# Patient Record
Sex: MALE | Race: WHITE | Employment: OTHER | URBAN - METROPOLITAN AREA
[De-identification: names, ages, dates, MRNs, and addresses within clinical notes are randomized per-mention and may not be internally consistent; named-entity substitution may affect disease eponyms.]

---

## 2017-10-21 ENCOUNTER — APPOINTMENT (OUTPATIENT)
Dept: GENERAL RADIOLOGY | Age: 47
End: 2017-10-21
Payer: MEDICARE

## 2017-10-21 ENCOUNTER — APPOINTMENT (OUTPATIENT)
Dept: CT IMAGING | Age: 47
End: 2017-10-21
Payer: MEDICARE

## 2017-10-21 ENCOUNTER — HOSPITAL ENCOUNTER (EMERGENCY)
Age: 47
Discharge: HOME | End: 2017-10-21
Attending: EMERGENCY MEDICINE
Payer: MEDICARE

## 2017-10-21 VITALS
BODY MASS INDEX: 25.52 KG/M2 | RESPIRATION RATE: 16 BRPM | HEIGHT: 60 IN | DIASTOLIC BLOOD PRESSURE: 98 MMHG | SYSTOLIC BLOOD PRESSURE: 142 MMHG | WEIGHT: 130 LBS | HEART RATE: 76 BPM | OXYGEN SATURATION: 95 % | TEMPERATURE: 97.7 F

## 2017-10-21 DIAGNOSIS — Z93.1 GASTROSTOMY TUBE IN PLACE (HCC): Primary | ICD-10-CM

## 2017-10-21 PROCEDURE — 99283 EMERGENCY DEPT VISIT LOW MDM: CPT

## 2017-10-21 PROCEDURE — 74176 CT ABD & PELVIS W/O CONTRAST: CPT

## 2017-10-21 PROCEDURE — 74000 XR ABDOMEN LIMITED (KUB): CPT

## 2017-10-21 PROCEDURE — 6360000004 HC RX CONTRAST MEDICATION: Performed by: EMERGENCY MEDICINE

## 2017-10-21 RX ORDER — ACETAMINOPHEN 325 MG/1
650 TABLET ORAL EVERY 6 HOURS PRN
Status: ON HOLD | COMMUNITY
End: 2019-04-08

## 2017-10-21 RX ORDER — MELATONIN
1000 2 TIMES DAILY
COMMUNITY

## 2017-10-21 RX ORDER — BISACODYL 10 MG
10 SUPPOSITORY, RECTAL RECTAL DAILY PRN
Status: ON HOLD | COMMUNITY
End: 2019-04-08

## 2017-10-21 RX ORDER — PSEUDOEPHEDRINE HYDROCHLORIDE 30 MG/1
30 TABLET ORAL EVERY 8 HOURS PRN
COMMUNITY

## 2017-10-21 RX ORDER — LEVETIRACETAM 100 MG/ML
1500 SOLUTION ORAL 2 TIMES DAILY
Status: ON HOLD | COMMUNITY
End: 2019-04-16 | Stop reason: HOSPADM

## 2017-10-21 RX ORDER — LACOSAMIDE 50 MG/1
150 TABLET ORAL EVERY MORNING
Status: ON HOLD | COMMUNITY
End: 2019-03-11

## 2017-10-21 RX ORDER — ALBUTEROL SULFATE 2.5 MG/3ML
2.5 SOLUTION RESPIRATORY (INHALATION) 3 TIMES DAILY PRN
Status: ON HOLD | COMMUNITY
End: 2019-03-14 | Stop reason: HOSPADM

## 2017-10-21 RX ORDER — LORATADINE 10 MG/1
10 TABLET ORAL DAILY
Status: ON HOLD | COMMUNITY
End: 2019-04-16 | Stop reason: HOSPADM

## 2017-10-21 RX ORDER — NUTRITIONAL SUPPLEMENT/FIBER
900 LIQUID (ML) ORAL DAILY
COMMUNITY

## 2017-10-21 RX ORDER — CLONAZEPAM 2 MG/1
2 TABLET ORAL PRN
COMMUNITY
End: 2018-01-23

## 2017-10-21 RX ORDER — AMOXICILLIN 250 MG
2 CAPSULE ORAL DAILY
COMMUNITY

## 2017-10-21 RX ORDER — LORAZEPAM 1 MG/1
1 TABLET ORAL PRN
Status: ON HOLD | COMMUNITY
End: 2019-03-11

## 2017-10-21 RX ORDER — LAMOTRIGINE 100 MG/1
100 TABLET ORAL EVERY MORNING
Status: ON HOLD | COMMUNITY
End: 2019-03-14 | Stop reason: HOSPADM

## 2017-10-21 RX ADMIN — DIATRIZOATE MEGLUMINE AND DIATRIZOATE SODIUM 50 ML: 660; 100 LIQUID ORAL; RECTAL at 15:30

## 2017-10-21 ASSESSMENT — ENCOUNTER SYMPTOMS
ABDOMINAL DISTENTION: 0
RESPIRATORY NEGATIVE: 1
EYES NEGATIVE: 1

## 2017-10-21 NOTE — ED PROVIDER NOTES
[address] [place] [address] ED  eMERGENCY dEPARTMENT eNCOUnter      Pt Name: Aron Gonzalez  MRN: [id number]  [place] 1970  Date of evaluation: 10/21/2017  Provider: Jesse Justin MD    [address] [place] [address]       Chief Complaint   Patient presents with    Feeding Tube Problem     malfunction         HISTORY OF PRESENT ILLNESS  (Location/Symptom, Timing/Onset, Context/Setting, Quality, Duration, Modifying Factors, Severity.)   Aron Gonzalez is a 51 y.o. male who presents to the emergency department The complain of having problems with feeding tube nursing home resident sent to this facility      Nursing Notes were reviewed.     PAST MEDICAL HISTORY     Past Medical History:   Diagnosis Date    [facility]erebral [facility] ([facility] [address]     Mental disability     MRDD    Scoliosis     Seizures ([facility] [address]          SURGICAL HISTORY       Past Surgical History:   Procedure Laterality Date    STOMACH SURGERY      has Feeding tube         CURRENT MEDICATIONS       Current Discharge Medication List      CONTINUE these medications which have NOT CHANGED    Details   lamoTRIgine (LAMICTAL) 100 MG tablet 100 mg by Per G Tube route daily      levETIRAcetam (KEPPRA) 100 MG/ML solution Take 1,500 mg by mouth 2 times daily      loratadine (CLARITIN) 10 MG tablet 10 mg by Per G Tube route daily      Lansoprazole (PREVACID 24HR PO) 30 mg by Feeding Tube route daily      pseudoephedrine (SUDAFED) 30 MG tablet 30 mg by Per G Tube route 3 times daily      senna-docusate (PERICOLACE) 8.6-50 MG per tablet 2 tablets by Per G Tube route daily      lacosamide (VIMPAT) 50 MG TABS tablet 100 mg by Per G Tube route 2 times daily      Cholecalciferol (VITAMIN D3) 2000 units CAPS 1,000 Units by Feeding Tube route 2 times daily      Nutritional Supplements (REPLETE/FIBER) LIQD 75 mLs by Feeding Tube route daily      clonazePAM (KLONOPIN) 2 MG tablet 2 mg by Per G Tube route as needed (for seizure greater than 3 min or 3 or more in 1 hour)      LORazepam (ATIVAN) 1 MG tablet Take 1 mg by mouth every 6 hours as needed for Anxiety      acetaminophen (TYLENOL) 325 MG tablet by Per G Tube route every 6 hours as needed for Pain      bisacodyl (DULCOLAX) 10 MG suppository Place 10 mg rectally daily as needed for Constipation      albuterol (PROVENTIL) (2.5 MG/3ML) 0.083% nebulizer solution Take 2.5 mg by nebulization 3 times daily as needed for Wheezing             ALLERGIES     Ampicillin and Valium [diazepam]    FAMILY HISTORY     History reviewed. No pertinent family history. SOCIAL HISTORY       Social History     Social History    Marital status: Single     Spouse name: N/A    Number of children: N/A    Years of education: N/A     Social History Main Topics    Smoking status: Never Smoker    Smokeless tobacco: Never Used    Alcohol use No    Drug use: No    Sexual activity: Not Asked     Other Topics Concern    None     Social History Narrative    None         REVIEW OF SYSTEMS    (2-9 systems for level 4, 10 or more for level 5)     Review of Systems   Constitutional: Positive for fatigue. HENT: Negative. Eyes: Negative. Respiratory: Negative. Cardiovascular: Negative. Gastrointestinal: Negative for abdominal distention. Feediing tube not working  Abdomen not distended,   Feeding tube to be attached to the left side of the epigastric   Musculoskeletal: Negative. Hematological: Negative. Psychiatric/Behavioral: Negative. Except as noted above the remainder of the review of systems was reviewed and negative. PHYSICAL EXAM    (up to 7 for level 4, 8 or more for level 5)     ED Triage Vitals   BP Temp Temp src Pulse Resp SpO2 Height Weight   -- -- -- -- -- -- -- --       Physical Exam   Constitutional: He appears well-developed and well-nourished. Neck: Normal range of motion. Cardiovascular: Normal rate and regular rhythm. Pulmonary/Chest: Breath sounds normal.   Abdominal: Soft.    Neurological:   Doesn't respond to verbal complication

## 2017-11-13 ENCOUNTER — HOSPITAL ENCOUNTER (INPATIENT)
Age: 47
LOS: 8 days | Discharge: OTHER INSTITUTION | DRG: 193 | End: 2017-11-21
Attending: EMERGENCY MEDICINE | Admitting: INTERNAL MEDICINE
Payer: MEDICARE

## 2017-11-13 ENCOUNTER — APPOINTMENT (OUTPATIENT)
Dept: GENERAL RADIOLOGY | Age: 47
DRG: 193 | End: 2017-11-13
Payer: MEDICARE

## 2017-11-13 DIAGNOSIS — J44.1 COPD EXACERBATION (HCC): Primary | ICD-10-CM

## 2017-11-13 LAB
ABSOLUTE EOS #: 0.1 K/UL (ref 0–0.4)
ABSOLUTE IMMATURE GRANULOCYTE: NORMAL K/UL (ref 0–0.3)
ABSOLUTE LYMPH #: 2.8 K/UL (ref 1–4.8)
ABSOLUTE MONO #: 0.7 K/UL (ref 0.2–0.8)
ANION GAP SERPL CALCULATED.3IONS-SCNC: 15 MMOL/L (ref 9–17)
BASOPHILS # BLD: 1 %
BASOPHILS ABSOLUTE: 0 K/UL (ref 0–0.2)
BNP INTERPRETATION: NORMAL
BUN BLDV-MCNC: 14 MG/DL (ref 6–20)
BUN/CREAT BLD: ABNORMAL (ref 9–20)
CALCIUM SERPL-MCNC: 9.3 MG/DL (ref 8.6–10.4)
CHLORIDE BLD-SCNC: 96 MMOL/L (ref 98–107)
CO2: 27 MMOL/L (ref 20–31)
CREAT SERPL-MCNC: <0.4 MG/DL (ref 0.7–1.2)
DIFFERENTIAL TYPE: NORMAL
EOSINOPHILS RELATIVE PERCENT: 2 %
GFR AFRICAN AMERICAN: ABNORMAL ML/MIN
GFR NON-AFRICAN AMERICAN: ABNORMAL ML/MIN
GFR SERPL CREATININE-BSD FRML MDRD: ABNORMAL ML/MIN/{1.73_M2}
GFR SERPL CREATININE-BSD FRML MDRD: ABNORMAL ML/MIN/{1.73_M2}
GLUCOSE BLD-MCNC: 100 MG/DL (ref 70–99)
HCT VFR BLD CALC: 45.7 % (ref 41–53)
HEMOGLOBIN: 15.3 G/DL (ref 13.5–17.5)
IMMATURE GRANULOCYTES: NORMAL %
LACTIC ACID: 1.9 MMOL/L (ref 0.5–2.2)
LYMPHOCYTES # BLD: 44 %
MCH RBC QN AUTO: 32.1 PG (ref 26–34)
MCHC RBC AUTO-ENTMCNC: 33.5 G/DL (ref 31–37)
MCV RBC AUTO: 95.8 FL (ref 80–100)
MONOCYTES # BLD: 11 %
MYOGLOBIN: <21 NG/ML (ref 28–72)
PDW BLD-RTO: 13.4 % (ref 11.5–14.5)
PLATELET # BLD: 253 K/UL (ref 130–400)
PLATELET ESTIMATE: NORMAL
PMV BLD AUTO: 9.3 FL (ref 6–12)
POTASSIUM SERPL-SCNC: 3.9 MMOL/L (ref 3.7–5.3)
PRO-BNP: 58 PG/ML
RBC # BLD: 4.77 M/UL (ref 4.5–5.9)
RBC # BLD: NORMAL 10*6/UL
SEG NEUTROPHILS: 42 %
SEGMENTED NEUTROPHILS ABSOLUTE COUNT: 2.6 K/UL (ref 1.8–7.7)
SODIUM BLD-SCNC: 138 MMOL/L (ref 135–144)
TROPONIN INTERP: ABNORMAL
TROPONIN T: <0.03 NG/ML
WBC # BLD: 6.3 K/UL (ref 3.5–11)
WBC # BLD: NORMAL 10*3/UL

## 2017-11-13 PROCEDURE — 96375 TX/PRO/DX INJ NEW DRUG ADDON: CPT

## 2017-11-13 PROCEDURE — 2580000003 HC RX 258: Performed by: NURSE PRACTITIONER

## 2017-11-13 PROCEDURE — 83880 ASSAY OF NATRIURETIC PEPTIDE: CPT

## 2017-11-13 PROCEDURE — 94640 AIRWAY INHALATION TREATMENT: CPT

## 2017-11-13 PROCEDURE — 6360000002 HC RX W HCPCS: Performed by: NURSE PRACTITIONER

## 2017-11-13 PROCEDURE — 80048 BASIC METABOLIC PNL TOTAL CA: CPT

## 2017-11-13 PROCEDURE — 83605 ASSAY OF LACTIC ACID: CPT

## 2017-11-13 PROCEDURE — 99285 EMERGENCY DEPT VISIT HI MDM: CPT

## 2017-11-13 PROCEDURE — 6370000000 HC RX 637 (ALT 250 FOR IP): Performed by: NURSE PRACTITIONER

## 2017-11-13 PROCEDURE — 1200000000 HC SEMI PRIVATE

## 2017-11-13 PROCEDURE — 83874 ASSAY OF MYOGLOBIN: CPT

## 2017-11-13 PROCEDURE — 96374 THER/PROPH/DIAG INJ IV PUSH: CPT

## 2017-11-13 PROCEDURE — 85025 COMPLETE CBC W/AUTO DIFF WBC: CPT

## 2017-11-13 PROCEDURE — 84484 ASSAY OF TROPONIN QUANT: CPT

## 2017-11-13 PROCEDURE — 87040 BLOOD CULTURE FOR BACTERIA: CPT

## 2017-11-13 PROCEDURE — 71010 XR CHEST PORTABLE: CPT

## 2017-11-13 PROCEDURE — 93005 ELECTROCARDIOGRAM TRACING: CPT

## 2017-11-13 RX ORDER — METHYLPREDNISOLONE SODIUM SUCCINATE 125 MG/2ML
80 INJECTION, POWDER, LYOPHILIZED, FOR SOLUTION INTRAMUSCULAR; INTRAVENOUS EVERY 8 HOURS
Status: DISCONTINUED | OUTPATIENT
Start: 2017-11-14 | End: 2017-11-15

## 2017-11-13 RX ORDER — ALBUTEROL SULFATE 90 UG/1
2 AEROSOL, METERED RESPIRATORY (INHALATION)
Status: DISCONTINUED | OUTPATIENT
Start: 2017-11-13 | End: 2017-11-13

## 2017-11-13 RX ORDER — MONTELUKAST SODIUM 10 MG/1
10 TABLET ORAL NIGHTLY
COMMUNITY

## 2017-11-13 RX ORDER — LAMOTRIGINE 100 MG/1
100 TABLET ORAL DAILY
Status: DISCONTINUED | OUTPATIENT
Start: 2017-11-14 | End: 2017-11-21 | Stop reason: HOSPADM

## 2017-11-13 RX ORDER — MORPHINE SULFATE 4 MG/ML
4 INJECTION, SOLUTION INTRAMUSCULAR; INTRAVENOUS
Status: DISCONTINUED | OUTPATIENT
Start: 2017-11-13 | End: 2017-11-21 | Stop reason: HOSPADM

## 2017-11-13 RX ORDER — METHYLPREDNISOLONE SODIUM SUCCINATE 125 MG/2ML
125 INJECTION, POWDER, LYOPHILIZED, FOR SOLUTION INTRAMUSCULAR; INTRAVENOUS ONCE
Status: COMPLETED | OUTPATIENT
Start: 2017-11-13 | End: 2017-11-13

## 2017-11-13 RX ORDER — BACLOFEN 10 MG/1
10 TABLET ORAL 3 TIMES DAILY
COMMUNITY

## 2017-11-13 RX ORDER — ALBUTEROL SULFATE 2.5 MG/3ML
5 SOLUTION RESPIRATORY (INHALATION)
Status: DISCONTINUED | OUTPATIENT
Start: 2017-11-13 | End: 2017-11-13

## 2017-11-13 RX ORDER — MORPHINE SULFATE 2 MG/ML
2 INJECTION, SOLUTION INTRAMUSCULAR; INTRAVENOUS
Status: DISCONTINUED | OUTPATIENT
Start: 2017-11-13 | End: 2017-11-21 | Stop reason: HOSPADM

## 2017-11-13 RX ORDER — BACLOFEN 10 MG/1
10 TABLET ORAL 3 TIMES DAILY
Status: DISCONTINUED | OUTPATIENT
Start: 2017-11-13 | End: 2017-11-21 | Stop reason: HOSPADM

## 2017-11-13 RX ORDER — BISACODYL 10 MG
10 SUPPOSITORY, RECTAL RECTAL DAILY PRN
Status: DISCONTINUED | OUTPATIENT
Start: 2017-11-13 | End: 2017-11-21 | Stop reason: HOSPADM

## 2017-11-13 RX ORDER — ALBUTEROL SULFATE 2.5 MG/3ML
2.5 SOLUTION RESPIRATORY (INHALATION)
Status: DISCONTINUED | OUTPATIENT
Start: 2017-11-13 | End: 2017-11-15

## 2017-11-13 RX ORDER — POLYETHYLENE GLYCOL 3350 17 G/17G
17 POWDER, FOR SOLUTION ORAL DAILY
Status: ON HOLD | COMMUNITY
End: 2019-03-11

## 2017-11-13 RX ORDER — SODIUM CHLORIDE 0.9 % (FLUSH) 0.9 %
10 SYRINGE (ML) INJECTION PRN
Status: DISCONTINUED | OUTPATIENT
Start: 2017-11-13 | End: 2017-11-21 | Stop reason: HOSPADM

## 2017-11-13 RX ORDER — LACTULOSE 10 G/15ML
20 SOLUTION ORAL 2 TIMES DAILY
Status: ON HOLD | COMMUNITY
End: 2019-03-11

## 2017-11-13 RX ORDER — SODIUM CHLORIDE 0.9 % (FLUSH) 0.9 %
10 SYRINGE (ML) INJECTION EVERY 12 HOURS SCHEDULED
Status: DISCONTINUED | OUTPATIENT
Start: 2017-11-13 | End: 2017-11-21 | Stop reason: HOSPADM

## 2017-11-13 RX ORDER — SODIUM CHLORIDE 9 MG/ML
INJECTION, SOLUTION INTRAVENOUS CONTINUOUS
Status: DISCONTINUED | OUTPATIENT
Start: 2017-11-13 | End: 2017-11-15

## 2017-11-13 RX ORDER — LAMOTRIGINE 100 MG/1
200 TABLET ORAL 2 TIMES DAILY
Status: DISCONTINUED | OUTPATIENT
Start: 2017-11-13 | End: 2017-11-21 | Stop reason: HOSPADM

## 2017-11-13 RX ORDER — ONDANSETRON 2 MG/ML
4 INJECTION INTRAMUSCULAR; INTRAVENOUS EVERY 6 HOURS PRN
Status: DISCONTINUED | OUTPATIENT
Start: 2017-11-13 | End: 2017-11-21 | Stop reason: HOSPADM

## 2017-11-13 RX ORDER — IPRATROPIUM BROMIDE AND ALBUTEROL SULFATE 2.5; .5 MG/3ML; MG/3ML
1 SOLUTION RESPIRATORY (INHALATION)
Status: DISCONTINUED | OUTPATIENT
Start: 2017-11-14 | End: 2017-11-21 | Stop reason: HOSPADM

## 2017-11-13 RX ORDER — IPRATROPIUM BROMIDE AND ALBUTEROL SULFATE 2.5; .5 MG/3ML; MG/3ML
1 SOLUTION RESPIRATORY (INHALATION)
Status: DISCONTINUED | OUTPATIENT
Start: 2017-11-13 | End: 2017-11-13

## 2017-11-13 RX ORDER — CLONAZEPAM 0.5 MG/1
2 TABLET ORAL PRN
Status: DISCONTINUED | OUTPATIENT
Start: 2017-11-13 | End: 2017-11-21 | Stop reason: HOSPADM

## 2017-11-13 RX ORDER — HYDROCODONE BITARTRATE AND ACETAMINOPHEN 5; 325 MG/1; MG/1
1 TABLET ORAL EVERY 4 HOURS PRN
Status: DISCONTINUED | OUTPATIENT
Start: 2017-11-13 | End: 2017-11-21 | Stop reason: HOSPADM

## 2017-11-13 RX ORDER — SODIUM PHOSPHATE, DIBASIC AND SODIUM PHOSPHATE, MONOBASIC 7; 19 G/133ML; G/133ML
1 ENEMA RECTAL DAILY PRN
Status: ON HOLD | COMMUNITY
End: 2019-04-08

## 2017-11-13 RX ORDER — LAMOTRIGINE 100 MG/1
200 TABLET ORAL 2 TIMES DAILY
Status: ON HOLD | COMMUNITY
End: 2019-03-11 | Stop reason: SDUPTHER

## 2017-11-13 RX ORDER — SENNA AND DOCUSATE SODIUM 50; 8.6 MG/1; MG/1
2 TABLET, FILM COATED ORAL DAILY
Status: DISCONTINUED | OUTPATIENT
Start: 2017-11-14 | End: 2017-11-21 | Stop reason: HOSPADM

## 2017-11-13 RX ORDER — NICOTINE 21 MG/24HR
1 PATCH, TRANSDERMAL 24 HOURS TRANSDERMAL DAILY PRN
Status: DISCONTINUED | OUTPATIENT
Start: 2017-11-13 | End: 2017-11-21 | Stop reason: HOSPADM

## 2017-11-13 RX ORDER — CALCIUM CARBONATE 200(500)MG
1000 TABLET,CHEWABLE ORAL 2 TIMES DAILY
COMMUNITY

## 2017-11-13 RX ORDER — LACOSAMIDE 100 MG/1
100 TABLET ORAL 2 TIMES DAILY
Status: DISCONTINUED | OUTPATIENT
Start: 2017-11-13 | End: 2017-11-21 | Stop reason: HOSPADM

## 2017-11-13 RX ORDER — ACETAMINOPHEN 325 MG/1
650 TABLET ORAL EVERY 4 HOURS PRN
Status: DISCONTINUED | OUTPATIENT
Start: 2017-11-13 | End: 2017-11-21 | Stop reason: HOSPADM

## 2017-11-13 RX ORDER — LEVETIRACETAM 100 MG/ML
1500 SOLUTION ORAL 2 TIMES DAILY
Status: DISCONTINUED | OUTPATIENT
Start: 2017-11-13 | End: 2017-11-21 | Stop reason: HOSPADM

## 2017-11-13 RX ORDER — FLUTICASONE PROPIONATE 50 MCG
2 SPRAY, SUSPENSION (ML) NASAL DAILY
COMMUNITY

## 2017-11-13 RX ADMIN — Medication 10 ML: at 23:13

## 2017-11-13 RX ADMIN — AZITHROMYCIN MONOHYDRATE 500 MG: 500 INJECTION, POWDER, LYOPHILIZED, FOR SOLUTION INTRAVENOUS at 20:12

## 2017-11-13 RX ADMIN — LACOSAMIDE 100 MG: 100 TABLET, FILM COATED ORAL at 23:31

## 2017-11-13 RX ADMIN — LAMOTRIGINE 200 MG: 100 TABLET ORAL at 23:31

## 2017-11-13 RX ADMIN — BACLOFEN 10 MG: 10 TABLET ORAL at 22:37

## 2017-11-13 RX ADMIN — SODIUM CHLORIDE: 9 INJECTION, SOLUTION INTRAVENOUS at 22:26

## 2017-11-13 RX ADMIN — LEVETIRACETAM 1500 MG: 100 SOLUTION ORAL at 22:37

## 2017-11-13 RX ADMIN — CEFTRIAXONE SODIUM 1 G: 1 INJECTION, POWDER, FOR SOLUTION INTRAMUSCULAR; INTRAVENOUS at 19:36

## 2017-11-13 RX ADMIN — ALBUTEROL SULFATE 2.5 MG: 2.5 SOLUTION RESPIRATORY (INHALATION) at 18:24

## 2017-11-13 RX ADMIN — METHYLPREDNISOLONE SODIUM SUCCINATE 125 MG: 125 INJECTION, POWDER, FOR SOLUTION INTRAMUSCULAR; INTRAVENOUS at 18:25

## 2017-11-13 ASSESSMENT — ENCOUNTER SYMPTOMS
SORE THROAT: 0
SHORTNESS OF BREATH: 1
COUGH: 1
COLOR CHANGE: 0
ABDOMINAL PAIN: 0
CONSTIPATION: 0
DIARRHEA: 0
WHEEZING: 1
NAUSEA: 0
RHINORRHEA: 0
SINUS PRESSURE: 0
VOMITING: 0

## 2017-11-14 ENCOUNTER — APPOINTMENT (OUTPATIENT)
Dept: GENERAL RADIOLOGY | Age: 47
DRG: 193 | End: 2017-11-14
Payer: MEDICARE

## 2017-11-14 PROBLEM — G40.909 SEIZURE DISORDER (HCC): Status: ACTIVE | Noted: 2017-11-14

## 2017-11-14 PROBLEM — J44.1 ACUTE EXACERBATION OF CHRONIC OBSTRUCTIVE PULMONARY DISEASE (COPD) (HCC): Status: ACTIVE | Noted: 2017-11-14

## 2017-11-14 PROBLEM — G80.9 CEREBRAL PALSIED (HCC): Status: ACTIVE | Noted: 2017-11-14

## 2017-11-14 LAB
ALBUMIN SERPL-MCNC: 4 G/DL (ref 3.5–5.2)
ALBUMIN/GLOBULIN RATIO: ABNORMAL (ref 1–2.5)
ALP BLD-CCNC: 101 U/L (ref 40–129)
ALT SERPL-CCNC: 44 U/L (ref 5–41)
ANION GAP SERPL CALCULATED.3IONS-SCNC: 13 MMOL/L (ref 9–17)
AST SERPL-CCNC: 28 U/L
BILIRUB SERPL-MCNC: 0.17 MG/DL (ref 0.3–1.2)
BUN BLDV-MCNC: 10 MG/DL (ref 6–20)
BUN/CREAT BLD: ABNORMAL (ref 9–20)
CALCIUM SERPL-MCNC: 8.9 MG/DL (ref 8.6–10.4)
CHLORIDE BLD-SCNC: 99 MMOL/L (ref 98–107)
CO2: 26 MMOL/L (ref 20–31)
CREAT SERPL-MCNC: <0.4 MG/DL (ref 0.7–1.2)
EKG ATRIAL RATE: 91 BPM
EKG P AXIS: 53 DEGREES
EKG P-R INTERVAL: 132 MS
EKG Q-T INTERVAL: 394 MS
EKG QRS DURATION: 88 MS
EKG QTC CALCULATION (BAZETT): 484 MS
EKG R AXIS: 124 DEGREES
EKG T AXIS: 45 DEGREES
EKG VENTRICULAR RATE: 91 BPM
GFR AFRICAN AMERICAN: ABNORMAL ML/MIN
GFR NON-AFRICAN AMERICAN: ABNORMAL ML/MIN
GFR SERPL CREATININE-BSD FRML MDRD: ABNORMAL ML/MIN/{1.73_M2}
GFR SERPL CREATININE-BSD FRML MDRD: ABNORMAL ML/MIN/{1.73_M2}
GLUCOSE BLD-MCNC: 134 MG/DL (ref 70–99)
HCT VFR BLD CALC: 44.8 % (ref 41–53)
HEMOGLOBIN: 14.8 G/DL (ref 13.5–17.5)
MCH RBC QN AUTO: 31.5 PG (ref 26–34)
MCHC RBC AUTO-ENTMCNC: 33.2 G/DL (ref 31–37)
MCV RBC AUTO: 94.9 FL (ref 80–100)
PDW BLD-RTO: 13.2 % (ref 11.5–14.5)
PLATELET # BLD: 258 K/UL (ref 130–400)
PMV BLD AUTO: 9.5 FL (ref 6–12)
POTASSIUM SERPL-SCNC: 3.9 MMOL/L (ref 3.7–5.3)
RBC # BLD: 4.72 M/UL (ref 4.5–5.9)
SODIUM BLD-SCNC: 138 MMOL/L (ref 135–144)
TOTAL PROTEIN: 7.2 G/DL (ref 6.4–8.3)
WBC # BLD: 3.5 K/UL (ref 3.5–11)

## 2017-11-14 PROCEDURE — 94640 AIRWAY INHALATION TREATMENT: CPT

## 2017-11-14 PROCEDURE — 80053 COMPREHEN METABOLIC PANEL: CPT

## 2017-11-14 PROCEDURE — 93005 ELECTROCARDIOGRAM TRACING: CPT

## 2017-11-14 PROCEDURE — 6360000002 HC RX W HCPCS: Performed by: NURSE PRACTITIONER

## 2017-11-14 PROCEDURE — 36415 COLL VENOUS BLD VENIPUNCTURE: CPT

## 2017-11-14 PROCEDURE — 2580000003 HC RX 258: Performed by: NURSE PRACTITIONER

## 2017-11-14 PROCEDURE — 2500000003 HC RX 250 WO HCPCS: Performed by: NURSE PRACTITIONER

## 2017-11-14 PROCEDURE — 2700000000 HC OXYGEN THERAPY PER DAY

## 2017-11-14 PROCEDURE — 6370000000 HC RX 637 (ALT 250 FOR IP): Performed by: NURSE PRACTITIONER

## 2017-11-14 PROCEDURE — 71010 XR CHEST PORTABLE: CPT

## 2017-11-14 PROCEDURE — 1200000000 HC SEMI PRIVATE

## 2017-11-14 PROCEDURE — 6370000000 HC RX 637 (ALT 250 FOR IP): Performed by: INTERNAL MEDICINE

## 2017-11-14 PROCEDURE — 99223 1ST HOSP IP/OBS HIGH 75: CPT | Performed by: INTERNAL MEDICINE

## 2017-11-14 PROCEDURE — 94760 N-INVAS EAR/PLS OXIMETRY 1: CPT

## 2017-11-14 PROCEDURE — 85027 COMPLETE CBC AUTOMATED: CPT

## 2017-11-14 RX ORDER — BACITRACIN, NEOMYCIN, POLYMYXIN B 400; 3.5; 5 [USP'U]/G; MG/G; [USP'U]/G
OINTMENT TOPICAL 2 TIMES DAILY
Status: DISCONTINUED | OUTPATIENT
Start: 2017-11-14 | End: 2017-11-21 | Stop reason: HOSPADM

## 2017-11-14 RX ADMIN — VITAMIN D, TAB 1000IU (100/BT) 1000 UNITS: 25 TAB at 10:07

## 2017-11-14 RX ADMIN — BACLOFEN 10 MG: 10 TABLET ORAL at 10:07

## 2017-11-14 RX ADMIN — LEVETIRACETAM 1500 MG: 100 SOLUTION ORAL at 10:08

## 2017-11-14 RX ADMIN — IPRATROPIUM BROMIDE AND ALBUTEROL SULFATE 1 AMPULE: .5; 3 SOLUTION RESPIRATORY (INHALATION) at 15:44

## 2017-11-14 RX ADMIN — AZITHROMYCIN MONOHYDRATE 500 MG: 500 INJECTION, POWDER, LYOPHILIZED, FOR SOLUTION INTRAVENOUS at 20:44

## 2017-11-14 RX ADMIN — ENOXAPARIN SODIUM 40 MG: 40 INJECTION SUBCUTANEOUS at 10:07

## 2017-11-14 RX ADMIN — IPRATROPIUM BROMIDE AND ALBUTEROL SULFATE 1 AMPULE: .5; 3 SOLUTION RESPIRATORY (INHALATION) at 06:19

## 2017-11-14 RX ADMIN — BACITRACIN, NEOMYCIN, POLYMYXIN B 14 G: 400; 3.5; 5 OINTMENT TOPICAL at 20:45

## 2017-11-14 RX ADMIN — BACLOFEN 10 MG: 10 TABLET ORAL at 15:46

## 2017-11-14 RX ADMIN — BACLOFEN 10 MG: 10 TABLET ORAL at 20:45

## 2017-11-14 RX ADMIN — LACOSAMIDE 100 MG: 100 TABLET, FILM COATED ORAL at 20:45

## 2017-11-14 RX ADMIN — IPRATROPIUM BROMIDE AND ALBUTEROL SULFATE 1 AMPULE: .5; 3 SOLUTION RESPIRATORY (INHALATION) at 11:20

## 2017-11-14 RX ADMIN — METHYLPREDNISOLONE SODIUM SUCCINATE 80 MG: 125 INJECTION, POWDER, FOR SOLUTION INTRAMUSCULAR; INTRAVENOUS at 02:00

## 2017-11-14 RX ADMIN — LAMOTRIGINE 200 MG: 100 TABLET ORAL at 20:44

## 2017-11-14 RX ADMIN — LACOSAMIDE 100 MG: 100 TABLET, FILM COATED ORAL at 10:07

## 2017-11-14 RX ADMIN — LANSOPRAZOLE 30 MG: 30 TABLET, ORALLY DISINTEGRATING, DELAYED RELEASE ORAL at 07:19

## 2017-11-14 RX ADMIN — LAMOTRIGINE 200 MG: 100 TABLET ORAL at 10:07

## 2017-11-14 RX ADMIN — IPRATROPIUM BROMIDE AND ALBUTEROL SULFATE 1 AMPULE: .5; 3 SOLUTION RESPIRATORY (INHALATION) at 19:35

## 2017-11-14 RX ADMIN — VITAMIN D, TAB 1000IU (100/BT) 1000 UNITS: 25 TAB at 20:45

## 2017-11-14 RX ADMIN — DOCUSATE SODIUM AND SENNOSIDES 2 TABLET: 8.6; 5 TABLET, FILM COATED ORAL at 10:07

## 2017-11-14 RX ADMIN — LEVETIRACETAM 1500 MG: 100 SOLUTION ORAL at 20:47

## 2017-11-14 RX ADMIN — METHYLPREDNISOLONE SODIUM SUCCINATE 80 MG: 125 INJECTION, POWDER, FOR SOLUTION INTRAMUSCULAR; INTRAVENOUS at 18:34

## 2017-11-14 RX ADMIN — METHYLPREDNISOLONE SODIUM SUCCINATE 80 MG: 125 INJECTION, POWDER, FOR SOLUTION INTRAMUSCULAR; INTRAVENOUS at 10:07

## 2017-11-14 RX ADMIN — LAMOTRIGINE 100 MG: 100 TABLET ORAL at 15:45

## 2017-11-14 RX ADMIN — BACITRACIN, NEOMYCIN, POLYMYXIN B: 400; 3.5; 5 OINTMENT TOPICAL at 15:45

## 2017-11-14 RX ADMIN — CEFTRIAXONE SODIUM 1 G: 10 INJECTION, POWDER, FOR SOLUTION INTRAVENOUS at 18:34

## 2017-11-14 ASSESSMENT — PAIN SCALES - WONG BAKER
WONGBAKER_NUMERICALRESPONSE: 0

## 2017-11-14 NOTE — ED PROVIDER NOTES
[address] [place] [address] ED  eMERGENCY dEPARTMENT eNCOUnter      Pt Name: Manuela Salazar  MRN: [id number]  [place] 1970  Date of evaluation: 11/13/2017  Provider: Ingrid Schultz NP, [id number]       Chief Complaint   Patient presents with    Shortness of Breath         HISTORY OF PRESENT ILLNESS  (Location/Symptom, Timing/Onset, Context/Setting, Quality, Duration, Modifying Factors, Severity.)   Manuela Salazar is a 51 y.o. male who presents to the emergency department Today by private vehicle for evaluation of cough and wheezing. The caregiver states that the patient has had a wet cough with audible wheezing for the last 2 days. The nurses at the group home called 911 because of the wheezing. EMS had given him a breathing treatment prior to arrival He denies any fevers or chills. He is currently getting tube feedings through a G-tube in the stomach. They deny that he's had any nausea or vomiting. Nursing Notes were reviewed.     ALLERGIES     Ampicillin and Valium [diazepam]    CURRENT MEDICATIONS       Previous Medications    ACETAMINOPHEN (TYLENOL) 325 MG TABLET    by Per G Tube route every 6 hours as needed for Pain    ALBUTEROL (PROVENTIL) (2.5 MG/3ML) 0.083% NEBULIZER SOLUTION    Take 2.5 mg by nebulization 3 times daily as needed for Wheezing    BISACODYL (DULCOLAX) 10 MG SUPPOSITORY    Place 10 mg rectally daily as needed for Constipation    CHOLECALCIFEROL (VITAMIN D3) 2000 UNITS CAPS    1,000 Units by Feeding Tube route 2 times daily    CLONAZEPAM (KLONOPIN) 2 MG TABLET    2 mg by Per G Tube route as needed (for seizure greater than 3 min or 3 or more in 1 hour)    LACOSAMIDE (VIMPAT) 50 MG TABS TABLET    100 mg by Per G Tube route 2 times daily    LAMOTRIGINE (LAMICTAL) 100 MG TABLET    100 mg by Per G Tube route daily    LANSOPRAZOLE (PREVACID 24HR PO)    30 mg by Feeding Tube route daily    LEVETIRACETAM (KEPPRA) 100 MG/ML SOLUTION    Take 1,500 mg by mouth 2 times daily HISTORY: Pain TECHNOLOGIST PROVIDED HISTORY: Inject 30-50ml of water soluble contrast to the tube, take  Xray immediately as advised by Radiologist Reason for exam:->Pain Ordering Physician Provided Reason for Exam: pegogram Acuity: Unknown Type of Exam: Unknown FINDINGS: Contrast was injected through the percutaneous enteric tube. Injected contrast appears within the lumen of the bowel. Multiple dilated bowel loops are otherwise unchanged from previous examination. Severe thoracolumbar scoliosis. Deformity of the left hip is unchanged. Injected oral contrast appears within lumen of bowel, indicating appropriate position. Xr Abdomen (kub) (single Ap View)    Result Date: 10/21/2017  EXAMINATION: SUPINE VIEW(S) OF THE ABDOMEN 10/21/2017 1:16 pm COMPARISON: None. HISTORY: ORDERING SYSTEM PROVIDED HISTORY: Pain TECHNOLOGIST PROVIDED HISTORY: Reason for exam:->Pain Acuity: Unknown Type of Exam: Unknown FINDINGS: Examination is somewhat limited due to patient body habitus. There are multiple dilated bowel loops throughout the abdomen or pelvis. Moderate colonic stool burden. Limited evaluation of the intra-abdominal free air on the supine radiograph. Severe thoracolumbar scoliosis. Multilevel degenerative changes of the thoracolumbar spine. Severe diffuse osteopenia. Chronic appearing deformity of the left hip. Multiple dilated bowel loops throughout the abdomen or pelvis may represent ileus, or less likely bowel obstruction. Moderate colonic stool burden. Severe thoracolumbar scoliosis. Chronic deformity of the left hip. Xr Chest Portable    Result Date: 11/13/2017  EXAMINATION: SINGLE VIEW OF THE CHEST 11/13/2017 6:19 pm COMPARISON: None.  HISTORY: ORDERING SYSTEM PROVIDED HISTORY: Chest Pain TECHNOLOGIST PROVIDED HISTORY: Reason for exam:->Chest Pain Ordering Physician Provided Reason for Exam: cough Acuity: Unknown unsure Type of Exam: Unknown Additional signs and symptoms: wheezing Relevant Medical/Surgical History: MRDD  scoliosis  feeding tube FINDINGS: Single frontal view of the chest is submitted for review. Cardiac silhouette is mildly enlarged. Low lung volumes are present resulting in crowding of the bronchopulmonary system. Severe scoliotic curvature of the thoracolumbar spine noted. Generalized osteopenia noted. Low lung volume examination resulting in crowding of the bronchopulmonary interstitium. No obvious focal airspace consolidation or pneumothorax. Severe scoliotic curvature of the thoracolumbar spine. Generalized osteopenia. Interpretation per the Radiologist below, if available at the time of this note:    XR Chest Portable   Final Result   Low lung volume examination resulting in crowding of the bronchopulmonary   interstitium. No obvious focal airspace consolidation or pneumothorax. Severe scoliotic curvature of the thoracolumbar spine. Generalized   osteopenia. LABS:  Labs Reviewed   BASIC METABOLIC PANEL - Abnormal; Notable for the following:        Result Value    Glucose 100 (*)     CREATININE <0.40 (*)     Chloride 96 (*)     All other components within normal limits   TROP/MYOGLOBIN - Abnormal; Notable for the following:     Myoglobin <21 (*)     All other components within normal limits   CULTURE BLOOD #1   CULTURE BLOOD #1   CBC WITH AUTO DIFFERENTIAL   BRAIN NATRIURETIC PEPTIDE   LACTIC ACID       All other labs were within normal range or not returned as of this dictation. EMERGENCY DEPARTMENT COURSE and DIFFERENTIAL DIAGNOSIS/MDM:   Vitals:    Vitals:    11/13/17 1834 11/13/17 1842 11/13/17 1857 11/13/17 1912   BP:  (!) 148/86 (!) 139/97 134/85   Pulse: 92 87 82 86   Resp: 23 26 26 24   SpO2: 98% 94% (!) 89% 95%   Weight:           Medical Decision Making: patient oxygen saturation did drop into the high 80s on room air and he remains wheezy and tachypneic despite breathing treatments and steroids.  He will be admitted to the

## 2017-11-14 NOTE — PLAN OF CARE
Problem: Falls - Risk of  Goal: Absence of falls  Outcome: Ongoing  Patient remains free from injuries and falls, appropriate measures in place       Problem: Risk for Impaired Skin Integrity  Goal: Tissue integrity - skin and mucous membranes  Structural intactness and normal physiological function of skin and  mucous membranes. Outcome: Ongoing  Skin integrity maintained, no new skin abnormalities assessed. Appropriate measures remain in place      Problem: Airway Clearance - Ineffective:  Goal: Ability to maintain a clear airway will improve  Ability to maintain a clear airway will improve  Outcome: Ongoing  Patients respiratory status being controlled at this time with use of respiratory treatments , IV antibiotics and IV soumedrol. No signs or symptoms of distress noted. Respirations non-labored and regular. Nasal canula 02 in place as needed to maintain sp02>90% or per md order.

## 2017-11-14 NOTE — PROGRESS NOTES
Nutrition Assessment    Type and Reason for Visit: Initial, Consult (tube feeding recommendations)    Nutrition Recommendations: 1. Modify tube feed order to run 77 ml/hr x 13 hours per G-tube to infuse entire 1L carton. This will give 1200 kcal, 55.5 g protein, and 805 ml free water. 100 ml 4x/day for flushes if no IVF. 2. Monitor for loose stool. Malnutrition Assessment:  · Malnutrition Status: Meets the criteria for severe malnutrition  · Context: Chronic illness  · Findings of the 6 clinical characteristics of malnutrition (Minimum of 2 out of 6 clinical characteristics is required to make the diagnosis of moderate or severe Protein Calorie Malnutrition based on AND/ASPEN Guidelines):  1. Energy Intake-Greater than 75%, not able to assess    2. Weight Loss-No significant weight loss, unable to assess  3. Fat Loss-Moderate subcutaneous fat loss, Orbital, Triceps  4. Muscle Loss-Moderate muscle mass loss, Temples (temporalis muscle), Clavicles (pectoralis and deltoids), Interosseous, Calf (gastrocnemius)  5. Fluid Accumulation-No significant fluid accumulation, Extremities  6.  Strength-Not measured    Nutrition Diagnosis:   · Problem: Inadequate oral intake  · Etiology: related to Cognitive or neurological impairment     Signs and symptoms:  as evidenced by NPO status due to medical condition, Nutrition support - EN    Nutrition Assessment:  · Subjective Assessment: Pt has cerebral palsy, MRDD. He is laying in bed, and is non-communicative. Pt has been started on tube feed this morning per nursing. Pt was in ER, and transferred out. Spoke with nursing, Deepika Toscano, and no family present. Per Deepika Toscano, pt is taking Nutren with fiber at home @ 75 ml/hr for 13 hours per day. Not certain which Nutren density is used. Will complete assessment.   +Wt loss: 4 lb (1.8 kg) 3% x 3 weeks  · Nutrition-Focused Physical Findings: GI: +gastrostomy, +rounded, +active bowel sounds, +PV: WDL  · Wound Type: None  · Current

## 2017-11-14 NOTE — PROGRESS NOTES
Pt admitted to room 2004 bed 1 from ER. Patient is non-verbal and contracted. Patient repositioned in bed for comfort, seizure pads in place, side rails 3/4 up.

## 2017-11-14 NOTE — H&P
Yes Historical Provider, MD   Potassium Chloride (KLOR-CON SPRINKLE PO) 10 mEq by Per G Tube route daily   Yes Historical Provider, MD   montelukast (SINGULAIR) 10 MG tablet 10 mg by Per G Tube route nightly   Yes Historical Provider, MD   fluticasone (FLONASE) 50 MCG/ACT nasal spray 2 sprays by Nasal route daily   Yes Historical Provider, MD   Multiple Vitamin (DAILY-JAY PO) by Gastric Tube route daily   Yes Historical Provider, MD   calcium carbonate 1250 MG/5ML SUSP suspension 1,250 mg by Per G Tube route daily   Yes Historical Provider, MD   polyethylene glycol (GLYCOLAX) powder 17 g by Per G Tube route daily   Yes Historical Provider, MD   lactulose (CHRONULAC) 10 GM/15ML solution 30 g by Per G Tube route 2 times daily   Yes Historical Provider, MD   Sodium Phosphates (FLEET) 7-19 GM/118ML Place 1 enema rectally daily as needed   Yes Historical Provider, MD   Dextromethorphan-Guaifenesin (ROBAFEN DM PO) by Gastric Tube route   Yes Historical Provider, MD   lamoTRIgine (LAMICTAL) 100 MG tablet 100 mg by Per G Tube route daily    Yes Historical Provider, MD   levETIRAcetam (KEPPRA) 100 MG/ML solution Take 1,500 mg by mouth 2 times daily   Yes Historical Provider, MD   loratadine (CLARITIN) 10 MG tablet 10 mg by Per G Tube route daily   Yes Historical Provider, MD   LORazepam (ATIVAN) 1 MG tablet Take 1 mg by mouth every 6 hours as needed for Anxiety   Yes Historical Provider, MD   Lansoprazole (PREVACID 24HR PO) 30 mg by Feeding Tube route daily   Yes Historical Provider, MD   pseudoephedrine (SUDAFED) 30 MG tablet 30 mg by Per G Tube route 3 times daily   Yes Historical Provider, MD   senna-docusate (PERICOLACE) 8.6-50 MG per tablet 2 tablets by Per G Tube route daily   Yes Historical Provider, MD   lacosamide (VIMPAT) 50 MG TABS tablet 100 mg by Per G Tube route 2 times daily   Yes Historical Provider, MD   Cholecalciferol (VITAMIN D3) 2000 units CAPS 1,000 Units by Feeding Tube route 2 times daily   Yes Historical Provider, MD   Nutritional Supplements (REPLETE/FIBER) LIQD 75 mLs by Feeding Tube route daily   Yes Historical Provider, MD   acetaminophen (TYLENOL) 325 MG tablet by Per G Tube route every 6 hours as needed for Pain   Yes Historical Provider, MD   bisacodyl (DULCOLAX) 10 MG suppository Place 10 mg rectally daily as needed for Constipation   Yes Historical Provider, MD   albuterol (PROVENTIL) (2.5 MG/3ML) 0.083% nebulizer solution Take 2.5 mg by nebulization 3 times daily as needed for Wheezing   Yes Historical Provider, MD   clonazePAM (KLONOPIN) 2 MG tablet 2 mg by Per G Tube route as needed (for seizure greater than 3 min or 3 or more in 1 hour)   Yes Historical Provider, MD        Allergies:     Ampicillin and Valium [diazepam]    Social History:     Tobacco:    reports that he has never smoked. He has never used smokeless tobacco.  Alcohol:      reports that he does not drink alcohol. Drug Use:  reports that he does not use drugs. Family History:     History reviewed. No pertinent family history. Review of Systems:     Unable to obtain since patient nonverbal    Physical Exam:   BP (!) 142/91   Pulse 94   Temp 97.5 °F (36.4 °C) (Oral)   Resp 20   Ht 4' 8\" (1.422 m)   Wt 126 lb (57.2 kg)   SpO2 100%   BMI 28.25 kg/m²   Temp (24hrs), Av.8 °F (36.6 °C), Min:97.3 °F (36.3 °C), Max:98.2 °F (36.8 °C)    No results for input(s): POCGLU in the last 72 hours. No intake or output data in the 24 hours ending 17 1339    General Appearance:  alert, ill appearing, and in no acute distress  Mental status: cerebral palsy  Head:  normocephalic, atraumatic.   Eye: no icterus, redness, pupils equal and reactive, extraocular eye movements intact, conjunctiva clear  Ear: normal external ear, no discharge, hearing intact  Nose:  no drainage noted  Mouth: mucous membranes moist  Neck: supple, no carotid bruits, thyroid not palpable  Lungs: coarse BS bilat  Cardiovascular: normal rate, regular rhythm, no murmur, gallop, rub. Abdomen: Soft, nontender, nondistended, normal bowel sounds, no hepatomegaly or splenomegaly  Neurologic: CP, contractures of all 4 extremities  Skin: No gross lesions, rashes, bruising or bleeding on exposed skin area  Extremities:  peripheral pulses palpable, no pedal edema or calf pain with palpation  Psych: CP      Investigations:      Laboratory Testing:  Recent Results (from the past 24 hour(s))   EKG 12 Lead    Collection Time: 11/13/17  6:10 PM   Result Value Ref Range    Ventricular Rate 91 BPM    Atrial Rate 91 BPM    P-R Interval 132 ms    QRS Duration 88 ms    Q-T Interval 394 ms    QTc Calculation (Bazett) 484 ms    P Axis 53 degrees    R Axis 124 degrees    T Axis 45 degrees   Culture Blood #1    Collection Time: 11/13/17  6:20 PM   Result Value Ref Range    Specimen Description       . BLOOD 4ML RTA CLP Performed at [address] [place] [address] [address] [facility]    Specimen Description  [place], [address] [facility] [phone]     Special Requests NOT REPORTED     Culture NO GROWTH 12 HOURS     Culture       Performed at [facility] [address] [facility], [address] [place] [address] [phone]    Status Pending    CBC Auto Differential    Collection Time: 11/13/17  6:28 PM   Result Value Ref Range    WBC 6.3 3.5 - 11.0 k/uL    RBC 4.77 4.5 - 5.9 m/uL    Hemoglobin 15.3 13.5 - 17.5 g/dL    Hematocrit 45.7 41 - 53 %    MCV 95.8 80 - 100 fL    MCH 32.1 26 - 34 pg    MCHC 33.5 31 - 37 g/dL    RDW 13.4 11.5 - 14.5 %    Platelets 035 186 - 741 k/uL    MPV 9.3 6.0 - 12.0 fL    Differential Type NOT REPORTED     Seg Neutrophils 42 %    Lymphocytes 44 %    Monocytes 11 %    Eosinophils % 2 %    Basophils 1 %    Immature Granulocytes NOT REPORTED 0 %    Segs Absolute 2.60 1.8 - 7.7 k/uL    Absolute Lymph # 2.80 1.0 - 4.8 k/uL    Absolute Mono # 0.70 0.2 - 0.8 k/uL    Absolute Eos # 0.10 0.0 - 0.4 k/uL    Basophils # 0.00 0.0 - 0.2 k/uL    Absolute Immature Granulocyte NOT REPORTED 0.00 - 0.30 k/uL    WBC Morphology NOT REPORTED     RBC Morphology NOT REPORTED     Platelet Estimate NOT REPORTED    Basic Metabolic Panel    Collection Time: 11/13/17  6:28 PM   Result Value Ref Range    Glucose 100 (H) 70 - 99 mg/dL    BUN 14 6 - 20 mg/dL    CREATININE <0.40 (L) 0.70 - 1.20 mg/dL    Bun/Cre Ratio CANNOT BE CALCULATED 9 - 20    Calcium 9.3 8.6 - 10.4 mg/dL    Sodium 138 135 - 144 mmol/L    Potassium 3.9 3.7 - 5.3 mmol/L    Chloride 96 (L) 98 - 107 mmol/L    CO2 27 20 - 31 mmol/L    Anion Gap 15 9 - 17 mmol/L    GFR Non- CANNOT BE CALCULATED >60 mL/min    GFR  CANNOT BE CALCULATED >60 mL/min    GFR Comment          GFR Staging NOT REPORTED    Brain Natriuretic Peptide    Collection Time: 11/13/17  6:28 PM   Result Value Ref Range    Pro-BNP 58 <300 pg/mL    BNP Interpretation         Trop/Myoglobin    Collection Time: 11/13/17  6:28 PM   Result Value Ref Range    Troponin T <0.03 <0.03 ng/mL    Troponin Interp          Myoglobin <21 (L) 28 - 72 ng/mL   Lactic Acid    Collection Time: 11/13/17  6:28 PM   Result Value Ref Range    Lactic Acid 1.9 0.5 - 2.2 mmol/L   Culture Blood #1    Collection Time: 11/13/17  6:30 PM   Result Value Ref Range    Specimen Description       . BLOOD 3ML L HAND CLP Performed at [address]    Specimen Description  [address] [phone]     Special Requests NOT REPORTED     Culture NO GROWTH 12 HOURS     Culture       Performed at [facility] [id number] [place] [address] [phone]    Status Pending    Comprehensive metabolic panel    Collection Time: 11/14/17  5:25 AM   Result Value Ref Range    Glucose 134 (H) 70 - 99 mg/dL    BUN 10 6 - 20 mg/dL    CREATININE <0.40 (L) 0.70 - 1.20 mg/dL    Bun/Cre Ratio CANNOT BE CALCULATED 9 - 20    Calcium 8.9 8.6 - 10.4 mg/dL    Sodium 138 135 - 144 mmol/L    Potassium 3.9 3.7 - 5.3 mmol/L    Chloride 99 98 - 107 mmol/L    CO2 26 20 - 31 mmol/L    Anion

## 2017-11-14 NOTE — FLOWSHEET NOTE
Patient sleeping; no family present.  prayed for patient; left note of support on tray table. Spiritual Care will follow as needed.      11/14/17 1200   Encounter Summary   Services provided to: Patient   Referral/Consult From: Cherrie   Continue Visiting (11/14/17 sleeping)   Complexity of Encounter Low   Length of Encounter 15 minutes   Routine   Type Initial   Assessment Sleeping   Intervention Prayer  (Left note)   Outcome Did not respond

## 2017-11-15 PROBLEM — E43 SEVERE PROTEIN-CALORIE MALNUTRITION (GOMEZ: LESS THAN 60% OF STANDARD WEIGHT) (HCC): Status: ACTIVE | Noted: 2017-11-15

## 2017-11-15 PROBLEM — J18.9 MULTIFOCAL PNEUMONIA: Status: ACTIVE | Noted: 2017-11-15

## 2017-11-15 LAB
EKG ATRIAL RATE: 105 BPM
EKG P AXIS: 61 DEGREES
EKG P-R INTERVAL: 166 MS
EKG Q-T INTERVAL: 386 MS
EKG QRS DURATION: 90 MS
EKG QTC CALCULATION (BAZETT): 510 MS
EKG R AXIS: 127 DEGREES
EKG T AXIS: 44 DEGREES
EKG VENTRICULAR RATE: 105 BPM

## 2017-11-15 PROCEDURE — 89220 SPUTUM SPECIMEN COLLECTION: CPT

## 2017-11-15 PROCEDURE — 87077 CULTURE AEROBIC IDENTIFY: CPT

## 2017-11-15 PROCEDURE — 1200000000 HC SEMI PRIVATE

## 2017-11-15 PROCEDURE — 2500000003 HC RX 250 WO HCPCS: Performed by: NURSE PRACTITIONER

## 2017-11-15 PROCEDURE — 2700000000 HC OXYGEN THERAPY PER DAY

## 2017-11-15 PROCEDURE — 31720 CLEARANCE OF AIRWAYS: CPT

## 2017-11-15 PROCEDURE — 94760 N-INVAS EAR/PLS OXIMETRY 1: CPT

## 2017-11-15 PROCEDURE — 6370000000 HC RX 637 (ALT 250 FOR IP): Performed by: NURSE PRACTITIONER

## 2017-11-15 PROCEDURE — 87070 CULTURE OTHR SPECIMN AEROBIC: CPT

## 2017-11-15 PROCEDURE — 2580000003 HC RX 258: Performed by: NURSE PRACTITIONER

## 2017-11-15 PROCEDURE — 87186 SC STD MICRODIL/AGAR DIL: CPT

## 2017-11-15 PROCEDURE — 87205 SMEAR GRAM STAIN: CPT

## 2017-11-15 PROCEDURE — 94640 AIRWAY INHALATION TREATMENT: CPT

## 2017-11-15 PROCEDURE — 6360000002 HC RX W HCPCS: Performed by: INTERNAL MEDICINE

## 2017-11-15 PROCEDURE — 6370000000 HC RX 637 (ALT 250 FOR IP): Performed by: INTERNAL MEDICINE

## 2017-11-15 PROCEDURE — 99232 SBSQ HOSP IP/OBS MODERATE 35: CPT | Performed by: INTERNAL MEDICINE

## 2017-11-15 PROCEDURE — 6360000002 HC RX W HCPCS: Performed by: NURSE PRACTITIONER

## 2017-11-15 RX ORDER — CETIRIZINE HYDROCHLORIDE 5 MG/1
5 TABLET ORAL DAILY
Status: DISCONTINUED | OUTPATIENT
Start: 2017-11-15 | End: 2017-11-21 | Stop reason: HOSPADM

## 2017-11-15 RX ORDER — FLUTICASONE PROPIONATE 50 MCG
2 SPRAY, SUSPENSION (ML) NASAL DAILY
Status: DISCONTINUED | OUTPATIENT
Start: 2017-11-15 | End: 2017-11-15

## 2017-11-15 RX ORDER — METHYLPREDNISOLONE SODIUM SUCCINATE 40 MG/ML
40 INJECTION, POWDER, LYOPHILIZED, FOR SOLUTION INTRAMUSCULAR; INTRAVENOUS EVERY 12 HOURS
Status: DISCONTINUED | OUTPATIENT
Start: 2017-11-15 | End: 2017-11-19

## 2017-11-15 RX ORDER — POLYETHYLENE GLYCOL 3350 17 G/17G
17 POWDER, FOR SOLUTION ORAL DAILY
Status: DISCONTINUED | OUTPATIENT
Start: 2017-11-15 | End: 2017-11-15 | Stop reason: CLARIF

## 2017-11-15 RX ORDER — ALBUTEROL SULFATE 2.5 MG/3ML
2.5 SOLUTION RESPIRATORY (INHALATION)
Status: DISCONTINUED | OUTPATIENT
Start: 2017-11-15 | End: 2017-11-15

## 2017-11-15 RX ORDER — MONTELUKAST SODIUM 10 MG/1
10 TABLET ORAL NIGHTLY
Status: DISCONTINUED | OUTPATIENT
Start: 2017-11-15 | End: 2017-11-21 | Stop reason: HOSPADM

## 2017-11-15 RX ORDER — LACTULOSE 10 G/15ML
30 SOLUTION ORAL 2 TIMES DAILY
Status: DISCONTINUED | OUTPATIENT
Start: 2017-11-15 | End: 2017-11-21 | Stop reason: HOSPADM

## 2017-11-15 RX ORDER — POLYETHYLENE GLYCOL 3350 17 G/17G
17 POWDER, FOR SOLUTION ORAL DAILY
Status: DISCONTINUED | OUTPATIENT
Start: 2017-11-15 | End: 2017-11-21 | Stop reason: HOSPADM

## 2017-11-15 RX ORDER — GUAIFENESIN/DEXTROMETHORPHAN 100-10MG/5
10 SYRUP ORAL EVERY 6 HOURS PRN
Status: DISCONTINUED | OUTPATIENT
Start: 2017-11-15 | End: 2017-11-21 | Stop reason: HOSPADM

## 2017-11-15 RX ORDER — ALBUTEROL SULFATE 2.5 MG/3ML
2.5 SOLUTION RESPIRATORY (INHALATION) EVERY 4 HOURS PRN
Status: DISCONTINUED | OUTPATIENT
Start: 2017-11-15 | End: 2017-11-21 | Stop reason: HOSPADM

## 2017-11-15 RX ORDER — MULTIVITAMIN WITH FOLIC ACID 400 MCG
1 TABLET ORAL DAILY
Status: DISCONTINUED | OUTPATIENT
Start: 2017-11-15 | End: 2017-11-21 | Stop reason: HOSPADM

## 2017-11-15 RX ORDER — FUROSEMIDE 10 MG/ML
20 INJECTION INTRAMUSCULAR; INTRAVENOUS ONCE
Status: COMPLETED | OUTPATIENT
Start: 2017-11-15 | End: 2017-11-15

## 2017-11-15 RX ORDER — CALCIUM CARBONATE 1250 MG/5ML
1250 SUSPENSION ORAL DAILY
Status: DISCONTINUED | OUTPATIENT
Start: 2017-11-16 | End: 2017-11-16 | Stop reason: CLARIF

## 2017-11-15 RX ADMIN — AZITHROMYCIN MONOHYDRATE 500 MG: 500 INJECTION, POWDER, LYOPHILIZED, FOR SOLUTION INTRAVENOUS at 19:42

## 2017-11-15 RX ADMIN — METHYLPREDNISOLONE SODIUM SUCCINATE 80 MG: 125 INJECTION, POWDER, FOR SOLUTION INTRAMUSCULAR; INTRAVENOUS at 02:32

## 2017-11-15 RX ADMIN — LACTULOSE 30 G: 20 SOLUTION ORAL at 22:20

## 2017-11-15 RX ADMIN — LACTULOSE 30 G: 20 SOLUTION ORAL at 13:50

## 2017-11-15 RX ADMIN — FUROSEMIDE 20 MG: 10 INJECTION, SOLUTION INTRAMUSCULAR; INTRAVENOUS at 13:50

## 2017-11-15 RX ADMIN — CEFTRIAXONE SODIUM 1 G: 10 INJECTION, POWDER, FOR SOLUTION INTRAVENOUS at 19:42

## 2017-11-15 RX ADMIN — BACLOFEN 10 MG: 10 TABLET ORAL at 22:12

## 2017-11-15 RX ADMIN — MONTELUKAST SODIUM 10 MG: 10 TABLET, FILM COATED ORAL at 22:12

## 2017-11-15 RX ADMIN — DOCUSATE SODIUM AND SENNOSIDES 2 TABLET: 8.6; 5 TABLET, FILM COATED ORAL at 07:47

## 2017-11-15 RX ADMIN — LACOSAMIDE 100 MG: 100 TABLET, FILM COATED ORAL at 07:46

## 2017-11-15 RX ADMIN — BACLOFEN 10 MG: 10 TABLET ORAL at 13:51

## 2017-11-15 RX ADMIN — IPRATROPIUM BROMIDE AND ALBUTEROL SULFATE 1 AMPULE: .5; 3 SOLUTION RESPIRATORY (INHALATION) at 14:51

## 2017-11-15 RX ADMIN — CETIRIZINE HYDROCHLORIDE 5 MG: 5 TABLET, FILM COATED ORAL at 13:50

## 2017-11-15 RX ADMIN — BACITRACIN, NEOMYCIN, POLYMYXIN B 14 G: 400; 3.5; 5 OINTMENT TOPICAL at 07:47

## 2017-11-15 RX ADMIN — MULTIVITAMIN TABLET 1 TABLET: TABLET at 13:50

## 2017-11-15 RX ADMIN — SODIUM CHLORIDE: 9 INJECTION, SOLUTION INTRAVENOUS at 02:30

## 2017-11-15 RX ADMIN — BACLOFEN 10 MG: 10 TABLET ORAL at 07:46

## 2017-11-15 RX ADMIN — GUAIFENESIN AND DEXTROMETHORPHAN 10 ML: 100; 10 SYRUP ORAL at 13:50

## 2017-11-15 RX ADMIN — LANSOPRAZOLE 30 MG: 30 TABLET, ORALLY DISINTEGRATING, DELAYED RELEASE ORAL at 07:46

## 2017-11-15 RX ADMIN — ENOXAPARIN SODIUM 40 MG: 40 INJECTION SUBCUTANEOUS at 07:46

## 2017-11-15 RX ADMIN — VITAMIN D, TAB 1000IU (100/BT) 1000 UNITS: 25 TAB at 07:46

## 2017-11-15 RX ADMIN — LACOSAMIDE 100 MG: 100 TABLET, FILM COATED ORAL at 22:12

## 2017-11-15 RX ADMIN — BACITRACIN, NEOMYCIN, POLYMYXIN B 14 G: 400; 3.5; 5 OINTMENT TOPICAL at 22:13

## 2017-11-15 RX ADMIN — LAMOTRIGINE 200 MG: 100 TABLET ORAL at 07:47

## 2017-11-15 RX ADMIN — HYDROCODONE BITARTRATE AND ACETAMINOPHEN 1 TABLET: 5; 325 TABLET ORAL at 07:50

## 2017-11-15 RX ADMIN — IPRATROPIUM BROMIDE AND ALBUTEROL SULFATE 1 AMPULE: .5; 3 SOLUTION RESPIRATORY (INHALATION) at 07:09

## 2017-11-15 RX ADMIN — LEVETIRACETAM 1500 MG: 100 SOLUTION ORAL at 22:13

## 2017-11-15 RX ADMIN — IPRATROPIUM BROMIDE AND ALBUTEROL SULFATE 1 AMPULE: .5; 3 SOLUTION RESPIRATORY (INHALATION) at 20:16

## 2017-11-15 RX ADMIN — LEVETIRACETAM 1500 MG: 100 SOLUTION ORAL at 07:46

## 2017-11-15 RX ADMIN — IPRATROPIUM BROMIDE AND ALBUTEROL SULFATE 1 AMPULE: .5; 3 SOLUTION RESPIRATORY (INHALATION) at 10:59

## 2017-11-15 RX ADMIN — METHYLPREDNISOLONE SODIUM SUCCINATE 40 MG: 40 INJECTION, POWDER, FOR SOLUTION INTRAMUSCULAR; INTRAVENOUS at 22:21

## 2017-11-15 RX ADMIN — LAMOTRIGINE 200 MG: 100 TABLET ORAL at 22:13

## 2017-11-15 RX ADMIN — LAMOTRIGINE 100 MG: 100 TABLET ORAL at 07:51

## 2017-11-15 RX ADMIN — VITAMIN D, TAB 1000IU (100/BT) 1000 UNITS: 25 TAB at 22:12

## 2017-11-15 RX ADMIN — POLYETHYLENE GLYCOL 3350 17 G: 17 POWDER, FOR SOLUTION ORAL at 13:50

## 2017-11-15 ASSESSMENT — PAIN SCALES - WONG BAKER
WONGBAKER_NUMERICALRESPONSE: 0

## 2017-11-15 ASSESSMENT — PAIN SCALES - GENERAL: PAINLEVEL_OUTOF10: 5

## 2017-11-15 NOTE — PROGRESS NOTES
[facility] Assessment complete. COPD exacerbation ([facility] [address] [J44.1] . Vitals:    11/15/17 1126   BP: 113/62   Pulse: 107   Resp: 20   Temp: 98.2 °F (36.8 °C)   SpO2: 98%   . Patients home meds are   Prior to Admission medications    Medication Sig Start Date End Date Taking?  Authorizing Provider   baclofen (LIORESAL) 10 MG tablet 10 mg by Per G Tube route 3 times daily   Yes Historical Provider, MD   lamoTRIgine (LAMICTAL) 100 MG tablet 200 mg by Per G Tube route 2 times daily    Yes Historical Provider, MD   Potassium Chloride (KLOR-CON SPRINKLE PO) 10 mEq by Per G Tube route daily   Yes Historical Provider, MD   montelukast (SINGULAIR) 10 MG tablet 10 mg by Per G Tube route nightly   Yes Historical Provider, MD   fluticasone (FLONASE) 50 MCG/ACT nasal spray 2 sprays by Nasal route daily   Yes Historical Provider, MD   Multiple Vitamin (DAILY-JAY PO) by Gastric Tube route daily   Yes Historical Provider, MD   calcium carbonate 1250 MG/5ML SUSP suspension 1,250 mg by Per G Tube route daily   Yes Historical Provider, MD   polyethylene glycol (GLYCOLAX) powder 17 g by Per G Tube route daily   Yes Historical Provider, MD   lactulose (CHRONULAC) 10 GM/15ML solution 30 g by Per G Tube route 2 times daily   Yes Historical Provider, MD   Sodium Phosphates (FLEET) 7-19 GM/118ML Place 1 enema rectally daily as needed   Yes Historical Provider, MD   Dextromethorphan-Guaifenesin (ROBAFEN DM PO) by Gastric Tube route   Yes Historical Provider, MD   lamoTRIgine (LAMICTAL) 100 MG tablet 100 mg by Per G Tube route every morning    Yes Historical Provider, MD   levETIRAcetam (KEPPRA) 100 MG/ML solution Take 1,500 mg by mouth 2 times daily   Yes Historical Provider, MD   loratadine (CLARITIN) 10 MG tablet 10 mg by Per G Tube route daily   Yes Historical Provider, MD   LORazepam (ATIVAN) 1 MG tablet Take 1 mg by mouth every 6 hours as needed for Anxiety   Yes Historical Provider, MD   Lansoprazole (PREVACID 24HR PO) 30

## 2017-11-15 NOTE — PROGRESS NOTES
96*  99   CO2  27  26   GLUCOSE  100*  134*   BUN  14  10   CREATININE  <0.40*  <0.40*   CALCIUM  9.3  8.9     Recent Labs      11/14/17   0525   PROT  7.2   LABALBU  4.0   AST  28   ALT  44*   ALKPHOS  101   BILITOT  0.17*       Lab Results   Component Value Date/Time    SPECIAL NOT REPORTED 11/13/2017 06:30 PM     Lab Results   Component Value Date/Time    CULTURE NO GROWTH 2 DAYS 11/13/2017 06:30 PM    CULTURE  11/13/2017 06:30 PM     Performed at [facility] [id number] [place] [address], [address] [place] [address] [phone]       [facility]    Radiology:    CXR:    Impression:        Low lung volume examination resulting in crowding of the bronchopulmonary  interstitium.  No obvious focal airspace consolidation or pneumothorax. Severe scoliotic curvature of the thoracolumbar spine.  Generalized  osteopenia         Physical Examination:        General appearance:  alert, cooperative and in mild respiratory distress  Mental Status:  Awake, doesn't follow commands or speak  Lungs:  Coarse BS bilat  Heart:  regular rate and rhythm, no murmur  Abdomen:  soft, nontender, nondistended, normal bowel sounds, no masses, hepatomegaly, splenomegaly  Extremities:  no edema, + contractures  Skin:  + erythema around Peg-tube    Assessment:        Primary Problem  Acute exacerbation of chronic obstructive pulmonary disease (COPD) ([address]    Active Hospital Problems    Diagnosis Date Noted    Severe protein-calorie malnutrition Imani Pester: less than 60% of standard weight) ([facility] [E43] 11/15/2017    Multifocal pneumonia [J18.9] 11/15/2017    Acute exacerbation of chronic obstructive pulmonary disease (COPD) ([facility] [J44.1] 11/14/2017    Seizure disorder ([facility] [G40.909] 11/14/2017    Cerebral palsied ([facility] [G80.9] 11/14/2017       Plan:        1. COPD exac/ Multifocal pneumonia- Looked at Chest xray with Dr. Cadence Mckay, there appears to be bibasilar pneumonia.   On IV antibiotics, agree with stopping IVF, Lasix x 1, aerosols, f/u CXR tomorrow,

## 2017-11-15 NOTE — PLAN OF CARE
Problem: Falls - Risk of  Goal: Absence of falls  Outcome: Ongoing  Siderails up x 2  Hourly rounding. Call light in reach. Instructed to call for assist before attempting out of bed. Remains free from falls and accidental injury at this time. Floor free from obstacles, and bed is locked and in lowest position. Adequate lighting provided. Bed alarm on for safety. Problem: Risk for Impaired Skin Integrity  Goal: Tissue integrity - skin and mucous membranes  Structural intactness and normal physiological function of skin and  mucous membranes.    Outcome: Ongoing      Problem: Airway Clearance - Ineffective:  Goal: Ability to maintain a clear airway will improve  Ability to maintain a clear airway will improve   Outcome: Ongoing

## 2017-11-15 NOTE — PLAN OF CARE
[facility]    Second Visit Note  For more detailed information please refer to the progress note of the day      11/15/2017    3:17 PM    Name:   Bernadine David  MRN:     [id number]     Alejandralyside:      [de-identified]   Room:   2004/2004-02  IP Day:  2  Admit Date:  11/13/2017  6:08 PM    PCP:   Guerline Bailey  Code Status:  Full Code        Pt vitals were reviewed   New labs were reviewed   Patient was seen    Updated plan :     1. Patient still appears SOB  2.  Will f/u CXR in am        Susan Servin MD  11/15/2017  3:17 PM

## 2017-11-16 ENCOUNTER — APPOINTMENT (OUTPATIENT)
Dept: GENERAL RADIOLOGY | Age: 47
DRG: 193 | End: 2017-11-16
Payer: MEDICARE

## 2017-11-16 PROCEDURE — 2700000000 HC OXYGEN THERAPY PER DAY

## 2017-11-16 PROCEDURE — 6370000000 HC RX 637 (ALT 250 FOR IP): Performed by: NURSE PRACTITIONER

## 2017-11-16 PROCEDURE — 2500000003 HC RX 250 WO HCPCS: Performed by: NURSE PRACTITIONER

## 2017-11-16 PROCEDURE — 94640 AIRWAY INHALATION TREATMENT: CPT

## 2017-11-16 PROCEDURE — 6370000000 HC RX 637 (ALT 250 FOR IP): Performed by: INTERNAL MEDICINE

## 2017-11-16 PROCEDURE — G8988 SELF CARE GOAL STATUS: HCPCS

## 2017-11-16 PROCEDURE — G8987 SELF CARE CURRENT STATUS: HCPCS

## 2017-11-16 PROCEDURE — 94761 N-INVAS EAR/PLS OXIMETRY MLT: CPT

## 2017-11-16 PROCEDURE — 1200000000 HC SEMI PRIVATE

## 2017-11-16 PROCEDURE — 99232 SBSQ HOSP IP/OBS MODERATE 35: CPT | Performed by: INTERNAL MEDICINE

## 2017-11-16 PROCEDURE — 6360000002 HC RX W HCPCS: Performed by: NURSE PRACTITIONER

## 2017-11-16 PROCEDURE — 6360000002 HC RX W HCPCS: Performed by: INTERNAL MEDICINE

## 2017-11-16 PROCEDURE — 97535 SELF CARE MNGMENT TRAINING: CPT

## 2017-11-16 PROCEDURE — 97166 OT EVAL MOD COMPLEX 45 MIN: CPT

## 2017-11-16 PROCEDURE — G8989 SELF CARE D/C STATUS: HCPCS

## 2017-11-16 PROCEDURE — 2580000003 HC RX 258: Performed by: NURSE PRACTITIONER

## 2017-11-16 PROCEDURE — 71010 XR CHEST PORTABLE: CPT

## 2017-11-16 RX ORDER — CALCIUM CARBONATE 200(500)MG
1250 TABLET,CHEWABLE ORAL DAILY
Status: DISCONTINUED | OUTPATIENT
Start: 2017-11-16 | End: 2017-11-21 | Stop reason: HOSPADM

## 2017-11-16 RX ADMIN — ANTACID TABLETS 1250 MG: 500 TABLET, CHEWABLE ORAL at 10:04

## 2017-11-16 RX ADMIN — CETIRIZINE HYDROCHLORIDE 5 MG: 5 TABLET, FILM COATED ORAL at 07:49

## 2017-11-16 RX ADMIN — BACITRACIN, NEOMYCIN, POLYMYXIN B 14 G: 400; 3.5; 5 OINTMENT TOPICAL at 07:50

## 2017-11-16 RX ADMIN — ENOXAPARIN SODIUM 40 MG: 40 INJECTION SUBCUTANEOUS at 07:49

## 2017-11-16 RX ADMIN — BACLOFEN 10 MG: 10 TABLET ORAL at 07:49

## 2017-11-16 RX ADMIN — BACLOFEN 10 MG: 10 TABLET ORAL at 14:04

## 2017-11-16 RX ADMIN — METHYLPREDNISOLONE SODIUM SUCCINATE 40 MG: 40 INJECTION, POWDER, FOR SOLUTION INTRAMUSCULAR; INTRAVENOUS at 21:56

## 2017-11-16 RX ADMIN — LACTULOSE 30 G: 20 SOLUTION ORAL at 07:51

## 2017-11-16 RX ADMIN — Medication 10 ML: at 20:02

## 2017-11-16 RX ADMIN — POLYETHYLENE GLYCOL 3350 17 G: 17 POWDER, FOR SOLUTION ORAL at 07:50

## 2017-11-16 RX ADMIN — MONTELUKAST SODIUM 10 MG: 10 TABLET, FILM COATED ORAL at 21:57

## 2017-11-16 RX ADMIN — LAMOTRIGINE 200 MG: 100 TABLET ORAL at 21:57

## 2017-11-16 RX ADMIN — VITAMIN D, TAB 1000IU (100/BT) 1000 UNITS: 25 TAB at 21:57

## 2017-11-16 RX ADMIN — LAMOTRIGINE 200 MG: 100 TABLET ORAL at 07:51

## 2017-11-16 RX ADMIN — BACLOFEN 10 MG: 10 TABLET ORAL at 21:57

## 2017-11-16 RX ADMIN — MULTIVITAMIN TABLET 1 TABLET: TABLET at 07:49

## 2017-11-16 RX ADMIN — MORPHINE SULFATE 2 MG: 2 INJECTION, SOLUTION INTRAMUSCULAR; INTRAVENOUS at 10:57

## 2017-11-16 RX ADMIN — LEVETIRACETAM 1500 MG: 100 SOLUTION ORAL at 07:48

## 2017-11-16 RX ADMIN — Medication 10 ML: at 07:52

## 2017-11-16 RX ADMIN — CEFTRIAXONE SODIUM 1 G: 10 INJECTION, POWDER, FOR SOLUTION INTRAVENOUS at 17:49

## 2017-11-16 RX ADMIN — IPRATROPIUM BROMIDE AND ALBUTEROL SULFATE 1 AMPULE: .5; 3 SOLUTION RESPIRATORY (INHALATION) at 15:31

## 2017-11-16 RX ADMIN — METHYLPREDNISOLONE SODIUM SUCCINATE 40 MG: 40 INJECTION, POWDER, FOR SOLUTION INTRAMUSCULAR; INTRAVENOUS at 10:04

## 2017-11-16 RX ADMIN — BACITRACIN, NEOMYCIN, POLYMYXIN B 14 G: 400; 3.5; 5 OINTMENT TOPICAL at 21:57

## 2017-11-16 RX ADMIN — LACTULOSE 30 G: 20 SOLUTION ORAL at 21:56

## 2017-11-16 RX ADMIN — LEVETIRACETAM 1500 MG: 100 SOLUTION ORAL at 21:57

## 2017-11-16 RX ADMIN — DOCUSATE SODIUM AND SENNOSIDES 2 TABLET: 8.6; 5 TABLET, FILM COATED ORAL at 07:49

## 2017-11-16 RX ADMIN — LAMOTRIGINE 100 MG: 100 TABLET ORAL at 07:49

## 2017-11-16 RX ADMIN — AZITHROMYCIN MONOHYDRATE 500 MG: 500 INJECTION, POWDER, LYOPHILIZED, FOR SOLUTION INTRAVENOUS at 20:02

## 2017-11-16 RX ADMIN — LANSOPRAZOLE 30 MG: 30 TABLET, ORALLY DISINTEGRATING, DELAYED RELEASE ORAL at 07:49

## 2017-11-16 RX ADMIN — IPRATROPIUM BROMIDE AND ALBUTEROL SULFATE 1 AMPULE: .5; 3 SOLUTION RESPIRATORY (INHALATION) at 19:04

## 2017-11-16 RX ADMIN — VITAMIN D, TAB 1000IU (100/BT) 1000 UNITS: 25 TAB at 07:49

## 2017-11-16 RX ADMIN — IPRATROPIUM BROMIDE AND ALBUTEROL SULFATE 1 AMPULE: .5; 3 SOLUTION RESPIRATORY (INHALATION) at 11:39

## 2017-11-16 RX ADMIN — IPRATROPIUM BROMIDE AND ALBUTEROL SULFATE 1 AMPULE: .5; 3 SOLUTION RESPIRATORY (INHALATION) at 07:41

## 2017-11-16 RX ADMIN — LACOSAMIDE 100 MG: 100 TABLET, FILM COATED ORAL at 07:49

## 2017-11-16 RX ADMIN — LACOSAMIDE 100 MG: 100 TABLET, FILM COATED ORAL at 21:57

## 2017-11-16 ASSESSMENT — PAIN SCALES - GENERAL
PAINLEVEL_OUTOF10: 0
PAINLEVEL_OUTOF10: 5

## 2017-11-16 NOTE — PLAN OF CARE
Problem: Falls - Risk of  Goal: Absence of falls  Outcome: Ongoing      Problem: Risk for Impaired Skin Integrity  Goal: Tissue integrity - skin and mucous membranes  Structural intactness and normal physiological function of skin and  mucous membranes.    Outcome: Ongoing      Problem: Airway Clearance - Ineffective:  Goal: Ability to maintain a clear airway will improve  Ability to maintain a clear airway will improve   Outcome: Ongoing    Goal: Clear lung sounds  Clear lung sounds   Outcome: Ongoing      Problem: Gas Exchange - Impaired:  Goal: Levels of oxygenation will improve  Levels of oxygenation will improve   Outcome: Ongoing      Problem: Nutrition  Goal: Optimal nutrition therapy  Nutrition Problem: Inadequate oral intake  Intervention: Food and/or Nutrient Delivery: Continue NPO, Modify rate/Continue current Tube Feeding  Nutritional Goals: EN to meet > 75% of estimated kcal/protein needs with good GI tolerance     Outcome: Ongoing      Problem: Pain:  Goal: Pain level will decrease  Pain level will decrease   Outcome: Ongoing      Problem: ABCDS Injury Assessment  Goal: Absence of physical injury  Outcome: Ongoing

## 2017-11-16 NOTE — PLAN OF CARE
Problem: Falls - Risk of  Goal: Absence of falls  Outcome: Ongoing  Siderails up x 2  Hourly rounding. Call light in reach. Instructed to call for assist before attempting out of bed. Remains free from falls and accidental injury at this time. Floor free from obstacles, and bed is locked and in lowest position. Adequate lighting provided. Bed alarm on for safety. Problem: Risk for Impaired Skin Integrity  Goal: Tissue integrity - skin and mucous membranes  Structural intactness and normal physiological function of skin and  mucous membranes. Outcome: Ongoing  Frequent turning and repositioning. Cleaning with foam cleanser with each brief change. Problem: Airway Clearance - Ineffective:  Goal: Ability to maintain a clear airway will improve  Ability to maintain a clear airway will improve   Outcome: Ongoing      Problem: Gas Exchange - Impaired:  Goal: Levels of oxygenation will improve  Levels of oxygenation will improve   Outcome: Ongoing      Problem: Pain:  Goal: Pain level will decrease  Pain level will decrease   Outcome: Ongoing  Pain level assessment complete. Pt educated on pain scale and control interventions. PRN pain medication given per pt request.   Pt instructed to call out with new onset of pain or unrelieved pain.

## 2017-11-16 NOTE — PROGRESS NOTES
route daily   Yes Historical Provider, MD   pseudoephedrine (SUDAFED) 30 MG tablet 30 mg by Per G Tube route 3 times daily   Yes Historical Provider, MD   senna-docusate (Bethel Singer) 8.6-50 MG per tablet 2 tablets by Per G Tube route daily   Yes Historical Provider, MD   lacosamide (VIMPAT) 50 MG TABS tablet 100 mg by Per G Tube route 2 times daily   Yes Historical Provider, MD   Cholecalciferol (VITAMIN D3) 2000 units CAPS 1,000 Units by Feeding Tube route 2 times daily   Yes Historical Provider, MD   Nutritional Supplements (REPLETE/FIBER) LIQD 75 mLs by Feeding Tube route daily   Yes Historical Provider, MD   acetaminophen (TYLENOL) 325 MG tablet by Per G Tube route every 6 hours as needed for Pain   Yes Historical Provider, MD   bisacodyl (DULCOLAX) 10 MG suppository Place 10 mg rectally daily as needed for Constipation   Yes Historical Provider, MD   albuterol (PROVENTIL) (2.5 MG/3ML) 0.083% nebulizer solution Take 2.5 mg by nebulization 3 times daily as needed for Wheezing   Yes Historical Provider, MD   clonazePAM (KLONOPIN) 2 MG tablet 2 mg by Per G Tube route as needed (for seizure greater than 3 min or 3 or more in 1 hour)   Yes Historical Provider, MD   .  Recent Surgical History: None = 0     Assessment       FEV1/FVC    FEV1 Predicted unable MRDD      FEV1     V1 % Predicted   FVC   IS volume   IBW   FIO2% 3lpm  SPO2 985  RR 20  Breath Sounds: dim/sl dim      · Bronchodilator assessment at level  3  · Hyperinflation assessment at level   · Secretion Management assessment at level    ·   · [x]    Bronchodilator Assessment  BRONCHODILATOR ASSESSMENT SCORE  Score 0 1 2 3 4 5   Breath Sounds   []  Patient Baseline []  No Wheeze good aeration []  Faint, scattered wheezing, good aeration [x]  Expiratory Wheezing and or moderately diminished []  Insp/Exp wheeze and/or very diminished []  Insp/Exp and/ or marked distress   Respiratory Rate   []  Patient Baseline []  Less than 20 []  Less than 20 [x]  20-25 []  Greater than 25 []  Greater than 25   Peak flow % of Pred or PB []  NA   []  Greater than 90%  []  81-90% []  71-80% []  Less than or equal to 70%  or unable to perform []  Unable due to Respiratory Distress   Dyspnea re []  Patient Baseline []  No SOB []  No SOB [x]  SOB on exertion []  SOB min activity []  At rest/acute   e FEV% Predicted       []  NA []  Above 69%  []  Unable []  Above 60-69%  []  Unable []  Above 50-59%  [x]  Unable []  Above 35-49%  []  Unable []  Less than 35%  []  Unable                 []  Hyperinflation Assessment  Score 1 2 3   CXR and Breath Sounds   []  Clear []  No atelectasis  Basilar aeration []  Atelectasis or absent basilar breath sounds   Incentive Spirometry Volume  (Per IBW)   []  Greater than or equal to 15ml/Kg []  less than 15ml/Kg []  less than 15ml/Kg   Surgery within last 2 weeks []  None or general   []  Abdominal or thoracic surgery  []  Abdominal or thoracic   Chronic Pulmonary Historyre []  No []  Yes []  Yes     []  Secretion Management Assessment  Score 1 2 3   Bilateral Breath Sounds   []  Occasional Rhonchi []  Scattered Rhonchi []  Course Rhonchi and/or poor aeration   Sputum    []  Small amount of thin secretions []  Moderate amount of viscous secretions []  Copius, Viscious Yellow/ Secretions   CXR as reported by physician []  clear  []  Unavailable []  Infiltrates and/or consolidation  []  Unavailable []  Mucus Plugging and or lobar consolidation  []  Unavailable   Cough []  Strong, productive cough []  Weak productive cough []  No cough or weak non-productive cough

## 2017-11-16 NOTE — PROGRESS NOTES
[facility]    Progress Note    11/16/2017    10:51 AM    Name:   Jay Ann  MRN:     [id number]     Alejandralyside:      [de-identified]   Room:   2004/2004-02  IP Day:  3  Admit Date:  11/13/2017  6:08 PM    PCP:   Pilar Jonas  Code Status:  Full Code    Subjective:     C/C:   Chief Complaint   Patient presents with    Shortness of Breath     Interval History Status: not changed. Patient is still SOB today. He is non-verbal.      Brief History:     The patient is a 51 y.o. Unavailable/unknown male who presents with Shortness of Breath   and he is admitted to the hospital for the management of  COPD exacerbation.     Per the ED report Yahaira Brown a 51 y. o. male who presents to the emergency department Today by private vehicle for evaluation of cough and wheezing.  The caregiver states that the patient has had a wet cough with audible wheezing for the last 2 days.  The nurses at the group home called 911 because of the wheezing.  EMS had given him a breathing treatment prior to arrival He denies any fevers or chills.  He is currently getting tube feedings through a G-tube in the stomach.  They deny that he's had any nausea or vomiting'     He is nonverbal. He was found to have bibasilar pneumonia. Pulmonary was consulted. Review of Systems:     Unable to obtain since he is non-verbal, CP    Medications: Allergies:     Allergies   Allergen Reactions    Ampicillin Other (See Comments)    Valium [Diazepam] Other (See Comments)    Other      Bubble bath products        Current Meds:   Scheduled Meds:    calcium carbonate  1,250 mg Per G Tube Daily    methylPREDNISolone  40 mg Intravenous Q12H    lactulose  30 g Per G Tube BID    cetirizine  5 mg Oral Daily    montelukast  10 mg Per G Tube Nightly    multivitamin  1 tablet Per G Tube Daily    polyethylene glycol  17 g Per G Tube Daily    neomycin-bacitracin-polymyxin   Topical BID    sodium chloride flush  10 mL Intravenous 2 times per day    enoxaparin  40 mg Subcutaneous Daily    ipratropium-albuterol  1 ampule Inhalation Q4H WA    baclofen  10 mg Per G Tube TID    vitamin D  1,000 Units Per G Tube BID    levETIRAcetam  1,500 mg Oral BID    lansoprazole  30 mg Per G Tube QAM AC    sennosides-docusate sodium  2 tablet Per G Tube Daily    azithromycin  500 mg Intravenous Q24H    cefTRIAXone (ROCEPHIN) IV  1 g Intravenous Q24H    lacosamide  100 mg Per G Tube BID    lamoTRIgine  100 mg Per G Tube Daily    lamoTRIgine  200 mg Per G Tube BID     Continuous Infusions:    PRN Meds: guaiFENesin-dextromethorphan, albuterol, sodium chloride flush, acetaminophen, HYDROcodone 5 mg - acetaminophen, morphine **OR** morphine, magnesium hydroxide, bisacodyl, ondansetron, nicotine, clonazePAM    Data:     Past Medical History:   has a past medical history of Cerebral palsy ([address] Cholelithiasis with chronic cholecystitis; Constipation; Delayed gastric emptying; Dysphagia; GERD (gastroesophageal reflux disease); Hearing loss; Mental disability; Profound mental retardation; Scoliosis; Seizures ([address] and 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[address] Social History:   reports that he has never smoked. He has never used smokeless tobacco. He reports that he does not drink alcohol or use drugs. Family History: History reviewed. No pertinent family history. Vitals:  /76   Pulse 106   Temp 97.9 °F (36.6 °C)   Resp 18   Ht 4' 8\" (1.422 m)   Wt 121 lb 4.8 oz (55 kg)   SpO2 96%   BMI 27.19 kg/m²   Temp (24hrs), Av.2 °F (36.8 °C), Min:97.9 °F (36.6 °C), Max:98.4 °F (36.9 °C)    No results for input(s): POCGLU in the last 72 hours. I/O (24Hr):     Intake/Output Summary (Last 24 hours) at 17 1051  Last data filed at 17 0810   Gross per 24 hour   Intake             1703 ml   Output                0 ml   Net             1703 ml       Labs:    Hematology:  Recent Labs      17   3037 11/14/17   0525   WBC  6.3  3.5   HGB  15.3  14.8   HCT  45.7  44.8   PLT  253  258     Chemistry:  Recent Labs      11/13/17   1828  11/14/17   0525   NA  138  138   K  3.9  3.9   CL  96*  99   CO2  27  26   GLUCOSE  100*  134*   BUN  14  10   CREATININE  <0.40*  <0.40*   CALCIUM  9.3  8.9     Recent Labs      11/14/17   0525   PROT  7.2   LABALBU  4.0   AST  28   ALT  44*   ALKPHOS  101   BILITOT  0.17*       Lab Results   Component Value Date/Time    SPECIAL NOT REPORTED 11/15/2017 11:30 AM     Lab Results   Component Value Date/Time    CULTURE Pending 11/15/2017 11:30 AM       @[facility]@    Radiology:    CXR:    Impression:        Low lung volume examination resulting in crowding of the bronchopulmonary  interstitium.  No obvious focal airspace consolidation or pneumothorax. Severe scoliotic curvature of the thoracolumbar spine.  Generalized  osteopenia         Physical Examination:        General appearance:  alert, cooperative and in mild respiratory distress  Mental Status:  Awake, doesn't follow commands or speak  Lungs:  Coarse BS bilat  Heart:  regular rate and rhythm, no murmur  Abdomen:  soft, nontender, nondistended, normal bowel sounds, no masses, hepatomegaly, splenomegaly  Extremities:  no edema, + contractures  Skin:  + erythema around Peg-tube    Assessment:        Primary Problem  Acute exacerbation of chronic obstructive pulmonary disease (COPD) ([facility]    Active Hospital Problems    Diagnosis Date Noted    Severe protein-calorie malnutrition Praful Ibrahim: less than 60% of standard weight) ([facility] [E43] 11/15/2017    Multifocal pneumonia [J18.9] 11/15/2017    Acute exacerbation of chronic obstructive pulmonary disease ([facility]D) ([facility] [J44.1] 11/14/2017    Seizure disorder ([facility] [G40.909] 11/14/2017    Cerebral palsied ([facility] [G80.9] 11/14/2017       Plan:        1. COPD exac/ Multifocal pneumonia- Appears to be bibasilar pneumonia.   On IV antibiotics, agree with stopping IVF, aerosols, f/u CXR today,

## 2017-11-16 NOTE — PROGRESS NOTES
Patient with excessive oral secretions, oral suction performed. Will continue to monitor patient closely.

## 2017-11-16 NOTE — PROGRESS NOTES
PROGRESS NOTE: PULMONARY & CRITICAL CARE             Nasim Brown MD     Patient - Yumiko Cortez   MRN -  [id number]   Zac # - [de-identified]   - 1970      Date of Admission -  2017  6:08 PM  Date of evaluation -  2017    Admit Date: 2017    Subjective:WORK OF BREATHING IS SLIGHTLY BETTER, AUDIBLE WHEEZE IS IMPROVING. Objective:   Vitals: /76   Pulse 106   Temp 97.9 °F (36.6 °C)   Resp 18   Ht 4' 8\" (1.422 m)   Wt 121 lb 4.8 oz (55 kg)   SpO2 96%   BMI 27.19 kg/m²   General appearance: alert, comfortable  Skin: Skin color, texture, turgor normal. No rash  HEENT: Head: Normocephalic, no lesions, without obvious abnormality. Neck:  Supple, no JVD, normal on examination  Lungs: diminished breath sounds bilaterally and wheezes bilaterally  Heart: regular rate and rhythm, S1, S2 normal, P2 sounds normal  ABDOMEN: soft, bowel sounds positive, soft  Extremities: CONTRACTED, SOME EDEMA. NO CELLULITIS;   SKIN: turgor normal, no rash seen, BETTER TODAY. Neurologic: Mental status: CEREBRAL PALSY, ALERT;      Data:   Scheduled Meds: Reviewed  Continuous Infusions:     Intake/Output Summary (Last 24 hours) at 17 1023  Last data filed at 17 0810   Gross per 24 hour   Intake             1703 ml   Output                0 ml   Net             1703 ml     CBC:   Recent Labs      17   0525   WBC  3.5   HGB  14.8   PLT  258     BMP:  Recent Labs      17   0525   NA  138   K  3.9   CL  99   CO2  26   BUN  10   CREATININE  <0.40*   GLUCOSE  134*       -TODAY CXR PENDING; WILL REVIEW      Assessment/Discussion & Plan     ACUTE MF PNEUMONIA  HYPOXEMIA  WHEEZING AND INCREASED WORK OF BREATHING  CEREBRAL PALSY    BETTER WITH BREATHING TODAY, STILL HAS COARSE CRACKLES, LESS WHEEZING. CXR PENDING.    RN WITH ME, NEXT 24 HRS, KEEP IV MEDS, AND STEROIDS, THE POSSIBLE SWITCH TO G TUBE AND GROUP HOME, IF OK WITH

## 2017-11-16 NOTE — PROGRESS NOTES
Occupational Therapy   Occupational Therapy Initial Assessment  Date: 2017   Patient Name: Linda Gutierrez  MRN: [id number]     : 1970     Discharge Recommendations:  Home with assist PRN (Per , Pt receives PT and OT 2x/week. Pt lives in group home with ADL assistance PRN)  Pt with no acute care OT needs at this time. Pt would benefit from continued sub-acute OT services. RN reports patient is medically stable for therapy treatment this date. Chart reviewed prior to treatment and patient is agreeable for therapy. All lines intact and patient positioned comfortably at end of treatment. All patient needs addressed prior to ending therapy session. Note copied from ED: Linda Gutierrez is a 51 y.o. male who presents to the emergency department Today by private vehicle for evaluation of cough and wheezing. The caregiver states that the patient has had a wet cough with audible wheezing for the last 2 days. The nurses at the group home called 911 because of the wheezing. EMS had given him a breathing treatment prior to arrival He denies any fevers or chills. He is currently getting tube feedings through a G-tube in the stomach. They deny that he's had any nausea or vomiting. Patient Diagnosis(es): The encounter diagnosis was COPD exacerbation ([address] has a past medical history of Cerebral palsy ([address] Cholelithiasis with chronic cholecystitis; Constipation; Delayed gastric emptying; Dysphagia; GERD (gastroesophageal reflux disease); Hearing loss; Mental disability; Profound mental retardation; Scoliosis; Seizures ([address] and 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[address] has a past surgical history that includes Stomach surgery.     Treatment Diagnosis: COPD exacerbation, Cerebral Palsy, Seizure disorder      Restrictions  Restrictions/Precautions  Restrictions/Precautions: General Precautions, Fall Risk  Position Activity Restriction  Other position/activity restrictions: Pt may progress activity as long as spO2 >92, up as tolerated    Subjective   General  Chart Reviewed: No  Patient assessed for rehabilitation services?: Yes  Family / Caregiver Present: No  Pain Assessment  Patient Currently in Pain: Yes  Pain Assessment: FLACC  Dias-Weber Pain Rating: No hurt  Pain Level: 5  Response to Pain Intervention: Asleep with RR greater than 10  Oxygen Therapy  SpO2: 96 %  Pulse Oximeter Device Mode: Continuous  Pulse Oximeter Device Location: Finger  O2 Device: Nasal cannula  O2 Flow Rate (L/min): 3 L/min  Social/Functional History  Social/Functional History  Lives With: Other (comment)  Type of Home: Trailer (Group Home)  ADL Assistance:  (Pt has visiting OT 2x/week)  Ambulation Assistance:  (Pt has visiting PT 2x/week)  Active : No  Occupation: On disability  Leisure & Hobbies: Pt attend adult  daily. Additional Comments: All information obtained from  note     Objective   Vision:  (KERRIE)  Hearing:  (Pt directed eye/head towards writer sporadically when writer speaking)    Orientation  Overall Orientation Status:  (KERRIE)     Balance  Sitting Balance: Dependent/Total  Standing Balance:  (Pt not medically appropriate for assessment OOB)  ADL  Feeding: Other (Comment); Dependent/Total (PEG)  Grooming: Maximum assistance  UE Bathing: Dependent/Total  LE Bathing: Dependent/Total  UE Dressing: Dependent/Total  LE Dressing: Dependent/Total  Toileting: Dependent/Total  Tone RUE  RUE Tone: Hypertonic  Tone LUE  LUE Tone: Hypertonic  Quality of Movement Other  Comment: Pt not following commands to assess coordination throughout UE     Bed mobility  Rolling to Left: Dependent/Total  Rolling to Right: Dependent/Total  Supine to Sit: Dependent/Total  Sit to Supine: Dependent/Total  Scooting: Dependent/Total     Cognition  Overall Cognitive Status: Exceptions  Following Commands: Does not follow commands  Safety Judgement: Decreased awareness of need for safety;Decreased awareness of need for assistance  Initiation: Requires cues for some  Sequencing: Requires cues for some  Cognition Comment: Pt writhing in bed to remove nasal cannula. Pt noted to grind teeth at times of distress (e.g. material buildup in mouth). Pt able to move material in mouth toward lips in order to place material on washcloth. Sensation  Overall Sensation Status:  (KERRIE)      LUE PROM (degrees)  LUE PROM: Exceptions  LUE General PROM: Pt with minimal passive movement throughout UE at time of exam.  LUE AROM (degrees)  LUE General AROM: Pt not following commands to assess AROM throughout UE  Left Hand PROM (degrees)  Left Hand General PROM: Pt with minimal passive movement throughout UE at time of exam.  Left Hand AROM (degrees)  Left Hand General AROM: Pt not following commands to assess AROM throughout UE  RUE PROM (degrees)  RUE General PROM: Pt with minimal passive movement throughout UE at time of exam.  RUE AROM (degrees)  RUE General AROM: Pt not following commands to assess AROM throughout UE  Right Hand PROM (degrees)  Right Hand General PROM: Pt with minimal passive movement throughout UE at time of exam.  Right Hand AROM (degrees)  Right Hand General AROM: Pt not following commands to assess AROM throughout UE  LUE Strength  Gross LUE Strength: Exceptions to WFL (1/5)  L Hand Grasp: 1/5  RUE Strength  Gross RUE Strength:  (1/5)  R Hand Grasp: 1/5     Assessment   Performance deficits / Impairments: Decreased functional mobility ; Decreased ADL status; Decreased ROM; Decreased strength;Decreased safe awareness;Decreased cognition;Decreased endurance;Decreased balance;Decreased high-level IADLs;Decreased fine motor control;Decreased coordination (Pt at baseline for all performance area per RN.)  Assessment: Pt supine in bed upon arrival. Pt noted to grind teeth and vocalize during ROM/MMT. Pt oral care performed with use of suction and washcloth to remove secretions.  Pt demo roll-left, roll-right, renata-care, scoot to [facility] bedpan, or urinal?      1      2        3      4   4. Putting on and taking off regular upper body clothing? 1      2      3       4   5. Taking care of personal grooming such as brushing teeth? 1      2      3      4   6. Eating meals? 1      2       3       4     1. Unable = Total/Dependent Assist  2. A Lot = Maximum/Moderate Assist  3. A Little = Minimum/Contact Guard Assist/Supervision  4.  None= Modified [place]/Independent    Raw Score Scale Score Scale Score Standard Error Approximate Degree of Functional Impairment     6 17.07 3.74 100%   7 20.13 3.68 92%   8 22.86 3.43 86%   9 25.33 3.17 80%   10 27.31 2.96 75%   11 29.04 2.79 70%   12 30.60 2.68 67%   13 32.03 2.62 63%   14 33.39 2.61 60%   15 34.69 2.65 56%   16 35.96 2.71 53%   17 37.26 2.82 50%   18 38.66 2.97 47%   19 40.22 3.20 43%   20 42.03 3.55 38%   21 44.27 4.08 33%   22 47.10 4.81 26%   23 51.12 5.88 16%   24 57.54 7.36 0%     Therapy Time   Individual Concurrent Group Co-treatment   Time In 0835         Time Out 0854         Minutes Henry Diadema [address] ASHLEE Hazel/L

## 2017-11-17 ENCOUNTER — APPOINTMENT (OUTPATIENT)
Dept: CT IMAGING | Age: 47
DRG: 193 | End: 2017-11-17
Payer: MEDICARE

## 2017-11-17 LAB
KEPPRA: 62 UG/ML
LAMOTRIGINE LEVEL: 9.6 UG/ML (ref 3–15)

## 2017-11-17 PROCEDURE — G0480 DRUG TEST DEF 1-7 CLASSES: HCPCS

## 2017-11-17 PROCEDURE — 94620 HC 6-MINUTE WALK TEST/PULM STRESS TEST SIMPLE: CPT

## 2017-11-17 PROCEDURE — 6360000002 HC RX W HCPCS: Performed by: NURSE PRACTITIONER

## 2017-11-17 PROCEDURE — 2700000000 HC OXYGEN THERAPY PER DAY

## 2017-11-17 PROCEDURE — 2500000003 HC RX 250 WO HCPCS: Performed by: NURSE PRACTITIONER

## 2017-11-17 PROCEDURE — 6360000002 HC RX W HCPCS: Performed by: INTERNAL MEDICINE

## 2017-11-17 PROCEDURE — 80175 DRUG SCREEN QUAN LAMOTRIGINE: CPT

## 2017-11-17 PROCEDURE — 94761 N-INVAS EAR/PLS OXIMETRY MLT: CPT

## 2017-11-17 PROCEDURE — 6370000000 HC RX 637 (ALT 250 FOR IP): Performed by: INTERNAL MEDICINE

## 2017-11-17 PROCEDURE — 2580000003 HC RX 258: Performed by: NURSE PRACTITIONER

## 2017-11-17 PROCEDURE — 6370000000 HC RX 637 (ALT 250 FOR IP): Performed by: NURSE PRACTITIONER

## 2017-11-17 PROCEDURE — 99232 SBSQ HOSP IP/OBS MODERATE 35: CPT | Performed by: INTERNAL MEDICINE

## 2017-11-17 PROCEDURE — 1200000000 HC SEMI PRIVATE

## 2017-11-17 PROCEDURE — 94640 AIRWAY INHALATION TREATMENT: CPT

## 2017-11-17 PROCEDURE — 80177 DRUG SCRN QUAN LEVETIRACETAM: CPT

## 2017-11-17 PROCEDURE — 70450 CT HEAD/BRAIN W/O DYE: CPT

## 2017-11-17 PROCEDURE — 36415 COLL VENOUS BLD VENIPUNCTURE: CPT

## 2017-11-17 RX ORDER — LORAZEPAM 2 MG/ML
1 INJECTION INTRAMUSCULAR EVERY 4 HOURS PRN
Status: DISCONTINUED | OUTPATIENT
Start: 2017-11-17 | End: 2017-11-21 | Stop reason: HOSPADM

## 2017-11-17 RX ORDER — LORAZEPAM 2 MG/ML
1 INJECTION INTRAMUSCULAR EVERY 4 HOURS PRN
Status: DISCONTINUED | OUTPATIENT
Start: 2017-11-17 | End: 2017-11-17

## 2017-11-17 RX ADMIN — LANSOPRAZOLE 30 MG: 30 TABLET, ORALLY DISINTEGRATING, DELAYED RELEASE ORAL at 08:14

## 2017-11-17 RX ADMIN — LACTULOSE 30 G: 20 SOLUTION ORAL at 08:13

## 2017-11-17 RX ADMIN — LAMOTRIGINE 200 MG: 100 TABLET ORAL at 08:13

## 2017-11-17 RX ADMIN — BACLOFEN 10 MG: 10 TABLET ORAL at 08:14

## 2017-11-17 RX ADMIN — LACOSAMIDE 100 MG: 100 TABLET, FILM COATED ORAL at 08:13

## 2017-11-17 RX ADMIN — LACTULOSE 30 G: 20 SOLUTION ORAL at 21:53

## 2017-11-17 RX ADMIN — METHYLPREDNISOLONE SODIUM SUCCINATE 40 MG: 40 INJECTION, POWDER, FOR SOLUTION INTRAMUSCULAR; INTRAVENOUS at 10:17

## 2017-11-17 RX ADMIN — IPRATROPIUM BROMIDE AND ALBUTEROL SULFATE 1 AMPULE: .5; 3 SOLUTION RESPIRATORY (INHALATION) at 11:55

## 2017-11-17 RX ADMIN — MULTIVITAMIN TABLET 1 TABLET: TABLET at 08:12

## 2017-11-17 RX ADMIN — ENOXAPARIN SODIUM 40 MG: 40 INJECTION SUBCUTANEOUS at 08:14

## 2017-11-17 RX ADMIN — DOCUSATE SODIUM AND SENNOSIDES 2 TABLET: 8.6; 5 TABLET, FILM COATED ORAL at 08:14

## 2017-11-17 RX ADMIN — LAMOTRIGINE 100 MG: 100 TABLET ORAL at 08:13

## 2017-11-17 RX ADMIN — BACITRACIN, NEOMYCIN, POLYMYXIN B 14 G: 400; 3.5; 5 OINTMENT TOPICAL at 21:55

## 2017-11-17 RX ADMIN — LACOSAMIDE 100 MG: 100 TABLET, FILM COATED ORAL at 21:52

## 2017-11-17 RX ADMIN — IPRATROPIUM BROMIDE AND ALBUTEROL SULFATE 1 AMPULE: .5; 3 SOLUTION RESPIRATORY (INHALATION) at 08:15

## 2017-11-17 RX ADMIN — LEVETIRACETAM 1500 MG: 100 SOLUTION ORAL at 21:51

## 2017-11-17 RX ADMIN — ANTACID TABLETS 1250 MG: 500 TABLET, CHEWABLE ORAL at 08:14

## 2017-11-17 RX ADMIN — LEVETIRACETAM 1500 MG: 100 SOLUTION ORAL at 08:14

## 2017-11-17 RX ADMIN — IPRATROPIUM BROMIDE AND ALBUTEROL SULFATE 1 AMPULE: .5; 3 SOLUTION RESPIRATORY (INHALATION) at 16:32

## 2017-11-17 RX ADMIN — Medication 10 ML: at 21:00

## 2017-11-17 RX ADMIN — POLYETHYLENE GLYCOL 3350 17 G: 17 POWDER, FOR SOLUTION ORAL at 08:14

## 2017-11-17 RX ADMIN — BACLOFEN 10 MG: 10 TABLET ORAL at 14:19

## 2017-11-17 RX ADMIN — CEFTRIAXONE SODIUM 1 G: 10 INJECTION, POWDER, FOR SOLUTION INTRAVENOUS at 21:40

## 2017-11-17 RX ADMIN — VITAMIN D, TAB 1000IU (100/BT) 1000 UNITS: 25 TAB at 21:55

## 2017-11-17 RX ADMIN — LORAZEPAM 1 MG: 2 INJECTION, SOLUTION INTRAMUSCULAR; INTRAVENOUS at 10:17

## 2017-11-17 RX ADMIN — MONTELUKAST SODIUM 10 MG: 10 TABLET, FILM COATED ORAL at 21:51

## 2017-11-17 RX ADMIN — AZITHROMYCIN MONOHYDRATE 500 MG: 500 INJECTION, POWDER, LYOPHILIZED, FOR SOLUTION INTRAVENOUS at 21:40

## 2017-11-17 RX ADMIN — IPRATROPIUM BROMIDE AND ALBUTEROL SULFATE 1 AMPULE: .5; 3 SOLUTION RESPIRATORY (INHALATION) at 20:36

## 2017-11-17 RX ADMIN — CETIRIZINE HYDROCHLORIDE 5 MG: 5 TABLET, FILM COATED ORAL at 08:14

## 2017-11-17 RX ADMIN — Medication 10 ML: at 08:48

## 2017-11-17 RX ADMIN — BACITRACIN, NEOMYCIN, POLYMYXIN B 14 G: 400; 3.5; 5 OINTMENT TOPICAL at 08:48

## 2017-11-17 RX ADMIN — LAMOTRIGINE 200 MG: 100 TABLET ORAL at 21:52

## 2017-11-17 RX ADMIN — BACLOFEN 10 MG: 10 TABLET ORAL at 21:53

## 2017-11-17 RX ADMIN — VITAMIN D, TAB 1000IU (100/BT) 1000 UNITS: 25 TAB at 08:13

## 2017-11-17 NOTE — PROGRESS NOTES
· Feeding Route: Gastrostomy  · Formula: Standard w/Fiber  · Rate [email or web address] ml/hr    · Volume (ml/day): 975 ml  · Duration: Cyclic (13 hours)  · TF Residuals: None recorded  · Water Flushes: Per Nursing Protocol  · Current TF & Flush Orders Provides: 1170 kcal, 54 g protein   · Goal TF & Flush Orders Provides: @77 ml/hr x 13 hrs: 1,200 kcal, 55.5 g protein  · Additional Calories: None  · Anthropometric Measures:  · Ht: 4' 8\" (142.2 cm)   · Admission Body Wt: 126 lb 1.7 oz (57.2 kg)  · Usual Body Wt: 130 lb (59 kg)  · % Weight Change: 3%,  3 weeks  · Ideal Body Wt: 96 lb (43.5 kg), % Ideal Body 131%  · BMI Classification: BMI 25.0 - 29.9 Overweight (28)  · Comparative Standards (Estimated Nutrition Needs):  · Estimated Daily Total Kcal: 4204-3990  · Estimated Daily Protein (g): 47-55  · Estimated Daily Fluid (ml/day): 1963-9893    Estimated Intake vs Estimated Needs: Intake Less Than Needs    Nutrition Risk Level: High    Nutrition Interventions:   Continue NPO, Continue current Tube Feeding  Continued Inpatient Monitoring, Coordination of Care    Nutrition Evaluation:   · Evaluation: Goals set   · Goals: EN to meet > 75% of estimated kcal/protein needs with good GI tolerance   · Monitoring: Diet Progression, TF Intake, TF Tolerance, Gastric Residuals, Ascites/Edema, Mental Status/Confusion, Weight, Pertinent Labs, Chewing/Swallowing, Diarrhea    See Adult Nutrition Doc Flowsheet for more detail.      Electronically signed by Mir Vera RD, BLAZE on 11/17/17 at 9:12 AM    Contact Number: [phone]

## 2017-11-17 NOTE — PROGRESS NOTES
congestion. No discrete area of consolidation.  No pleural effusion or pneumothorax.           Impression   Possible mild vascular congestion.  No focal consolidation. Assessment/Discussion & Plan     ACUTE MF PNEUMONIA  HYPOXEMIA  WHEEZING AND INCREASED WORK OF BREATHING  CEREBRAL PALSY    ONLY MILD IMPROVEMENT, STILL HAS INCREASED WORK OF BREATHING, SKIN BETTER TURGOR, I WILL KEEP ALL AGGRESSIVE MEASURES FOR NEXT 24 HRS.  RN INFORMED    Electronically signed by Haley Pulido MD on 11/17/2017 at 11:41 AM  Keagan Turner MD  PULMONARY, [address]   [phone]

## 2017-11-17 NOTE — CARE COORDINATION
Dc planning    Received call from Henry Ya 1154 at [facility]. Updated Shoshana Pradip with pt's status. Pt will need to return home with new 02. Shoshana Moseley will discuss with pt's sister Marin Stover if she wants pt to be discharged to [facility], where there is RN present for monitoring pt's status or back to group home with new home 02. Called and left voicemail for Marin Stover to discuss discharge plan. Informed Marin Stover pt will need oxygen at discharge and will need DME company. Informed Rikki Milton will be faxed to [facility] as known provider for medicare. Requested Marin Stover to call back for coordination of care.

## 2017-11-17 NOTE — PROGRESS NOTES
[facility] Assessment complete. COPD exacerbation ([address] [J44.1] . Vitals:    11/17/17 1530   BP: 125/86   Pulse: 95   Resp: 18   Temp: 98.2 °F (36.8 °C)   SpO2: 97%   . Patients home meds are   Prior to Admission medications    Medication Sig Start Date End Date Taking?  Authorizing Provider   baclofen (LIORESAL) 10 MG tablet 10 mg by Per G Tube route 3 times daily   Yes Historical Provider, MD   lamoTRIgine (LAMICTAL) 100 MG tablet 200 mg by Per G Tube route 2 times daily    Yes Historical Provider, MD   Potassium Chloride (KLOR-CON SPRINKLE PO) 10 mEq by Per G Tube route daily   Yes Historical Provider, MD   montelukast (SINGULAIR) 10 MG tablet 10 mg by Per G Tube route nightly   Yes Historical Provider, MD   fluticasone (FLONASE) 50 MCG/ACT nasal spray 2 sprays by Nasal route daily   Yes Historical Provider, MD   Multiple Vitamin (DAILY-JAY PO) by Gastric Tube route daily   Yes Historical Provider, MD   calcium carbonate 1250 MG/5ML SUSP suspension 1,250 mg by Per G Tube route daily   Yes Historical Provider, MD   polyethylene glycol (GLYCOLAX) powder 17 g by Per G Tube route daily   Yes Historical Provider, MD   lactulose (CHRONULAC) 10 GM/15ML solution 30 g by Per G Tube route 2 times daily   Yes Historical Provider, MD   Sodium Phosphates (FLEET) 7-19 GM/118ML Place 1 enema rectally daily as needed   Yes Historical Provider, MD   Dextromethorphan-Guaifenesin (ROBAFEN DM PO) by Gastric Tube route   Yes Historical Provider, MD   lamoTRIgine (LAMICTAL) 100 MG tablet 100 mg by Per G Tube route every morning    Yes Historical Provider, MD   levETIRAcetam (KEPPRA) 100 MG/ML solution Take 1,500 mg by mouth 2 times daily   Yes Historical Provider, MD   loratadine (CLARITIN) 10 MG tablet 10 mg by Per G Tube route daily   Yes Historical Provider, MD   LORazepam (ATIVAN) 1 MG tablet Take 1 mg by mouth every 6 hours as needed for Anxiety   Yes Historical Provider, MD   Lansoprazole (PREVACID 24HR PO) 30 20 [x]  20-25 []  Greater than 25 []  Greater than 25   Peak flow % of Pred or PB []  NA   []  Greater than 90%  []  81-90% []  71-80% []  Less than or equal to 70%  or unable to perform []  Unable due to Respiratory Distress   Dyspnea re []  Patient Baseline []  No SOB []  No SOB [x]  SOB on exertion []  SOB min activity []  At rest/acute   e FEV% Predicted       []  NA []  Above 69%  []  Unable []  Above 60-69%  []  Unable []  Above 50-59%  [x]  Unable []  Above 35-49%  []  Unable []  Less than 35%  []  Unable                 []  Hyperinflation Assessment  Score 1 2 3   CXR and Breath Sounds   []  Clear []  No atelectasis  Basilar aeration []  Atelectasis or absent basilar breath sounds   Incentive Spirometry Volume  (Per IBW)   []  Greater than or equal to 15ml/Kg []  less than 15ml/Kg []  less than 15ml/Kg   Surgery within last 2 weeks []  None or general   []  Abdominal or thoracic surgery  []  Abdominal or thoracic   Chronic Pulmonary Historyre []  No []  Yes []  Yes     []  Secretion Management Assessment  Score 1 2 3   Bilateral Breath Sounds   []  Occasional Rhonchi []  Scattered Rhonchi []  Course Rhonchi and/or poor aeration   Sputum    []  Small amount of thin secretions []  Moderate amount of viscous secretions []  Copius, Viscious Yellow/ Secretions   CXR as reported by physician []  clear  []  Unavailable []  Infiltrates and/or consolidation  []  Unavailable []  Mucus Plugging and or lobar consolidation  []  Unavailable   Cough []  Strong, productive cough []  Weak productive cough []  No cough or weak non-productive cough

## 2017-11-17 NOTE — PROGRESS NOTES
Home Oxygen Evaluation [facility] Oxygen Evaluation completed. Patient is on 3 liters per minute via nasal cannula. Resting SpO2 = 98%  Resting SpO2 on room air = 83%    SpO2 on room air with exercise = na%  SpO2 on oxygen as above with exercise = na%    Nocturnal Oximetry with patient on room air is recommended is SpO2 is between 89% and 95% (requires additional order).     Efrem Bui  10:22 AM

## 2017-11-17 NOTE — PLAN OF CARE
Problem: Falls - Risk of  Goal: Absence of falls  Outcome: Met This Shift  Pt remains free from falls/injury this shift, bed locked and in lowest position, call light and bedside table within reach, bed alarm activated, falling star posted outside of room, non-skid socks on, hourly rounding, will continue to monitor. Problem: Risk for Impaired Skin Integrity  Goal: Tissue integrity - skin and mucous membranes  Structural intactness and normal physiological function of skin and  mucous membranes.    Outcome: Ongoing  Skin assessment completed, barrier cream used, pt turned Q2H, I&Os monitored, skin and linens kept clean/dry, waffle mattress in place, pillow support provided, heels elevated off bed,  will continue to monitor

## 2017-11-17 NOTE — FLOWSHEET NOTE
While writer was in pt's room giving his medications, the pt had a seizure that lasted 30 seconds. Dr. Kline Left notified of situation and that pt's scheduled meds were given, she stated she was going to order a neuro consult. Will continue to monitor.

## 2017-11-17 NOTE — PROGRESS NOTES
mg Intravenous Q12H    lactulose  30 g Per G Tube BID    cetirizine  5 mg Oral Daily    montelukast  10 mg Per G Tube Nightly    multivitamin  1 tablet Per G Tube Daily    polyethylene glycol  17 g Per G Tube Daily    neomycin-bacitracin-polymyxin   Topical BID    sodium chloride flush  10 mL Intravenous 2 times per day    enoxaparin  40 mg Subcutaneous Daily    ipratropium-albuterol  1 ampule Inhalation Q4H WA    baclofen  10 mg Per G Tube TID    vitamin D  1,000 Units Per G Tube BID    levETIRAcetam  1,500 mg Oral BID    lansoprazole  30 mg Per G Tube QAM AC    sennosides-docusate sodium  2 tablet Per G Tube Daily    azithromycin  500 mg Intravenous Q24H    cefTRIAXone (ROCEPHIN) IV  1 g Intravenous Q24H    lacosamide  100 mg Per G Tube BID    lamoTRIgine  100 mg Per G Tube Daily    lamoTRIgine  200 mg Per G Tube BID     Continuous Infusions:    PRN Meds: guaiFENesin-dextromethorphan, albuterol, sodium chloride flush, acetaminophen, HYDROcodone 5 mg - acetaminophen, morphine **OR** morphine, magnesium hydroxide, bisacodyl, ondansetron, nicotine, clonazePAM    Data:     Past Medical History:   has a past medical history of Cerebral palsy ([address] Cholelithiasis with chronic cholecystitis; Constipation; Delayed gastric emptying; Dysphagia; GERD (gastroesophageal reflux disease); Hearing loss; Mental disability; Profound mental retardation; Scoliosis; Seizures ([address] and 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[address] Social History:   reports that he has never smoked. He has never used smokeless tobacco. He reports that he does not drink alcohol or use drugs. Family History: History reviewed. No pertinent family history.     Vitals:  BP (!) 124/91   Pulse 87   Temp 98.4 °F (36.9 °C) (Axillary)   Resp 18   Ht 4' 8\" (1.422 m)   Wt 118 lb 14.4 oz (53.9 kg)   SpO2 94%   BMI 26.66 kg/m²   Temp (24hrs), Av °F (36.7 °C), Min:97.6 °F (36.4 °C), Max:98.4 °F (36.9 °C)    No results for input(s): POCGLU in the

## 2017-11-17 NOTE — CONSULTS
[address] [facility] [address]                 [address] [facility], [address] [place] [address]                                   CONSULTATION    PATIENT NAME: Lilliana Leo                     :        1970  MED REC NO:   [id number]                             ROOM:       2004  ACCOUNT NO:   [de-identified]                           ADMIT DATE: 2017  PROVIDER:     Carlo Wilson DATE:  2017    HISTORY OF PRESENT ILLNESS:  This patient is a 70-year-old gentleman whom I  am asked to see in neurology consultation for advice and opinion regarding  seizures. The patient has multiple comorbidities including cerebral palsy,  mental retardation with developmental delay, scoliosis, and seizures. He  was brought to the hospital because of cough and wheezing. He had wet  cough and audible wheezing over the last 2 days prior to admission. Nurses  at the [facility] home activated EMS because the patient had wheezing. The EMS  gave him a breathing treatment prior to arrival.  He denied any fever or  chills. He is getting tube feedings through G-tube in the stomach. He  denied he had any nausea, vomiting. He is nonverbal.  While he was here,  he had a breakthrough seizure. He has generalized seizures. He has had  seizures for long time. PAST MEDICAL HISTORY:  Significant for cerebral palsy, mental retardation,  developmental delay, scoliosis, seizures. PAST SURGICAL HISTORY:  Includes tube placement for feeding. FAMILY HISTORY:  Noncontributory. REVIEW OF SYSTEMS:  He is unable to give review of systems. PERSONAL SOCIAL HISTORY:  Does not use alcohol, tobacco, or street drugs. MEDICATIONS:  As on the electronic health record. ALLERGIES:  AMPICILLIN, and VALIUM. PHYSICAL EXAMINATION:  VITAL SIGNS:  His temperature is 97.5 degrees Fahrenheit, pulse 74,  respirations 20, blood pressure 142/91. CHEST:  Coarse rhonchi, bibasilar.   HEART:  S1, S2.  NEUROLOGIC:  He
LIVER PROFILE: @LABRCNT, ALB:3,BILIDIR:3,BILITOT:3,ALKPHOS:3)@  PT/INR: No results for input(s): PROTIME, INR in the last 72 hours. APTT: No results for input(s): APTT in the last 72 hours. UA:No results for input(s): NITRITE, COLORU, PHUR, LABCAST, WBCUA, RBCUA, MUCUS, TRICHOMONAS, YEAST, BACTERIA, CLARITYU, SPECGRAV, LEUKOCYTESUR, UROBILINOGEN, BILIRUBINUR, BLOODU, GLUCOSEU, AMORPHOUS in the last 72 hours. Invalid input(s): KETONESU  No results for input(s): PHART, SOV3YUG, PO2ART in the last 72 hours.      LABS REVIEWED    CHEST RADIOGRAPH:  ROTATED FILM WITH MF PNEUMONIA, MORE EXTENSIVE LEFT BASE  Assessment:    ACUTE MF PNEUMONIA  ACUTE RESP FAILURE WITH HYPOXEMIA  CEREBRAL PALSY WITH SPASTIC QUADRIPARESIS    Plan:   STOP IV FLUIDS, ONE DOSE OF IV LASIX  KEEP TUBE FEEDING  TRACHEAL SUCTION FOR SPUTUM CULTURE  AEROSOL PROTOCOL  OXYGEN MONITORING NEXT 24 HRS  REPEAT CXR Thursday  D/W  1508 Kiran Santana MD  PULMONARY, CRITICAL CARE  [facility] PULMONARY  SLEEP AND CRITICAL CARE GROUP  [phone]

## 2017-11-18 ENCOUNTER — APPOINTMENT (OUTPATIENT)
Dept: GENERAL RADIOLOGY | Age: 47
DRG: 193 | End: 2017-11-18
Payer: MEDICARE

## 2017-11-18 LAB
ABSOLUTE EOS #: 0 K/UL (ref 0–0.4)
ABSOLUTE IMMATURE GRANULOCYTE: ABNORMAL K/UL (ref 0–0.3)
ABSOLUTE LYMPH #: 1.3 K/UL (ref 1–4.8)
ABSOLUTE MONO #: 0.4 K/UL (ref 0.2–0.8)
ANION GAP SERPL CALCULATED.3IONS-SCNC: 11 MMOL/L (ref 9–17)
BASOPHILS # BLD: 0 %
BASOPHILS ABSOLUTE: 0 K/UL (ref 0–0.2)
BUN BLDV-MCNC: 7 MG/DL (ref 6–20)
BUN/CREAT BLD: ABNORMAL (ref 9–20)
CALCIUM SERPL-MCNC: 8.8 MG/DL (ref 8.6–10.4)
CHLORIDE BLD-SCNC: 100 MMOL/L (ref 98–107)
CO2: 29 MMOL/L (ref 20–31)
CREAT SERPL-MCNC: <0.4 MG/DL (ref 0.7–1.2)
CULTURE: ABNORMAL
DIFFERENTIAL TYPE: ABNORMAL
DIRECT EXAM: ABNORMAL
EOSINOPHILS RELATIVE PERCENT: 0 %
GFR AFRICAN AMERICAN: ABNORMAL ML/MIN
GFR NON-AFRICAN AMERICAN: ABNORMAL ML/MIN
GFR SERPL CREATININE-BSD FRML MDRD: ABNORMAL ML/MIN/{1.73_M2}
GFR SERPL CREATININE-BSD FRML MDRD: ABNORMAL ML/MIN/{1.73_M2}
GLUCOSE BLD-MCNC: 122 MG/DL (ref 70–99)
HCT VFR BLD CALC: 49.5 % (ref 41–53)
HEMOGLOBIN: 16.4 G/DL (ref 13.5–17.5)
IMMATURE GRANULOCYTES: ABNORMAL %
LYMPHOCYTES # BLD: 14 %
Lab: ABNORMAL
MCH RBC QN AUTO: 31.9 PG (ref 26–34)
MCHC RBC AUTO-ENTMCNC: 33.1 G/DL (ref 31–37)
MCV RBC AUTO: 96.2 FL (ref 80–100)
MONOCYTES # BLD: 4 %
ORGANISM: ABNORMAL
PDW BLD-RTO: 13.6 % (ref 11.5–14.5)
PLATELET # BLD: 333 K/UL (ref 130–400)
PLATELET ESTIMATE: ABNORMAL
PMV BLD AUTO: 8.7 FL (ref 6–12)
POTASSIUM SERPL-SCNC: 3.8 MMOL/L (ref 3.7–5.3)
PROCALCITONIN: 0.05 NG/ML
RBC # BLD: 5.15 M/UL (ref 4.5–5.9)
RBC # BLD: ABNORMAL 10*6/UL
SEG NEUTROPHILS: 82 %
SEGMENTED NEUTROPHILS ABSOLUTE COUNT: 7.8 K/UL (ref 1.8–7.7)
SODIUM BLD-SCNC: 140 MMOL/L (ref 135–144)
SPECIMEN DESCRIPTION: ABNORMAL
SPECIMEN DESCRIPTION: ABNORMAL
STATUS: ABNORMAL
WBC # BLD: 9.6 K/UL (ref 3.5–11)
WBC # BLD: ABNORMAL 10*3/UL

## 2017-11-18 PROCEDURE — 80048 BASIC METABOLIC PNL TOTAL CA: CPT

## 2017-11-18 PROCEDURE — 6370000000 HC RX 637 (ALT 250 FOR IP): Performed by: NURSE PRACTITIONER

## 2017-11-18 PROCEDURE — 6360000002 HC RX W HCPCS: Performed by: INTERNAL MEDICINE

## 2017-11-18 PROCEDURE — 6360000002 HC RX W HCPCS: Performed by: NURSE PRACTITIONER

## 2017-11-18 PROCEDURE — 2580000003 HC RX 258: Performed by: INTERNAL MEDICINE

## 2017-11-18 PROCEDURE — 94640 AIRWAY INHALATION TREATMENT: CPT

## 2017-11-18 PROCEDURE — 94667 MNPJ CHEST WALL 1ST: CPT

## 2017-11-18 PROCEDURE — 36415 COLL VENOUS BLD VENIPUNCTURE: CPT

## 2017-11-18 PROCEDURE — 2580000003 HC RX 258: Performed by: NURSE PRACTITIONER

## 2017-11-18 PROCEDURE — 71010 XR CHEST PORTABLE: CPT

## 2017-11-18 PROCEDURE — 94761 N-INVAS EAR/PLS OXIMETRY MLT: CPT

## 2017-11-18 PROCEDURE — 99232 SBSQ HOSP IP/OBS MODERATE 35: CPT | Performed by: INTERNAL MEDICINE

## 2017-11-18 PROCEDURE — 85025 COMPLETE CBC W/AUTO DIFF WBC: CPT

## 2017-11-18 PROCEDURE — 6370000000 HC RX 637 (ALT 250 FOR IP): Performed by: INTERNAL MEDICINE

## 2017-11-18 PROCEDURE — 84145 PROCALCITONIN (PCT): CPT

## 2017-11-18 PROCEDURE — 2700000000 HC OXYGEN THERAPY PER DAY

## 2017-11-18 PROCEDURE — 1200000000 HC SEMI PRIVATE

## 2017-11-18 RX ORDER — FUROSEMIDE 10 MG/ML
40 INJECTION INTRAMUSCULAR; INTRAVENOUS ONCE
Status: COMPLETED | OUTPATIENT
Start: 2017-11-18 | End: 2017-11-18

## 2017-11-18 RX ADMIN — BACLOFEN 10 MG: 10 TABLET ORAL at 07:53

## 2017-11-18 RX ADMIN — CETIRIZINE HYDROCHLORIDE 5 MG: 5 TABLET, FILM COATED ORAL at 07:53

## 2017-11-18 RX ADMIN — WATER 1 G: 1 INJECTION INTRAMUSCULAR; INTRAVENOUS; SUBCUTANEOUS at 22:18

## 2017-11-18 RX ADMIN — WATER 1 G: 1 INJECTION INTRAMUSCULAR; INTRAVENOUS; SUBCUTANEOUS at 14:42

## 2017-11-18 RX ADMIN — LAMOTRIGINE 200 MG: 100 TABLET ORAL at 07:54

## 2017-11-18 RX ADMIN — POLYETHYLENE GLYCOL 3350 17 G: 17 POWDER, FOR SOLUTION ORAL at 07:54

## 2017-11-18 RX ADMIN — BACITRACIN, NEOMYCIN, POLYMYXIN B 14 G: 400; 3.5; 5 OINTMENT TOPICAL at 11:00

## 2017-11-18 RX ADMIN — Medication 10 ML: at 11:00

## 2017-11-18 RX ADMIN — VITAMIN D, TAB 1000IU (100/BT) 1000 UNITS: 25 TAB at 21:13

## 2017-11-18 RX ADMIN — LAMOTRIGINE 100 MG: 100 TABLET ORAL at 07:57

## 2017-11-18 RX ADMIN — MONTELUKAST SODIUM 10 MG: 10 TABLET, FILM COATED ORAL at 21:13

## 2017-11-18 RX ADMIN — LANSOPRAZOLE 30 MG: 30 TABLET, ORALLY DISINTEGRATING, DELAYED RELEASE ORAL at 07:53

## 2017-11-18 RX ADMIN — DOCUSATE SODIUM AND SENNOSIDES 2 TABLET: 8.6; 5 TABLET, FILM COATED ORAL at 07:53

## 2017-11-18 RX ADMIN — LEVETIRACETAM 1500 MG: 100 SOLUTION ORAL at 07:52

## 2017-11-18 RX ADMIN — LACOSAMIDE 100 MG: 100 TABLET, FILM COATED ORAL at 21:13

## 2017-11-18 RX ADMIN — IPRATROPIUM BROMIDE AND ALBUTEROL SULFATE 1 AMPULE: .5; 3 SOLUTION RESPIRATORY (INHALATION) at 15:18

## 2017-11-18 RX ADMIN — HYDROCODONE BITARTRATE AND ACETAMINOPHEN 1 TABLET: 5; 325 TABLET ORAL at 07:55

## 2017-11-18 RX ADMIN — BACLOFEN 10 MG: 10 TABLET ORAL at 14:19

## 2017-11-18 RX ADMIN — VITAMIN D, TAB 1000IU (100/BT) 1000 UNITS: 25 TAB at 07:53

## 2017-11-18 RX ADMIN — BACITRACIN, NEOMYCIN, POLYMYXIN B 14 G: 400; 3.5; 5 OINTMENT TOPICAL at 21:12

## 2017-11-18 RX ADMIN — ANTACID TABLETS 1250 MG: 500 TABLET, CHEWABLE ORAL at 07:55

## 2017-11-18 RX ADMIN — IPRATROPIUM BROMIDE AND ALBUTEROL SULFATE 1 AMPULE: .5; 3 SOLUTION RESPIRATORY (INHALATION) at 07:14

## 2017-11-18 RX ADMIN — BACLOFEN 10 MG: 10 TABLET ORAL at 21:13

## 2017-11-18 RX ADMIN — METHYLPREDNISOLONE SODIUM SUCCINATE 40 MG: 40 INJECTION, POWDER, FOR SOLUTION INTRAMUSCULAR; INTRAVENOUS at 14:20

## 2017-11-18 RX ADMIN — ENOXAPARIN SODIUM 40 MG: 40 INJECTION SUBCUTANEOUS at 07:54

## 2017-11-18 RX ADMIN — IPRATROPIUM BROMIDE AND ALBUTEROL SULFATE 1 AMPULE: .5; 3 SOLUTION RESPIRATORY (INHALATION) at 11:16

## 2017-11-18 RX ADMIN — Medication 10 ML: at 22:36

## 2017-11-18 RX ADMIN — LACTULOSE 30 G: 20 SOLUTION ORAL at 21:14

## 2017-11-18 RX ADMIN — METHYLPREDNISOLONE SODIUM SUCCINATE 40 MG: 40 INJECTION, POWDER, FOR SOLUTION INTRAMUSCULAR; INTRAVENOUS at 22:17

## 2017-11-18 RX ADMIN — METHYLPREDNISOLONE SODIUM SUCCINATE 40 MG: 40 INJECTION, POWDER, FOR SOLUTION INTRAMUSCULAR; INTRAVENOUS at 01:02

## 2017-11-18 RX ADMIN — MULTIVITAMIN TABLET 1 TABLET: TABLET at 07:53

## 2017-11-18 RX ADMIN — LACTULOSE 30 G: 20 SOLUTION ORAL at 07:54

## 2017-11-18 RX ADMIN — LACOSAMIDE 100 MG: 100 TABLET, FILM COATED ORAL at 07:52

## 2017-11-18 RX ADMIN — IPRATROPIUM BROMIDE AND ALBUTEROL SULFATE 1 AMPULE: .5; 3 SOLUTION RESPIRATORY (INHALATION) at 20:36

## 2017-11-18 RX ADMIN — LEVETIRACETAM 1500 MG: 100 SOLUTION ORAL at 21:11

## 2017-11-18 RX ADMIN — HYDROCODONE BITARTRATE AND ACETAMINOPHEN 1 TABLET: 5; 325 TABLET ORAL at 22:29

## 2017-11-18 RX ADMIN — LAMOTRIGINE 200 MG: 100 TABLET ORAL at 21:13

## 2017-11-18 RX ADMIN — FUROSEMIDE 40 MG: 10 INJECTION, SOLUTION INTRAMUSCULAR; INTRAVENOUS at 15:44

## 2017-11-18 ASSESSMENT — PAIN SCALES - GENERAL
PAINLEVEL_OUTOF10: 6
PAINLEVEL_OUTOF10: 3
PAINLEVEL_OUTOF10: 6

## 2017-11-18 ASSESSMENT — PAIN DESCRIPTION - LOCATION: LOCATION: OTHER (COMMENT)

## 2017-11-18 ASSESSMENT — PAIN SCALES - WONG BAKER
WONGBAKER_NUMERICALRESPONSE: 0
WONGBAKER_NUMERICALRESPONSE: 6
WONGBAKER_NUMERICALRESPONSE: 2
WONGBAKER_NUMERICALRESPONSE: 6

## 2017-11-18 ASSESSMENT — PAIN DESCRIPTION - PAIN TYPE
TYPE: CHRONIC PAIN
TYPE: CHRONIC PAIN

## 2017-11-18 ASSESSMENT — PAIN DESCRIPTION - DESCRIPTORS: DESCRIPTORS: PATIENT UNABLE TO DESCRIBE

## 2017-11-18 NOTE — PROGRESS NOTES
Armani Montes is a 51 y.o. male patient.     Current Facility-Administered Medications   Medication Dose Route Frequency Provider Last Rate Last Dose    cefepime (MAXIPIME) 1 g in sterile water 10 mL IV syringe  1 g Intravenous Q12H Beverley Lawrence MD        LORazepam (ATIVAN) injection 1 mg  1 mg Intravenous Q4H PRN Beverley Larwence MD   1 mg at 11/17/17 1017    calcium carbonate (TUMS) chewable tablet 1,250 mg  1,250 mg Per G Tube Daily Beverley Lawrence MD   1,250 mg at 11/18/17 0755    methylPREDNISolone sodium (SOLU-MEDROL) injection 40 mg  40 mg Intravenous Q12H Yuval Ramirez MD   40 mg at 11/18/17 0102    guaiFENesin-dextromethorphan (ROBITUSSIN DM) 100-10 MG/5ML syrup 10 mL  10 mL Per G Tube Q6H PRN Beverley Lawrence MD   10 mL at 11/15/17 1350    lactulose (CHRONULAC) 10 GM/15ML solution 30 g  30 g Per G Tube BID Beverley Lawrence MD   30 g at 11/18/17 0754    cetirizine (ZYRTEC) tablet 5 mg  5 mg Oral Daily Beverley Lawrence MD   5 mg at 11/18/17 0753    montelukast (SINGULAIR) tablet 10 mg  10 mg Per G Tube Nightly Beverley Lawrence MD   10 mg at 11/17/17 2151    multivitamin 1 tablet  1 tablet Per G Tube Daily Beverley Lawrence MD   1 tablet at 11/18/17 0753    albuterol (PROVENTIL) nebulizer solution 2.5 mg  2.5 mg Nebulization Q4H PRN Yuval Ramirez MD        polyethylene glycol [facility]) packet 17 g  17 g Per G Tube Daily Beverley Lawrence MD   17 g at 11/18/17 0754    neomycin-bacitracin-polymyxin (NEOSPORIN) ointment   Topical BID Beverley Lawrence MD   14 g at 11/17/17 2155    sodium chloride flush 0.9 % injection 10 mL  10 mL Intravenous 2 times per day Drew Mitchell NP   10 mL at 11/17/17 2100    sodium chloride flush 0.9 % injection 10 mL  10 mL Intravenous PRN Drew Mitchell NP        acetaminophen (TYLENOL) tablet 650 mg  650 mg Oral Q4H PRN Drew Mitchell NP        HYDROcodone-acetaminophen (NORCO) 5-325 MG per tablet 1 tablet  1 tablet Oral Q4H PRN

## 2017-11-18 NOTE — PROGRESS NOTES
11/17/17 2246   Gross per 24 hour   Intake             1100 ml   Output                0 ml   Net             1100 ml       Labs:    Hematology:  No results for input(s): WBC, HGB, HCT, PLT, SEDRATE, INR in the last 72 hours. Invalid input(s): PT  Chemistry:  Recent Labs      11/18/17   0528   NA  140   K  3.8   CL  100   CO2  29   GLUCOSE  122*   BUN  7   CREATININE  <0.40*   CALCIUM  8.8     No results for input(s): PROT, LABALBU, LABA1C, V7RBSBJ, K6VBOUY, FT4, TSH, AST, ALT, LDH, GGT, ALKPHOS, BILITOT, BILIDIR, AMMONIA, AMYLASE, LIPASE, LACTATE, CHOL, TRIG, HDL, LDLCALC, LDLDIRECT, LABVLDL, BNP, TROPONINI, CKTOTAL, CKMB, CKMBINDEX in the last 72 hours. Lab Results   Component Value Date/Time    SPECIAL NOT REPORTED 11/15/2017 11:30 AM     Lab Results   Component Value Date/Time    CULTURE PSEUDOMONAS AERUGINOSA LIGHT GROWTH (A) 11/15/2017 11:30 AM    CULTURE NORMAL RESPIRATORY JACKSON HEAVY GROWTH 11/15/2017 11:30 AM    CULTURE  11/15/2017 11:30 AM     Performed at [address] [place] [address], [address] [place] [address] [phone]     Lamotrigine Lvl 9.6     Levetiracetam Lvl 62         @[facility]@    Radiology:    CXR: 11.16.17    FINDINGS:  Severe levoscoliosis of the spine and patient rotation noted.  Stable  cardiomediastinal silhouette.  Possible mild perihilar vascular congestion. No discrete area of consolidation.  No pleural effusion or pneumothorax.      Impression:       Possible mild vascular congestion.  No focal consolidation. CT brain:  Impression:     1. Motion limits evaluation. 2. No convincing acute intracranial abnormality. 3. Extensive global parenchymal volume loss with asymmetric involvement  involving the posterior cerebral hemispheres bilaterally.          Physical Examination:        General appearance:  alert, cooperative and agitated  Mental Status:  Awake, doesn't follow commands or speak  Lungs:  Clear bilateral lung fields  Heart:  regular rate and rhythm, no

## 2017-11-19 LAB
CULTURE: NORMAL
Lab: NORMAL
Lab: NORMAL
SPECIMEN DESCRIPTION: NORMAL
STATUS: NORMAL
STATUS: NORMAL

## 2017-11-19 PROCEDURE — 6370000000 HC RX 637 (ALT 250 FOR IP): Performed by: INTERNAL MEDICINE

## 2017-11-19 PROCEDURE — 2700000000 HC OXYGEN THERAPY PER DAY

## 2017-11-19 PROCEDURE — 99232 SBSQ HOSP IP/OBS MODERATE 35: CPT | Performed by: INTERNAL MEDICINE

## 2017-11-19 PROCEDURE — 6360000002 HC RX W HCPCS: Performed by: INTERNAL MEDICINE

## 2017-11-19 PROCEDURE — 6370000000 HC RX 637 (ALT 250 FOR IP): Performed by: NURSE PRACTITIONER

## 2017-11-19 PROCEDURE — 94640 AIRWAY INHALATION TREATMENT: CPT

## 2017-11-19 PROCEDURE — 2580000003 HC RX 258: Performed by: NURSE PRACTITIONER

## 2017-11-19 PROCEDURE — 94761 N-INVAS EAR/PLS OXIMETRY MLT: CPT

## 2017-11-19 PROCEDURE — 2580000003 HC RX 258: Performed by: INTERNAL MEDICINE

## 2017-11-19 PROCEDURE — 1200000000 HC SEMI PRIVATE

## 2017-11-19 PROCEDURE — 6360000002 HC RX W HCPCS: Performed by: NURSE PRACTITIONER

## 2017-11-19 RX ORDER — SODIUM PHOSPHATE, DIBASIC AND SODIUM PHOSPHATE, MONOBASIC 7; 19 G/133ML; G/133ML
1 ENEMA RECTAL DAILY PRN
Status: DISCONTINUED | OUTPATIENT
Start: 2017-11-19 | End: 2017-11-21 | Stop reason: HOSPADM

## 2017-11-19 RX ORDER — LEVOFLOXACIN 500 MG/1
500 TABLET, FILM COATED ORAL DAILY
Status: DISCONTINUED | OUTPATIENT
Start: 2017-11-19 | End: 2017-11-19

## 2017-11-19 RX ORDER — NUTRITIONAL SUPPLEMENT/FIBER
75 LIQUID (ML) ORAL DAILY
Status: DISCONTINUED | OUTPATIENT
Start: 2017-11-19 | End: 2017-11-19

## 2017-11-19 RX ORDER — POLYETHYLENE GLYCOL 3350 17 G/17G
17 POWDER, FOR SOLUTION ORAL DAILY
Status: DISCONTINUED | OUTPATIENT
Start: 2017-11-19 | End: 2017-11-19 | Stop reason: SDUPTHER

## 2017-11-19 RX ORDER — PREDNISONE 20 MG/1
40 TABLET ORAL DAILY
Status: DISCONTINUED | OUTPATIENT
Start: 2017-11-19 | End: 2017-11-19

## 2017-11-19 RX ORDER — FUROSEMIDE 10 MG/ML
20 INJECTION INTRAMUSCULAR; INTRAVENOUS ONCE
Status: COMPLETED | OUTPATIENT
Start: 2017-11-19 | End: 2017-11-19

## 2017-11-19 RX ORDER — PREDNISONE 20 MG/1
20 TABLET ORAL DAILY
Status: DISCONTINUED | OUTPATIENT
Start: 2017-11-19 | End: 2017-11-21 | Stop reason: HOSPADM

## 2017-11-19 RX ADMIN — METHYLPREDNISOLONE SODIUM SUCCINATE 40 MG: 40 INJECTION, POWDER, FOR SOLUTION INTRAMUSCULAR; INTRAVENOUS at 09:49

## 2017-11-19 RX ADMIN — BACLOFEN 10 MG: 10 TABLET ORAL at 07:35

## 2017-11-19 RX ADMIN — VITAMIN D, TAB 1000IU (100/BT) 1000 UNITS: 25 TAB at 20:45

## 2017-11-19 RX ADMIN — ALBUTEROL SULFATE 2.5 MG: 2.5 SOLUTION RESPIRATORY (INHALATION) at 15:14

## 2017-11-19 RX ADMIN — HYDROCODONE BITARTRATE AND ACETAMINOPHEN 1 TABLET: 5; 325 TABLET ORAL at 07:37

## 2017-11-19 RX ADMIN — BACITRACIN, NEOMYCIN, POLYMYXIN B 14 G: 400; 3.5; 5 OINTMENT TOPICAL at 20:46

## 2017-11-19 RX ADMIN — ANTACID TABLETS 1250 MG: 500 TABLET, CHEWABLE ORAL at 07:36

## 2017-11-19 RX ADMIN — Medication 10 ML: at 07:37

## 2017-11-19 RX ADMIN — VITAMIN D, TAB 1000IU (100/BT) 1000 UNITS: 25 TAB at 07:36

## 2017-11-19 RX ADMIN — CETIRIZINE HYDROCHLORIDE 5 MG: 5 TABLET, FILM COATED ORAL at 07:35

## 2017-11-19 RX ADMIN — LACOSAMIDE 100 MG: 100 TABLET, FILM COATED ORAL at 20:45

## 2017-11-19 RX ADMIN — LANSOPRAZOLE 30 MG: 30 TABLET, ORALLY DISINTEGRATING, DELAYED RELEASE ORAL at 07:36

## 2017-11-19 RX ADMIN — IPRATROPIUM BROMIDE AND ALBUTEROL SULFATE 1 AMPULE: .5; 3 SOLUTION RESPIRATORY (INHALATION) at 08:21

## 2017-11-19 RX ADMIN — LACTULOSE 30 G: 20 SOLUTION ORAL at 07:34

## 2017-11-19 RX ADMIN — LAMOTRIGINE 200 MG: 100 TABLET ORAL at 20:45

## 2017-11-19 RX ADMIN — DOCUSATE SODIUM AND SENNOSIDES 2 TABLET: 8.6; 5 TABLET, FILM COATED ORAL at 07:35

## 2017-11-19 RX ADMIN — BACITRACIN, NEOMYCIN, POLYMYXIN B 14 G: 400; 3.5; 5 OINTMENT TOPICAL at 07:34

## 2017-11-19 RX ADMIN — ENOXAPARIN SODIUM 40 MG: 40 INJECTION SUBCUTANEOUS at 07:36

## 2017-11-19 RX ADMIN — BACLOFEN 10 MG: 10 TABLET ORAL at 16:02

## 2017-11-19 RX ADMIN — MULTIVITAMIN TABLET 1 TABLET: TABLET at 07:35

## 2017-11-19 RX ADMIN — LAMOTRIGINE 100 MG: 100 TABLET ORAL at 09:59

## 2017-11-19 RX ADMIN — LACOSAMIDE 100 MG: 100 TABLET, FILM COATED ORAL at 07:35

## 2017-11-19 RX ADMIN — LACTULOSE 30 G: 20 SOLUTION ORAL at 20:46

## 2017-11-19 RX ADMIN — POLYETHYLENE GLYCOL 3350 17 G: 17 POWDER, FOR SOLUTION ORAL at 07:36

## 2017-11-19 RX ADMIN — LEVETIRACETAM 1500 MG: 100 SOLUTION ORAL at 20:46

## 2017-11-19 RX ADMIN — PREDNISONE 20 MG: 20 TABLET ORAL at 16:01

## 2017-11-19 RX ADMIN — WATER 1 G: 1 INJECTION INTRAMUSCULAR; INTRAVENOUS; SUBCUTANEOUS at 09:48

## 2017-11-19 RX ADMIN — BACLOFEN 10 MG: 10 TABLET ORAL at 20:45

## 2017-11-19 RX ADMIN — LEVETIRACETAM 1500 MG: 100 SOLUTION ORAL at 07:34

## 2017-11-19 RX ADMIN — MONTELUKAST SODIUM 10 MG: 10 TABLET, FILM COATED ORAL at 20:45

## 2017-11-19 RX ADMIN — FUROSEMIDE 20 MG: 10 INJECTION, SOLUTION INTRAMUSCULAR; INTRAVENOUS at 16:01

## 2017-11-19 RX ADMIN — LAMOTRIGINE 200 MG: 100 TABLET ORAL at 07:36

## 2017-11-19 RX ADMIN — IPRATROPIUM BROMIDE AND ALBUTEROL SULFATE 1 AMPULE: .5; 3 SOLUTION RESPIRATORY (INHALATION) at 19:44

## 2017-11-19 RX ADMIN — IPRATROPIUM BROMIDE AND ALBUTEROL SULFATE 1 AMPULE: .5; 3 SOLUTION RESPIRATORY (INHALATION) at 11:05

## 2017-11-19 ASSESSMENT — PAIN SCALES - GENERAL: PAINLEVEL_OUTOF10: 3

## 2017-11-19 NOTE — PROGRESS NOTES
route daily   Yes Historical Provider, MD   pseudoephedrine (SUDAFED) 30 MG tablet 30 mg by Per G Tube route 3 times daily   Yes Historical Provider, MD   senna-docusate (Fausto Ayala) 8.6-50 MG per tablet 2 tablets by Per G Tube route daily   Yes Historical Provider, MD   lacosamide (VIMPAT) 50 MG TABS tablet 100 mg by Per G Tube route 2 times daily   Yes Historical Provider, MD   Cholecalciferol (VITAMIN D3) 2000 units CAPS 1,000 Units by Feeding Tube route 2 times daily   Yes Historical Provider, MD   Nutritional Supplements (REPLETE/FIBER) LIQD 75 mLs by Feeding Tube route daily   Yes Historical Provider, MD   acetaminophen (TYLENOL) 325 MG tablet by Per G Tube route every 6 hours as needed for Pain   Yes Historical Provider, MD   bisacodyl (DULCOLAX) 10 MG suppository Place 10 mg rectally daily as needed for Constipation   Yes Historical Provider, MD   albuterol (PROVENTIL) (2.5 MG/3ML) 0.083% nebulizer solution Take 2.5 mg by nebulization 3 times daily as needed for Wheezing   Yes Historical Provider, MD   clonazePAM (KLONOPIN) 2 MG tablet 2 mg by Per G Tube route as needed (for seizure greater than 3 min or 3 or more in 1 hour)   Yes Historical Provider, MD   .  Recent Surgical History: None = 0     Assessment     FEV1/FVC    FEV1 Predicted unable MRDD      FEV1     FEV1 % Predicted   FVC   IS volume   IBW   FIO2% 2lpm  SPO2 98%  RR 20  Breath Sounds: dim/clear      · Bronchodilator assessment at level  3  · Hyperinflation assessment at level   · Secretion Management assessment at level    ·   · [x]    Bronchodilator Assessment  BRONCHODILATOR ASSESSMENT SCORE  Score 0 1 2 3 4 5   Breath Sounds   []  Patient Baseline []  No Wheeze good aeration []  Faint, scattered wheezing, good aeration [x]  Expiratory Wheezing and or moderately diminished []  Insp/Exp wheeze and/or very diminished []  Insp/Exp and/ or marked distress   Respiratory Rate   []  Patient Baseline []  Less than 20 []  Less than 20 [x]  20-25

## 2017-11-19 NOTE — PLAN OF CARE
Problem: Falls - Risk of  Goal: Absence of falls  Outcome: Ongoing  Bed alarm in use, frequent rounds, monitoring    Problem: Risk for Impaired Skin Integrity  Goal: Tissue integrity - skin and mucous membranes  Structural intactness and normal physiological function of skin and  mucous membranes.    Outcome: Ongoing  Oral care prn, waffle mattress in place, turning ~q2h    Problem: Airway Clearance - Ineffective:  Goal: Clear lung sounds  Clear lung sounds   Outcome: Ongoing      Problem: Gas Exchange - Impaired:  Goal: Levels of oxygenation will improve  Levels of oxygenation will improve   Outcome: Ongoing  Using 2lnc    Problem: Pain:  Goal: Pain level will decrease  Pain level will decrease   Outcome: Ongoing  Medicated for pain when pt restless, crying out -- resting after, affect relaxed      Problem: ABCDS Injury Assessment  Goal: Absence of physical injury  Outcome: Ongoing

## 2017-11-19 NOTE — PROGRESS NOTES
Pulmonary Critical Care Progress Note    Patient seen for the follow up of Acute exacerbation of chronic obstructive pulmonary disease (COPD) ([address]     Subjective:    He is non verbal he has less desaturation on O2 NC. He keep taking cannula off by moving his head. He has been on Tf. No seizures    Examination:    Vitals: BP (!) 126/98   Pulse 89   Temp 97.9 °F (36.6 °C) (Axillary)   Resp 20   Ht 4' 8\" (1.422 m)   Wt 103 lb 4.8 oz (46.9 kg)   SpO2 99%   BMI 23.16 kg/m²   SpO2  Av %  Min: 94 %  Max: 99 %  General appearance: alert and non verbal  Neck: No JVD  Lungs: mild decreased air entry no crackels  Heart: regular rate and rhythm, S1, S2 normal, no gallop  Abdomen: Soft, non tender, + BS distended  Extremities: no cyanosis or clubbing.  No significant edema contractures    LABs:    BMP:   Recent Labs      17   0528   NA  140   K  3.8   CO2  29   BUN  7   CREATININE  <0.40*   LABGLOM  CANNOT BE CALCULATED   GLUCOSE  122*       Radiology:    CXR ;      Bibasilar opacities/ mild congestion    No pleural effusion or pneumothorax     procalcitonin 0.05   Impression:  · Acute hypoxic respiratory failure  · Atelectasis  · Pulmonary edema/Pneumonia possible  · CP/ Scoliosis    Recommendations:  · O2  · duoneb  · Stop Solumedrol 40 IV  q12hr  · Repeat lasix 20 IV x1  · echoacrdiogram  · prednisone 20 mg /PEG taper to stop  · Stop Cefepime given normal procalcitonin  · TF  · Discussed with POA and siter Geovani Dominguez; she wants patient DNRCCA  · percussion vest  · Poor prognosis  · Discharge planning  · DVT/PUD Cuate Dowling MD, [facility]  Pulmonary Critical Care and Sleep Medicine,  [facility]  Cell: [phone]  Office: [phone]

## 2017-11-19 NOTE — PROGRESS NOTES
acetaminophen (TYLENOL) tablet 650 mg  650 mg Oral Q4H PRN Gleda Beaumont, NP        HYDROcodone-acetaminophen [facility]) 5-325 MG per tablet 1 tablet  1 tablet Oral Q4H PRN Gleda Beaumont, NP   1 tablet at 11/19/17 0737    morphine injection 2 mg  2 mg Intravenous Q2H PRN Gleda Julio Cesar, NP   2 mg at 11/16/17 1057    Or    morphine injection 4 mg  4 mg Intravenous Q2H PRN Gleda Julio Cesar, NP        magnesium hydroxide (MILK OF MAGNESIA) 400 MG/5ML suspension 30 mL  30 mL Oral Daily PRN Gleda Julio Cesar, NP        bisacodyl (DULCOLAX) suppository 10 mg  10 mg Rectal Daily PRN Gleda Beaumont, NP        ondansetron [facility]) injection 4 mg  4 mg Intravenous Q6H PRN Gleda Beaumont, NP        nicotine (NICODERM CQ) 21 MG/24HR 1 patch  1 patch Transdermal Daily PRN Gleda Julio Cesar, NP        enoxaparin (LOVENOX) injection 40 mg  40 mg Subcutaneous Daily Gleda Beaumont, NP   40 mg at 11/19/17 0736    ipratropium-albuterol (DUONEB) nebulizer solution 1 ampule  1 ampule Inhalation Q4H WA Gleda Beaumont, NP   1 ampule at 11/19/17 2579    baclofen (LIORESAL) tablet 10 mg  10 mg Per G Tube TID Gleda Beaumont, NP   10 mg at 11/19/17 0735    vitamin D (CHOLECALCIFEROL) tablet 1,000 Units  1,000 Units Per G Tube BID Gleda Beaumont, NP   1,000 Units at 11/19/17 0736    clonazePAM (KLONOPIN) tablet 2 mg  2 mg Per G Tube PRN Gleda Julio Cesar, NP        levETIRAcetam (KEPPRA) 100 MG/ML solution 1,500 mg  1,500 mg Oral BID Gleda Julio Cesar, NP   1,500 mg at 11/19/17 0734    lansoprazole (PREVACID SOLUTAB) disintegrating tablet 30 mg  30 mg Per G Tube QAM AC Gleda Julio Cesar, NP   30 mg at 11/19/17 0736    sennosides-docusate sodium (SENOKOT-S) 8.6-50 MG tablet 2 tablet  2 tablet Per G Tube Daily Patricia Harrell NP   2 tablet at 11/19/17 0735    lacosamide (VIMPAT) tablet 100 mg  100 mg Per G Tube BID Gleda Waverly Hall, NP   100 mg at 11/19/17 0735    lamoTRIgine (LAMICTAL) tablet 100 mg  100 mg Per G Tube Daily Gleda Waverly Hall, NP   100 mg at 11/19/17 8434    lamoTRIgine (LAMICTAL) tablet 200 mg  200 mg Per G Tube BID Gleda Waverly Hall, NP   200 mg at 11/19/17 5922     Allergies   Allergen Reactions    Ampicillin Other (See Comments)    Valium [Diazepam] Other (See Comments)    Other      Bubble bath products      Principal Problem:    Acute exacerbation of chronic obstructive pulmonary disease (COPD) ([facility]  Active Problems:    Seiz[facility] disorder ([facility]    Cerebral palsied ([facility]    Severe protein-calorie malnutrition (Cherylle Esau: less than 60% of standard weight) ([facility]    Multifocal pneumonia    Blood pressure (!) 126/98, pulse 89, temperature 97.9 °F (36.6 °C), temperature source Axillary, resp. rate 20, height 4' 8\" (1.422 m), weight 103 lb 4.8 oz (46.9 kg), SpO2 95 %. Subjective:  Symptoms:  (No further seizures. ). Objective:  General Appearance:  Uncomfortable. Vital signs: (most recent): Blood pressure (!) 126/98, pulse 89, temperature 97.9 °F (36.6 °C), temperature source Axillary, resp. rate 20, height 4' 8\" (1.422 m), weight 103 lb 4.8 oz (46.9 kg), SpO2 95 %. (Diastolic BP is elevated. ). Lungs:  Normal effort. Heart: Normal rate. Neurological: Patient is alert. Assessment:  (MRDD, CP, seizures. No seizures. ).        Alan Gaxiola MD  11/19/2017

## 2017-11-19 NOTE — PLAN OF CARE
Problem: Falls - Risk of  Goal: Absence of falls  Outcome: Ongoing      Problem: Risk for Impaired Skin Integrity  Goal: Tissue integrity - skin and mucous membranes  Structural intactness and normal physiological function of skin and  mucous membranes.    Outcome: Ongoing      Problem: Airway Clearance - Ineffective:  Goal: Ability to maintain a clear airway will improve  Ability to maintain a clear airway will improve   Outcome: Ongoing      Problem: Pain:  Goal: Pain level will decrease  Pain level will decrease   Outcome: Ongoing

## 2017-11-19 NOTE — PROGRESS NOTES
[facility]    Progress Note    11/19/2017    9:26 AM    Name:   Kayleen Watts  MRN:     [id number]     [place]:      [de-identified]   Room:   2004/2004-01   Day:  6  Admit Date:  11/13/2017  6:08 PM    PCP:   Kong Ace  Code Status:  Full Code    Subjective:     C/C:   Chief Complaint   Patient presents with    Shortness of Breath     Interval History Status: not changed. Patient appears more comfortable today. He is sleeping. He is non-verbal.     No more seizure activity. Brief History:     The patient is a 51 y.o. Unavailable/unknown male who presents with Shortness of Breath   and he is admitted to the hospital for the management of  COPD exacerbation.     Per the ED report Maite Salgueros a 51 y. o. male who presents to the emergency department Today by private vehicle for evaluation of cough and wheezing.  The caregiver states that the patient has had a wet cough with audible wheezing for the last 2 days.  The nurses at the group home called 911 because of the wheezing.  EMS had given him a breathing treatment prior to arrival He denies any fevers or chills.  He is currently getting tube feedings through a G-tube in the stomach.  They deny that he's had any nausea or vomiting'     He is nonverbal. He was found to have bibasilar pneumonia. Pulmonary was consulted. He has had 2 break-through seizures while he has been admitted. Review of Systems:     Unable to obtain since he is non-verbal, CP    Medications: Allergies:     Allergies   Allergen Reactions    Ampicillin Other (See Comments)    Valium [Diazepam] Other (See Comments)    Other      Bubble bath products        Current Meds:   Scheduled Meds:    polyethylene glycol  17 g Per G Tube Daily    REPLETE/FIBER  75 mL Feeding Tube Daily    cefepime  1 g Intravenous Q12H    calcium carbonate  1,250 mg Per G Tube Daily    methylPREDNISolone  40 mg Intravenous Q12H    lactulose  30 g Per G Tube BID    cetirizine  5 mg Oral Daily    montelukast  10 mg Per G Tube Nightly    multivitamin  1 tablet Per G Tube Daily    polyethylene glycol  17 g Per G Tube Daily    neomycin-bacitracin-polymyxin   Topical BID    sodium chloride flush  10 mL Intravenous 2 times per day    enoxaparin  40 mg Subcutaneous Daily    ipratropium-albuterol  1 ampule Inhalation Q4H WA    baclofen  10 mg Per G Tube TID    vitamin D  1,000 Units Per G Tube BID    levETIRAcetam  1,500 mg Oral BID    lansoprazole  30 mg Per G Tube QAM AC    sennosides-docusate sodium  2 tablet Per G Tube Daily    lacosamide  100 mg Per G Tube BID    lamoTRIgine  100 mg Per G Tube Daily    lamoTRIgine  200 mg Per G Tube BID     Continuous Infusions:    PRN Meds: fleet, LORazepam, guaiFENesin-dextromethorphan, albuterol, sodium chloride flush, acetaminophen, HYDROcodone 5 mg - acetaminophen, morphine **OR** morphine, magnesium hydroxide, bisacodyl, ondansetron, nicotine, clonazePAM    Data:     Past Medical History:   has a past medical history of Cerebral palsy ([facility] Cholelithiasis with chronic cholecystitis; Constipation; Delayed gastric emptying; Dysphagia; GERD (gastroesophageal reflux disease); Hearing loss; Mental disability; Profound mental retardation; Scoliosis; Seizures ([facility] and 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[facility] Social History:   reports that he has never smoked. He has never used smokeless tobacco. He reports that he does not drink alcohol or use drugs. Family History: History reviewed. No pertinent family history. Vitals:  BP (!) 126/98   Pulse 89   Temp 97.9 °F (36.6 °C) (Axillary)   Resp 20   Ht 4' 8\" (1.422 m)   Wt 103 lb 4.8 oz (46.9 kg)   SpO2 95%   BMI 23.16 kg/m²   Temp (24hrs), Av.1 °F (36.7 °C), Min:97.9 °F (36.6 °C), Max:98.4 °F (36.9 °C)    No results for input(s): POCGLU in the last 72 hours. I/O (24Hr):     Intake/Output Summary (Last 24 hours) at 17 4364  Last data filed at 11/19/17 0650   Gross per 24 hour   Intake             1281 ml   Output                0 ml   Net             1281 ml       Labs:    Hematology:  Recent Labs      11/18/17   1737   WBC  9.6   HGB  16.4   HCT  49.5   PLT  333     Chemistry:  Recent Labs      11/18/17   0528   NA  140   K  3.8   CL  100   CO2  29   GLUCOSE  122*   BUN  7   CREATININE  <0.40*   CALCIUM  8.8     No results for input(s): PROT, LABALBU, LABA1C, Q1BMZES, P4VGPCU, FT4, TSH, AST, ALT, LDH, GGT, ALKPHOS, BILITOT, BILIDIR, AMMONIA, AMYLASE, LIPASE, LACTATE, CHOL, TRIG, HDL, LDLCALC, LDLDIRECT, LABVLDL, BNP, TROPONINI, CKTOTAL, CKMB, CKMBINDEX in the last 72 hours. Lab Results   Component Value Date/Time    SPECIAL NOT REPORTED 11/15/2017 11:30 AM     Lab Results   Component Value Date/Time    CULTURE PSEUDOMONAS AERUGINOSA LIGHT GROWTH (A) 11/15/2017 11:30 AM    CULTURE NORMAL RESPIRATORY JACKSON HEAVY GROWTH 11/15/2017 11:30 AM    CULTURE  11/15/2017 11:30 AM     Performed at [address] [place] [address] [phone]     Lamotrigine Lvl 9.6     Levetiracetam Lvl 62         @[facility]@    Radiology:    CXR: 11.19.17    Impression:        No acute cardiopulmonary disease. CT brain:  Impression:     1. Motion limits evaluation. 2. No convincing acute intracranial abnormality. 3. Extensive global parenchymal volume loss with asymmetric involvement  involving the posterior cerebral hemispheres bilaterally.          Physical Examination:        General appearance:  sleeping, comfortable  Mental Status:  sleeping, doesn't follow commands or speak  Lungs:  Clear bilateral lung fields  Heart:  regular rate and rhythm, no murmur  Abdomen:  soft, nontender, nondistended, normal bowel sounds, no masses, hepatomegaly, splenomegaly  Extremities:  no edema, + contractures  Skin:  + erythema around Peg-tube    Assessment:        Primary Problem  Acute exacerbation of chronic obstructive pulmonary disease (COPD) [facility])    Active Hospital Problems    Diagnosis Date Noted    Severe protein-calorie malnutrition Pedro Fischer: less than 60% of standard weight) ([facility] [E43] 11/15/2017    Multifocal pneumonia [J18.9] 11/15/2017    Acute exacerbation of chronic obstructive pulmonary disease (COPD) ([facility] [J44.1] 11/14/2017    Se[facility] ([facility] [G40.909] 11/14/2017    Cerebral palsied ([facility] [G80.9] 11/14/2017       Plan:        1. COPD exac/ Multifocal pneumonia- Appears to be improving. Changed IV antibiotics to Cefepime since he has Pseudomonas in his resp culture. Consider taper steriods, Pulmonary following, CXR improved after Lasix  2. Severe protein malnutrition- Tube feeds  3. Cerebral palsy  4. Skin breakdown at Peg tube site- Bacitracin, place gauze on top  5. Seizure d/o- 2 break through seizures, Ativan IV prn, apprec Neurology input, CT brain essentially negative, seizure precautions  6. Constipation- Lactulose, resume Fiber, Miralax, suppository prn  7. GI/DVT proph  8.  Occupational therapy  9. SW consult- dc planning back to group home soon but he may need a Picc for IV antibiotics?, avoid Cipro or Levaquin due to lowering seizure threshold    dw nursing    Talon Barba MD  11/19/2017  9:26 AM

## 2017-11-20 LAB
LACOSAMIDE: 6.6 UG/ML (ref 5–10)
LV EF: 55 %
LVEF MODALITY: NORMAL

## 2017-11-20 PROCEDURE — 6370000000 HC RX 637 (ALT 250 FOR IP): Performed by: NURSE PRACTITIONER

## 2017-11-20 PROCEDURE — 2700000000 HC OXYGEN THERAPY PER DAY

## 2017-11-20 PROCEDURE — 99232 SBSQ HOSP IP/OBS MODERATE 35: CPT | Performed by: INTERNAL MEDICINE

## 2017-11-20 PROCEDURE — 94640 AIRWAY INHALATION TREATMENT: CPT

## 2017-11-20 PROCEDURE — 6370000000 HC RX 637 (ALT 250 FOR IP): Performed by: INTERNAL MEDICINE

## 2017-11-20 PROCEDURE — 2580000003 HC RX 258: Performed by: NURSE PRACTITIONER

## 2017-11-20 PROCEDURE — 1200000000 HC SEMI PRIVATE

## 2017-11-20 PROCEDURE — 93306 TTE W/DOPPLER COMPLETE: CPT

## 2017-11-20 PROCEDURE — 6360000002 HC RX W HCPCS: Performed by: NURSE PRACTITIONER

## 2017-11-20 PROCEDURE — 94760 N-INVAS EAR/PLS OXIMETRY 1: CPT

## 2017-11-20 PROCEDURE — 94761 N-INVAS EAR/PLS OXIMETRY MLT: CPT

## 2017-11-20 RX ADMIN — IPRATROPIUM BROMIDE AND ALBUTEROL SULFATE 1 AMPULE: .5; 3 SOLUTION RESPIRATORY (INHALATION) at 15:52

## 2017-11-20 RX ADMIN — BACITRACIN, NEOMYCIN, POLYMYXIN B 14 G: 400; 3.5; 5 OINTMENT TOPICAL at 11:10

## 2017-11-20 RX ADMIN — LACOSAMIDE 100 MG: 100 TABLET, FILM COATED ORAL at 20:27

## 2017-11-20 RX ADMIN — HYDROCODONE BITARTRATE AND ACETAMINOPHEN 1 TABLET: 5; 325 TABLET ORAL at 20:27

## 2017-11-20 RX ADMIN — LAMOTRIGINE 100 MG: 100 TABLET ORAL at 11:15

## 2017-11-20 RX ADMIN — VITAMIN D, TAB 1000IU (100/BT) 1000 UNITS: 25 TAB at 11:14

## 2017-11-20 RX ADMIN — LACTULOSE 30 G: 20 SOLUTION ORAL at 11:11

## 2017-11-20 RX ADMIN — MONTELUKAST SODIUM 10 MG: 10 TABLET, FILM COATED ORAL at 20:26

## 2017-11-20 RX ADMIN — LANSOPRAZOLE 30 MG: 30 TABLET, ORALLY DISINTEGRATING, DELAYED RELEASE ORAL at 11:13

## 2017-11-20 RX ADMIN — VITAMIN D, TAB 1000IU (100/BT) 1000 UNITS: 25 TAB at 20:26

## 2017-11-20 RX ADMIN — PREDNISONE 20 MG: 20 TABLET ORAL at 11:13

## 2017-11-20 RX ADMIN — BACLOFEN 10 MG: 10 TABLET ORAL at 14:47

## 2017-11-20 RX ADMIN — Medication 10 ML: at 11:12

## 2017-11-20 RX ADMIN — LACOSAMIDE 100 MG: 100 TABLET, FILM COATED ORAL at 11:13

## 2017-11-20 RX ADMIN — Medication 10 ML: at 20:27

## 2017-11-20 RX ADMIN — CETIRIZINE HYDROCHLORIDE 5 MG: 5 TABLET, FILM COATED ORAL at 11:14

## 2017-11-20 RX ADMIN — LAMOTRIGINE 200 MG: 100 TABLET ORAL at 11:13

## 2017-11-20 RX ADMIN — LAMOTRIGINE 200 MG: 100 TABLET ORAL at 20:26

## 2017-11-20 RX ADMIN — IPRATROPIUM BROMIDE AND ALBUTEROL SULFATE 1 AMPULE: .5; 3 SOLUTION RESPIRATORY (INHALATION) at 18:14

## 2017-11-20 RX ADMIN — HYDROCODONE BITARTRATE AND ACETAMINOPHEN 1 TABLET: 5; 325 TABLET ORAL at 05:01

## 2017-11-20 RX ADMIN — LEVETIRACETAM 1500 MG: 100 SOLUTION ORAL at 11:11

## 2017-11-20 RX ADMIN — BACLOFEN 10 MG: 10 TABLET ORAL at 11:14

## 2017-11-20 RX ADMIN — IPRATROPIUM BROMIDE AND ALBUTEROL SULFATE 1 AMPULE: .5; 3 SOLUTION RESPIRATORY (INHALATION) at 12:00

## 2017-11-20 RX ADMIN — MULTIVITAMIN TABLET 1 TABLET: TABLET at 11:12

## 2017-11-20 RX ADMIN — ANTACID TABLETS 1250 MG: 500 TABLET, CHEWABLE ORAL at 11:14

## 2017-11-20 RX ADMIN — DOCUSATE SODIUM AND SENNOSIDES 2 TABLET: 8.6; 5 TABLET, FILM COATED ORAL at 11:11

## 2017-11-20 RX ADMIN — LEVETIRACETAM 1500 MG: 100 SOLUTION ORAL at 20:27

## 2017-11-20 RX ADMIN — ENOXAPARIN SODIUM 40 MG: 40 INJECTION SUBCUTANEOUS at 11:13

## 2017-11-20 RX ADMIN — BACLOFEN 10 MG: 10 TABLET ORAL at 20:27

## 2017-11-20 RX ADMIN — IPRATROPIUM BROMIDE AND ALBUTEROL SULFATE 1 AMPULE: .5; 3 SOLUTION RESPIRATORY (INHALATION) at 08:26

## 2017-11-20 RX ADMIN — LACTULOSE 30 G: 20 SOLUTION ORAL at 20:27

## 2017-11-20 RX ADMIN — POLYETHYLENE GLYCOL 3350 17 G: 17 POWDER, FOR SOLUTION ORAL at 11:11

## 2017-11-20 RX ADMIN — BACITRACIN, NEOMYCIN, POLYMYXIN B 14 G: 400; 3.5; 5 OINTMENT TOPICAL at 20:27

## 2017-11-20 ASSESSMENT — PAIN SCALES - WONG BAKER
WONGBAKER_NUMERICALRESPONSE: 4
WONGBAKER_NUMERICALRESPONSE: 8
WONGBAKER_NUMERICALRESPONSE: 4
WONGBAKER_NUMERICALRESPONSE: 6

## 2017-11-20 ASSESSMENT — PAIN SCALES - GENERAL
PAINLEVEL_OUTOF10: 5
PAINLEVEL_OUTOF10: 4
PAINLEVEL_OUTOF10: 6
PAINLEVEL_OUTOF10: 7

## 2017-11-20 ASSESSMENT — PAIN DESCRIPTION - DESCRIPTORS: DESCRIPTORS: PATIENT UNABLE TO DESCRIBE

## 2017-11-20 NOTE — PROGRESS NOTES
(1.422 m)   Wt 103 lb 4.8 oz (46.9 kg)   SpO2 97%   BMI 23.16 kg/m²   Intake/Output Summary (Last 24 hours) at 11/20/17 0945  Last data filed at 11/19/17 2055   Gross per 24 hour   Intake              982 ml   Output                0 ml   Net              982 ml       General Appearance:    Awake, nonverbal, no distress, appears stated age   Head:    Normocephalic, without obvious abnormality, atraumatic   Eyes:    PERRL, conjunctiva/corneas clear, EOM's intact        Ears:    Normal external ear canals, both ears   Nose:   Nares normal, septum midline, mucosa normal, no drainage    or sinus tenderness   Throat:   Lips, mucosa, and tongue normal; Teeth in poor repair    Neck:   Supple, symmetrical, trachea midline, no adenopathy;        thyroid:  No enlargement/tenderness/nodules; no carotid    bruit or JVD   Back:     Not evaluated    Lungs:     Clear to auscultation bilaterally, Upper respiratory secretions noted, respirations unlabored   Chest wall:    No tenderness   Heart:    Regular rate and rhythm, S1 and S2 normal, no murmur, rub   or gallop   Abdomen:     Soft, non-tender, bowel sounds active all four quadrants,     no masses, no organomegaly. PEG site intact    Extremities:   Cerebral palsy, contractures all 4 extremities, no cyanosis or edema   Pulses:   2+ and symmetric all extremities   Skin:   Skin color, texture, turgor normal, no rashes or lesions   Lymph nodes:   Cervical, supraclavicular, and axillary nodes normal   Neurologic:   CNII-XII Grossly intact.        Assessment:     Primary Problem  Acute exacerbation of chronic obstructive pulmonary disease (COPD) ([facility]     Active Hospital Problems    Diagnosis Date Noted    Severe protein-calorie malnutrition Rekha Murali: less than 60% of standard weight) ([facility] [E43] 11/15/2017    Multifocal pneumonia [J18.9] 11/15/2017    Acute exacerbation of chronic obstructive pulmonary disease (COPD) ([facility] [J44.1] 11/14/2017    Seizure disorder ([facility] [Q66.739]

## 2017-11-20 NOTE — FLOWSHEET NOTE
11/20/17 1004   Provider Notification   Reason for Communication Review case   Provider Name Latoya Steve   Provider Notification Nurse Practitioner   Method of Communication Face to face   Response At bedside   Notification Time 1000   NP rounded at this time    Per NP:   OK to d/c continual monitoring of SpO2 at this time.

## 2017-11-20 NOTE — PLAN OF CARE
Problem: Falls - Risk of  Goal: Absence of falls  Outcome: Ongoing  Bed alarm in use, frequent rounds    Problem: Risk for Impaired Skin Integrity  Goal: Tissue integrity - skin and mucous membranes  Structural intactness and normal physiological function of skin and  mucous membranes.    Outcome: Ongoing  Oral care prn, pericare prn    Problem: Airway Clearance - Ineffective:  Goal: Ability to maintain a clear airway will improve  Ability to maintain a clear airway will improve   Outcome: Ongoing  No respiratory distress noted    Problem: Gas Exchange - Impaired:  Goal: Levels of oxygenation will improve  Levels of oxygenation will improve   Outcome: Ongoing  sats in mid 90's, down to 80's when agitated, repositioned -- returns to 90's      Problem: Nutrition  Goal: Optimal nutrition therapy  Nutrition Problem: Inadequate oral intake  Intervention: Food and/or Nutrient Delivery: Continue NPO, Continue current Tube Feeding  Nutritional Goals: EN to meet > 75% of estimated kcal/protein needs with good GI tolerance        Outcome: Ongoing  PEG feeding per orders      Problem: Pain:  Goal: Pain level will decrease  Pain level will decrease   Outcome: Ongoing  Pt resting more tonite, no agitation noted    Problem: ABCDS Injury Assessment  Goal: Absence of physical injury  Outcome: Ongoing

## 2017-11-20 NOTE — FLOWSHEET NOTE
11/20/17 1004   Family Notification   Reason update request   Name J.   Method Call   Relationship Sibling   Response updated   patient's sister called at this time    Stated concern about tightness of NC tubing causing pt discomfort. Per  AM assessment, pt is not currently displaying discomfort.   No further questions at this time

## 2017-11-20 NOTE — PROGRESS NOTES
tablet at 11/20/17 0501    morphine injection 2 mg  2 mg Intravenous Q2H PRN Codie Damian NP   2 mg at 11/16/17 1057    Or    morphine injection 4 mg  4 mg Intravenous Q2H PRN Codie Damian NP        magnesium hydroxide (MILK OF MAGNESIA) 400 MG/5ML suspension 30 mL  30 mL Oral Daily PRN Codie Damian NP        bisacodyl (DULCOLAX) suppository 10 mg  10 mg Rectal Daily PRN Codie Damian NP        ondansetron [facility]) injection 4 mg  4 mg Intravenous Q6H PRN Codie Damian NP        nicotine (NICODERM CQ) 21 MG/24HR 1 patch  1 patch Transdermal Daily PRN Codie Damian NP        enoxaparin (LOVENOX) injection 40 mg  40 mg Subcutaneous Daily Codie Damian NP   40 mg at 11/20/17 1113    ipratropium-albuterol (DUONEB) nebulizer solution 1 ampule  1 ampule Inhalation Q4H WA Codie Damian NP   1 ampule at 11/20/17 5232    baclofen (LIORESAL) tablet 10 mg  10 mg Per G Tube TID Codie Damian NP   10 mg at 11/20/17 1114    vitamin D (CHOLECALCIFEROL) tablet 1,000 Units  1,000 Units Per G Tube BID Codie Damian NP   1,000 Units at 11/20/17 1114    clonazePAM (KLONOPIN) tablet 2 mg  2 mg Per G Tube PRN Codie Damian NP        levETIRAcetam (KEPPRA) 100 MG/ML solution 1,500 mg  1,500 mg Oral BID Codie Damian NP   1,500 mg at 11/20/17 1111    lansoprazole (PREVACID SOLUTAB) disintegrating tablet 30 mg  30 mg Per G Tube QAM AC Codie Damian NP   30 mg at 11/20/17 1113    sennosides-docusate sodium (SENOKOT-S) 8.6-50 MG tablet 2 tablet  2 tablet Per G Tube Daily Codie Damian NP   2 tablet at 11/20/17 1111    lacosamide (VIMPAT) tablet 100 mg  100 mg Per G Tube BID Codie Damian NP   100 mg at 11/20/17 1113    lamoTRIgine (LAMICTAL) tablet 100 mg  100 mg Per G Tube Daily Codie Damian NP   100 mg at 11/20/17 1115    lamoTRIgine (LAMICTAL) tablet 200 mg  200 mg Per G Tube BID Shea Mcleod NP   200 mg at 11/20/17 1113     Allergies   Allergen Reactions    Ampicillin Other (See Comments)    Valium [Diazepam] Other (See Comments)    Other      Bubble bath products      Principal Problem:    Acute exacerbation of chronic obstructive pulmonary disease (COPD) ([facility]  Active Problems:    Seizure disorder ([facility]    Cerebral palsied ([facility]    Severe protein-calorie malnutrition (Schwartz Srinath: less than 60% of standard weight) (HCC)    Multifocal pneumonia    Blood pressure 125/87, pulse 67, temperature 97.2 °F (36.2 °C), temperature source Axillary, resp. rate 20, height 4' 8\" (1.422 m), weight 103 lb 4.8 oz (46.9 kg), SpO2 97 %. Subjective:  Symptoms:  (No seizures. ). Objective:  General Appearance:  Ill-appearing. Vital signs: (most recent): Blood pressure 125/87, pulse 67, temperature 97.2 °F (36.2 °C), temperature source Axillary, resp. rate 20, height 4' 8\" (1.422 m), weight 103 lb 4.8 oz (46.9 kg), SpO2 97 %. Vital signs are normal.    Lungs:  Normal effort. Heart: Normal rate. Neurological: Patient is alert. Assessment:  (CP, MRDD, seizures. Responding to therapy. ).        Tony Lakhani MD  11/20/2017

## 2017-11-20 NOTE — PROGRESS NOTES
Nutrition Assessment    Type and Reason for Visit: Reassess    Nutrition Recommendations: 1. Suggest continuing NPO status. 2. Consider maintaining cyclic Tube Feeding per g-tube:  with Jevity 1.2 Chace (standard with fiber) @ 77 ml/hr x 13 hrs (0800 to 2100). Provide additional water flushes of 100 ml, x 4/day. *    *TF @ goal:  1,201 kcal, 56 g protein, 18 g dietary fiber, 808 ml free water + 400 ml water flushes=1,208 ml total fluid/day. Malnutrition Assessment:  · Malnutrition Status: Meets the criteria for severe malnutrition  · Context: Chronic illness  · Findings of the 6 clinical characteristics of malnutrition (Minimum of 2 out of 6 clinical characteristics is required to make the diagnosis of moderate or severe Protein Calorie Malnutrition based on AND/ASPEN Guidelines):  1. Energy Intake-Greater than 75%, not able to assess    2. Weight Loss-No significant weight loss, unable to assess  3. Fat Loss-Moderate subcutaneous fat loss, Orbital, Triceps  4. Muscle Loss-Moderate muscle mass loss, Temples (temporalis muscle), Clavicles (pectoralis and deltoids), Interosseous, Calf (gastrocnemius)  5. Fluid Accumulation-No significant fluid accumulation, Extremities  6.  Strength-Not measured    Nutrition Diagnosis:   · Problem: Inadequate oral intake  · Etiology: related to Cognitive or neurological impairment     Signs and symptoms:  as evidenced by NPO status due to medical condition, Nutrition support - EN (wt loss of 3% x weeks)    Nutrition Assessment:  · Subjective Assessment: Pt is +non-verbal.  Spoke with Jasmyne (Nursing):  Pt is tolerating cyclic tube feeding with Jevity 1.2 formula @ 77 ml/hr (goal) from 8 am to 9 pm.  Residuals:  0-2.5 ml. Noted +wt loss of 8.1 kg since 11/16, likely r/t fluid losses on diuretic medication per Charlotte Zimmerman.   · Nutrition-Focused Physical Findings: GI:  +g-tube, +rounded, +soft, +no guarding, +active bowel sounds; PV:  WDL; Skin:  +redness/skin breakdown, +g-tube

## 2017-11-21 VITALS
RESPIRATION RATE: 16 BRPM | HEIGHT: 60 IN | DIASTOLIC BLOOD PRESSURE: 64 MMHG | SYSTOLIC BLOOD PRESSURE: 118 MMHG | OXYGEN SATURATION: 95 % | BODY MASS INDEX: 20.48 KG/M2 | TEMPERATURE: 98.8 F | WEIGHT: 104.3 LBS | HEART RATE: 102 BPM

## 2017-11-21 PROCEDURE — 6360000002 HC RX W HCPCS: Performed by: NURSE PRACTITIONER

## 2017-11-21 PROCEDURE — 99239 HOSP IP/OBS DSCHRG MGMT >30: CPT | Performed by: INTERNAL MEDICINE

## 2017-11-21 PROCEDURE — 2700000000 HC OXYGEN THERAPY PER DAY

## 2017-11-21 PROCEDURE — 6370000000 HC RX 637 (ALT 250 FOR IP): Performed by: NURSE PRACTITIONER

## 2017-11-21 PROCEDURE — 6370000000 HC RX 637 (ALT 250 FOR IP): Performed by: INTERNAL MEDICINE

## 2017-11-21 PROCEDURE — 94640 AIRWAY INHALATION TREATMENT: CPT

## 2017-11-21 RX ORDER — PREDNISONE 10 MG/1
TABLET ORAL
Qty: 12 TABLET | Refills: 0 | Status: SHIPPED | OUTPATIENT
Start: 2017-11-21 | End: 2018-01-23

## 2017-11-21 RX ADMIN — BACLOFEN 10 MG: 10 TABLET ORAL at 14:11

## 2017-11-21 RX ADMIN — IPRATROPIUM BROMIDE AND ALBUTEROL SULFATE 1 AMPULE: .5; 3 SOLUTION RESPIRATORY (INHALATION) at 10:40

## 2017-11-21 RX ADMIN — HYDROCODONE BITARTRATE AND ACETAMINOPHEN 1 TABLET: 5; 325 TABLET ORAL at 01:08

## 2017-11-21 RX ADMIN — DOCUSATE SODIUM AND SENNOSIDES 2 TABLET: 8.6; 5 TABLET, FILM COATED ORAL at 08:25

## 2017-11-21 RX ADMIN — POLYETHYLENE GLYCOL 3350 17 G: 17 POWDER, FOR SOLUTION ORAL at 08:25

## 2017-11-21 RX ADMIN — LACOSAMIDE 100 MG: 100 TABLET, FILM COATED ORAL at 08:28

## 2017-11-21 RX ADMIN — BACITRACIN, NEOMYCIN, POLYMYXIN B 14 G: 400; 3.5; 5 OINTMENT TOPICAL at 08:25

## 2017-11-21 RX ADMIN — LACTULOSE 30 G: 20 SOLUTION ORAL at 08:29

## 2017-11-21 RX ADMIN — IPRATROPIUM BROMIDE AND ALBUTEROL SULFATE 1 AMPULE: .5; 3 SOLUTION RESPIRATORY (INHALATION) at 14:44

## 2017-11-21 RX ADMIN — ANTACID TABLETS 1250 MG: 500 TABLET, CHEWABLE ORAL at 08:25

## 2017-11-21 RX ADMIN — BACLOFEN 10 MG: 10 TABLET ORAL at 08:28

## 2017-11-21 RX ADMIN — LEVETIRACETAM 1500 MG: 100 SOLUTION ORAL at 08:24

## 2017-11-21 RX ADMIN — PREDNISONE 20 MG: 20 TABLET ORAL at 08:24

## 2017-11-21 RX ADMIN — ENOXAPARIN SODIUM 40 MG: 40 INJECTION SUBCUTANEOUS at 08:24

## 2017-11-21 RX ADMIN — IPRATROPIUM BROMIDE AND ALBUTEROL SULFATE 1 AMPULE: .5; 3 SOLUTION RESPIRATORY (INHALATION) at 06:58

## 2017-11-21 RX ADMIN — LAMOTRIGINE 200 MG: 100 TABLET ORAL at 08:30

## 2017-11-21 RX ADMIN — VITAMIN D, TAB 1000IU (100/BT) 1000 UNITS: 25 TAB at 08:25

## 2017-11-21 RX ADMIN — LAMOTRIGINE 100 MG: 100 TABLET ORAL at 08:25

## 2017-11-21 RX ADMIN — CETIRIZINE HYDROCHLORIDE 5 MG: 5 TABLET, FILM COATED ORAL at 08:25

## 2017-11-21 RX ADMIN — LANSOPRAZOLE 30 MG: 30 TABLET, ORALLY DISINTEGRATING, DELAYED RELEASE ORAL at 08:25

## 2017-11-21 ASSESSMENT — PAIN DESCRIPTION - LOCATION: LOCATION: OTHER (COMMENT)

## 2017-11-21 ASSESSMENT — PAIN SCALES - WONG BAKER
WONGBAKER_NUMERICALRESPONSE: 8
WONGBAKER_NUMERICALRESPONSE: 4

## 2017-11-21 ASSESSMENT — PAIN SCALES - GENERAL
PAINLEVEL_OUTOF10: 4
PAINLEVEL_OUTOF10: 8

## 2017-11-21 ASSESSMENT — PAIN DESCRIPTION - PAIN TYPE: TYPE: CHRONIC PAIN

## 2017-11-21 NOTE — DISCHARGE SUMMARY
[facility] Medicine Discharge Summary      Patient ID: Karen Mendez    MRN: [id number]     Acct:  [de-identified]   Hospital: [facility]      Patient's PCP: Narinder Ramirez    Admit Date: 11/13/2017     Discharge Date:  11/21/2017      Admitting Physician: Popeye Polanco MD    Discharge Physician: Dalton Espinal DO     Discharge Diagnoses:    Primary Problem  Acute exacerbation of chronic obstructive pulmonary disease (COPD) [facility])    Active Hospital Problems    Diagnosis Date Noted    Severe protein-calorie malnutrition Indira Huertas: less than 60% of standard weight) ([facility] [address] [E43] 11/15/2017    Multifocal pneumonia [J18.9] 11/15/2017    Acute exacerbation of chronic obstructive pulmonary disease (COPD) ([facility] [address] [J44.1] 11/14/2017    Seizure disorder ([facility] [address] [G40.909] 11/14/2017    Cerebral palsied ([facility] [address] [G80.9] 11/14/2017       Recommendations:  1. Albuterol and ipratropium every 4 hours and when necessary  2. Singular  3. Prednisone 20 mg via PEG with tapering dose to eventual discontinuation  4. Continue tube feed  5. Continue DVT prophylaxis  6. Continue GI prophylaxis  7. Pulmonary toilet  8. Percussion vest  9. Follow up with pulmonary in 2-3 weeks    Past Medical History:   Diagnosis Date    Cerebral palsy ([address]     Cholelithiasis with chronic cholecystitis     Constipation     Delayed gastric emptying     Dysphagia     GERD (gastroesophageal reflux disease)     Hearing loss     Mental disability     MRDD    Profound mental retardation     Scoliosis     Seizures (HCC)     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HCC)      The patient was seen and examined on day of discharge and this discharge summary is in conjunction with any daily progress note from day of discharge. Code Status:  [facility] Course:     C/C:       Chief Complaint   Patient presents with    Shortness of Breath         Interval History: Status: Stable. No further seizure activity.  The patient LABGLOM  CANNOT BE CALCULATED   GFRAA  CANNOT BE CALCULATED   CALCIUM  8.8       Treatments: as above    Disposition: Group home    Discharged Condition: Stable    Follow Up:   Luana Hutchins in one week    Discharge Medications:    Mu Moser   Home Medication Instructions VID:[id number]    Printed on:11/21/17 0741   Medication Information                      acetaminophen (TYLENOL) 325 MG tablet  by Per G Tube route every 6 hours as needed for Pain             albuterol (PROVENTIL) (2.5 MG/3ML) 0.083% nebulizer solution  Take 2.5 mg by nebulization 3 times daily as needed for Wheezing             baclofen (LIORESAL) 10 MG tablet  10 mg by Per G Tube route 3 times daily             bisacodyl (DULCOLAX) 10 MG suppository  Place 10 mg rectally daily as needed for Constipation             calcium carbonate 1250 MG/5ML SUSP suspension  1,250 mg by Per G Tube route daily             Cholecalciferol (VITAMIN D3) 2000 units CAPS  1,000 Units by Feeding Tube route 2 times daily             clonazePAM (KLONOPIN) 2 MG tablet  2 mg by Per G Tube route as needed (for seizure greater than 3 min or 3 or more in 1 hour)             Dextromethorphan-Guaifenesin (ROBAFEN DM PO)  by Gastric Tube route             fluticasone (FLONASE) 50 MCG/ACT nasal spray  2 sprays by Nasal route daily             lacosamide (VIMPAT) 50 MG TABS tablet  100 mg by Per G Tube route 2 times daily             lactulose (CHRONULAC) 10 GM/15ML solution  30 g by Per G Tube route 2 times daily             lamoTRIgine (LAMICTAL) 100 MG tablet  100 mg by Per G Tube route every morning              lamoTRIgine (LAMICTAL) 100 MG tablet  200 mg by Per G Tube route 2 times daily              Lansoprazole (PREVACID 24HR PO)  30 mg by Feeding Tube route daily             levETIRAcetam (KEPPRA) 100 MG/ML solution  Take 1,500 mg by mouth 2 times daily             loratadine (CLARITIN) 10 MG tablet  10 mg by Per G Tube route daily             LORazepam

## 2017-11-21 NOTE — PLAN OF CARE
Problem: Falls - Risk of  Goal: Absence of falls  Outcome: Ongoing  Pt remains free of falls and verbalizes understanding of individual fall risks. Pt wearing non skid footwear, bed locked and in lowest position. Side rails up 2/4. Pt has call light and tray table within reach and encouraged to seek out staff for any assistance needed. Pt uses call light appropriately. Problem: Risk for Impaired Skin Integrity  Goal: Tissue integrity - skin and mucous membranes  Structural intactness and normal physiological function of skin and  mucous membranes. Outcome: Ongoing  Patient turned Q2H or able to turn self, nutrition adequate to support skin integrity. No new insults to skin noted for the duration of the shift.

## 2017-11-21 NOTE — PROGRESS NOTES
Shilpi Hobbs is a 51 y.o. male patient.     Current Facility-Administered Medications   Medication Dose Route Frequency Provider Last Rate Last Dose    fleet rectal enema 1 enema  1 enema Rectal Daily PRN Susan Skinner MD        predniSONE (DELTASONE) tablet 20 mg  20 mg Oral Daily Roxanna Jean MD   20 mg at 11/21/17 0824    LORazepam (ATIVAN) injection 1 mg  1 mg Intravenous Q4H PRN Susan Skinner MD   1 mg at 11/17/17 1017    calcium carbonate (TUMS) chewable tablet 1,250 mg  1,250 mg Per G Tube Daily Susan Skinner MD   1,250 mg at 11/21/17 0825    guaiFENesin-dextromethorphan (ROBITUSSIN DM) 100-10 MG/5ML syrup 10 mL  10 mL Per G Tube Q6H PRN Susan Skinner MD   10 mL at 11/15/17 1350    lactulose (CHRONULAC) 10 GM/15ML solution 30 g  30 g Per G Tube BID Susan Skinner MD   30 g at 11/21/17 0829    cetirizine (ZYRTEC) tablet 5 mg  5 mg Oral Daily Susan Skinner MD   5 mg at 11/21/17 0825    montelukast (SINGULAIR) tablet 10 mg  10 mg Per G Tube Nightly Susan Skinner MD   10 mg at 11/20/17 2026    multivitamin 1 tablet  1 tablet Per G Tube Daily Susan Skinner MD   1 tablet at 11/20/17 1112    albuterol (PROVENTIL) nebulizer solution 2.5 mg  2.5 mg Nebulization Q4H PRN Perfecto Davidson MD   2.5 mg at 11/19/17 1514    polyethylene glycol (GLYCOLAX) packet 17 g  17 g Per G Tube Daily Susan Skinner MD   17 g at 11/21/17 0825    neomycin-bacitracin-polymyxin (NEOSPORIN) ointment   Topical BID Susan Skinner MD   14 g at 11/21/17 0825    sodium chloride flush 0.9 % injection 10 mL  10 mL Intravenous 2 times per day Hank Sparks NP   10 mL at 11/20/17 2027    sodium chloride flush 0.9 % injection 10 mL  10 mL Intravenous PRN Hank Sparks NP        acetaminophen (TYLENOL) tablet 650 mg  650 mg Oral Q4H PRN Hank Sparks NP        HYDROcodone-acetaminophen [facility]) 5-325 MG per tablet 1 tablet  1 tablet Oral Q4H PRN Hank Sparks NP   1 tablet at 11/21/17 0108    morphine injection 2 mg  2 mg Intravenous Q2H PRN Corrine Gamma, NP   2 mg at 11/16/17 1057    Or    morphine injection 4 mg  4 mg Intravenous Q2H PRN Corrine Gamma, NP        magnesium hydroxide (MILK OF MAGNESIA) 400 MG/5ML suspension 30 mL  30 mL Oral Daily PRN Corrine Gamma, NP        bisacodyl (DULCOLAX) suppository 10 mg  10 mg Rectal Daily PRN Corrine Gamma, NP        ondansetron [facility]) injection 4 mg  4 mg Intravenous Q6H PRN Corrine Gamma, NP        nicotine (NICODERM CQ) 21 MG/24HR 1 patch  1 patch Transdermal Daily PRN Corrine Gamma, NP        enoxaparin (LOVENOX) injection 40 mg  40 mg Subcutaneous Daily Corrine Gamma, NP   40 mg at 11/21/17 0824    ipratropium-albuterol (DUONEB) nebulizer solution 1 ampule  1 ampule Inhalation Q4H WA Corrine Gamma, NP   1 ampule at 11/21/17 1040    baclofen (LIORESAL) tablet 10 mg  10 mg Per G Tube TID Corrine Gamma, NP   10 mg at 11/21/17 0986    vitamin D (CHOLECALCIFEROL) tablet 1,000 Units  1,000 Units Per G Tube BID Corrine Gamma, NP   1,000 Units at 11/21/17 0825    clonazePAM (KLONOPIN) tablet 2 mg  2 mg Per G Tube PRN Corrine Gamma, NP        levETIRAcetam (KEPPRA) 100 MG/ML solution 1,500 mg  1,500 mg Oral BID Corrine Gamma, NP   1,500 mg at 11/21/17 0824    lansoprazole (PREVACID SOLUTAB) disintegrating tablet 30 mg  30 mg Per G Tube QAM AC Corrine Gamma, NP   30 mg at 11/21/17 0825    sennosides-docusate sodium (SENOKOT-S) 8.6-50 MG tablet 2 tablet  2 tablet Per G Tube Daily Corrine Gamma, NP   2 tablet at 11/21/17 0825    lacosamide (VIMPAT) tablet 100 mg  100 mg Per G Tube BID Corrine Gamma, NP   100 mg at 11/21/17 7489    lamoTRIgine (LAMICTAL) tablet 100 mg  100 mg Per G Tube Daily Corrine White NP   100 mg at 11/21/17 0825    lamoTRIgine (LAMICTAL) tablet 200 mg  200

## 2017-11-21 NOTE — PROGRESS NOTES
Nurse from [facility] called to obtain report prior to disch.  Await ambulance transport

## 2017-11-21 NOTE — PLAN OF CARE
Problem: Falls - Risk of  Goal: Absence of falls  Outcome: Ongoing  Bed alarm on, pt does not ambulate, monitoring frequently    Problem: Risk for Impaired Skin Integrity  Goal: Tissue integrity - skin and mucous membranes  Structural intactness and normal physiological function of skin and  mucous membranes.    Outcome: Ongoing  Waffle mattress in place, pericare prn, turning ~q2h    Problem: Airway Clearance - Ineffective:  Goal: Ability to maintain a clear airway will improve  Ability to maintain a clear airway will improve   Outcome: Ongoing  Continues with congested cough, no respiratory distress    Problem: Gas Exchange - Impaired:  Goal: Levels of oxygenation will improve  Levels of oxygenation will improve   Outcome: Ongoing  sats in mid-90's on 2lnc,     Problem: Pain:  Goal: Pain level will decrease  Pain level will decrease   Outcome: Ongoing  Pt resting, affect more relaxed after pain meds given

## 2017-11-21 NOTE — PROGRESS NOTES
[facility] - Progress Note    11/21/2017   9:12 AM    Name:  Nalini Lopez  Age:  51 y.o. male  MRN:    [id number]     [place]:     [de-identified]   Room:  2004/2004-01   Day: Armando Thorntonat [address]: [facility]     Admit Date: 11/13/2017  6:08 PM  PCP: Jayla Morgan    Subjective:     C/C:   Chief Complaint   Patient presents with    Shortness of Breath       Interval History: Status: Stable. No further seizure activity. The patient does not appear to be short of breath. There is no evidence of coughing. Likewise there is no evidence of wheezing. Patient remains nonverbal. Pulmonary has released the patient for discharge back to group home. Patient's oxygen saturation is between 90-94% on room air. He will not require home oxygen. History: \"The patient is a 51 y.o. male who presents with Shortness of Breath. He is admitted to the hospital for the management of  COPD exacerbation.     Per the ED report Ann Nam a 51 y. o. male who presents to the emergency department Today by private vehicle for evaluation of cough and wheezing.  The caregiver states that the patient has had a wet cough with audible wheezing for the last 2 days.  The nurses at the group home called 911 because of the wheezing.  EMS had given him a breathing treatment prior to arrival He denies any fevers or chills.  He is currently getting tube feedings through a G-tube in the stomach.  They deny that he's had any nausea or vomiting'     He is nonverbal.  He appears drowsy, but is arrousable. \"    ROS:  Unable to obtain secondary to patient's underlying condition. Medications: Allergies:    Allergies   Allergen Reactions    Ampicillin Other (See Comments)    Valium [Diazepam] Other (See Comments)    Other      Bubble bath products        Current Meds:     fleet rectal enema 1 enema Daily PRN   predniSONE (DELTASONE) tablet 20 mg Daily   LORazepam (ATIVAN) injection 1 mg Q4H PRN   calcium carbonate (TUMS)

## 2017-11-21 NOTE — FLOWSHEET NOTE
approached the patient who was unresponsive,  sustained presence/Silent prayer and left a Spiritual Card, patient did not respond     11/21/17 1513   Encounter Summary   Services provided to: Patient   Referral/Consult From: Cherrie   Continue Visiting (11-21-17)   Complexity of Encounter Low   Length of Encounter 15 minutes   Routine   Type Follow up   Assessment Approachable; Unable to respond   Intervention Sustaining presence/ Ministry of presence;Prayer;Provided reading materials/devotional materials   Outcome Did not respond   .

## 2018-01-22 ENCOUNTER — HOSPITAL ENCOUNTER (INPATIENT)
Age: 48
LOS: 6 days | Discharge: HOME | DRG: 193 | End: 2018-01-29
Attending: EMERGENCY MEDICINE | Admitting: FAMILY MEDICINE
Payer: MEDICARE

## 2018-01-22 ENCOUNTER — APPOINTMENT (OUTPATIENT)
Dept: GENERAL RADIOLOGY | Age: 48
DRG: 193 | End: 2018-01-22
Payer: MEDICARE

## 2018-01-22 DIAGNOSIS — J18.9 PNEUMONIA DUE TO ORGANISM: Primary | ICD-10-CM

## 2018-01-22 PROCEDURE — 71045 X-RAY EXAM CHEST 1 VIEW: CPT

## 2018-01-22 PROCEDURE — 99285 EMERGENCY DEPT VISIT HI MDM: CPT

## 2018-01-22 PROCEDURE — 94761 N-INVAS EAR/PLS OXIMETRY MLT: CPT

## 2018-01-22 PROCEDURE — 2700000000 HC OXYGEN THERAPY PER DAY

## 2018-01-23 PROBLEM — J18.9 PNEUMONIA: Status: ACTIVE | Noted: 2018-01-23

## 2018-01-23 LAB
ABSOLUTE EOS #: 0.1 K/UL (ref 0–0.4)
ABSOLUTE IMMATURE GRANULOCYTE: ABNORMAL K/UL (ref 0–0.3)
ABSOLUTE LYMPH #: 3 K/UL (ref 1–4.8)
ABSOLUTE MONO #: 0.7 K/UL (ref 0.2–0.8)
ANION GAP SERPL CALCULATED.3IONS-SCNC: 13 MMOL/L (ref 9–17)
BASOPHILS # BLD: 1 % (ref 0–2)
BASOPHILS ABSOLUTE: 0.1 K/UL (ref 0–0.2)
BUN BLDV-MCNC: 14 MG/DL (ref 6–20)
BUN/CREAT BLD: 34 (ref 9–20)
CALCIUM SERPL-MCNC: 9.6 MG/DL (ref 8.6–10.4)
CHLORIDE BLD-SCNC: 99 MMOL/L (ref 98–107)
CO2: 29 MMOL/L (ref 20–31)
CREAT SERPL-MCNC: 0.41 MG/DL (ref 0.7–1.2)
DIFFERENTIAL TYPE: ABNORMAL
DIRECT EXAM: NORMAL
EOSINOPHILS RELATIVE PERCENT: 1 % (ref 1–4)
GFR AFRICAN AMERICAN: >60 ML/MIN
GFR NON-AFRICAN AMERICAN: >60 ML/MIN
GFR SERPL CREATININE-BSD FRML MDRD: ABNORMAL ML/MIN/{1.73_M2}
GFR SERPL CREATININE-BSD FRML MDRD: ABNORMAL ML/MIN/{1.73_M2}
GLUCOSE BLD-MCNC: 108 MG/DL (ref 75–110)
GLUCOSE BLD-MCNC: 142 MG/DL (ref 75–110)
GLUCOSE BLD-MCNC: 84 MG/DL (ref 75–110)
GLUCOSE BLD-MCNC: 88 MG/DL (ref 75–110)
GLUCOSE BLD-MCNC: 93 MG/DL (ref 70–99)
HCT VFR BLD CALC: 45.1 % (ref 41–53)
HEMOGLOBIN: 14.9 G/DL (ref 13.5–17.5)
IMMATURE GRANULOCYTES: ABNORMAL %
LACTIC ACID: 1.3 MMOL/L (ref 0.5–2.2)
LACTIC ACID: 1.4 MMOL/L (ref 0.5–2.2)
LYMPHOCYTES # BLD: 34 % (ref 24–44)
Lab: NORMAL
MCH RBC QN AUTO: 31.5 PG (ref 26–34)
MCHC RBC AUTO-ENTMCNC: 32.9 G/DL (ref 31–37)
MCV RBC AUTO: 95.7 FL (ref 80–100)
MONOCYTES # BLD: 8 % (ref 1–7)
NRBC AUTOMATED: ABNORMAL PER 100 WBC
PDW BLD-RTO: 13.8 % (ref 11.5–14.5)
PLATELET # BLD: 306 K/UL (ref 130–400)
PLATELET ESTIMATE: ABNORMAL
PMV BLD AUTO: 8.9 FL (ref 6–12)
POTASSIUM SERPL-SCNC: 3.9 MMOL/L (ref 3.7–5.3)
RBC # BLD: 4.72 M/UL (ref 4.5–5.9)
RBC # BLD: ABNORMAL 10*6/UL
SEG NEUTROPHILS: 56 % (ref 36–66)
SEGMENTED NEUTROPHILS ABSOLUTE COUNT: 4.8 K/UL (ref 1.8–7.7)
SODIUM BLD-SCNC: 141 MMOL/L (ref 135–144)
SPECIMEN DESCRIPTION: NORMAL
STATUS: NORMAL
WBC # BLD: 8.6 K/UL (ref 3.5–11)
WBC # BLD: ABNORMAL 10*3/UL

## 2018-01-23 PROCEDURE — 87040 BLOOD CULTURE FOR BACTERIA: CPT

## 2018-01-23 PROCEDURE — 85025 COMPLETE CBC W/AUTO DIFF WBC: CPT

## 2018-01-23 PROCEDURE — 1200000000 HC SEMI PRIVATE

## 2018-01-23 PROCEDURE — 87804 INFLUENZA ASSAY W/OPTIC: CPT

## 2018-01-23 PROCEDURE — 87801 DETECT AGNT MULT DNA AMPLI: CPT

## 2018-01-23 PROCEDURE — 80048 BASIC METABOLIC PNL TOTAL CA: CPT

## 2018-01-23 PROCEDURE — 94640 AIRWAY INHALATION TREATMENT: CPT

## 2018-01-23 PROCEDURE — 87149 DNA/RNA DIRECT PROBE: CPT

## 2018-01-23 PROCEDURE — 2580000003 HC RX 258: Performed by: NURSE PRACTITIONER

## 2018-01-23 PROCEDURE — 82947 ASSAY GLUCOSE BLOOD QUANT: CPT

## 2018-01-23 PROCEDURE — 6360000002 HC RX W HCPCS: Performed by: INTERNAL MEDICINE

## 2018-01-23 PROCEDURE — 6370000000 HC RX 637 (ALT 250 FOR IP): Performed by: NURSE PRACTITIONER

## 2018-01-23 PROCEDURE — 87205 SMEAR GRAM STAIN: CPT

## 2018-01-23 PROCEDURE — 36415 COLL VENOUS BLD VENIPUNCTURE: CPT

## 2018-01-23 PROCEDURE — 2700000000 HC OXYGEN THERAPY PER DAY

## 2018-01-23 PROCEDURE — 6360000002 HC RX W HCPCS: Performed by: NURSE PRACTITIONER

## 2018-01-23 PROCEDURE — 94760 N-INVAS EAR/PLS OXIMETRY 1: CPT

## 2018-01-23 PROCEDURE — 83605 ASSAY OF LACTIC ACID: CPT

## 2018-01-23 PROCEDURE — 99222 1ST HOSP IP/OBS MODERATE 55: CPT | Performed by: FAMILY MEDICINE

## 2018-01-23 RX ORDER — SODIUM CHLORIDE 0.9 % (FLUSH) 0.9 %
10 SYRINGE (ML) INJECTION EVERY 12 HOURS SCHEDULED
Status: DISCONTINUED | OUTPATIENT
Start: 2018-01-23 | End: 2018-01-29 | Stop reason: HOSPADM

## 2018-01-23 RX ORDER — NUTRITIONAL SUPPLEMENT/FIBER
75 LIQUID (ML) ORAL DAILY
Status: DISCONTINUED | OUTPATIENT
Start: 2018-01-23 | End: 2018-01-23

## 2018-01-23 RX ORDER — FLUTICASONE PROPIONATE 50 MCG
2 SPRAY, SUSPENSION (ML) NASAL DAILY
Status: DISCONTINUED | OUTPATIENT
Start: 2018-01-23 | End: 2018-01-29 | Stop reason: HOSPADM

## 2018-01-23 RX ORDER — ACETAMINOPHEN 325 MG/1
650 TABLET ORAL EVERY 4 HOURS PRN
Status: DISCONTINUED | OUTPATIENT
Start: 2018-01-23 | End: 2018-01-29 | Stop reason: HOSPADM

## 2018-01-23 RX ORDER — POLYETHYLENE GLYCOL 3350 17 G/17G
17 POWDER, FOR SOLUTION ORAL DAILY
Status: DISCONTINUED | OUTPATIENT
Start: 2018-01-23 | End: 2018-01-29 | Stop reason: HOSPADM

## 2018-01-23 RX ORDER — MONTELUKAST SODIUM 10 MG/1
10 TABLET ORAL NIGHTLY
Status: DISCONTINUED | OUTPATIENT
Start: 2018-01-23 | End: 2018-01-29 | Stop reason: HOSPADM

## 2018-01-23 RX ORDER — HYDROCODONE BITARTRATE AND ACETAMINOPHEN 5; 325 MG/1; MG/1
2 TABLET ORAL EVERY 4 HOURS PRN
Status: DISCONTINUED | OUTPATIENT
Start: 2018-01-23 | End: 2018-01-29 | Stop reason: HOSPADM

## 2018-01-23 RX ORDER — LEVOFLOXACIN 5 MG/ML
750 INJECTION, SOLUTION INTRAVENOUS EVERY 24 HOURS
Status: DISCONTINUED | OUTPATIENT
Start: 2018-01-23 | End: 2018-01-25

## 2018-01-23 RX ORDER — CALCIUM CARBONATE 1250 MG/5ML
1250 SUSPENSION ORAL DAILY
Status: DISCONTINUED | OUTPATIENT
Start: 2018-01-23 | End: 2018-01-23 | Stop reason: CLARIF

## 2018-01-23 RX ORDER — LAMOTRIGINE 100 MG/1
200 TABLET ORAL 2 TIMES DAILY
Status: DISCONTINUED | OUTPATIENT
Start: 2018-01-23 | End: 2018-01-29 | Stop reason: HOSPADM

## 2018-01-23 RX ORDER — BACLOFEN 10 MG/1
10 TABLET ORAL 3 TIMES DAILY
Status: DISCONTINUED | OUTPATIENT
Start: 2018-01-23 | End: 2018-01-29 | Stop reason: HOSPADM

## 2018-01-23 RX ORDER — FUROSEMIDE 10 MG/ML
20 INJECTION INTRAMUSCULAR; INTRAVENOUS ONCE
Status: COMPLETED | OUTPATIENT
Start: 2018-01-23 | End: 2018-01-23

## 2018-01-23 RX ORDER — SODIUM CHLORIDE 0.9 % (FLUSH) 0.9 %
10 SYRINGE (ML) INJECTION PRN
Status: DISCONTINUED | OUTPATIENT
Start: 2018-01-23 | End: 2018-01-29 | Stop reason: HOSPADM

## 2018-01-23 RX ORDER — IPRATROPIUM BROMIDE AND ALBUTEROL SULFATE 2.5; .5 MG/3ML; MG/3ML
1 SOLUTION RESPIRATORY (INHALATION)
Status: DISCONTINUED | OUTPATIENT
Start: 2018-01-23 | End: 2018-01-29 | Stop reason: HOSPADM

## 2018-01-23 RX ORDER — POLYETHYLENE GLYCOL 3350 17 G/17G
17 POWDER, FOR SOLUTION ORAL DAILY
Status: DISCONTINUED | OUTPATIENT
Start: 2018-01-23 | End: 2018-01-23 | Stop reason: CLARIF

## 2018-01-23 RX ORDER — FUROSEMIDE 20 MG/1
20 TABLET ORAL DAILY
Status: ON HOLD | COMMUNITY
End: 2019-04-16 | Stop reason: HOSPADM

## 2018-01-23 RX ORDER — LACTULOSE 10 G/15ML
30 SOLUTION ORAL 2 TIMES DAILY
Status: DISCONTINUED | OUTPATIENT
Start: 2018-01-23 | End: 2018-01-29 | Stop reason: HOSPADM

## 2018-01-23 RX ORDER — HYDROCODONE BITARTRATE AND ACETAMINOPHEN 5; 325 MG/1; MG/1
1 TABLET ORAL EVERY 4 HOURS PRN
Status: DISCONTINUED | OUTPATIENT
Start: 2018-01-23 | End: 2018-01-29 | Stop reason: HOSPADM

## 2018-01-23 RX ORDER — LACOSAMIDE 100 MG/1
100 TABLET ORAL 2 TIMES DAILY
Status: DISCONTINUED | OUTPATIENT
Start: 2018-01-23 | End: 2018-01-29 | Stop reason: HOSPADM

## 2018-01-23 RX ORDER — METHYLPREDNISOLONE SODIUM SUCCINATE 40 MG/ML
40 INJECTION, POWDER, LYOPHILIZED, FOR SOLUTION INTRAMUSCULAR; INTRAVENOUS EVERY 8 HOURS
Status: COMPLETED | OUTPATIENT
Start: 2018-01-23 | End: 2018-01-24

## 2018-01-23 RX ORDER — LAMOTRIGINE 100 MG/1
100 TABLET ORAL EVERY MORNING
Status: DISCONTINUED | OUTPATIENT
Start: 2018-01-23 | End: 2018-01-29 | Stop reason: HOSPADM

## 2018-01-23 RX ORDER — CALCIUM CARBONATE 200(500)MG
1250 TABLET,CHEWABLE ORAL DAILY
Status: DISCONTINUED | OUTPATIENT
Start: 2018-01-23 | End: 2018-01-29 | Stop reason: HOSPADM

## 2018-01-23 RX ORDER — CETIRIZINE HYDROCHLORIDE 10 MG/1
10 TABLET ORAL DAILY
Status: DISCONTINUED | OUTPATIENT
Start: 2018-01-23 | End: 2018-01-29 | Stop reason: HOSPADM

## 2018-01-23 RX ORDER — ONDANSETRON 2 MG/ML
4 INJECTION INTRAMUSCULAR; INTRAVENOUS EVERY 6 HOURS PRN
Status: DISCONTINUED | OUTPATIENT
Start: 2018-01-23 | End: 2018-01-29 | Stop reason: HOSPADM

## 2018-01-23 RX ORDER — LORAZEPAM 1 MG/1
1 TABLET ORAL EVERY 6 HOURS PRN
Status: DISCONTINUED | OUTPATIENT
Start: 2018-01-23 | End: 2018-01-29 | Stop reason: HOSPADM

## 2018-01-23 RX ORDER — SODIUM CHLORIDE 9 MG/ML
INJECTION, SOLUTION INTRAVENOUS CONTINUOUS
Status: DISCONTINUED | OUTPATIENT
Start: 2018-01-23 | End: 2018-01-23

## 2018-01-23 RX ORDER — ALBUTEROL SULFATE 2.5 MG/3ML
2.5 SOLUTION RESPIRATORY (INHALATION)
Status: DISCONTINUED | OUTPATIENT
Start: 2018-01-23 | End: 2018-01-29 | Stop reason: HOSPADM

## 2018-01-23 RX ORDER — LEVOFLOXACIN 5 MG/ML
500 INJECTION, SOLUTION INTRAVENOUS ONCE
Status: DISCONTINUED | OUTPATIENT
Start: 2018-01-23 | End: 2018-01-23

## 2018-01-23 RX ORDER — NICOTINE 21 MG/24HR
1 PATCH, TRANSDERMAL 24 HOURS TRANSDERMAL DAILY PRN
Status: DISCONTINUED | OUTPATIENT
Start: 2018-01-23 | End: 2018-01-29 | Stop reason: HOSPADM

## 2018-01-23 RX ORDER — BISACODYL 10 MG
10 SUPPOSITORY, RECTAL RECTAL DAILY PRN
Status: DISCONTINUED | OUTPATIENT
Start: 2018-01-23 | End: 2018-01-29 | Stop reason: HOSPADM

## 2018-01-23 RX ORDER — LANSOPRAZOLE 15 MG/1
30 CAPSULE, DELAYED RELEASE ORAL
Status: DISCONTINUED | OUTPATIENT
Start: 2018-01-23 | End: 2018-01-23 | Stop reason: CLARIF

## 2018-01-23 RX ORDER — NYSTATIN 100000 [USP'U]/G
POWDER TOPICAL DAILY
COMMUNITY

## 2018-01-23 RX ORDER — LEVETIRACETAM 100 MG/ML
1500 SOLUTION ORAL 2 TIMES DAILY
Status: DISCONTINUED | OUTPATIENT
Start: 2018-01-23 | End: 2018-01-25

## 2018-01-23 RX ADMIN — IPRATROPIUM BROMIDE AND ALBUTEROL SULFATE 1 AMPULE: .5; 3 SOLUTION RESPIRATORY (INHALATION) at 14:18

## 2018-01-23 RX ADMIN — LEVETIRACETAM 1500 MG: 100 SOLUTION ORAL at 09:09

## 2018-01-23 RX ADMIN — LEVOFLOXACIN 750 MG: 5 INJECTION, SOLUTION INTRAVENOUS at 02:09

## 2018-01-23 RX ADMIN — LAMOTRIGINE 200 MG: 100 TABLET ORAL at 21:48

## 2018-01-23 RX ADMIN — IPRATROPIUM BROMIDE AND ALBUTEROL SULFATE 1 AMPULE: .5; 3 SOLUTION RESPIRATORY (INHALATION) at 19:42

## 2018-01-23 RX ADMIN — CETIRIZINE HYDROCHLORIDE 10 MG: 10 TABLET, FILM COATED ORAL at 09:08

## 2018-01-23 RX ADMIN — SODIUM CHLORIDE: 9 INJECTION, SOLUTION INTRAVENOUS at 16:53

## 2018-01-23 RX ADMIN — SODIUM CHLORIDE: 9 INJECTION, SOLUTION INTRAVENOUS at 02:09

## 2018-01-23 RX ADMIN — MONTELUKAST SODIUM 10 MG: 10 TABLET, FILM COATED ORAL at 21:48

## 2018-01-23 RX ADMIN — LACOSAMIDE 100 MG: 100 TABLET, FILM COATED ORAL at 09:07

## 2018-01-23 RX ADMIN — LEVETIRACETAM 1500 MG: 100 SOLUTION ORAL at 21:48

## 2018-01-23 RX ADMIN — LANSOPRAZOLE 30 MG: 30 TABLET, ORALLY DISINTEGRATING, DELAYED RELEASE ORAL at 09:07

## 2018-01-23 RX ADMIN — BACLOFEN 10 MG: 10 TABLET ORAL at 09:07

## 2018-01-23 RX ADMIN — BACLOFEN 10 MG: 10 TABLET ORAL at 21:48

## 2018-01-23 RX ADMIN — VITAMIN D, TAB 1000IU (100/BT) 2000 UNITS: 25 TAB at 21:47

## 2018-01-23 RX ADMIN — LACTULOSE 30 G: 20 SOLUTION ORAL at 09:09

## 2018-01-23 RX ADMIN — LACOSAMIDE 100 MG: 100 TABLET, FILM COATED ORAL at 21:48

## 2018-01-23 RX ADMIN — LAMOTRIGINE 100 MG: 100 TABLET ORAL at 09:07

## 2018-01-23 RX ADMIN — ENOXAPARIN SODIUM 40 MG: 40 INJECTION SUBCUTANEOUS at 08:59

## 2018-01-23 RX ADMIN — FLUTICASONE PROPIONATE 2 SPRAY: 50 SPRAY, METERED NASAL at 09:06

## 2018-01-23 RX ADMIN — METHYLPREDNISOLONE SODIUM SUCCINATE 40 MG: 40 INJECTION, POWDER, FOR SOLUTION INTRAMUSCULAR; INTRAVENOUS at 18:24

## 2018-01-23 RX ADMIN — BACLOFEN 10 MG: 10 TABLET ORAL at 16:55

## 2018-01-23 RX ADMIN — VITAMIN D, TAB 1000IU (100/BT) 2000 UNITS: 25 TAB at 09:08

## 2018-01-23 RX ADMIN — LACTULOSE 30 G: 20 SOLUTION ORAL at 21:49

## 2018-01-23 RX ADMIN — FUROSEMIDE 20 MG: 10 INJECTION, SOLUTION INTRAMUSCULAR; INTRAVENOUS at 18:24

## 2018-01-23 RX ADMIN — ANTACID TABLETS 1250 MG: 500 TABLET, CHEWABLE ORAL at 09:06

## 2018-01-23 RX ADMIN — IPRATROPIUM BROMIDE AND ALBUTEROL SULFATE 1 AMPULE: .5; 3 SOLUTION RESPIRATORY (INHALATION) at 11:25

## 2018-01-23 RX ADMIN — LAMOTRIGINE 200 MG: 100 TABLET ORAL at 09:09

## 2018-01-23 RX ADMIN — IPRATROPIUM BROMIDE AND ALBUTEROL SULFATE 1 AMPULE: .5; 3 SOLUTION RESPIRATORY (INHALATION) at 07:38

## 2018-01-23 RX ADMIN — POLYETHYLENE GLYCOL (3350) 17 G: 17 POWDER, FOR SOLUTION ORAL at 09:09

## 2018-01-23 ASSESSMENT — ENCOUNTER SYMPTOMS
COUGH: 1
SHORTNESS OF BREATH: 1

## 2018-01-23 NOTE — PROGRESS NOTES
Pharmacy Accuracy Service Medication History Note    The patient's list of current home medications has been reviewed. Source(s) of information: MAR from [address] on information provided by the above source(s), I have updated the patient's home med list as described below. Please review the ACTION REQUESTED BY PHYSICIAN section of this note below for any discrepancies on current hospital orders. I changed or updated the following medications on the patient's home medication list:  Discontinued · none     Added · Lasix 20 mg per GT daily  · Nystatin powder between toes daily     Adjusted   · Lactulose from 30 GM to 20 GM BID  · Ativan from 1mg q6hrs prn to once prn prior to examination  · KCL 10meq from daily to BID   Other Notes · none         PHYSICIAN ACTION REQUESTED  Discrepancies on current hospital orders that need to be addressed by a physician:    Medication Action Requested   Lasix  Nystatin  Lactulose  KCl     Please review/reconcile and order if appropriate. Please evaluate dosing and adjust as appropriate. Please feel free to call me with any questions about this encounter. Thank you.     Jhonny Nuno PharmD  Pharmacy Medication Accuracy Review Service  Phone:  [phone]  Fax: [phone]      Electronically signed by Jhonny Nuno, [address] [place] [address] on 1/23/2018 at 9:49 AM

## 2018-01-23 NOTE — CARE COORDINATION
Social work; pt is from [facility] Randal is the supervisor there. His bed is still available but the level of care should be discussed with Aliya once final plan known. Will need abhijit, discharge med list and call at discharge.   Lilly branch

## 2018-01-23 NOTE — PROGRESS NOTES
weights for accuracy. Will communicate with nursing. Pt is considered to be at high nutrition risk. · Nutrition-Focused Physical Findings: GI: G-tube, +active bowel sounds, +PV: WDL   · Wound Type: None  · Current Nutrition Therapies:  · Oral Diet Orders: NPO   · Oral Diet intake: NPO  · Oral Nutrition Supplement (ONS) Orders: None  · ONS intake: NPO  Tube Feeding (TF) Orders:   Feeding Route: Gastrostomy  Formula: Standard w/Fiber  Rate (ml/hr):40 ml/hr    Volume (ml/day): 960 ml  Duration: Continuous 24hrs  TF Residuals: None recorded  Water Flushes:  (Water flushes of 150 ml (4 times) if IVF not running)  Current TF & Flush Orders Provides: @40 ml/hr: 1,152 kcal, 53 g protein/day, 17 g Dietary Fiber with 775 ml free water  Goal TF & Flush Orders Provides: @ 75 ml/hr: 2,160 kcal, 93 g protein/day, 32 g Dietary Fiber with 1,450 ml free water  · Anthropometric Measures:  · Ht: 5' 6\" (167.6 cm)   · Current Body Wt: 126 lb 1.7 oz (57.2 kg)  · Admission Body Wt: 126 lb (57.2 kg)  · Usual Body Wt: 104 lb (47.2 kg) (actual weight on 11/21/17)  · Ideal Body Wt: 142 lb (64.4 kg), % Ideal Body 88.7%  · BMI Classification: BMI 18.5 - 24.9 Normal Weight  · Comparative Standards (Estimated Nutrition Needs):  · Estimated Daily Total Kcal: 6474-0263  · Estimated Daily Protein (g): 64-76  · Estimated Daily Fluids (mL): 0629-9601    Estimated Intake vs Estimated Needs: Intake Less Than Needs    Nutrition Risk Level: High    Nutrition Interventions:   Continue NPO, Start Tube Feeding  Continued Inpatient Monitoring, Coordination of Care    Nutrition Evaluation:   · Evaluation: Goals set   · Goals: EN to meet > 90% of estimated kcal/protein needs with good GI tolerance    · Monitoring: TF Intake, Diet Tolerance, Gastric Residuals, Mental Status/Confusion, Weight, Pertinent Labs, Constipation    See Adult Nutrition Doc Flowsheet for more detail.      Electronically signed by Shubham JHAVERI, WILLIAM, LD on 1/23/2018 at 12:25

## 2018-01-23 NOTE — ED PROVIDER NOTES
[facility]  eMERGENCY dEPARTMENT eNCOUnter      Pt Name: Beth Machado  MRN: [id number]  [place] 1970  Date of evaluation: 1/22/2018  Provider: Derrek Uribe NP    CHIEF COMPLAINT       Chief Complaint   Patient presents with    Shortness of Breath     from group home onset this evening         HISTORY OF PRESENT ILLNESS  (Location/Symptom, Timing/Onset, Context/Setting, Quality, Duration, Modifying Factors, Severity.)   Beth Machado is a 49 y.o. male who presents to the emergency department via EMS for SOB. Onset was this evening. His oxygen saturation was 89% on room air at his group home per EMS. Additional information is not available. The patient is [facility]. Patient is unable to communicate; he has a history of cerebral palsy, MRDD. HPI is limited. He was admitted for COPD exacerbation 11/13/17. Nursing Notes were reviewed.     ALLERGIES     Ampicillin; Valium [diazepam]; and Other    CURRENT MEDICATIONS       Current Discharge Medication List      CONTINUE these medications which have NOT CHANGED    Details   baclofen (LIORESAL) 10 MG tablet 10 mg by Per G Tube route 3 times daily      !! lamoTRIgine (LAMICTAL) 100 MG tablet 200 mg by Per G Tube route 2 times daily       Potassium Chloride (KLOR-CON SPRINKLE PO) 10 mEq by Per G Tube route daily      montelukast (SINGULAIR) 10 MG tablet 10 mg by Per G Tube route nightly      fluticasone (FLONASE) 50 MCG/ACT nasal spray 2 sprays by Nasal route daily      Multiple Vitamin (DAILY-JAY PO) by Gastric Tube route daily      calcium carbonate 1250 MG/5ML SUSP suspension 1,250 mg by Per G Tube route daily      polyethylene glycol (GLYCOLAX) powder 17 g by Per G Tube route daily      lactulose (CHRONULAC) 10 GM/15ML solution 30 g by Per G Tube route 2 times daily      !! lamoTRIgine (LAMICTAL) 100 MG tablet 100 mg by Per G Tube route every morning       levETIRAcetam (KEPPRA) 100 MG/ML solution Take 1,500 mg by mouth 2 times There is suspicion of diffuse left lung airspace disease. Right lung is grossly clear. Heart size is likely within normal limits. No pneumothorax or definite pleural effusion. Left lung airspace disease/ pneumonia is suspected on this portable study obtained at low lung volumes. Clinical correlation and follow-up study recommended. LABS:  Labs Reviewed   BASIC METABOLIC PANEL - Abnormal; Notable for the following:        Result Value    CREATININE 0.41 (*)     Bun/Cre Ratio 34 (*)     All other components within normal limits   CBC WITH AUTO DIFFERENTIAL - Abnormal; Notable for the following:     Monocytes 8 (*)     All other components within normal limits   CULTURE BLOOD #1   CULTURE BLOOD #1   LACTIC ACID   LACTIC ACID       All other labs were within normal range or not returned as of this dictation. EMERGENCY DEPARTMENT COURSE and DIFFERENTIAL DIAGNOSIS/MDM:   Vitals:    Vitals:    01/22/18 2342 01/22/18 2356 01/23/18 0030 01/23/18 0123   Pulse:       Resp:       Temp:    97.3 °F (36.3 °C)   TempSrc:    Axillary   SpO2: 95% 95% 93%    Weight:    126 lb (57.2 kg)   Height:    5' 6\" (1.676 m)         MEDICATIONS GIVEN IN THE ED:  Medications   0.9 % sodium chloride infusion (not administered)   levofloxacin (LEVAQUIN) 750 MG/150ML infusion 750 mg (not administered)       CLINICAL DECISION MAKING:  The patient presented alert with a nontoxic appearance and was seen in conjunction with Dr. Nilda Jean. Chest x-ray showed pneumonia. He will be admitted for further evaluation and treatment. CONSULTS:  IP CONSULT TO INTERNAL MEDICINE      FINAL IMPRESSION      1.  Pneumonia due to organism            Problem List  Patient Active Problem List   Diagnosis Code    Acute exacerbation of chronic obstructive pulmonary disease (COPD) ([facility] [address] J44.1    Seizure disorder ([facility] [address] G40.909    Cerebral palsied ([facility] [address] G80.9    Severe protein-calorie malnutrition (Darcella Churches: less than 60% of standard weight) ([facility] [address] E43   

## 2018-01-23 NOTE — CONSULTS
Pulmonary Medicine and Critical Care Consult    Patient - David Castro   MRN -  [id number]   Acct # - [de-identified]   - 1970      Date of Admission -  2018 11:05 PM  Date of evaluation -  2018  [facility] - [facility]   Hospital Day - [address] Rosa M MD Primary Care Physician - Sonu Dong     Reason for Consult      Pneumonia  Assessment     · Pneumonia/possible aspiration/atelectasis  · Possible cardiomegaly/ pulmonary edema  · Dysphagia status post PEG  · Cerebral palsy with spastic quadriparesis    Recommendations   · Oxygen nasal cannula  · Albuterol by nebulizer  · Levaquin  · Check BNP  · Solu-Medrol 40 IV every 8 hours for 3 doses  · Echocardiogram  · PEG tube feeding  · lasix 20 iv x1  · Repeat chest x-ray  · DVT and peptic ulcer disease prophylaxis    Problem List      Patient Active Problem List   Diagnosis    Acute exacerbation of chronic obstructive pulmonary disease (COPD) ([address]    Seizure disorder ([address]    Cerebral palsied ([address]    Severe protein-calorie malnutrition Lars Clark: less than 60% of standard weight) ([address]    Multifocal pneumonia    Pneumonia       HPI     David Castro is 49 y.o.,  male, PMH Of cerebral palsy with spastic quadriparesis and seizures, scoliosis, scoliosis , gastroparesis, dysphagia status post PEG tube, gastroesophageal reflux disease, and severe MRDD. He presented after he was noted to be in respiratory distress by his caregiver having wheezing and saturation of 90%. He also was noted to have cough for one day. His chest x-ray showed left lung opacity. The patient is not able to volunteer history.     PMHx   Past Medical History      Diagnosis Date    Cerebral palsy ([address]     Cholelithiasis with chronic cholecystitis     Constipation     Delayed gastric emptying     Dysphagia     GERD (gastroesophageal reflux disease)     Hearing loss     Mental disability     MRDD    Profound mental retardation     Scoliosis     by Per G Tube route daily  lactulose (CHRONULAC) 10 GM/15ML solution, 20 g by Per G Tube route 2 times daily   Dextromethorphan-Guaifenesin (ROBAFEN DM PO), 10 mLs by Gastric Tube route 4 times daily as needed (cough)   lamoTRIgine (LAMICTAL) 100 MG tablet, 100 mg by Per G Tube route every morning   levETIRAcetam (KEPPRA) 100 MG/ML solution, Take 1,500 mg by mouth 2 times daily  loratadine (CLARITIN) 10 MG tablet, 10 mg by Per G Tube route daily  LORazepam (ATIVAN) 1 MG tablet, Take 1 mg by mouth as needed for Anxiety . Lansoprazole (PREVACID 24HR PO), 30 mg by Feeding Tube route daily  pseudoephedrine (SUDAFED) 30 MG tablet, 30 mg by Per G Tube route 3 times daily  senna-docusate (PERICOLACE) 8.6-50 MG per tablet, 2 tablets by Per G Tube route daily  Nutritional Supplements (REPLETE/FIBER) LIQD, 75 mLs by Feeding Tube route daily  bisacodyl (DULCOLAX) 10 MG suppository, Place 10 mg rectally daily as needed for Constipation  albuterol (PROVENTIL) (2.5 MG/3ML) 0.083% nebulizer solution, Take 2.5 mg by nebulization 3 times daily as needed for Wheezing  Sodium Phosphates (FLEET) 7-19 GM/118ML, Place 1 enema rectally daily as needed  lacosamide (VIMPAT) 50 MG TABS tablet, 100 mg by Per G Tube route 2 times daily  Cholecalciferol (VITAMIN D3) 2000 units CAPS, 1,000 Units by Feeding Tube route 2 times daily  acetaminophen (TYLENOL) 325 MG tablet, by Per G Tube route every 6 hours as needed for Pain    Allergies    Ampicillin; Valium [diazepam]; and Other  Social History     Social History     Social History    Marital status: Single     Spouse name: N/A    Number of children: N/A    Years of education: N/A     Occupational History    Not on file.      Social History Main Topics    Smoking status: Never Smoker    Smokeless tobacco: Never Used    Alcohol use No    Drug use: No    Sexual activity: Not on file     Other Topics Concern    Not on file     Social History Narrative    No narrative on file     Family

## 2018-01-23 NOTE — H&P
topically daily Apply between toes. Yes Historical Provider, MD   baclofen (LIORESAL) 10 MG tablet 10 mg by Per G Tube route 3 times daily   Yes Historical Provider, MD   lamoTRIgine (LAMICTAL) 100 MG tablet 200 mg by Per G Tube route 2 times daily    Yes Historical Provider, MD   Potassium Chloride (KLOR-CON SPRINKLE PO) 10 mEq by Per G Tube route 2 times daily    Yes Historical Provider, MD   montelukast (SINGULAIR) 10 MG tablet 10 mg by Per G Tube route nightly   Yes Historical Provider, MD   fluticasone (FLONASE) 50 MCG/ACT nasal spray 2 sprays by Nasal route daily   Yes Historical Provider, MD   Multiple Vitamin (DAILY-JAY PO) by Gastric Tube route daily   Yes Historical Provider, MD   calcium carbonate 1250 MG/5ML SUSP suspension 1,250 mg by Per G Tube route daily   Yes Historical Provider, MD   polyethylene glycol (GLYCOLAX) powder 17 g by Per G Tube route daily   Yes Historical Provider, MD   lactulose (CHRONULAC) 10 GM/15ML solution 20 g by Per G Tube route 2 times daily    Yes Historical Provider, MD   Dextromethorphan-Guaifenesin (ROBAFEN DM PO) 10 mLs by Gastric Tube route 4 times daily as needed (cough)    Yes Historical Provider, MD   lamoTRIgine (LAMICTAL) 100 MG tablet 100 mg by Per G Tube route every morning    Yes Historical Provider, MD   levETIRAcetam (KEPPRA) 100 MG/ML solution Take 1,500 mg by mouth 2 times daily   Yes Historical Provider, MD   loratadine (CLARITIN) 10 MG tablet 10 mg by Per G Tube route daily   Yes Historical Provider, MD   LORazepam (ATIVAN) 1 MG tablet Take 1 mg by mouth as needed for Anxiety .    Yes Historical Provider, MD   Lansoprazole (PREVACID 24HR PO) 30 mg by Feeding Tube route daily   Yes Historical Provider, MD   pseudoephedrine (SUDAFED) 30 MG tablet 30 mg by Per G Tube route 3 times daily   Yes Historical Provider, MD   senna-docusate (Morene Chill) 8.6-50 MG per tablet 2 tablets by Per G Tube route daily   Yes Historical Provider, MD   Nutritional Supplements (REPLETE/FIBER) LIQD 75 mLs by Feeding Tube route daily   Yes Historical Provider, MD   bisacodyl (DULCOLAX) 10 MG suppository Place 10 mg rectally daily as needed for Constipation   Yes Historical Provider, MD   albuterol (PROVENTIL) (2.5 MG/3ML) 0.083% nebulizer solution Take 2.5 mg by nebulization 3 times daily as needed for Wheezing   Yes Historical Provider, MD   Sodium Phosphates (FLEET) 7-19 GM/118ML Place 1 enema rectally daily as needed    Historical Provider, MD   lacosamide (VIMPAT) 50 MG TABS tablet 100 mg by Per G Tube route 2 times daily    Historical Provider, MD   Cholecalciferol (VITAMIN D3) 2000 units CAPS 1,000 Units by Feeding Tube route 2 times daily    Historical Provider, MD   acetaminophen (TYLENOL) 325 MG tablet by Per G Tube route every 6 hours as needed for Pain    Historical Provider, MD        Allergies:     Ampicillin; Valium [diazepam]; and Other    Social History:     Tobacco:    reports that he has never smoked. He has never used smokeless tobacco.  Alcohol:      reports that he does not drink alcohol. Drug Use:  reports that he does not use drugs. Family History:     History reviewed. No pertinent family history. Review of Systems:     Positive and Negative as described in HPI. Review of Systems   Unable to perform ROS: Patient nonverbal   Respiratory: Positive for shortness of breath.           Physical Exam:   BP (!) 143/77   Pulse 89   Temp 98.3 °F (36.8 °C) (Axillary)   Resp 26   Ht 5' 6\" (1.676 m)   Wt 126 lb (57.2 kg)   SpO2 97%   BMI 20.34 kg/m²   Temp (24hrs), Av.8 °F (36.6 °C), Min:97.3 °F (36.3 °C), Max:98.3 °F (36.8 °C)    Recent Labs      18   0822   POCGLU  84       Intake/Output Summary (Last 24 hours) at 18 1047  Last data filed at 18 5799   Gross per 24 hour   Intake              574 ml   Output                0 ml   Net              574 ml       General Appearance:  alert, and in no acute distress  Head:  normocephalic, Absolute Lymph # 3.00 1.0 - 4.8 k/uL    Absolute Mono # 0.70 0.2 - 0.8 k/uL    Absolute Eos # 0.10 0.0 - 0.4 k/uL    Basophils # 0.10 0.0 - 0.2 k/uL    Absolute Immature Granulocyte NOT REPORTED 0.00 - 0.30 k/uL    WBC Morphology NOT REPORTED     RBC Morphology NOT REPORTED     Platelet Estimate NOT REPORTED    Lactic Acid    Collection Time: 01/23/18 12:53 AM   Result Value Ref Range    Lactic Acid 1.3 0.5 - 2.2 mmol/L   Lactic Acid    Collection Time: 01/23/18  4:07 AM   Result Value Ref Range    Lactic Acid 1.4 0.5 - 2.2 mmol/L   POC Glucose Fingerstick    Collection Time: 01/23/18  8:22 AM   Result Value Ref Range    POC Glucose 84 75 - 110 mg/dL       Imaging/Diagnostics:    Xr Chest Portable    Result Date: 1/22/2018  EXAMINATION: SINGLE VIEW OF THE CHEST 1/22/2018 11:09 pm COMPARISON: 11/18/2017 HISTORY: ORDERING SYSTEM PROVIDED HISTORY: sob TECHNOLOGIST PROVIDED HISTORY: Reason for exam:->sob Ordering Physician Provided Reason for Exam: sob Acuity: Acute Type of Exam: Initial FINDINGS: Portable study was obtained at low lung volumes and limited by unchanged severe thoracolumbar scoliosis. There is suspicion of diffuse left lung airspace disease. Right lung is grossly clear. Heart size is likely within normal limits. No pneumothorax or definite pleural effusion. Left lung airspace disease/ pneumonia is suspected on this portable study obtained at low lung volumes. Clinical correlation and follow-up study recommended. Assessment :      Primary Problem  Pneumonia    Active Hospital Problems    Diagnosis Date Noted    Pneumonia [J18.9] 01/23/2018    Seizure disorder ([facility] [G40.909] 11/14/2017    Cerebral palsied ([facility] [G80.9] 11/14/2017       Plan:     Patient status Admit as inpatient in the  Med/Surge    1. PNA, acute hypoxic respiratory failure, improving  - on iv levaquin   - duoneb, singulair, zyrtec, flonase    - pulm consult   - fu rapid flu, respiratory culture   2.  Seizure d/o - stable. vimpat, lamictal, keppra    3. Feeding tube - dietitian consult, will start feeding today   4. GERD- continue prevacid      Consultations:   IP CONSULT TO INTERNAL MEDICINE  IP CONSULT TO DIETITIAN    Patient is admitted as inpatient status because of co-morbidities listed above, severity of signs and symptoms as outlined, requirement for current medical therapies and most importantly because of direct risk to patient if care not provided in a hospital setting.     Kenyon Oscar MD  1/23/2018  10:47 AM    Copy sent to Dr. Lorena Monk

## 2018-01-24 ENCOUNTER — APPOINTMENT (OUTPATIENT)
Dept: GENERAL RADIOLOGY | Age: 48
DRG: 193 | End: 2018-01-24
Payer: MEDICARE

## 2018-01-24 LAB
BNP INTERPRETATION: NORMAL
GLUCOSE BLD-MCNC: 129 MG/DL (ref 75–110)
GLUCOSE BLD-MCNC: 162 MG/DL (ref 75–110)
GLUCOSE BLD-MCNC: 169 MG/DL (ref 75–110)
GLUCOSE BLD-MCNC: 176 MG/DL (ref 75–110)
HCT VFR BLD CALC: 44.1 % (ref 41–53)
HEMOGLOBIN: 14.5 G/DL (ref 13.5–17.5)
LV EF: 65 %
LVEF MODALITY: NORMAL
MCH RBC QN AUTO: 31.4 PG (ref 26–34)
MCHC RBC AUTO-ENTMCNC: 33 G/DL (ref 31–37)
MCV RBC AUTO: 95.3 FL (ref 80–100)
NRBC AUTOMATED: ABNORMAL PER 100 WBC
PDW BLD-RTO: 14.3 % (ref 11.5–14.5)
PLATELET # BLD: 308 K/UL (ref 130–400)
PMV BLD AUTO: 9.4 FL (ref 6–12)
PRO-BNP: 115 PG/ML
RBC # BLD: 4.62 M/UL (ref 4.5–5.9)
WBC # BLD: 3.3 K/UL (ref 3.5–11)

## 2018-01-24 PROCEDURE — 6370000000 HC RX 637 (ALT 250 FOR IP): Performed by: NURSE PRACTITIONER

## 2018-01-24 PROCEDURE — 83880 ASSAY OF NATRIURETIC PEPTIDE: CPT

## 2018-01-24 PROCEDURE — 94760 N-INVAS EAR/PLS OXIMETRY 1: CPT

## 2018-01-24 PROCEDURE — 94640 AIRWAY INHALATION TREATMENT: CPT

## 2018-01-24 PROCEDURE — 51701 INSERT BLADDER CATHETER: CPT

## 2018-01-24 PROCEDURE — 2700000000 HC OXYGEN THERAPY PER DAY

## 2018-01-24 PROCEDURE — 99232 SBSQ HOSP IP/OBS MODERATE 35: CPT | Performed by: FAMILY MEDICINE

## 2018-01-24 PROCEDURE — 36415 COLL VENOUS BLD VENIPUNCTURE: CPT

## 2018-01-24 PROCEDURE — 85027 COMPLETE CBC AUTOMATED: CPT

## 2018-01-24 PROCEDURE — 93306 TTE W/DOPPLER COMPLETE: CPT

## 2018-01-24 PROCEDURE — 82947 ASSAY GLUCOSE BLOOD QUANT: CPT

## 2018-01-24 PROCEDURE — 51798 US URINE CAPACITY MEASURE: CPT

## 2018-01-24 PROCEDURE — 1200000000 HC SEMI PRIVATE

## 2018-01-24 PROCEDURE — 6360000002 HC RX W HCPCS: Performed by: INTERNAL MEDICINE

## 2018-01-24 PROCEDURE — 99221 1ST HOSP IP/OBS SF/LOW 40: CPT | Performed by: INTERNAL MEDICINE

## 2018-01-24 PROCEDURE — 71045 X-RAY EXAM CHEST 1 VIEW: CPT

## 2018-01-24 PROCEDURE — 6360000002 HC RX W HCPCS: Performed by: NURSE PRACTITIONER

## 2018-01-24 RX ORDER — NICOTINE POLACRILEX 4 MG
15 LOZENGE BUCCAL PRN
Status: DISCONTINUED | OUTPATIENT
Start: 2018-01-24 | End: 2018-01-29 | Stop reason: HOSPADM

## 2018-01-24 RX ORDER — DEXTROSE MONOHYDRATE 50 MG/ML
100 INJECTION, SOLUTION INTRAVENOUS PRN
Status: DISCONTINUED | OUTPATIENT
Start: 2018-01-24 | End: 2018-01-29 | Stop reason: HOSPADM

## 2018-01-24 RX ORDER — DEXTROSE MONOHYDRATE 25 G/50ML
12.5 INJECTION, SOLUTION INTRAVENOUS PRN
Status: DISCONTINUED | OUTPATIENT
Start: 2018-01-24 | End: 2018-01-29 | Stop reason: HOSPADM

## 2018-01-24 RX ADMIN — LANSOPRAZOLE 30 MG: 30 TABLET, ORALLY DISINTEGRATING, DELAYED RELEASE ORAL at 10:11

## 2018-01-24 RX ADMIN — LACOSAMIDE 100 MG: 100 TABLET, FILM COATED ORAL at 10:10

## 2018-01-24 RX ADMIN — LAMOTRIGINE 200 MG: 100 TABLET ORAL at 20:33

## 2018-01-24 RX ADMIN — ANTACID TABLETS 1250 MG: 500 TABLET, CHEWABLE ORAL at 10:10

## 2018-01-24 RX ADMIN — IPRATROPIUM BROMIDE AND ALBUTEROL SULFATE 1 AMPULE: .5; 3 SOLUTION RESPIRATORY (INHALATION) at 07:29

## 2018-01-24 RX ADMIN — METHYLPREDNISOLONE SODIUM SUCCINATE 40 MG: 40 INJECTION, POWDER, FOR SOLUTION INTRAMUSCULAR; INTRAVENOUS at 01:56

## 2018-01-24 RX ADMIN — LACTULOSE 30 G: 20 SOLUTION ORAL at 10:12

## 2018-01-24 RX ADMIN — INSULIN LISPRO 1 UNITS: 100 INJECTION, SOLUTION INTRAVENOUS; SUBCUTANEOUS at 17:13

## 2018-01-24 RX ADMIN — INSULIN LISPRO 1 UNITS: 100 INJECTION, SOLUTION INTRAVENOUS; SUBCUTANEOUS at 20:49

## 2018-01-24 RX ADMIN — METHYLPREDNISOLONE SODIUM SUCCINATE 40 MG: 40 INJECTION, POWDER, FOR SOLUTION INTRAMUSCULAR; INTRAVENOUS at 10:09

## 2018-01-24 RX ADMIN — LACOSAMIDE 100 MG: 100 TABLET, FILM COATED ORAL at 20:32

## 2018-01-24 RX ADMIN — MONTELUKAST SODIUM 10 MG: 10 TABLET, FILM COATED ORAL at 20:33

## 2018-01-24 RX ADMIN — BACLOFEN 10 MG: 10 TABLET ORAL at 20:32

## 2018-01-24 RX ADMIN — LAMOTRIGINE 100 MG: 100 TABLET ORAL at 10:12

## 2018-01-24 RX ADMIN — VITAMIN D, TAB 1000IU (100/BT) 2000 UNITS: 25 TAB at 10:09

## 2018-01-24 RX ADMIN — IPRATROPIUM BROMIDE AND ALBUTEROL SULFATE 1 AMPULE: .5; 3 SOLUTION RESPIRATORY (INHALATION) at 18:43

## 2018-01-24 RX ADMIN — LEVETIRACETAM 1500 MG: 100 SOLUTION ORAL at 10:11

## 2018-01-24 RX ADMIN — BACLOFEN 10 MG: 10 TABLET ORAL at 14:31

## 2018-01-24 RX ADMIN — LEVETIRACETAM 1500 MG: 100 SOLUTION ORAL at 20:33

## 2018-01-24 RX ADMIN — FLUTICASONE PROPIONATE 2 SPRAY: 50 SPRAY, METERED NASAL at 10:09

## 2018-01-24 RX ADMIN — LAMOTRIGINE 200 MG: 100 TABLET ORAL at 10:10

## 2018-01-24 RX ADMIN — IPRATROPIUM BROMIDE AND ALBUTEROL SULFATE 1 AMPULE: .5; 3 SOLUTION RESPIRATORY (INHALATION) at 15:19

## 2018-01-24 RX ADMIN — VITAMIN D, TAB 1000IU (100/BT) 2000 UNITS: 25 TAB at 20:31

## 2018-01-24 RX ADMIN — BACLOFEN 10 MG: 10 TABLET ORAL at 10:11

## 2018-01-24 RX ADMIN — CETIRIZINE HYDROCHLORIDE 10 MG: 10 TABLET, FILM COATED ORAL at 10:10

## 2018-01-24 RX ADMIN — LEVOFLOXACIN 750 MG: 5 INJECTION, SOLUTION INTRAVENOUS at 01:56

## 2018-01-24 RX ADMIN — ENOXAPARIN SODIUM 40 MG: 40 INJECTION SUBCUTANEOUS at 10:11

## 2018-01-24 RX ADMIN — POLYETHYLENE GLYCOL (3350) 17 G: 17 POWDER, FOR SOLUTION ORAL at 10:09

## 2018-01-24 RX ADMIN — IPRATROPIUM BROMIDE AND ALBUTEROL SULFATE 1 AMPULE: .5; 3 SOLUTION RESPIRATORY (INHALATION) at 11:26

## 2018-01-24 NOTE — CARE COORDINATION
Case Management Initial Discharge Plan  Randy Hood,         Readmission Risk              Readmission Risk:        27       Age 72 or Greater:  0    Admitted from SNF or Requires Paid or Family Care:  2    Currently has CHF,COPD,ARF,CRI,or is on dialysis:  4    Takes more than 5 Prescription Medications:  4    Takes Digoxin,Insulin,Anticoagulants,Narcotics or ASA/Plavix:  [address] in Past 12 Months:  10    On Disability:  3    Patient Considers own Health:  5            Met with:Makayla to discuss discharge plans. Information verified: address, contacts, phone number, , insurance Yes  PCP: Svitlana Ramos  Date of last visit: 18    Insurance Provider: Medicare, Medicaid    Discharge Planning  Current Residence:   [address] [place] [address]  Living Arrangements:   [address] has 1 stories/0 stairs to climb  Support Systems:     Current Services PTA:    Supplier: Gets nursing from [address] [place] [address] 1-2 times a week  Patient able to perform ADL's:Dependent  DME used to aid ambulation prior to admission: hospital bed, camden lift, shower chair/during admission - none    Potential Assistance Needed:   Return to [address] [place] [address]: [address] [place] [address]  Medications:   no  Does patient want to participate in local refill/ meds to beds program?   no  Patient agreeable to home care: No  [place] of choice provided:  n/a      Type of Home Care Services:     Patient expects to be discharged to:       Prior SNF/Rehab Placement and Facility: no  Agreeable to SNF/Rehab: No  [place] of choice provided: n/a   Evaluation: n/a    Expected Discharge date: Follow Up Appointment: Best Day/ Time:      Transportation provider: ambulance  Transportation arrangements needed for discharge: Yes    Discharge Plan: Spoke to Jimena Christensen home care worker at [address] [place] [address]. Prior to group home pt was resident at [facility] on [address] [facility] in [facility].   Pt has been a resident at [facility] for 4-5 months. Has sister Camilo Luevano who is guardian and has not been to [address] [place] [address] to see pt. Does call infrequently to check on patients condition. Pt is total care and has care givers 24/7. Has a SN that visits 1-2 times a week. Does go to PCP's office by bus service from [employer]. Continue to follow ID plan of care and will return to [address] [place] [address] at discharge if no IV's are needed.               Electronically signed by Hardik Go RN on 1/24/18 at 3:35 PM

## 2018-01-24 NOTE — PROGRESS NOTES
Patient has not urinated since 10 pm last night. RN bladder scanned patient for 350. RN paged NP for order for straight cath.     Muna Onofre

## 2018-01-24 NOTE — PROGRESS NOTES
Pt stable, no signs of distress and transferred to [place] [address], with belongings, medication, and paperwork. Report given at bedside to [facility].

## 2018-01-24 NOTE — CONSULTS
Inpatient consult to Infectious Diseases  Consult performed by: Quan Narayan ordered by: Jayme Florez          [facility]  Initial Consult Note  Date: 1/24/2018    Impression:   · Acute hypoxic respiratory insufficiency   · Possible left lower lobe pneumonia  · Leukopenia  · Coagulase negative staph out of one set likely represents a contaminated blood culture  · Cerebral palsy with spastic quadriparesis  · Kyphoscoliosis    Recommendations   · Continue the levofloxacin therapy but this can be switched to oral via PEG  · The patient did complete a course of IV Solu-Medrol  · Continue O2 by nasal cannula  · Follow the white blood cell count trend  · Influenza PCR is pending    Chief complaint/reason for consultation:   Positive blood culture, pneumonia. History of Present Illness: Alexsander Manjarrez is a 49y.o.-year-old male who was initially admitted on 1/22/2018. Lisa Deutsch has underlying cerebral palsy/MRDD and resides at an extended care facility. It is my understanding that on the day of admission he had an acute onset of shortness of breath and cough. He was found to have an O2 sat of 89% on room air which prompted him to be sent to the emergency room for evaluation. The patient was admitted for an acute onset of COPD, has been seen by the pulmonary service who do raise concern for possible aspiration pneumonia  Blood cultures 1 out of 2 sets were positive and I asked to evaluate and help with antibiotic choice. The patient is nonverbal and the history is obtained from reviewing the chart    I have personally reviewed the past medical history, past surgical history, medications, social history, and family history, and I have updated the database accordingly.   Past Medical History:     Past Medical History:   Diagnosis Date    Cerebral palsy ([address]     Cholelithiasis with chronic cholecystitis     Constipation     Delayed gastric emptying     Dysphagia     GERD RT [employer] 9ML Performed at [facility] [address] [facility] [place]    Specimen Description [place], [facility] [phone]    Special Requests NOT REPORTED    Culture NO GROWTH 1 DAY    Culture Performed at [facility] [id number] [place] [address], [address] [place] [address] [phone]    Status Pending   Cult,Blood #2 [[id number]] (Abnormal) Collected: 01/23/18 0053   Order Status: Completed Specimen: Blood Updated: 01/24/18 0527    Specimen Description . BLOOD 6ML RT FOREARM HM Performed at [address] [place] [address] [address]    Specimen Description [facility], [address] [place] [address] [phone]    Special Requests NOT REPORTED    Culture POSITIVE BLOOD CULTURE, RN NOTIFIED: Yamilex Cortez. 1/24 0525 (A)    Culture DIRECT GRAM STAIN FROM BOTTLE: GRAM POSITIVE COCCI IN CLUSTERS (A)    Culture ID by PNAFISH: STAPHYLOCOCCUS SPECIES, COAGULASE NEGATIVE (A)    Culture Performed at [facility] [address], [address] [place] [address] [phone]    Status Pending   RAPID INFLUENZA A/B ANTIGENS [[id number]] Collected: 01/23/18 1045   Order Status: Completed Specimen: Nasopharyngeal Updated: 01/23/18 1137    Specimen Description . NASOPHARYNGEAL SWAB    Special Requests NOT REPORTED    Direct Exam PRESUMPTIVE NEGATIVE for Influenza A + B antigens. Direct Exam PCR testing to confirm this result is available upon request. Brent Buenrostro will be    Direct Exam  saved in the laboratory for 7 days.  Please call [phone] if PCR testing is    Direct Exam  indicated. Direct Exam Performed at [address] [place] [address] [address], [facility]    Direct Exam  [phone]    Status FINAL 01/23/2018       Thank you for allowing us to participate in the care of this patient. Please call with questions.     Electronically signed by Rubén Chinchilla MD on 1/24/2018 at 5:14 PM    [facility] messaging  OFFICE: [phone]    This note is created with the assistance of a speech recognition program. While intending to generate a document that actually reflects the content of the visit, the document can still have some errors including those of syntax and sound a like substitutions which may escape proof reading. It such instances, actual meaning can be extrapolated by contextual diversion.

## 2018-01-24 NOTE — PROGRESS NOTES
[facility]    Progress Note    1/24/2018    8:48 AM    Name:   Laurie Bertrand  MRN:     [id number]     Acct:      [de-identified]   Room:   2012/2012-02   Day:  1  Admit Date:  1/22/2018 11:05 PM    PCP:   Ana Maria Olivo  Code Status:  Limited    Subjective:     C/C:   Chief Complaint   Patient presents with    Shortness of Breath     from group home onset this evening     Interval History Status: not changed. Patient seems to be having a lot of mucus. And is breathing heavy. No fever, WBC low at 3.3   Positive blood culture of coag negative gram positive cocci in 1 out of 2 blood cultures. Likely contamination. ID consulted overnight. Urinary retention with bladder scan showing 350 ml. Brief History:     50 yo m with h/o cerebral palsy, MRDD, seizure disorder presented to ED from group home for shortness of breath and cough x1 day. Patient was satting at 89% in group home. CXR from ED shows left lung air space disease/pna. Patient is afebrile. No leucocytosis, but is tachypneic and required O2 yesterday. Patient was started on levaquin for pna. Patient saturating at 98% on room air this morning.       Echo in nov, 2017 with EF 55%, mild LVH.     Patient was admitted to [facility] for multifocal pna and copd exacerbation 2 months ago. Review of Systems:     Unable to  Obtain     Medications: Allergies:     Allergies   Allergen Reactions    Ampicillin Other (See Comments)    Valium [Diazepam] Other (See Comments)    Other      Bubble bath products        Current Meds:   Scheduled Meds:    insulin lispro  0-6 Units Subcutaneous TID WC    insulin lispro  0-3 Units Subcutaneous Nightly    baclofen  10 mg Per G Tube TID    vitamin D  2,000 Units Per G Tube BID    fluticasone  2 spray Nasal Daily    lacosamide  100 mg Per G Tube BID    lactulose  30 g Per G Tube BID    lamoTRIgine  100 mg Per G Tube QAM    lamoTRIgine  200 mg Labs:    Hematology:  Recent Labs      01/23/18   0001  01/24/18   0626   WBC  8.6  3.3*   RBC  4.72  4.62   HGB  14.9  14.5   HCT  45.1  44.1   MCV  95.7  95.3   MCH  31.5  31.4   MCHC  32.9  33.0   RDW  13.8  14.3   PLT  306  308   MPV  8.9  9.4     Chemistry:  Recent Labs      01/23/18   0001  01/24/18   0626   NA  141   --    K  3.9   --    CL  99   --    CO2  29   --    GLUCOSE  93   --    BUN  14   --    CREATININE  0.41*   --    ANIONGAP  13   --    LABGLOM  >60   --    GFRAA  >60   --    CALCIUM  9.6   --    PROBNP   --   115     Recent Labs      01/23/18   0822  01/23/18   1213  01/23/18   1631  01/23/18   2102  01/24/18   0308   POCGLU  84  88  108  142*  162*         Lab Results   Component Value Date/Time    SPECIAL NOT REPORTED 01/23/2018 10:45 AM     Lab Results   Component Value Date/Time    CULTURE (A) 01/23/2018 12:53 AM     POSITIVE BLOOD CULTURE, RN NOTIFIED: Montez Stone SCarlos 1/24 0525    CULTURE (A) 01/23/2018 12:53 AM     DIRECT GRAM STAIN FROM BOTTLE: GRAM POSITIVE COCCI IN CLUSTERS    CULTURE (A) 01/23/2018 12:53 AM     ID by BRIELLE: STAPHYLOCOCCUS SPECIES, COAGULASE NEGATIVE    CULTURE  01/23/2018 12:53 AM     Performed at [address] [phone]       No results found for: POCPH, PHART, PH, POCPCO2, GCH6LVQ, PCO2, POCPO2, PO2ART, PO2, POCHCO3, NGL2MTE, HCO3, NBEA, PBEA, BEART, BE, THGBART, THB, NEL2SCB, EPIO8JAT, N9CMIDBM, O2SAT, FIO2    Radiology:    Xr Chest Portable    Result Date: 1/22/2018  EXAMINATION: SINGLE VIEW OF THE CHEST 1/22/2018 11:09 pm COMPARISON: 11/18/2017 HISTORY: ORDERING SYSTEM PROVIDED HISTORY: sob TECHNOLOGIST PROVIDED HISTORY: Reason for exam:->sob Ordering Physician Provided Reason for Exam: sob Acuity: Acute Type of Exam: Initial FINDINGS: Portable study was obtained at low lung volumes and limited by unchanged severe thoracolumbar scoliosis. There is suspicion of diffuse left lung airspace disease.   Right lung is grossly

## 2018-01-25 LAB
ABSOLUTE EOS #: 0 K/UL (ref 0–0.4)
ABSOLUTE IMMATURE GRANULOCYTE: ABNORMAL K/UL (ref 0–0.3)
ABSOLUTE LYMPH #: 3.1 K/UL (ref 1–4.8)
ABSOLUTE MONO #: 1.7 K/UL (ref 0.2–0.8)
ANION GAP SERPL CALCULATED.3IONS-SCNC: 12 MMOL/L (ref 9–17)
BASOPHILS # BLD: 0 % (ref 0–2)
BASOPHILS ABSOLUTE: 0 K/UL (ref 0–0.2)
BUN BLDV-MCNC: 13 MG/DL (ref 6–20)
BUN/CREAT BLD: 32 (ref 9–20)
CALCIUM SERPL-MCNC: 9.2 MG/DL (ref 8.6–10.4)
CHLORIDE BLD-SCNC: 107 MMOL/L (ref 98–107)
CO2: 29 MMOL/L (ref 20–31)
CREAT SERPL-MCNC: 0.41 MG/DL (ref 0.7–1.2)
CULTURE: ABNORMAL
DIFFERENTIAL TYPE: ABNORMAL
DIRECT EXAM: NORMAL
EOSINOPHILS RELATIVE PERCENT: 0 % (ref 1–4)
GFR AFRICAN AMERICAN: >60 ML/MIN
GFR NON-AFRICAN AMERICAN: >60 ML/MIN
GFR SERPL CREATININE-BSD FRML MDRD: ABNORMAL ML/MIN/{1.73_M2}
GFR SERPL CREATININE-BSD FRML MDRD: ABNORMAL ML/MIN/{1.73_M2}
GLUCOSE BLD-MCNC: 108 MG/DL (ref 75–110)
GLUCOSE BLD-MCNC: 122 MG/DL (ref 75–110)
GLUCOSE BLD-MCNC: 131 MG/DL (ref 70–99)
GLUCOSE BLD-MCNC: 131 MG/DL (ref 75–110)
HCT VFR BLD CALC: 43.6 % (ref 41–53)
HEMOGLOBIN: 14.6 G/DL (ref 13.5–17.5)
IMMATURE GRANULOCYTES: ABNORMAL %
LYMPHOCYTES # BLD: 25 % (ref 24–44)
Lab: ABNORMAL
Lab: NORMAL
MCH RBC QN AUTO: 31.9 PG (ref 26–34)
MCHC RBC AUTO-ENTMCNC: 33.4 G/DL (ref 31–37)
MCV RBC AUTO: 95.3 FL (ref 80–100)
MONOCYTES # BLD: 14 % (ref 1–7)
NRBC AUTOMATED: ABNORMAL PER 100 WBC
PDW BLD-RTO: 13.6 % (ref 11.5–14.5)
PLATELET # BLD: 308 K/UL (ref 130–400)
PLATELET ESTIMATE: ABNORMAL
PMV BLD AUTO: ABNORMAL FL (ref 6–12)
POTASSIUM SERPL-SCNC: 3.4 MMOL/L (ref 3.7–5.3)
PROCALCITONIN: 0.05 NG/ML
RBC # BLD: 4.57 M/UL (ref 4.5–5.9)
RBC # BLD: ABNORMAL 10*6/UL
SEG NEUTROPHILS: 61 % (ref 36–66)
SEGMENTED NEUTROPHILS ABSOLUTE COUNT: 7.3 K/UL (ref 1.8–7.7)
SODIUM BLD-SCNC: 148 MMOL/L (ref 135–144)
SPECIMEN DESCRIPTION: ABNORMAL
SPECIMEN DESCRIPTION: ABNORMAL
SPECIMEN DESCRIPTION: NORMAL
SPECIMEN DESCRIPTION: NORMAL
STATUS: ABNORMAL
STATUS: NORMAL
WBC # BLD: 12.1 K/UL (ref 3.5–11)
WBC # BLD: ABNORMAL 10*3/UL

## 2018-01-25 PROCEDURE — 6370000000 HC RX 637 (ALT 250 FOR IP): Performed by: INTERNAL MEDICINE

## 2018-01-25 PROCEDURE — 6370000000 HC RX 637 (ALT 250 FOR IP): Performed by: NURSE PRACTITIONER

## 2018-01-25 PROCEDURE — 36415 COLL VENOUS BLD VENIPUNCTURE: CPT

## 2018-01-25 PROCEDURE — 84145 PROCALCITONIN (PCT): CPT

## 2018-01-25 PROCEDURE — 1200000000 HC SEMI PRIVATE

## 2018-01-25 PROCEDURE — 94640 AIRWAY INHALATION TREATMENT: CPT

## 2018-01-25 PROCEDURE — 6360000002 HC RX W HCPCS: Performed by: NURSE PRACTITIONER

## 2018-01-25 PROCEDURE — 85025 COMPLETE CBC W/AUTO DIFF WBC: CPT

## 2018-01-25 PROCEDURE — 80048 BASIC METABOLIC PNL TOTAL CA: CPT

## 2018-01-25 PROCEDURE — 82947 ASSAY GLUCOSE BLOOD QUANT: CPT

## 2018-01-25 PROCEDURE — 2700000000 HC OXYGEN THERAPY PER DAY

## 2018-01-25 PROCEDURE — 99232 SBSQ HOSP IP/OBS MODERATE 35: CPT | Performed by: FAMILY MEDICINE

## 2018-01-25 PROCEDURE — 99232 SBSQ HOSP IP/OBS MODERATE 35: CPT | Performed by: INTERNAL MEDICINE

## 2018-01-25 RX ORDER — POTASSIUM CHLORIDE 20 MEQ/1
40 TABLET, EXTENDED RELEASE ORAL ONCE
Status: COMPLETED | OUTPATIENT
Start: 2018-01-25 | End: 2018-01-25

## 2018-01-25 RX ORDER — LEVETIRACETAM 100 MG/ML
1500 SOLUTION ORAL 2 TIMES DAILY
Status: DISCONTINUED | OUTPATIENT
Start: 2018-01-26 | End: 2018-01-29 | Stop reason: HOSPADM

## 2018-01-25 RX ORDER — LEVOFLOXACIN 750 MG/1
750 TABLET ORAL DAILY
Status: COMPLETED | OUTPATIENT
Start: 2018-01-25 | End: 2018-01-26

## 2018-01-25 RX ADMIN — LEVOFLOXACIN 750 MG: 5 INJECTION, SOLUTION INTRAVENOUS at 02:03

## 2018-01-25 RX ADMIN — LAMOTRIGINE 200 MG: 100 TABLET ORAL at 08:43

## 2018-01-25 RX ADMIN — LACTULOSE 30 G: 20 SOLUTION ORAL at 20:01

## 2018-01-25 RX ADMIN — CETIRIZINE HYDROCHLORIDE 10 MG: 10 TABLET, FILM COATED ORAL at 08:42

## 2018-01-25 RX ADMIN — IPRATROPIUM BROMIDE AND ALBUTEROL SULFATE 1 AMPULE: .5; 3 SOLUTION RESPIRATORY (INHALATION) at 08:12

## 2018-01-25 RX ADMIN — LACOSAMIDE 100 MG: 100 TABLET, FILM COATED ORAL at 20:00

## 2018-01-25 RX ADMIN — LEVOFLOXACIN 750 MG: 750 TABLET, FILM COATED ORAL at 17:09

## 2018-01-25 RX ADMIN — LANSOPRAZOLE 30 MG: 30 TABLET, ORALLY DISINTEGRATING, DELAYED RELEASE ORAL at 06:28

## 2018-01-25 RX ADMIN — MONTELUKAST SODIUM 10 MG: 10 TABLET, FILM COATED ORAL at 19:45

## 2018-01-25 RX ADMIN — BACLOFEN 10 MG: 10 TABLET ORAL at 20:00

## 2018-01-25 RX ADMIN — POTASSIUM CHLORIDE 40 MEQ: 20 TABLET, EXTENDED RELEASE ORAL at 15:23

## 2018-01-25 RX ADMIN — ENOXAPARIN SODIUM 40 MG: 40 INJECTION SUBCUTANEOUS at 08:42

## 2018-01-25 RX ADMIN — LEVETIRACETAM 1500 MG: 100 SOLUTION ORAL at 20:01

## 2018-01-25 RX ADMIN — VITAMIN D, TAB 1000IU (100/BT) 2000 UNITS: 25 TAB at 19:42

## 2018-01-25 RX ADMIN — VITAMIN D, TAB 1000IU (100/BT) 2000 UNITS: 25 TAB at 08:43

## 2018-01-25 RX ADMIN — LEVETIRACETAM 1500 MG: 100 SOLUTION ORAL at 08:41

## 2018-01-25 RX ADMIN — IPRATROPIUM BROMIDE AND ALBUTEROL SULFATE 1 AMPULE: .5; 3 SOLUTION RESPIRATORY (INHALATION) at 20:49

## 2018-01-25 RX ADMIN — IPRATROPIUM BROMIDE AND ALBUTEROL SULFATE 1 AMPULE: .5; 3 SOLUTION RESPIRATORY (INHALATION) at 16:10

## 2018-01-25 RX ADMIN — BACLOFEN 10 MG: 10 TABLET ORAL at 08:42

## 2018-01-25 RX ADMIN — IPRATROPIUM BROMIDE AND ALBUTEROL SULFATE 1 AMPULE: .5; 3 SOLUTION RESPIRATORY (INHALATION) at 11:27

## 2018-01-25 RX ADMIN — LAMOTRIGINE 200 MG: 100 TABLET ORAL at 19:59

## 2018-01-25 RX ADMIN — FLUTICASONE PROPIONATE 2 SPRAY: 50 SPRAY, METERED NASAL at 08:41

## 2018-01-25 RX ADMIN — LACOSAMIDE 100 MG: 100 TABLET, FILM COATED ORAL at 08:42

## 2018-01-25 RX ADMIN — BACLOFEN 10 MG: 10 TABLET ORAL at 15:23

## 2018-01-25 RX ADMIN — ANTACID TABLETS 1250 MG: 500 TABLET, CHEWABLE ORAL at 08:42

## 2018-01-25 RX ADMIN — LORAZEPAM 1 MG: 1 TABLET ORAL at 08:43

## 2018-01-25 RX ADMIN — LAMOTRIGINE 100 MG: 100 TABLET ORAL at 08:48

## 2018-01-25 NOTE — PROGRESS NOTES
[facility]    Progress Note    1/25/2018    8:55 AM    Name:   Renay Rios  MRN:     [id number]     Acct:      [de-identified]   Room:   2012/2012-02   Day:  2  Admit Date:  1/22/2018 11:05 PM    PCP:   Ej Augustine  Code Status:  Limited    Subjective:     C/C:   Chief Complaint   Patient presents with    Shortness of Breath     from group home onset this evening     Interval History Status:      WBC 12 today from 3.3. tachycardic. Afebrile   Hypernatremia and hypokalemia. Pt on tube feeding      Brief History:     50 yo m with h/o cerebral palsy, MRDD, seizure disorder presented to ED from group home for shortness of breath and cough x1 day. Patient was satting at 89% in group home. CXR from ED shows left lung air space disease/pna. Patient is afebrile. No leucocytosis, but is tachypneic and required O2 yesterday. Patient was started on levaquin for pna. Patient saturating at 98% on room air this morning.       Echo in nov, 2017 with EF 55%, mild LVH.     Patient was admitted to [facility] for multifocal pna and copd exacerbation 2 months ago. Review of Systems:     Unable to  Obtain     Medications: Allergies:     Allergies   Allergen Reactions    Ampicillin Other (See Comments)    Valium [Diazepam] Other (See Comments)    Other      Bubble bath products        Current Meds:   Scheduled Meds:    insulin lispro  0-6 Units Subcutaneous TID WC    insulin lispro  0-3 Units Subcutaneous Nightly    baclofen  10 mg Per G Tube TID    vitamin D  2,000 Units Per G Tube BID    fluticasone  2 spray Nasal Daily    lacosamide  100 mg Per G Tube BID    lactulose  30 g Per G Tube BID    lamoTRIgine  100 mg Per G Tube QAM    lamoTRIgine  200 mg Per G Tube BID    levETIRAcetam  1,500 mg Oral BID    cetirizine  10 mg PEG Tube Daily    montelukast  10 mg Per G Tube Nightly    sodium chloride flush  10 mL Intravenous 2 times per day    31.5  31.4  31.9   MCHC  32.9  33.0  33.4   RDW  13.8  14.3  13.6   PLT  306  308  308   MPV  8.9  9.4  NOT REPORTED     Chemistry:  Recent Labs      01/23/18   0001  01/24/18   0626  01/25/18   0702   NA  141   --   148*   K  3.9   --   3.4*   CL  99   --   107   CO2  29   --   29   GLUCOSE  93   --   131*   BUN  14   --   13   CREATININE  0.41*   --   0.41*   ANIONGAP  13   --   12   LABGLOM  >60   --   >60   GFRAA  >60   --   >60   CALCIUM  9.6   --   9.2   PROBNP   --   115   --      Recent Labs      01/23/18   2102  01/24/18   0308  01/24/18   1119  01/24/18   1702  01/24/18   2033  01/25/18   0619   POCGLU  142*  162*  129*  176*  169*  122*         Lab Results   Component Value Date/Time    SPECIAL NOT REPORTED 01/23/2018 10:45 AM     Lab Results   Component Value Date/Time    CULTURE  01/23/2018 12:53 AM     Performed at [address] [place] [address], [address] [place] [address] [phone]    CULTURE (A) 01/23/2018 12:53 AM     POSITIVE BLOOD CULTURE, RN NOTIFIED: Gallito Nguyen. 1/24 0525    CULTURE (A) 01/23/2018 12:53 AM     DIRECT GRAM STAIN FROM BOTTLE: GRAM POSITIVE COCCI IN CLUSTERS    CULTURE (A) 01/23/2018 12:53 AM     ID by PNAFISH: STAPHYLOCOCCUS SPECIES, COAGULASE NEGATIVE    CULTURE (A) 01/23/2018 12:53 AM     STAPHYLOCOCCUS SPECIES, COAGULASE NEGATIVE A single positive blood culture of    CULTURE (A) 01/23/2018 12:53 AM      coagulase negative staphylococci, micrococci, diphtheroids or Bacillus species    CULTURE (A) 01/23/2018 12:53 AM      should be interpreted with caution and viewed as likely a skin contaminant.        No results found for: POCPH, PHART, PH, POCPCO2, XWE0VSO, PCO2, POCPO2, PO2ART, PO2, POCHCO3, COI6VYN, HCO3, NBEA, PBEA, BEART, BE, THGBART, THB, GGI3FXC, DXVN0AVR, N1PSVCBM, O2SAT, FIO2    Radiology:    Xr Chest Portable    Result Date: 1/22/2018  EXAMINATION: SINGLE VIEW OF THE CHEST 1/22/2018 11:09 pm COMPARISON: 11/18/2017 HISTORY: ORDERING SYSTEM PROVIDED HISTORY: sob TECHNOLOGIST

## 2018-01-25 NOTE — PROGRESS NOTES
Direct Exam NEGATIVE:  Influenza A and B RNA not detected by nucleic acid amplification. Direct Exam                                          The results obtained should be    Direct Exam  interpreted in conjunction with clinical findings and other laboratory markers. Direct Exam   The performance characterisitics of this molecular test were validated by the    Direct Exam  molecular microbiology department of [facility]. Direct Exam Performed at [address] [phone]    Status FINAL 01/25/2018   Cult,Blood #1 [[id number]] Collected: 01/23/18 0045   Order Status: Completed Specimen: Blood Updated: 01/25/18 0747    Specimen Description . BLOOD RT AC 9ML Performed at [place] [id number] [facility]    Specimen Description Tosha [address] [place] [address] [phone]    Special Requests NOT REPORTED    Culture NO GROWTH 2 DAYS    Culture Performed at [address] [phone]    Status Pending   Cult,Blood #2 [[id number]] (Abnormal) Collected: 01/23/18 0053   Order Status: Completed Specimen: Blood Updated: 01/24/18 2011    Specimen Description . BLOOD 6ML RT FOREARM HM Performed at [facility]    Specimen Description Camilo [address] [place] [address] [phone]    Special Requests NOT REPORTED    Culture POSITIVE BLOOD CULTURE, RN NOTIFIED: Montez Stone S. 1/24 4717 (A)    Culture DIRECT GRAM STAIN FROM BOTTLE: GRAM POSITIVE COCCI IN CLUSTERS (A)    Culture ID by PNAFISH: STAPHYLOCOCCUS SPECIES, COAGULASE NEGATIVE (A)    Culture STAPHYLOCOCCUS SPECIES, COAGULASE NEGATIVE A single positive blood culture of (A)    Culture  coagulase negative staphylococci, micrococci, diphtheroids or Bacillus species (A)    Culture  should be interpreted with caution and viewed as likely a skin contaminant.  (A)    Culture Performed at [facility] [address], [address] [place] [address] [phone]

## 2018-01-25 NOTE — PROGRESS NOTES
Pulmonary Critical Care Progress Note  Shon Madrid CNP / Dr. Zoraida Young M.D. Patient seen for the follow up of Pneumonia due to organism     Subjective:  He is nonverbal. He had Ativan earlier this morning for some increased anxiety. He has accessory muscle use.      Examination:  Vitals: BP (!) 133/91   Pulse 103   Temp 98.9 °F (37.2 °C) (Axillary)   Resp 28   Ht 5' 6\" (1.676 m)   Wt 117 lb 11.6 oz (53.4 kg)   SpO2 95%   BMI 19.00 kg/m²     General appearance: Nonverbal, resting   Neck: No JVD  Lungs: + ronchi, accessory muscle use, no wheeze  Heart: regular rate and rhythm, S1, S2 normal, no gallop  Abdomen: Soft, non tender, + BS  Extremities: no cyanosis or clubbing. + edema    LABs:  CBC:   Recent Labs      01/24/18   0626  01/25/18   0527   WBC  3.3*  12.1*   HGB  14.5  14.6   HCT  44.1  43.6   PLT  308  308     BMP:   Recent Labs      01/23/18   0001  01/25/18   0702   NA  141  148*   K  3.9  3.4*   CO2  29  29   BUN  14  13   CREATININE  0.41*  0.41*   LABGLOM  >60  >60   GLUCOSE  93  131*     PRESUMPTIVE NEGATIVE for Influenza A + B antigens    Impression:  · Acute hypoxic respiratory failure with oxygen saturation at 88% while on room air on admission  · Possible left lower lobe Pneumonia  · Leukopenia secondary to above  · Atelectasis  · Mild pulmonary edema  · Cerebral palsy with spastic quadriparesis  · Hypokalemia    Recommendations:  · 2 liters/min via nasal cannula  · Continue IV Levaquin, infectious disease on consult  · Completed IV solu-medrol  · Albuterol and Ipratropium Q 4 hours and prn  · Singulair  · DVT prophylaxis with low molecular weight heparin  · Check pro calcitonin  · Reviewed echocardiogram  · Discussed with nursing   · Home O2 evaluation prior to discharge  · Will follow with you    Electronically signed by     Siobhan Richard CNP on 1/25/2018 at 12:21 PM  Patient was seen under the supervision of Dr. [facility], patient seen and evaluated by me around 12:15 p.m. this afternoon. Patient is nonverbal, in no apparent distress. Lung exam reveals moderate air exchange. No wheezing was appreciated. Plan is to continue IV Levaquin . Continue duo Lobo goldbergir. Patient may need oxygen at the time of discharge with evaluate for the need of oxygen. Above was reviewed and discussed with Marcell Noonan CNP. And I agree with assessment and plan of care.   Electronically signed by     Waleska Mcadams MD on 1/25/2018 at 11:52 PM  Pulmonary Critical Care and Sleep Medicine,  [facility]  Cell: [phone]  Office: [phone]

## 2018-01-25 NOTE — PLAN OF CARE
Problem: Airway Clearance - Ineffective:  Goal: Ability to maintain a clear airway will improve  Ability to maintain a clear airway will improve   Outcome: Ongoing  Airway is clear, at times resp labored using accessory muscles, O2 cont per nasal cannula jeeping sats above 95%    Problem: Gas Exchange - Impaired:  Goal: Levels of oxygenation will improve  Levels of oxygenation will improve   Outcome: Ongoing  Levels of oxygen remain stable with supplementary oxygen    Problem: Falls - Risk of  Goal: Absence of falls  Outcome: Ongoing  Patient is a fall risk during this admission. Fall risk assessment was performed. Patient is absent of falls. Bed is in the lowest position. Wheels on the bed are locked. Call light and bed side table are within reach. Clutter is removed. Patient was educated to call out when needing assistance or wanting to get out of bed. Patient offered toileting assistance during rounding. Hourly rounds have been performed. Fall precautions in place    Problem: Risk for Impaired Skin Integrity  Goal: Tissue integrity - skin and mucous membranes  Structural intactness and normal physiological function of skin and  mucous membranes. Outcome: Ongoing  Skin assessment ongoing. Pressure ulcer preventions being practiced - see charting for details. Patient turned every two hours.  Waffle mattress on

## 2018-01-26 ENCOUNTER — APPOINTMENT (OUTPATIENT)
Dept: GENERAL RADIOLOGY | Age: 48
DRG: 193 | End: 2018-01-26
Payer: MEDICARE

## 2018-01-26 LAB
ABSOLUTE EOS #: 0.2 K/UL (ref 0–0.4)
ABSOLUTE IMMATURE GRANULOCYTE: ABNORMAL K/UL (ref 0–0.3)
ABSOLUTE LYMPH #: 2.8 K/UL (ref 1–4.8)
ABSOLUTE MONO #: 1 K/UL (ref 0.2–0.8)
ANION GAP SERPL CALCULATED.3IONS-SCNC: 9 MMOL/L (ref 9–17)
BASOPHILS # BLD: 1 % (ref 0–2)
BASOPHILS ABSOLUTE: 0.1 K/UL (ref 0–0.2)
BUN BLDV-MCNC: 14 MG/DL (ref 6–20)
BUN/CREAT BLD: ABNORMAL (ref 9–20)
CALCIUM SERPL-MCNC: 9.1 MG/DL (ref 8.6–10.4)
CHLORIDE BLD-SCNC: 111 MMOL/L (ref 98–107)
CHLORIDE, UR: 85 MMOL/L
CO2: 31 MMOL/L (ref 20–31)
CREAT SERPL-MCNC: <0.4 MG/DL (ref 0.7–1.2)
DIFFERENTIAL TYPE: ABNORMAL
EOSINOPHILS RELATIVE PERCENT: 3 % (ref 1–4)
GFR AFRICAN AMERICAN: ABNORMAL ML/MIN
GFR NON-AFRICAN AMERICAN: ABNORMAL ML/MIN
GFR SERPL CREATININE-BSD FRML MDRD: ABNORMAL ML/MIN/{1.73_M2}
GFR SERPL CREATININE-BSD FRML MDRD: ABNORMAL ML/MIN/{1.73_M2}
GLUCOSE BLD-MCNC: 101 MG/DL (ref 75–110)
GLUCOSE BLD-MCNC: 116 MG/DL (ref 75–110)
GLUCOSE BLD-MCNC: 120 MG/DL (ref 75–110)
GLUCOSE BLD-MCNC: 91 MG/DL (ref 70–99)
GLUCOSE BLD-MCNC: 93 MG/DL (ref 75–110)
HCT VFR BLD CALC: 42.9 % (ref 41–53)
HEMOGLOBIN: 13.8 G/DL (ref 13.5–17.5)
IMMATURE GRANULOCYTES: ABNORMAL %
LYMPHOCYTES # BLD: 34 % (ref 24–44)
MCH RBC QN AUTO: 31.2 PG (ref 26–34)
MCHC RBC AUTO-ENTMCNC: 32.2 G/DL (ref 31–37)
MCV RBC AUTO: 97.1 FL (ref 80–100)
MONOCYTES # BLD: 12 % (ref 1–7)
NRBC AUTOMATED: ABNORMAL PER 100 WBC
OSMOLALITY URINE: 535 MOSM/KG (ref 300–1170)
PDW BLD-RTO: 14.3 % (ref 11.5–14.5)
PLATELET # BLD: 289 K/UL (ref 130–400)
PLATELET ESTIMATE: ABNORMAL
PMV BLD AUTO: 9.9 FL (ref 6–12)
POTASSIUM SERPL-SCNC: 4.1 MMOL/L (ref 3.7–5.3)
RBC # BLD: 4.42 M/UL (ref 4.5–5.9)
RBC # BLD: ABNORMAL 10*6/UL
SEG NEUTROPHILS: 50 % (ref 36–66)
SEGMENTED NEUTROPHILS ABSOLUTE COUNT: 4.1 K/UL (ref 1.8–7.7)
SODIUM BLD-SCNC: 151 MMOL/L (ref 135–144)
SODIUM,UR: 121 MMOL/L
WBC # BLD: 8.2 K/UL (ref 3.5–11)
WBC # BLD: ABNORMAL 10*3/UL

## 2018-01-26 PROCEDURE — 85025 COMPLETE CBC W/AUTO DIFF WBC: CPT

## 2018-01-26 PROCEDURE — 36415 COLL VENOUS BLD VENIPUNCTURE: CPT

## 2018-01-26 PROCEDURE — 6370000000 HC RX 637 (ALT 250 FOR IP): Performed by: NURSE PRACTITIONER

## 2018-01-26 PROCEDURE — 6360000002 HC RX W HCPCS: Performed by: NURSE PRACTITIONER

## 2018-01-26 PROCEDURE — 80051 ELECTROLYTE PANEL: CPT

## 2018-01-26 PROCEDURE — 84300 ASSAY OF URINE SODIUM: CPT

## 2018-01-26 PROCEDURE — 80048 BASIC METABOLIC PNL TOTAL CA: CPT

## 2018-01-26 PROCEDURE — 6370000000 HC RX 637 (ALT 250 FOR IP): Performed by: INTERNAL MEDICINE

## 2018-01-26 PROCEDURE — 99232 SBSQ HOSP IP/OBS MODERATE 35: CPT | Performed by: INTERNAL MEDICINE

## 2018-01-26 PROCEDURE — 2700000000 HC OXYGEN THERAPY PER DAY

## 2018-01-26 PROCEDURE — 82947 ASSAY GLUCOSE BLOOD QUANT: CPT

## 2018-01-26 PROCEDURE — 1200000000 HC SEMI PRIVATE

## 2018-01-26 PROCEDURE — 71045 X-RAY EXAM CHEST 1 VIEW: CPT

## 2018-01-26 PROCEDURE — 82436 ASSAY OF URINE CHLORIDE: CPT

## 2018-01-26 PROCEDURE — 83935 ASSAY OF URINE OSMOLALITY: CPT

## 2018-01-26 PROCEDURE — 94640 AIRWAY INHALATION TREATMENT: CPT

## 2018-01-26 PROCEDURE — 94760 N-INVAS EAR/PLS OXIMETRY 1: CPT

## 2018-01-26 PROCEDURE — 99232 SBSQ HOSP IP/OBS MODERATE 35: CPT | Performed by: FAMILY MEDICINE

## 2018-01-26 PROCEDURE — 2580000003 HC RX 258: Performed by: FAMILY MEDICINE

## 2018-01-26 RX ORDER — DEXTROSE AND SODIUM CHLORIDE 5; .45 G/100ML; G/100ML
INJECTION, SOLUTION INTRAVENOUS CONTINUOUS
Status: DISCONTINUED | OUTPATIENT
Start: 2018-01-26 | End: 2018-01-28

## 2018-01-26 RX ADMIN — LEVETIRACETAM 1500 MG: 100 SOLUTION ORAL at 10:26

## 2018-01-26 RX ADMIN — LANSOPRAZOLE 30 MG: 30 TABLET, ORALLY DISINTEGRATING, DELAYED RELEASE ORAL at 06:27

## 2018-01-26 RX ADMIN — IPRATROPIUM BROMIDE AND ALBUTEROL SULFATE 1 AMPULE: .5; 3 SOLUTION RESPIRATORY (INHALATION) at 07:48

## 2018-01-26 RX ADMIN — IPRATROPIUM BROMIDE AND ALBUTEROL SULFATE 1 AMPULE: .5; 3 SOLUTION RESPIRATORY (INHALATION) at 20:24

## 2018-01-26 RX ADMIN — IPRATROPIUM BROMIDE AND ALBUTEROL SULFATE 1 AMPULE: .5; 3 SOLUTION RESPIRATORY (INHALATION) at 15:46

## 2018-01-26 RX ADMIN — CETIRIZINE HYDROCHLORIDE 10 MG: 10 TABLET, FILM COATED ORAL at 10:26

## 2018-01-26 RX ADMIN — ENOXAPARIN SODIUM 40 MG: 40 INJECTION SUBCUTANEOUS at 10:27

## 2018-01-26 RX ADMIN — LAMOTRIGINE 200 MG: 100 TABLET ORAL at 10:30

## 2018-01-26 RX ADMIN — LACOSAMIDE 100 MG: 100 TABLET, FILM COATED ORAL at 10:26

## 2018-01-26 RX ADMIN — ANTACID TABLETS 1250 MG: 500 TABLET, CHEWABLE ORAL at 10:26

## 2018-01-26 RX ADMIN — BACLOFEN 10 MG: 10 TABLET ORAL at 15:35

## 2018-01-26 RX ADMIN — IPRATROPIUM BROMIDE AND ALBUTEROL SULFATE 1 AMPULE: .5; 3 SOLUTION RESPIRATORY (INHALATION) at 11:52

## 2018-01-26 RX ADMIN — LORAZEPAM 1 MG: 1 TABLET ORAL at 03:51

## 2018-01-26 RX ADMIN — FLUTICASONE PROPIONATE 2 SPRAY: 50 SPRAY, METERED NASAL at 10:31

## 2018-01-26 RX ADMIN — LEVOFLOXACIN 750 MG: 750 TABLET, FILM COATED ORAL at 12:23

## 2018-01-26 RX ADMIN — LAMOTRIGINE 100 MG: 100 TABLET ORAL at 10:26

## 2018-01-26 RX ADMIN — VITAMIN D, TAB 1000IU (100/BT) 2000 UNITS: 25 TAB at 10:27

## 2018-01-26 RX ADMIN — DEXTROSE AND SODIUM CHLORIDE: 5; 450 INJECTION, SOLUTION INTRAVENOUS at 10:27

## 2018-01-26 RX ADMIN — LACTULOSE 30 G: 20 SOLUTION ORAL at 10:30

## 2018-01-26 RX ADMIN — BACLOFEN 10 MG: 10 TABLET ORAL at 10:27

## 2018-01-26 RX ADMIN — POLYETHYLENE GLYCOL (3350) 17 G: 17 POWDER, FOR SOLUTION ORAL at 10:27

## 2018-01-26 NOTE — PROGRESS NOTES
[facility]    Progress Note    1/26/2018    9:19 AM    Name:   Renay Rios  MRN:     [id number]     Acct:      [de-identified]   Room:   2012/2012-02   Day:  3  Admit Date:  1/22/2018 11:05 PM    PCP:   Ej Augustine  Code Status:  Limited    Subjective:     C/C:   Chief Complaint   Patient presents with    Shortness of Breath     from group home onset this evening     Interval History Status:    Vitals stable. Patient still on 2L of O2. But looks more comfortable today    Na up to 151 today. Brief History:     48 yo m with h/o cerebral palsy, MRDD, seizure disorder presented to ED from group home for shortness of breath and cough x1 day. Patient was satting at 89% in group home. CXR from ED shows left lung air space disease/pna. Patient is afebrile. No leucocytosis, but is tachypneic and required O2 yesterday. Patient was started on levaquin for pna. Patient saturating at 98% on room air this morning.       Echo in nov, 2017 with EF 55%, mild LVH.     Patient was admitted to [facility] for multifocal pna and copd exacerbation 2 months ago. Review of Systems:     Unable to  Obtain     Medications: Allergies:     Allergies   Allergen Reactions    Ampicillin Other (See Comments)    Valium [Diazepam] Other (See Comments)    Other      Bubble bath products        Current Meds:   Scheduled Meds:    levofloxacin  750 mg PEG Tube Daily    levETIRAcetam  1,500 mg Per G Tube BID    insulin lispro  0-6 Units Subcutaneous TID WC    insulin lispro  0-3 Units Subcutaneous Nightly    baclofen  10 mg Per G Tube TID    vitamin D  2,000 Units Per G Tube BID    fluticasone  2 spray Nasal Daily    lacosamide  100 mg Per G Tube BID    lactulose  30 g Per G Tube BID    lamoTRIgine  100 mg Per G Tube QAM    lamoTRIgine  200 mg Per G Tube BID    cetirizine  10 mg PEG Tube Daily    montelukast  10 mg Per G Tube Nightly    sodium chloride flush 10 mL Intravenous 2 times per day    enoxaparin  40 mg Subcutaneous Daily    ipratropium-albuterol  1 ampule Inhalation Q4H WA    lansoprazole  30 mg PEG Tube QAM AC    calcium carbonate  1,250 mg Per G Tube Daily    polyethylene glycol  17 g Per G Tube Daily     Continuous Infusions:    dextrose 5 % and 0.45 % NaCl      dextrose       PRN Meds: glucose, dextrose, glucagon (rDNA), dextrose, bisacodyl, LORazepam, sodium chloride flush, acetaminophen, HYDROcodone 5 mg - acetaminophen **OR** HYDROcodone 5 mg - acetaminophen, magnesium hydroxide, ondansetron, nicotine, albuterol    Data:     Past Medical History:   has a past medical history of Cerebral palsy ([facility] [address] Cholelithiasis with chronic cholecystitis; Constipation; Delayed gastric emptying; Dysphagia; GERD (gastroesophageal reflux disease); Hearing loss; Mental disability; Profound mental retardation; Scoliosis; Seizures ([facility] [address] and 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[facility] [address] Social History:   reports that he has never smoked. He has never used smokeless tobacco. He reports that he does not drink alcohol or use drugs. Family History: History reviewed. No pertinent family history. Vitals:  /77   Pulse 93   Temp 98.6 °F (37 °C) (Oral)   Resp 20   Ht 5' 6\" (1.676 m)   Wt 117 lb 11.6 oz (53.4 kg)   SpO2 94%   BMI 19.00 kg/m²   Temp (24hrs), Av °F (37.2 °C), Min:98.6 °F (37 °C), Max:99.3 °F (37.4 °C)    Recent Labs      18   0619  18   1202  18   2140  18   0552   POCGLU  122*  108  131*  116*       I/O (24Hr):     Intake/Output Summary (Last 24 hours) at 18 0919  Last data filed at 18 0806   Gross per 24 hour   Intake             2315 ml   Output                0 ml   Net             2315 ml       Labs:    Hematology:  Recent Labs      18   0626  18   0527  18   0618   WBC  3.3*  12.1*  8.2   RBC  4.62  4.57  4.42*   HGB  14.5  14.6  13.8   HCT  44.1  43.6  42.9   MCV  95.3  95.3  97.1   MCH

## 2018-01-26 NOTE — PLAN OF CARE
Problem: Falls - Risk of  Goal: Absence of falls  Outcome: Ongoing  Patient is free from falls this shift. Bed is in the lowest position. Call light and side table are within reach. Room checks done hourly. Bed alarm is on. Side rails are up.

## 2018-01-26 NOTE — PROGRESS NOTES
[facility]  Progress Note    David Castro  MRN: [id number]  Date: 2018    Reason for F/U :   Pneumonia    Impression :   · Acute hypoxic respiratory insufficiency   · Possible left lower lobe pneumonia  · Leukopenia - Resolved  · Coagulase negative staph out of one set likely represents a contaminated blood culture  · Cerebral palsy with spastic quadriparesis  · Kyphoscoliosis - likely with a component of restrictive lung disease    Recommendations:   · Continue levofloxacin to complete a five-day course on 2018  · Continue aerosol therapy  · Consider vest/percussion therapy  · The patient does have some mild increase in the work of breathing but again this has been stable for the last 3 days that I have seen him. · Infectious disease wise he is okay for discharge    Infection Control Recommendations:   Universal precautions    Discharge Planning:   Infectious disease wise the patient is stable for discharge back to the care facility    Summary of Stay:   David Castro is a 49y.o.-year-old male who was initially admitted on 2018. Wilbur Fernandez has underlying cerebral palsy/MRDD and resides at an extended care facility. It is my understanding that on the day of admission he had an acute onset of shortness of breath and cough. He was found to have an O2 sat of 89% on room air which prompted him to be sent to the emergency room for evaluation. The patient was admitted for an acute onset of COPD, has been seen by the pulmonary service who do raise concern for possible aspiration pneumonia  Blood cultures 1 out of 2 sets were positive and I asked to evaluate and help with antibiotic choice.   The patient is nonverbal.     Current evaluation:2018    /77   Pulse 93   Temp 98.6 °F (37 °C) (Oral)   Resp 20   Ht 5' 6\" (1.676 m)   Wt 117 lb 11.6 oz (53.4 kg)   SpO2 94%   BMI 19.00 kg/m²     Temperature Range: Temp: 98.6 °F (37 °C) Temp  Av °F (37.2 °C)  Min: 98.6 °F

## 2018-01-27 LAB
ANION GAP SERPL CALCULATED.3IONS-SCNC: 11 MMOL/L (ref 9–17)
ANION GAP SERPL CALCULATED.3IONS-SCNC: 13 MMOL/L (ref 9–17)
ANION GAP SERPL CALCULATED.3IONS-SCNC: 9 MMOL/L (ref 9–17)
BUN BLDV-MCNC: 9 MG/DL (ref 6–20)
BUN/CREAT BLD: ABNORMAL (ref 9–20)
CALCIUM SERPL-MCNC: 9.1 MG/DL (ref 8.6–10.4)
CHLORIDE BLD-SCNC: 101 MMOL/L (ref 98–107)
CHLORIDE BLD-SCNC: 101 MMOL/L (ref 98–107)
CHLORIDE BLD-SCNC: 102 MMOL/L (ref 98–107)
CO2: 28 MMOL/L (ref 20–31)
CO2: 29 MMOL/L (ref 20–31)
CO2: 31 MMOL/L (ref 20–31)
CREAT SERPL-MCNC: <0.4 MG/DL (ref 0.7–1.2)
GFR AFRICAN AMERICAN: ABNORMAL ML/MIN
GFR NON-AFRICAN AMERICAN: ABNORMAL ML/MIN
GFR SERPL CREATININE-BSD FRML MDRD: ABNORMAL ML/MIN/{1.73_M2}
GFR SERPL CREATININE-BSD FRML MDRD: ABNORMAL ML/MIN/{1.73_M2}
GLUCOSE BLD-MCNC: 105 MG/DL (ref 75–110)
GLUCOSE BLD-MCNC: 113 MG/DL (ref 75–110)
GLUCOSE BLD-MCNC: 115 MG/DL (ref 75–110)
GLUCOSE BLD-MCNC: 116 MG/DL (ref 70–99)
GLUCOSE BLD-MCNC: 116 MG/DL (ref 75–110)
GLUCOSE BLD-MCNC: 88 MG/DL (ref 75–110)
POTASSIUM SERPL-SCNC: 3.5 MMOL/L (ref 3.7–5.3)
POTASSIUM SERPL-SCNC: 3.6 MMOL/L (ref 3.7–5.3)
POTASSIUM SERPL-SCNC: 3.8 MMOL/L (ref 3.7–5.3)
SODIUM BLD-SCNC: 141 MMOL/L (ref 135–144)
SODIUM BLD-SCNC: 142 MMOL/L (ref 135–144)
SODIUM BLD-SCNC: 142 MMOL/L (ref 135–144)

## 2018-01-27 PROCEDURE — 1200000000 HC SEMI PRIVATE

## 2018-01-27 PROCEDURE — 99233 SBSQ HOSP IP/OBS HIGH 50: CPT | Performed by: INTERNAL MEDICINE

## 2018-01-27 PROCEDURE — 80048 BASIC METABOLIC PNL TOTAL CA: CPT

## 2018-01-27 PROCEDURE — 6370000000 HC RX 637 (ALT 250 FOR IP): Performed by: NURSE PRACTITIONER

## 2018-01-27 PROCEDURE — 6360000002 HC RX W HCPCS: Performed by: FAMILY MEDICINE

## 2018-01-27 PROCEDURE — 99232 SBSQ HOSP IP/OBS MODERATE 35: CPT | Performed by: FAMILY MEDICINE

## 2018-01-27 PROCEDURE — 2580000003 HC RX 258: Performed by: FAMILY MEDICINE

## 2018-01-27 PROCEDURE — 82947 ASSAY GLUCOSE BLOOD QUANT: CPT

## 2018-01-27 PROCEDURE — 80051 ELECTROLYTE PANEL: CPT

## 2018-01-27 PROCEDURE — 94640 AIRWAY INHALATION TREATMENT: CPT

## 2018-01-27 PROCEDURE — 6360000002 HC RX W HCPCS: Performed by: NURSE PRACTITIONER

## 2018-01-27 PROCEDURE — 36415 COLL VENOUS BLD VENIPUNCTURE: CPT

## 2018-01-27 RX ORDER — DIPHENHYDRAMINE HYDROCHLORIDE 50 MG/ML
25 INJECTION INTRAMUSCULAR; INTRAVENOUS EVERY 6 HOURS PRN
Status: DISCONTINUED | OUTPATIENT
Start: 2018-01-27 | End: 2018-01-29 | Stop reason: HOSPADM

## 2018-01-27 RX ORDER — LORAZEPAM 2 MG/ML
1 INJECTION INTRAMUSCULAR EVERY 6 HOURS PRN
Status: DISCONTINUED | OUTPATIENT
Start: 2018-01-27 | End: 2018-01-27

## 2018-01-27 RX ORDER — FUROSEMIDE 10 MG/ML
20 INJECTION INTRAMUSCULAR; INTRAVENOUS ONCE
Status: COMPLETED | OUTPATIENT
Start: 2018-01-27 | End: 2018-01-27

## 2018-01-27 RX ADMIN — LORAZEPAM 1 MG: 1 TABLET ORAL at 19:02

## 2018-01-27 RX ADMIN — IPRATROPIUM BROMIDE AND ALBUTEROL SULFATE 1 AMPULE: .5; 3 SOLUTION RESPIRATORY (INHALATION) at 15:05

## 2018-01-27 RX ADMIN — FUROSEMIDE 20 MG: 10 INJECTION, SOLUTION INTRAMUSCULAR; INTRAVENOUS at 13:50

## 2018-01-27 RX ADMIN — ENOXAPARIN SODIUM 40 MG: 40 INJECTION SUBCUTANEOUS at 08:15

## 2018-01-27 RX ADMIN — FLUTICASONE PROPIONATE 2 SPRAY: 50 SPRAY, METERED NASAL at 08:16

## 2018-01-27 RX ADMIN — DEXTROSE AND SODIUM CHLORIDE: 5; 450 INJECTION, SOLUTION INTRAVENOUS at 06:30

## 2018-01-27 RX ADMIN — BACLOFEN 10 MG: 10 TABLET ORAL at 13:50

## 2018-01-27 RX ADMIN — LAMOTRIGINE 100 MG: 100 TABLET ORAL at 08:16

## 2018-01-27 RX ADMIN — LEVETIRACETAM 1500 MG: 100 SOLUTION ORAL at 08:15

## 2018-01-27 RX ADMIN — MONTELUKAST SODIUM 10 MG: 10 TABLET, FILM COATED ORAL at 22:16

## 2018-01-27 RX ADMIN — LACOSAMIDE 100 MG: 100 TABLET, FILM COATED ORAL at 03:15

## 2018-01-27 RX ADMIN — MONTELUKAST SODIUM 10 MG: 10 TABLET, FILM COATED ORAL at 03:15

## 2018-01-27 RX ADMIN — LEVETIRACETAM 1500 MG: 100 SOLUTION ORAL at 22:21

## 2018-01-27 RX ADMIN — BACLOFEN 10 MG: 10 TABLET ORAL at 03:16

## 2018-01-27 RX ADMIN — BACLOFEN 10 MG: 10 TABLET ORAL at 08:14

## 2018-01-27 RX ADMIN — DIPHENHYDRAMINE HYDROCHLORIDE 25 MG: 50 INJECTION, SOLUTION INTRAMUSCULAR; INTRAVENOUS at 03:10

## 2018-01-27 RX ADMIN — LORAZEPAM 1 MG: 1 TABLET ORAL at 08:29

## 2018-01-27 RX ADMIN — VITAMIN D, TAB 1000IU (100/BT) 2000 UNITS: 25 TAB at 22:20

## 2018-01-27 RX ADMIN — VITAMIN D, TAB 1000IU (100/BT) 2000 UNITS: 25 TAB at 03:17

## 2018-01-27 RX ADMIN — CETIRIZINE HYDROCHLORIDE 10 MG: 10 TABLET, FILM COATED ORAL at 08:14

## 2018-01-27 RX ADMIN — IPRATROPIUM BROMIDE AND ALBUTEROL SULFATE 1 AMPULE: .5; 3 SOLUTION RESPIRATORY (INHALATION) at 20:32

## 2018-01-27 RX ADMIN — LAMOTRIGINE 200 MG: 100 TABLET ORAL at 08:14

## 2018-01-27 RX ADMIN — VITAMIN D, TAB 1000IU (100/BT) 2000 UNITS: 25 TAB at 08:14

## 2018-01-27 RX ADMIN — LEVETIRACETAM 1500 MG: 100 SOLUTION ORAL at 03:27

## 2018-01-27 RX ADMIN — IPRATROPIUM BROMIDE AND ALBUTEROL SULFATE 1 AMPULE: .5; 3 SOLUTION RESPIRATORY (INHALATION) at 08:00

## 2018-01-27 RX ADMIN — BACLOFEN 10 MG: 10 TABLET ORAL at 22:17

## 2018-01-27 RX ADMIN — LACOSAMIDE 100 MG: 100 TABLET, FILM COATED ORAL at 08:14

## 2018-01-27 RX ADMIN — LANSOPRAZOLE 30 MG: 30 TABLET, ORALLY DISINTEGRATING, DELAYED RELEASE ORAL at 08:15

## 2018-01-27 RX ADMIN — LACTULOSE 30 G: 20 SOLUTION ORAL at 08:15

## 2018-01-27 RX ADMIN — LACTULOSE 30 G: 20 SOLUTION ORAL at 22:20

## 2018-01-27 RX ADMIN — LACTULOSE 30 G: 20 SOLUTION ORAL at 03:13

## 2018-01-27 RX ADMIN — LAMOTRIGINE 200 MG: 100 TABLET ORAL at 22:17

## 2018-01-27 RX ADMIN — POLYETHYLENE GLYCOL (3350) 17 G: 17 POWDER, FOR SOLUTION ORAL at 08:15

## 2018-01-27 RX ADMIN — IPRATROPIUM BROMIDE AND ALBUTEROL SULFATE 1 AMPULE: .5; 3 SOLUTION RESPIRATORY (INHALATION) at 11:26

## 2018-01-27 RX ADMIN — LACOSAMIDE 100 MG: 100 TABLET, FILM COATED ORAL at 22:16

## 2018-01-27 RX ADMIN — LAMOTRIGINE 200 MG: 100 TABLET ORAL at 03:16

## 2018-01-27 RX ADMIN — ANTACID TABLETS 1250 MG: 500 TABLET, CHEWABLE ORAL at 08:15

## 2018-01-27 ASSESSMENT — PAIN SCALES - PAIN ASSESSMENT IN ADVANCED DEMENTIA (PAINAD)
FACIALEXPRESSION: 0
TOTALSCORE: 2
NEGVOCALIZATION: 1
BODYLANGUAGE: 0
BREATHING: 1
CONSOLABILITY: 0

## 2018-01-27 NOTE — PLAN OF CARE
Problem: Airway Clearance - Ineffective:  Goal: Clear lung sounds  Clear lung sounds   Outcome: Ongoing  Afebrile Congested cough Antibiotics given Lungs diminished O2 on at 2 liters per mask Pulse ox 99 on O2

## 2018-01-27 NOTE — PROGRESS NOTES
[facility]  Progress Note    Angela Lowe  MRN: [id number]  Date: 1/27/2018    Reason for F/U :   Pneumonia    Impression :   · Acute hypoxic respiratory insufficiency   · Possible left lower lobe pneumonia  · Leukopenia - Resolved  · Coagulase negative staph out of one set likely represents a contaminated blood culture  · Cerebral palsy with spastic quadriparesis  · Kyphoscoliosis - likely with a component of restrictive lung disease    Recommendations:   · Has completed levofloxacin x five-day course on January 26, 2018  · Continue aerosol therapy  · Consider vest/percussion therapy  · The patient does have some mild increase in the work of breathing but again this has been stable for the last 3 days that I have seen him. · Infectious disease wise he is okay for discharge    Infection Control Recommendations:   Universal precautions    Discharge Planning:   Infectious disease wise the patient is stable for discharge back to the care facility    Summary of Stay:     INITIAL HISTORY:  Angela Lowe is a 49y.o.-year-old male who was initially admitted on 1/22/2018. Meron Camarillo has underlying cerebral palsy/MRDD and resides at an extended care facility. It is my understanding that on the day of admission he had an acute onset of shortness of breath and cough. He was found to have an O2 sat of 89% on room air which prompted him to be sent to the emergency room for evaluation. The patient was admitted for an acute onset of COPD, has been seen by the pulmonary service who do raise concern for possible aspiration pneumonia  Blood cultures 1 out of 2 sets were positive and I asked to evaluate and help with antibiotic choice.   The patient is nonverbal.     CURRENT EVALUATION [de-identified]1/27/2018    BP (!) 141/93   Pulse 77   Temp 98.2 °F (36.8 °C) (Axillary)   Resp 20   Ht 5' 6\" (1.676 m)   Wt 114 lb 12.8 oz (52.1 kg)   SpO2 99%   BMI 18.53 kg/m²     Temperature Range: Temp: 98.2 °F (36.8 °C) Temp  Avg: to participate in the care of this patient. Please call with questions. I have examined the patient, reviewed the patient's medical history in detail and updated the computerized patient record. Above exam and data confirmed. Sofiya Triana MD      Electronically signed by Bernie Boyce MD on 1/27/2018 at 9:35 AM    This note is created with the assistance of a speech recognition program.  While intending to generate a document that actually reflects the content of the visit, the document can still have some errors including those of syntax and sound a like substitutions which may escape proof reading. It such instances, actual meaning can be extrapolated by contextual diversion.

## 2018-01-27 NOTE — PROGRESS NOTES
Department of Internal Medicine  Nephrology Amanda Coronado MD    Progress Note        SUBJECTIVE:  We are following this patient for the management of hypernatremia. Cindy Gonzalez initially presented with cough and shortness of breath and was admitted for management of Pneumonia. His serum sodium however progressively increased to 151 mmol/L and hence nephrology consultation. He was placed on hypotonic IV fluid D5/0.45 normal saline at 50 mL/h and serum sodium is down to 1 41 mmol/L today. Remains fairly nonverbal.    Physical Exam:    VITALS:  BP (!) 144/88   Pulse 86   Temp 97.7 °F (36.5 °C) (Axillary)   Resp 20   Ht 5' 6\" (1.676 m)   Wt 114 lb 12.8 oz (52.1 kg)   SpO2 97%   BMI 18.53 kg/m²   24HR INTAKE/OUTPUT:    Intake/Output Summary (Last 24 hours) at 01/27/18 1407  Last data filed at 01/27/18 1121   Gross per 24 hour   Intake          2478.82 ml   Output                0 ml   Net          2478.82 ml       Constitutional:  Nonviable. Cardiovascular/Edema:  S1-S2 regular rate and rhythm; trace pedal edema. Respiratory:  Diminished breath sounds. Gastrointestinal:  Soft with feeding tube in place.       CBC:   Recent Labs      01/24/18   0626  01/25/18   0527  01/26/18   0618   WBC  3.3*  12.1*  8.2   HGB  14.5  14.6  13.8   PLT  308  308  289     BMP:    Recent Labs      01/25/18   0702  01/26/18   0618  01/26/18   2348   NA  148*  151*  142   K  3.4*  4.1  3.8   CL  107  111*  102   CO2  29  31  31   BUN  13  14   --    CREATININE  0.41*  <0.40*   --    GLUCOSE  131*  91   --        No results found for: NITRU, COLORU, PHUR, LABCAST, WBCUA, RBCUA, MUCUS, TRICHOMONAS, YEAST, BACTERIA, CLARITYU, SPECGRAV, LEUKOCYTESUR, UROBILINOGEN, BILIRUBINUR, BLOODU, GLUCOSEU, KETUA, AMORPHOUS  Urine Sodium:     Lab Results   Component Value Date    SANDOR 121 01/26/2018     Urine Potassium:  No results found for: KUR  Urine Chloride:    Lab Results   Component Value Date    CLUR 85 01/26/2018     Urine

## 2018-01-27 NOTE — PLAN OF CARE
Problem: Falls - Risk of  Goal: Absence of falls  Outcome: Ongoing  Fall precautions in place Bed in low position Does not attempt to crawl out of bed Nonverbal but yells out at intervals    Problem: Risk for Impaired Skin Integrity  Goal: Tissue integrity - skin and mucous membranes  Structural intactness and normal physiological function of skin and  mucous membranes.    Outcome: Ongoing  No heel breakdown Scrotum red and cream applied Air mattress in place    Problem: Nutrition  Goal: Optimal nutrition therapy  Nutrition Problem: Inadequate oral intake  Intervention: Food and/or Nutrient Delivery: Continue NPO, Modify current Tube Feeding  Nutritional Goals: EN to meet > 90% of estimated kcal/protein needs with good GI tolerance     Outcome: Ongoing  Tolerating tube feedings today without residual

## 2018-01-27 NOTE — PROGRESS NOTES
During q 4h flush of patient's G-tube 150mL of residual was returned. Shad Knapp contacted via [employer] for orders to hold tube feed and medications through G-tube. Orders placed to hold meds and tube feeding for 4 hours. Meds to given and tube feed to be restarted at 0230 on 1/27.

## 2018-01-27 NOTE — FLOWSHEET NOTE
was going to visit, RN's just left room, patient was restless.  spoke to RN's  RN states patient has been here before, do not have any contact information of visitors or family. Spiritual Care to follow up as needed.       01/27/18 1348   Encounter Summary   Services provided to: Patient not available   Referral/Consult From: [facility] Unknown   Continue Visiting (1-27-18 spoke to RN's / MRDD)   Complexity of Encounter Low   Length of Encounter 15 minutes   Spiritual Assessment Completed Yes   Routine   Type Follow up   Assessment Unable to respond   Intervention Prayer;Explored feelings, thoughts, concerns   Outcome Did not respond

## 2018-01-27 NOTE — PROGRESS NOTES
[facility]    Progress Note    1/27/2018    11:43 AM    Name:   Alexsander Manjarrez  MRN:     [id number]     Acct:      [de-identified]   Room:   2012/2012-02   Day:  4  Admit Date:  1/22/2018 11:05 PM    PCP:   Ranulfo Nova  Code Status:  Limited    Subjective:     C/C:   Chief Complaint   Patient presents with    Shortness of Breath     from group home onset this evening     Interval History Status:    Patient on 2L of O2 this morning. Seems to be breathing heavier today. CXR done yesterday showed increased congestion. 1 dose lasix ordered. Brief History:     48 yo m with h/o cerebral palsy, MRDD, seizure disorder presented to ED from group home for shortness of breath and cough x1 day. Patient was satting at 89% in group home. CXR from ED shows left lung air space disease/pna. Patient is afebrile. No leucocytosis, but is tachypneic and required O2 yesterday. Patient was started on levaquin for pna. Patient saturating at 98% on room air this morning.       Echo in nov, 2017 with EF 55%, mild LVH.     Patient was admitted to [facility] for multifocal pna and copd exacerbation 2 months ago. Review of Systems:     Unable to  Obtain     Medications: Allergies:     Allergies   Allergen Reactions    Ampicillin Other (See Comments)    Valium [Diazepam] Other (See Comments)    Other      Bubble bath products        Current Meds:   Scheduled Meds:    furosemide  20 mg Intravenous Once    levETIRAcetam  1,500 mg Per G Tube BID    insulin lispro  0-6 Units Subcutaneous TID WC    insulin lispro  0-3 Units Subcutaneous Nightly    baclofen  10 mg Per G Tube TID    vitamin D  2,000 Units Per G Tube BID    fluticasone  2 spray Nasal Daily    lacosamide  100 mg Per G Tube BID    lactulose  30 g Per G Tube BID    lamoTRIgine  100 mg Per G Tube QAM    lamoTRIgine  200 mg Per G Tube BID    cetirizine  10 mg PEG Tube Daily    montelukast  10 MD  1/27/2018  11:43 AM

## 2018-01-27 NOTE — PLAN OF CARE
Problem: Falls - Risk of  Goal: Absence of falls  Outcome: Ongoing  Room free of clutter  Hourly rounding   Non-skid socks worn  Side rails up x2  Bed low and locked  Call light in reach  Instructed to call out before getting out of bed  Anticipatory needs met  Bed alarm on  Falling star at the door and on wristband      Problem: Risk for Impaired Skin Integrity  Goal: Tissue integrity - skin and mucous membranes  Structural intactness and normal physiological function of skin and  mucous membranes.    Outcome: Ongoing  Skin assessment completed and documented  Shahid scale updated  Relieve pressure to bony prominences  Avoid shearing  Assess s/sx of infection   Air mattress in place  Turned q 2 hours  Corie care given and barrier cream applied to prevent breakdown  Heels elevated  Structural intactness and normal physiological function of skin and mucous membranes

## 2018-01-28 LAB
ABSOLUTE EOS #: 0.4 K/UL (ref 0–0.4)
ABSOLUTE IMMATURE GRANULOCYTE: ABNORMAL K/UL (ref 0–0.3)
ABSOLUTE LYMPH #: 3 K/UL (ref 1–4.8)
ABSOLUTE MONO #: 0.6 K/UL (ref 0.2–0.8)
ALBUMIN SERPL-MCNC: 3.5 G/DL (ref 3.5–5.2)
ANION GAP SERPL CALCULATED.3IONS-SCNC: 9 MMOL/L (ref 9–17)
ANION GAP SERPL CALCULATED.3IONS-SCNC: 9 MMOL/L (ref 9–17)
BASOPHILS # BLD: 1 % (ref 0–2)
BASOPHILS ABSOLUTE: 0 K/UL (ref 0–0.2)
BUN BLDV-MCNC: 10 MG/DL (ref 6–20)
BUN BLDV-MCNC: 11 MG/DL (ref 6–20)
BUN/CREAT BLD: ABNORMAL (ref 9–20)
BUN/CREAT BLD: ABNORMAL (ref 9–20)
CALCIUM SERPL-MCNC: 8.7 MG/DL (ref 8.6–10.4)
CALCIUM SERPL-MCNC: 8.8 MG/DL (ref 8.6–10.4)
CHLORIDE BLD-SCNC: 98 MMOL/L (ref 98–107)
CHLORIDE BLD-SCNC: 98 MMOL/L (ref 98–107)
CO2: 31 MMOL/L (ref 20–31)
CO2: 32 MMOL/L (ref 20–31)
CREAT SERPL-MCNC: <0.4 MG/DL (ref 0.7–1.2)
CREAT SERPL-MCNC: <0.4 MG/DL (ref 0.7–1.2)
DIFFERENTIAL TYPE: ABNORMAL
EOSINOPHILS RELATIVE PERCENT: 5 % (ref 1–4)
GFR AFRICAN AMERICAN: ABNORMAL ML/MIN
GFR AFRICAN AMERICAN: ABNORMAL ML/MIN
GFR NON-AFRICAN AMERICAN: ABNORMAL ML/MIN
GFR NON-AFRICAN AMERICAN: ABNORMAL ML/MIN
GFR SERPL CREATININE-BSD FRML MDRD: ABNORMAL ML/MIN/{1.73_M2}
GLUCOSE BLD-MCNC: 109 MG/DL (ref 70–99)
GLUCOSE BLD-MCNC: 110 MG/DL (ref 75–110)
GLUCOSE BLD-MCNC: 92 MG/DL (ref 70–99)
GLUCOSE BLD-MCNC: 92 MG/DL (ref 75–110)
HCT VFR BLD CALC: 39.4 % (ref 41–53)
HEMOGLOBIN: 12.7 G/DL (ref 13.5–17.5)
IMMATURE GRANULOCYTES: ABNORMAL %
LYMPHOCYTES # BLD: 38 % (ref 24–44)
MCH RBC QN AUTO: 31.3 PG (ref 26–34)
MCHC RBC AUTO-ENTMCNC: 32.2 G/DL (ref 31–37)
MCV RBC AUTO: 97.2 FL (ref 80–100)
MONOCYTES # BLD: 8 % (ref 1–7)
NRBC AUTOMATED: ABNORMAL PER 100 WBC
PDW BLD-RTO: 14.1 % (ref 11.5–14.5)
PHOSPHORUS: 3.4 MG/DL (ref 2.5–4.5)
PLATELET # BLD: 273 K/UL (ref 130–400)
PLATELET ESTIMATE: ABNORMAL
PMV BLD AUTO: 8.9 FL (ref 6–12)
POTASSIUM SERPL-SCNC: 3.5 MMOL/L (ref 3.7–5.3)
POTASSIUM SERPL-SCNC: 3.8 MMOL/L (ref 3.7–5.3)
RBC # BLD: 4.05 M/UL (ref 4.5–5.9)
RBC # BLD: ABNORMAL 10*6/UL
SEG NEUTROPHILS: 48 % (ref 36–66)
SEGMENTED NEUTROPHILS ABSOLUTE COUNT: 3.9 K/UL (ref 1.8–7.7)
SODIUM BLD-SCNC: 138 MMOL/L (ref 135–144)
SODIUM BLD-SCNC: 139 MMOL/L (ref 135–144)
WBC # BLD: 8 K/UL (ref 3.5–11)
WBC # BLD: ABNORMAL 10*3/UL

## 2018-01-28 PROCEDURE — 6360000002 HC RX W HCPCS: Performed by: NURSE PRACTITIONER

## 2018-01-28 PROCEDURE — 99232 SBSQ HOSP IP/OBS MODERATE 35: CPT | Performed by: FAMILY MEDICINE

## 2018-01-28 PROCEDURE — 2700000000 HC OXYGEN THERAPY PER DAY

## 2018-01-28 PROCEDURE — 6370000000 HC RX 637 (ALT 250 FOR IP): Performed by: NURSE PRACTITIONER

## 2018-01-28 PROCEDURE — 1200000000 HC SEMI PRIVATE

## 2018-01-28 PROCEDURE — 2580000003 HC RX 258: Performed by: NURSE PRACTITIONER

## 2018-01-28 PROCEDURE — 85025 COMPLETE CBC W/AUTO DIFF WBC: CPT

## 2018-01-28 PROCEDURE — 36415 COLL VENOUS BLD VENIPUNCTURE: CPT

## 2018-01-28 PROCEDURE — 94760 N-INVAS EAR/PLS OXIMETRY 1: CPT

## 2018-01-28 PROCEDURE — 80069 RENAL FUNCTION PANEL: CPT

## 2018-01-28 PROCEDURE — 99233 SBSQ HOSP IP/OBS HIGH 50: CPT | Performed by: INTERNAL MEDICINE

## 2018-01-28 PROCEDURE — 2580000003 HC RX 258: Performed by: FAMILY MEDICINE

## 2018-01-28 PROCEDURE — 82947 ASSAY GLUCOSE BLOOD QUANT: CPT

## 2018-01-28 PROCEDURE — 80048 BASIC METABOLIC PNL TOTAL CA: CPT

## 2018-01-28 PROCEDURE — 94640 AIRWAY INHALATION TREATMENT: CPT

## 2018-01-28 RX ADMIN — BACLOFEN 10 MG: 10 TABLET ORAL at 14:53

## 2018-01-28 RX ADMIN — BACLOFEN 10 MG: 10 TABLET ORAL at 08:13

## 2018-01-28 RX ADMIN — BACLOFEN 10 MG: 10 TABLET ORAL at 21:27

## 2018-01-28 RX ADMIN — LORAZEPAM 1 MG: 1 TABLET ORAL at 21:27

## 2018-01-28 RX ADMIN — DIPHENHYDRAMINE HYDROCHLORIDE 25 MG: 50 INJECTION, SOLUTION INTRAMUSCULAR; INTRAVENOUS at 04:58

## 2018-01-28 RX ADMIN — FLUTICASONE PROPIONATE 2 SPRAY: 50 SPRAY, METERED NASAL at 08:13

## 2018-01-28 RX ADMIN — LEVETIRACETAM 1500 MG: 100 SOLUTION ORAL at 21:25

## 2018-01-28 RX ADMIN — IPRATROPIUM BROMIDE AND ALBUTEROL SULFATE 1 AMPULE: .5; 3 SOLUTION RESPIRATORY (INHALATION) at 20:38

## 2018-01-28 RX ADMIN — VITAMIN D, TAB 1000IU (100/BT) 2000 UNITS: 25 TAB at 21:26

## 2018-01-28 RX ADMIN — IPRATROPIUM BROMIDE AND ALBUTEROL SULFATE 1 AMPULE: .5; 3 SOLUTION RESPIRATORY (INHALATION) at 08:06

## 2018-01-28 RX ADMIN — LANSOPRAZOLE 30 MG: 30 TABLET, ORALLY DISINTEGRATING, DELAYED RELEASE ORAL at 08:14

## 2018-01-28 RX ADMIN — DEXTROSE AND SODIUM CHLORIDE: 5; 450 INJECTION, SOLUTION INTRAVENOUS at 01:37

## 2018-01-28 RX ADMIN — ENOXAPARIN SODIUM 40 MG: 40 INJECTION SUBCUTANEOUS at 08:14

## 2018-01-28 RX ADMIN — Medication 10 ML: at 21:27

## 2018-01-28 RX ADMIN — POLYETHYLENE GLYCOL (3350) 17 G: 17 POWDER, FOR SOLUTION ORAL at 08:14

## 2018-01-28 RX ADMIN — CETIRIZINE HYDROCHLORIDE 10 MG: 10 TABLET, FILM COATED ORAL at 08:14

## 2018-01-28 RX ADMIN — IPRATROPIUM BROMIDE AND ALBUTEROL SULFATE 1 AMPULE: .5; 3 SOLUTION RESPIRATORY (INHALATION) at 11:27

## 2018-01-28 RX ADMIN — LEVETIRACETAM 1500 MG: 100 SOLUTION ORAL at 08:13

## 2018-01-28 RX ADMIN — LORAZEPAM 1 MG: 1 TABLET ORAL at 08:14

## 2018-01-28 RX ADMIN — LACTULOSE 30 G: 20 SOLUTION ORAL at 08:13

## 2018-01-28 RX ADMIN — ANTACID TABLETS 1250 MG: 500 TABLET, CHEWABLE ORAL at 08:13

## 2018-01-28 RX ADMIN — LACOSAMIDE 100 MG: 100 TABLET, FILM COATED ORAL at 21:27

## 2018-01-28 RX ADMIN — LACTULOSE 30 G: 20 SOLUTION ORAL at 21:25

## 2018-01-28 RX ADMIN — LAMOTRIGINE 200 MG: 100 TABLET ORAL at 21:27

## 2018-01-28 RX ADMIN — LAMOTRIGINE 100 MG: 100 TABLET ORAL at 08:15

## 2018-01-28 RX ADMIN — VITAMIN D, TAB 1000IU (100/BT) 2000 UNITS: 25 TAB at 08:14

## 2018-01-28 RX ADMIN — LORAZEPAM 1 MG: 1 TABLET ORAL at 01:17

## 2018-01-28 RX ADMIN — LACOSAMIDE 100 MG: 100 TABLET, FILM COATED ORAL at 08:14

## 2018-01-28 RX ADMIN — MONTELUKAST SODIUM 10 MG: 10 TABLET, FILM COATED ORAL at 21:27

## 2018-01-28 RX ADMIN — LAMOTRIGINE 200 MG: 100 TABLET ORAL at 08:14

## 2018-01-28 ASSESSMENT — PAIN SCALES - PAIN ASSESSMENT IN ADVANCED DEMENTIA (PAINAD)
BREATHING: 0
TOTALSCORE: 0
CONSOLABILITY: 0
NEGVOCALIZATION: 0
BREATHING: 0
BODYLANGUAGE: 0
CONSOLABILITY: 0
FACIALEXPRESSION: 0
BREATHING: 0
BREATHING: 0
BODYLANGUAGE: 0
FACIALEXPRESSION: 0
CONSOLABILITY: 0
NEGVOCALIZATION: 0
BREATHING: 0
BODYLANGUAGE: 0
NEGVOCALIZATION: 0
TOTALSCORE: 0
FACIALEXPRESSION: 0
NEGVOCALIZATION: 0
CONSOLABILITY: 0
FACIALEXPRESSION: 0
TOTALSCORE: 0
BODYLANGUAGE: 0
TOTALSCORE: 0
FACIALEXPRESSION: 0
TOTALSCORE: 0
CONSOLABILITY: 0
BODYLANGUAGE: 0
NEGVOCALIZATION: 0

## 2018-01-28 NOTE — PROGRESS NOTES
PLT  289  273   MPV  9.9  8.9     Chemistry:  Recent Labs      01/27/18   0623  01/27/18   1428  01/28/18   0521  01/28/18   0920   NA  141  142  138  139   K  3.6*  3.5*  3.5*  3.8   CL  101  101  98  98   CO2  29  28  31  32*   GLUCOSE  116*   --   92  109*   BUN  9   --   10  11   CREATININE  <0.40*   --   <0.40*  <0.40*   ANIONGAP  11  13  9  9   LABGLOM  CANNOT BE CALCULATED   --   CANNOT BE CALCULATED  CANNOT BE CALCULATED   GFRAA  CANNOT BE CALCULATED   --   CANNOT BE CALCULATED  CANNOT BE CALCULATED   CALCIUM  9.1   --   8.7  8.8   PHOS   --    --   3.4   --      Recent Labs      01/27/18   0628  01/27/18   1115  01/27/18   1619  01/27/18   2123  01/28/18   0520  01/28/18   0521  01/28/18   1138   LABALBU   --    --    --    --    --   3.5   --    POCGLU  115*  116*  113*  105  92   --   110         Lab Results   Component Value Date/Time    SPECIAL NOT REPORTED 01/23/2018 10:45 AM     Lab Results   Component Value Date/Time    CULTURE  01/23/2018 12:53 AM     Performed at [facility], [address] [place] [address] [phone]    CULTURE (A) 01/23/2018 12:53 AM     POSITIVE BLOOD CULTURE, RN NOTIFIED: Nisha Anne. 1/24 0525    CULTURE (A) 01/23/2018 12:53 AM     DIRECT GRAM STAIN FROM BOTTLE: GRAM POSITIVE COCCI IN CLUSTERS    CULTURE (A) 01/23/2018 12:53 AM     ID by PNAFISH: STAPHYLOCOCCUS SPECIES, COAGULASE NEGATIVE    CULTURE (A) 01/23/2018 12:53 AM     STAPHYLOCOCCUS SPECIES, COAGULASE NEGATIVE A single positive blood culture of    CULTURE (A) 01/23/2018 12:53 AM      coagulase negative staphylococci, micrococci, diphtheroids or Bacillus species    CULTURE (A) 01/23/2018 12:53 AM      should be interpreted with caution and viewed as likely a skin contaminant.        No results found for: POCPH, PHART, PH, POCPCO2, NQO7PWF, PCO2, POCPO2, PO2ART, PO2, POCHCO3, MJI1EPV, HCO3, NBEA, PBEA, BEART, BE, THGBART, THB, TES0QYP, MWIL7JFU, H4RAXBEG, O2SAT, FIO2    Radiology:    Xr Chest Portable    Result 4. Seizure d/o - stable. vimpat, lamictal, keppra    5. Feeding tube - dietitian consult  6.  GERD- continue prevacid    Venetta Cranker, MD  1/28/2018  1:14 PM

## 2018-01-28 NOTE — PLAN OF CARE
Problem: Airway Clearance - Ineffective:  Goal: Clear lung sounds  Clear lung sounds   Outcome: Ongoing    Goal: Ability to maintain a clear airway will improve  Ability to maintain a clear airway will improve   Outcome: Ongoing      Problem: Falls - Risk of  Goal: Absence of falls  Outcome: Ongoing      Problem: Risk for Impaired Skin Integrity  Goal: Tissue integrity - skin and mucous membranes  Structural intactness and normal physiological function of skin and  mucous membranes.    Outcome: Ongoing      Problem: Nutrition  Goal: Optimal nutrition therapy  Nutrition Problem: Inadequate oral intake  Intervention: Food and/or Nutrient Delivery: Continue NPO, Modify current Tube Feeding  Nutritional Goals: EN to meet > 90% of estimated kcal/protein needs with good GI tolerance     Outcome: Ongoing

## 2018-01-28 NOTE — PROGRESS NOTES
[facility]  Progress Note    Dayna Hammans  MRN: [id number]  Date: 2018    Reason for F/U :   Pneumonia    Impression :   · Acute hypoxic respiratory insufficiency   · Possible left lower lobe pneumonia  · Leukopenia - Resolved  · Coagulase negative staph out of one set likely represents a contaminated blood culture  · Cerebral palsy with spastic quadriparesis  · Kyphoscoliosis - likely with a component of restrictive lung disease    Recommendations:   · Has completed levofloxacin x five-day course on 2018  · Continue aerosol therapy  · Consider vest/percussion therapy  · The patient does have some mild increase in the work of breathing but again this has been stable for the last 3 days that I have seen him. · Infectious disease wise he is okay for discharge    Infection Control Recommendations:   Universal precautions    Discharge Planning:   Infectious disease wise the patient is stable for discharge back to the care facility    Summary of Stay:     INITIAL HISTORY:  Dayna Hammans is a 49y.o.-year-old male who was initially admitted on 2018. Trisha Mtz has underlying cerebral palsy/MRDD and resides at an extended care facility. It is my understanding that on the day of admission he had an acute onset of shortness of breath and cough. He was found to have an O2 sat of 89% on room air which prompted him to be sent to the emergency room for evaluation. The patient was admitted for an acute onset of COPD, has been seen by the pulmonary service who do raise concern for possible aspiration pneumonia  Blood cultures 1 out of 2 sets were positive and I asked to evaluate and help with antibiotic choice.   The patient is nonverbal.     CURRENT EVALUATION [de-identified]2018    /67   Pulse 85   Temp 98.6 °F (37 °C) (Oral)   Resp 20   Ht 5' 6\" (1.676 m)   Wt 114 lb 12.8 oz (52.1 kg)   SpO2 95%   BMI 18.53 kg/m²     Temperature Range: Temp: 98.6 °F (37 °C) Temp  Av.4 °F (36.9 °C)  Min: 97.7 °F (36.5 °C)  Max: 98.8 °F (37.1 °C)     Afebrile  VS stable    The patient seen and evaluated at bedside. He is doing well with no new acute issues or concerns today. He does not have any fevers, chills, cough, shortness of breath, chest pains or palpitations. No new culture data. No new x-ray data    Discussed with patient, RN. Physical Examination :     Physical Exam   Constitutional: No distress. HENT:   Head: Normocephalic and atraumatic. Eyes: Pupils are equal, round, and reactive to light. Neck: Normal range of motion. No tracheal deviation present. No thyromegaly present. Cardiovascular: Normal rate, regular rhythm and normal heart sounds. Pulmonary/Chest: No stridor. He has no wheezes. He has rales (Few scattered rales). Increased work of breathing   Abdominal: Soft. He exhibits distension. There is no rebound and no guarding. There is a PEG tube in place   Musculoskeletal: He exhibits deformity. He exhibits no edema. Neurological: He is alert. Skin: Skin is warm and dry. He is not diaphoretic. Psychiatric: He has a normal mood and affect. Laboratory data:   I have independently reviewed the following labs:  CBC with Differential:   Recent Labs      01/26/18   0618   WBC  8.2   HGB  13.8   HCT  42.9   PLT  289   LYMPHOPCT  34   MONOPCT  12*     BMP:   Recent Labs      01/27/18   0623  01/27/18   1428  01/28/18   0521   NA  141  142  138   K  3.6*  3.5*  3.5*   CL  101  101  98   CO2  29  28  31   BUN  9   --   10   CREATININE  <0.40*   --   <0.40*     Hepatic Function Panel:   Recent Labs      01/28/18   0521   LABALBU  3.5     No results for input(s): [facility] in the last 72 hours. No results found for: CRP  No results found for: SEDRATE      Imaging Studies:   SINGLE VIEW OF THE CHEST 1/26/2018 10:19 am  Impression   Limited study due to patient condition and low lung volumes. Yris Hones worsened   vascular congestion suspected.        Cultures:

## 2018-01-28 NOTE — PLAN OF CARE
Problem: Airway Clearance - Ineffective:  Goal: Clear lung sounds  Clear lung sounds   Outcome: Ongoing  Pulse ox 95 with O2 at 2 liters continuously. Infrequent congested cough. Lungs diminished     Problem: Falls - Risk of  Goal: Absence of falls  Outcome: Ongoing  Fall precautions in place Bed in low position Does not attempt to crawl out of bed Totally dependent Nonverbal     Problem: Risk for Impaired Skin Integrity  Goal: Tissue integrity - skin and mucous membranes  Structural intactness and normal physiological function of skin and  mucous membranes.    Outcome: Ongoing  Complete care with turning No heel breakdown  Scrotum red No coccyx breakdown Air mattress in place    Problem: Nutrition  Goal: Optimal nutrition therapy  Nutrition Problem: Inadequate oral intake  Intervention: Food and/or Nutrient Delivery: Continue NPO, Modify current Tube Feeding  Nutritional Goals: EN to meet > 90% of estimated kcal/protein needs with good GI tolerance     Outcome: Ongoing  Tolerating TF and flushes without residual

## 2018-01-29 ENCOUNTER — APPOINTMENT (OUTPATIENT)
Dept: GENERAL RADIOLOGY | Age: 48
DRG: 193 | End: 2018-01-29
Payer: MEDICARE

## 2018-01-29 VITALS
DIASTOLIC BLOOD PRESSURE: 88 MMHG | RESPIRATION RATE: 23 BRPM | BODY MASS INDEX: 18.45 KG/M2 | SYSTOLIC BLOOD PRESSURE: 123 MMHG | TEMPERATURE: 97.7 F | OXYGEN SATURATION: 95 % | WEIGHT: 114.8 LBS | HEIGHT: 66 IN | HEART RATE: 91 BPM

## 2018-01-29 LAB
ABSOLUTE EOS #: 0.3 K/UL (ref 0–0.4)
ABSOLUTE IMMATURE GRANULOCYTE: ABNORMAL K/UL (ref 0–0.3)
ABSOLUTE LYMPH #: 2.9 K/UL (ref 1–4.8)
ABSOLUTE MONO #: 0.6 K/UL (ref 0.2–0.8)
ANION GAP SERPL CALCULATED.3IONS-SCNC: 11 MMOL/L (ref 9–17)
BASOPHILS # BLD: 1 % (ref 0–2)
BASOPHILS ABSOLUTE: 0.1 K/UL (ref 0–0.2)
BUN BLDV-MCNC: 11 MG/DL (ref 6–20)
BUN/CREAT BLD: ABNORMAL (ref 9–20)
CALCIUM SERPL-MCNC: 9.1 MG/DL (ref 8.6–10.4)
CHLORIDE BLD-SCNC: 101 MMOL/L (ref 98–107)
CO2: 31 MMOL/L (ref 20–31)
CREAT SERPL-MCNC: <0.4 MG/DL (ref 0.7–1.2)
CULTURE: NORMAL
CULTURE: NORMAL
DIFFERENTIAL TYPE: ABNORMAL
EOSINOPHILS RELATIVE PERCENT: 4 % (ref 1–4)
GFR AFRICAN AMERICAN: ABNORMAL ML/MIN
GFR NON-AFRICAN AMERICAN: ABNORMAL ML/MIN
GFR SERPL CREATININE-BSD FRML MDRD: ABNORMAL ML/MIN/{1.73_M2}
GFR SERPL CREATININE-BSD FRML MDRD: ABNORMAL ML/MIN/{1.73_M2}
GLUCOSE BLD-MCNC: 101 MG/DL (ref 75–110)
GLUCOSE BLD-MCNC: 114 MG/DL (ref 75–110)
GLUCOSE BLD-MCNC: 89 MG/DL (ref 70–99)
GLUCOSE BLD-MCNC: 96 MG/DL (ref 75–110)
HCT VFR BLD CALC: 39.5 % (ref 41–53)
HEMOGLOBIN: 12.9 G/DL (ref 13.5–17.5)
IMMATURE GRANULOCYTES: ABNORMAL %
LYMPHOCYTES # BLD: 44 % (ref 24–44)
Lab: NORMAL
MCH RBC QN AUTO: 31.6 PG (ref 26–34)
MCHC RBC AUTO-ENTMCNC: 32.7 G/DL (ref 31–37)
MCV RBC AUTO: 96.6 FL (ref 80–100)
MONOCYTES # BLD: 9 % (ref 1–7)
NRBC AUTOMATED: ABNORMAL PER 100 WBC
PDW BLD-RTO: 14.3 % (ref 11.5–14.5)
PLATELET # BLD: 268 K/UL (ref 130–400)
PLATELET ESTIMATE: ABNORMAL
PMV BLD AUTO: 8.9 FL (ref 6–12)
POTASSIUM SERPL-SCNC: 4.1 MMOL/L (ref 3.7–5.3)
RBC # BLD: 4.09 M/UL (ref 4.5–5.9)
RBC # BLD: ABNORMAL 10*6/UL
SEG NEUTROPHILS: 42 % (ref 36–66)
SEGMENTED NEUTROPHILS ABSOLUTE COUNT: 2.8 K/UL (ref 1.8–7.7)
SODIUM BLD-SCNC: 143 MMOL/L (ref 135–144)
SPECIMEN DESCRIPTION: NORMAL
SPECIMEN DESCRIPTION: NORMAL
STATUS: NORMAL
WBC # BLD: 6.6 K/UL (ref 3.5–11)
WBC # BLD: ABNORMAL 10*3/UL

## 2018-01-29 PROCEDURE — 99232 SBSQ HOSP IP/OBS MODERATE 35: CPT | Performed by: INTERNAL MEDICINE

## 2018-01-29 PROCEDURE — 6370000000 HC RX 637 (ALT 250 FOR IP): Performed by: NURSE PRACTITIONER

## 2018-01-29 PROCEDURE — 85025 COMPLETE CBC W/AUTO DIFF WBC: CPT

## 2018-01-29 PROCEDURE — 94640 AIRWAY INHALATION TREATMENT: CPT

## 2018-01-29 PROCEDURE — 6360000002 HC RX W HCPCS: Performed by: INTERNAL MEDICINE

## 2018-01-29 PROCEDURE — 71045 X-RAY EXAM CHEST 1 VIEW: CPT

## 2018-01-29 PROCEDURE — 94760 N-INVAS EAR/PLS OXIMETRY 1: CPT

## 2018-01-29 PROCEDURE — 99239 HOSP IP/OBS DSCHRG MGMT >30: CPT | Performed by: INTERNAL MEDICINE

## 2018-01-29 PROCEDURE — 2700000000 HC OXYGEN THERAPY PER DAY

## 2018-01-29 PROCEDURE — 2580000003 HC RX 258: Performed by: NURSE PRACTITIONER

## 2018-01-29 PROCEDURE — 82947 ASSAY GLUCOSE BLOOD QUANT: CPT

## 2018-01-29 PROCEDURE — 80048 BASIC METABOLIC PNL TOTAL CA: CPT

## 2018-01-29 PROCEDURE — 6360000002 HC RX W HCPCS: Performed by: NURSE PRACTITIONER

## 2018-01-29 PROCEDURE — 36415 COLL VENOUS BLD VENIPUNCTURE: CPT

## 2018-01-29 RX ORDER — FUROSEMIDE 10 MG/ML
20 INJECTION INTRAMUSCULAR; INTRAVENOUS ONCE
Status: COMPLETED | OUTPATIENT
Start: 2018-01-29 | End: 2018-01-29

## 2018-01-29 RX ADMIN — LACOSAMIDE 100 MG: 100 TABLET, FILM COATED ORAL at 08:09

## 2018-01-29 RX ADMIN — Medication 10 ML: at 08:11

## 2018-01-29 RX ADMIN — LANSOPRAZOLE 30 MG: 30 TABLET, ORALLY DISINTEGRATING, DELAYED RELEASE ORAL at 05:12

## 2018-01-29 RX ADMIN — ANTACID TABLETS 1250 MG: 500 TABLET, CHEWABLE ORAL at 08:07

## 2018-01-29 RX ADMIN — LAMOTRIGINE 200 MG: 100 TABLET ORAL at 08:09

## 2018-01-29 RX ADMIN — LAMOTRIGINE 100 MG: 100 TABLET ORAL at 08:12

## 2018-01-29 RX ADMIN — VITAMIN D, TAB 1000IU (100/BT) 2000 UNITS: 25 TAB at 08:08

## 2018-01-29 RX ADMIN — CETIRIZINE HYDROCHLORIDE 10 MG: 10 TABLET, FILM COATED ORAL at 08:09

## 2018-01-29 RX ADMIN — IPRATROPIUM BROMIDE AND ALBUTEROL SULFATE 1 AMPULE: .5; 3 SOLUTION RESPIRATORY (INHALATION) at 11:40

## 2018-01-29 RX ADMIN — POLYETHYLENE GLYCOL (3350) 17 G: 17 POWDER, FOR SOLUTION ORAL at 08:07

## 2018-01-29 RX ADMIN — ENOXAPARIN SODIUM 40 MG: 40 INJECTION SUBCUTANEOUS at 08:07

## 2018-01-29 RX ADMIN — FUROSEMIDE 20 MG: 10 INJECTION, SOLUTION INTRAMUSCULAR; INTRAVENOUS at 14:49

## 2018-01-29 RX ADMIN — LEVETIRACETAM 1500 MG: 100 SOLUTION ORAL at 08:07

## 2018-01-29 RX ADMIN — IPRATROPIUM BROMIDE AND ALBUTEROL SULFATE 1 AMPULE: .5; 3 SOLUTION RESPIRATORY (INHALATION) at 15:50

## 2018-01-29 RX ADMIN — BACLOFEN 10 MG: 10 TABLET ORAL at 08:09

## 2018-01-29 RX ADMIN — LORAZEPAM 1 MG: 1 TABLET ORAL at 10:43

## 2018-01-29 RX ADMIN — FLUTICASONE PROPIONATE 2 SPRAY: 50 SPRAY, METERED NASAL at 08:07

## 2018-01-29 RX ADMIN — BACLOFEN 10 MG: 10 TABLET ORAL at 13:50

## 2018-01-29 RX ADMIN — LACTULOSE 30 G: 20 SOLUTION ORAL at 08:07

## 2018-01-29 RX ADMIN — IPRATROPIUM BROMIDE AND ALBUTEROL SULFATE 1 AMPULE: .5; 3 SOLUTION RESPIRATORY (INHALATION) at 07:39

## 2018-01-29 ASSESSMENT — PAIN SCALES - PAIN ASSESSMENT IN ADVANCED DEMENTIA (PAINAD)
TOTALSCORE: 0
NEGVOCALIZATION: 0
BREATHING: 0
BREATHING: 0
FACIALEXPRESSION: 0
TOTALSCORE: 0
BODYLANGUAGE: 0
NEGVOCALIZATION: 0
BODYLANGUAGE: 0
NEGVOCALIZATION: 0
BREATHING: 0
CONSOLABILITY: 0
TOTALSCORE: 0
FACIALEXPRESSION: 0
FACIALEXPRESSION: 0
CONSOLABILITY: 0
FACIALEXPRESSION: 0
CONSOLABILITY: 0
BODYLANGUAGE: 0
BODYLANGUAGE: 0
FACIALEXPRESSION: 0
CONSOLABILITY: 0
CONSOLABILITY: 0
NEGVOCALIZATION: 0
BREATHING: 0
NEGVOCALIZATION: 0
FACIALEXPRESSION: 0
TOTALSCORE: 0
CONSOLABILITY: 0
CONSOLABILITY: 0
TOTALSCORE: 0
NEGVOCALIZATION: 0
BREATHING: 0
NEGVOCALIZATION: 0
BREATHING: 0
BODYLANGUAGE: 0
CONSOLABILITY: 0
TOTALSCORE: 0
TOTALSCORE: 0
BODYLANGUAGE: 0
FACIALEXPRESSION: 0
BREATHING: 0
NEGVOCALIZATION: 0
BODYLANGUAGE: 0
BODYLANGUAGE: 0
TOTALSCORE: 0
CONSOLABILITY: 0
BODYLANGUAGE: 0
BODYLANGUAGE: 0
BREATHING: 0
TOTALSCORE: 0
CONSOLABILITY: 0
CONSOLABILITY: 0
TOTALSCORE: 0
TOTALSCORE: 0
BODYLANGUAGE: 0
BODYLANGUAGE: 0
NEGVOCALIZATION: 0
CONSOLABILITY: 0
BREATHING: 0
BODYLANGUAGE: 0
NEGVOCALIZATION: 0
NEGVOCALIZATION: 0
FACIALEXPRESSION: 0
FACIALEXPRESSION: 0
BREATHING: 0
CONSOLABILITY: 0
BREATHING: 0
NEGVOCALIZATION: 0
BODYLANGUAGE: 0
FACIALEXPRESSION: 0
BREATHING: 0
FACIALEXPRESSION: 0
NEGVOCALIZATION: 0
TOTALSCORE: 0
BODYLANGUAGE: 0
FACIALEXPRESSION: 0
TOTALSCORE: 0
FACIALEXPRESSION: 0
FACIALEXPRESSION: 0
NEGVOCALIZATION: 0
FACIALEXPRESSION: 0
TOTALSCORE: 0
BREATHING: 0
BREATHING: 0
BODYLANGUAGE: 0
TOTALSCORE: 0
TOTALSCORE: 0
NEGVOCALIZATION: 0
CONSOLABILITY: 0
FACIALEXPRESSION: 0
CONSOLABILITY: 0
FACIALEXPRESSION: 0
BREATHING: 0
BREATHING: 0
TOTALSCORE: 0
BODYLANGUAGE: 0
NEGVOCALIZATION: 0
NEGVOCALIZATION: 0
BREATHING: 0

## 2018-01-29 NOTE — DISCHARGE SUMMARY
patient for discharge.     History: \"49 yo m with h/o cerebral palsy, MRDD, seizure disorder presented to ED from group home for shortness of breath and cough x1 day. CXR from [facility] left lung air space disease/pna. Patient is afebrile. No leucocytosis, but is tachypneic and requiring O2 due to hypoxia. Pulmonology consulted. Started on steroid and duoneb. Patient was also started on levaquin for pna, finished 5 day course of abx on 1/26/18. ID was also consulted due to positive blood culture 1/2 - coag negative staph, likely contamination. Nephrology was also involved due to hypernatremia, which has resolved. \"      Consults:  pulmonary/intensive care, ID and nephrology    Significant Diagnostic Studies: as above, and as follows:      Portable chest x-ray: 1/29/2018  Vascular congestion appears mildly improved.  Otherwise stable appearance of   the chest.     Hematology:       Recent Labs      01/28/18   0920  01/29/18   0347   WBC  8.0  6.6   RBC  4.05*  4.09*   HGB  12.7*  12.9*   HCT  39.4*  39.5*   MCV  97.2  96.6   MCH  31.3  31.6   MCHC  32.2  32.7   RDW  14.1  14.3   PLT  273  268   MPV  8.9  8.9      Chemistry:        Recent Labs      01/28/18   0521  01/28/18   0920  01/29/18   0347   NA  138  139  143   K  3.5*  3.8  4.1   CL  98  98  101   CO2  31  32*  31   GLUCOSE  92  109*  89   BUN  10  11  11   CREATININE  <0.40*  <0.40*  <0.40*   ANIONGAP  9  9  11   LABGLOM  CANNOT BE CALCULATED  CANNOT BE CALCULATED  CANNOT BE CALCULATED   GFRAA  CANNOT BE CALCULATED  CANNOT BE CALCULATED  CANNOT BE CALCULATED   CALCIUM  8.7  8.8  9.1   PHOS  3.4   --    --           Recent Labs      01/28/18   0521   LABALBU  3.5      POC Glucose:           Recent Labs      01/27/18   2123  01/28/18   0520  01/28/18   1138  01/28/18   1637  01/29/18   0059  01/29/18   0510   POCGLU  105  92  110  114*  101  96      Cult,Blood #1 [[id number]] Collected: 01/23/18 0045   Order Status: Completed Specimen: Blood Updated: Gastric Tube route 4 times daily as needed (cough)              fluticasone (FLONASE) 50 MCG/ACT nasal spray  2 sprays by Nasal route daily             furosemide (LASIX) 20 MG tablet  20 mg by Per G Tube route daily             lacosamide (VIMPAT) 50 MG TABS tablet  100 mg by Per G Tube route 2 times daily             lactulose (CHRONULAC) 10 GM/15ML solution  20 g by Per G Tube route 2 times daily              lamoTRIgine (LAMICTAL) 100 MG tablet  100 mg by Per G Tube route every morning              lamoTRIgine (LAMICTAL) 100 MG tablet  200 mg by Per G Tube route 2 times daily              Lansoprazole (PREVACID 24HR PO)  30 mg by Feeding Tube route daily             levETIRAcetam (KEPPRA) 100 MG/ML solution  Take 1,500 mg by mouth 2 times daily             loratadine (CLARITIN) 10 MG tablet  10 mg by Per G Tube route daily             LORazepam (ATIVAN) 1 MG tablet  Take 1 mg by mouth as needed for Anxiety . montelukast (SINGULAIR) 10 MG tablet  10 mg by Per G Tube route nightly             Multiple Vitamin (DAILY-JAY PO)  by Gastric Tube route daily             Nutritional Supplements (REPLETE/FIBER) LIQD  75 mLs by Feeding Tube route daily             nystatin (MYCOSTATIN) 583207 UNIT/GM powder  Apply topically daily Apply between toes.              polyethylene glycol (GLYCOLAX) powder  17 g by Per G Tube route daily             Potassium Chloride (KLOR-CON SPRINKLE PO)  10 mEq by Per G Tube route 2 times daily              pseudoephedrine (SUDAFED) 30 MG tablet  30 mg by Per G Tube route 3 times daily             senna-docusate (PERICOLACE) 8.6-50 MG per tablet  2 tablets by Per G Tube route daily             Sodium Phosphates (FLEET) 7-19 GM/118ML  Place 1 enema rectally daily as needed                  Activity: activity as tolerated and bedrest    Diet: Tube feeds    Time Spent on discharge is more than 45 minutes in the examination, evaluation, counseling and review of medications and

## 2018-01-29 NOTE — CARE COORDINATION
Pt discharged back to group home. [facility] called and will be transferred today at 4pm by ambulance. Aliya from group home informed and [phone] fax[address].

## 2018-01-29 NOTE — DISCHARGE INSTR - DIET

## 2018-01-29 NOTE — PLAN OF CARE
Problem: Gas Exchange - Impaired:  Goal: Levels of oxygenation will improve  Levels of oxygenation will improve   Outcome: Ongoing  Lungs diminished Pulse ox 99 on O2 at 2.5 liters which is worn continuously Less congested coughing and shortness of breath

## 2018-01-29 NOTE — PLAN OF CARE
Problem: Falls - Risk of  Goal: Absence of falls  Outcome: Ongoing  Siderails up x 2  Hourly rounding. Call light in reach. Instructed to call for assist before attempting out of bed. Remains free from falls and accidental injury at this time. Floor free from obstacles, and bed is locked and in lowest position. Adequate lighting provided. Bed alarm on, red falling star and Stay with Me signs posted. Arms and legs contracted, no attempts oob per self. Problem: Risk for Impaired Skin Integrity  Goal: Tissue integrity - skin and mucous membranes  Structural intactness and normal physiological function of skin and  mucous membranes. Outcome: Ongoing  Checked for incontinence every 2 hours and prn. Pericare as needed. Assisted to reposition off back frequently. On waffle mattress. Heels off bed with pillows.

## 2018-02-01 LAB — GLUCOSE BLD-MCNC: 115 MG/DL (ref 75–110)

## 2018-02-05 ENCOUNTER — APPOINTMENT (OUTPATIENT)
Dept: GENERAL RADIOLOGY | Age: 48
End: 2018-02-05
Payer: MEDICARE

## 2018-02-05 ENCOUNTER — HOSPITAL ENCOUNTER (EMERGENCY)
Age: 48
Discharge: HOME | End: 2018-02-06
Attending: EMERGENCY MEDICINE
Payer: MEDICARE

## 2018-02-05 DIAGNOSIS — R06.00 DYSPNEA AND RESPIRATORY ABNORMALITIES: Primary | ICD-10-CM

## 2018-02-05 DIAGNOSIS — R06.89 DYSPNEA AND RESPIRATORY ABNORMALITIES: Primary | ICD-10-CM

## 2018-02-05 LAB
ABSOLUTE EOS #: 0.1 K/UL (ref 0–0.4)
ABSOLUTE IMMATURE GRANULOCYTE: ABNORMAL K/UL (ref 0–0.3)
ABSOLUTE LYMPH #: 2.7 K/UL (ref 1–4.8)
ABSOLUTE MONO #: 0.7 K/UL (ref 0.2–0.8)
BASOPHILS # BLD: 1 % (ref 0–2)
BASOPHILS ABSOLUTE: 0.1 K/UL (ref 0–0.2)
DIFFERENTIAL TYPE: ABNORMAL
EOSINOPHILS RELATIVE PERCENT: 2 % (ref 1–4)
HCT VFR BLD CALC: 42.4 % (ref 41–53)
HEMOGLOBIN: 13.8 G/DL (ref 13.5–17.5)
IMMATURE GRANULOCYTES: ABNORMAL %
LYMPHOCYTES # BLD: 42 % (ref 24–44)
MCH RBC QN AUTO: 31.1 PG (ref 26–34)
MCHC RBC AUTO-ENTMCNC: 32.5 G/DL (ref 31–37)
MCV RBC AUTO: 95.9 FL (ref 80–100)
MONOCYTES # BLD: 11 % (ref 1–7)
NRBC AUTOMATED: ABNORMAL PER 100 WBC
PDW BLD-RTO: 14.3 % (ref 11.5–14.5)
PLATELET # BLD: 353 K/UL (ref 130–400)
PLATELET ESTIMATE: ABNORMAL
PMV BLD AUTO: 9 FL (ref 6–12)
RBC # BLD: 4.42 M/UL (ref 4.5–5.9)
RBC # BLD: ABNORMAL 10*6/UL
SEG NEUTROPHILS: 44 % (ref 36–66)
SEGMENTED NEUTROPHILS ABSOLUTE COUNT: 2.9 K/UL (ref 1.8–7.7)
WBC # BLD: 6.6 K/UL (ref 3.5–11)
WBC # BLD: ABNORMAL 10*3/UL

## 2018-02-05 PROCEDURE — 94640 AIRWAY INHALATION TREATMENT: CPT

## 2018-02-05 PROCEDURE — 6360000002 HC RX W HCPCS: Performed by: EMERGENCY MEDICINE

## 2018-02-05 PROCEDURE — 87040 BLOOD CULTURE FOR BACTERIA: CPT

## 2018-02-05 PROCEDURE — 80048 BASIC METABOLIC PNL TOTAL CA: CPT

## 2018-02-05 PROCEDURE — 83735 ASSAY OF MAGNESIUM: CPT

## 2018-02-05 PROCEDURE — 71045 X-RAY EXAM CHEST 1 VIEW: CPT

## 2018-02-05 PROCEDURE — 99285 EMERGENCY DEPT VISIT HI MDM: CPT

## 2018-02-05 PROCEDURE — 83880 ASSAY OF NATRIURETIC PEPTIDE: CPT

## 2018-02-05 PROCEDURE — 85025 COMPLETE CBC W/AUTO DIFF WBC: CPT

## 2018-02-05 RX ORDER — ALBUTEROL SULFATE 2.5 MG/3ML
2.5 SOLUTION RESPIRATORY (INHALATION) ONCE
Status: COMPLETED | OUTPATIENT
Start: 2018-02-05 | End: 2018-02-05

## 2018-02-05 RX ORDER — CLONAZEPAM 2 MG/1
2 TABLET ORAL 2 TIMES DAILY PRN
Status: ON HOLD | COMMUNITY
End: 2019-04-08

## 2018-02-05 RX ADMIN — ALBUTEROL SULFATE 2.5 MG: 2.5 SOLUTION RESPIRATORY (INHALATION) at 22:50

## 2018-02-06 VITALS
RESPIRATION RATE: 22 BRPM | DIASTOLIC BLOOD PRESSURE: 73 MMHG | OXYGEN SATURATION: 95 % | HEIGHT: 60 IN | HEART RATE: 74 BPM | WEIGHT: 126 LBS | TEMPERATURE: 97 F | SYSTOLIC BLOOD PRESSURE: 118 MMHG | BODY MASS INDEX: 24.74 KG/M2

## 2018-02-06 LAB
ANION GAP SERPL CALCULATED.3IONS-SCNC: 13 MMOL/L (ref 9–17)
BNP INTERPRETATION: NORMAL
BUN BLDV-MCNC: 19 MG/DL (ref 6–20)
BUN/CREAT BLD: 42 (ref 9–20)
CALCIUM SERPL-MCNC: 9.5 MG/DL (ref 8.6–10.4)
CHLORIDE BLD-SCNC: 100 MMOL/L (ref 98–107)
CO2: 28 MMOL/L (ref 20–31)
CREAT SERPL-MCNC: 0.45 MG/DL (ref 0.7–1.2)
D-DIMER QUANTITATIVE: 0.46 MG/L FEU
GFR AFRICAN AMERICAN: >60 ML/MIN
GFR NON-AFRICAN AMERICAN: >60 ML/MIN
GFR SERPL CREATININE-BSD FRML MDRD: ABNORMAL ML/MIN/{1.73_M2}
GFR SERPL CREATININE-BSD FRML MDRD: ABNORMAL ML/MIN/{1.73_M2}
GLUCOSE BLD-MCNC: 98 MG/DL (ref 70–99)
MAGNESIUM: 2.2 MG/DL (ref 1.6–2.6)
POTASSIUM SERPL-SCNC: 3.9 MMOL/L (ref 3.7–5.3)
PRO-BNP: 34 PG/ML
SODIUM BLD-SCNC: 141 MMOL/L (ref 135–144)

## 2018-02-06 PROCEDURE — 36415 COLL VENOUS BLD VENIPUNCTURE: CPT

## 2018-02-06 PROCEDURE — 85379 FIBRIN DEGRADATION QUANT: CPT

## 2018-02-06 NOTE — ED PROVIDER NOTES
times daily as needed (cough)     FLUTICASONE (FLONASE) 50 MCG/ACT NASAL SPRAY    2 sprays by Nasal route daily    FUROSEMIDE (LASIX) 20 MG TABLET    20 mg by Per G Tube route daily    LACOSAMIDE (VIMPAT) 50 MG TABS TABLET    100 mg by Per G Tube route 2 times daily    LACTULOSE (CHRONULAC) 10 GM/15ML SOLUTION    20 g by Per G Tube route 2 times daily     LAMOTRIGINE (LAMICTAL) 100 MG TABLET    100 mg by Per G Tube route every morning     LAMOTRIGINE (LAMICTAL) 100 MG TABLET    200 mg by Per G Tube route 2 times daily     LANSOPRAZOLE (PREVACID 24HR PO)    30 mg by Feeding Tube route daily    LEVETIRACETAM (KEPPRA) 100 MG/ML SOLUTION    Take 1,500 mg by mouth 2 times daily    LORATADINE (CLARITIN) 10 MG TABLET    10 mg by Per G Tube route daily    LORAZEPAM (ATIVAN) 1 MG TABLET    Take 1 mg by mouth as needed for Anxiety . MONTELUKAST (SINGULAIR) 10 MG TABLET    10 mg by Per G Tube route nightly    MULTIPLE VITAMIN (DAILY-JAY PO)    by Gastric Tube route daily    NUTRITIONAL SUPPLEMENTS (REPLETE/FIBER) LIQD    75 mLs by Feeding Tube route daily    NYSTATIN (MYCOSTATIN) 288773 UNIT/GM POWDER    Apply topically daily Apply between toes.     POLYETHYLENE GLYCOL (GLYCOLAX) POWDER    17 g by Per G Tube route daily    POTASSIUM CHLORIDE (KLOR-CON SPRINKLE PO)    10 mEq by Per G Tube route 2 times daily     PSEUDOEPHEDRINE (SUDAFED) 30 MG TABLET    30 mg by Per G Tube route 3 times daily    SENNA-DOCUSATE (PERICOLACE) 8.6-50 MG PER TABLET    2 tablets by Per G Tube route daily    SODIUM PHOSPHATES (FLEET) 7-19 GM/118ML    Place 1 enema rectally daily as needed       PAST MEDICAL HISTORY         Diagnosis Date    Cerebral palsy ([address]     Cholelithiasis with chronic cholecystitis     Constipation     Delayed gastric emptying     Dysphagia     GERD (gastroesophageal reflux disease)     Hearing loss     Mental disability     MRDD    Profound mental retardation     Scoliosis     Seizures (HCC)     Spastic CULTURE BLOOD #1   BRAIN NATRIURETIC PEPTIDE   MAGNESIUM   D-DIMER, QUANTITATIVE     Results for Zak Boswell (MRN [id number]) as of 2/6/2018 00:30   Ref.  Range 2/5/2018 23:00 2/5/2018 23:16 2/5/2018 23:20   Sodium Latest Ref Range: 135 - 144 mmol/L   141   Potassium Latest Ref Range: 3.7 - 5.3 mmol/L   3.9   Chloride Latest Ref Range: 98 - 107 mmol/L   100   CO2 Latest Ref Range: 20 - 31 mmol/L   28   BUN Latest Ref Range: 6 - 20 mg/dL   19   Creatinine Latest Ref Range: 0.70 - 1.20 mg/dL   0.45 (L)   Bun/Cre Ratio Latest Ref Range: 9 - 20    42 (H)   Anion Gap Latest Ref Range: 9 - 17 mmol/L   13   GFR Non- Latest Ref Range: >60 mL/min   >60   GFR  Latest Ref Range: >60 mL/min   >60   Magnesium Latest Ref Range: 1.6 - 2.6 mg/dL   2.2   Glucose Latest Ref Range: 70 - 99 mg/dL   98   Calcium Latest Ref Range: 8.6 - 10.4 mg/dL   9.5   GFR Comment Unknown   Pend   GFR Staging Unknown   NOT REPORTED   Pro-BNP Latest Ref Range: <300 pg/mL   34   BNP Interpretation Unknown   Pend   WBC Latest Ref Range: 3.5 - 11.0 k/uL 6.6     RBC Latest Ref Range: 4.5 - 5.9 m/uL 4.42 (L)     Hemoglobin Quant Latest Ref Range: 13.5 - 17.5 g/dL 13.8     Hematocrit Latest Ref Range: 41 - 53 % 42.4     MCV Latest Ref Range: 80 - 100 fL 95.9     MCH Latest Ref Range: 26 - 34 pg 31.1     MCHC Latest Ref Range: 31 - 37 g/dL 32.5     MPV Latest Ref Range: 6.0 - 12.0 fL 9.0     RDW Latest Ref Range: 11.5 - 14.5 % 14.3     Platelet Count Latest Ref Range: 130 - 400 k/uL 353     Platelet Estimate Unknown NOT REPORTED     Absolute Mono # Latest Ref Range: 0.2 - 0.8 k/uL 0.70     Eosinophils % Latest Ref Range: 1 - 4 % 2     Basophils # Latest Ref Range: 0.0 - 0.2 k/uL 0.10     Differential Type Unknown NOT REPORTED     Seg Neutrophils Latest Ref Range: 36 - 66 % 44     Segs Absolute Latest Ref Range: 1.8 - 7.7 k/uL 2.90     Lymphocytes Latest Ref Range: 24 - 44 % 42     Absolute Lymph # Latest Ref Range: 1.0 - 4.8 AM      PATIENT REFERRED TO:   [address] [facility], [address]  [facility] [address]  [phone]    Call       [facility]  [address] [facility]  [phone]    As needed      DISCHARGE MEDICATIONS:     New Prescriptions    No medications on file         (Please note that portions of this note were completed with a voice recognition program.  Efforts were made to edit the dictations but occasionally words are mis-transcribed.)    Eron Calix MD  Attending Emergency Physician         Eron Calix MD  02/06/18 4432

## 2018-02-06 NOTE — ED NOTES
Pt presents to ED via EMS with c/o SOB. Per EMS pt was at his group home and was SOB with an O2 saturation of 90%, so an albuterol treatment was administered. EMS put pt on 8L of O2 non-re breather with a O2 sat of 94%. Pt's O2 sat was 98% on 4L NC upon arrival to ED room 22. Pt's lung sounds are diminished throughout. Pt's mucous membranes are pink and moist, pt's skin is warm and dry. Pt's respirations are even and non-labored, no distress noted at this time, will continue to monitor pt.       Alexus Younger RN  02/05/18 0973

## 2018-02-12 LAB
CULTURE: NORMAL
Lab: NORMAL
Lab: NORMAL
SPECIMEN DESCRIPTION: NORMAL
STATUS: NORMAL
STATUS: NORMAL

## 2018-03-01 ENCOUNTER — APPOINTMENT (OUTPATIENT)
Dept: GENERAL RADIOLOGY | Age: 48
End: 2018-03-01
Payer: MEDICARE

## 2018-03-01 ENCOUNTER — HOSPITAL ENCOUNTER (EMERGENCY)
Age: 48
Discharge: HOME | End: 2018-03-02
Attending: EMERGENCY MEDICINE
Payer: MEDICARE

## 2018-03-01 VITALS
WEIGHT: 126 LBS | TEMPERATURE: 98.2 F | SYSTOLIC BLOOD PRESSURE: 148 MMHG | OXYGEN SATURATION: 95 % | HEART RATE: 74 BPM | BODY MASS INDEX: 28.25 KG/M2 | DIASTOLIC BLOOD PRESSURE: 101 MMHG

## 2018-03-01 DIAGNOSIS — Z87.09 HISTORY OF ASTHMA: Primary | ICD-10-CM

## 2018-03-01 LAB
ABSOLUTE EOS #: 0.1 K/UL (ref 0–0.4)
ABSOLUTE IMMATURE GRANULOCYTE: ABNORMAL K/UL (ref 0–0.3)
ABSOLUTE LYMPH #: 2.3 K/UL (ref 1–4.8)
ABSOLUTE MONO #: 0.7 K/UL (ref 0.2–0.8)
ANION GAP SERPL CALCULATED.3IONS-SCNC: 13 MMOL/L (ref 9–17)
BASOPHILS # BLD: 1 % (ref 0–2)
BASOPHILS ABSOLUTE: 0.1 K/UL (ref 0–0.2)
BILIRUBIN URINE: NEGATIVE
BNP INTERPRETATION: NORMAL
BUN BLDV-MCNC: 16 MG/DL (ref 6–20)
BUN/CREAT BLD: 36 (ref 9–20)
CALCIUM SERPL-MCNC: 9.7 MG/DL (ref 8.6–10.4)
CHLORIDE BLD-SCNC: 100 MMOL/L (ref 98–107)
CO2: 28 MMOL/L (ref 20–31)
COLOR: YELLOW
COMMENT UA: NORMAL
CREAT SERPL-MCNC: 0.45 MG/DL (ref 0.7–1.2)
DIFFERENTIAL TYPE: ABNORMAL
EOSINOPHILS RELATIVE PERCENT: 2 % (ref 1–4)
GFR AFRICAN AMERICAN: >60 ML/MIN
GFR NON-AFRICAN AMERICAN: >60 ML/MIN
GFR SERPL CREATININE-BSD FRML MDRD: ABNORMAL ML/MIN/{1.73_M2}
GFR SERPL CREATININE-BSD FRML MDRD: ABNORMAL ML/MIN/{1.73_M2}
GLUCOSE BLD-MCNC: 95 MG/DL (ref 70–99)
GLUCOSE URINE: NEGATIVE
HCT VFR BLD CALC: 47.6 % (ref 41–53)
HEMOGLOBIN: 15.5 G/DL (ref 13.5–17.5)
IMMATURE GRANULOCYTES: ABNORMAL %
KETONES, URINE: NEGATIVE
LEUKOCYTE ESTERASE, URINE: NEGATIVE
LYMPHOCYTES # BLD: 37 % (ref 24–44)
MCH RBC QN AUTO: 30.8 PG (ref 26–34)
MCHC RBC AUTO-ENTMCNC: 32.6 G/DL (ref 31–37)
MCV RBC AUTO: 94.7 FL (ref 80–100)
MONOCYTES # BLD: 12 % (ref 1–7)
NITRITE, URINE: NEGATIVE
NRBC AUTOMATED: ABNORMAL PER 100 WBC
PDW BLD-RTO: 13.5 % (ref 11.5–14.5)
PH UA: 7 (ref 5–8)
PLATELET # BLD: 325 K/UL (ref 130–400)
PLATELET ESTIMATE: ABNORMAL
PMV BLD AUTO: ABNORMAL FL (ref 6–12)
POTASSIUM SERPL-SCNC: 4.2 MMOL/L (ref 3.7–5.3)
PRO-BNP: 104 PG/ML
PROTEIN UA: NEGATIVE
RBC # BLD: 5.03 M/UL (ref 4.5–5.9)
RBC # BLD: ABNORMAL 10*6/UL
SEG NEUTROPHILS: 48 % (ref 36–66)
SEGMENTED NEUTROPHILS ABSOLUTE COUNT: 2.9 K/UL (ref 1.8–7.7)
SODIUM BLD-SCNC: 141 MMOL/L (ref 135–144)
SPECIFIC GRAVITY UA: 1.01 (ref 1–1.03)
TURBIDITY: CLEAR
URINE HGB: NEGATIVE
UROBILINOGEN, URINE: NORMAL
WBC # BLD: 6.1 K/UL (ref 3.5–11)
WBC # BLD: ABNORMAL 10*3/UL

## 2018-03-01 PROCEDURE — 80048 BASIC METABOLIC PNL TOTAL CA: CPT

## 2018-03-01 PROCEDURE — 51798 US URINE CAPACITY MEASURE: CPT

## 2018-03-01 PROCEDURE — 87086 URINE CULTURE/COLONY COUNT: CPT

## 2018-03-01 PROCEDURE — 83880 ASSAY OF NATRIURETIC PEPTIDE: CPT

## 2018-03-01 PROCEDURE — 81003 URINALYSIS AUTO W/O SCOPE: CPT

## 2018-03-01 PROCEDURE — 71045 X-RAY EXAM CHEST 1 VIEW: CPT

## 2018-03-01 PROCEDURE — 99285 EMERGENCY DEPT VISIT HI MDM: CPT

## 2018-03-01 PROCEDURE — 85025 COMPLETE CBC W/AUTO DIFF WBC: CPT

## 2018-03-01 PROCEDURE — 99284 EMERGENCY DEPT VISIT MOD MDM: CPT

## 2018-03-02 NOTE — ED NOTES
Pt presents to ED via private auto with c/o per care giver from group home with SOB, and urination. EMS states caregivers were unable to improve Pt oxygen saturation and has not urinated since 11 am.  Upon arrival Pt is non-labored, with 95/96% on room air. Respirations even and unlabored with clear lung sounds throughout all fields. Denies fever, chest pain, SOB, N/V/D, blurry vision and dizziness at this time. Pt is alert and oriented x 4 with no signs of distress noted.   Pt is resting on cot         Samuel Montano RN  03/01/18 2451
more than3 min). DEXTROMETHORPHAN-GUAIFENESIN (ROBAFEN DM PO)    10 mLs by Gastric Tube route 4 times daily as needed (cough)     FLUTICASONE (FLONASE) 50 MCG/ACT NASAL SPRAY    2 sprays by Nasal route daily    FUROSEMIDE (LASIX) 20 MG TABLET    20 mg by Per G Tube route daily    LACOSAMIDE (VIMPAT) 50 MG TABS TABLET    100 mg by Per G Tube route 2 times daily    LACTULOSE (CHRONULAC) 10 GM/15ML SOLUTION    20 g by Per G Tube route 2 times daily     LAMOTRIGINE (LAMICTAL) 100 MG TABLET    100 mg by Per G Tube route every morning     LAMOTRIGINE (LAMICTAL) 100 MG TABLET    200 mg by Per G Tube route 2 times daily     LANSOPRAZOLE (PREVACID 24HR PO)    30 mg by Feeding Tube route daily    LEVETIRACETAM (KEPPRA) 100 MG/ML SOLUTION    Take 1,500 mg by mouth 2 times daily    LORATADINE (CLARITIN) 10 MG TABLET    10 mg by Per G Tube route daily    LORAZEPAM (ATIVAN) 1 MG TABLET    Take 1 mg by mouth as needed for Anxiety . MONTELUKAST (SINGULAIR) 10 MG TABLET    10 mg by Per G Tube route nightly    MULTIPLE VITAMIN (DAILY-JAY PO)    by Gastric Tube route daily    NUTRITIONAL SUPPLEMENTS (REPLETE/FIBER) LIQD    75 mLs by Feeding Tube route daily    NYSTATIN (MYCOSTATIN) 500410 UNIT/GM POWDER    Apply topically daily Apply between toes.     POLYETHYLENE GLYCOL (GLYCOLAX) POWDER    17 g by Per G Tube route daily    POTASSIUM CHLORIDE (KLOR-CON SPRINKLE PO)    10 mEq by Per G Tube route 2 times daily     PSEUDOEPHEDRINE (SUDAFED) 30 MG TABLET    30 mg by Per G Tube route 3 times daily    SENNA-DOCUSATE (PERICOLACE) 8.6-50 MG PER TABLET    2 tablets by Per G Tube route daily    SODIUM PHOSPHATES (FLEET) 7-19 GM/118ML    Place 1 enema rectally daily as needed       PAST MEDICAL HISTORY         Diagnosis Date    Cerebral palsy ([address]     Cholelithiasis with chronic cholecystitis     Constipation     Delayed gastric emptying     Dysphagia     GERD (gastroesophageal reflux disease)     Hearing loss     Mental

## 2018-03-03 LAB
CULTURE: NO GROWTH
CULTURE: NORMAL
Lab: NORMAL
SPECIMEN DESCRIPTION: NORMAL
SPECIMEN DESCRIPTION: NORMAL
STATUS: NORMAL

## 2018-08-13 ENCOUNTER — APPOINTMENT (OUTPATIENT)
Dept: CT IMAGING | Age: 48
DRG: 417 | End: 2018-08-13
Payer: MEDICARE

## 2018-08-13 ENCOUNTER — HOSPITAL ENCOUNTER (INPATIENT)
Age: 48
LOS: 7 days | Discharge: HOME | DRG: 417 | End: 2018-08-21
Attending: EMERGENCY MEDICINE | Admitting: INTERNAL MEDICINE
Payer: MEDICARE

## 2018-08-13 DIAGNOSIS — F89 DEVELOPMENTAL DISABILITY: ICD-10-CM

## 2018-08-13 DIAGNOSIS — R10.84 GENERALIZED ABDOMINAL PAIN: Primary | ICD-10-CM

## 2018-08-13 DIAGNOSIS — K59.00 CONSTIPATION, UNSPECIFIED CONSTIPATION TYPE: ICD-10-CM

## 2018-08-13 DIAGNOSIS — K80.50 CHOLEDOCHOLITHIASIS: ICD-10-CM

## 2018-08-13 DIAGNOSIS — Z90.49 S/P CHOLECYSTECTOMY: ICD-10-CM

## 2018-08-13 DIAGNOSIS — G82.50 QUADRIPARESIS (HCC): ICD-10-CM

## 2018-08-13 LAB
ABSOLUTE EOS #: 0 K/UL (ref 0–0.4)
ABSOLUTE IMMATURE GRANULOCYTE: ABNORMAL K/UL (ref 0–0.3)
ABSOLUTE LYMPH #: 1.2 K/UL (ref 1–4.8)
ABSOLUTE MONO #: 0.6 K/UL (ref 0.2–0.8)
ALBUMIN SERPL-MCNC: 4.3 G/DL (ref 3.5–5.2)
ALBUMIN/GLOBULIN RATIO: ABNORMAL (ref 1–2.5)
ALP BLD-CCNC: 113 U/L (ref 40–129)
ALT SERPL-CCNC: 53 U/L (ref 5–41)
AMYLASE: 40 U/L (ref 28–100)
ANION GAP SERPL CALCULATED.3IONS-SCNC: 14 MMOL/L (ref 9–17)
AST SERPL-CCNC: 30 U/L
BASOPHILS # BLD: 1 % (ref 0–2)
BASOPHILS ABSOLUTE: 0 K/UL (ref 0–0.2)
BILIRUB SERPL-MCNC: 0.15 MG/DL (ref 0.3–1.2)
BILIRUBIN DIRECT: <0.08 MG/DL
BILIRUBIN, INDIRECT: ABNORMAL MG/DL (ref 0–1)
BUN BLDV-MCNC: 17 MG/DL (ref 6–20)
BUN/CREAT BLD: ABNORMAL (ref 9–20)
CALCIUM SERPL-MCNC: 9.9 MG/DL (ref 8.6–10.4)
CHLORIDE BLD-SCNC: 98 MMOL/L (ref 98–107)
CO2: 25 MMOL/L (ref 20–31)
CREAT SERPL-MCNC: <0.4 MG/DL (ref 0.7–1.2)
DIFFERENTIAL TYPE: ABNORMAL
EOSINOPHILS RELATIVE PERCENT: 0 % (ref 1–4)
GFR AFRICAN AMERICAN: ABNORMAL ML/MIN
GFR NON-AFRICAN AMERICAN: ABNORMAL ML/MIN
GFR SERPL CREATININE-BSD FRML MDRD: ABNORMAL ML/MIN/{1.73_M2}
GFR SERPL CREATININE-BSD FRML MDRD: ABNORMAL ML/MIN/{1.73_M2}
GLOBULIN: ABNORMAL G/DL (ref 1.5–3.8)
GLUCOSE BLD-MCNC: 112 MG/DL (ref 70–99)
HCT VFR BLD CALC: 46 % (ref 41–53)
HEMOGLOBIN: 15.1 G/DL (ref 13.5–17.5)
IMMATURE GRANULOCYTES: ABNORMAL %
LACTIC ACID: 2.5 MMOL/L (ref 0.5–2.2)
LIPASE: 21 U/L (ref 13–60)
LYMPHOCYTES # BLD: 15 % (ref 24–44)
MCH RBC QN AUTO: 31.6 PG (ref 26–34)
MCHC RBC AUTO-ENTMCNC: 32.8 G/DL (ref 31–37)
MCV RBC AUTO: 96.3 FL (ref 80–100)
MONOCYTES # BLD: 7 % (ref 1–7)
NRBC AUTOMATED: ABNORMAL PER 100 WBC
PDW BLD-RTO: 13.6 % (ref 11.5–14.5)
PLATELET # BLD: 336 K/UL (ref 130–400)
PLATELET ESTIMATE: ABNORMAL
PMV BLD AUTO: 9.2 FL (ref 6–12)
POTASSIUM SERPL-SCNC: 4 MMOL/L (ref 3.7–5.3)
RBC # BLD: 4.77 M/UL (ref 4.5–5.9)
RBC # BLD: ABNORMAL 10*6/UL
SEG NEUTROPHILS: 77 % (ref 36–66)
SEGMENTED NEUTROPHILS ABSOLUTE COUNT: 6.6 K/UL (ref 1.8–7.7)
SODIUM BLD-SCNC: 137 MMOL/L (ref 135–144)
TOTAL PROTEIN: 8.1 G/DL (ref 6.4–8.3)
WBC # BLD: 8.4 K/UL (ref 3.5–11)
WBC # BLD: ABNORMAL 10*3/UL

## 2018-08-13 PROCEDURE — 82150 ASSAY OF AMYLASE: CPT

## 2018-08-13 PROCEDURE — 6360000002 HC RX W HCPCS: Performed by: EMERGENCY MEDICINE

## 2018-08-13 PROCEDURE — 96374 THER/PROPH/DIAG INJ IV PUSH: CPT

## 2018-08-13 PROCEDURE — 6360000004 HC RX CONTRAST MEDICATION: Performed by: EMERGENCY MEDICINE

## 2018-08-13 PROCEDURE — 96375 TX/PRO/DX INJ NEW DRUG ADDON: CPT

## 2018-08-13 PROCEDURE — 80076 HEPATIC FUNCTION PANEL: CPT

## 2018-08-13 PROCEDURE — 81001 URINALYSIS AUTO W/SCOPE: CPT

## 2018-08-13 PROCEDURE — 80048 BASIC METABOLIC PNL TOTAL CA: CPT

## 2018-08-13 PROCEDURE — 74177 CT ABD & PELVIS W/CONTRAST: CPT

## 2018-08-13 PROCEDURE — 2580000003 HC RX 258: Performed by: EMERGENCY MEDICINE

## 2018-08-13 PROCEDURE — 99285 EMERGENCY DEPT VISIT HI MDM: CPT

## 2018-08-13 PROCEDURE — 85025 COMPLETE CBC W/AUTO DIFF WBC: CPT

## 2018-08-13 PROCEDURE — 87086 URINE CULTURE/COLONY COUNT: CPT

## 2018-08-13 PROCEDURE — 83605 ASSAY OF LACTIC ACID: CPT

## 2018-08-13 PROCEDURE — 83690 ASSAY OF LIPASE: CPT

## 2018-08-13 RX ORDER — FENTANYL CITRATE 50 UG/ML
50 INJECTION, SOLUTION INTRAMUSCULAR; INTRAVENOUS ONCE
Status: COMPLETED | OUTPATIENT
Start: 2018-08-13 | End: 2018-08-13

## 2018-08-13 RX ORDER — ONDANSETRON 2 MG/ML
4 INJECTION INTRAMUSCULAR; INTRAVENOUS ONCE
Status: COMPLETED | OUTPATIENT
Start: 2018-08-13 | End: 2018-08-13

## 2018-08-13 RX ORDER — 0.9 % SODIUM CHLORIDE 0.9 %
500 INTRAVENOUS SOLUTION INTRAVENOUS ONCE
Status: COMPLETED | OUTPATIENT
Start: 2018-08-13 | End: 2018-08-14

## 2018-08-13 RX ORDER — 0.9 % SODIUM CHLORIDE 0.9 %
80 INTRAVENOUS SOLUTION INTRAVENOUS ONCE
Status: COMPLETED | OUTPATIENT
Start: 2018-08-13 | End: 2018-08-14

## 2018-08-13 RX ORDER — SODIUM CHLORIDE 0.9 % (FLUSH) 0.9 %
10 SYRINGE (ML) INJECTION PRN
Status: DISCONTINUED | OUTPATIENT
Start: 2018-08-13 | End: 2018-08-14 | Stop reason: SDUPTHER

## 2018-08-13 RX ORDER — LORAZEPAM 2 MG/ML
0.5 INJECTION INTRAMUSCULAR ONCE
Status: COMPLETED | OUTPATIENT
Start: 2018-08-13 | End: 2018-08-13

## 2018-08-13 RX ADMIN — ONDANSETRON 4 MG: 2 INJECTION, SOLUTION INTRAMUSCULAR; INTRAVENOUS at 21:30

## 2018-08-13 RX ADMIN — IOPAMIDOL 75 ML: 755 INJECTION, SOLUTION INTRAVENOUS at 21:36

## 2018-08-13 RX ADMIN — Medication 10 ML: at 21:37

## 2018-08-13 RX ADMIN — FENTANYL CITRATE 50 MCG: 50 INJECTION, SOLUTION INTRAMUSCULAR; INTRAVENOUS at 21:30

## 2018-08-13 RX ADMIN — LORAZEPAM 0.5 MG: 2 INJECTION, SOLUTION INTRAMUSCULAR; INTRAVENOUS at 21:53

## 2018-08-13 RX ADMIN — SODIUM CHLORIDE 500 ML: 9 INJECTION, SOLUTION INTRAVENOUS at 21:53

## 2018-08-13 ASSESSMENT — PAIN SCALES - GENERAL: PAINLEVEL_OUTOF10: 0

## 2018-08-14 ENCOUNTER — APPOINTMENT (OUTPATIENT)
Dept: MRI IMAGING | Age: 48
DRG: 417 | End: 2018-08-14
Payer: MEDICARE

## 2018-08-14 PROBLEM — K80.50 CHOLEDOCHOLITHIASIS: Status: ACTIVE | Noted: 2018-08-14

## 2018-08-14 PROBLEM — K59.00 OBSTIPATION: Status: ACTIVE | Noted: 2018-08-14

## 2018-08-14 LAB
-: ABNORMAL
AMORPHOUS: ABNORMAL
ANION GAP SERPL CALCULATED.3IONS-SCNC: 11 MMOL/L (ref 9–17)
BACTERIA: ABNORMAL
BILIRUBIN URINE: NEGATIVE
BUN BLDV-MCNC: 10 MG/DL (ref 6–20)
BUN/CREAT BLD: ABNORMAL (ref 9–20)
CALCIUM SERPL-MCNC: 9.4 MG/DL (ref 8.6–10.4)
CASTS UA: ABNORMAL /LPF (ref 0–8)
CHLORIDE BLD-SCNC: 105 MMOL/L (ref 98–107)
CO2: 25 MMOL/L (ref 20–31)
COLOR: YELLOW
COMMENT UA: ABNORMAL
CREAT SERPL-MCNC: <0.4 MG/DL (ref 0.7–1.2)
CRYSTALS, UA: ABNORMAL /HPF
CULTURE: NO GROWTH
EPITHELIAL CELLS UA: ABNORMAL /HPF (ref 0–5)
GFR AFRICAN AMERICAN: ABNORMAL ML/MIN
GFR NON-AFRICAN AMERICAN: ABNORMAL ML/MIN
GFR SERPL CREATININE-BSD FRML MDRD: ABNORMAL ML/MIN/{1.73_M2}
GFR SERPL CREATININE-BSD FRML MDRD: ABNORMAL ML/MIN/{1.73_M2}
GLUCOSE BLD-MCNC: 94 MG/DL (ref 70–99)
GLUCOSE URINE: NEGATIVE
HCT VFR BLD CALC: 44.6 % (ref 41–53)
HEMOGLOBIN: 14.4 G/DL (ref 13.5–17.5)
INR BLD: 1.2
KETONES, URINE: NEGATIVE
LEUKOCYTE ESTERASE, URINE: ABNORMAL
Lab: NORMAL
MCH RBC QN AUTO: 31.2 PG (ref 26–34)
MCHC RBC AUTO-ENTMCNC: 32.4 G/DL (ref 31–37)
MCV RBC AUTO: 96.3 FL (ref 80–100)
MUCUS: ABNORMAL
NITRITE, URINE: NEGATIVE
NRBC AUTOMATED: NORMAL PER 100 WBC
OTHER OBSERVATIONS UA: ABNORMAL
PDW BLD-RTO: 13.3 % (ref 11.5–14.5)
PH UA: 7 (ref 5–8)
PLATELET # BLD: 298 K/UL (ref 130–400)
PMV BLD AUTO: 8.8 FL (ref 6–12)
POTASSIUM SERPL-SCNC: 3.8 MMOL/L (ref 3.7–5.3)
PROTEIN UA: NEGATIVE
PROTHROMBIN TIME: 12.5 SEC (ref 9.7–11.6)
RBC # BLD: 4.63 M/UL (ref 4.5–5.9)
RBC UA: ABNORMAL /HPF (ref 0–4)
RENAL EPITHELIAL, UA: ABNORMAL /HPF
SODIUM BLD-SCNC: 141 MMOL/L (ref 135–144)
SPECIFIC GRAVITY UA: 1.05 (ref 1–1.03)
SPECIMEN DESCRIPTION: NORMAL
STATUS: NORMAL
TRICHOMONAS: ABNORMAL
TURBIDITY: ABNORMAL
URINE HGB: ABNORMAL
UROBILINOGEN, URINE: NORMAL
WBC # BLD: 9.9 K/UL (ref 3.5–11)
WBC UA: ABNORMAL /HPF (ref 0–5)
YEAST: ABNORMAL

## 2018-08-14 PROCEDURE — 6370000000 HC RX 637 (ALT 250 FOR IP): Performed by: INTERNAL MEDICINE

## 2018-08-14 PROCEDURE — 2580000003 HC RX 258: Performed by: NURSE PRACTITIONER

## 2018-08-14 PROCEDURE — 99223 1ST HOSP IP/OBS HIGH 75: CPT | Performed by: INTERNAL MEDICINE

## 2018-08-14 PROCEDURE — 80048 BASIC METABOLIC PNL TOTAL CA: CPT

## 2018-08-14 PROCEDURE — 97110 THERAPEUTIC EXERCISES: CPT

## 2018-08-14 PROCEDURE — 85027 COMPLETE CBC AUTOMATED: CPT

## 2018-08-14 PROCEDURE — G8979 MOBILITY GOAL STATUS: HCPCS

## 2018-08-14 PROCEDURE — 6360000002 HC RX W HCPCS: Performed by: NURSE PRACTITIONER

## 2018-08-14 PROCEDURE — 1200000000 HC SEMI PRIVATE

## 2018-08-14 PROCEDURE — G8978 MOBILITY CURRENT STATUS: HCPCS

## 2018-08-14 PROCEDURE — 36415 COLL VENOUS BLD VENIPUNCTURE: CPT

## 2018-08-14 PROCEDURE — 6370000000 HC RX 637 (ALT 250 FOR IP): Performed by: NURSE PRACTITIONER

## 2018-08-14 PROCEDURE — 85610 PROTHROMBIN TIME: CPT

## 2018-08-14 PROCEDURE — 2580000003 HC RX 258: Performed by: EMERGENCY MEDICINE

## 2018-08-14 PROCEDURE — 99222 1ST HOSP IP/OBS MODERATE 55: CPT | Performed by: INTERNAL MEDICINE

## 2018-08-14 PROCEDURE — 97163 PT EVAL HIGH COMPLEX 45 MIN: CPT

## 2018-08-14 PROCEDURE — 6360000002 HC RX W HCPCS: Performed by: EMERGENCY MEDICINE

## 2018-08-14 RX ORDER — SENNA AND DOCUSATE SODIUM 50; 8.6 MG/1; MG/1
2 TABLET, FILM COATED ORAL DAILY
Status: DISCONTINUED | OUTPATIENT
Start: 2018-08-14 | End: 2018-08-21 | Stop reason: HOSPADM

## 2018-08-14 RX ORDER — ALBUTEROL SULFATE 2.5 MG/3ML
2.5 SOLUTION RESPIRATORY (INHALATION) 3 TIMES DAILY PRN
Status: DISCONTINUED | OUTPATIENT
Start: 2018-08-14 | End: 2018-08-21 | Stop reason: HOSPADM

## 2018-08-14 RX ORDER — LACTULOSE 10 G/15ML
20 SOLUTION ORAL 2 TIMES DAILY
Status: DISCONTINUED | OUTPATIENT
Start: 2018-08-14 | End: 2018-08-21 | Stop reason: HOSPADM

## 2018-08-14 RX ORDER — NUTRITIONAL SUPPLEMENT/FIBER
75 LIQUID (ML) ORAL DAILY
Status: DISCONTINUED | OUTPATIENT
Start: 2018-08-14 | End: 2018-08-21 | Stop reason: HOSPADM

## 2018-08-14 RX ORDER — ACETAMINOPHEN 325 MG/1
650 TABLET ORAL EVERY 4 HOURS PRN
Status: DISCONTINUED | OUTPATIENT
Start: 2018-08-14 | End: 2018-08-21 | Stop reason: HOSPADM

## 2018-08-14 RX ORDER — LAMOTRIGINE 100 MG/1
300 TABLET ORAL EVERY MORNING
Status: DISCONTINUED | OUTPATIENT
Start: 2018-08-14 | End: 2018-08-21 | Stop reason: HOSPADM

## 2018-08-14 RX ORDER — ONDANSETRON 4 MG/1
4 TABLET, ORALLY DISINTEGRATING ORAL EVERY 6 HOURS PRN
Status: DISCONTINUED | OUTPATIENT
Start: 2018-08-14 | End: 2018-08-18

## 2018-08-14 RX ORDER — SODIUM CHLORIDE 0.9 % (FLUSH) 0.9 %
10 SYRINGE (ML) INJECTION EVERY 12 HOURS SCHEDULED
Status: DISCONTINUED | OUTPATIENT
Start: 2018-08-14 | End: 2018-08-18 | Stop reason: SDUPTHER

## 2018-08-14 RX ORDER — CLONAZEPAM 0.5 MG/1
2 TABLET ORAL 2 TIMES DAILY PRN
Status: DISCONTINUED | OUTPATIENT
Start: 2018-08-14 | End: 2018-08-21 | Stop reason: HOSPADM

## 2018-08-14 RX ORDER — SODIUM CHLORIDE 9 MG/ML
INJECTION, SOLUTION INTRAVENOUS CONTINUOUS
Status: DISCONTINUED | OUTPATIENT
Start: 2018-08-14 | End: 2018-08-15

## 2018-08-14 RX ORDER — UREA 10 %
2 LOTION (ML) TOPICAL NIGHTLY PRN
Status: ON HOLD | COMMUNITY
End: 2019-04-08

## 2018-08-14 RX ORDER — BACLOFEN 10 MG/1
10 TABLET ORAL 3 TIMES DAILY
Status: DISCONTINUED | OUTPATIENT
Start: 2018-08-14 | End: 2018-08-21 | Stop reason: HOSPADM

## 2018-08-14 RX ORDER — LANSOPRAZOLE 15 MG/1
30 CAPSULE, DELAYED RELEASE ORAL DAILY
Status: DISCONTINUED | OUTPATIENT
Start: 2018-08-14 | End: 2018-08-14 | Stop reason: CLARIF

## 2018-08-14 RX ORDER — FLUTICASONE PROPIONATE 50 MCG
2 SPRAY, SUSPENSION (ML) NASAL DAILY
Status: DISCONTINUED | OUTPATIENT
Start: 2018-08-14 | End: 2018-08-21 | Stop reason: HOSPADM

## 2018-08-14 RX ORDER — MONTELUKAST SODIUM 10 MG/1
10 TABLET ORAL NIGHTLY
Status: DISCONTINUED | OUTPATIENT
Start: 2018-08-14 | End: 2018-08-21 | Stop reason: HOSPADM

## 2018-08-14 RX ORDER — ONDANSETRON 2 MG/ML
4 INJECTION INTRAMUSCULAR; INTRAVENOUS EVERY 6 HOURS PRN
Status: DISCONTINUED | OUTPATIENT
Start: 2018-08-14 | End: 2018-08-18

## 2018-08-14 RX ORDER — CALCIUM CARBONATE 1250 MG/5ML
1250 SUSPENSION ORAL DAILY
Status: DISCONTINUED | OUTPATIENT
Start: 2018-08-14 | End: 2018-08-14 | Stop reason: CLARIF

## 2018-08-14 RX ORDER — POTASSIUM CHLORIDE 20MEQ/15ML
40 LIQUID (ML) ORAL PRN
Status: DISCONTINUED | OUTPATIENT
Start: 2018-08-14 | End: 2018-08-21 | Stop reason: HOSPADM

## 2018-08-14 RX ORDER — LANSOPRAZOLE
30 KIT
Status: DISCONTINUED | OUTPATIENT
Start: 2018-08-14 | End: 2018-08-21 | Stop reason: HOSPADM

## 2018-08-14 RX ORDER — CETIRIZINE HYDROCHLORIDE 10 MG/1
10 TABLET ORAL DAILY
Status: DISCONTINUED | OUTPATIENT
Start: 2018-08-14 | End: 2018-08-21 | Stop reason: HOSPADM

## 2018-08-14 RX ORDER — ONDANSETRON 2 MG/ML
4 INJECTION INTRAMUSCULAR; INTRAVENOUS EVERY 6 HOURS PRN
Status: DISCONTINUED | OUTPATIENT
Start: 2018-08-14 | End: 2018-08-14 | Stop reason: SDUPTHER

## 2018-08-14 RX ORDER — CALCIUM CARBONATE 200(500)MG
500 TABLET,CHEWABLE ORAL DAILY
Status: DISCONTINUED | OUTPATIENT
Start: 2018-08-14 | End: 2018-08-21 | Stop reason: HOSPADM

## 2018-08-14 RX ORDER — POLYETHYLENE GLYCOL 3350 17 G/17G
17 POWDER, FOR SOLUTION ORAL DAILY
Status: DISCONTINUED | OUTPATIENT
Start: 2018-08-14 | End: 2018-08-21 | Stop reason: HOSPADM

## 2018-08-14 RX ORDER — MAGNESIUM SULFATE 1 G/100ML
1 INJECTION INTRAVENOUS PRN
Status: DISCONTINUED | OUTPATIENT
Start: 2018-08-14 | End: 2018-08-21 | Stop reason: HOSPADM

## 2018-08-14 RX ORDER — POTASSIUM CHLORIDE 20 MEQ/1
40 TABLET, EXTENDED RELEASE ORAL PRN
Status: DISCONTINUED | OUTPATIENT
Start: 2018-08-14 | End: 2018-08-21 | Stop reason: HOSPADM

## 2018-08-14 RX ORDER — LEVETIRACETAM 100 MG/ML
1500 SOLUTION ORAL 2 TIMES DAILY
Status: DISCONTINUED | OUTPATIENT
Start: 2018-08-14 | End: 2018-08-21 | Stop reason: HOSPADM

## 2018-08-14 RX ORDER — GLYCOPYRROLATE 1 MG/1
1 TABLET ORAL DAILY
COMMUNITY

## 2018-08-14 RX ORDER — FUROSEMIDE 20 MG/1
20 TABLET ORAL DAILY
Status: DISCONTINUED | OUTPATIENT
Start: 2018-08-14 | End: 2018-08-21 | Stop reason: HOSPADM

## 2018-08-14 RX ORDER — BISACODYL 10 MG
10 SUPPOSITORY, RECTAL RECTAL DAILY PRN
Status: DISCONTINUED | OUTPATIENT
Start: 2018-08-14 | End: 2018-08-21 | Stop reason: HOSPADM

## 2018-08-14 RX ORDER — NICOTINE 21 MG/24HR
1 PATCH, TRANSDERMAL 24 HOURS TRANSDERMAL DAILY PRN
Status: DISCONTINUED | OUTPATIENT
Start: 2018-08-14 | End: 2018-08-18

## 2018-08-14 RX ORDER — POTASSIUM CHLORIDE 20MEQ/15ML
10 LIQUID (ML) ORAL 2 TIMES DAILY
Status: DISCONTINUED | OUTPATIENT
Start: 2018-08-14 | End: 2018-08-21 | Stop reason: HOSPADM

## 2018-08-14 RX ORDER — LORAZEPAM 2 MG/ML
0.5 INJECTION INTRAMUSCULAR ONCE
Status: COMPLETED | OUTPATIENT
Start: 2018-08-14 | End: 2018-08-14

## 2018-08-14 RX ORDER — LAMOTRIGINE 100 MG/1
200 TABLET ORAL NIGHTLY
Status: DISCONTINUED | OUTPATIENT
Start: 2018-08-14 | End: 2018-08-21 | Stop reason: HOSPADM

## 2018-08-14 RX ORDER — ACETAMINOPHEN 325 MG/1
650 TABLET ORAL EVERY 6 HOURS PRN
Status: DISCONTINUED | OUTPATIENT
Start: 2018-08-14 | End: 2018-08-14 | Stop reason: SDUPTHER

## 2018-08-14 RX ORDER — BISACODYL 10 MG
10 SUPPOSITORY, RECTAL RECTAL DAILY PRN
Status: DISCONTINUED | OUTPATIENT
Start: 2018-08-14 | End: 2018-08-14 | Stop reason: SDUPTHER

## 2018-08-14 RX ORDER — SODIUM CHLORIDE 0.9 % (FLUSH) 0.9 %
10 SYRINGE (ML) INJECTION PRN
Status: DISCONTINUED | OUTPATIENT
Start: 2018-08-14 | End: 2018-08-18 | Stop reason: SDUPTHER

## 2018-08-14 RX ORDER — POTASSIUM CHLORIDE 7.45 MG/ML
10 INJECTION INTRAVENOUS PRN
Status: DISCONTINUED | OUTPATIENT
Start: 2018-08-14 | End: 2018-08-21 | Stop reason: HOSPADM

## 2018-08-14 RX ORDER — POLYETHYLENE GLYCOL 3350 17 G/17G
50 POWDER, FOR SOLUTION ORAL ONCE
Status: COMPLETED | OUTPATIENT
Start: 2018-08-14 | End: 2018-08-14

## 2018-08-14 RX ORDER — LACOSAMIDE 50 MG/1
100 TABLET ORAL NIGHTLY
Status: ON HOLD | COMMUNITY
End: 2019-03-11

## 2018-08-14 RX ORDER — LACOSAMIDE 100 MG/1
100 TABLET ORAL 2 TIMES DAILY
Status: DISCONTINUED | OUTPATIENT
Start: 2018-08-14 | End: 2018-08-21 | Stop reason: HOSPADM

## 2018-08-14 RX ADMIN — LORAZEPAM 0.5 MG: 2 INJECTION, SOLUTION INTRAMUSCULAR; INTRAVENOUS at 04:44

## 2018-08-14 RX ADMIN — BACLOFEN 10 MG: 10 TABLET ORAL at 12:05

## 2018-08-14 RX ADMIN — POLYETHYLENE GLYCOL 3350 50 G: 17 POWDER, FOR SOLUTION ORAL at 18:05

## 2018-08-14 RX ADMIN — LACTULOSE 20 G: 20 SOLUTION ORAL at 12:04

## 2018-08-14 RX ADMIN — LACOSAMIDE 100 MG: 100 TABLET, FILM COATED ORAL at 22:11

## 2018-08-14 RX ADMIN — LAMOTRIGINE 300 MG: 100 TABLET ORAL at 12:04

## 2018-08-14 RX ADMIN — VITAMIN D, TAB 1000IU (100/BT) 1000 UNITS: 25 TAB at 22:11

## 2018-08-14 RX ADMIN — POTASSIUM CHLORIDE 10 MEQ: 40 SOLUTION ORAL at 12:03

## 2018-08-14 RX ADMIN — CETIRIZINE HYDROCHLORIDE 10 MG: 10 TABLET, FILM COATED ORAL at 12:05

## 2018-08-14 RX ADMIN — MONTELUKAST SODIUM 10 MG: 10 TABLET, FILM COATED ORAL at 22:11

## 2018-08-14 RX ADMIN — FUROSEMIDE 20 MG: 20 TABLET ORAL at 12:05

## 2018-08-14 RX ADMIN — SODIUM CHLORIDE 80 ML: 0.9 INJECTION, SOLUTION INTRAVENOUS at 01:20

## 2018-08-14 RX ADMIN — VITAMIN D, TAB 1000IU (100/BT) 1000 UNITS: 25 TAB at 12:04

## 2018-08-14 RX ADMIN — POTASSIUM CHLORIDE 10 MEQ: 40 SOLUTION ORAL at 22:12

## 2018-08-14 RX ADMIN — LAMOTRIGINE 200 MG: 100 TABLET ORAL at 22:11

## 2018-08-14 RX ADMIN — FLUTICASONE PROPIONATE 2 SPRAY: 50 SPRAY, METERED NASAL at 08:45

## 2018-08-14 RX ADMIN — LEVETIRACETAM 1500 MG: 100 SOLUTION ORAL at 22:12

## 2018-08-14 RX ADMIN — ENOXAPARIN SODIUM 40 MG: 40 INJECTION SUBCUTANEOUS at 12:04

## 2018-08-14 RX ADMIN — Medication 10 ML: at 08:45

## 2018-08-14 RX ADMIN — LEVETIRACETAM 1500 MG: 100 SOLUTION ORAL at 12:06

## 2018-08-14 RX ADMIN — SODIUM CHLORIDE: 9 INJECTION, SOLUTION INTRAVENOUS at 04:36

## 2018-08-14 RX ADMIN — SENNOSIDES AND DOCUSATE SODIUM 2 TABLET: 8.6; 5 TABLET ORAL at 12:05

## 2018-08-14 RX ADMIN — BACLOFEN 10 MG: 10 TABLET ORAL at 22:11

## 2018-08-14 RX ADMIN — SODIUM CHLORIDE: 9 INJECTION, SOLUTION INTRAVENOUS at 18:05

## 2018-08-14 RX ADMIN — LACTULOSE 20 G: 20 SOLUTION ORAL at 22:11

## 2018-08-14 RX ADMIN — POLYETHYLENE GLYCOL (3350) 17 G: 17 POWDER, FOR SOLUTION ORAL at 12:04

## 2018-08-14 RX ADMIN — ANTACID TABLETS 500 MG: 500 TABLET, CHEWABLE ORAL at 12:05

## 2018-08-14 RX ADMIN — LACOSAMIDE 100 MG: 100 TABLET, FILM COATED ORAL at 12:04

## 2018-08-14 ASSESSMENT — PAIN SCALES - GENERAL: PAINLEVEL_OUTOF10: 0

## 2018-08-14 NOTE — PROGRESS NOTES
Nutrition Assessment    Type and Reason for Visit: Initial, Positive Nutrition Screen, Consult (Difficulty chewing or swallowing, +PEG tube/home TF / TF recommendations)    Nutrition Recommendations: 1. Suggest continuing NPO Effective Now.  2. Consider when appropriate, start of continuous Tube Feeding per G-tube with:  Jevity 1.2 Chace (standard with fiber formula) @ 10 ml/hr, increase as tolerated by 10 ml/hr every 6 hrs to 60 ml/hr (goal). Provide additional water flushes of 150 ml every 6 hrs. *    *TF @ 60 ml/hr goal:  1,440 ml, 1,728 kcal, 80 g protein, 26 g dietary fiber, 1,162 ml free water + 600 ml water flushes=1,762 ml total fluid/day. Malnutrition Assessment:  · Malnutrition Status: At risk for malnutrition  · Findings of the 6 clinical characteristics of malnutrition (Minimum of 2 out of 6 clinical characteristics is required to make the diagnosis of moderate or severe Protein Calorie Malnutrition based on AND/ASPEN Guidelines):  1. Energy Intake-Not available, not able to assess    2. Weight Loss-No significant weight loss  3. Fat Loss-Unable to assess  4. Muscle Loss-Unable to assess  5. Fluid Accumulation-No significant fluid accumulation  6.  Strength-Not measured    Nutrition Diagnosis:   · Problem: Inadequate oral intake  · Etiology: related to Difficulty swallowing     Signs and symptoms:  as evidenced by NPO status due to medical condition, Nutrition support - EN    Nutrition Assessment:  · Subjective Assessment: Pt is non-verbal.  Contacted by Nanette William RN about request for TF recommendations. Suggest using Jevity 1.2 Chace formula @ 10 ml/hr, increase to goal of 60 ml/hr. · Nutrition-Focused Physical Findings: GI:  +soft, +distended, +active bowel sounds, +passing flatus; PV/Skin:  WDL;  Other:  +contractures  · Wound Type: None  · Current Nutrition Therapies:  · Oral Diet Orders: NPO   · Oral Diet intake: NPO  · Tube Feeding (TF) Orders:   · Feeding Route: Gastrostomy  · Formula:

## 2018-08-14 NOTE — H&P
daily    Historical Provider, MD   LORazepam (ATIVAN) 1 MG tablet Take 1 mg by mouth as needed for Anxiety . Historical Provider, MD   Lansoprazole (PREVACID 24HR PO) 30 mg by Feeding Tube route daily    Historical Provider, MD   pseudoephedrine (SUDAFED) 30 MG tablet 30 mg by Per G Tube route 3 times daily    Historical Provider, MD   senna-docusate (Josey Proffer) 8.6-50 MG per tablet 2 tablets by Per G Tube route daily    Historical Provider, MD   lacosamide (VIMPAT) 50 MG TABS tablet 100 mg by Per G Tube route 2 times daily    Historical Provider, MD   Cholecalciferol (VITAMIN D3) 2000 units CAPS 1,000 Units by Feeding Tube route 2 times daily    Historical Provider, MD   Nutritional Supplements (REPLETE/FIBER) LIQD 75 mLs by Feeding Tube route daily    Historical Provider, MD   acetaminophen (TYLENOL) 325 MG tablet by Per G Tube route every 6 hours as needed for Pain    Historical Provider, MD   bisacodyl (DULCOLAX) 10 MG suppository Place 10 mg rectally daily as needed for Constipation    Historical Provider, MD   albuterol (PROVENTIL) (2.5 MG/3ML) 0.083% nebulizer solution Take 2.5 mg by nebulization 3 times daily as needed for Wheezing    Historical Provider, MD        Allergies:  Ampicillin; Valium [diazepam]; and Other    Social History:   Tobacco:    reports that he has never smoked. He has never used smokeless tobacco.  Alcohol:      reports that he does not drink alcohol. Drug Use:  reports that he does not use drugs. Family History:   History reviewed. No pertinent family history. REVIEW OF SYSTE  Unable to obtain from patient. History is only as noted above from caretaker.     Code Status:  Full Code    PHYSICAL EXAM:  /78   Pulse 92   Temp 97.9 °F (36.6 °C) (Axillary)   Resp 20   Ht 4' (1.219 m)   Wt 126 lb (57.2 kg)   SpO2 91%   BMI 38.45 kg/m² No intake or output data in the 24 hours ending 08/14/18 1103    General Appearance:    Patient was sleeping soundly and awakens only Slightly smaller coarse calcification in the pancreatic head, equivocal for choledocholithiasis. Cholelithiasis present. The CBD is not significantly dilated. Minimal prominence of the intrahepatic bile ducts. 16 mm hypodensity in the right kidney is indeterminate based on Hounsfield unit attenuation characteristics. Additional bilateral subcentimeter renal hypodensities, too small to characterize, probably representing cysts. Nonobstructive bilateral renal calculi. No definite hydronephrosis. GI/Bowel: Gastrostomy tube in place. No definite acute gastric abnormality. No definite acute small bowel abnormality. Mild nonspecific anal wall thickening. Large stool burden is noted in the colon with the rectosigmoid region dilated up to 7.7 cm. Pelvis: Redemonstration of small right inguinal hernia containing a portion of the outer wall of the bladder. Tiny calcifications within dependent portion of bladder, equivocal for layering tiny bladder stones versus wall calcification. Coarse prostatic calcifications. Peritoneum/Retroperitoneum: No significant free fluid. There is no significant lymphadenopathy. IVC and aorta demonstrate no definite acute abnormality. Bones/Soft Tissues: Bony demineralization. Chronic dysplastic changes of the left hip. Severe scoliotic curvature of the thoracolumbar spine is again noted. Impression:  1. Examination is limited by significant motion artifact and severe scoliotic curvature of the thoracolumbar spine. 2. Slightly smaller coarse calcifications near the pancreatic head, measuring up to 4 mm, near the distal CBD. Findings are suspicious for choledocholithiasis. Additional probable punctate nonobstructing stone in the mid CBD. The CBD is otherwise not significantly dilated. There is mild prominence of the intrahepatic bile ducts. Additional gallstones are noted. Consider MRCP for further evaluation.    3. Indeterminate 16 mm hypodensity in the right kidney, Intravenous PRN Milas Stephen, APRN - CNP        magnesium hydroxide (MILK OF MAGNESIA) 400 MG/5ML suspension 30 mL  30 mL Oral Daily PRN Milas Stephen, APRN - CNP        bisacodyl (DULCOLAX) suppository 10 mg  10 mg Rectal Daily PRN Milas Stephen, APRN - CNP        nicotine (NICODERM CQ) 21 MG/24HR 1 patch  1 patch Transdermal Daily PRN Milas Stephen, APRN - CNP        0.9 % sodium chloride infusion   Intravenous Continuous Milas Stephen, APRN - CNP 75 mL/hr at 08/14/18 0436      enoxaparin (LOVENOX) injection 40 mg  40 mg Subcutaneous Daily Milas Stephen, APRN - CNP        acetaminophen (TYLENOL) tablet 650 mg  650 mg Per G Tube Q4H PRN Milas Stephen, APRN - CNP        potassium chloride 20 MEQ/15ML (10%) oral solution 10 mEq  10 mEq Per G Tube BID Milas Stephen, APRN - CNP        ondansetron (ZOFRAN-ODT) disintegrating tablet 4 mg  4 mg Oral Q6H PRN Raina Guerin MD        Or    ondansetron [facility]) injection 4 mg  4 mg Intravenous Q6H PRN Raina Guerin MD        calcium carbonate (TUMS) chewable tablet 500 mg  500 mg Per G Tube Daily Raina Guerin MD        lansoprazole suspension SUSP 30 mg  30 [facility]         Consultations:  IP CONSULT TO INTERNAL MEDICINE  IP CONSULT TO GI    ASSESSMENT:    Primary Problem  Obstipation    Active Hospital Problems    Diagnosis Date Noted    Choledocholithiasis [K80.50] 08/14/2018    Obstipation [K59.00] 08/14/2018    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 GI consultation has been obtained  2. Resume home medications via PEG  3. Hold tube feedings so after GI consultation  4. Antiseizure medications as ordered  5. Repeat labs in the morning  6.  Resume tube feeds if approved by GI  7. PT to eval and treat  8. GI and DVT prophylaxis    The severity of this patient's signs and symptoms indicate the need for an

## 2018-08-14 NOTE — CONSULTS
Tube route daily    Historical Provider, MD   polyethylene glycol (GLYCOLAX) powder 17 g by Per G Tube route daily    Historical Provider, MD   lactulose (CHRONULAC) 10 GM/15ML solution 20 g by Per G Tube route 2 times daily     Historical Provider, MD   Sodium Phosphates (FLEET) 7-19 GM/118ML Place 1 enema rectally daily as needed    Historical Provider, MD   Dextromethorphan-Guaifenesin (ROBAFEN DM PO) 10 mLs by Gastric Tube route 4 times daily as needed (cough)     Historical Provider, MD   lamoTRIgine (LAMICTAL) 100 MG tablet 100 mg by Per G Tube route every morning     Historical Provider, MD   levETIRAcetam (KEPPRA) 100 MG/ML solution Take 1,500 mg by mouth 2 times daily    Historical Provider, MD   loratadine (CLARITIN) 10 MG tablet 10 mg by Per G Tube route daily    Historical Provider, MD   LORazepam (ATIVAN) 1 MG tablet Take 1 mg by mouth as needed for Anxiety .     Historical Provider, MD   Lansoprazole (PREVACID 24HR PO) 30 mg by Feeding Tube route daily    Historical Provider, MD   pseudoephedrine (SUDAFED) 30 MG tablet 30 mg by Per G Tube route 3 times daily    Historical Provider, MD   senna-docusate (Deric Valentina) 8.6-50 MG per tablet 2 tablets by Per G Tube route daily    Historical Provider, MD   lacosamide (VIMPAT) 50 MG TABS tablet 100 mg by Per G Tube route 2 times daily    Historical Provider, MD   Cholecalciferol (VITAMIN D3) 2000 units CAPS 1,000 Units by Feeding Tube route 2 times daily    Historical Provider, MD   Nutritional Supplements (REPLETE/FIBER) LIQD 75 mLs by Feeding Tube route daily    Historical Provider, MD   acetaminophen (TYLENOL) 325 MG tablet by Per G Tube route every 6 hours as needed for Pain    Historical Provider, MD   bisacodyl (DULCOLAX) 10 MG suppository Place 10 mg rectally daily as needed for Constipation    Historical Provider, MD   albuterol (PROVENTIL) (2.5 MG/3ML) 0.083% nebulizer solution Take 2.5 mg by nebulization 3 times daily as needed for Wheezing 08/13/2018    BASOPCT 1 08/13/2018    MONOSABS 0.60 08/13/2018    LYMPHSABS 1.20 08/13/2018    EOSABS 0.00 08/13/2018    BASOSABS 0.00 08/13/2018    DIFFTYPE NOT REPORTED 08/13/2018     Hemoglobin/Hematocrit:    Lab Results   Component Value Date    HGB 14.4 08/14/2018    HCT 44.6 08/14/2018     CMP:    Lab Results   Component Value Date     08/14/2018    K 3.8 08/14/2018     08/14/2018    CO2 25 08/14/2018    BUN 10 08/14/2018    CREATININE <0.40 08/14/2018    GFRAA CANNOT BE CALCULATED 08/14/2018    LABGLOM CANNOT BE CALCULATED 08/14/2018    GLUCOSE 94 08/14/2018    PROT 8.1 08/13/2018    LABALBU 4.3 08/13/2018    CALCIUM 9.4 08/14/2018    BILITOT 0.15 08/13/2018    ALKPHOS 113 08/13/2018    AST 30 08/13/2018    ALT 53 08/13/2018     BMP:    Lab Results   Component Value Date     08/14/2018    K 3.8 08/14/2018     08/14/2018    CO2 25 08/14/2018    BUN 10 08/14/2018    LABALBU 4.3 08/13/2018    CREATININE <0.40 08/14/2018    CALCIUM 9.4 08/14/2018    GFRAA CANNOT BE CALCULATED 08/14/2018    LABGLOM CANNOT BE CALCULATED 08/14/2018    GLUCOSE 94 08/14/2018     PT/INR:    Lab Results   Component Value Date    PROTIME 12.5 08/14/2018    INR 1.2 08/14/2018     PTT:  No results found for: APTT, PTT[APTT}    Assesment:     Primary Problem  Obstipation    Active Hospital Problems    Diagnosis Date Noted    Choledocholithiasis [K80.50] 08/14/2018    Obstipation [K59.00] 08/14/2018    Spastic quadriplegic cerebral palsy (HCC) [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 Abdominal Pains  Abnormal imaging  abd distention  PEG status  MRDD    Plan:     1. Miralax   2. Will get KUB in AM  3. Will order MRCP  4. F/u LFTs  5. Will DW family regarding any invasive procedures if needed  6.  nursing staff        Thank you for allowing me to participate in the care of your patient. Please feel free to contact me with any questions or concerns.      Electronically signed by Tori Piña, MD on 8/14/2018 at 5:05 PM     Copy sent to Dr. Kirk Jones

## 2018-08-14 NOTE — PROGRESS NOTES
Dr. Ace Killer rounded and updated on patient. See order for Miralax, MRCP and KUB tomorrow. Hold off on tube feeds for now and will reassess tomorrow. Okay to give meds through PEG tube. Will monitor.

## 2018-08-14 NOTE — ED PROVIDER NOTES
[address] [place] [address] ED  eMERGENCY dEPARTMENT eNCOUnter      Pt Name: Gurpreet Ennis  MRN: [id number]  [place] 1970  Date of evaluation: 8/13/2018  Provider: Jessica Bailon MD    [address] [place] [address]       Chief Complaint   Patient presents with    Abdominal Pain     x 1 day         HISTORY OF PRESENT ILLNESS  (Location/Symptom, Timing/Onset, Context/Setting, Quality, Duration, Modifying Factors, Severity.)   Gurpreet Ennis is a 49 y.o. male who presents to the emergency department For evaluation of abdominal pain. Patient is MR and nonverbal.  He presents with visible abdominal distention. He is moaning in pain. His caretaker states the symptoms have been increasing over the last 24 hours. He is tube fed. They did feed him tonight. He has some of his feeding solution visible within his gastric tubing. He has low-grade temp on arrival and appears very uncomfortable       Nursing Notes were reviewed. ALLERGIES     Ampicillin; Valium [diazepam]; and Other    CURRENT MEDICATIONS       Previous Medications    ACETAMINOPHEN (TYLENOL) 325 MG TABLET    by Per G Tube route every 6 hours as needed for Pain    ALBUTEROL (PROVENTIL) (2.5 MG/3ML) 0.083% NEBULIZER SOLUTION    Take 2.5 mg by nebulization 3 times daily as needed for Wheezing    BACLOFEN (LIORESAL) 10 MG TABLET    10 mg by Per G Tube route 3 times daily    BISACODYL (DULCOLAX) 10 MG SUPPOSITORY    Place 10 mg rectally daily as needed for Constipation    CALCIUM CARBONATE 1250 MG/5ML SUSP SUSPENSION    1,250 mg by Per G Tube route daily    CHOLECALCIFEROL (VITAMIN D3) 2000 UNITS CAPS    1,000 Units by Feeding Tube route 2 times daily    CLONAZEPAM (KLONOPIN) 2 MG TABLET    Take 2 mg by mouth 2 times daily as needed (for seizure lasting more than3 min).     DEXTROMETHORPHAN-GUAIFENESIN (ROBAFEN DM PO)    10 mLs by Gastric Tube route 4 times daily as needed (cough)     FLUTICASONE (FLONASE) 50 MCG/ACT NASAL SPRAY    2 sprays by Nasal route daily FUROSEMIDE (LASIX) 20 MG TABLET    20 mg by Per G Tube route daily    LACOSAMIDE (VIMPAT) 50 MG TABS TABLET    100 mg by Per G Tube route 2 times daily    LACTULOSE (CHRONULAC) 10 GM/15ML SOLUTION    20 g by Per G Tube route 2 times daily     LAMOTRIGINE (LAMICTAL) 100 MG TABLET    100 mg by Per G Tube route every morning     LAMOTRIGINE (LAMICTAL) 100 MG TABLET    200 mg by Per G Tube route 2 times daily     LANSOPRAZOLE (PREVACID 24HR PO)    30 mg by Feeding Tube route daily    LEVETIRACETAM (KEPPRA) 100 MG/ML SOLUTION    Take 1,500 mg by mouth 2 times daily    LORATADINE (CLARITIN) 10 MG TABLET    10 mg by Per G Tube route daily    LORAZEPAM (ATIVAN) 1 MG TABLET    Take 1 mg by mouth as needed for Anxiety . MONTELUKAST (SINGULAIR) 10 MG TABLET    10 mg by Per G Tube route nightly    MULTIPLE VITAMIN (DAILY-JAY PO)    by Gastric Tube route daily    NUTRITIONAL SUPPLEMENTS (REPLETE/FIBER) LIQD    75 mLs by Feeding Tube route daily    NYSTATIN (MYCOSTATIN) 486872 UNIT/GM POWDER    Apply topically daily Apply between toes.     POLYETHYLENE GLYCOL (GLYCOLAX) POWDER    17 g by Per G Tube route daily    POTASSIUM CHLORIDE (KLOR-CON SPRINKLE PO)    10 mEq by Per G Tube route 2 times daily     PSEUDOEPHEDRINE (SUDAFED) 30 MG TABLET    30 mg by Per G Tube route 3 times daily    SENNA-DOCUSATE (PERICOLACE) 8.6-50 MG PER TABLET    2 tablets by Per G Tube route daily    SODIUM PHOSPHATES (FLEET) 7-19 GM/118ML    Place 1 enema rectally daily as needed       PAST MEDICAL HISTORY         Diagnosis Date    Cerebral palsy ([address]     Cholelithiasis with chronic cholecystitis     Constipation     Delayed gastric emptying     Dysphagia     GERD (gastroesophageal reflux disease)     Hearing loss     Mental disability     MRDD    Profound mental retardation     Scoliosis     Seizures (HCC)     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HCC)        SURGICAL HISTORY           Procedure Laterality Date    STOMACH SURGERY      has Feeding tube further evaluation. 3. Indeterminate 16 mm hypodensity in the right kidney, possibly   proteinaceous cyst.  However, consider nonemergent outpatient  further   evaluation with ultrasound or renal mass CT/MRI. 4. Large stool burden is seen throughout the colon. The rectosigmoid region   is dilated up to 7.7 cm. Correlation for constipation is recommended. 5. Small right inguinal hernia containing a small portion of the outer wall   of the bladder. Tiny calcifications along the dependent portion of bladder   may represent layering stones versus wall calcifications. 6. Nonobstructive bilateral renal calculi. No hydronephrosis. LABS:  Labs Reviewed   BASIC METABOLIC PANEL - Abnormal; Notable for the following:        Result Value    Glucose 112 (*)     CREATININE <0.40 (*)     All other components within normal limits   CBC WITH AUTO DIFFERENTIAL - Abnormal; Notable for the following:     Seg Neutrophils 77 (*)     Lymphocytes 15 (*)     Eosinophils % 0 (*)     All other components within normal limits   HEPATIC FUNCTION PANEL - Abnormal; Notable for the following:     ALT 53 (*)     Total Bilirubin 0.15 (*)     All other components within normal limits   URINALYSIS - Abnormal; Notable for the following:     Turbidity UA TURBID (*)     Specific Gravity, UA 1.053 (*)     Urine Hgb LARGE (*)     Leukocyte Esterase, Urine TRACE (*)     All other components within normal limits   LACTIC ACID - Abnormal; Notable for the following:     Lactic Acid 2.5 (*)     All other components within normal limits   MICROSCOPIC URINALYSIS - Abnormal; Notable for the following:     Amorphous, UA 3+ (*)     All other components within normal limits   URINE CULTURE   AMYLASE   LIPASE   BASIC METABOLIC PANEL W/ REFLEX TO MG FOR LOW K    CBC   PROTIME-INR     Results for Catarino Dong (MRN [id number]) as of 8/14/2018 01:58   Ref.  Range 8/13/2018 21:31 8/13/2018 22:00   Sodium Latest Ref Range: 135 - 144 mmol/L 137 Potassium Latest Ref Range: 3.7 - 5.3 mmol/L 4.0    Chloride Latest Ref Range: 98 - 107 mmol/L 98    CO2 Latest Ref Range: 20 - 31 mmol/L 25    BUN Latest Ref Range: 6 - 20 mg/dL 17    Creatinine Latest Ref Range: 0.70 - 1.20 mg/dL <0.40 (L)    Bun/Cre Ratio Latest Ref Range: 9 - 20  CANNOT BE CALCULATED    Anion Gap Latest Ref Range: 9 - 17 mmol/L 14    GFR Non- Latest Ref Range: >60 mL/min CANNOT BE CALCULATED    GFR  Latest Ref Range: >60 mL/min CANNOT BE CALCULATED    Lactic Acid Latest Ref Range: 0.5 - 2.2 mmol/L 2.5 (H)    Glucose Latest Ref Range: 70 - 99 mg/dL 112 (H)    Calcium Latest Ref Range: 8.6 - 10.4 mg/dL 9.9    Albumin/Globulin Ratio Latest Ref Range: 1.0 - 2.5  NOT REPORTED    Total Protein Latest Ref Range: 6.4 - 8.3 g/dL 8.1    GFR Comment Unknown Pend    GFR Staging Unknown NOT REPORTED    Albumin Latest Ref Range: 3.5 - 5.2 g/dL 4.3    Globulin Latest Ref Range: 1.5 - 3.8 g/dL NOT REPORTED    Alk Phos Latest Ref Range: 40 - 129 U/L 113    ALT Latest Ref Range: 5 - 41 U/L 53 (H)    Amylase Latest Ref Range: 28 - 100 U/L 40    AST Latest Ref Range: <40 U/L 30    Bilirubin Latest Ref Range: 0.3 - 1.2 mg/dL 0.15 (L)    Bilirubin, Direct Latest Ref Range: <0.31 mg/dL <0.08    Bilirubin, Indirect Latest Ref Range: 0.00 - 1.00 mg/dL CANNOT BE CALCULATED    Lipase Latest Ref Range: 13 - 60 U/L 21    WBC Latest Ref Range: 3.5 - 11.0 k/uL 8.4    RBC Latest Ref Range: 4.5 - 5.9 m/uL 4.77    Hemoglobin Quant Latest Ref Range: 13.5 - 17.5 g/dL 15.1    Hematocrit Latest Ref Range: 41 - 53 % 46.0    MCV Latest Ref Range: 80 - 100 fL 96.3    MCH Latest Ref Range: 26 - 34 pg 31.6    MCHC Latest Ref Range: 31 - 37 g/dL 32.8    MPV Latest Ref Range: 6.0 - 12.0 fL 9.2    RDW Latest Ref Range: 11.5 - 14.5 % 13.6    Platelet Count Latest Ref Range: 130 - 400 k/uL 336    Platelet Estimate Unknown NOT REPORTED    Absolute Mono # Latest Ref Range: 0.2 - 0.8 k/uL 0.60    Eosinophils % REPORTED     All other labs were within normal range or not returned as of this dictation. EMERGENCY DEPARTMENT COURSE and DIFFERENTIAL DIAGNOSIS/MDM:   Vitals:    Vitals:    08/14/18 0022 08/14/18 0052 08/14/18 0122 08/14/18 0152   BP: 134/78 130/83 137/85 139/88   Pulse: 111 106 108 107   Resp:       Temp:       TempSrc:       SpO2: 92% 96% 97% 99%   Weight:       Height:         Patient is evaluated. He is treated symptomatically for his discomfort and anxiety symptoms. He rested much more comfortably following medical management. Laboratory and imaging studies are reviewed. Patient does have evidence of choledocholithiasis a component of biliary colic possible cholecystitis not excluded . MRCP has been recommended and will be obtained in the morning. Patient also suffers from recurrent constipation. This is addressed while in the emergency department. With his elevation in lactic acid care is discussed with internal medicine to facilitate admission for further symptomatic management and additional evaluation and care. CONSULTS:  IP CONSULT TO INTERNAL MEDICINE  IP CONSULT TO GI    PROCEDURES:  None    FINAL IMPRESSION      1. Generalized abdominal pain    2. Choledocholithiasis    3. Constipation, unspecified constipation type    4. Quadriparesis ([address]    5.  Developmental disability          DISPOSITION/PLAN   DISPOSITION    Decision to Admit    PATIENT REFERRED TO:   [place] [address], [address]  [place] [address] [address] [facility]  [phone]            DISCHARGE MEDICATIONS:     New Prescriptions    No medications on file             (Please note that portions of this note were completed with a voice recognition program.  Efforts were made to edit the dictations but occasionally words are mis-transcribed.)    Gurpreet Ty MD  Attending Emergency Physician         Gurpreet Ty MD  08/14/18 1168

## 2018-08-15 ENCOUNTER — APPOINTMENT (OUTPATIENT)
Dept: MRI IMAGING | Age: 48
DRG: 417 | End: 2018-08-15
Payer: MEDICARE

## 2018-08-15 ENCOUNTER — APPOINTMENT (OUTPATIENT)
Dept: GENERAL RADIOLOGY | Age: 48
DRG: 417 | End: 2018-08-15
Payer: MEDICARE

## 2018-08-15 LAB
-: NORMAL
ABSOLUTE EOS #: 0.1 K/UL (ref 0–0.4)
ABSOLUTE IMMATURE GRANULOCYTE: ABNORMAL K/UL (ref 0–0.3)
ABSOLUTE LYMPH #: 2 K/UL (ref 1–4.8)
ABSOLUTE MONO #: 0.8 K/UL (ref 0.2–0.8)
ALBUMIN SERPL-MCNC: 4 G/DL (ref 3.5–5.2)
ALBUMIN/GLOBULIN RATIO: ABNORMAL (ref 1–2.5)
ALP BLD-CCNC: 84 U/L (ref 40–129)
ALT SERPL-CCNC: 98 U/L (ref 5–41)
AMYLASE: 32 U/L (ref 28–100)
ANION GAP SERPL CALCULATED.3IONS-SCNC: 13 MMOL/L (ref 9–17)
AST SERPL-CCNC: 65 U/L
BASOPHILS # BLD: 1 % (ref 0–2)
BASOPHILS ABSOLUTE: 0.1 K/UL (ref 0–0.2)
BILIRUB SERPL-MCNC: 0.24 MG/DL (ref 0.3–1.2)
BILIRUBIN DIRECT: <0.08 MG/DL
BILIRUBIN, INDIRECT: ABNORMAL MG/DL (ref 0–1)
BUN BLDV-MCNC: 6 MG/DL (ref 6–20)
CALCIUM SERPL-MCNC: 8.9 MG/DL (ref 8.6–10.4)
CHLORIDE BLD-SCNC: 108 MMOL/L (ref 98–107)
CO2: 24 MMOL/L (ref 20–31)
CREAT SERPL-MCNC: <0.4 MG/DL (ref 0.7–1.2)
DIFFERENTIAL TYPE: ABNORMAL
EOSINOPHILS RELATIVE PERCENT: 1 % (ref 1–4)
GFR AFRICAN AMERICAN: ABNORMAL ML/MIN
GFR NON-AFRICAN AMERICAN: ABNORMAL ML/MIN
GFR SERPL CREATININE-BSD FRML MDRD: ABNORMAL ML/MIN/{1.73_M2}
GFR SERPL CREATININE-BSD FRML MDRD: ABNORMAL ML/MIN/{1.73_M2}
GLUCOSE BLD-MCNC: 109 MG/DL (ref 75–110)
GLUCOSE BLD-MCNC: 84 MG/DL (ref 70–99)
HCT VFR BLD CALC: 43.9 % (ref 41–53)
HEMOGLOBIN: 14.7 G/DL (ref 13.5–17.5)
IMMATURE GRANULOCYTES: ABNORMAL %
LIPASE: 13 U/L (ref 13–60)
LYMPHOCYTES # BLD: 32 % (ref 24–44)
MAGNESIUM: 2.1 MG/DL (ref 1.6–2.6)
MCH RBC QN AUTO: 31.7 PG (ref 26–34)
MCHC RBC AUTO-ENTMCNC: 33.4 G/DL (ref 31–37)
MCV RBC AUTO: 95 FL (ref 80–100)
MONOCYTES # BLD: 13 % (ref 1–7)
NRBC AUTOMATED: ABNORMAL PER 100 WBC
PDW BLD-RTO: 13 % (ref 11.5–14.5)
PLATELET # BLD: 283 K/UL (ref 130–400)
PLATELET ESTIMATE: ABNORMAL
PMV BLD AUTO: ABNORMAL FL (ref 6–12)
POTASSIUM SERPL-SCNC: 3.6 MMOL/L (ref 3.7–5.3)
RBC # BLD: 4.62 M/UL (ref 4.5–5.9)
RBC # BLD: ABNORMAL 10*6/UL
REASON FOR REJECTION: NORMAL
SEG NEUTROPHILS: 53 % (ref 36–66)
SEGMENTED NEUTROPHILS ABSOLUTE COUNT: 3.3 K/UL (ref 1.8–7.7)
SODIUM BLD-SCNC: 145 MMOL/L (ref 135–144)
TOTAL PROTEIN: 7.1 G/DL (ref 6.4–8.3)
WBC # BLD: 6.3 K/UL (ref 3.5–11)
WBC # BLD: ABNORMAL 10*3/UL
ZZ NTE CLEAN UP: ORDERED TEST: NORMAL
ZZ NTE WITH NAME CLEAN UP: SPECIMEN SOURCE: NORMAL

## 2018-08-15 PROCEDURE — 36415 COLL VENOUS BLD VENIPUNCTURE: CPT

## 2018-08-15 PROCEDURE — 2580000003 HC RX 258: Performed by: INTERNAL MEDICINE

## 2018-08-15 PROCEDURE — 1200000000 HC SEMI PRIVATE

## 2018-08-15 PROCEDURE — 97110 THERAPEUTIC EXERCISES: CPT

## 2018-08-15 PROCEDURE — 99232 SBSQ HOSP IP/OBS MODERATE 35: CPT | Performed by: INTERNAL MEDICINE

## 2018-08-15 PROCEDURE — 82947 ASSAY GLUCOSE BLOOD QUANT: CPT

## 2018-08-15 PROCEDURE — 83690 ASSAY OF LIPASE: CPT

## 2018-08-15 PROCEDURE — 97530 THERAPEUTIC ACTIVITIES: CPT

## 2018-08-15 PROCEDURE — 85025 COMPLETE CBC W/AUTO DIFF WBC: CPT

## 2018-08-15 PROCEDURE — 6370000000 HC RX 637 (ALT 250 FOR IP): Performed by: INTERNAL MEDICINE

## 2018-08-15 PROCEDURE — 83735 ASSAY OF MAGNESIUM: CPT

## 2018-08-15 PROCEDURE — 80074 ACUTE HEPATITIS PANEL: CPT

## 2018-08-15 PROCEDURE — 6360000004 HC RX CONTRAST MEDICATION: Performed by: INTERNAL MEDICINE

## 2018-08-15 PROCEDURE — 6360000002 HC RX W HCPCS: Performed by: NURSE PRACTITIONER

## 2018-08-15 PROCEDURE — 80053 COMPREHEN METABOLIC PANEL: CPT

## 2018-08-15 PROCEDURE — 74183 MRI ABD W/O CNTR FLWD CNTR: CPT

## 2018-08-15 PROCEDURE — 6370000000 HC RX 637 (ALT 250 FOR IP): Performed by: NURSE PRACTITIONER

## 2018-08-15 PROCEDURE — 74018 RADEX ABDOMEN 1 VIEW: CPT

## 2018-08-15 PROCEDURE — 82248 BILIRUBIN DIRECT: CPT

## 2018-08-15 PROCEDURE — A9579 GAD-BASE MR CONTRAST NOS,1ML: HCPCS | Performed by: INTERNAL MEDICINE

## 2018-08-15 PROCEDURE — 82150 ASSAY OF AMYLASE: CPT

## 2018-08-15 RX ORDER — POTASSIUM CHLORIDE 20MEQ/15ML
20 LIQUID (ML) ORAL ONCE
Status: DISCONTINUED | OUTPATIENT
Start: 2018-08-15 | End: 2018-08-21 | Stop reason: HOSPADM

## 2018-08-15 RX ORDER — MORPHINE SULFATE 4 MG/ML
4 INJECTION, SOLUTION INTRAMUSCULAR; INTRAVENOUS
Status: DISCONTINUED | OUTPATIENT
Start: 2018-08-15 | End: 2018-08-18

## 2018-08-15 RX ORDER — SODIUM PHOSPHATE, DIBASIC AND SODIUM PHOSPHATE, MONOBASIC 7; 19 G/133ML; G/133ML
1 ENEMA RECTAL ONCE
Status: COMPLETED | OUTPATIENT
Start: 2018-08-15 | End: 2018-08-15

## 2018-08-15 RX ORDER — 0.9 % SODIUM CHLORIDE 0.9 %
20 INTRAVENOUS SOLUTION INTRAVENOUS
Status: DISCONTINUED | OUTPATIENT
Start: 2018-08-15 | End: 2018-08-21 | Stop reason: HOSPADM

## 2018-08-15 RX ORDER — MORPHINE SULFATE 2 MG/ML
2 INJECTION, SOLUTION INTRAMUSCULAR; INTRAVENOUS
Status: DISCONTINUED | OUTPATIENT
Start: 2018-08-15 | End: 2018-08-18

## 2018-08-15 RX ORDER — DEXTROSE MONOHYDRATE 50 MG/ML
INJECTION, SOLUTION INTRAVENOUS CONTINUOUS
Status: DISCONTINUED | OUTPATIENT
Start: 2018-08-15 | End: 2018-08-17

## 2018-08-15 RX ADMIN — SENNOSIDES AND DOCUSATE SODIUM 2 TABLET: 8.6; 5 TABLET ORAL at 10:48

## 2018-08-15 RX ADMIN — LACTULOSE 20 G: 20 SOLUTION ORAL at 10:49

## 2018-08-15 RX ADMIN — LAMOTRIGINE 200 MG: 100 TABLET ORAL at 22:41

## 2018-08-15 RX ADMIN — BACLOFEN 10 MG: 10 TABLET ORAL at 22:40

## 2018-08-15 RX ADMIN — BACLOFEN 10 MG: 10 TABLET ORAL at 10:47

## 2018-08-15 RX ADMIN — VITAMIN D, TAB 1000IU (100/BT) 1000 UNITS: 25 TAB at 10:49

## 2018-08-15 RX ADMIN — LACOSAMIDE 100 MG: 100 TABLET, FILM COATED ORAL at 10:48

## 2018-08-15 RX ADMIN — BACLOFEN 10 MG: 10 TABLET ORAL at 15:20

## 2018-08-15 RX ADMIN — FUROSEMIDE 20 MG: 20 TABLET ORAL at 10:49

## 2018-08-15 RX ADMIN — LEVETIRACETAM 1500 MG: 100 SOLUTION ORAL at 10:49

## 2018-08-15 RX ADMIN — ANTACID TABLETS 500 MG: 500 TABLET, CHEWABLE ORAL at 10:48

## 2018-08-15 RX ADMIN — LAMOTRIGINE 300 MG: 100 TABLET ORAL at 10:48

## 2018-08-15 RX ADMIN — POTASSIUM CHLORIDE 10 MEQ: 40 SOLUTION ORAL at 22:41

## 2018-08-15 RX ADMIN — DEXTROSE MONOHYDRATE: 50 INJECTION, SOLUTION INTRAVENOUS at 18:06

## 2018-08-15 RX ADMIN — GADOTERIDOL 12 ML: 279.3 INJECTION, SOLUTION INTRAVENOUS at 14:04

## 2018-08-15 RX ADMIN — VITAMIN D, TAB 1000IU (100/BT) 1000 UNITS: 25 TAB at 22:41

## 2018-08-15 RX ADMIN — CETIRIZINE HYDROCHLORIDE 10 MG: 10 TABLET, FILM COATED ORAL at 10:49

## 2018-08-15 RX ADMIN — LACTULOSE 20 G: 20 SOLUTION ORAL at 22:41

## 2018-08-15 RX ADMIN — MORPHINE SULFATE 2 MG: 2 INJECTION, SOLUTION INTRAMUSCULAR; INTRAVENOUS at 03:40

## 2018-08-15 RX ADMIN — FLUTICASONE PROPIONATE 2 SPRAY: 50 SPRAY, METERED NASAL at 11:04

## 2018-08-15 RX ADMIN — POTASSIUM CHLORIDE 10 MEQ: 40 SOLUTION ORAL at 10:49

## 2018-08-15 RX ADMIN — ENOXAPARIN SODIUM 40 MG: 40 INJECTION SUBCUTANEOUS at 10:48

## 2018-08-15 RX ADMIN — LACOSAMIDE 100 MG: 100 TABLET, FILM COATED ORAL at 22:40

## 2018-08-15 RX ADMIN — SODIUM PHOSPHATE, DIBASIC AND SODIUM PHOSPHATE, MONOBASIC 1 ENEMA: 7; 19 ENEMA RECTAL at 16:57

## 2018-08-15 RX ADMIN — POLYETHYLENE GLYCOL (3350) 17 G: 17 POWDER, FOR SOLUTION ORAL at 15:21

## 2018-08-15 RX ADMIN — MONTELUKAST SODIUM 10 MG: 10 TABLET, FILM COATED ORAL at 22:40

## 2018-08-15 RX ADMIN — LEVETIRACETAM 1500 MG: 100 SOLUTION ORAL at 22:41

## 2018-08-15 ASSESSMENT — PAIN SCALES - GENERAL: PAINLEVEL_OUTOF10: 4

## 2018-08-15 NOTE — PROGRESS NOTES
Dr. Britt Bañuelos rounded and updated on patient. Okay to go forward with MRCP today. Only start tube feeds at 10 ml if patient has a BM. Patient needs to have a BM prior to starting tube feeds. Give fleets enema after MRCP. If patient has a BM start tube feeds at 10 ml only continuous and stop at 0600 for possible ERCP tomorrow.

## 2018-08-15 NOTE — PROGRESS NOTES
Call out to Dr. Mirna Valadez to review MRCP results. Enema given but no results yet. Tube feeds on hold until patient has a BM. Blood sugar check 79. Will await return call.

## 2018-08-15 NOTE — PROGRESS NOTES
Patient has been crying out off and on since 0000. Spoke with [facility] JOSE Price via secure message. See MAR for additional orders.

## 2018-08-15 NOTE — PROGRESS NOTES
GI Progress notes    8/15/2018   2:00 PM    Name:  Gurpreet Ennis  MRN:    [id number]     Acct:     [de-identified]   Room:  [address] [place] [address] Day: 1     Admit Date: 8/13/2018  9:15 PM  PCP: Sosa Hutchins    Subjective:     C/C:   Chief Complaint   Patient presents with    Abdominal Pain     x 1 day       Interval History: Status: not changed. Patient seen with the nursing staff  More stable than yesterday  Has mild abd bloating  Apparently no BMs today  No bleeding reported  No sig worsening of the LFTs  For MRCP today     ROS:  MRDD    Medications: Allergies:    Allergies   Allergen Reactions    Ampicillin Other (See Comments)    Valium [Diazepam] Other (See Comments)    Other      Bubble bath products        Current Meds:   morphine (PF) injection 2 mg Q2H PRN   Or    morphine (PF) injection 4 mg Q2H PRN   potassium chloride 20 MEQ/15ML (10%) oral solution 20 mEq Once   gadoteridol (PROHANCE) injection 12 mL ONCE PRN   0.9 % sodium chloride bolus As Directed RT PRN   fleet rectal enema 1 enema Once   albuterol (PROVENTIL) nebulizer solution 2.5 mg TID PRN   baclofen (LIORESAL) tablet 10 mg TID   vitamin D (CHOLECALCIFEROL) tablet 1,000 Units BID   clonazePAM (KLONOPIN) tablet 2 mg BID PRN   fluticasone (FLONASE) 50 MCG/ACT nasal spray 2 spray Daily   furosemide (LASIX) tablet 20 mg Daily   lacosamide (VIMPAT) tablet 100 mg BID   lactulose (CHRONULAC) 10 GM/15ML solution 20 g BID   lamoTRIgine (LAMICTAL) tablet 300 mg QAM   lamoTRIgine (LAMICTAL) tablet 200 mg Nightly   levETIRAcetam (KEPPRA) 100 MG/ML solution 1,500 mg BID   cetirizine (ZYRTEC) tablet 10 mg Daily   montelukast (SINGULAIR) tablet 10 mg Nightly   REPLETE/FIBER LIQD 75 mL Daily   polyethylene glycol (GLYCOLAX) packet 17 g Daily   sennosides-docusate sodium (SENOKOT-S) 8.6-50 MG tablet 2 tablet Daily   sodium chloride flush 0.9 % injection 10 mL 2 times per day   sodium chloride flush 0.9 % injection 10 mL PRN   potassium chloride (KLOR-CON M) extended release tablet 40 mEq PRN   Or    potassium chloride 20 MEQ/15ML (10%) oral solution 40 mEq PRN   Or    potassium chloride 10 mEq/100 mL IVPB (Peripheral Line) PRN   magnesium sulfate 1 g in dextrose 5% 100 mL IVPB PRN   magnesium hydroxide (MILK OF MAGNESIA) 400 MG/5ML suspension 30 mL Daily PRN   bisacodyl (DULCOLAX) suppository 10 mg Daily PRN   nicotine (NICODERM CQ) 21 MG/24HR 1 patch Daily PRN   0.9 % sodium chloride infusion Continuous   enoxaparin (LOVENOX) injection 40 mg Daily   acetaminophen (TYLENOL) tablet 650 mg Q4H PRN   potassium chloride 20 MEQ/15ML (10%) oral solution 10 mEq BID   ondansetron (ZOFRAN-ODT) disintegrating tablet 4 mg Q6H PRN   Or    ondansetron (ZOFRAN) injection 4 mg Q6H PRN   calcium carbonate (TUMS) chewable tablet 500 mg Daily   lansoprazole suspension SUSP 30 mg QAM AC       Data:     Code Status:  Full Code    History reviewed. No pertinent family history. Social History     Social History    Marital status: Single     Spouse name: N/A    Number of children: N/A    Years of education: N/A     Occupational History    Not on file. Social History Main Topics    Smoking status: Never Smoker    Smokeless tobacco: Never Used    Alcohol use No    Drug use: No    Sexual activity: Not on file     Other Topics Concern    Not on file     Social History Narrative    No narrative on file       Vitals:  BP (!) 150/96   Pulse 69   Temp 98.4 °F (36.9 °C) (Axillary)   Resp 20   Ht 4' (1.219 m)   Wt 126 lb (57.2 kg)   SpO2 96%   BMI 38.45 kg/m²   Temp (24hrs), Av.9 °F (36.6 °C), Min:97.5 °F (36.4 °C), Max:98.4 °F (36.9 °C)    No results for input(s): POCGLU in the last 72 hours. 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 OK to give ativan for MRCP  2. Enemas after that for BMs  3. F/u KUB  4. May need ERCP   5. Will try to get hold of the POA for that  6. F/u LFTs  7.  If have good BMs OK to start PEG feedings at slow rate      d/W Nursing Staff  Will F/U with you  Please call or Page for any issues or change in status  Thanks    Electronically signed by Theresa Ham MD on 8/15/2018 at 2:00 PM

## 2018-08-15 NOTE — PROGRESS NOTES
[facility] - Progress Note    8/15/2018   11:02 AM    Name:  Eloina Macias  :    1970  Age:  49 y.o. male  MRN:    [id number]     Acct:     [de-identified]   Room:  [address] [place] [address] Day: [address]: [address]     Admit Date: 2018  9:15 PM  PCP: Otilio Aviles    Subjective:     C/C:   Chief Complaint   Patient presents with    Abdominal Pain     x 1 day       Interval History: Status: improved. Patient remains unresponsive but is resting comfortably without any pain. KUB confirms decreased stool burden. The patient is scheduled for an attempted MRCP today. Potassium is 3.6 and will be replaced. His medications are being given through his PEG tube with small amounts of water. History: Kami Morgan is a 49 y.o.  male who presents to the emergency department for evaluation of abdominal pain. The patient has severe MR and is nonverbal secondary to underlying cerebral palsy. History is from the chart and caretakers. He presented with abdominal distention and was moaning in pain. Caretaker relates that his symptoms had been increasing over the 24 hours prior to admission. He has tube feeds and was fed on the night of admission. He is exhibiting a low-grade temperature. He is currently resting comfortably in the room. He does not awaken. I have palpated his abdomen extensively and he does not appear to be in any pain. CT abdomen revealed large fecal load. The patient had 2 large bowel movements in the emergency department which appears to have relieved his symptomatology. GI consultation has been obtained. The patient is currently nothing by mouth but I will resume his medications via PEG tube pending their consult. \"    ROS:  Unable to obtain review of systems as patient is noncommunicative. Medications: Allergies:    Allergies   Allergen Reactions    Ampicillin Other (See Comments)    Valium [Diazepam] Other (See Comments)    Other      Bubble bath products        Current Meds:   morphine (PF) injection 2 mg Q2H PRN   Or    morphine (PF) injection 4 mg Q2H PRN   albuterol (PROVENTIL) nebulizer solution 2.5 mg TID PRN   baclofen (LIORESAL) tablet 10 mg TID   vitamin D (CHOLECALCIFEROL) tablet 1,000 Units BID   clonazePAM (KLONOPIN) tablet 2 mg BID PRN   fluticasone (FLONASE) 50 MCG/ACT nasal spray 2 spray Daily   furosemide (LASIX) tablet 20 mg Daily   lacosamide (VIMPAT) tablet 100 mg BID   lactulose (CHRONULAC) 10 GM/15ML solution 20 g BID   lamoTRIgine (LAMICTAL) tablet 300 mg QAM   lamoTRIgine (LAMICTAL) tablet 200 mg Nightly   levETIRAcetam (KEPPRA) 100 MG/ML solution 1,500 mg BID   cetirizine (ZYRTEC) tablet 10 mg Daily   montelukast (SINGULAIR) tablet 10 mg Nightly   REPLETE/FIBER LIQD 75 mL Daily   polyethylene glycol (GLYCOLAX) packet 17 g Daily   sennosides-docusate sodium (SENOKOT-S) 8.6-50 MG tablet 2 tablet Daily   sodium chloride flush 0.9 % injection 10 mL 2 times per day   sodium chloride flush 0.9 % injection 10 mL PRN   potassium chloride (KLOR-CON M) extended release tablet 40 mEq PRN   Or    potassium chloride 20 MEQ/15ML (10%) oral solution 40 mEq PRN   Or    potassium chloride 10 mEq/100 mL IVPB (Peripheral Line) PRN   magnesium sulfate 1 g in dextrose 5% 100 mL IVPB PRN   magnesium hydroxide (MILK OF MAGNESIA) 400 MG/5ML suspension 30 mL Daily PRN   bisacodyl (DULCOLAX) suppository 10 mg Daily PRN   nicotine (NICODERM CQ) 21 MG/24HR 1 patch Daily PRN   0.9 % sodium chloride infusion Continuous   enoxaparin (LOVENOX) injection 40 mg Daily   acetaminophen (TYLENOL) tablet 650 mg Q4H PRN   potassium chloride 20 MEQ/15ML (10%) oral solution 10 mEq BID   ondansetron (ZOFRAN-ODT) disintegrating tablet 4 mg Q6H PRN   Or    ondansetron (ZOFRAN) injection 4 mg Q6H PRN   calcium carbonate (TUMS) chewable tablet 500 mg Daily   lansoprazole suspension SUSP 30 mg YOSI        Data:     Code Status:  Full Code     KUB:8/15/2018  Severe scoliosis is noted.  Mildly dysplastic left hip with osteoarthrosis is   again noted.  A gastrostomy tube projects in the left upper quadrant. Bilateral renal calculi are noted.  Gas is present within mildly prominent   loops of colon and there is a moderate amount of rectal stool burden.  No   dilated loops of small bowel identified. Impression   Overall stool burden has diminished with residual moderate amount of distal   stool burden distending the rectum and mild gaseous prominence of the colon.       Bilateral nephrolithiasis. CT Abdomen Pelvis W Iv Contrast  Result Date: 8/13/2018  COMPARISON: CT dated October 21, 2017  FINDINGS: Examination is limited by significant motion degradation and severe scoliotic curvature. There is also streak artifact from overlying arms. Lower Chest: Lung bases demonstrate no definite acute abnormality. Atelectasis or scarring is noted in the lingula and bilateral bases. No definite acute abnormality to limited views of the mediastinum. Organs: No definite acute hepatic abnormality. Subcentimeter hypodensities in the right hepatic lobe are too small to characterize, probably represents cyst.  Bilateral adrenal glands appear normal.  The spleen is unremarkable. Pancreas appears unremarkable. Slightly smaller coarse calcification in the pancreatic head, equivocal for choledocholithiasis. Cholelithiasis present. The CBD is not significantly dilated. Minimal prominence of the intrahepatic bile ducts. 16 mm hypodensity in the right kidney is indeterminate based on Hounsfield unit attenuation characteristics. Additional bilateral subcentimeter renal hypodensities, too small to characterize, probably representing cysts. Nonobstructive bilateral renal calculi. No definite hydronephrosis. GI/Bowel: Gastrostomy tube in place. No definite acute gastric abnormality. No definite acute small bowel abnormality. Mild nonspecific anal wall thickening.   Large stool burden is noted in the colon with the rectosigmoid region dilated up to 7.7 cm. Pelvis: Redemonstration of small right inguinal hernia containing a portion of the outer wall of the bladder. Tiny calcifications within dependent portion of bladder, equivocal for layering tiny bladder stones versus wall calcification. Coarse prostatic calcifications. Peritoneum/Retroperitoneum: No significant free fluid. There is no significant lymphadenopathy. IVC and aorta demonstrate no definite acute abnormality. Bones/Soft Tissues: Bony demineralization. Chronic dysplastic changes of the left hip. Severe scoliotic curvature of the thoracolumbar spine is again noted. Impression:  1. Examination is limited by significant motion artifact and severe scoliotic curvature of the thoracolumbar spine. 2. Slightly smaller coarse calcifications near the pancreatic head, measuring up to 4 mm, near the distal CBD. Findings are suspicious for choledocholithiasis. Additional probable punctate nonobstructing stone in the mid CBD. The CBD is otherwise not significantly dilated. There is mild prominence of the intrahepatic bile ducts. Additional gallstones are noted. Consider MRCP for further evaluation. 3. Indeterminate 16 mm hypodensity in the right kidney, possibly proteinaceous cyst.  However, consider nonemergent outpatient  further evaluation with ultrasound or renal mass CT/MRI. 4. Large stool burden is seen throughout the colon. The rectosigmoid region is dilated up to 7.7 cm. Correlation for constipation is recommended. 5. Small right inguinal hernia containing a small portion of the outer wall of the bladder. Tiny calcifications along the dependent portion of bladder may represent layering stones versus wall calcifications. 6. Nonobstructive bilateral renal calculi. No hydronephrosis.      Labs:    Hematology:  Recent Labs      08/13/18   2131  08/14/18   0608  08/15/18   0840   WBC  8.4  9.9  6.3   RBC  4.77  4.63  4.62 HGB  15.1  14.4  14.7   HCT  46.0  44.6  43.9   MCV  96.3  96.3  95.0   MCH  31.6  31.2  31.7   MCHC  32.8  32.4  33.4   RDW  13.6  13.3  13.0   PLT  336  298  283   MPV  9.2  8.8  NOT REPORTED   INR   --   1.2   --      Chemistry:  Recent Labs      08/13/18 2131 08/14/18   0608  08/15/18   0522   NA  137  141  145*   K  4.0  3.8  3.6*   CL  98  105  108*   CO2  25  25  24   GLUCOSE  112*  94  84   BUN  17  10  6   CREATININE  <0.40*  <0.40*  <0.40*   MG   --    --   2.1   ANIONGAP  14  11  13   LABGLOM  CANNOT BE CALCULATED  CANNOT BE CALCULATED  CANNOT BE CALCULATED   GFRAA  CANNOT BE CALCULATED  CANNOT BE CALCULATED  CANNOT BE CALCULATED   CALCIUM  9.9  9.4  8.9   LACTA  2.5*   --    --      Recent Labs      08/13/18   2131  08/15/18   0522   PROT  8.1  7.1   LABALBU  4.3  4.0   AST  30  65*   ALT  53*  98*   ALKPHOS  113  84   BILITOT  0.15*  0.24*   BILIDIR  <0.08  <0.08   AMYLASE  40  32   LIPASE  21  13     POC Glucose:No results for input(s): POCGLU in the last 72 hours.     Physical Examination:    /86   Pulse 80   Temp 98.4 °F (36.9 °C) (Axillary)   Resp 16   Ht 4' (1.219 m)   Wt 126 lb (57.2 kg)   SpO2 95%   BMI 38.45 kg/m²   Intake/Output Summary (Last 24 hours) at 08/15/18 1102  Last data filed at 08/15/18 0506   Gross per 24 hour   Intake             1971 ml   Output                0 ml   Net             1971 ml       General Appearance:    Patient was sleeping soundly and awakens only briefly, appears stated age   Head:    Normocephalic, without obvious abnormality, atraumatic   Eyes:    PERRL, conjunctiva/corneas clear, EOM's intact        Ears:    Normal external ear canals, both ears   Nose:   Nares normal, septum midline, mucosa normal, no drainage    or sinus tenderness   Throat:   Lips, mucosa, and tongue normal   Neck:   Supple, symmetrical, trachea midline, no adenopathy;        thyroid:  No enlargement/tenderness/nodules; no carotid    bruit or JVD   Back:     Not evaluated

## 2018-08-15 NOTE — PLAN OF CARE
Problem: Nutrition  Goal: Optimal nutrition therapy  Nutrition Problem: Inadequate oral intake  Intervention: Food and/or Nutrient Delivery: Continue NPO, Start Tube Feeding  Nutritional Goals: EN intake to meet >90% of estimated kcal/protein needs, with good GI tolerance   Outcome: Ongoing  Patient currently NPO. NATHALIA on board. Plans to start tube feeds today after testing completed. Patient has PEG tube. IVF at this time ordered. Will monitor.

## 2018-08-16 ENCOUNTER — ANESTHESIA (OUTPATIENT)
Dept: OPERATING ROOM | Age: 48
DRG: 417 | End: 2018-08-16
Payer: MEDICARE

## 2018-08-16 ENCOUNTER — APPOINTMENT (OUTPATIENT)
Dept: GENERAL RADIOLOGY | Age: 48
DRG: 417 | End: 2018-08-16
Payer: MEDICARE

## 2018-08-16 ENCOUNTER — ANESTHESIA EVENT (OUTPATIENT)
Dept: OPERATING ROOM | Age: 48
DRG: 417 | End: 2018-08-16
Payer: MEDICARE

## 2018-08-16 VITALS — TEMPERATURE: 95.2 F | DIASTOLIC BLOOD PRESSURE: 67 MMHG | SYSTOLIC BLOOD PRESSURE: 111 MMHG | OXYGEN SATURATION: 100 %

## 2018-08-16 LAB
ANION GAP SERPL CALCULATED.3IONS-SCNC: 14 MMOL/L (ref 9–17)
BUN BLDV-MCNC: 4 MG/DL (ref 6–20)
BUN/CREAT BLD: ABNORMAL (ref 9–20)
CALCIUM SERPL-MCNC: 8.9 MG/DL (ref 8.6–10.4)
CHLORIDE BLD-SCNC: 104 MMOL/L (ref 98–107)
CO2: 24 MMOL/L (ref 20–31)
CREAT SERPL-MCNC: <0.4 MG/DL (ref 0.7–1.2)
GFR AFRICAN AMERICAN: ABNORMAL ML/MIN
GFR NON-AFRICAN AMERICAN: ABNORMAL ML/MIN
GFR SERPL CREATININE-BSD FRML MDRD: ABNORMAL ML/MIN/{1.73_M2}
GFR SERPL CREATININE-BSD FRML MDRD: ABNORMAL ML/MIN/{1.73_M2}
GLUCOSE BLD-MCNC: 104 MG/DL (ref 70–99)
GLUCOSE BLD-MCNC: 74 MG/DL (ref 75–110)
GLUCOSE BLD-MCNC: 85 MG/DL (ref 75–110)
GLUCOSE BLD-MCNC: 96 MG/DL (ref 75–110)
HAV IGM SER IA-ACNC: NONREACTIVE
HEPATITIS B CORE IGM ANTIBODY: NONREACTIVE
HEPATITIS B SURFACE ANTIGEN: NONREACTIVE
HEPATITIS C ANTIBODY: NONREACTIVE
MAGNESIUM: 2.2 MG/DL (ref 1.6–2.6)
POTASSIUM SERPL-SCNC: 3.4 MMOL/L (ref 3.7–5.3)
SODIUM BLD-SCNC: 142 MMOL/L (ref 135–144)

## 2018-08-16 PROCEDURE — 3209999900 FLUORO FOR SURGICAL PROCEDURES

## 2018-08-16 PROCEDURE — 2720000010 HC SURG SUPPLY STERILE: Performed by: INTERNAL MEDICINE

## 2018-08-16 PROCEDURE — 2500000003 HC RX 250 WO HCPCS: Performed by: NURSE ANESTHETIST, CERTIFIED REGISTERED

## 2018-08-16 PROCEDURE — 74328 X-RAY BILE DUCT ENDOSCOPY: CPT

## 2018-08-16 PROCEDURE — 2500000003 HC RX 250 WO HCPCS: Performed by: ANESTHESIOLOGY

## 2018-08-16 PROCEDURE — 83735 ASSAY OF MAGNESIUM: CPT

## 2018-08-16 PROCEDURE — 6360000002 HC RX W HCPCS: Performed by: ANESTHESIOLOGY

## 2018-08-16 PROCEDURE — 6360000004 HC RX CONTRAST MEDICATION: Performed by: INTERNAL MEDICINE

## 2018-08-16 PROCEDURE — 2709999900 HC NON-CHARGEABLE SUPPLY: Performed by: INTERNAL MEDICINE

## 2018-08-16 PROCEDURE — 6370000000 HC RX 637 (ALT 250 FOR IP): Performed by: NURSE PRACTITIONER

## 2018-08-16 PROCEDURE — 6360000002 HC RX W HCPCS: Performed by: NURSE ANESTHETIST, CERTIFIED REGISTERED

## 2018-08-16 PROCEDURE — 2580000003 HC RX 258: Performed by: NURSE PRACTITIONER

## 2018-08-16 PROCEDURE — 6370000000 HC RX 637 (ALT 250 FOR IP): Performed by: INTERNAL MEDICINE

## 2018-08-16 PROCEDURE — 80048 BASIC METABOLIC PNL TOTAL CA: CPT

## 2018-08-16 PROCEDURE — 2580000003 HC RX 258: Performed by: NURSE ANESTHETIST, CERTIFIED REGISTERED

## 2018-08-16 PROCEDURE — 0F798ZZ DILATION OF COMMON BILE DUCT, VIA NATURAL OR ARTIFICIAL OPENING ENDOSCOPIC: ICD-10-PCS | Performed by: INTERNAL MEDICINE

## 2018-08-16 PROCEDURE — 3700000000 HC ANESTHESIA ATTENDED CARE: Performed by: INTERNAL MEDICINE

## 2018-08-16 PROCEDURE — 7100000000 HC PACU RECOVERY - FIRST 15 MIN: Performed by: INTERNAL MEDICINE

## 2018-08-16 PROCEDURE — 3700000001 HC ADD 15 MINUTES (ANESTHESIA): Performed by: INTERNAL MEDICINE

## 2018-08-16 PROCEDURE — 82947 ASSAY GLUCOSE BLOOD QUANT: CPT

## 2018-08-16 PROCEDURE — 7100000001 HC PACU RECOVERY - ADDTL 15 MIN: Performed by: INTERNAL MEDICINE

## 2018-08-16 PROCEDURE — 36415 COLL VENOUS BLD VENIPUNCTURE: CPT

## 2018-08-16 PROCEDURE — 99232 SBSQ HOSP IP/OBS MODERATE 35: CPT | Performed by: INTERNAL MEDICINE

## 2018-08-16 PROCEDURE — 0FC98ZZ EXTIRPATION OF MATTER FROM COMMON BILE DUCT, VIA NATURAL OR ARTIFICIAL OPENING ENDOSCOPIC: ICD-10-PCS | Performed by: INTERNAL MEDICINE

## 2018-08-16 PROCEDURE — C1769 GUIDE WIRE: HCPCS | Performed by: INTERNAL MEDICINE

## 2018-08-16 PROCEDURE — 2580000003 HC RX 258: Performed by: INTERNAL MEDICINE

## 2018-08-16 PROCEDURE — 2060000000 HC ICU INTERMEDIATE R&B

## 2018-08-16 PROCEDURE — BF101ZZ FLUOROSCOPY OF BILE DUCTS USING LOW OSMOLAR CONTRAST: ICD-10-PCS | Performed by: INTERNAL MEDICINE

## 2018-08-16 PROCEDURE — 97110 THERAPEUTIC EXERCISES: CPT

## 2018-08-16 PROCEDURE — 3609018800 HC ERCP DX COLLECTION SPECIMEN BRUSHING/WASHING: Performed by: INTERNAL MEDICINE

## 2018-08-16 RX ORDER — FENTANYL CITRATE 50 UG/ML
25 INJECTION, SOLUTION INTRAMUSCULAR; INTRAVENOUS EVERY 5 MIN PRN
Status: DISCONTINUED | OUTPATIENT
Start: 2018-08-16 | End: 2018-08-16 | Stop reason: HOSPADM

## 2018-08-16 RX ORDER — GLYCOPYRROLATE 1 MG/5 ML
SYRINGE (ML) INTRAVENOUS PRN
Status: DISCONTINUED | OUTPATIENT
Start: 2018-08-16 | End: 2018-08-16 | Stop reason: SDUPTHER

## 2018-08-16 RX ORDER — LABETALOL HYDROCHLORIDE 5 MG/ML
5 INJECTION, SOLUTION INTRAVENOUS EVERY 10 MIN PRN
Status: DISCONTINUED | OUTPATIENT
Start: 2018-08-16 | End: 2018-08-16 | Stop reason: HOSPADM

## 2018-08-16 RX ORDER — LIDOCAINE HYDROCHLORIDE 20 MG/ML
INJECTION, SOLUTION INFILTRATION; PERINEURAL PRN
Status: DISCONTINUED | OUTPATIENT
Start: 2018-08-16 | End: 2018-08-16 | Stop reason: SDUPTHER

## 2018-08-16 RX ORDER — HYDRALAZINE HYDROCHLORIDE 20 MG/ML
5 INJECTION INTRAMUSCULAR; INTRAVENOUS EVERY 10 MIN PRN
Status: DISCONTINUED | OUTPATIENT
Start: 2018-08-16 | End: 2018-08-16 | Stop reason: HOSPADM

## 2018-08-16 RX ORDER — FENTANYL CITRATE 50 UG/ML
INJECTION, SOLUTION INTRAMUSCULAR; INTRAVENOUS PRN
Status: DISCONTINUED | OUTPATIENT
Start: 2018-08-16 | End: 2018-08-16 | Stop reason: SDUPTHER

## 2018-08-16 RX ORDER — DEXAMETHASONE SODIUM PHOSPHATE 4 MG/ML
4 INJECTION, SOLUTION INTRA-ARTICULAR; INTRALESIONAL; INTRAMUSCULAR; INTRAVENOUS; SOFT TISSUE
Status: DISCONTINUED | OUTPATIENT
Start: 2018-08-16 | End: 2018-08-16 | Stop reason: HOSPADM

## 2018-08-16 RX ORDER — DEXTROSE MONOHYDRATE 50 MG/ML
INJECTION, SOLUTION INTRAVENOUS CONTINUOUS PRN
Status: DISCONTINUED | OUTPATIENT
Start: 2018-08-16 | End: 2018-08-16 | Stop reason: SDUPTHER

## 2018-08-16 RX ORDER — PROPOFOL 10 MG/ML
INJECTION, EMULSION INTRAVENOUS PRN
Status: DISCONTINUED | OUTPATIENT
Start: 2018-08-16 | End: 2018-08-16 | Stop reason: SDUPTHER

## 2018-08-16 RX ORDER — CEFAZOLIN SODIUM 1 G/3ML
INJECTION, POWDER, FOR SOLUTION INTRAMUSCULAR; INTRAVENOUS PRN
Status: DISCONTINUED | OUTPATIENT
Start: 2018-08-16 | End: 2018-08-16 | Stop reason: SDUPTHER

## 2018-08-16 RX ORDER — MEPERIDINE HYDROCHLORIDE 25 MG/ML
12.5 INJECTION INTRAMUSCULAR; INTRAVENOUS; SUBCUTANEOUS EVERY 5 MIN PRN
Status: DISCONTINUED | OUTPATIENT
Start: 2018-08-16 | End: 2018-08-16 | Stop reason: HOSPADM

## 2018-08-16 RX ORDER — DIPHENHYDRAMINE HYDROCHLORIDE 50 MG/ML
12.5 INJECTION INTRAMUSCULAR; INTRAVENOUS
Status: DISCONTINUED | OUTPATIENT
Start: 2018-08-16 | End: 2018-08-16 | Stop reason: HOSPADM

## 2018-08-16 RX ORDER — ONDANSETRON 2 MG/ML
INJECTION INTRAMUSCULAR; INTRAVENOUS PRN
Status: DISCONTINUED | OUTPATIENT
Start: 2018-08-16 | End: 2018-08-16 | Stop reason: SDUPTHER

## 2018-08-16 RX ORDER — ONDANSETRON 2 MG/ML
4 INJECTION INTRAMUSCULAR; INTRAVENOUS
Status: DISCONTINUED | OUTPATIENT
Start: 2018-08-16 | End: 2018-08-16 | Stop reason: HOSPADM

## 2018-08-16 RX ADMIN — MONTELUKAST SODIUM 10 MG: 10 TABLET, FILM COATED ORAL at 21:38

## 2018-08-16 RX ADMIN — FLUTICASONE PROPIONATE 2 SPRAY: 50 SPRAY, METERED NASAL at 09:39

## 2018-08-16 RX ADMIN — LACTULOSE 20 G: 20 SOLUTION ORAL at 09:40

## 2018-08-16 RX ADMIN — Medication 10 ML: at 21:45

## 2018-08-16 RX ADMIN — VITAMIN D, TAB 1000IU (100/BT) 1000 UNITS: 25 TAB at 09:47

## 2018-08-16 RX ADMIN — POTASSIUM CHLORIDE 40 MEQ: 40 SOLUTION ORAL at 09:43

## 2018-08-16 RX ADMIN — BACLOFEN 10 MG: 10 TABLET ORAL at 09:41

## 2018-08-16 RX ADMIN — FUROSEMIDE 20 MG: 20 TABLET ORAL at 09:41

## 2018-08-16 RX ADMIN — FENTANYL CITRATE 100 MCG: 50 INJECTION, SOLUTION INTRAMUSCULAR; INTRAVENOUS at 17:16

## 2018-08-16 RX ADMIN — CETIRIZINE HYDROCHLORIDE 10 MG: 10 TABLET, FILM COATED ORAL at 09:41

## 2018-08-16 RX ADMIN — LACTULOSE 20 G: 20 SOLUTION ORAL at 21:37

## 2018-08-16 RX ADMIN — LAMOTRIGINE 300 MG: 100 TABLET ORAL at 09:40

## 2018-08-16 RX ADMIN — ANTACID TABLETS 500 MG: 500 TABLET, CHEWABLE ORAL at 09:41

## 2018-08-16 RX ADMIN — POTASSIUM CHLORIDE 10 MEQ: 40 SOLUTION ORAL at 21:37

## 2018-08-16 RX ADMIN — DEXTROSE MONOHYDRATE: 50 INJECTION, SOLUTION INTRAVENOUS at 21:58

## 2018-08-16 RX ADMIN — ONDANSETRON 4 MG: 2 INJECTION, SOLUTION INTRAMUSCULAR; INTRAVENOUS at 18:28

## 2018-08-16 RX ADMIN — SENNOSIDES AND DOCUSATE SODIUM 2 TABLET: 8.6; 5 TABLET ORAL at 09:41

## 2018-08-16 RX ADMIN — CEFAZOLIN 2000 MG: 1 INJECTION, POWDER, FOR SOLUTION INTRAMUSCULAR; INTRAVENOUS at 17:26

## 2018-08-16 RX ADMIN — PHENYLEPHRINE HYDROCHLORIDE 100 MCG: 10 INJECTION INTRAVENOUS at 17:26

## 2018-08-16 RX ADMIN — LACOSAMIDE 100 MG: 100 TABLET, FILM COATED ORAL at 09:41

## 2018-08-16 RX ADMIN — LAMOTRIGINE 200 MG: 100 TABLET ORAL at 21:38

## 2018-08-16 RX ADMIN — LEVETIRACETAM 1500 MG: 100 SOLUTION ORAL at 09:40

## 2018-08-16 RX ADMIN — PROPOFOL 140 MG: 10 INJECTION, EMULSION INTRAVENOUS at 17:16

## 2018-08-16 RX ADMIN — DEXTROSE MONOHYDRATE: 50 INJECTION, SOLUTION INTRAVENOUS at 17:06

## 2018-08-16 RX ADMIN — LACOSAMIDE 100 MG: 100 TABLET, FILM COATED ORAL at 21:38

## 2018-08-16 RX ADMIN — Medication 0.2 MG: at 18:24

## 2018-08-16 RX ADMIN — LEVETIRACETAM 1500 MG: 100 SOLUTION ORAL at 21:37

## 2018-08-16 RX ADMIN — LIDOCAINE HYDROCHLORIDE 70 MG: 20 INJECTION, SOLUTION INFILTRATION; PERINEURAL at 17:16

## 2018-08-16 RX ADMIN — VITAMIN D, TAB 1000IU (100/BT) 1000 UNITS: 25 TAB at 21:38

## 2018-08-16 RX ADMIN — POTASSIUM CHLORIDE 10 MEQ: 40 SOLUTION ORAL at 09:40

## 2018-08-16 RX ADMIN — BACLOFEN 10 MG: 10 TABLET ORAL at 21:38

## 2018-08-16 RX ADMIN — POLYETHYLENE GLYCOL (3350) 17 G: 17 POWDER, FOR SOLUTION ORAL at 09:39

## 2018-08-16 ASSESSMENT — PULMONARY FUNCTION TESTS
PIF_VALUE: 2
PIF_VALUE: 1
PIF_VALUE: 25
PIF_VALUE: 23
PIF_VALUE: 25
PIF_VALUE: 4
PIF_VALUE: 25
PIF_VALUE: 0
PIF_VALUE: 25
PIF_VALUE: 23
PIF_VALUE: 4
PIF_VALUE: 1
PIF_VALUE: 25
PIF_VALUE: 25
PIF_VALUE: 0
PIF_VALUE: 22
PIF_VALUE: 27
PIF_VALUE: 23
PIF_VALUE: 26
PIF_VALUE: 25
PIF_VALUE: 25
PIF_VALUE: 22
PIF_VALUE: 24
PIF_VALUE: 24
PIF_VALUE: 25
PIF_VALUE: 24
PIF_VALUE: 24
PIF_VALUE: 25
PIF_VALUE: 24
PIF_VALUE: 25
PIF_VALUE: 1
PIF_VALUE: 25
PIF_VALUE: 26
PIF_VALUE: 2
PIF_VALUE: 29
PIF_VALUE: 1
PIF_VALUE: 40
PIF_VALUE: 25
PIF_VALUE: 3
PIF_VALUE: 1
PIF_VALUE: 3
PIF_VALUE: 26
PIF_VALUE: 4
PIF_VALUE: 20
PIF_VALUE: 24
PIF_VALUE: 25
PIF_VALUE: 10
PIF_VALUE: 11
PIF_VALUE: 25
PIF_VALUE: 23
PIF_VALUE: 22
PIF_VALUE: 28
PIF_VALUE: 25
PIF_VALUE: 25
PIF_VALUE: 4
PIF_VALUE: 25
PIF_VALUE: 25
PIF_VALUE: 26
PIF_VALUE: 26
PIF_VALUE: 22
PIF_VALUE: 38
PIF_VALUE: 1
PIF_VALUE: 2
PIF_VALUE: 25
PIF_VALUE: 25
PIF_VALUE: 23
PIF_VALUE: 24
PIF_VALUE: 26
PIF_VALUE: 22
PIF_VALUE: 0
PIF_VALUE: 22
PIF_VALUE: 25
PIF_VALUE: 27
PIF_VALUE: 24
PIF_VALUE: 3
PIF_VALUE: 26
PIF_VALUE: 26
PIF_VALUE: 4
PIF_VALUE: 2
PIF_VALUE: 2
PIF_VALUE: 1
PIF_VALUE: 3
PIF_VALUE: 25

## 2018-08-16 NOTE — PLAN OF CARE
Problem: Falls - Risk of:  Goal: Will remain free from falls  Will remain free from falls   Outcome: Ongoing  Pt remains nonverbal and does not follow commands, pt remains free from falls, bed alarm on at all times, will continue to monitor     Comments: careplan reviewed with pt and family

## 2018-08-16 NOTE — PLAN OF CARE
Problem: Risk for Impaired Skin Integrity  Goal: Tissue integrity - skin and mucous membranes  Structural intactness and normal physiological function of skin and  mucous membranes. Outcome: Ongoing  Patients membranes moist. Skin intact, no breakdown. RN will continue to monitor on hourly rounds and as needed. Problem: Falls - Risk of:  Goal: Will remain free from falls  Will remain free from falls   Outcome: Ongoing  Bed in lowest position, wheels locked, bed alarm on. RN will continue to monitor on hourly rounds and as needed.      Comments: Careplan reviewed with patient

## 2018-08-16 NOTE — ANESTHESIA PRE PROCEDURE
Department of Anesthesiology  Preprocedure Note       Name:  Shawn Meier   Age:  49 y.o.  :  1970                                          MRN:  [id number]         Date:  2018      Surgeon: Astrid Davis):  Edmund Vazquez MD    Procedure: Procedure(s):  ERCP ENDOSCOPIC RETROGRADE CHOLANGIOPANCREATOGRAPHY    Medications prior to admission:   Prior to Admission medications    Medication Sig Start Date End Date Taking? Authorizing Provider   glycopyrrolate (ROBINUL) 1 MG tablet 1 mg by Per G Tube route 3 times daily FOR INCREASED SECRETIONS. Yes Historical Provider, MD   carbamide peroxide (DEBROX) 6.5 % otic solution Place 5 drops into both ears 2 times daily FOR 2 DAYS PER MONTH. Yes Historical Provider, MD   melatonin 1 MG tablet 2 mg by Per G Tube route nightly as needed for Sleep   Yes Historical Provider, MD   lacosamide (VIMPAT) 50 MG TABS tablet 100 mg by Per G Tube route nightly. .   Yes Historical Provider, MD   clonazePAM (KLONOPIN) 2 MG tablet Take 2 mg by mouth 2 times daily as needed (for seizure lasting more than3 min). Historical Provider, MD   furosemide (LASIX) 20 MG tablet 20 mg by Per G Tube route daily    Historical Provider, MD   nystatin (MYCOSTATIN) 618593 UNIT/GM powder Apply topically daily Apply between toes.     Historical Provider, MD   baclofen (LIORESAL) 10 MG tablet 10 mg by Per G Tube route 3 times daily    Historical Provider, MD   lamoTRIgine (LAMICTAL) 100 MG tablet 200 mg by Per G Tube route 2 times daily     Historical Provider, MD   Potassium Chloride (KLOR-CON SPRINKLE PO) 10 mEq by Per G Tube route 2 times daily     Historical Provider, MD   montelukast (SINGULAIR) 10 MG tablet 10 mg by Per G Tube route nightly    Historical Provider, MD   fluticasone (FLONASE) 50 MCG/ACT nasal spray 2 sprays by Nasal route daily    Historical Provider, MD   Multiple Vitamin (DAILY-JAY PO) by Gastric Tube route daily    Historical Provider, MD   calcium carbonate 1250 MG/5ML SUSP needed for Wheezing    Historical Provider, MD       Current medications:    Current Facility-Administered Medications   Medication Dose Route Frequency Provider Last Rate Last Dose    morphine (PF) injection 2 mg  2 mg Intravenous Q2H PRN KARIME Bains - CNP   2 mg at 08/15/18 0340    Or    morphine (PF) injection 4 mg  4 mg Intravenous Q2H PRN KARIME Bains CNP        potassium chloride 20 MEQ/15ML (10%) oral solution 20 mEq  20 mEq Oral Once Mariann Habermann,         0.9 % sodium chloride bolus  20 mL Intravenous As Directed RT PRN Joao Shelton MD   Stopped at 08/15/18 1405    dextrose 5 % solution   Intravenous Continuous Glory Alfaro DO 75 mL/hr at 08/15/18 1806      albuterol (PROVENTIL) nebulizer solution 2.5 mg  2.5 mg Nebulization TID PRN Verna Cartwright APRN - CNP        baclofen (LIORESAL) tablet 10 mg  10 mg Per G Tube TID Verna Cartwright APRN - CNP   10 mg at 08/16/18 0941    vitamin D (CHOLECALCIFEROL) tablet 1,000 Units  1,000 Units Per G Tube BID Verna Cartwright APRN - CNP   1,000 Units at 08/16/18 4765    clonazePAM (KLONOPIN) tablet 2 mg  2 mg Oral BID PRN Verna Cartwright APRN - JOSELYN        fluticasone (FLONASE) 50 MCG/ACT nasal spray 2 spray  2 spray Nasal Daily Verna Cartwright APRN - CNP   2 spray at 08/16/18 9499    furosemide (LASIX) tablet 20 mg  20 mg Per G Tube Daily Verna Cartwright APRN - CNP   20 mg at 08/16/18 0941    lacosamide (VIMPAT) tablet 100 mg  100 mg Per G Tube BID Verna Cartwright APRN - CNP   100 mg at 08/16/18 0941    lactulose (CHRONULAC) 10 GM/15ML solution 20 g  20 g Per G Tube BID Verna Cartwright APRN - CNP   20 g at 08/16/18 0940    lamoTRIgine (LAMICTAL) tablet 300 mg  300 mg Per G Tube QAM Verna Cartwright APRN - CNP   300 mg at 08/16/18 0940    lamoTRIgine (LAMICTAL) tablet 200 mg  200 mg Per G Tube Nightly KARIME Cosby CNP   200 mg at 08/15/18 7959    levETIRAcetam (KEPPRA) 100 MG/ML solution 1,500 mg  1,500 mg Oral BID KARIME Hung CNP   1,500 mg at 08/16/18 0940    cetirizine (ZYRTEC) tablet 10 mg  10 mg Oral Daily KARIME Hung CNP   10 mg at 08/16/18 0941    montelukast (SINGULAIR) tablet 10 mg  10 mg Per G Tube Nightly KARIME Hung CNP   10 mg at 08/15/18 2240    REPLETE/FIBER LIQD 75 mL  75 mL Feeding Tube Daily KARIME Hung CNP        polyethylene glycol (GLYCOLAX) packet 17 g  17 g Per G Tube Daily KARIME Hung CNP   17 g at 08/16/18 4848    sennosides-docusate sodium (SENOKOT-S) 8.6-50 MG tablet 2 tablet  2 tablet Per G Tube Daily KARIME Hung CNP   2 tablet at 08/16/18 0941    sodium chloride flush 0.9 % injection 10 mL  10 mL Intravenous 2 times per day KARIME Hung CNP   10 mL at 08/14/18 0845    sodium chloride flush 0.9 % injection 10 mL  10 mL Intravenous PRN KARIME Hung CNP        potassium chloride (KLOR-CON M) extended release tablet 40 mEq  40 mEq Oral PRN KARIME Hung CNP        Or    potassium chloride 20 MEQ/15ML (10%) oral solution 40 mEq  40 mEq Oral PRN KARIME Hung CNP   40 mEq at 08/16/18 0420    Or    potassium chloride 10 mEq/100 mL IVPB (Peripheral Line)  10 mEq Intravenous PRN KARIME Hung CNP        magnesium sulfate 1 g in dextrose 5% 100 mL IVPB  1 g Intravenous PRN KARIME Hung CNP        magnesium hydroxide (MILK OF MAGNESIA) 400 MG/5ML suspension 30 mL  30 mL Oral Daily PRN KARIME Hung CNP        bisacodyl (DULCOLAX) suppository 10 mg  10 mg Rectal Daily PRN Jeannene Woodbury, APRN - CNP        nicotine (NICODERM CQ) 21 MG/24HR 1 patch  1 patch Transdermal Daily PRN KARIME Hung - CNP        enoxaparin (LOVENOX) injection 40 mg  40 mg Subcutaneous Daily KARIME Hung - CNP   40 mg at 08/15/18

## 2018-08-16 NOTE — PROGRESS NOTES
[facility] - Progress Note    2018   9:30 AM    Name:  Zhen Sahni  :    1970  Age:  49 y.o. male  MRN:    [id number]     Acct:     [de-identified]   Room:  [facility]1020-01   Day: 2  Hospital: [facility] Date: 2018  9:15 PM  PCP: Ann Magana    Subjective:     C/C:   Chief Complaint   Patient presents with    Abdominal Pain     x 1 day       Interval History: Status: improved. Patient is more awake today. MRCP suggested large stone within the common bile duct. LFTs are mildly elevated today. The patient is scheduled for an ERCP. On physical exam the patient appears more uncomfortable on palpation across the abdomen although this is difficult to completely evaluate. When I am not palpating his abdomen he appears at his baseline. KUB confirms decreased stool burden. Potassium is 3.4 and will be replaced with 40 mEq of KCl. His medications are being given through his PEG tube with small amounts of water prior to the procedure. History: Chrissy Kelsey is a 49 y.o.  male who presents to the emergency department for evaluation of abdominal pain. The patient has severe MR and is nonverbal secondary to underlying cerebral palsy. History is from the chart and caretakers. He presented with abdominal distention and was moaning in pain. Caretaker relates that his symptoms had been increasing over the 24 hours prior to admission. He has tube feeds and was fed on the night of admission. He is exhibiting a low-grade temperature. He is currently resting comfortably in the room. He does not awaken. I have palpated his abdomen extensively and he does not appear to be in any pain. CT abdomen revealed large fecal load. The patient had 2 large bowel movements in the emergency department which appears to have relieved his symptomatology. GI consultation has been obtained.  The patient is currently nothing by mouth but I will resume his medications via PEG tube for layering tiny bladder stones versus wall calcification. Coarse prostatic calcifications. Peritoneum/Retroperitoneum: No significant free fluid. There is no significant lymphadenopathy. IVC and aorta demonstrate no definite acute abnormality. Bones/Soft Tissues: Bony demineralization. Chronic dysplastic changes of the left hip. Severe scoliotic curvature of the thoracolumbar spine is again noted. Impression:  1. Examination is limited by significant motion artifact and severe scoliotic curvature of the thoracolumbar spine. 2. Slightly smaller coarse calcifications near the pancreatic head, measuring up to 4 mm, near the distal CBD. Findings are suspicious for choledocholithiasis. Additional probable punctate nonobstructing stone in the mid CBD. The CBD is otherwise not significantly dilated. There is mild prominence of the intrahepatic bile ducts. Additional gallstones are noted. Consider MRCP for further evaluation. 3. Indeterminate 16 mm hypodensity in the right kidney, possibly proteinaceous cyst.  However, consider nonemergent outpatient  further evaluation with ultrasound or renal mass CT/MRI. 4. Large stool burden is seen throughout the colon. The rectosigmoid region is dilated up to 7.7 cm. Correlation for constipation is recommended. 5. Small right inguinal hernia containing a small portion of the outer wall of the bladder. Tiny calcifications along the dependent portion of bladder may represent layering stones versus wall calcifications. 6. Nonobstructive bilateral renal calculi. No hydronephrosis.      Labs:    Hematology:  Recent Labs      08/13/18   2131  08/14/18   0608  08/15/18   0840   WBC  8.4  9.9  6.3   RBC  4.77  4.63  4.62   HGB  15.1  14.4  14.7   HCT  46.0  44.6  43.9   MCV  96.3  96.3  95.0   MCH  31.6  31.2  31.7   MCHC  32.8  32.4  33.4   RDW  13.6  13.3  13.0   PLT  336  298  283   MPV  9.2  8.8  NOT REPORTED   INR   --   1.2   --      Chemistry:  Recent

## 2018-08-16 NOTE — ANESTHESIA POSTPROCEDURE EVALUATION
Department of Anesthesiology  Postprocedure Note    Patient: Vaughn Watts  MRN: [id number]  YOB: 1970  Date of evaluation: 8/16/2018  Time:  6:35 PM     Procedure Summary     Date:  08/16/18 Room / Location:  [facility]    Anesthesia Start:  1498 Anesthesia Stop:  7035    Procedure:  ERCP PAPILOTOMY SWEEPING STONE EXTRACTION ENDOSCOPIC RETROGRADE CHOLANGIOPANCREATOGRAPHY (N/A ) Diagnosis:  (obstipation)    Surgeon:  Brittany Black MD Responsible Provider:  Krystina Lucio MD    Anesthesia Type:  general ASA Status:  4          Anesthesia Type: general    Yee Phase I:      Yee Phase II:      Last vitals: Reviewed and per EMR flowsheets. Anesthesia Post Evaluation    Patient location during evaluation: PACU  Patient participation: complete - patient cannot participate  Level of consciousness: awake  Pain score: 0  Airway patency: patent  Nausea & Vomiting: no nausea and no vomiting  Complications: no  Cardiovascular status: blood pressure returned to baseline  Respiratory status: acceptable  Hydration status: euvolemic  Comments: Pt has mental retardation he is back to his preop basline.

## 2018-08-17 ENCOUNTER — ANESTHESIA EVENT (OUTPATIENT)
Dept: OPERATING ROOM | Age: 48
DRG: 417 | End: 2018-08-17
Payer: MEDICARE

## 2018-08-17 ENCOUNTER — APPOINTMENT (OUTPATIENT)
Dept: GENERAL RADIOLOGY | Age: 48
DRG: 417 | End: 2018-08-17
Payer: MEDICARE

## 2018-08-17 LAB
ABSOLUTE EOS #: 0 K/UL (ref 0–0.4)
ABSOLUTE IMMATURE GRANULOCYTE: ABNORMAL K/UL (ref 0–0.3)
ABSOLUTE LYMPH #: 1 K/UL (ref 1–4.8)
ABSOLUTE MONO #: 0.8 K/UL (ref 0.2–0.8)
ALBUMIN SERPL-MCNC: 3.9 G/DL (ref 3.5–5.2)
ALBUMIN/GLOBULIN RATIO: ABNORMAL (ref 1–2.5)
ALP BLD-CCNC: 135 U/L (ref 40–129)
ALT SERPL-CCNC: 106 U/L (ref 5–41)
ANION GAP SERPL CALCULATED.3IONS-SCNC: 15 MMOL/L (ref 9–17)
AST SERPL-CCNC: 102 U/L
BASOPHILS # BLD: 1 % (ref 0–2)
BASOPHILS ABSOLUTE: 0.1 K/UL (ref 0–0.2)
BILIRUB SERPL-MCNC: 1.28 MG/DL (ref 0.3–1.2)
BILIRUBIN DIRECT: 0.82 MG/DL
BILIRUBIN, INDIRECT: 0.46 MG/DL (ref 0–1)
BUN BLDV-MCNC: 5 MG/DL (ref 6–20)
CALCIUM SERPL-MCNC: 9.1 MG/DL (ref 8.6–10.4)
CHLORIDE BLD-SCNC: 97 MMOL/L (ref 98–107)
CO2: 22 MMOL/L (ref 20–31)
CREAT SERPL-MCNC: <0.4 MG/DL (ref 0.7–1.2)
DIFFERENTIAL TYPE: ABNORMAL
EOSINOPHILS RELATIVE PERCENT: 0 % (ref 1–4)
GFR AFRICAN AMERICAN: ABNORMAL ML/MIN
GFR NON-AFRICAN AMERICAN: ABNORMAL ML/MIN
GFR SERPL CREATININE-BSD FRML MDRD: ABNORMAL ML/MIN/{1.73_M2}
GFR SERPL CREATININE-BSD FRML MDRD: ABNORMAL ML/MIN/{1.73_M2}
GLUCOSE BLD-MCNC: 105 MG/DL (ref 75–110)
GLUCOSE BLD-MCNC: 106 MG/DL (ref 75–110)
GLUCOSE BLD-MCNC: 128 MG/DL (ref 75–110)
GLUCOSE BLD-MCNC: 151 MG/DL (ref 70–99)
GLUCOSE BLD-MCNC: 176 MG/DL (ref 75–110)
HCT VFR BLD CALC: 45.6 % (ref 41–53)
HEMOGLOBIN: 15 G/DL (ref 13.5–17.5)
IMMATURE GRANULOCYTES: ABNORMAL %
LYMPHOCYTES # BLD: 8 % (ref 24–44)
MAGNESIUM: 2 MG/DL (ref 1.6–2.6)
MCH RBC QN AUTO: 31.3 PG (ref 26–34)
MCHC RBC AUTO-ENTMCNC: 32.9 G/DL (ref 31–37)
MCV RBC AUTO: 95.1 FL (ref 80–100)
MONOCYTES # BLD: 7 % (ref 1–7)
NRBC AUTOMATED: ABNORMAL PER 100 WBC
PDW BLD-RTO: 12.3 % (ref 11.5–14.5)
PLATELET # BLD: 318 K/UL (ref 130–400)
PLATELET ESTIMATE: ABNORMAL
PMV BLD AUTO: ABNORMAL FL (ref 6–12)
POTASSIUM SERPL-SCNC: 3.9 MMOL/L (ref 3.7–5.3)
RBC # BLD: 4.79 M/UL (ref 4.5–5.9)
RBC # BLD: ABNORMAL 10*6/UL
SEG NEUTROPHILS: 84 % (ref 36–66)
SEGMENTED NEUTROPHILS ABSOLUTE COUNT: 10.4 K/UL (ref 1.8–7.7)
SODIUM BLD-SCNC: 134 MMOL/L (ref 135–144)
TOTAL PROTEIN: 7.2 G/DL (ref 6.4–8.3)
WBC # BLD: 12.3 K/UL (ref 3.5–11)
WBC # BLD: ABNORMAL 10*3/UL

## 2018-08-17 PROCEDURE — 6370000000 HC RX 637 (ALT 250 FOR IP): Performed by: NURSE PRACTITIONER

## 2018-08-17 PROCEDURE — 83735 ASSAY OF MAGNESIUM: CPT

## 2018-08-17 PROCEDURE — 2580000003 HC RX 258: Performed by: SURGERY

## 2018-08-17 PROCEDURE — 2500000003 HC RX 250 WO HCPCS: Performed by: SURGERY

## 2018-08-17 PROCEDURE — 6360000002 HC RX W HCPCS: Performed by: SURGERY

## 2018-08-17 PROCEDURE — 43262 ENDO CHOLANGIOPANCREATOGRAPH: CPT | Performed by: INTERNAL MEDICINE

## 2018-08-17 PROCEDURE — 43264 ERCP REMOVE DUCT CALCULI: CPT | Performed by: INTERNAL MEDICINE

## 2018-08-17 PROCEDURE — 99233 SBSQ HOSP IP/OBS HIGH 50: CPT | Performed by: INTERNAL MEDICINE

## 2018-08-17 PROCEDURE — 6360000002 HC RX W HCPCS: Performed by: NURSE PRACTITIONER

## 2018-08-17 PROCEDURE — 97110 THERAPEUTIC EXERCISES: CPT

## 2018-08-17 PROCEDURE — 6360000002 HC RX W HCPCS: Performed by: INTERNAL MEDICINE

## 2018-08-17 PROCEDURE — 6370000000 HC RX 637 (ALT 250 FOR IP): Performed by: INTERNAL MEDICINE

## 2018-08-17 PROCEDURE — 74018 RADEX ABDOMEN 1 VIEW: CPT

## 2018-08-17 PROCEDURE — 80053 COMPREHEN METABOLIC PANEL: CPT

## 2018-08-17 PROCEDURE — 82248 BILIRUBIN DIRECT: CPT

## 2018-08-17 PROCEDURE — 82947 ASSAY GLUCOSE BLOOD QUANT: CPT

## 2018-08-17 PROCEDURE — 99232 SBSQ HOSP IP/OBS MODERATE 35: CPT | Performed by: INTERNAL MEDICINE

## 2018-08-17 PROCEDURE — 2060000000 HC ICU INTERMEDIATE R&B

## 2018-08-17 PROCEDURE — 85025 COMPLETE CBC W/AUTO DIFF WBC: CPT

## 2018-08-17 PROCEDURE — 36415 COLL VENOUS BLD VENIPUNCTURE: CPT

## 2018-08-17 PROCEDURE — 2580000003 HC RX 258: Performed by: NURSE PRACTITIONER

## 2018-08-17 RX ORDER — CIPROFLOXACIN 2 MG/ML
400 INJECTION, SOLUTION INTRAVENOUS EVERY 12 HOURS
Status: DISCONTINUED | OUTPATIENT
Start: 2018-08-17 | End: 2018-08-20

## 2018-08-17 RX ORDER — SODIUM CHLORIDE 9 MG/ML
INJECTION, SOLUTION INTRAVENOUS CONTINUOUS
Status: DISCONTINUED | OUTPATIENT
Start: 2018-08-17 | End: 2018-08-18

## 2018-08-17 RX ORDER — METOCLOPRAMIDE HYDROCHLORIDE 5 MG/ML
10 INJECTION INTRAMUSCULAR; INTRAVENOUS EVERY 8 HOURS SCHEDULED
Status: DISCONTINUED | OUTPATIENT
Start: 2018-08-17 | End: 2018-08-21 | Stop reason: HOSPADM

## 2018-08-17 RX ADMIN — METRONIDAZOLE 500 MG: 500 INJECTION, SOLUTION INTRAVENOUS at 21:29

## 2018-08-17 RX ADMIN — BACLOFEN 10 MG: 10 TABLET ORAL at 21:12

## 2018-08-17 RX ADMIN — LEVETIRACETAM 1500 MG: 100 SOLUTION ORAL at 08:25

## 2018-08-17 RX ADMIN — MORPHINE SULFATE 2 MG: 2 INJECTION, SOLUTION INTRAMUSCULAR; INTRAVENOUS at 09:11

## 2018-08-17 RX ADMIN — LACOSAMIDE 100 MG: 100 TABLET, FILM COATED ORAL at 08:25

## 2018-08-17 RX ADMIN — METOCLOPRAMIDE 10 MG: 5 INJECTION, SOLUTION INTRAMUSCULAR; INTRAVENOUS at 21:13

## 2018-08-17 RX ADMIN — POTASSIUM CHLORIDE 10 MEQ: 40 SOLUTION ORAL at 08:25

## 2018-08-17 RX ADMIN — ENOXAPARIN SODIUM 40 MG: 40 INJECTION SUBCUTANEOUS at 08:25

## 2018-08-17 RX ADMIN — LACOSAMIDE 100 MG: 100 TABLET, FILM COATED ORAL at 21:12

## 2018-08-17 RX ADMIN — LAMOTRIGINE 300 MG: 100 TABLET ORAL at 08:25

## 2018-08-17 RX ADMIN — METRONIDAZOLE 500 MG: 500 INJECTION, SOLUTION INTRAVENOUS at 14:34

## 2018-08-17 RX ADMIN — BACLOFEN 10 MG: 10 TABLET ORAL at 08:25

## 2018-08-17 RX ADMIN — POTASSIUM CHLORIDE 10 MEQ: 40 SOLUTION ORAL at 21:37

## 2018-08-17 RX ADMIN — MONTELUKAST SODIUM 10 MG: 10 TABLET, FILM COATED ORAL at 21:12

## 2018-08-17 RX ADMIN — POLYETHYLENE GLYCOL (3350) 17 G: 17 POWDER, FOR SOLUTION ORAL at 08:26

## 2018-08-17 RX ADMIN — CIPROFLOXACIN 400 MG: 2 INJECTION, SOLUTION INTRAVENOUS at 13:18

## 2018-08-17 RX ADMIN — LACTULOSE 20 G: 20 SOLUTION ORAL at 21:13

## 2018-08-17 RX ADMIN — SODIUM CHLORIDE: 9 INJECTION, SOLUTION INTRAVENOUS at 10:48

## 2018-08-17 RX ADMIN — LEVETIRACETAM 1500 MG: 100 SOLUTION ORAL at 21:13

## 2018-08-17 RX ADMIN — VITAMIN D, TAB 1000IU (100/BT) 1000 UNITS: 25 TAB at 08:26

## 2018-08-17 RX ADMIN — LACTULOSE 20 G: 20 SOLUTION ORAL at 08:25

## 2018-08-17 RX ADMIN — LAMOTRIGINE 200 MG: 100 TABLET ORAL at 21:12

## 2018-08-17 RX ADMIN — METOCLOPRAMIDE 10 MG: 5 INJECTION, SOLUTION INTRAMUSCULAR; INTRAVENOUS at 13:21

## 2018-08-17 RX ADMIN — Medication 30 MG: at 05:49

## 2018-08-17 RX ADMIN — Medication 10 ML: at 21:37

## 2018-08-17 RX ADMIN — ANTACID TABLETS 500 MG: 500 TABLET, CHEWABLE ORAL at 08:29

## 2018-08-17 RX ADMIN — MORPHINE SULFATE 4 MG: 4 INJECTION, SOLUTION INTRAMUSCULAR; INTRAVENOUS at 21:41

## 2018-08-17 RX ADMIN — CETIRIZINE HYDROCHLORIDE 10 MG: 10 TABLET, FILM COATED ORAL at 08:26

## 2018-08-17 RX ADMIN — FUROSEMIDE 20 MG: 20 TABLET ORAL at 08:26

## 2018-08-17 RX ADMIN — VITAMIN D, TAB 1000IU (100/BT) 1000 UNITS: 25 TAB at 21:29

## 2018-08-17 ASSESSMENT — PAIN SCALES - GENERAL: PAINLEVEL_OUTOF10: 5

## 2018-08-17 NOTE — PROGRESS NOTES
2067 ml       Labs:      CBC: Lab Results   Component Value Date    WBC 12.3 08/17/2018    RBC 4.79 08/17/2018    HGB 15.0 08/17/2018    HCT 45.6 08/17/2018    MCV 95.1 08/17/2018    MCH 31.3 08/17/2018    MCHC 32.9 08/17/2018    RDW 12.3 08/17/2018     08/17/2018    MPV NOT REPORTED 08/17/2018     CBC with Differential:  Lab Results   Component Value Date    WBC 12.3 08/17/2018    RBC 4.79 08/17/2018    HGB 15.0 08/17/2018    HCT 45.6 08/17/2018     08/17/2018    MCV 95.1 08/17/2018    MCH 31.3 08/17/2018    MCHC 32.9 08/17/2018    RDW 12.3 08/17/2018    LYMPHOPCT 8 08/17/2018    MONOPCT 7 08/17/2018    BASOPCT 1 08/17/2018    MONOSABS 0.80 08/17/2018    LYMPHSABS 1.00 08/17/2018    EOSABS 0.00 08/17/2018    BASOSABS 0.10 08/17/2018    DIFFTYPE NOT REPORTED 08/17/2018     Hemoglobin/Hematocrit:  Lab Results   Component Value Date    HGB 15.0 08/17/2018    HCT 45.6 08/17/2018     CMP:  Lab Results   Component Value Date     08/17/2018    K 3.9 08/17/2018    CL 97 08/17/2018    CO2 22 08/17/2018    BUN 5 08/17/2018    CREATININE <0.40 08/17/2018    GFRAA CANNOT BE CALCULATED 08/17/2018    LABGLOM CANNOT BE CALCULATED 08/17/2018    GLUCOSE 151 08/17/2018    PROT 7.2 08/17/2018    LABALBU 3.9 08/17/2018    CALCIUM 9.1 08/17/2018    BILITOT 1.28 08/17/2018    ALKPHOS 135 08/17/2018     08/17/2018     08/17/2018     BMP:  Lab Results   Component Value Date     08/17/2018    K 3.9 08/17/2018    CL 97 08/17/2018    CO2 22 08/17/2018    BUN 5 08/17/2018    LABALBU 3.9 08/17/2018    CREATININE <0.40 08/17/2018    CALCIUM 9.1 08/17/2018    GFRAA CANNOT BE CALCULATED 08/17/2018    LABGLOM CANNOT BE CALCULATED 08/17/2018    GLUCOSE 151 08/17/2018     PT/INR:    Lab Results   Component Value Date    PROTIME 12.5 08/14/2018    INR 1.2 08/14/2018     PTT:  No results found for: APTT, PTT[APTT}    Physical Examination:        General appearance: MRDD  Abdomen: soft, nontender, mild

## 2018-08-17 NOTE — PROGRESS NOTES
[facility] - Progress Note    2018   9:17 AM    Name:  Zhen Sahni  :    1970  Age:  49 y.o. male  MRN:    [id number]     Acct:     [de-identified]   Room:  [address] [place] [address] Day: [address] [facility] [address]: [facility] Date: 2018  9:15 PM  PCP: Ann Magana    Subjective:     C/C:   Chief Complaint   Patient presents with    Abdominal Pain     x 1 day       Interval History: Status: Significantly worse. Called by nursing to evaluate patient. His abdomen initially appeared to be a rigid and distended however when he relaxes (and stops bearing down) it appears once again to be soft. He appears to be in a significant amount of pain. When his PEG tube is uncapped air spontaneously comes out. LFTs are all elevated over yesterday as is his white count. He is status post ERCP with papillotomy, multiple balloon sweeps, extension of papillotomy and removal of multiple stone fragments. Normal bile flow was reported from the duct at the end of the procedure. Multiple retained stones are noted within the gallbladder. Patient has had a bowel movement and tube feeds were resumed last night. Stat KUB is ordered. The case has been discussed with GI. At this point surgical consultation has been obtained as the patient will be cholecystectomy and CBD dye injection. KUB just returned. There is no evidence of acute abdomen. Small bowel and large bowel distention consistent with ileus. History: Chrissy Kelsey is a 49 y.o.  male who presents to the emergency department for evaluation of abdominal pain. The patient has severe MR and is nonverbal secondary to underlying cerebral palsy. History is from the chart and caretakers. He presented with abdominal distention and was moaning in pain. Caretaker relates that his symptoms had been increasing over the 24 hours prior to admission. He has tube feeds and was fed on the night of admission. He is exhibiting a low-grade temperature.  He is currently resting comfortably in the room. He does not awaken. I have palpated his abdomen extensively and he does not appear to be in any pain. CT abdomen revealed large fecal load. The patient had 2 large bowel movements in the emergency department which appears to have relieved his symptomatology. GI consultation has been obtained. The patient is currently nothing by mouth but I will resume his medications via PEG tube pending their consult. \"    ROS:  Unable to obtain review of systems as patient is noncommunicative. Patient is obviously in significant discomfort from abdominal distention. Medications: Allergies:    Allergies   Allergen Reactions    Ampicillin Other (See Comments)    Valium [Diazepam] Other (See Comments)    Other      Bubble bath products        Current Meds:     morphine (PF) injection 2 mg Q2H PRN   Or    morphine (PF) injection 4 mg Q2H PRN   potassium chloride 20 MEQ/15ML (10%) oral solution 20 mEq Once   0.9 % sodium chloride bolus As Directed RT PRN   dextrose 5 % solution Continuous   albuterol (PROVENTIL) nebulizer solution 2.5 mg TID PRN   baclofen (LIORESAL) tablet 10 mg TID   vitamin D (CHOLECALCIFEROL) tablet 1,000 Units BID   clonazePAM (KLONOPIN) tablet 2 mg BID PRN   fluticasone (FLONASE) 50 MCG/ACT nasal spray 2 spray Daily   furosemide (LASIX) tablet 20 mg Daily   lacosamide (VIMPAT) tablet 100 mg BID   lactulose (CHRONULAC) 10 GM/15ML solution 20 g BID   lamoTRIgine (LAMICTAL) tablet 300 mg QAM   lamoTRIgine (LAMICTAL) tablet 200 mg Nightly   levETIRAcetam (KEPPRA) 100 MG/ML solution 1,500 mg BID   cetirizine (ZYRTEC) tablet 10 mg Daily   montelukast (SINGULAIR) tablet 10 mg Nightly   REPLETE/FIBER LIQD 75 mL Daily   polyethylene glycol (GLYCOLAX) packet 17 g Daily   sennosides-docusate sodium (SENOKOT-S) 8.6-50 MG tablet 2 tablet Daily   sodium chloride flush 0.9 % injection 10 mL 2 times per day   sodium chloride flush 0.9 % injection 10 mL PRN   potassium chloride (KLOR-CON M) extended release tablet 40 mEq PRN   Or    potassium chloride 20 MEQ/15ML (10%) oral solution 40 mEq PRN   Or    potassium chloride 10 mEq/100 mL IVPB (Peripheral Line) PRN   magnesium sulfate 1 g in dextrose 5% 100 mL IVPB PRN   magnesium hydroxide (MILK OF MAGNESIA) 400 MG/5ML suspension 30 mL Daily PRN   bisacodyl (DULCOLAX) suppository 10 mg Daily PRN   nicotine (NICODERM CQ) 21 MG/24HR 1 patch Daily PRN   enoxaparin (LOVENOX) injection 40 mg Daily   acetaminophen (TYLENOL) tablet 650 mg Q4H PRN   potassium chloride 20 MEQ/15ML (10%) oral solution 10 mEq BID   ondansetron (ZOFRAN-ODT) disintegrating tablet 4 mg Q6H PRN   Or    ondansetron (ZOFRAN) injection 4 mg Q6H PRN   calcium carbonate (TUMS) chewable tablet 500 mg Daily   lansoprazole suspension SUSP 30 mg QAM AC       Data:     Code Status:  Full Code     KUB: 8/17/2018  There is PEG tube noted.  Bilateral renal calculi are noted.       There is gaseous distention of the small and large bowel, likely related to   an ileus, not appreciably changed.       There is a marked scoliosis.  The bones are osteopenic.  There is deformity   of the left femoral head. Impression   1. Bilateral renal calculi. 2. Gaseous distention of small and large bowel, likely related to an ileus. ERCP: 8/16/2018  1. ERCP. 2.  ERCP with papillotomy. 3.  ERCP with balloon sweep. 4. ERCP with large amount of broken stones and sludge extraction as  mentioned below.     PREOPERATIVE DIAGNOSES:  1. History for CBD stones. 2.  Mild elevation of the LFTs.     POSTOPERATIVE DIAGNOSES:  1. Difficult anatomy secondary to body stature. 2.  Mild dilation of the common bile duct. 3.  Papillotomy was made. 4.  Large amount of dark stone pieces as well as sludge were extracted, as  mentioned below.     MRCP: 8/15/2018  There is an 8 mm cyst in the posterior right lobe of liver.  There is motion   degradation of particularly the T1 weighted images.  No scoliotic curvature of the thoracolumbar spine. 2. Slightly smaller coarse calcifications near the pancreatic head, measuring up to 4 mm, near the distal CBD. Findings are suspicious for choledocholithiasis. Additional probable punctate nonobstructing stone in the mid CBD. The CBD is otherwise not significantly dilated. There is mild prominence of the intrahepatic bile ducts. Additional gallstones are noted. Consider MRCP for further evaluation. 3. Indeterminate 16 mm hypodensity in the right kidney, possibly proteinaceous cyst.  However, consider nonemergent outpatient  further evaluation with ultrasound or renal mass CT/MRI. 4. Large stool burden is seen throughout the colon. The rectosigmoid region is dilated up to 7.7 cm. Correlation for constipation is recommended. 5. Small right inguinal hernia containing a small portion of the outer wall of the bladder. Tiny calcifications along the dependent portion of bladder may represent layering stones versus wall calcifications. 6. Nonobstructive bilateral renal calculi. No hydronephrosis.      Labs:    Hematology:  Recent Labs      08/15/18   0840  08/17/18   0629   WBC  6.3  12.3*   RBC  4.62  4.79   HGB  14.7  15.0   HCT  43.9  45.6   MCV  95.0  95.1   MCH  31.7  31.3   MCHC  33.4  32.9   RDW  13.0  12.3   PLT  283  318   MPV  NOT REPORTED  NOT REPORTED     Chemistry:  Recent Labs      08/15/18   0522  08/16/18   0537  08/17/18   0629   NA  145*  142  134*   K  3.6*  3.4*  3.9   CL  108*  104  97*   CO2  24  24  22   GLUCOSE  84  104*  151*   BUN  6  4*  5*   CREATININE  <0.40*  <0.40*  <0.40*   MG  2.1  2.2  2.0   ANIONGAP  13  14  15   LABGLOM  CANNOT BE CALCULATED  CANNOT BE CALCULATED  CANNOT BE CALCULATED   GFRAA  CANNOT BE CALCULATED  CANNOT BE CALCULATED  CANNOT BE CALCULATED   CALCIUM  8.9  8.9  9.1     Recent Labs      08/15/18   0522  08/17/18   0629   PROT  7.1  7.2   LABALBU  4.0  3.9   AST  65*  102*   ALT  98*  106*   ALKPHOS  84  135* BILITOT  0.24*  1.28*   BILIDIR  <0.08  0.82*   AMYLASE  32   --    LIPASE  13   --      POC Glucose:  Recent Labs      08/15/18   2347  08/16/18   0640  08/16/18   0818  08/16/18   2035  08/16/18   2358  08/17/18   0607   POCGLU  109  96  85  74*  128*  176*       Physical Examination:    BP (!) 150/80   Pulse 104   Temp 98.4 °F (36.9 °C) (Oral)   Resp 18   Ht 4' (1.219 m)   Wt 122 lb 11.2 oz (55.7 kg)   SpO2 92%   BMI 37.44 kg/m²     Intake/Output Summary (Last 24 hours) at 08/17/18 0455  Last data filed at 08/17/18 5129   Gross per 24 hour   Intake             2067 ml   Output                0 ml   Net             2067 ml       General Appearance:    Patient is awake and in significant discomfort    Head:    Normocephalic, without obvious abnormality, atraumatic   Eyes:    PERRL, conjunctiva/corneas clear, EOM's intact        Ears:    Normal external ear canals, both ears   Nose:   Nares normal, septum midline, mucosa normal, no drainage    or sinus tenderness   Throat:   Lips, mucosa, and tongue normal   Neck:   Supple, symmetrical, trachea midline, no adenopathy;        thyroid:  No enlargement/tenderness/nodules; no carotid    bruit or JVD   Back:     Not evaluated at this time    Lungs:     Clear to auscultation bilaterally, respirations unlabored   Chest wall:    No tenderness or deformity   Heart:    Regular rate and rhythm, S1 and S2 normal, no murmur, rub   or gallop   Abdomen:     Rigid, painful to palpation, PEG tube is in place and leaking air from the lumen    Extremities:   Flexion contractures noted    Pulses:   2+ and symmetric all extremities   Skin:   Skin color, texture, turgor normal, no rashes or lesions   Lymph nodes:   Cervical, supraclavicular, and axillary nodes normal   Neurologic:   CNII-XII Grossly intact, History of quadriplegia          Assessment:     Primary Problem  Obstipation     Active Hospital Problems    Diagnosis Date Noted    Choledocholithiasis [K80.50] 08/14/2018

## 2018-08-17 NOTE — PLAN OF CARE
Problem: Risk for Impaired Skin Integrity  Goal: Tissue integrity - skin and mucous membranes  Structural intactness and normal physiological function of skin and  mucous membranes. Outcome: Ongoing  Skin No rashes or nodules noted. . Mucus membranes moist. Patient turned and repositioned as needed. Heels elevated as needed. Dressings changed as needed and per orders. Continue to monitor skin for breakdown. Problem: Falls - Risk of:  Goal: Will remain free from falls  Will remain free from falls   Outcome: Ongoing  Patient is fall risk per fall scale. Falling star on door. Fall sticker on armband. Hourly rounding performed. Personal belongings and call light within reach. Bed in low position. Restraint alternatives in place.

## 2018-08-17 NOTE — PROGRESS NOTES
Spoke with Osvaldo Fields, patients POA/sister.  Advised her to be available tomorrow morning prior to patient's surgery for verbal consent due to her being ill and not making it to the hospital.

## 2018-08-17 NOTE — OP NOTE
[id number] [facility] [address]                 [address] [facility], [address] [place] [address]                                 OPERATIVE REPORT    PATIENT NAME: Jaswinder Chavez                     :        1970  MED REC NO:   [id number]                             ROOM:       1020  ACCOUNT NO:   [de-identified]                           ADMIT DATE: 2018  PROVIDER:     Serenity Bañuelos    DATE OF PROCEDURE:  2018    PROCEDURES PERFORMED:  1. ERCP. 2.  ERCP with papillotomy. 3.  ERCP with balloon sweep. 4. ERCP with large amount of broken stones and sludge extraction as  mentioned below. PREOPERATIVE DIAGNOSES:  1. History for CBD stones. 2.  Mild elevation of the LFTs. POSTOPERATIVE DIAGNOSES:  1. Difficult anatomy secondary to body stature. 2.  Mild dilation of the common bile duct. 3.  Papillotomy was made. 4.  Large amount of dark stone pieces as well as sludge were extracted, as  mentioned below. SURGEON:  Serenity Bañuelos MD    SEDATION:  As per Anesthesia. OPERATIVE PROCEDURE:  The procedure was explained to the patient's sister  on the telephone and informed consent was taken. All the risks, benefits,  alternatives including bleeding, perforation, cardiorespiratory problem,  pancreatitis were all explained to them. Informed consent was also taken  by the anesthesiologist.  The patient was brought to the endoscopy suite  and sedated as per Anesthesia and placed in appropriate ERCP position  secondary to body stature with MR/DD. Intubation was slightly difficult. Subsequently, descending duodenum was reached and ampulla was identified. Contrast material was injected into the ampulla after cannulating the  common bile duct, which appeared mildly dilated. Some radiopaque stones  were seen. Gallbladder filling was also noted with large amount of stones  in the gallbladder. Approximately, 1-cm papillotomy was made, which  overlaid and then extended.   Multiple

## 2018-08-17 NOTE — CONSULTS
General Surgery Consult      Pt Name: Peter Celestin  MRN: [id number]  [place]: 1970  Date of evaluation: 8/17/2018  Primary Care Physician: Gilberto Ramon   Patient evaluated at the request of  Dr. Jayme Law  Reason for evaluation: abdominal pain    SUBJECTIVE:   History of Chief Complaint:    Peter Celestin is a 49 y.o. male with a history of MRDD due to cerebral palsy and is non-verbal. He was noted by his care giver to have worsening abdominal pain and distension and was brought to ED. A CT scan was performed showing multiple stones in the gallbladder. The patient had a gastroenterology evaluation and an MRCP was ordered. This also showed multiple stones in the gallbladder and the common bile duct. The patient underwent an ERCP yesterday where a papillotomy was performed and a balloon sweep was done to remove multiple stones in the common bile duct. Fragments or stone were also noted. The gallbladder still has multiple stones. We were asked to see the patient regarding abdominal distention and need for cholecystectomy     Past Medical History   has a past medical history of Cerebral palsy ([address] Cholelithiasis with chronic cholecystitis; Constipation; Delayed gastric emptying; Dysphagia; GERD (gastroesophageal reflux disease); Hearing loss; Mental disability; Profound mental retardation; Scoliosis; Seizures ([address] and 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[address] Past Surgical History   has a past surgical history that includes Stomach surgery; ERCP (08/16/2018); and pr ercp dx collection specimen brushing/washing (N/A, 8/16/2018). Medications  Prior to Admission medications    Medication Sig Start Date End Date Taking? Authorizing Provider   glycopyrrolate (ROBINUL) 1 MG tablet 1 mg by Per G Tube route 3 times daily FOR INCREASED SECRETIONS. Yes Historical Provider, MD   carbamide peroxide (DEBROX) 6.5 % otic solution Place 5 drops into both ears 2 times daily FOR 2 DAYS PER MONTH.    Yes Historical evaluation. 3. Indeterminate 16 mm hypodensity in the right kidney, possibly  proteinaceous cyst.  However, consider nonemergent evaluation with ultrasound  or renal mass CT/MRI. 4. Large stool burden is seen throughout the colon. The rectosigmoid region  is dilated up to 7.7 cm. Correlation for constipation is recommended. 5. Small right inguinal hernia containing a small portion of the outer wall  of the bladder. Tiny calcifications along the dependent portion of bladder  may represent layering stones versus wall calcifications. 6. Nonobstructive bilateral renal calculi. No hydronephrosis. MRI/MRCP:  There is an 8 mm cyst in the posterior right lobe of liver. There is motion  degradation of particularly the T1 weighted images. No suspicious liver  lesion. No steatosis. The spleen, pancreas, adrenal glands show no  significant abnormalities. Bilateral renal cysts largest 1.4 cm on the right  noted. Gallbladder: Cholelithiasis with several small gallstones evident. No  findings to suggest acute cholecystitis. Bile Ducts: MRCP images are quite suboptimal.  No clear evidence for  intrahepatic biliary ductal dilatation. Dilatation of the common bile duct  up to 9 mm noted. There is 6 mm rounded signal void filling defect in the  distal common bile duct most suggestive of common duct stone. This is best  seen on 3D MRCP images. Pancreatic Duct: Pancreatic duct is not dilated. It is not well visualized. Other:  Percutaneous gastrostomy tube in place. Severe dextroscoliosis  lumbar spine. No ascites. Impression:      1. Study limited by large amount of motion artifact degrading particularly T1  weighted images and MRCP. 2. A 6 mm filling defect in the distal common bile duct most suggestive of  choledocholithiasis. 3. Cholelithiasis without evidence for acute cholecystitis. 4. There is an 8 mm hepatic cyst and bilateral renal cysts. IMPRESSION:   1.  Symptomatic cholelithiasis  2. Choledocholithiasis, status post ERCP    Principal Problem:    Obstipation  Active Problems:    Seizure disorder (HCC)    Spastic quadriplegic cerebral palsy (HCC)    Choledocholithiasis    Generalized abdominal pain    Constipation    Developmental disability  Resolved Problems:    * No resolved hospital problems. *      PLAN:   1. Will need cholecystectomy with cholangiogram  2. This will be discussed with the patient's sister who is his power of   3. In the meantime I will place the patient on antibiotics      Thank you for this interesting consult and for allowing us to participate in the care of your patient. Please feel free to contact me with any questions or concerns.

## 2018-08-17 NOTE — CARE COORDINATION
SW received call from [address] [place] [address] regarding contacting pt group home staff regarding discharge planning, DEEP spoke with nurse Joao Shay regarding discharge plan. Joao Shay reports the plan is for the pt to return to the group home at discharge. Joao Shay reports after the surgery if pt needs more this can be addressed with staff closer to discharge.    Pt guardian/sister is Cassandra Avitia [phone]
Lalito Abbott sees pt as needed at his home. Pt is seen by PCP at [facility]. Pt is transported to [facility] for PCP appt. Pt sister Erik Tirado is guardian. Attempted to call Erik Tirado [phone] to verify plan to return to home. Call rings busy, unable to leave voice mail. Plan is to return to [facility] home on [place]. No additional needs identified at this time. Follow GI plan of care for addiitonal needs.              Electronically signed by Sawyer Robins RN on 8/14/18 at 1:00 PM

## 2018-08-18 ENCOUNTER — ANESTHESIA (OUTPATIENT)
Dept: OPERATING ROOM | Age: 48
DRG: 417 | End: 2018-08-18
Payer: MEDICARE

## 2018-08-18 VITALS — OXYGEN SATURATION: 91 % | DIASTOLIC BLOOD PRESSURE: 84 MMHG | TEMPERATURE: 96.1 F | SYSTOLIC BLOOD PRESSURE: 147 MMHG

## 2018-08-18 PROBLEM — K56.7 ILEUS (HCC): Status: ACTIVE | Noted: 2018-08-18

## 2018-08-18 LAB
ABSOLUTE EOS #: 0 K/UL (ref 0–0.4)
ABSOLUTE IMMATURE GRANULOCYTE: ABNORMAL K/UL (ref 0–0.3)
ABSOLUTE LYMPH #: 1.5 K/UL (ref 1–4.8)
ABSOLUTE MONO #: 0.7 K/UL (ref 0.2–0.8)
ALBUMIN SERPL-MCNC: 3.3 G/DL (ref 3.5–5.2)
ALBUMIN SERPL-MCNC: 3.4 G/DL (ref 3.5–5.2)
ALBUMIN SERPL-MCNC: 3.5 G/DL (ref 3.5–5.2)
ALBUMIN/GLOBULIN RATIO: ABNORMAL (ref 1–2.5)
ALP BLD-CCNC: 145 U/L (ref 40–129)
ALP BLD-CCNC: 153 U/L (ref 40–129)
ALP BLD-CCNC: 154 U/L (ref 40–129)
ALT SERPL-CCNC: 166 U/L (ref 5–41)
ALT SERPL-CCNC: 184 U/L (ref 5–41)
ALT SERPL-CCNC: 197 U/L (ref 5–41)
AMYLASE: 77 U/L (ref 28–100)
ANION GAP SERPL CALCULATED.3IONS-SCNC: 11 MMOL/L (ref 9–17)
ANION GAP SERPL CALCULATED.3IONS-SCNC: 14 MMOL/L (ref 9–17)
ANION GAP SERPL CALCULATED.3IONS-SCNC: 14 MMOL/L (ref 9–17)
AST SERPL-CCNC: 135 U/L
AST SERPL-CCNC: 155 U/L
AST SERPL-CCNC: 156 U/L
BASOPHILS # BLD: 0 % (ref 0–2)
BASOPHILS ABSOLUTE: 0 K/UL (ref 0–0.2)
BILIRUB SERPL-MCNC: 0.59 MG/DL (ref 0.3–1.2)
BILIRUB SERPL-MCNC: 0.75 MG/DL (ref 0.3–1.2)
BILIRUB SERPL-MCNC: 1.56 MG/DL (ref 0.3–1.2)
BILIRUBIN DIRECT: 1.1 MG/DL
BILIRUBIN, INDIRECT: 0.46 MG/DL (ref 0–1)
BUN BLDV-MCNC: 2 MG/DL (ref 6–20)
BUN BLDV-MCNC: 2 MG/DL (ref 6–20)
BUN BLDV-MCNC: 3 MG/DL (ref 6–20)
BUN/CREAT BLD: ABNORMAL (ref 9–20)
BUN/CREAT BLD: ABNORMAL (ref 9–20)
CALCIUM SERPL-MCNC: 7.6 MG/DL (ref 8.6–10.4)
CALCIUM SERPL-MCNC: 8 MG/DL (ref 8.6–10.4)
CALCIUM SERPL-MCNC: 8.1 MG/DL (ref 8.6–10.4)
CHLORIDE BLD-SCNC: 108 MMOL/L (ref 98–107)
CHLORIDE BLD-SCNC: 109 MMOL/L (ref 98–107)
CHLORIDE BLD-SCNC: 109 MMOL/L (ref 98–107)
CO2: 17 MMOL/L (ref 20–31)
CO2: 22 MMOL/L (ref 20–31)
CO2: 25 MMOL/L (ref 20–31)
CREAT SERPL-MCNC: <0.4 MG/DL (ref 0.7–1.2)
DIFFERENTIAL TYPE: ABNORMAL
EOSINOPHILS RELATIVE PERCENT: 0 % (ref 1–4)
GFR AFRICAN AMERICAN: ABNORMAL ML/MIN
GFR NON-AFRICAN AMERICAN: ABNORMAL ML/MIN
GFR SERPL CREATININE-BSD FRML MDRD: ABNORMAL ML/MIN/{1.73_M2}
GLUCOSE BLD-MCNC: 100 MG/DL (ref 70–99)
GLUCOSE BLD-MCNC: 112 MG/DL (ref 75–110)
GLUCOSE BLD-MCNC: 131 MG/DL (ref 70–99)
GLUCOSE BLD-MCNC: 89 MG/DL (ref 75–110)
GLUCOSE BLD-MCNC: 94 MG/DL (ref 70–99)
HCT VFR BLD CALC: 42.1 % (ref 41–53)
HEMOGLOBIN: 13.6 G/DL (ref 13.5–17.5)
IMMATURE GRANULOCYTES: ABNORMAL %
LIPASE: 75 U/L (ref 13–60)
LYMPHOCYTES # BLD: 25 % (ref 24–44)
MAGNESIUM: 1.9 MG/DL (ref 1.6–2.6)
MCH RBC QN AUTO: 31.3 PG (ref 26–34)
MCHC RBC AUTO-ENTMCNC: 32.4 G/DL (ref 31–37)
MCV RBC AUTO: 96.5 FL (ref 80–100)
MONOCYTES # BLD: 13 % (ref 1–7)
NRBC AUTOMATED: ABNORMAL PER 100 WBC
PDW BLD-RTO: 13.2 % (ref 11.5–14.5)
PLATELET # BLD: 296 K/UL (ref 130–400)
PLATELET ESTIMATE: ABNORMAL
PMV BLD AUTO: 8.8 FL (ref 6–12)
POTASSIUM SERPL-SCNC: 3.1 MMOL/L (ref 3.7–5.3)
POTASSIUM SERPL-SCNC: 3.5 MMOL/L (ref 3.7–5.3)
POTASSIUM SERPL-SCNC: 3.9 MMOL/L (ref 3.7–5.3)
RBC # BLD: 4.36 M/UL (ref 4.5–5.9)
RBC # BLD: ABNORMAL 10*6/UL
SEG NEUTROPHILS: 62 % (ref 36–66)
SEGMENTED NEUTROPHILS ABSOLUTE COUNT: 3.6 K/UL (ref 1.8–7.7)
SODIUM BLD-SCNC: 140 MMOL/L (ref 135–144)
SODIUM BLD-SCNC: 144 MMOL/L (ref 135–144)
SODIUM BLD-SCNC: 145 MMOL/L (ref 135–144)
TOTAL PROTEIN: 6.2 G/DL (ref 6.4–8.3)
TOTAL PROTEIN: 6.5 G/DL (ref 6.4–8.3)
TOTAL PROTEIN: 6.6 G/DL (ref 6.4–8.3)
WBC # BLD: 5.9 K/UL (ref 3.5–11)
WBC # BLD: ABNORMAL 10*3/UL

## 2018-08-18 PROCEDURE — 82150 ASSAY OF AMYLASE: CPT

## 2018-08-18 PROCEDURE — 85025 COMPLETE CBC W/AUTO DIFF WBC: CPT

## 2018-08-18 PROCEDURE — 6360000002 HC RX W HCPCS: Performed by: ANESTHESIOLOGY

## 2018-08-18 PROCEDURE — 36415 COLL VENOUS BLD VENIPUNCTURE: CPT

## 2018-08-18 PROCEDURE — 2500000003 HC RX 250 WO HCPCS: Performed by: SURGERY

## 2018-08-18 PROCEDURE — 2500000003 HC RX 250 WO HCPCS: Performed by: ANESTHESIOLOGY

## 2018-08-18 PROCEDURE — 6370000000 HC RX 637 (ALT 250 FOR IP): Performed by: NURSE PRACTITIONER

## 2018-08-18 PROCEDURE — 3600000013 HC SURGERY LEVEL 3 ADDTL 15MIN: Performed by: SURGERY

## 2018-08-18 PROCEDURE — 83735 ASSAY OF MAGNESIUM: CPT

## 2018-08-18 PROCEDURE — 82248 BILIRUBIN DIRECT: CPT

## 2018-08-18 PROCEDURE — 6360000002 HC RX W HCPCS: Performed by: SURGERY

## 2018-08-18 PROCEDURE — 0FT44ZZ RESECTION OF GALLBLADDER, PERCUTANEOUS ENDOSCOPIC APPROACH: ICD-10-PCS | Performed by: SURGERY

## 2018-08-18 PROCEDURE — 2060000000 HC ICU INTERMEDIATE R&B

## 2018-08-18 PROCEDURE — 31720 CLEARANCE OF AIRWAYS: CPT

## 2018-08-18 PROCEDURE — 3700000000 HC ANESTHESIA ATTENDED CARE: Performed by: SURGERY

## 2018-08-18 PROCEDURE — 0WQF4ZZ REPAIR ABDOMINAL WALL, PERCUTANEOUS ENDOSCOPIC APPROACH: ICD-10-PCS | Performed by: SURGERY

## 2018-08-18 PROCEDURE — 80053 COMPREHEN METABOLIC PANEL: CPT

## 2018-08-18 PROCEDURE — 7100000001 HC PACU RECOVERY - ADDTL 15 MIN: Performed by: SURGERY

## 2018-08-18 PROCEDURE — 88304 TISSUE EXAM BY PATHOLOGIST: CPT

## 2018-08-18 PROCEDURE — 2720000010 HC SURG SUPPLY STERILE: Performed by: SURGERY

## 2018-08-18 PROCEDURE — 7100000000 HC PACU RECOVERY - FIRST 15 MIN: Performed by: SURGERY

## 2018-08-18 PROCEDURE — C1758 CATHETER, URETERAL: HCPCS | Performed by: SURGERY

## 2018-08-18 PROCEDURE — 3700000001 HC ADD 15 MINUTES (ANESTHESIA): Performed by: SURGERY

## 2018-08-18 PROCEDURE — 2580000003 HC RX 258: Performed by: ANESTHESIOLOGY

## 2018-08-18 PROCEDURE — 2580000003 HC RX 258: Performed by: SURGERY

## 2018-08-18 PROCEDURE — 2700000000 HC OXYGEN THERAPY PER DAY

## 2018-08-18 PROCEDURE — 83690 ASSAY OF LIPASE: CPT

## 2018-08-18 PROCEDURE — 3600000003 HC SURGERY LEVEL 3 BASE: Performed by: SURGERY

## 2018-08-18 PROCEDURE — 99231 SBSQ HOSP IP/OBS SF/LOW 25: CPT | Performed by: INTERNAL MEDICINE

## 2018-08-18 PROCEDURE — 6360000002 HC RX W HCPCS: Performed by: INTERNAL MEDICINE

## 2018-08-18 PROCEDURE — 94761 N-INVAS EAR/PLS OXIMETRY MLT: CPT

## 2018-08-18 PROCEDURE — 6360000002 HC RX W HCPCS: Performed by: NURSE PRACTITIONER

## 2018-08-18 PROCEDURE — 2709999900 HC NON-CHARGEABLE SUPPLY: Performed by: SURGERY

## 2018-08-18 PROCEDURE — 82947 ASSAY GLUCOSE BLOOD QUANT: CPT

## 2018-08-18 RX ORDER — ROCURONIUM BROMIDE 10 MG/ML
INJECTION, SOLUTION INTRAVENOUS PRN
Status: DISCONTINUED | OUTPATIENT
Start: 2018-08-18 | End: 2018-08-18 | Stop reason: SDUPTHER

## 2018-08-18 RX ORDER — ONDANSETRON 2 MG/ML
4 INJECTION INTRAMUSCULAR; INTRAVENOUS EVERY 4 HOURS PRN
Status: DISCONTINUED | OUTPATIENT
Start: 2018-08-18 | End: 2018-08-18 | Stop reason: ALTCHOICE

## 2018-08-18 RX ORDER — ONDANSETRON 2 MG/ML
INJECTION INTRAMUSCULAR; INTRAVENOUS PRN
Status: DISCONTINUED | OUTPATIENT
Start: 2018-08-18 | End: 2018-08-18 | Stop reason: SDUPTHER

## 2018-08-18 RX ORDER — FENTANYL CITRATE 50 UG/ML
50 INJECTION, SOLUTION INTRAMUSCULAR; INTRAVENOUS EVERY 5 MIN PRN
Status: DISCONTINUED | OUTPATIENT
Start: 2018-08-18 | End: 2018-08-18 | Stop reason: HOSPADM

## 2018-08-18 RX ORDER — SODIUM CHLORIDE 0.9 % (FLUSH) 0.9 %
10 SYRINGE (ML) INJECTION EVERY 12 HOURS SCHEDULED
Status: DISCONTINUED | OUTPATIENT
Start: 2018-08-18 | End: 2018-08-21 | Stop reason: HOSPADM

## 2018-08-18 RX ORDER — FENTANYL CITRATE 50 UG/ML
25 INJECTION, SOLUTION INTRAMUSCULAR; INTRAVENOUS EVERY 5 MIN PRN
Status: DISCONTINUED | OUTPATIENT
Start: 2018-08-18 | End: 2018-08-18 | Stop reason: HOSPADM

## 2018-08-18 RX ORDER — ONDANSETRON 2 MG/ML
4 INJECTION INTRAMUSCULAR; INTRAVENOUS EVERY 4 HOURS PRN
Status: DISCONTINUED | OUTPATIENT
Start: 2018-08-18 | End: 2018-08-21 | Stop reason: HOSPADM

## 2018-08-18 RX ORDER — BUPIVACAINE HYDROCHLORIDE AND EPINEPHRINE 5; 5 MG/ML; UG/ML
INJECTION, SOLUTION EPIDURAL; INTRACAUDAL; PERINEURAL PRN
Status: DISCONTINUED | OUTPATIENT
Start: 2018-08-18 | End: 2018-08-18 | Stop reason: HOSPADM

## 2018-08-18 RX ORDER — ONDANSETRON 4 MG/1
4 TABLET, ORALLY DISINTEGRATING ORAL EVERY 4 HOURS PRN
Status: DISCONTINUED | OUTPATIENT
Start: 2018-08-18 | End: 2018-08-21 | Stop reason: HOSPADM

## 2018-08-18 RX ORDER — LIDOCAINE HYDROCHLORIDE 20 MG/ML
INJECTION, SOLUTION INFILTRATION; PERINEURAL PRN
Status: DISCONTINUED | OUTPATIENT
Start: 2018-08-18 | End: 2018-08-18 | Stop reason: SDUPTHER

## 2018-08-18 RX ORDER — SODIUM CHLORIDE 0.9 % (FLUSH) 0.9 %
10 SYRINGE (ML) INJECTION PRN
Status: DISCONTINUED | OUTPATIENT
Start: 2018-08-18 | End: 2018-08-21 | Stop reason: HOSPADM

## 2018-08-18 RX ORDER — SODIUM CHLORIDE, SODIUM LACTATE, POTASSIUM CHLORIDE, CALCIUM CHLORIDE 600; 310; 30; 20 MG/100ML; MG/100ML; MG/100ML; MG/100ML
INJECTION, SOLUTION INTRAVENOUS CONTINUOUS
Status: DISCONTINUED | OUTPATIENT
Start: 2018-08-18 | End: 2018-08-21

## 2018-08-18 RX ORDER — SODIUM CHLORIDE, SODIUM LACTATE, POTASSIUM CHLORIDE, CALCIUM CHLORIDE 600; 310; 30; 20 MG/100ML; MG/100ML; MG/100ML; MG/100ML
INJECTION, SOLUTION INTRAVENOUS CONTINUOUS PRN
Status: DISCONTINUED | OUTPATIENT
Start: 2018-08-18 | End: 2018-08-18 | Stop reason: SDUPTHER

## 2018-08-18 RX ORDER — CEFAZOLIN SODIUM 1 G/3ML
INJECTION, POWDER, FOR SOLUTION INTRAMUSCULAR; INTRAVENOUS PRN
Status: DISCONTINUED | OUTPATIENT
Start: 2018-08-18 | End: 2018-08-18 | Stop reason: SDUPTHER

## 2018-08-18 RX ORDER — FENTANYL CITRATE 50 UG/ML
50 INJECTION, SOLUTION INTRAMUSCULAR; INTRAVENOUS
Status: DISCONTINUED | OUTPATIENT
Start: 2018-08-18 | End: 2018-08-21 | Stop reason: HOSPADM

## 2018-08-18 RX ORDER — PROPOFOL 10 MG/ML
INJECTION, EMULSION INTRAVENOUS PRN
Status: DISCONTINUED | OUTPATIENT
Start: 2018-08-18 | End: 2018-08-18 | Stop reason: SDUPTHER

## 2018-08-18 RX ORDER — FENTANYL CITRATE 50 UG/ML
INJECTION, SOLUTION INTRAMUSCULAR; INTRAVENOUS PRN
Status: DISCONTINUED | OUTPATIENT
Start: 2018-08-18 | End: 2018-08-18 | Stop reason: SDUPTHER

## 2018-08-18 RX ORDER — ONDANSETRON 2 MG/ML
4 INJECTION INTRAMUSCULAR; INTRAVENOUS
Status: DISCONTINUED | OUTPATIENT
Start: 2018-08-18 | End: 2018-08-18 | Stop reason: HOSPADM

## 2018-08-18 RX ADMIN — LACTULOSE 20 G: 20 SOLUTION ORAL at 21:29

## 2018-08-18 RX ADMIN — ONDANSETRON 4 MG: 2 INJECTION, SOLUTION INTRAMUSCULAR; INTRAVENOUS at 13:33

## 2018-08-18 RX ADMIN — METRONIDAZOLE 500 MG: 500 INJECTION, SOLUTION INTRAVENOUS at 01:15

## 2018-08-18 RX ADMIN — POTASSIUM CHLORIDE 10 MEQ: 40 SOLUTION ORAL at 21:29

## 2018-08-18 RX ADMIN — LEVETIRACETAM 1500 MG: 100 SOLUTION ORAL at 21:29

## 2018-08-18 RX ADMIN — PHENYLEPHRINE HYDROCHLORIDE 200 MCG: 10 INJECTION INTRAVENOUS at 11:31

## 2018-08-18 RX ADMIN — FENTANYL CITRATE 50 MCG: 50 INJECTION, SOLUTION INTRAMUSCULAR; INTRAVENOUS at 22:17

## 2018-08-18 RX ADMIN — BACLOFEN 10 MG: 10 TABLET ORAL at 21:28

## 2018-08-18 RX ADMIN — FENTANYL CITRATE 50 MCG: 50 INJECTION, SOLUTION INTRAMUSCULAR; INTRAVENOUS at 11:59

## 2018-08-18 RX ADMIN — PROPOFOL 150 MG: 10 INJECTION, EMULSION INTRAVENOUS at 11:23

## 2018-08-18 RX ADMIN — SODIUM CHLORIDE, POTASSIUM CHLORIDE, SODIUM LACTATE AND CALCIUM CHLORIDE: 600; 310; 30; 20 INJECTION, SOLUTION INTRAVENOUS at 21:31

## 2018-08-18 RX ADMIN — FENTANYL CITRATE 25 MCG: 50 INJECTION INTRAMUSCULAR; INTRAVENOUS at 14:28

## 2018-08-18 RX ADMIN — SUGAMMADEX 150 MG: 100 INJECTION, SOLUTION INTRAVENOUS at 13:41

## 2018-08-18 RX ADMIN — FENTANYL CITRATE 25 MCG: 50 INJECTION INTRAMUSCULAR; INTRAVENOUS at 14:44

## 2018-08-18 RX ADMIN — LACOSAMIDE 100 MG: 100 TABLET, FILM COATED ORAL at 07:23

## 2018-08-18 RX ADMIN — LACOSAMIDE 100 MG: 100 TABLET, FILM COATED ORAL at 21:28

## 2018-08-18 RX ADMIN — METOCLOPRAMIDE 10 MG: 5 INJECTION, SOLUTION INTRAMUSCULAR; INTRAVENOUS at 21:29

## 2018-08-18 RX ADMIN — LAMOTRIGINE 200 MG: 100 TABLET ORAL at 21:28

## 2018-08-18 RX ADMIN — LIDOCAINE HYDROCHLORIDE 100 MG: 20 INJECTION, SOLUTION INFILTRATION; PERINEURAL at 11:23

## 2018-08-18 RX ADMIN — FENTANYL CITRATE 100 MCG: 50 INJECTION, SOLUTION INTRAMUSCULAR; INTRAVENOUS at 11:23

## 2018-08-18 RX ADMIN — CEFAZOLIN 2000 MG: 1 INJECTION, POWDER, FOR SOLUTION INTRAMUSCULAR; INTRAVENOUS at 11:27

## 2018-08-18 RX ADMIN — LEVETIRACETAM 1500 MG: 100 SOLUTION ORAL at 07:24

## 2018-08-18 RX ADMIN — SODIUM CHLORIDE, POTASSIUM CHLORIDE, SODIUM LACTATE AND CALCIUM CHLORIDE: 600; 310; 30; 20 INJECTION, SOLUTION INTRAVENOUS at 18:54

## 2018-08-18 RX ADMIN — METRONIDAZOLE 500 MG: 500 INJECTION, SOLUTION INTRAVENOUS at 18:54

## 2018-08-18 RX ADMIN — BACLOFEN 10 MG: 10 TABLET ORAL at 07:24

## 2018-08-18 RX ADMIN — POTASSIUM CHLORIDE 40 MEQ: 40 SOLUTION ORAL at 07:24

## 2018-08-18 RX ADMIN — CIPROFLOXACIN 400 MG: 2 INJECTION, SOLUTION INTRAVENOUS at 00:07

## 2018-08-18 RX ADMIN — MORPHINE SULFATE 2 MG: 2 INJECTION, SOLUTION INTRAMUSCULAR; INTRAVENOUS at 15:19

## 2018-08-18 RX ADMIN — ROCURONIUM BROMIDE 50 MG: 10 INJECTION, SOLUTION INTRAVENOUS at 11:23

## 2018-08-18 RX ADMIN — Medication 10 ML: at 21:29

## 2018-08-18 RX ADMIN — VITAMIN D, TAB 1000IU (100/BT) 1000 UNITS: 25 TAB at 21:28

## 2018-08-18 RX ADMIN — FENTANYL CITRATE 50 MCG: 50 INJECTION, SOLUTION INTRAMUSCULAR; INTRAVENOUS at 16:31

## 2018-08-18 RX ADMIN — LAMOTRIGINE 300 MG: 100 TABLET ORAL at 10:08

## 2018-08-18 RX ADMIN — SODIUM CHLORIDE, POTASSIUM CHLORIDE, SODIUM LACTATE AND CALCIUM CHLORIDE: 600; 310; 30; 20 INJECTION, SOLUTION INTRAVENOUS at 11:16

## 2018-08-18 RX ADMIN — FENTANYL CITRATE 50 MCG: 50 INJECTION, SOLUTION INTRAMUSCULAR; INTRAVENOUS at 12:34

## 2018-08-18 RX ADMIN — MONTELUKAST SODIUM 10 MG: 10 TABLET, FILM COATED ORAL at 22:03

## 2018-08-18 RX ADMIN — METOCLOPRAMIDE 10 MG: 5 INJECTION, SOLUTION INTRAMUSCULAR; INTRAVENOUS at 05:55

## 2018-08-18 ASSESSMENT — PULMONARY FUNCTION TESTS
PIF_VALUE: 31
PIF_VALUE: 31
PIF_VALUE: 36
PIF_VALUE: 34
PIF_VALUE: 32
PIF_VALUE: 32
PIF_VALUE: 18
PIF_VALUE: 32
PIF_VALUE: 27
PIF_VALUE: 31
PIF_VALUE: 29
PIF_VALUE: 32
PIF_VALUE: 32
PIF_VALUE: 21
PIF_VALUE: 33
PIF_VALUE: 31
PIF_VALUE: 30
PIF_VALUE: 31
PIF_VALUE: 36
PIF_VALUE: 31
PIF_VALUE: 17
PIF_VALUE: 32
PIF_VALUE: 25
PIF_VALUE: 27
PIF_VALUE: 24
PIF_VALUE: 28
PIF_VALUE: 27
PIF_VALUE: 32
PIF_VALUE: 34
PIF_VALUE: 36
PIF_VALUE: 17
PIF_VALUE: 23
PIF_VALUE: 33
PIF_VALUE: 21
PIF_VALUE: 1
PIF_VALUE: 24
PIF_VALUE: 33
PIF_VALUE: 26
PIF_VALUE: 33
PIF_VALUE: 31
PIF_VALUE: 30
PIF_VALUE: 27
PIF_VALUE: 35
PIF_VALUE: 32
PIF_VALUE: 32
PIF_VALUE: 31
PIF_VALUE: 32
PIF_VALUE: 26
PIF_VALUE: 24
PIF_VALUE: 18
PIF_VALUE: 33
PIF_VALUE: 35
PIF_VALUE: 1
PIF_VALUE: 30
PIF_VALUE: 2
PIF_VALUE: 31
PIF_VALUE: 32
PIF_VALUE: 15
PIF_VALUE: 0
PIF_VALUE: 31
PIF_VALUE: 17
PIF_VALUE: 32
PIF_VALUE: 31
PIF_VALUE: 32
PIF_VALUE: 31
PIF_VALUE: 32
PIF_VALUE: 35
PIF_VALUE: 34
PIF_VALUE: 31
PIF_VALUE: 32
PIF_VALUE: 20
PIF_VALUE: 30
PIF_VALUE: 33
PIF_VALUE: 32
PIF_VALUE: 35
PIF_VALUE: 32
PIF_VALUE: 32
PIF_VALUE: 33
PIF_VALUE: 33
PIF_VALUE: 32
PIF_VALUE: 31
PIF_VALUE: 31
PIF_VALUE: 1
PIF_VALUE: 21
PIF_VALUE: 17
PIF_VALUE: 32
PIF_VALUE: 29
PIF_VALUE: 32
PIF_VALUE: 27
PIF_VALUE: 32
PIF_VALUE: 32
PIF_VALUE: 30
PIF_VALUE: 25
PIF_VALUE: 1
PIF_VALUE: 1
PIF_VALUE: 32
PIF_VALUE: 22
PIF_VALUE: 32
PIF_VALUE: 31
PIF_VALUE: 32
PIF_VALUE: 34
PIF_VALUE: 31
PIF_VALUE: 32
PIF_VALUE: 31
PIF_VALUE: 33
PIF_VALUE: 29
PIF_VALUE: 25
PIF_VALUE: 25
PIF_VALUE: 23
PIF_VALUE: 34
PIF_VALUE: 31
PIF_VALUE: 27
PIF_VALUE: 17
PIF_VALUE: 25
PIF_VALUE: 24
PIF_VALUE: 1
PIF_VALUE: 32
PIF_VALUE: 22
PIF_VALUE: 32
PIF_VALUE: 32
PIF_VALUE: 33
PIF_VALUE: 35
PIF_VALUE: 0
PIF_VALUE: 33
PIF_VALUE: 24
PIF_VALUE: 31
PIF_VALUE: 31
PIF_VALUE: 23
PIF_VALUE: 32
PIF_VALUE: 25
PIF_VALUE: 26
PIF_VALUE: 32
PIF_VALUE: 31
PIF_VALUE: 29
PIF_VALUE: 25
PIF_VALUE: 18
PIF_VALUE: 32
PIF_VALUE: 25
PIF_VALUE: 25
PIF_VALUE: 23
PIF_VALUE: 32
PIF_VALUE: 27
PIF_VALUE: 24
PIF_VALUE: 34
PIF_VALUE: 23
PIF_VALUE: 27
PIF_VALUE: 25
PIF_VALUE: 31
PIF_VALUE: 35
PIF_VALUE: 36
PIF_VALUE: 31
PIF_VALUE: 29
PIF_VALUE: 33
PIF_VALUE: 1
PIF_VALUE: 27
PIF_VALUE: 25
PIF_VALUE: 32
PIF_VALUE: 0
PIF_VALUE: 18
PIF_VALUE: 36
PIF_VALUE: 27
PIF_VALUE: 32
PIF_VALUE: 17
PIF_VALUE: 25
PIF_VALUE: 21
PIF_VALUE: 34
PIF_VALUE: 24
PIF_VALUE: 2
PIF_VALUE: 33
PIF_VALUE: 24
PIF_VALUE: 32

## 2018-08-18 ASSESSMENT — PAIN DESCRIPTION - LOCATION
LOCATION: ABDOMEN
LOCATION: ABDOMEN

## 2018-08-18 ASSESSMENT — PAIN SCALES - GENERAL
PAINLEVEL_OUTOF10: 10
PAINLEVEL_OUTOF10: 6
PAINLEVEL_OUTOF10: 10
PAINLEVEL_OUTOF10: 0
PAINLEVEL_OUTOF10: 6

## 2018-08-18 ASSESSMENT — PAIN SCALES - WONG BAKER
WONGBAKER_NUMERICALRESPONSE: 10
WONGBAKER_NUMERICALRESPONSE: 10

## 2018-08-18 ASSESSMENT — PAIN DESCRIPTION - PAIN TYPE
TYPE: SURGICAL PAIN
TYPE: SURGICAL PAIN

## 2018-08-18 NOTE — OP NOTE
cavity through the umbilical hernia fascial defect. Under direct visualization, 12 mm balloon trocar was inserted and the balloon was inflated to secure the trocar in place. Patient was placed in reverse Trendelenburg position, airplaned to the left. The abdomen was visually inspected. The colon was noted to be severely distended. The patient was placed in severe reverse Trendelenburg, tilted to the left. Under direct visualization, three 5 mm trocars were inserted subcostally on the right side, 1 was subxiphoid, 1 midclavicular and 1 anterior axillary. This was done under direct visualization and after infiltration of Marcaine for local anesthesia. Dense omental adhesions were noted around the her gallbladder. These were taken down bluntly. The peritoneal reflection was noted to be thickened. The fundus of the gallbladder was grabbed and reflected anteriorly and superiorly. The infundibulum was grabbed and reflected latterly in inferiorly to expose the triangle of Calot. The peritoneal reflection, which was very thickened, was peeled using electrocautery as well as hydro-dissection. The common bile duct was visualize. It was buggy and distended. The cystic duct was dissected. It was very small and scarred down. The lumen was pretty much obliterated and a cholangiogram was attempted but we could not cannulate the duct. The duct was doubly clipped and divided. The cystic artery was identified, clipped and divided. The gallbladder was taken off the gallbladder fossa in retrograde fashion using electrocautery. In the process the gallbladder was perforated and there was spillage of bile and stones. The stones were retrieved in the bile was suctioned out. The gallbladder was retrieved into an endobag. The area was copiously irrigated and the irrigant was suctioned out until it returned clear. Given the degree of inflammation we left a 19 Fr Vin drain in the gallbladder fossa. The drain was brought out through the lateral-most trocar site and secured with silk suture. The remaining trocars were removed and their insertion sites were noted to be hemostatic. The 12 mm trocar was removed after the balloon was deflated and the gallbladder was passed off the table as specimen. The fascia at the umbilical incision was closed with 0 Vicryl suture in a figure of eight fashion, thus repairing the umbilical hernia. The incisions were irrigated and dried and the skin was closed with 4-0 Vicryl in subcuticular fashion. Incisions were washed and dried sterilely. Steri-Strips were applied. The patient tolerated the procedure well, was awakened, extubated and taken to recovery in stable condition. Instruments, needles and sponges were counted twice at the end of the procedure and count was correct. Procedure was more difficult than usual and took significantly longer time given the anatomy of the patient, being severely scoliotic and contracted, as well as the degree of inflammation of the gallbladder and the duct area, rendering the dissection much more  difficult and taking significantly more time.

## 2018-08-18 NOTE — PLAN OF CARE
Problem: Falls - Risk of:  Goal: Will remain free from falls  Will remain free from falls   Outcome: Ongoing  Pt free from falls this shift

## 2018-08-18 NOTE — ANESTHESIA PRE PROCEDURE
Department of Anesthesiology  Preprocedure Note       Name:  Tara Winkler   Age:  49 y.o.  :  1970                                          MRN:  [id number]         Date:  2018      Surgeon: Nancy John):  Omkar Houston DO    Procedure: Procedure(s):  CHOLECYSTECTOMY LAPAROSCOPIC WITH CHOLANGIOGRAM    Medications prior to admission:   Prior to Admission medications    Medication Sig Start Date End Date Taking? Authorizing Provider   glycopyrrolate (ROBINUL) 1 MG tablet 1 mg by Per G Tube route 3 times daily FOR INCREASED SECRETIONS. Yes Historical Provider, MD   carbamide peroxide (DEBROX) 6.5 % otic solution Place 5 drops into both ears 2 times daily FOR 2 DAYS PER MONTH. Yes Historical Provider, MD   melatonin 1 MG tablet 2 mg by Per G Tube route nightly as needed for Sleep   Yes Historical Provider, MD   lacosamide (VIMPAT) 50 MG TABS tablet 100 mg by Per G Tube route nightly. .   Yes Historical Provider, MD   clonazePAM (KLONOPIN) 2 MG tablet Take 2 mg by mouth 2 times daily as needed (for seizure lasting more than3 min). Historical Provider, MD   furosemide (LASIX) 20 MG tablet 20 mg by Per G Tube route daily    Historical Provider, MD   nystatin (MYCOSTATIN) 829006 UNIT/GM powder Apply topically daily Apply between toes.     Historical Provider, MD   baclofen (LIORESAL) 10 MG tablet 10 mg by Per G Tube route 3 times daily    Historical Provider, MD   lamoTRIgine (LAMICTAL) 100 MG tablet 200 mg by Per G Tube route 2 times daily     Historical Provider, MD   Potassium Chloride (KLOR-CON SPRINKLE PO) 10 mEq by Per G Tube route 2 times daily     Historical Provider, MD   montelukast (SINGULAIR) 10 MG tablet 10 mg by Per G Tube route nightly    Historical Provider, MD   fluticasone (FLONASE) 50 MCG/ACT nasal spray 2 sprays by Nasal route daily    Historical Provider, MD   Multiple Vitamin (DAILY-JAY PO) by Gastric Tube route daily    Historical Provider, MD   calcium carbonate 1250 MG/5ML SUSP mg at 08/18/18 0723    [MAR Hold] lactulose (CHRONULAC) 10 GM/15ML solution 20 g  20 g Per G Tube BID Janathom Quiñones, APRN - CNP   20 g at 08/17/18 2113    [MAR Hold] lamoTRIgine (LAMICTAL) tablet 300 mg  300 mg Per G Tube QAM Jana Quiñones, APRN - CNP   300 mg at 08/18/18 1008    [MAR Hold] lamoTRIgine (LAMICTAL) tablet 200 mg  200 mg Per G Tube Nightly Jana Brill, APRN - CNP   200 mg at 08/17/18 2112    [MAR Hold] levETIRAcetam (KEPPRA) 100 MG/ML solution 1,500 mg  1,500 mg Oral BID Jana Brdaniel, APRN - CNP   1,500 mg at 08/18/18 0724    [MAR Hold] cetirizine (ZYRTEC) tablet 10 mg  10 mg Oral Daily Jana Brdaniel, APRN - CNP   10 mg at 08/17/18 0826    [MAR Hold] montelukast (SINGULAIR) tablet 10 mg  10 mg Per G Tube Nightly Jana Brdaniel, APRN - CNP   10 mg at 08/17/18 2112    [MAR Hold] REPLETE/FIBER LIQD 75 mL  75 mL Feeding Tube Daily Jana Quiñones, APRN - CNP        [MAR Hold] polyethylene glycol (GLYCOLAX) packet 17 g  17 g Per G Tube Daily Jana Quioñnes, APRN - CNP   17 g at 08/17/18 0826    [MAR Hold] sennosides-docusate sodium (SENOKOT-S) 8.6-50 MG tablet 2 tablet  2 tablet Per G Tube Daily Jana Quiñones, APRN - CNP   Stopped at 08/17/18 0822    [MAR Hold] sodium chloride flush 0.9 % injection 10 mL  10 mL Intravenous 2 times per day Jana Quiñones, APRN - CNP   10 mL at 08/17/18 2137    [MAR Hold] sodium chloride flush 0.9 % injection 10 mL  10 mL Intravenous PRN Jana Quiñones, APRN - CNP        [facility] Hold] potassium chloride (KLOR-CON M) extended release tablet 40 mEq  40 mEq Oral PRN Jana Quiñones, APRN - CNP        Or    [facility] Hold] potassium chloride 20 MEQ/15ML (10%) oral solution 40 mEq  40 mEq Oral PRN KARIME Delgadillo CNP   40 mEq at 08/18/18 0724    Or    [MAR Hold] potassium chloride 10 mEq/100 mL IVPB (Peripheral Line)  10 mEq Intravenous PRN KARIME Delgadillo CNP        [facility] Hold] Allergies:     Allergies   Allergen Reactions    Ampicillin Other (See Comments)    Valium [Diazepam] Other (See Comments)    Other      Bubble bath products        Problem List:    Patient Active Problem List   Diagnosis Code    Acute exacerbation of chronic obstructive pulmonary disease (COPD) ([facility] J44.1    Seizure disorder ([facility] G40.909    Cerebral palsied ([facility] G80.9    Severe protein-calorie malnutrition (Victory Brodnax: less than 60% of standard weight) ([facility] E43    Multifocal pneumonia J18.9    Pneumonia due to organism J18.9    Positive blood cultures R78.81    Pneumonia of left lower lobe due to infectious organism ([facility] J18.1    Spastic quadriplegic cerebral palsy ([facility] G80.0    Choledocholithiasis K80.50    Obstipation K59.00    Generalized abdominal pain R10.84    Constipation K59.00    Developmental disability F89    Ileus ([facility] K56.7       Past Medical History:        Diagnosis Date    Cerebral palsy ([facility]     Cholelithiasis with chronic cholecystitis     Constipation     Delayed gastric emptying     Dysphagia     GERD (gastroesophageal reflux disease)     Hearing loss     Mental disability     MRDD    Profound mental retardation     Scoliosis     Seizures (HCC)     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HCC)        Past Surgical History:        Procedure Laterality Date    ERCP  08/16/2018    [place] ERCP DX COLLECTION SPECIMEN BRUSHING/WASHING N/A 8/16/2018    ERCP PAPILOTOMY SWEEPING STONE EXTRACTION ENDOSCOPIC RETROGRADE CHOLANGIOPANCREATOGRAPHY performed by Anabela Chicas MD at [address]      has Feeding tube       Social History:    Social History   Substance Use Topics    Smoking status: Never Smoker    Smokeless tobacco: Never Used    Alcohol use No                                Counseling given: Not Answered      Vital Signs (Current):   Vitals:    08/17/18 1619 08/17/18 1730 08/17/18 1830 08/18/18 0656   BP: 133/87  (!) 148/90 123/73   Pulse: 83  82 55   Resp: 20

## 2018-08-18 NOTE — ANESTHESIA POSTPROCEDURE EVALUATION
Department of Anesthesiology  Postprocedure Note    Patient: Jovanni Calle  MRN: [id number]  YOB: 1970  Date of evaluation: 8/18/2018  Time:  3:29 PM     Procedure Summary     Date:  08/18/18 [facility]    Anesthesia Start:  0647 Anesthesia Stop:  5391    Procedure:  CHOLECYSTECTOMY LAPAROSCOPIC WITH UMBILICAL HERNIA REPAIR (N/A Abdomen) Diagnosis:  (stones)    Surgeon:  Kavita Rivera DO Responsible Provider:  Malina Lundberg MD    Anesthesia Type:  general ASA Status:  4          Anesthesia Type: General     Yee Phase I: Yee Score: 8    Yee Phase II:      Last vitals: Reviewed and per EMR flowsheets.        Anesthesia Post Evaluation    Patient location during evaluation: PACU  Level of consciousness: awake and alert  Airway patency: patent  Nausea & Vomiting: no nausea and no vomiting  Complications: no  Cardiovascular status: blood pressure returned to baseline  Respiratory status: acceptable  Hydration status: euvolemic

## 2018-08-19 ENCOUNTER — APPOINTMENT (OUTPATIENT)
Dept: GENERAL RADIOLOGY | Age: 48
DRG: 417 | End: 2018-08-19
Payer: MEDICARE

## 2018-08-19 LAB
ABSOLUTE EOS #: 0 K/UL (ref 0–0.4)
ABSOLUTE IMMATURE GRANULOCYTE: ABNORMAL K/UL (ref 0–0.3)
ABSOLUTE LYMPH #: 1.4 K/UL (ref 1–4.8)
ABSOLUTE MONO #: 0.9 K/UL (ref 0.2–0.8)
ALBUMIN SERPL-MCNC: 3 G/DL (ref 3.5–5.2)
ALBUMIN/GLOBULIN RATIO: ABNORMAL (ref 1–2.5)
ALP BLD-CCNC: 116 U/L (ref 40–129)
ALT SERPL-CCNC: 112 U/L (ref 5–41)
AMYLASE: 24 U/L (ref 28–100)
ANION GAP SERPL CALCULATED.3IONS-SCNC: 10 MMOL/L (ref 9–17)
AST SERPL-CCNC: 72 U/L
BASOPHILS # BLD: 0 % (ref 0–2)
BASOPHILS ABSOLUTE: 0 K/UL (ref 0–0.2)
BILIRUB SERPL-MCNC: 0.55 MG/DL (ref 0.3–1.2)
BUN BLDV-MCNC: 2 MG/DL (ref 6–20)
BUN/CREAT BLD: ABNORMAL (ref 9–20)
CALCIUM SERPL-MCNC: 8 MG/DL (ref 8.6–10.4)
CHLORIDE BLD-SCNC: 112 MMOL/L (ref 98–107)
CO2: 25 MMOL/L (ref 20–31)
CREAT SERPL-MCNC: <0.4 MG/DL (ref 0.7–1.2)
DIFFERENTIAL TYPE: ABNORMAL
EOSINOPHILS RELATIVE PERCENT: 0 % (ref 1–4)
GFR AFRICAN AMERICAN: ABNORMAL ML/MIN
GFR NON-AFRICAN AMERICAN: ABNORMAL ML/MIN
GFR SERPL CREATININE-BSD FRML MDRD: ABNORMAL ML/MIN/{1.73_M2}
GFR SERPL CREATININE-BSD FRML MDRD: ABNORMAL ML/MIN/{1.73_M2}
GLUCOSE BLD-MCNC: 114 MG/DL (ref 70–99)
HCT VFR BLD CALC: 38.8 % (ref 41–53)
HEMOGLOBIN: 12.8 G/DL (ref 13.5–17.5)
IMMATURE GRANULOCYTES: ABNORMAL %
LYMPHOCYTES # BLD: 12 % (ref 24–44)
MAGNESIUM: 1.6 MG/DL (ref 1.6–2.6)
MCH RBC QN AUTO: 31.7 PG (ref 26–34)
MCHC RBC AUTO-ENTMCNC: 32.9 G/DL (ref 31–37)
MCV RBC AUTO: 96.2 FL (ref 80–100)
MONOCYTES # BLD: 8 % (ref 1–7)
NRBC AUTOMATED: ABNORMAL PER 100 WBC
PDW BLD-RTO: 13.7 % (ref 11.5–14.5)
PHOSPHORUS: 1.9 MG/DL (ref 2.5–4.5)
PLATELET # BLD: 252 K/UL (ref 130–400)
PLATELET ESTIMATE: ABNORMAL
PMV BLD AUTO: 9.2 FL (ref 6–12)
POTASSIUM SERPL-SCNC: 3.7 MMOL/L (ref 3.7–5.3)
RBC # BLD: 4.04 M/UL (ref 4.5–5.9)
RBC # BLD: ABNORMAL 10*6/UL
SEG NEUTROPHILS: 80 % (ref 36–66)
SEGMENTED NEUTROPHILS ABSOLUTE COUNT: 9.4 K/UL (ref 1.8–7.7)
SODIUM BLD-SCNC: 147 MMOL/L (ref 135–144)
TOTAL PROTEIN: 5.7 G/DL (ref 6.4–8.3)
WBC # BLD: 11.7 K/UL (ref 3.5–11)
WBC # BLD: ABNORMAL 10*3/UL

## 2018-08-19 PROCEDURE — 6370000000 HC RX 637 (ALT 250 FOR IP): Performed by: INTERNAL MEDICINE

## 2018-08-19 PROCEDURE — 80053 COMPREHEN METABOLIC PANEL: CPT

## 2018-08-19 PROCEDURE — 85025 COMPLETE CBC W/AUTO DIFF WBC: CPT

## 2018-08-19 PROCEDURE — 83735 ASSAY OF MAGNESIUM: CPT

## 2018-08-19 PROCEDURE — 2060000000 HC ICU INTERMEDIATE R&B

## 2018-08-19 PROCEDURE — 84100 ASSAY OF PHOSPHORUS: CPT

## 2018-08-19 PROCEDURE — 6360000002 HC RX W HCPCS: Performed by: INTERNAL MEDICINE

## 2018-08-19 PROCEDURE — 99232 SBSQ HOSP IP/OBS MODERATE 35: CPT | Performed by: INTERNAL MEDICINE

## 2018-08-19 PROCEDURE — 36415 COLL VENOUS BLD VENIPUNCTURE: CPT

## 2018-08-19 PROCEDURE — 6370000000 HC RX 637 (ALT 250 FOR IP): Performed by: NURSE PRACTITIONER

## 2018-08-19 PROCEDURE — 6360000002 HC RX W HCPCS: Performed by: SURGERY

## 2018-08-19 PROCEDURE — 2500000003 HC RX 250 WO HCPCS: Performed by: SURGERY

## 2018-08-19 PROCEDURE — 71045 X-RAY EXAM CHEST 1 VIEW: CPT

## 2018-08-19 PROCEDURE — 82150 ASSAY OF AMYLASE: CPT

## 2018-08-19 RX ADMIN — METRONIDAZOLE 500 MG: 500 INJECTION, SOLUTION INTRAVENOUS at 16:43

## 2018-08-19 RX ADMIN — ENOXAPARIN SODIUM 30 MG: 30 INJECTION SUBCUTANEOUS at 09:44

## 2018-08-19 RX ADMIN — METOCLOPRAMIDE 10 MG: 5 INJECTION, SOLUTION INTRAMUSCULAR; INTRAVENOUS at 22:24

## 2018-08-19 RX ADMIN — CIPROFLOXACIN 400 MG: 2 INJECTION, SOLUTION INTRAVENOUS at 00:57

## 2018-08-19 RX ADMIN — POLYETHYLENE GLYCOL (3350) 17 G: 17 POWDER, FOR SOLUTION ORAL at 09:41

## 2018-08-19 RX ADMIN — BACLOFEN 10 MG: 10 TABLET ORAL at 22:23

## 2018-08-19 RX ADMIN — BACLOFEN 10 MG: 10 TABLET ORAL at 09:43

## 2018-08-19 RX ADMIN — SENNOSIDES AND DOCUSATE SODIUM 2 TABLET: 8.6; 5 TABLET ORAL at 09:43

## 2018-08-19 RX ADMIN — LACOSAMIDE 100 MG: 100 TABLET, FILM COATED ORAL at 22:23

## 2018-08-19 RX ADMIN — LACTULOSE 20 G: 20 SOLUTION ORAL at 22:23

## 2018-08-19 RX ADMIN — VITAMIN D, TAB 1000IU (100/BT) 1000 UNITS: 25 TAB at 09:43

## 2018-08-19 RX ADMIN — POTASSIUM CHLORIDE 10 MEQ: 40 SOLUTION ORAL at 09:42

## 2018-08-19 RX ADMIN — FUROSEMIDE 20 MG: 20 TABLET ORAL at 09:43

## 2018-08-19 RX ADMIN — POTASSIUM CHLORIDE 10 MEQ: 40 SOLUTION ORAL at 22:24

## 2018-08-19 RX ADMIN — METOCLOPRAMIDE 10 MG: 5 INJECTION, SOLUTION INTRAMUSCULAR; INTRAVENOUS at 12:04

## 2018-08-19 RX ADMIN — CLONAZEPAM 2 MG: 0.5 TABLET ORAL at 09:41

## 2018-08-19 RX ADMIN — FENTANYL CITRATE 50 MCG: 50 INJECTION, SOLUTION INTRAMUSCULAR; INTRAVENOUS at 18:17

## 2018-08-19 RX ADMIN — CIPROFLOXACIN 400 MG: 2 INJECTION, SOLUTION INTRAVENOUS at 12:04

## 2018-08-19 RX ADMIN — ANTACID TABLETS 500 MG: 500 TABLET, CHEWABLE ORAL at 09:43

## 2018-08-19 RX ADMIN — LEVETIRACETAM 1500 MG: 100 SOLUTION ORAL at 09:42

## 2018-08-19 RX ADMIN — METOCLOPRAMIDE 10 MG: 5 INJECTION, SOLUTION INTRAMUSCULAR; INTRAVENOUS at 05:49

## 2018-08-19 RX ADMIN — BACLOFEN 10 MG: 10 TABLET ORAL at 13:19

## 2018-08-19 RX ADMIN — MONTELUKAST SODIUM 10 MG: 10 TABLET, FILM COATED ORAL at 22:23

## 2018-08-19 RX ADMIN — LACOSAMIDE 100 MG: 100 TABLET, FILM COATED ORAL at 09:43

## 2018-08-19 RX ADMIN — Medication 30 MG: at 05:49

## 2018-08-19 RX ADMIN — METRONIDAZOLE 500 MG: 500 INJECTION, SOLUTION INTRAVENOUS at 09:43

## 2018-08-19 RX ADMIN — FENTANYL CITRATE 50 MCG: 50 INJECTION, SOLUTION INTRAMUSCULAR; INTRAVENOUS at 05:49

## 2018-08-19 RX ADMIN — LACTULOSE 20 G: 20 SOLUTION ORAL at 09:42

## 2018-08-19 RX ADMIN — VITAMIN D, TAB 1000IU (100/BT) 1000 UNITS: 25 TAB at 22:33

## 2018-08-19 RX ADMIN — LEVETIRACETAM 1500 MG: 100 SOLUTION ORAL at 22:23

## 2018-08-19 RX ADMIN — METRONIDAZOLE 500 MG: 500 INJECTION, SOLUTION INTRAVENOUS at 02:08

## 2018-08-19 RX ADMIN — FENTANYL CITRATE 50 MCG: 50 INJECTION, SOLUTION INTRAMUSCULAR; INTRAVENOUS at 12:04

## 2018-08-19 RX ADMIN — LAMOTRIGINE 200 MG: 100 TABLET ORAL at 22:23

## 2018-08-19 RX ADMIN — FENTANYL CITRATE 50 MCG: 50 INJECTION, SOLUTION INTRAMUSCULAR; INTRAVENOUS at 09:44

## 2018-08-19 RX ADMIN — LAMOTRIGINE 300 MG: 100 TABLET ORAL at 09:42

## 2018-08-19 RX ADMIN — CETIRIZINE HYDROCHLORIDE 10 MG: 10 TABLET, FILM COATED ORAL at 09:43

## 2018-08-19 ASSESSMENT — PAIN SCALES - GENERAL
PAINLEVEL_OUTOF10: 8
PAINLEVEL_OUTOF10: 10
PAINLEVEL_OUTOF10: 0
PAINLEVEL_OUTOF10: 0
PAINLEVEL_OUTOF10: 7
PAINLEVEL_OUTOF10: 5

## 2018-08-19 ASSESSMENT — PAIN DESCRIPTION - PAIN TYPE
TYPE: SURGICAL PAIN
TYPE: SURGICAL PAIN

## 2018-08-19 ASSESSMENT — PAIN DESCRIPTION - LOCATION
LOCATION: ABDOMEN
LOCATION: ABDOMEN

## 2018-08-19 NOTE — PROGRESS NOTES
[facility]    Progress Note    8/19/2018    8:21 AM    Name:   Tara Winkler  MRN:     [id number]     Acct:      [de-identified]   Room:   [facility] Day:  5  Admit Date:  8/13/2018  9:15 PM    PCP:   Ivette Hutchins  Code Status:  Full Code    Subjective:     C/C:   Chief Complaint   Patient presents with    Abdominal Pain     x 1 day     Interval History Status: not changed. Had amber yesterday and umb hernia repair  When I walked into room, o2 sat read 88%-o2 was around his neck; I placed it into nose with no change in sat, then into mouth and o2 level immediately went to 95%    Brief History:     Per my partner: \"History: \"Enrrique Bañuelos is a 49 y.o.  male who presents to the emergency department for evaluation of abdominal pain. The patient has severe MR and is nonverbal secondary to underlying cerebral palsy. History is from the chart and caretakers. He presented with abdominal distention and was moaning in pain. Caretaker relates that his symptoms had been increasing over the 24 hours prior to admission. He has tube feeds and was fed on the night of admission. He is exhibiting a low-grade temperature. He is currently resting comfortably in the room. He does not awaken. I have palpated his abdomen extensively and he does not appear to be in any pain. CT abdomen revealed large fecal load. The patient had 2 large bowel movements in the emergency department which appears to have relieved his symptomatology. \"     Had ercp with papillotomy and balloon sweep on 8/16    Review of Systems:     unobtainable      Medications: Allergies:     Allergies   Allergen Reactions    Ampicillin Other (See Comments)    Valium [Diazepam] Other (See Comments)    Other      Bubble bath products        Current Meds:   Scheduled Meds:    sodium chloride flush  10 mL Intravenous 2 times per day    enoxaparin  30 mg Subcutaneous Daily    metroNIDAZOLE 500 mg Intravenous Q8H    metoclopramide  10 mg Intravenous 3 times per day    ciprofloxacin  400 mg Intravenous Q12H    potassium chloride  20 mEq Oral Once    baclofen  10 mg Per G Tube TID    vitamin D  1,000 Units Per G Tube BID    fluticasone  2 spray Nasal Daily    furosemide  20 mg Per G Tube Daily    lacosamide  100 mg Per G Tube BID    lactulose  20 g Per G Tube BID    lamoTRIgine  300 mg Per G Tube QAM    lamoTRIgine  200 mg Per G Tube Nightly    levETIRAcetam  1,500 mg Oral BID    cetirizine  10 mg Oral Daily    montelukast  10 mg Per G Tube Nightly    REPLETE/FIBER  75 mL Feeding Tube Daily    polyethylene glycol  17 g Per G Tube Daily    sennosides-docusate sodium  2 tablet Per G Tube Daily    potassium chloride  10 mEq Per G Tube BID    calcium carbonate  500 mg Per G Tube Daily    lansoprazole  30 mg Per G Tube QAM AC     Continuous Infusions:    lactated ringers 100 mL/hr at 18 2131     PRN Meds: sodium chloride flush, ondansetron **OR** ondansetron, fentanNYL, sodium chloride, albuterol, clonazePAM, potassium chloride **OR** potassium chloride **OR** potassium chloride, magnesium sulfate, magnesium hydroxide, bisacodyl, acetaminophen    Data:     Past Medical History:   has a past medical history of Cerebral palsy ([address] Cholelithiasis with chronic cholecystitis; Constipation; Delayed gastric emptying; Dysphagia; GERD (gastroesophageal reflux disease); Hearing loss; Mental disability; Profound mental retardation; Scoliosis; Seizures ([address] and 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[facility] Social History:   reports that he has never smoked. He has never used smokeless tobacco. He reports that he does not drink alcohol or use drugs. Family History: History reviewed. No pertinent family history.     Vitals:  /67   Pulse 101   Temp 99.5 °F (37.5 °C) (Axillary)   Resp 22   Ht 4' (1.219 m)   Wt 120 lb 9.6 oz (54.7 kg)   SpO2 92%   BMI 36.80 kg/m²   Temp (24hrs), Av.6 °F   8/19/2018  8:21 AM

## 2018-08-19 NOTE — PLAN OF CARE
Problem: Risk for Impaired Skin Integrity  Goal: Tissue integrity - skin and mucous membranes  Structural intactness and normal physiological function of skin and  mucous membranes. Outcome: Ongoing  Waffle mattress on bed and properly inflated   Q2 turn        Problem: Falls - Risk of:  Goal: Will remain free from falls  Will remain free from falls   Outcome: Ongoing  Siderails up x 2  Hourly rounding. Call light in reach. Instructed to call for assist before attempting out of bed. Remains free from falls and accidental injury at this time. Floor free from obstacles, and bed is locked and in lowest position. Adequate lighting provided. Bed alarm on. Fall sticker on wristband.  Fall Sign posted in doorway

## 2018-08-19 NOTE — PLAN OF CARE
Problem: Nutrition  Goal: Optimal nutrition therapy  Nutrition Problem: Inadequate oral intake  Intervention: Food and/or Nutrient Delivery: Continue NPO, Start Tube Feeding  Nutritional Goals: EN intake to meet >90% of estimated kcal/protein needs, with good GI tolerance  Outcome: Ongoing

## 2018-08-19 NOTE — PLAN OF CARE
Problem: Falls - Risk of:  Goal: Will remain free from falls  Will remain free from falls   Outcome: Ongoing  Free from falls at this time

## 2018-08-19 NOTE — PLAN OF CARE
[facility]    Second Visit Note  For more detailed information please refer to the progress note of the day      8/19/2018    2:28 PM    Name:   Derrick Ford  MRN:     [id number]     [place]:      [de-identified]   Room:   [address] [place] [address] Day:  5  Admit Date:  8/13/2018  9:15 PM    PCP:   Isa Mercer  Code Status:  Full Code        Pt vitals were reviewed   New labs were reviewed   Patient was seen    Updated plan :     1. Nonverbal  2.  Will check cxr to eval hypoxia        Roger TORRES Blood, DO  8/19/2018  2:28 PM

## 2018-08-20 LAB
ABSOLUTE EOS #: 0 K/UL (ref 0–0.4)
ABSOLUTE IMMATURE GRANULOCYTE: ABNORMAL K/UL (ref 0–0.3)
ABSOLUTE LYMPH #: 1.7 K/UL (ref 1–4.8)
ABSOLUTE MONO #: 0.9 K/UL (ref 0.2–0.8)
ANION GAP SERPL CALCULATED.3IONS-SCNC: 7 MMOL/L (ref 9–17)
BASOPHILS # BLD: 0 % (ref 0–2)
BASOPHILS ABSOLUTE: 0 K/UL (ref 0–0.2)
BUN BLDV-MCNC: 3 MG/DL (ref 6–20)
BUN/CREAT BLD: ABNORMAL (ref 9–20)
CALCIUM SERPL-MCNC: 8.3 MG/DL (ref 8.6–10.4)
CHLORIDE BLD-SCNC: 109 MMOL/L (ref 98–107)
CO2: 31 MMOL/L (ref 20–31)
CREAT SERPL-MCNC: <0.4 MG/DL (ref 0.7–1.2)
DIFFERENTIAL TYPE: ABNORMAL
EOSINOPHILS RELATIVE PERCENT: 0 % (ref 1–4)
GFR AFRICAN AMERICAN: ABNORMAL ML/MIN
GFR NON-AFRICAN AMERICAN: ABNORMAL ML/MIN
GFR SERPL CREATININE-BSD FRML MDRD: ABNORMAL ML/MIN/{1.73_M2}
GFR SERPL CREATININE-BSD FRML MDRD: ABNORMAL ML/MIN/{1.73_M2}
GLUCOSE BLD-MCNC: 129 MG/DL (ref 70–99)
HCT VFR BLD CALC: 37.8 % (ref 41–53)
HEMOGLOBIN: 12.5 G/DL (ref 13.5–17.5)
IMMATURE GRANULOCYTES: ABNORMAL %
LYMPHOCYTES # BLD: 16 % (ref 24–44)
MCH RBC QN AUTO: 32 PG (ref 26–34)
MCHC RBC AUTO-ENTMCNC: 33 G/DL (ref 31–37)
MCV RBC AUTO: 96.9 FL (ref 80–100)
MONOCYTES # BLD: 9 % (ref 1–7)
NRBC AUTOMATED: ABNORMAL PER 100 WBC
PDW BLD-RTO: 13.5 % (ref 11.5–14.5)
PLATELET # BLD: 256 K/UL (ref 130–400)
PLATELET ESTIMATE: ABNORMAL
PMV BLD AUTO: 9.5 FL (ref 6–12)
POTASSIUM SERPL-SCNC: 3.3 MMOL/L (ref 3.7–5.3)
RBC # BLD: 3.9 M/UL (ref 4.5–5.9)
RBC # BLD: ABNORMAL 10*6/UL
SEG NEUTROPHILS: 75 % (ref 36–66)
SEGMENTED NEUTROPHILS ABSOLUTE COUNT: 7.7 K/UL (ref 1.8–7.7)
SODIUM BLD-SCNC: 147 MMOL/L (ref 135–144)
WBC # BLD: 10.3 K/UL (ref 3.5–11)
WBC # BLD: ABNORMAL 10*3/UL

## 2018-08-20 PROCEDURE — 6360000002 HC RX W HCPCS: Performed by: SURGERY

## 2018-08-20 PROCEDURE — 6370000000 HC RX 637 (ALT 250 FOR IP): Performed by: NURSE PRACTITIONER

## 2018-08-20 PROCEDURE — 99232 SBSQ HOSP IP/OBS MODERATE 35: CPT | Performed by: INTERNAL MEDICINE

## 2018-08-20 PROCEDURE — 36415 COLL VENOUS BLD VENIPUNCTURE: CPT

## 2018-08-20 PROCEDURE — 2500000003 HC RX 250 WO HCPCS: Performed by: SURGERY

## 2018-08-20 PROCEDURE — 85025 COMPLETE CBC W/AUTO DIFF WBC: CPT

## 2018-08-20 PROCEDURE — 2700000000 HC OXYGEN THERAPY PER DAY

## 2018-08-20 PROCEDURE — 2060000000 HC ICU INTERMEDIATE R&B

## 2018-08-20 PROCEDURE — 6370000000 HC RX 637 (ALT 250 FOR IP): Performed by: INTERNAL MEDICINE

## 2018-08-20 PROCEDURE — 6360000002 HC RX W HCPCS: Performed by: INTERNAL MEDICINE

## 2018-08-20 PROCEDURE — 80048 BASIC METABOLIC PNL TOTAL CA: CPT

## 2018-08-20 PROCEDURE — 2580000003 HC RX 258: Performed by: SURGERY

## 2018-08-20 PROCEDURE — 94640 AIRWAY INHALATION TREATMENT: CPT

## 2018-08-20 PROCEDURE — 94761 N-INVAS EAR/PLS OXIMETRY MLT: CPT

## 2018-08-20 PROCEDURE — 94660 CPAP INITIATION&MGMT: CPT

## 2018-08-20 PROCEDURE — 97110 THERAPEUTIC EXERCISES: CPT

## 2018-08-20 PROCEDURE — 6360000002 HC RX W HCPCS: Performed by: NURSE PRACTITIONER

## 2018-08-20 RX ORDER — DIPHENHYDRAMINE HYDROCHLORIDE 50 MG/ML
12.5 INJECTION INTRAMUSCULAR; INTRAVENOUS ONCE
Status: COMPLETED | OUTPATIENT
Start: 2018-08-20 | End: 2018-08-20

## 2018-08-20 RX ADMIN — BACLOFEN 10 MG: 10 TABLET ORAL at 14:14

## 2018-08-20 RX ADMIN — Medication 10 ML: at 08:25

## 2018-08-20 RX ADMIN — METRONIDAZOLE 500 MG: 500 INJECTION, SOLUTION INTRAVENOUS at 16:16

## 2018-08-20 RX ADMIN — METOCLOPRAMIDE 10 MG: 5 INJECTION, SOLUTION INTRAMUSCULAR; INTRAVENOUS at 14:14

## 2018-08-20 RX ADMIN — METOCLOPRAMIDE 10 MG: 5 INJECTION, SOLUTION INTRAMUSCULAR; INTRAVENOUS at 06:19

## 2018-08-20 RX ADMIN — POTASSIUM CHLORIDE 10 MEQ: 40 SOLUTION ORAL at 08:16

## 2018-08-20 RX ADMIN — CIPROFLOXACIN 400 MG: 2 INJECTION, SOLUTION INTRAVENOUS at 00:03

## 2018-08-20 RX ADMIN — CETIRIZINE HYDROCHLORIDE 10 MG: 10 TABLET, FILM COATED ORAL at 08:17

## 2018-08-20 RX ADMIN — CIPROFLOXACIN 400 MG: 2 INJECTION, SOLUTION INTRAVENOUS at 12:44

## 2018-08-20 RX ADMIN — LAMOTRIGINE 200 MG: 100 TABLET ORAL at 22:01

## 2018-08-20 RX ADMIN — FENTANYL CITRATE 50 MCG: 50 INJECTION, SOLUTION INTRAMUSCULAR; INTRAVENOUS at 21:58

## 2018-08-20 RX ADMIN — METOCLOPRAMIDE 10 MG: 5 INJECTION, SOLUTION INTRAMUSCULAR; INTRAVENOUS at 21:59

## 2018-08-20 RX ADMIN — LACTULOSE 20 G: 20 SOLUTION ORAL at 21:57

## 2018-08-20 RX ADMIN — LACTULOSE 20 G: 20 SOLUTION ORAL at 08:19

## 2018-08-20 RX ADMIN — ANTACID TABLETS 500 MG: 500 TABLET, CHEWABLE ORAL at 08:17

## 2018-08-20 RX ADMIN — ENOXAPARIN SODIUM 30 MG: 30 INJECTION SUBCUTANEOUS at 08:19

## 2018-08-20 RX ADMIN — LEVETIRACETAM 1500 MG: 100 SOLUTION ORAL at 08:16

## 2018-08-20 RX ADMIN — VITAMIN D, TAB 1000IU (100/BT) 1000 UNITS: 25 TAB at 22:01

## 2018-08-20 RX ADMIN — MONTELUKAST SODIUM 10 MG: 10 TABLET, FILM COATED ORAL at 22:01

## 2018-08-20 RX ADMIN — ALBUTEROL SULFATE 2.5 MG: 2.5 SOLUTION RESPIRATORY (INHALATION) at 23:15

## 2018-08-20 RX ADMIN — DIPHENHYDRAMINE HYDROCHLORIDE 12.5 MG: 50 INJECTION, SOLUTION INTRAMUSCULAR; INTRAVENOUS at 18:24

## 2018-08-20 RX ADMIN — POTASSIUM CHLORIDE 10 MEQ: 40 SOLUTION ORAL at 21:57

## 2018-08-20 RX ADMIN — FENTANYL CITRATE 50 MCG: 50 INJECTION, SOLUTION INTRAMUSCULAR; INTRAVENOUS at 09:32

## 2018-08-20 RX ADMIN — METRONIDAZOLE 500 MG: 500 INJECTION, SOLUTION INTRAVENOUS at 01:10

## 2018-08-20 RX ADMIN — LACOSAMIDE 100 MG: 100 TABLET, FILM COATED ORAL at 08:17

## 2018-08-20 RX ADMIN — Medication 10 ML: at 22:01

## 2018-08-20 RX ADMIN — LEVETIRACETAM 1500 MG: 100 SOLUTION ORAL at 21:57

## 2018-08-20 RX ADMIN — BACLOFEN 10 MG: 10 TABLET ORAL at 08:17

## 2018-08-20 RX ADMIN — LACOSAMIDE 100 MG: 100 TABLET, FILM COATED ORAL at 22:01

## 2018-08-20 RX ADMIN — FLUTICASONE PROPIONATE 2 SPRAY: 50 SPRAY, METERED NASAL at 08:24

## 2018-08-20 RX ADMIN — VITAMIN D, TAB 1000IU (100/BT) 1000 UNITS: 25 TAB at 08:17

## 2018-08-20 RX ADMIN — BACLOFEN 10 MG: 10 TABLET ORAL at 22:00

## 2018-08-20 RX ADMIN — LAMOTRIGINE 300 MG: 100 TABLET ORAL at 08:17

## 2018-08-20 RX ADMIN — FUROSEMIDE 20 MG: 20 TABLET ORAL at 08:17

## 2018-08-20 RX ADMIN — FENTANYL CITRATE 50 MCG: 50 INJECTION, SOLUTION INTRAMUSCULAR; INTRAVENOUS at 20:09

## 2018-08-20 RX ADMIN — Medication 30 MG: at 06:20

## 2018-08-20 RX ADMIN — METRONIDAZOLE 500 MG: 500 INJECTION, SOLUTION INTRAVENOUS at 08:17

## 2018-08-20 RX ADMIN — POTASSIUM CHLORIDE 40 MEQ: 40 SOLUTION ORAL at 08:18

## 2018-08-20 ASSESSMENT — PAIN SCALES - GENERAL: PAINLEVEL_OUTOF10: 8

## 2018-08-20 NOTE — PROGRESS NOTES
General Surgery Progress Note            PATIENT NAME: Isreal Banuelos     TODAY'S DATE: 8/20/2018, 4:35 PM    SUBJECTIVE:    Pt  Seen and examined. OBJECTIVE:   VITALS:  BP (!) 99/50   Pulse 98   Temp 98.1 °F (36.7 °C) (Axillary)   Resp 20   Ht 4' (1.219 m)   Wt 122 lb 9.6 oz (55.6 kg)   SpO2 99%   BMI 37.41 kg/m²      INTAKE/OUTPUT:      Intake/Output Summary (Last 24 hours) at 08/20/18 1635  Last data filed at 08/20/18 1616   Gross per 24 hour   Intake             3011 ml   Output               60 ml   Net             2951 ml                 CONSTITUTIONAL:  awake and alert. no apparent distress  HEART:   Regular   LUNGS:   Diminished   ABDOMEN:   Abdomen soft, non-tender, mildly distended. +BS. +BM. Tolerating tube feeding. Incisions intact.  Drain serous  EXTREMITIES:   No edema    Data:  CBC with Differential:    Lab Results   Component Value Date    WBC 10.3 08/20/2018    RBC 3.90 08/20/2018    HGB 12.5 08/20/2018    HCT 37.8 08/20/2018     08/20/2018    MCV 96.9 08/20/2018    MCH 32.0 08/20/2018    MCHC 33.0 08/20/2018    RDW 13.5 08/20/2018    LYMPHOPCT 16 08/20/2018    MONOPCT 9 08/20/2018    BASOPCT 0 08/20/2018    MONOSABS 0.90 08/20/2018    LYMPHSABS 1.70 08/20/2018    EOSABS 0.00 08/20/2018    BASOSABS 0.00 08/20/2018    DIFFTYPE NOT REPORTED 08/20/2018     BMP:    Lab Results   Component Value Date     08/20/2018    K 3.3 08/20/2018     08/20/2018    CO2 31 08/20/2018    BUN 3 08/20/2018    LABALBU 3.0 08/19/2018    CREATININE <0.40 08/20/2018    CALCIUM 8.3 08/20/2018    GFRAA CANNOT BE CALCULATED 08/20/2018    LABGLOM CANNOT BE CALCULATED 08/20/2018    GLUCOSE 129 08/20/2018         ASSESSMENT     Principal Problem:    Obstipation  Active Problems:    Seizure disorder (HCC)    Spastic quadriplegic cerebral palsy (HCC)    Choledocholithiasis    Generalized abdominal pain    Constipation    Developmental disability    Ileus (HCC)  Resolved Problems:    * No resolved

## 2018-08-20 NOTE — PROGRESS NOTES
[facility]    Progress Note    8/20/2018    2:06 PM    Name:   Heaven Irwin  MRN:     [id number]     [place]:      [de-identified]   Room:   [address] [place] [address] Day:  6  Admit Date:  8/13/2018  9:15 PM    PCP:   Judith Pitts  Code Status:  Full Code    Subjective:     C/C:   Chief Complaint   Patient presents with    Abdominal Pain     x 1 day     Interval History Status: not changed. Patient is nonverbal.  Per nursing staff no acute issues other than erythema around the periumbilical incision. Brief History:     Per my partner: \"History: \"Enrrique Bañuelos is a 49 y.o.  male who presents to the emergency department for evaluation of abdominal pain. The patient has severe MR and is nonverbal secondary to underlying cerebral palsy. History is from the chart and caretakers. He presented with abdominal distention and was moaning in pain. Caretaker relates that his symptoms had been increasing over the 24 hours prior to admission. He has tube feeds and was fed on the night of admission. He is exhibiting a low-grade temperature. He is currently resting comfortably in the room. He does not awaken. I have palpated his abdomen extensively and he does not appear to be in any pain. CT abdomen revealed large fecal load. The patient had 2 large bowel movements in the emergency department which appears to have relieved his symptomatology. \"     Had ercp with papillotomy and balloon sweep on 8/16    Review of Systems:     Constitutional:  negative for chills, fevers, sweats  Respiratory:  negative for cough, dyspnea on exertion, shortness of breath, wheezing  Cardiovascular:  negative for chest pain, chest pressure/discomfort, lower extremity edema, palpitations  Gastrointestinal:  negative for abdominal pain, constipation, diarrhea, nausea, vomiting  Neurological:  negative for dizziness, headache    Medications: Allergies:     Allergies   Allergen drink alcohol or use drugs. Family History: History reviewed. No pertinent family history. Vitals:  BP (!) 144/82   Pulse 94   Temp 98.4 °F (36.9 °C) (Axillary)   Resp 20   Ht 4' (1.219 m)   Wt 122 lb 9.6 oz (55.6 kg)   SpO2 97%   BMI 37.41 kg/m²   Temp (24hrs), Av.2 °F (37.3 °C), Min:97.5 °F (36.4 °C), Max:100.1 °F (37.8 °C)    Recent Labs      18   1742  18   0601  18   1722   POCGLU  106  89  112*       I/O (24Hr): Intake/Output Summary (Last 24 hours) at 18 1406  Last data filed at 18 1200   Gross per 24 hour   Intake             1459 ml   Output               60 ml   Net             1399 ml       Labs:    Hematology:  Recent Labs      18   0543  18   0635  18   0533   WBC  5.9  11.7*  10.3   RBC  4.36*  4.04*  3.90*   HGB  13.6  12.8*  12.5*   HCT  42.1  38.8*  37.8*   MCV  96.5  96.2  96.9   MCH  31.3  31.7  32.0   MCHC  32.4  32.9  33.0   RDW  13.2  13.7  13.5   PLT  296  252  256   MPV  8.8  9.2  9.5     Chemistry:  Recent Labs      18   0543   18   1950  18   0635  18   0533   NA  144   < >  145*  147*  147*   K  3.1*   < >  3.5*  3.7  3.3*   CL  108*   < >  109*  112*  109*   CO2  25   < >  22  25  31   GLUCOSE  94   < >  100*  114*  129*   BUN  3*   < >  2*  2*  3*   CREATININE  <0.40*   < >  <0.40*  <0.40*  <0.40*   MG  1.9   --    --   1.6   --    ANIONGAP  11   < >  14  10  7*   LABGLOM  CANNOT BE CALCULATED   < >  CANNOT BE CALCULATED  CANNOT BE CALCULATED  CANNOT BE CALCULATED   GFRAA  CANNOT BE CALCULATED   < >  CANNOT BE CALCULATED  CANNOT BE CALCULATED  CANNOT BE CALCULATED   CALCIUM  8.1*   < >  8.0*  8.0*  8.3*   PHOS   --    --    --   1.9*   --     < > = values in this interval not displayed.      Recent Labs      18   1742   18   0543  18   0601  18   1428  18   1722  18   1950  18   0635   PROT   --    < >  6.2*   --   6.6   --   6.5  5.7*   LABALBU   --    < >  3.3*   --   3.4*   --   3.5  3.0*   AST   --    < >  156*   --   155*   --   135*  72*   ALT   --    < >  184*   --   197*   --   166*  112*   ALKPHOS   --    < >  153*   --   154*   --   145*  116   BILITOT   --    < >  1.56*   --   0.75   --   0.59  0.55   BILIDIR   --    --   1.10*   --    --    --    --    --    AMYLASE   --    --   77   --    --    --    --   24*   LIPASE   --    --   75*   --    --    --    --    --    POCGLU  106   --    --   89   --   112*   --    --     < > = values in this interval not displayed. Lab Results   Component Value Date/Time    SPECIAL NOT REPORTED 08/13/2018 10:00 PM     Lab Results   Component Value Date/Time    CULTURE NO GROWTH 08/13/2018 10:00 PM       No results found for: POCPH, PHART, PH, POCPCO2, OTR2GBY, PCO2, POCPO2, PO2ART, PO2, POCHCO3, ZTI8GUF, HCO3, NBEA, PBEA, BEART, BE, THGBART, THB, DWV7IDN, EKCA7ZEM, X5QHLVEQ, O2SAT, FIO2    Radiology:    No new radiology reports    Physical Examination:        General appearance:  alert and no distress  Mental Status:  Cannot be assessed as the patient is nonverbal  Lungs:  clear to auscultation bilaterally, normal effort, dementia basis  Heart:  regular rate and rhythm, no murmur  Abdomen:  soft, nontender, nondistended, normal bowel sounds, no masses, hepatomegaly, splenomegaly  Extremities:  no edema, redness, tenderness in the calves  Skin:  Mild erythema in the periumbilical region, no gross lesions, induration    Assessment:        Primary Problem  Obstipation    Active Hospital Problems    Diagnosis Date Noted    Ileus (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 Diet as tolerated  2.  Continue routine postoperative

## 2018-08-20 NOTE — PLAN OF CARE
Problem: Risk for Impaired Skin Integrity  Goal: Tissue integrity - skin and mucous membranes  Structural intactness and normal physiological function of skin and  mucous membranes. Outcome: Ongoing    Intervention: SKIN ASSESSMENT  Careplan reviewed with patient      Problem: Musculor/Skeletal Functional Status  Goal: Absence of falls  Outcome: Ongoing  Bed in lowest position, wheels locked. RN will continue to monitor on hourly rounds and as needed.      Comments: Careplan reviewed with patient

## 2018-08-20 NOTE — PROGRESS NOTES
GI Progress notes    8/20/2018   1:03 PM    Name:  Isreal Banuelos  MRN:    [id number]     Acct:     [de-identified]   Room:  [address] [place] [address] Day: 6     Admit Date: 8/13/2018  9:15 PM  PCP: Augusta Hutchins    Subjective:     C/C:   Chief Complaint   Patient presents with    Abdominal Pain     x 1 day       Interval History: Status: improved. LFTs continues to improve  PEG feedings have been advanced  Has some abd bloating and some erythema around the op sites  No bleeding is reported     ROS:  MRDD    Medications: Allergies:    Allergies   Allergen Reactions    Ampicillin Other (See Comments)    Valium [Diazepam] Other (See Comments)    Other      Bubble bath products        Current Meds:   lactated ringers infusion Continuous   sodium chloride flush 0.9 % injection 10 mL 2 times per day   sodium chloride flush 0.9 % injection 10 mL PRN   enoxaparin (LOVENOX) injection 30 mg Daily   metronidazole (FLAGYL) 500 mg in NaCl 100 mL IVPB premix Q8H   ondansetron (ZOFRAN-ODT) disintegrating tablet 4 mg Q4H PRN   Or    ondansetron (ZOFRAN) injection 4 mg Q4H PRN   fentaNYL (SUBLIMAZE) injection 50 mcg Q1H PRN   metoclopramide (REGLAN) injection 10 mg 3 times per day   ciprofloxacin (CIPRO) IVPB 400 mg Q12H   potassium chloride 20 MEQ/15ML (10%) oral solution 20 mEq Once   0.9 % sodium chloride bolus As Directed RT PRN   albuterol (PROVENTIL) nebulizer solution 2.5 mg TID PRN   baclofen (LIORESAL) tablet 10 mg TID   vitamin D (CHOLECALCIFEROL) tablet 1,000 Units BID   clonazePAM (KLONOPIN) tablet 2 mg BID PRN   fluticasone (FLONASE) 50 MCG/ACT nasal spray 2 spray Daily   furosemide (LASIX) tablet 20 mg Daily   lacosamide (VIMPAT) tablet 100 mg BID   lactulose (CHRONULAC) 10 GM/15ML solution 20 g BID   lamoTRIgine (LAMICTAL) tablet 300 mg QAM   lamoTRIgine (LAMICTAL) tablet 200 mg Nightly   levETIRAcetam (KEPPRA) 100 MG/ML solution 1,500 mg BID   cetirizine (ZYRTEC) tablet 10 mg Daily   montelukast (SINGULAIR) tablet 10 mg Nightly   REPLETE/FIBER LIQD 75 mL Daily   polyethylene glycol (GLYCOLAX) packet 17 g Daily   sennosides-docusate sodium (SENOKOT-S) 8.6-50 MG tablet 2 tablet Daily   potassium chloride (KLOR-CON M) extended release tablet 40 mEq PRN   Or    potassium chloride 20 MEQ/15ML (10%) oral solution 40 mEq PRN   Or    potassium chloride 10 mEq/100 mL IVPB (Peripheral Line) PRN   magnesium sulfate 1 g in dextrose 5% 100 mL IVPB PRN   magnesium hydroxide (MILK OF MAGNESIA) 400 MG/5ML suspension 30 mL Daily PRN   bisacodyl (DULCOLAX) suppository 10 mg Daily PRN   acetaminophen (TYLENOL) tablet 650 mg Q4H PRN   potassium chloride 20 MEQ/15ML (10%) oral solution 10 mEq BID   calcium carbonate (TUMS) chewable tablet 500 mg Daily   lansoprazole suspension SUSP 30 mg QAM AC       Data:     Code Status:  Full Code    History reviewed. No pertinent family history. Social History     Social History    Marital status: Single     Spouse name: N/A    Number of children: N/A    Years of education: N/A     Occupational History    Not on file. Social History Main Topics    Smoking status: Never Smoker    Smokeless tobacco: Never Used    Alcohol use No    Drug use: No    Sexual activity: Not on file     Other Topics Concern    Not on file     Social History Narrative    No narrative on file       Vitals:  BP (!) 144/82   Pulse 94   Temp 98.4 °F (36.9 °C) (Axillary)   Resp 20   Ht 4' (1.219 m)   Wt 122 lb 9.6 oz (55.6 kg)   SpO2 97%   BMI 37.41 kg/m²   Temp (24hrs), Av.2 °F (37.3 °C), Min:97.5 °F (36.4 °C), Max:100.1 °F (37.8 °C)    Recent Labs      18   1742  18   0601  18   1722   POCGLU  106  89  112*       I/O (24Hr):     Intake/Output Summary (Last 24 hours) at 18 1303  Last data filed at 18 1200   Gross per 24 hour   Intake             1459 ml   Output               60 ml   Net             1399 ml       Labs:      CBC: Lab Results   Component Value Date    WBC 10.3 2018 Problem  Obstipation     Active Hospital Problems    Diagnosis Date Noted    Ileus ([facility] [K56.7] 08/18/2018    Choledocholithiasis [K80.50] 08/14/2018    Obstipation [K59.00] 08/14/2018    Generalized abdominal pain [R10.84]     Constipation [K59.00]     Developmental disability [F89]     Spastic quadriplegic cerebral palsy (HCC) [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 Cont post op care per surgery   2. F/u LFTs  3. Cont PEG feedings  4.  Relatively stable from GI point for DC    Explained to the patient and d/W Nursing Staff  Will F/U with you  Please call or Page for any issues or change in status  Thanks    Electronically signed by Jake Kruse MD on 8/20/2018 at 1:03 PM

## 2018-08-20 NOTE — PROGRESS NOTES
x10 each   PROM RLE (degrees)  RLE PROM: WNL  PROM LLE (degrees)  LLE PROM: WNL  PROM RUE (degrees)  RUE PROM: WNL  AROM LUE (degrees)  LUE AROM : WNL  Strength Other  Other: No purposeful AROM x 4 extremities      Assessment   Assessment: Tightness x 4 extremities  Prognosis: poor rehab potential   Decision Making: High Complexity  REQUIRES PT FOLLOW UP: No   Recommend ROM maintenance program at d/c. Activity Tolerance  Activity Tolerance: Patient limited by cognitive status; Patient Tolerated treatment well     AM-PAC Score  AM-PAC Inpatient Mobility Raw Score : 6  AM-PAC Inpatient T-Scale Score : 23.55  Mobility Inpatient CMS 0-100% Score: 100  Mobility Inpatient CMS G-Code Modifier : CN     Goals  Short term goals  Time Frame for Short term goals: 8 treatments  Short term goal 1: PROM x 4 extremities  Patient Goals   Patient goals : Pt. non verbal    Plan    Plan  Times per week: Will d/c skilled physical therapy at this time.    Current Treatment Recommendations: ROM  Plan Comment: PROM x 4 extremities  Safety Devices  Type of devices: Bed alarm in place, Left in bed, Nurse notified, Call light within reach  Restraints  Initially in place: No     Therapy Time   Individual Concurrent Group Co-treatment   Time In 0915         Time Out 0925         Minutes [address] [facility], Student Physical Therapist Assistant

## 2018-08-21 ENCOUNTER — APPOINTMENT (OUTPATIENT)
Dept: GENERAL RADIOLOGY | Age: 48
DRG: 417 | End: 2018-08-21
Payer: MEDICARE

## 2018-08-21 VITALS
HEART RATE: 91 BPM | WEIGHT: 127.2 LBS | HEIGHT: 60 IN | RESPIRATION RATE: 24 BRPM | BODY MASS INDEX: 24.97 KG/M2 | SYSTOLIC BLOOD PRESSURE: 116 MMHG | DIASTOLIC BLOOD PRESSURE: 72 MMHG | TEMPERATURE: 97.9 F | OXYGEN SATURATION: 99 %

## 2018-08-21 LAB
ABSOLUTE EOS #: 0 K/UL (ref 0–0.4)
ABSOLUTE IMMATURE GRANULOCYTE: ABNORMAL K/UL (ref 0–0.3)
ABSOLUTE LYMPH #: 1.8 K/UL (ref 1–4.8)
ABSOLUTE MONO #: 0.6 K/UL (ref 0.2–0.8)
ANION GAP SERPL CALCULATED.3IONS-SCNC: 8 MMOL/L (ref 9–17)
BASOPHILS # BLD: 0 % (ref 0–2)
BASOPHILS ABSOLUTE: 0 K/UL (ref 0–0.2)
BUN BLDV-MCNC: 5 MG/DL (ref 6–20)
BUN/CREAT BLD: ABNORMAL (ref 9–20)
CALCIUM SERPL-MCNC: 8.1 MG/DL (ref 8.6–10.4)
CHLORIDE BLD-SCNC: 104 MMOL/L (ref 98–107)
CO2: 32 MMOL/L (ref 20–31)
CREAT SERPL-MCNC: <0.4 MG/DL (ref 0.7–1.2)
DIFFERENTIAL TYPE: ABNORMAL
EOSINOPHILS RELATIVE PERCENT: 0 % (ref 1–4)
GFR AFRICAN AMERICAN: ABNORMAL ML/MIN
GFR NON-AFRICAN AMERICAN: ABNORMAL ML/MIN
GFR SERPL CREATININE-BSD FRML MDRD: ABNORMAL ML/MIN/{1.73_M2}
GFR SERPL CREATININE-BSD FRML MDRD: ABNORMAL ML/MIN/{1.73_M2}
GLUCOSE BLD-MCNC: 131 MG/DL (ref 70–99)
HCT VFR BLD CALC: 35.9 % (ref 41–53)
HEMOGLOBIN: 11.6 G/DL (ref 13.5–17.5)
IMMATURE GRANULOCYTES: ABNORMAL %
LYMPHOCYTES # BLD: 21 % (ref 24–44)
MCH RBC QN AUTO: 31.2 PG (ref 26–34)
MCHC RBC AUTO-ENTMCNC: 32.3 G/DL (ref 31–37)
MCV RBC AUTO: 96.5 FL (ref 80–100)
MONOCYTES # BLD: 7 % (ref 1–7)
NRBC AUTOMATED: ABNORMAL PER 100 WBC
PDW BLD-RTO: 13.5 % (ref 11.5–14.5)
PLATELET # BLD: 246 K/UL (ref 130–400)
PLATELET ESTIMATE: ABNORMAL
PMV BLD AUTO: 9.6 FL (ref 6–12)
POTASSIUM SERPL-SCNC: 3.5 MMOL/L (ref 3.7–5.3)
RBC # BLD: 3.72 M/UL (ref 4.5–5.9)
RBC # BLD: ABNORMAL 10*6/UL
SEG NEUTROPHILS: 72 % (ref 36–66)
SEGMENTED NEUTROPHILS ABSOLUTE COUNT: 6.3 K/UL (ref 1.8–7.7)
SODIUM BLD-SCNC: 144 MMOL/L (ref 135–144)
SURGICAL PATHOLOGY REPORT: NORMAL
WBC # BLD: 8.8 K/UL (ref 3.5–11)
WBC # BLD: ABNORMAL 10*3/UL

## 2018-08-21 PROCEDURE — 80048 BASIC METABOLIC PNL TOTAL CA: CPT

## 2018-08-21 PROCEDURE — 94761 N-INVAS EAR/PLS OXIMETRY MLT: CPT

## 2018-08-21 PROCEDURE — 6360000002 HC RX W HCPCS: Performed by: INTERNAL MEDICINE

## 2018-08-21 PROCEDURE — 6370000000 HC RX 637 (ALT 250 FOR IP): Performed by: NURSE PRACTITIONER

## 2018-08-21 PROCEDURE — 36415 COLL VENOUS BLD VENIPUNCTURE: CPT

## 2018-08-21 PROCEDURE — 99232 SBSQ HOSP IP/OBS MODERATE 35: CPT | Performed by: INTERNAL MEDICINE

## 2018-08-21 PROCEDURE — 99239 HOSP IP/OBS DSCHRG MGMT >30: CPT | Performed by: INTERNAL MEDICINE

## 2018-08-21 PROCEDURE — 71045 X-RAY EXAM CHEST 1 VIEW: CPT

## 2018-08-21 PROCEDURE — 6370000000 HC RX 637 (ALT 250 FOR IP): Performed by: INTERNAL MEDICINE

## 2018-08-21 PROCEDURE — 94660 CPAP INITIATION&MGMT: CPT

## 2018-08-21 PROCEDURE — 2580000003 HC RX 258: Performed by: SURGERY

## 2018-08-21 PROCEDURE — 2700000000 HC OXYGEN THERAPY PER DAY

## 2018-08-21 PROCEDURE — 6360000002 HC RX W HCPCS: Performed by: SURGERY

## 2018-08-21 PROCEDURE — 85025 COMPLETE CBC W/AUTO DIFF WBC: CPT

## 2018-08-21 PROCEDURE — 94640 AIRWAY INHALATION TREATMENT: CPT

## 2018-08-21 RX ORDER — ALBUTEROL SULFATE 2.5 MG/3ML
2.5 SOLUTION RESPIRATORY (INHALATION) 4 TIMES DAILY
Status: DISCONTINUED | OUTPATIENT
Start: 2018-08-21 | End: 2018-08-21 | Stop reason: HOSPADM

## 2018-08-21 RX ADMIN — VITAMIN D, TAB 1000IU (100/BT) 1000 UNITS: 25 TAB at 10:03

## 2018-08-21 RX ADMIN — ALBUTEROL SULFATE 2.5 MG: 2.5 SOLUTION RESPIRATORY (INHALATION) at 11:33

## 2018-08-21 RX ADMIN — SODIUM CHLORIDE, POTASSIUM CHLORIDE, SODIUM LACTATE AND CALCIUM CHLORIDE: 600; 310; 30; 20 INJECTION, SOLUTION INTRAVENOUS at 05:56

## 2018-08-21 RX ADMIN — Medication 10 ML: at 10:18

## 2018-08-21 RX ADMIN — SENNOSIDES AND DOCUSATE SODIUM 2 TABLET: 8.6; 5 TABLET ORAL at 10:04

## 2018-08-21 RX ADMIN — FLUTICASONE PROPIONATE 2 SPRAY: 50 SPRAY, METERED NASAL at 10:43

## 2018-08-21 RX ADMIN — FENTANYL CITRATE 50 MCG: 50 INJECTION, SOLUTION INTRAMUSCULAR; INTRAVENOUS at 01:44

## 2018-08-21 RX ADMIN — LACTULOSE 20 G: 20 SOLUTION ORAL at 10:06

## 2018-08-21 RX ADMIN — Medication 30 MG: at 10:15

## 2018-08-21 RX ADMIN — ENOXAPARIN SODIUM 30 MG: 30 INJECTION SUBCUTANEOUS at 10:06

## 2018-08-21 RX ADMIN — METOCLOPRAMIDE 10 MG: 5 INJECTION, SOLUTION INTRAMUSCULAR; INTRAVENOUS at 13:45

## 2018-08-21 RX ADMIN — BACLOFEN 10 MG: 10 TABLET ORAL at 13:45

## 2018-08-21 RX ADMIN — FUROSEMIDE 20 MG: 20 TABLET ORAL at 10:05

## 2018-08-21 RX ADMIN — CETIRIZINE HYDROCHLORIDE 10 MG: 10 TABLET, FILM COATED ORAL at 10:04

## 2018-08-21 RX ADMIN — ALBUTEROL SULFATE 2.5 MG: 2.5 SOLUTION RESPIRATORY (INHALATION) at 07:31

## 2018-08-21 RX ADMIN — BACLOFEN 10 MG: 10 TABLET ORAL at 10:03

## 2018-08-21 RX ADMIN — LACOSAMIDE 100 MG: 100 TABLET, FILM COATED ORAL at 10:36

## 2018-08-21 RX ADMIN — METOCLOPRAMIDE 10 MG: 5 INJECTION, SOLUTION INTRAMUSCULAR; INTRAVENOUS at 05:55

## 2018-08-21 RX ADMIN — LEVETIRACETAM 1500 MG: 100 SOLUTION ORAL at 10:05

## 2018-08-21 RX ADMIN — ANTACID TABLETS 500 MG: 500 TABLET, CHEWABLE ORAL at 10:05

## 2018-08-21 RX ADMIN — LAMOTRIGINE 300 MG: 100 TABLET ORAL at 10:03

## 2018-08-21 RX ADMIN — POTASSIUM CHLORIDE 40 MEQ: 40 SOLUTION ORAL at 10:15

## 2018-08-21 RX ADMIN — POLYETHYLENE GLYCOL (3350) 17 G: 17 POWDER, FOR SOLUTION ORAL at 10:06

## 2018-08-21 RX ADMIN — POTASSIUM CHLORIDE 10 MEQ: 40 SOLUTION ORAL at 10:07

## 2018-08-21 NOTE — PROGRESS NOTES
GI Progress notes    8/21/2018   1:10 PM    Name:  Bozena Bauer  MRN:    [id number]     Acct:     [de-identified]   Room:  [address] [place] [address] Day: 7     Admit Date: 8/13/2018  9:15 PM  PCP: Roula Hutchins    Subjective:     C/C:   Chief Complaint   Patient presents with    Abdominal Pain     x 1 day       Interval History: Status: improved. Improving overall  Tolerating PEG feedings  No bleeding or any melena reported  LFTs have improved     ROS:  MRDD      Medications: Allergies:    Allergies   Allergen Reactions    Ampicillin Other (See Comments)    Valium [Diazepam] Other (See Comments)    Other      Bubble bath products        Current Meds:   albuterol (PROVENTIL) nebulizer solution 2.5 mg 4x daily   sodium chloride flush 0.9 % injection 10 mL 2 times per day   sodium chloride flush 0.9 % injection 10 mL PRN   enoxaparin (LOVENOX) injection 30 mg Daily   ondansetron (ZOFRAN-ODT) disintegrating tablet 4 mg Q4H PRN   Or    ondansetron (ZOFRAN) injection 4 mg Q4H PRN   fentaNYL (SUBLIMAZE) injection 50 mcg Q1H PRN   metoclopramide (REGLAN) injection 10 mg 3 times per day   potassium chloride 20 MEQ/15ML (10%) oral solution 20 mEq Once   0.9 % sodium chloride bolus As Directed RT PRN   albuterol (PROVENTIL) nebulizer solution 2.5 mg TID PRN   baclofen (LIORESAL) tablet 10 mg TID   vitamin D (CHOLECALCIFEROL) tablet 1,000 Units BID   clonazePAM (KLONOPIN) tablet 2 mg BID PRN   fluticasone (FLONASE) 50 MCG/ACT nasal spray 2 spray Daily   furosemide (LASIX) tablet 20 mg Daily   lacosamide (VIMPAT) tablet 100 mg BID   lactulose (CHRONULAC) 10 GM/15ML solution 20 g BID   lamoTRIgine (LAMICTAL) tablet 300 mg QAM   lamoTRIgine (LAMICTAL) tablet 200 mg Nightly   levETIRAcetam (KEPPRA) 100 MG/ML solution 1,500 mg BID   cetirizine (ZYRTEC) tablet 10 mg Daily   montelukast (SINGULAIR) tablet 10 mg Nightly   REPLETE/FIBER LIQD 75 mL Daily   polyethylene glycol (GLYCOLAX) packet 17 g Daily   sennosides-docusate sodium 08/21/2018    MPV 9.6 08/21/2018     CBC with Differential:  Lab Results   Component Value Date    WBC 8.8 08/21/2018    RBC 3.72 08/21/2018    HGB 11.6 08/21/2018    HCT 35.9 08/21/2018     08/21/2018    MCV 96.5 08/21/2018    MCH 31.2 08/21/2018    MCHC 32.3 08/21/2018    RDW 13.5 08/21/2018    LYMPHOPCT 21 08/21/2018    MONOPCT 7 08/21/2018    BASOPCT 0 08/21/2018    MONOSABS 0.60 08/21/2018    LYMPHSABS 1.80 08/21/2018    EOSABS 0.00 08/21/2018    BASOSABS 0.00 08/21/2018    DIFFTYPE NOT REPORTED 08/21/2018     Hemoglobin/Hematocrit:  Lab Results   Component Value Date    HGB 11.6 08/21/2018    HCT 35.9 08/21/2018     CMP:  Lab Results   Component Value Date     08/21/2018    K 3.5 08/21/2018     08/21/2018    CO2 32 08/21/2018    BUN 5 08/21/2018    CREATININE <0.40 08/21/2018    GFRAA CANNOT BE CALCULATED 08/21/2018    LABGLOM CANNOT BE CALCULATED 08/21/2018    GLUCOSE 131 08/21/2018    PROT 5.7 08/19/2018    LABALBU 3.0 08/19/2018    CALCIUM 8.1 08/21/2018    BILITOT 0.55 08/19/2018    ALKPHOS 116 08/19/2018    AST 72 08/19/2018     08/19/2018     BMP:  Lab Results   Component Value Date     08/21/2018    K 3.5 08/21/2018     08/21/2018    CO2 32 08/21/2018    BUN 5 08/21/2018    LABALBU 3.0 08/19/2018    CREATININE <0.40 08/21/2018    CALCIUM 8.1 08/21/2018    GFRAA CANNOT BE CALCULATED 08/21/2018    LABGLOM CANNOT BE CALCULATED 08/21/2018    GLUCOSE 131 08/21/2018     PT/INR:    Lab Results   Component Value Date    PROTIME 12.5 08/14/2018    INR 1.2 08/14/2018     PTT:  No results found for: APTT, PTT[APTT}    Physical Examination:        General appearance: MRDD  Abdomen: soft, nontender, mild distended, bowel sounds present + PEG      Assessment:        Primary Problem  Obstipation     Active Hospital Problems    Diagnosis Date Noted    [facility] [K56.7] 08/18/2018    Choledocholithiasis [K80.50] 08/14/2018    Obstipation [K59.00] 08/14/2018    Generalized abdominal pain [R10.84]     Constipation [K59.00]     Developmental disability [F89]     Spastic quadriplegic cerebral palsy (HCC) [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 [G40.909] 11/14/2017     Past Medical History:   Diagnosis Date    Cerebral palsy ([facility]     Cholelithiasis with chronic cholecystitis     Constipation     Delayed gastric emptying     Dysphagia     GERD (gastroesophageal reflux disease)     Hearing loss     Mental disability     MRDD    Profound mental retardation     Scoliosis     Seizures (HCC)     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HCC)         Plan:        1. Cont current supportive care  2. PRN stool softeners  3. F/u LFTs in few weeks  4. OK for DC from GI  5.  Will greg     d/W Nursing Staff    Please call or Page for any issues or change in status  Thanks    Electronically signed by Jonathan Hayward MD on 8/21/2018 at 1:10 PM

## 2018-08-21 NOTE — DISCHARGE SUMMARY
[facility]    Discharge Summary     Patient ID: Dequan Birch  :  1970   MRN: [id number]     ACCOUNT:  [de-identified]   Patient's PCP: Juliann Riddle  Admit Date: 2018   Discharge Date: 2018     Length of Stay: 7  Code Status:  Full Code  Admitting Physician: Krystina Sylvester MD  Discharge Physician: Laine Santamaria DO     Active Discharge Diagnoses:     Hospital Problem Lists:  Principal Problem:    Obstipation  Active Problems:    Seizure disorder ([address]    Spastic quadriplegic cerebral palsy ([address]    Choledocholithiasis    Generalized abdominal pain    Constipation    Developmental disability    Ileus ([address]  Resolved Problems:    * No resolved hospital problems. *      Admission Condition:  fair     Discharged Condition: stable    Hospital Stay:     Hospital Course: Dequan Birch is a 49 y.o. male who was admitted for the management of   Obstipation , presented to ER with Abdominal Pain (x 1 day)    This is a 43-year-old white male was admitted with abdominal pain and obstipation. This investigation he is found to have cholelithiasis and MRCP findings of choledocholithiasis. He underwent ERCP with sphincterotomy and sweeping the duct. Subsequently he underwent laparoscopic cholecystectomy without complication. After his surgery his tube feeding was resumed and advanced to goal with good tolerance.       Significant therapeutic interventions: As above    Significant Diagnostic Studies:   Labs / Micro:  CBC:   Lab Results   Component Value Date    WBC 8.8 2018    RBC 3.72 2018    HGB 11.6 2018    HCT 35.9 2018    MCV 96.5 2018    MCH 31.2 2018    MCHC 32.3 2018    RDW 13.5 2018     2018     BMP:    Lab Results   Component Value Date    GLUCOSE 131 2018     2018    K 3.5 2018     2018    CO2 32 2018    ANIONGAP 8 2018    BUN 5 08/21/2018    CREATININE <0.40 08/21/2018    BUNCRER CANNOT BE CALCULATED 08/21/2018    CALCIUM 8.1 08/21/2018    LABGLOM CANNOT BE CALCULATED 08/21/2018    GFRAA CANNOT BE CALCULATED 08/21/2018    GFR      08/21/2018    GFR NOT REPORTED 08/21/2018         Radiology:  Xr Abdomen (kub) (single Ap View)    Result Date: 8/17/2018  EXAMINATION: SINGLE SUPINE XRAY VIEW(S) OF THE ABDOMEN, 8/17/2018 9:31 am COMPARISON: 08/15/2018 HISTORY: ORDERING SYSTEM PROVIDED HISTORY: acute abdomen, s/p ERCP/papillotomy TECHNOLOGIST PROVIDED HISTORY: Reason for exam:->acute abdomen, s/p ERCP/papillotomy Ordering Physician Provided Reason for Exam: fu kidney stone Acuity: Unknown Type of Exam: Unknown Follow-up examination. FINDINGS: There is PEG tube noted. Bilateral renal calculi are noted. There is gaseous distention of the small and large bowel, likely related to an ileus, not appreciably changed. There is a marked scoliosis. The bones are osteopenic. There is deformity of the left femoral head. 1. Bilateral renal calculi. 2. Gaseous distention of small and large bowel, likely related to an ileus. Xr Abdomen (kub) (single Ap View)    Result Date: 8/15/2018  EXAMINATION: SINGLE SUPINE XRAY VIEW(S) OF THE ABDOMEN 8/15/2018 6:46 am COMPARISON: CT abdomen and pelvis August 13, 2018. Abdominal radiograph August 21, 2017 HISTORY: ORDERING SYSTEM PROVIDED HISTORY: abdominal pain TECHNOLOGIST PROVIDED HISTORY: Reason for exam:->abdominal pain FINDINGS: Severe scoliosis is noted. Mildly dysplastic left hip with osteoarthrosis is again noted. A gastrostomy tube projects in the left upper quadrant. Bilateral renal calculi are noted. Gas is present within mildly prominent loops of colon and there is a moderate amount of rectal stool burden. No dilated loops of small bowel identified.      Overall stool burden has diminished with residual moderate amount of distal stool burden distending the rectum and mild gaseous prominence of the colon. Bilateral nephrolithiasis. Ct Abdomen Pelvis W Iv Contrast    Result Date: 8/15/2018  EXAMINATION: CT OF THE ABDOMEN AND PELVIS WITH CONTRAST 8/13/2018 9:43 pm TECHNIQUE: CT of the abdomen and pelvis was performed with the administration of intravenous contrast. Multiplanar reformatted images are provided for review. Dose modulation, iterative reconstruction, and/or weight based adjustment of the mA/kV was utilized to reduce the radiation dose to as low as reasonably achievable. COMPARISON: CT dated October 21, 2017. HISTORY: ORDERING SYSTEM PROVIDED HISTORY: ABDOMINAL PAIN TECHNOLOGIST PROVIDED HISTORY: IV Only Contrast FINDINGS: Examination is limited by significant motion degradation and severe scoliotic curvature. There is also streak artifact from overlying arms. Lower Chest: Lung bases demonstrate no definite acute abnormality. Atelectasis or scarring is noted in the lingula and bilateral bases. No definite acute abnormality to limited views of the mediastinum. Organs: No definite acute hepatic abnormality. Subcentimeter hypodensities in the right hepatic lobe are too small to characterize, probably represents cyst.  Bilateral adrenal glands appear normal.  The spleen is unremarkable. Pancreas appears unremarkable. Slightly smaller coarse calcification in the pancreatic head, equivocal for choledocholithiasis. Cholelithiasis present. The CBD is not significantly dilated. Minimal prominence of the intrahepatic bile ducts. 16 mm hypodensity in the right kidney is indeterminate based on Hounsfield unit attenuation characteristics. Additional bilateral subcentimeter renal hypodensities, too small to characterize, probably representing cysts. Nonobstructive bilateral renal calculi. No definite hydronephrosis. GI/Bowel: Gastrostomy tube in place. No definite acute gastric abnormality. No definite acute small bowel abnormality. Mild nonspecific anal wall thickening.   Large stool burden is noted in the colon with the rectosigmoid region dilated up to 7.7 cm. Pelvis: Redemonstration of small right inguinal hernia containing a portion of the outer wall of the bladder. Tiny calcifications within dependent portion of bladder, equivocal for layering tiny bladder stones versus wall calcification. Coarse prostatic calcifications. Peritoneum/Retroperitoneum: No significant free fluid. There is no significant lymphadenopathy. IVC and aorta demonstrate no definite acute abnormality. Bones/Soft Tissues: Bony demineralization. Chronic dysplastic changes of the left hip. Severe scoliotic curvature of the thoracolumbar spine is again noted. 1. Examination is limited by significant motion artifact and severe scoliotic curvature of the thoracolumbar spine. 2. Slightly smaller coarse calcifications near the pancreatic head, measuring up to 4 mm, near the distal CBD. Findings are suspicious for choledocholithiasis. Additional probable punctate nonobstructing stone in the mid CBD. The CBD is otherwise not significantly dilated. There is mild prominence of the intrahepatic bile ducts. Additional gallstones are noted. Consider MRCP for further evaluation. 3. Indeterminate 16 mm hypodensity in the right kidney, possibly proteinaceous cyst.  However, consider nonemergent evaluation with ultrasound or renal mass CT/MRI. 4. Large stool burden is seen throughout the colon. The rectosigmoid region is dilated up to 7.7 cm. Correlation for constipation is recommended. 5. Small right inguinal hernia containing a small portion of the outer wall of the bladder. Tiny calcifications along the dependent portion of bladder may represent layering stones versus wall calcifications. 6. Nonobstructive bilateral renal calculi. No hydronephrosis.      Fl Ercp Biliary S&i    Result Date: 8/16/2018  EXAMINATION: 13 SPOT IMAGES FROM AN ERCP COMPARISON: None FLUOROSCOPY DOSE OR TIME/IMAGES: 181 seconds fluoro time HISTORY: ORDERING SYSTEM PROVIDED HISTORY: intra op TECHNOLOGIST PROVIDED HISTORY: Reason for exam:->intra op Ordering Physician Provided Reason for Exam: ercp Acuity: Unknown Type of Exam: Unknown Relevant Medical/Surgical History: ercp, DAP 59588.5 mGycm2, fl time 181 sec FINDINGS: Endoscopy and cannulation of the major papilla was performed by the gastroenterology service under the supervision of [address] [place] [address]. Spot views are presented for interpretation. Contrast is identified in the intrahepatic and extrahepatic bile ducts. No intraluminal filling defects or strictures are identified in the common bile duct, common hepatic duct or visualized portions of the intrahepatic bile ducts. No contrast extravasation is identified. Endoscope visualized cannulating the common bile duct. The bile duct is opacified with multiple stenoses. Unremarkable ERCP images. Please refer to the procedure report for further details. Xr Chest Portable    Result Date: 8/21/2018  EXAMINATION: SINGLE XRAY VIEW OF THE CHEST 8/21/2018 6:28 am COMPARISON: 08/19/2018 HISTORY: ORDERING SYSTEM PROVIDED HISTORY: hypoxia TECHNOLOGIST PROVIDED HISTORY: hypoxia Ordering Physician Provided Reason for Exam: hypoxia Acuity: Unknown Type of Exam: Unknown FINDINGS: Hypoinflated lungs. Notable scoliotic curvature of the chest.  Mild parahilar consolidation, left side greater than right. Bibasilar opacification, not significantly changed. Dilated bowel loops in the upper abdomen again noted. Overall appearance of the chest has not significantly changed, with mild cardiomegaly and parahilar congestion. Bibasilar atelectatic changes noted. Xr Chest Portable    Result Date: 8/20/2018  EXAMINATION: SINGLE XRAY VIEW OF THE CHEST 8/19/2018 3:00 pm COMPARISON: March 1, 2018.  HISTORY: ORDERING SYSTEM PROVIDED HISTORY: hypoxia TECHNOLOGIST PROVIDED HISTORY: Reason for exam:->hypoxia Ordering Physician Provided Reason for Exam: Port semi upright AP CXR - SOB Acuity: Unknown Type of Exam: Unknown FINDINGS: Cardiac and mediastinal contours are enlarged but obscured. Interval development of bibasilar pulmonary opacities with prominence of interstitial lung markings bilaterally. No evidence of pneumothorax. Multiple gaseous distention of the bowel loops appear similar to previous examination. Chronic appearing deformity of the bilateral proximal humerus. Diffuse osteopenia. Severe thoracolumbar scoliosis. Degenerative changes. 1. Interval development of bibasilar pulmonary opacities may represent combination of atelectasis, aspiration, and/or pneumonia. Recommend radiographic follow-up to complete resolution. 2. Prominence of interstitial lung markings may be secondary to underlying interstitial edema. Correlate with volume status. 3. Mild gaseous distension of the multiple bowel loops appears reasonably similar to previous examination. Mri Abdomen W Wo Contrast Mrcp    Result Date: 8/15/2018  EXAMINATION: MRI OF THE ABDOMEN WITH AND WITHOUT CONTRAST AND MRCP 8/15/2018 2:08 pm TECHNIQUE: Multiplanar multisequence MRI of the abdomen was performed with and without the administration of intravenous contrast.  After initial T2 axial and coronal images, thick slab, thin slab and 3D coronal MRCP sequences were obtained without the administration of intravenous contrast.  MIP images are provided for review. COMPARISON: CT 08/13/2018 HISTORY: ORDERING SYSTEM PROVIDED HISTORY: TECHNOLOGIST PROVIDED HISTORY: Ordering Physician Provided Reason for Exam: CEREBRAL PALSY, AND ABDOMEN PAIN Acuity: Chronic Type of Exam: Subsequent/Follow-up Additional signs and symptoms: ABNORMAL CT ABD/PEL Relevant Medical/Surgical History: ABDOMEN PAIN FOR THREE DAYS. FINDINGS: There is an 8 mm cyst in the posterior right lobe of liver. There is motion degradation of particularly the T1 weighted images. No suspicious liver lesion. No steatosis.   The spleen, pancreas, adrenal glands show no significant abnormalities. Bilateral renal cysts largest 1.4 cm on the right noted. Gallbladder: Cholelithiasis with several small gallstones evident. No findings to suggest acute cholecystitis. Bile Ducts: MRCP images are quite suboptimal.  No clear evidence for intrahepatic biliary ductal dilatation. Dilatation of the common bile duct up to 9 mm noted. There is 6 mm rounded signal void filling defect in the distal common bile duct most suggestive of common duct stone. This is best seen on 3D MRCP images. Pancreatic Duct: Pancreatic duct is not dilated. It is not well visualized. Other:  Percutaneous gastrostomy tube in place. Severe dextroscoliosis lumbar spine. No ascites. 1. Study limited by large amount of motion artifact degrading particularly T1 weighted images and MRCP. 2. A 6 mm filling defect in the distal common bile duct most suggestive of choledocholithiasis. 3. Cholelithiasis without evidence for acute cholecystitis. 4. There is an 8 mm hepatic cyst and bilateral renal cysts. Fluoro For Surgical Procedures    Result Date: 8/16/2018  Radiology exam is complete. No Radiologist dictation. Please follow up with ordering provider. Consultations:    Consults:     Final Specialist Recommendations/Findings:   IP CONSULT TO INTERNAL MEDICINE  IP CONSULT TO GI  IP CONSULT TO GENERAL SURGERY  IP CONSULT TO PRIMARY CARE PROVIDER      The patient was seen and examined on day of discharge and this discharge summary is in conjunction with any daily progress note from day of discharge. Discharge plan:     Disposition: Assisted Living    Physician Follow Up:    [address]  [facility], [address] [id number] [facility]    In 1 week      Olga Rangel MD  [address]  [phone]    Call in 1 week  Follow up appointment    Abida Vaz DO  [address] [place] [address]  [phone]    Schedule an appointment as soon as possible for a visit in 1 week  For suture removal, Follow up appointment       Requiring Further Evaluation/Follow Up POST HOSPITALIZATION/Incidental Findings: None    Diet: Tube feeding    Activity: As tolerated    Instructions to Patient: Take medications as prescribed. Follow GI and general surgery as directed    Discharge Medications:      Medication List      START taking these medications    acetaminophen-codeine 120-12 MG/5ML suspension  Commonly known as:  CAPITAL/CODEINE  5-10 mL every 6 hours as needed for pain.         CONTINUE taking these medications    acetaminophen 325 MG tablet  Commonly known as:  TYLENOL     albuterol (2.5 MG/3ML) 0.083% nebulizer solution  Commonly known as:  PROVENTIL     baclofen 10 MG tablet  Commonly known as:  LIORESAL     bisacodyl 10 MG suppository  Commonly known as:  DULCOLAX     calcium carbonate 1250 MG/5ML Susp suspension     carbamide peroxide 6.5 % otic solution  Commonly known as:  DEBROX     clonazePAM 2 MG tablet  Commonly known as:  KLONOPIN     DAILY-JAY PO     fleet 7-19 GM/118ML     fluticasone 50 MCG/ACT nasal spray  Commonly known as:  FLONASE     furosemide 20 MG tablet  Commonly known as:  LASIX     glycopyrrolate 1 MG tablet  Commonly known as:  ROBINUL     KLOR-CON SPRINKLE PO     * lacosamide 50 MG Tabs tablet  Commonly known as:  VIMPAT     * lacosamide 50 MG Tabs tablet  Commonly known as:  VIMPAT     lactulose 10 GM/15ML solution  Commonly known as:  CHRONULAC     * lamoTRIgine 100 MG tablet  Commonly known as:  LAMICTAL     * lamoTRIgine 100 MG tablet  Commonly known as:  LAMICTAL     levETIRAcetam 100 MG/ML solution  Commonly known as:  KEPPRA     loratadine 10 MG tablet  Commonly known as:  CLARITIN     LORazepam 1 MG tablet  Commonly known as:  ATIVAN     melatonin 1 MG tablet     montelukast 10 MG tablet  Commonly known as:  SINGULAIR     nystatin 719760 UNIT/GM powder  Commonly known as:  MYCOSTATIN     polyethylene

## 2018-08-21 NOTE — PROGRESS NOTES
disability    Ileus ([facility] [address]  Resolved Problems:    * No resolved hospital problems. *  S/P lap amber    Plan  1. OK to DC from surgery standpoint  2.  Outpatient follow up in 1-2 weeks

## 2018-08-21 NOTE — PROGRESS NOTES
Called to pt room by RN for assessment as pt was wheezy and had incr WOB. Pt found to have I/E wheezes, RR was 37 and he was using his shoulders and belly breathing. Pt given PRn to see if that would help and it did not. Pt chart reviewed; u/a to assess I/O as pt does not have rodriguez in. Discussed case with RN; pt feeding tube no issues with residuals, no fever, no issues with Hgb. Pt started on BiPAP and NPJessica contacted for pt update. Pt WOB eliminated with BiPAP use and RR went down to 18.

## 2018-08-21 NOTE — PROGRESS NOTES
[facility]    Progress Note    8/21/2018    9:58 AM    Name:   Jamila Minor  MRN:     [id number]     Acct:      [de-identified]   Room:   [facility] Day:  7  Admit Date:  8/13/2018  9:15 PM    PCP:   Olga Sandifer Bruss  Code Status:  Full Code    Subjective:     C/C:   Chief Complaint   Patient presents with    Abdominal Pain     x 1 day     Interval History Status: not changed. Nonverbal.  Had episodes of respiratory distress lasting which improved with BiPAP, aerosols. Brief History:     Per my partner: \"History: \"Enrrique Bañuelos is a 49 y.o.  male who presents to the emergency department for evaluation of abdominal pain. The patient has severe MR and is nonverbal secondary to underlying cerebral palsy. History is from the chart and caretakers. He presented with abdominal distention and was moaning in pain. Caretaker relates that his symptoms had been increasing over the 24 hours prior to admission. He has tube feeds and was fed on the night of admission. He is exhibiting a low-grade temperature. He is currently resting comfortably in the room. He does not awaken. I have palpated his abdomen extensively and he does not appear to be in any pain. CT abdomen revealed large fecal load. The patient had 2 large bowel movements in the emergency department which appears to have relieved his symptomatology. \"     Had ercp with papillotomy and balloon sweep on 8/16    Review of Systems:     Constitutional:  negative for chills, fevers, sweats  Respiratory:  negative for cough, dyspnea on exertion, shortness of breath, wheezing  Cardiovascular:  negative for chest pain, chest pressure/discomfort, lower extremity edema, palpitations  Gastrointestinal:  negative for abdominal pain, constipation, diarrhea, nausea, vomiting  Neurological:  negative for dizziness, headache    Medications: Allergies:     Allergies   Allergen Reactions    Ampicillin Other (See Comments)    Valium [Diazepam] Other (See Comments)    Other      Bubble bath products        Current Meds:   Scheduled Meds:    albuterol  2.5 mg Nebulization 4x daily    sodium chloride flush  10 mL Intravenous 2 times per day    enoxaparin  30 mg Subcutaneous Daily    metoclopramide  10 mg Intravenous 3 times per day    potassium chloride  20 mEq Oral Once    baclofen  10 mg Per G Tube TID    vitamin D  1,000 Units Per G Tube BID    fluticasone  2 spray Nasal Daily    furosemide  20 mg Per G Tube Daily    lacosamide  100 mg Per G Tube BID    lactulose  20 g Per G Tube BID    lamoTRIgine  300 mg Per G Tube QAM    lamoTRIgine  200 mg Per G Tube Nightly    levETIRAcetam  1,500 mg Oral BID    cetirizine  10 mg Oral Daily    montelukast  10 mg Per G Tube Nightly    REPLETE/FIBER  75 mL Feeding Tube Daily    polyethylene glycol  17 g Per G Tube Daily    sennosides-docusate sodium  2 tablet Per G Tube Daily    potassium chloride  10 mEq Per G Tube BID    calcium carbonate  500 mg Per G Tube Daily    lansoprazole  30 mg Per G Tube QAM AC     Continuous Infusions:    lactated ringers 75 mL/hr at 08/21/18 0556     PRN Meds: sodium chloride flush, ondansetron **OR** ondansetron, fentanNYL, sodium chloride, albuterol, clonazePAM, potassium chloride **OR** potassium chloride **OR** potassium chloride, magnesium sulfate, magnesium hydroxide, bisacodyl, acetaminophen    Data:     Past Medical History:   has a past medical history of Cerebral palsy ([facility] [address] Cholelithiasis with chronic cholecystitis; Constipation; Delayed gastric emptying; Dysphagia; GERD (gastroesophageal reflux disease); Hearing loss; Mental disability; Profound mental retardation; Scoliosis; Seizures ([address] and 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[facility] [address] Social History:   reports that he has never smoked. He has never used smokeless tobacco. He reports that he does not drink alcohol or use drugs. Family History: History reviewed.  No QMK8IUZ, PCO2, POCPO2, PO2ART, PO2, POCHCO3, DJY4EHC, HCO3, NBEA, PBEA, BEART, BE, THGBART, THB, LYR2XOM, OMDN9TYR, S9EGCVBB, O2SAT, FIO2    Radiology:    Chest x-ray from last night without acute change    Physical Examination:        General appearance:  alert, cooperative and no distress  Mental Status:  oriented to person, place and time and normal affect  Lungs:  clear to auscultation bilaterally, normal effort  Heart:  regular rate and rhythm, no murmur  Abdomen:  soft, nontender, nondistended, normal bowel sounds, no masses, hepatomegaly, splenomegaly  Extremities:  no edema, redness, tenderness in the calves  Skin:  no gross lesions, rashes, induration    Assessment:        Primary Problem  Obstipation    Active Hospital Problems    Diagnosis Date Noted    Ileus ([facility] [address] [K56.7] 08/18/2018    Choledocholithiasis [K80.50] 08/14/2018    Obstipation [K59.00] 08/14/2018    Generalized abdominal pain [R10.84]     Constipation [K59.00]     Developmental disability [F89]     Spastic quadriplegic cerebral palsy ([facility] [G80.0]     Seizure disorder ([facility] [address] [G40.909] 11/14/2017       Plan:        1. Aerosols as needed  2. Routine postoperative care  3. Continue home medications  4. Discontinue IV fluids  5. GI and DVT prophylaxis  6.  Discharge planning    Kelby Sifuentes DO  8/21/2018  9:58 AM

## 2018-08-29 ENCOUNTER — OFFICE VISIT (OUTPATIENT)
Dept: GASTROENTEROLOGY | Age: 48
End: 2018-08-29
Payer: MEDICARE

## 2018-08-29 VITALS — HEART RATE: 75 BPM | RESPIRATION RATE: 14 BRPM | SYSTOLIC BLOOD PRESSURE: 125 MMHG | DIASTOLIC BLOOD PRESSURE: 84 MMHG

## 2018-08-29 DIAGNOSIS — Z98.890 S/P ERCP: ICD-10-CM

## 2018-08-29 DIAGNOSIS — R14.0 ABDOMINAL DISTENSION: ICD-10-CM

## 2018-08-29 DIAGNOSIS — Z43.1 PEG (PERCUTANEOUS ENDOSCOPIC GASTROSTOMY) ADJUSTMENT/REPLACEMENT/REMOVAL (HCC): ICD-10-CM

## 2018-08-29 DIAGNOSIS — R10.84 GENERALIZED ABDOMINAL PAIN: Primary | ICD-10-CM

## 2018-08-29 PROCEDURE — 1036F TOBACCO NON-USER: CPT | Performed by: INTERNAL MEDICINE

## 2018-08-29 PROCEDURE — G8427 DOCREV CUR MEDS BY ELIG CLIN: HCPCS | Performed by: INTERNAL MEDICINE

## 2018-08-29 PROCEDURE — 99213 OFFICE O/P EST LOW 20 MIN: CPT | Performed by: INTERNAL MEDICINE

## 2018-08-29 PROCEDURE — 1111F DSCHRG MED/CURRENT MED MERGE: CPT | Performed by: INTERNAL MEDICINE

## 2018-08-29 PROCEDURE — G8417 CALC BMI ABV UP PARAM F/U: HCPCS | Performed by: INTERNAL MEDICINE

## 2018-08-29 ASSESSMENT — ENCOUNTER SYMPTOMS: ABDOMINAL PAIN: 1

## 2018-09-21 ENCOUNTER — TELEPHONE (OUTPATIENT)
Dept: GASTROENTEROLOGY | Age: 48
End: 2018-09-21

## 2018-10-26 ENCOUNTER — APPOINTMENT (OUTPATIENT)
Dept: GENERAL RADIOLOGY | Age: 48
End: 2018-10-26
Payer: MEDICARE

## 2018-10-26 ENCOUNTER — HOSPITAL ENCOUNTER (EMERGENCY)
Age: 48
Discharge: HOME | End: 2018-10-26
Attending: EMERGENCY MEDICINE
Payer: MEDICARE

## 2018-10-26 VITALS
SYSTOLIC BLOOD PRESSURE: 135 MMHG | RESPIRATION RATE: 28 BRPM | HEART RATE: 85 BPM | WEIGHT: 126 LBS | OXYGEN SATURATION: 100 % | TEMPERATURE: 98.2 F | BODY MASS INDEX: 24.74 KG/M2 | HEIGHT: 60 IN | DIASTOLIC BLOOD PRESSURE: 85 MMHG

## 2018-10-26 DIAGNOSIS — B34.9 VIRAL ILLNESS: Primary | ICD-10-CM

## 2018-10-26 LAB
ABSOLUTE EOS #: 0 K/UL (ref 0–0.4)
ABSOLUTE IMMATURE GRANULOCYTE: ABNORMAL K/UL (ref 0–0.3)
ABSOLUTE LYMPH #: 1.5 K/UL (ref 1–4.8)
ABSOLUTE MONO #: 0.8 K/UL (ref 0.2–0.8)
ANION GAP SERPL CALCULATED.3IONS-SCNC: 16 MMOL/L (ref 9–17)
BASOPHILS # BLD: 1 % (ref 0–2)
BASOPHILS ABSOLUTE: 0 K/UL (ref 0–0.2)
BUN BLDV-MCNC: 19 MG/DL (ref 6–20)
BUN/CREAT BLD: 28 (ref 9–20)
CALCIUM SERPL-MCNC: 10.6 MG/DL (ref 8.6–10.4)
CHLORIDE BLD-SCNC: 102 MMOL/L (ref 98–107)
CO2: 26 MMOL/L (ref 20–31)
CREAT SERPL-MCNC: 0.67 MG/DL (ref 0.7–1.2)
DIFFERENTIAL TYPE: ABNORMAL
DIRECT EXAM: NORMAL
EOSINOPHILS RELATIVE PERCENT: 0 % (ref 1–4)
GFR AFRICAN AMERICAN: >60 ML/MIN
GFR NON-AFRICAN AMERICAN: >60 ML/MIN
GFR SERPL CREATININE-BSD FRML MDRD: ABNORMAL ML/MIN/{1.73_M2}
GFR SERPL CREATININE-BSD FRML MDRD: ABNORMAL ML/MIN/{1.73_M2}
GLUCOSE BLD-MCNC: 121 MG/DL (ref 70–99)
HCT VFR BLD CALC: 47.1 % (ref 41–53)
HEMOGLOBIN: 16.2 G/DL (ref 13.5–17.5)
IMMATURE GRANULOCYTES: ABNORMAL %
LACTIC ACID, SEPSIS WHOLE BLOOD: NORMAL MMOL/L (ref 0.5–1.9)
LACTIC ACID, SEPSIS WHOLE BLOOD: NORMAL MMOL/L (ref 0.5–1.9)
LACTIC ACID, SEPSIS: 0.7 MMOL/L (ref 0.5–1.9)
LACTIC ACID, SEPSIS: 0.9 MMOL/L (ref 0.5–1.9)
LYMPHOCYTES # BLD: 17 % (ref 24–44)
Lab: NORMAL
MCH RBC QN AUTO: 31.1 PG (ref 26–34)
MCHC RBC AUTO-ENTMCNC: 34.3 G/DL (ref 31–37)
MCV RBC AUTO: 90.8 FL (ref 80–100)
MONOCYTES # BLD: 9 % (ref 1–7)
NRBC AUTOMATED: ABNORMAL PER 100 WBC
PDW BLD-RTO: 14.4 % (ref 11.5–14.5)
PLATELET # BLD: 373 K/UL (ref 130–400)
PLATELET ESTIMATE: ABNORMAL
PMV BLD AUTO: 9.1 FL (ref 6–12)
POTASSIUM SERPL-SCNC: 4.5 MMOL/L (ref 3.7–5.3)
RBC # BLD: 5.19 M/UL (ref 4.5–5.9)
RBC # BLD: ABNORMAL 10*6/UL
SEG NEUTROPHILS: 73 % (ref 36–66)
SEGMENTED NEUTROPHILS ABSOLUTE COUNT: 6.4 K/UL (ref 1.8–7.7)
SODIUM BLD-SCNC: 144 MMOL/L (ref 135–144)
SPECIMEN DESCRIPTION: NORMAL
STATUS: NORMAL
WBC # BLD: 8.8 K/UL (ref 3.5–11)
WBC # BLD: ABNORMAL 10*3/UL

## 2018-10-26 PROCEDURE — 80048 BASIC METABOLIC PNL TOTAL CA: CPT

## 2018-10-26 PROCEDURE — 99285 EMERGENCY DEPT VISIT HI MDM: CPT

## 2018-10-26 PROCEDURE — 87804 INFLUENZA ASSAY W/OPTIC: CPT

## 2018-10-26 PROCEDURE — 71045 X-RAY EXAM CHEST 1 VIEW: CPT

## 2018-10-26 PROCEDURE — 36415 COLL VENOUS BLD VENIPUNCTURE: CPT

## 2018-10-26 PROCEDURE — 2580000003 HC RX 258: Performed by: EMERGENCY MEDICINE

## 2018-10-26 PROCEDURE — 83605 ASSAY OF LACTIC ACID: CPT

## 2018-10-26 PROCEDURE — 81003 URINALYSIS AUTO W/O SCOPE: CPT

## 2018-10-26 PROCEDURE — 85025 COMPLETE CBC W/AUTO DIFF WBC: CPT

## 2018-10-26 RX ORDER — SODIUM CHLORIDE 9 MG/ML
INJECTION, SOLUTION INTRAVENOUS CONTINUOUS
Status: DISCONTINUED | OUTPATIENT
Start: 2018-10-26 | End: 2018-10-26 | Stop reason: HOSPADM

## 2018-10-26 RX ADMIN — SODIUM CHLORIDE: 9 INJECTION, SOLUTION INTRAVENOUS at 09:37

## 2018-10-26 NOTE — ED NOTES
Bed: 21  Expected date:   Expected time:   Means of arrival:   Comments:   [address] [place], [facility]  10/26/18 9970

## 2018-11-15 NOTE — TELEPHONE ENCOUNTER
[place] [facility] printed off the lab order and faxed it to [facility] group home so patient could have labs drawn. Scanned faxed order into patients media.  Thank you

## 2018-11-21 LAB
A/G RATIO: NORMAL
ALBUMIN SERPL-MCNC: NORMAL G/DL
ALP BLD-CCNC: NORMAL U/L
ALT SERPL-CCNC: NORMAL U/L
AST SERPL-CCNC: NORMAL U/L
BILIRUB SERPL-MCNC: NORMAL MG/DL (ref 0.1–1.4)
BILIRUBIN DIRECT: NORMAL MG/DL
BILIRUBIN, INDIRECT: NORMAL
GLOBULIN: NORMAL
PROTEIN TOTAL: NORMAL G/DL

## 2018-11-26 DIAGNOSIS — Z98.890 S/P ERCP: ICD-10-CM

## 2018-11-26 DIAGNOSIS — R10.84 GENERALIZED ABDOMINAL PAIN: ICD-10-CM

## 2018-11-26 DIAGNOSIS — Z43.1 PEG (PERCUTANEOUS ENDOSCOPIC GASTROSTOMY) ADJUSTMENT/REPLACEMENT/REMOVAL (HCC): ICD-10-CM

## 2018-11-26 DIAGNOSIS — R14.0 ABDOMINAL DISTENSION: ICD-10-CM

## 2019-01-16 ENCOUNTER — OFFICE VISIT (OUTPATIENT)
Dept: GASTROENTEROLOGY | Age: 49
End: 2019-01-16
Payer: MEDICARE

## 2019-01-16 VITALS — RESPIRATION RATE: 14 BRPM | SYSTOLIC BLOOD PRESSURE: 119 MMHG | DIASTOLIC BLOOD PRESSURE: 82 MMHG | HEART RATE: 88 BPM

## 2019-01-16 DIAGNOSIS — R10.84 GENERALIZED ABDOMINAL PAIN: Primary | ICD-10-CM

## 2019-01-16 DIAGNOSIS — F89 DEVELOPMENTAL DISABILITY: ICD-10-CM

## 2019-01-16 DIAGNOSIS — F79 MENTAL RETARDATION: ICD-10-CM

## 2019-01-16 PROCEDURE — G8427 DOCREV CUR MEDS BY ELIG CLIN: HCPCS | Performed by: INTERNAL MEDICINE

## 2019-01-16 PROCEDURE — 99213 OFFICE O/P EST LOW 20 MIN: CPT | Performed by: INTERNAL MEDICINE

## 2019-01-16 PROCEDURE — 1036F TOBACCO NON-USER: CPT | Performed by: INTERNAL MEDICINE

## 2019-01-16 PROCEDURE — G8484 FLU IMMUNIZE NO ADMIN: HCPCS | Performed by: INTERNAL MEDICINE

## 2019-01-16 PROCEDURE — G8417 CALC BMI ABV UP PARAM F/U: HCPCS | Performed by: INTERNAL MEDICINE

## 2019-02-24 ENCOUNTER — APPOINTMENT (OUTPATIENT)
Dept: GENERAL RADIOLOGY | Age: 49
End: 2019-02-24
Payer: MEDICARE

## 2019-02-24 ENCOUNTER — HOSPITAL ENCOUNTER (EMERGENCY)
Age: 49
Discharge: HOME | End: 2019-02-24
Attending: EMERGENCY MEDICINE
Payer: MEDICARE

## 2019-02-24 VITALS
HEART RATE: 83 BPM | BODY MASS INDEX: 24.74 KG/M2 | DIASTOLIC BLOOD PRESSURE: 89 MMHG | RESPIRATION RATE: 18 BRPM | WEIGHT: 126 LBS | OXYGEN SATURATION: 95 % | TEMPERATURE: 97.7 F | SYSTOLIC BLOOD PRESSURE: 115 MMHG | HEIGHT: 60 IN

## 2019-02-24 DIAGNOSIS — R33.9 URINARY RETENTION: Primary | ICD-10-CM

## 2019-02-24 DIAGNOSIS — N30.00 ACUTE CYSTITIS WITHOUT HEMATURIA: ICD-10-CM

## 2019-02-24 LAB
-: ABNORMAL
ABSOLUTE EOS #: 0 K/UL (ref 0–0.4)
ABSOLUTE IMMATURE GRANULOCYTE: ABNORMAL K/UL (ref 0–0.3)
ABSOLUTE LYMPH #: 2.2 K/UL (ref 1–4.8)
ABSOLUTE MONO #: 0.5 K/UL (ref 0.2–0.8)
ALBUMIN SERPL-MCNC: 4.1 G/DL (ref 3.5–5.2)
ALBUMIN/GLOBULIN RATIO: ABNORMAL (ref 1–2.5)
ALP BLD-CCNC: 91 U/L (ref 40–129)
ALT SERPL-CCNC: 32 U/L (ref 5–41)
AMORPHOUS: ABNORMAL
ANION GAP SERPL CALCULATED.3IONS-SCNC: 12 MMOL/L (ref 9–17)
AST SERPL-CCNC: 20 U/L
BACTERIA: ABNORMAL
BASOPHILS # BLD: 1 % (ref 0–2)
BASOPHILS ABSOLUTE: 0.1 K/UL (ref 0–0.2)
BILIRUB SERPL-MCNC: 0.14 MG/DL (ref 0.3–1.2)
BILIRUBIN URINE: NEGATIVE
BUN BLDV-MCNC: 15 MG/DL (ref 6–20)
BUN/CREAT BLD: 35 (ref 9–20)
CALCIUM SERPL-MCNC: 10.2 MG/DL (ref 8.6–10.4)
CASTS UA: ABNORMAL /LPF
CASTS UA: ABNORMAL /LPF
CHLORIDE BLD-SCNC: 102 MMOL/L (ref 98–107)
CO2: 28 MMOL/L (ref 20–31)
COLOR: YELLOW
COMMENT UA: ABNORMAL
CREAT SERPL-MCNC: 0.43 MG/DL (ref 0.7–1.2)
CRYSTALS, UA: ABNORMAL /HPF
DIFFERENTIAL TYPE: ABNORMAL
DIRECT EXAM: NORMAL
EOSINOPHILS RELATIVE PERCENT: 0 % (ref 1–4)
EPITHELIAL CELLS UA: ABNORMAL /HPF (ref 0–5)
GFR AFRICAN AMERICAN: >60 ML/MIN
GFR NON-AFRICAN AMERICAN: >60 ML/MIN
GFR SERPL CREATININE-BSD FRML MDRD: ABNORMAL ML/MIN/{1.73_M2}
GFR SERPL CREATININE-BSD FRML MDRD: ABNORMAL ML/MIN/{1.73_M2}
GLUCOSE BLD-MCNC: 93 MG/DL (ref 70–99)
GLUCOSE URINE: NEGATIVE
HCT VFR BLD CALC: 43.8 % (ref 41–53)
HEMOGLOBIN: 14.6 G/DL (ref 13.5–17.5)
IMMATURE GRANULOCYTES: ABNORMAL %
KETONES, URINE: NEGATIVE
LACTIC ACID: 1.2 MMOL/L (ref 0.5–2.2)
LEUKOCYTE ESTERASE, URINE: ABNORMAL
LYMPHOCYTES # BLD: 37 % (ref 24–44)
Lab: NORMAL
MCH RBC QN AUTO: 30.5 PG (ref 26–34)
MCHC RBC AUTO-ENTMCNC: 33.3 G/DL (ref 31–37)
MCV RBC AUTO: 91.7 FL (ref 80–100)
MONOCYTES # BLD: 9 % (ref 1–7)
MUCUS: ABNORMAL
NITRITE, URINE: POSITIVE
NRBC AUTOMATED: ABNORMAL PER 100 WBC
OTHER OBSERVATIONS UA: ABNORMAL
PDW BLD-RTO: 15.7 % (ref 11.5–14.5)
PH UA: 6 (ref 5–8)
PLATELET # BLD: 286 K/UL (ref 130–400)
PLATELET ESTIMATE: ABNORMAL
PMV BLD AUTO: 8.8 FL (ref 6–12)
POTASSIUM SERPL-SCNC: 4.3 MMOL/L (ref 3.7–5.3)
PROTEIN UA: NEGATIVE
RBC # BLD: 4.77 M/UL (ref 4.5–5.9)
RBC # BLD: ABNORMAL 10*6/UL
RBC UA: ABNORMAL /HPF (ref 0–2)
RENAL EPITHELIAL, UA: ABNORMAL /HPF
SEG NEUTROPHILS: 53 % (ref 36–66)
SEGMENTED NEUTROPHILS ABSOLUTE COUNT: 3 K/UL (ref 1.8–7.7)
SODIUM BLD-SCNC: 142 MMOL/L (ref 135–144)
SPECIFIC GRAVITY UA: 1.01 (ref 1–1.03)
SPECIMEN DESCRIPTION: NORMAL
TOTAL PROTEIN: 7.3 G/DL (ref 6.4–8.3)
TRICHOMONAS: ABNORMAL
TURBIDITY: ABNORMAL
URINE HGB: ABNORMAL
UROBILINOGEN, URINE: NORMAL
WBC # BLD: 5.8 K/UL (ref 3.5–11)
WBC # BLD: ABNORMAL 10*3/UL
WBC UA: ABNORMAL /HPF (ref 0–5)
YEAST: ABNORMAL

## 2019-02-24 PROCEDURE — 86403 PARTICLE AGGLUT ANTBDY SCRN: CPT

## 2019-02-24 PROCEDURE — 85025 COMPLETE CBC W/AUTO DIFF WBC: CPT

## 2019-02-24 PROCEDURE — 80053 COMPREHEN METABOLIC PANEL: CPT

## 2019-02-24 PROCEDURE — 36415 COLL VENOUS BLD VENIPUNCTURE: CPT

## 2019-02-24 PROCEDURE — 87186 SC STD MICRODIL/AGAR DIL: CPT

## 2019-02-24 PROCEDURE — 71045 X-RAY EXAM CHEST 1 VIEW: CPT

## 2019-02-24 PROCEDURE — 83605 ASSAY OF LACTIC ACID: CPT

## 2019-02-24 PROCEDURE — 87804 INFLUENZA ASSAY W/OPTIC: CPT

## 2019-02-24 PROCEDURE — 87077 CULTURE AEROBIC IDENTIFY: CPT

## 2019-02-24 PROCEDURE — 81001 URINALYSIS AUTO W/SCOPE: CPT

## 2019-02-24 PROCEDURE — 87040 BLOOD CULTURE FOR BACTERIA: CPT

## 2019-02-24 PROCEDURE — 87086 URINE CULTURE/COLONY COUNT: CPT

## 2019-02-24 PROCEDURE — 99283 EMERGENCY DEPT VISIT LOW MDM: CPT

## 2019-02-24 RX ORDER — SULFAMETHOXAZOLE AND TRIMETHOPRIM 200; 40 MG/5ML; MG/5ML
160 SUSPENSION ORAL 2 TIMES DAILY
Qty: 280 ML | Refills: 0 | Status: SHIPPED | OUTPATIENT
Start: 2019-02-24 | End: 2019-03-03

## 2019-02-24 RX ORDER — 0.9 % SODIUM CHLORIDE 0.9 %
2000 INTRAVENOUS SOLUTION INTRAVENOUS ONCE
Status: DISCONTINUED | OUTPATIENT
Start: 2019-02-24 | End: 2019-02-24

## 2019-02-24 ASSESSMENT — ENCOUNTER SYMPTOMS: VOMITING: 0

## 2019-02-26 LAB
CULTURE: ABNORMAL
Lab: ABNORMAL
SPECIMEN DESCRIPTION: ABNORMAL

## 2019-03-02 LAB
CULTURE: NORMAL
Lab: NORMAL
SPECIMEN DESCRIPTION: NORMAL

## 2019-03-10 ENCOUNTER — APPOINTMENT (OUTPATIENT)
Dept: GENERAL RADIOLOGY | Age: 49
DRG: 189 | End: 2019-03-10
Payer: MEDICARE

## 2019-03-10 ENCOUNTER — APPOINTMENT (OUTPATIENT)
Dept: CT IMAGING | Age: 49
DRG: 189 | End: 2019-03-10
Payer: MEDICARE

## 2019-03-10 ENCOUNTER — HOSPITAL ENCOUNTER (INPATIENT)
Age: 49
LOS: 5 days | Discharge: NURSING, REHAB OR LONG-TERM CARE | DRG: 189 | End: 2019-03-15
Attending: EMERGENCY MEDICINE | Admitting: INTERNAL MEDICINE
Payer: MEDICARE

## 2019-03-10 DIAGNOSIS — R14.0 ABDOMINAL DISTENSION: ICD-10-CM

## 2019-03-10 DIAGNOSIS — R06.89 DYSPNEA AND RESPIRATORY ABNORMALITIES: ICD-10-CM

## 2019-03-10 DIAGNOSIS — R09.02 HYPOXIA: Primary | ICD-10-CM

## 2019-03-10 DIAGNOSIS — J11.1 INFLUENZA WITH RESPIRATORY MANIFESTATION OTHER THAN PNEUMONIA: ICD-10-CM

## 2019-03-10 DIAGNOSIS — R06.00 DYSPNEA AND RESPIRATORY ABNORMALITIES: ICD-10-CM

## 2019-03-10 PROBLEM — J10.1 INFLUENZA A: Status: ACTIVE | Noted: 2019-03-10

## 2019-03-10 LAB
% CKMB: 1.4 % (ref 0–3.5)
-: ABNORMAL
ABSOLUTE EOS #: 0 K/UL (ref 0–0.4)
ABSOLUTE IMMATURE GRANULOCYTE: ABNORMAL K/UL (ref 0–0.3)
ABSOLUTE LYMPH #: 1.5 K/UL (ref 1–4.8)
ABSOLUTE MONO #: 0.9 K/UL (ref 0.2–0.8)
ALBUMIN SERPL-MCNC: 4.2 G/DL (ref 3.5–5.2)
ALBUMIN/GLOBULIN RATIO: ABNORMAL (ref 1–2.5)
ALP BLD-CCNC: 97 U/L (ref 40–129)
ALT SERPL-CCNC: 27 U/L (ref 5–41)
AMORPHOUS: ABNORMAL
AMPHETAMINE SCREEN URINE: NEGATIVE
ANION GAP SERPL CALCULATED.3IONS-SCNC: 13 MMOL/L (ref 9–17)
AST SERPL-CCNC: 20 U/L
BACTERIA: ABNORMAL
BARBITURATE SCREEN URINE: NEGATIVE
BASOPHILS # BLD: 1 % (ref 0–2)
BASOPHILS ABSOLUTE: 0.1 K/UL (ref 0–0.2)
BENZODIAZEPINE SCREEN, URINE: NEGATIVE
BILIRUB SERPL-MCNC: 0.13 MG/DL (ref 0.3–1.2)
BILIRUBIN URINE: NEGATIVE
BUN BLDV-MCNC: 11 MG/DL (ref 6–20)
BUN/CREAT BLD: 26 (ref 9–20)
BUPRENORPHINE URINE: ABNORMAL
CALCIUM IONIZED: 1.2 MMOL/L (ref 1.13–1.33)
CALCIUM SERPL-MCNC: 8.9 MG/DL (ref 8.6–10.4)
CANNABINOID SCREEN URINE: NEGATIVE
CASTS UA: ABNORMAL /LPF
CHLORIDE BLD-SCNC: 99 MMOL/L (ref 98–107)
CK MB: <1 NG/ML
CKMB INTERPRETATION: NORMAL
CO2: 24 MMOL/L (ref 20–31)
COCAINE METABOLITE, URINE: NEGATIVE
COLOR: YELLOW
COMMENT UA: ABNORMAL
CREAT SERPL-MCNC: 0.43 MG/DL (ref 0.7–1.2)
CRYSTALS, UA: ABNORMAL /HPF
DIFFERENTIAL TYPE: ABNORMAL
DIRECT EXAM: ABNORMAL
EOSINOPHILS RELATIVE PERCENT: 0 % (ref 1–4)
EPITHELIAL CELLS UA: ABNORMAL /HPF (ref 0–5)
GFR AFRICAN AMERICAN: >60 ML/MIN
GFR NON-AFRICAN AMERICAN: >60 ML/MIN
GFR SERPL CREATININE-BSD FRML MDRD: ABNORMAL ML/MIN/{1.73_M2}
GFR SERPL CREATININE-BSD FRML MDRD: ABNORMAL ML/MIN/{1.73_M2}
GLUCOSE BLD-MCNC: 122 MG/DL (ref 70–99)
GLUCOSE URINE: NEGATIVE
HCT VFR BLD CALC: 43.1 % (ref 41–53)
HEMOGLOBIN: 14.5 G/DL (ref 13.5–17.5)
IMMATURE GRANULOCYTES: ABNORMAL %
INR BLD: 1.3
KETONES, URINE: NEGATIVE
LACTIC ACID: 1.7 MMOL/L (ref 0.5–2.2)
LACTIC ACID: 1.7 MMOL/L (ref 0.5–2.2)
LEUKOCYTE ESTERASE, URINE: NEGATIVE
LIPASE: 19 U/L (ref 13–60)
LYMPHOCYTES # BLD: 15 % (ref 24–44)
Lab: ABNORMAL
MAGNESIUM: 2.1 MG/DL (ref 1.6–2.6)
MCH RBC QN AUTO: 30.9 PG (ref 26–34)
MCHC RBC AUTO-ENTMCNC: 33.6 G/DL (ref 31–37)
MCV RBC AUTO: 92.1 FL (ref 80–100)
MDMA URINE: ABNORMAL
METHADONE SCREEN, URINE: NEGATIVE
METHAMPHETAMINE, URINE: ABNORMAL
MONOCYTES # BLD: 9 % (ref 1–7)
MUCUS: ABNORMAL
NITRITE, URINE: NEGATIVE
NRBC AUTOMATED: ABNORMAL PER 100 WBC
OPIATES, URINE: NEGATIVE
OTHER OBSERVATIONS UA: ABNORMAL
OXYCODONE SCREEN URINE: NEGATIVE
PARTIAL THROMBOPLASTIN TIME: 30.8 SEC (ref 23–31)
PDW BLD-RTO: 15.7 % (ref 11.5–14.5)
PH UA: 7 (ref 5–8)
PHENCYCLIDINE, URINE: POSITIVE
PHOSPHORUS: 2.6 MG/DL (ref 2.5–4.5)
PLATELET # BLD: 326 K/UL (ref 130–400)
PLATELET ESTIMATE: ABNORMAL
PMV BLD AUTO: 8.2 FL (ref 6–12)
POTASSIUM SERPL-SCNC: 3.8 MMOL/L (ref 3.7–5.3)
PROPOXYPHENE, URINE: ABNORMAL
PROTEIN UA: NEGATIVE
PROTHROMBIN TIME: 13 SEC (ref 9.7–11.6)
RBC # BLD: 4.68 M/UL (ref 4.5–5.9)
RBC # BLD: ABNORMAL 10*6/UL
RBC UA: ABNORMAL /HPF (ref 0–2)
RENAL EPITHELIAL, UA: ABNORMAL /HPF
SEG NEUTROPHILS: 75 % (ref 36–66)
SEGMENTED NEUTROPHILS ABSOLUTE COUNT: 7.9 K/UL (ref 1.8–7.7)
SODIUM BLD-SCNC: 136 MMOL/L (ref 135–144)
SPECIFIC GRAVITY UA: 1.01 (ref 1–1.03)
SPECIMEN DESCRIPTION: ABNORMAL
TEST INFORMATION: ABNORMAL
TOTAL CK: 69 U/L (ref 39–308)
TOTAL PROTEIN: 7.6 G/DL (ref 6.4–8.3)
TRICHOMONAS: ABNORMAL
TRICYCLIC ANTIDEPRESSANTS, UR: ABNORMAL
TROPONIN INTERP: NORMAL
TROPONIN INTERP: NORMAL
TROPONIN T: NORMAL NG/ML
TROPONIN T: NORMAL NG/ML
TROPONIN, HIGH SENSITIVITY: 12 NG/L (ref 0–22)
TROPONIN, HIGH SENSITIVITY: 13 NG/L (ref 0–22)
TURBIDITY: CLEAR
URINE HGB: ABNORMAL
UROBILINOGEN, URINE: NORMAL
WBC # BLD: 10.4 K/UL (ref 3.5–11)
WBC # BLD: ABNORMAL 10*3/UL
WBC UA: ABNORMAL /HPF (ref 0–5)
YEAST: ABNORMAL

## 2019-03-10 PROCEDURE — 6360000002 HC RX W HCPCS: Performed by: EMERGENCY MEDICINE

## 2019-03-10 PROCEDURE — 74022 RADEX COMPL AQT ABD SERIES: CPT

## 2019-03-10 PROCEDURE — 99223 1ST HOSP IP/OBS HIGH 75: CPT | Performed by: NURSE PRACTITIONER

## 2019-03-10 PROCEDURE — 81001 URINALYSIS AUTO W/SCOPE: CPT

## 2019-03-10 PROCEDURE — 87804 INFLUENZA ASSAY W/OPTIC: CPT

## 2019-03-10 PROCEDURE — 6370000000 HC RX 637 (ALT 250 FOR IP): Performed by: INTERNAL MEDICINE

## 2019-03-10 PROCEDURE — 2700000000 HC OXYGEN THERAPY PER DAY

## 2019-03-10 PROCEDURE — 82550 ASSAY OF CK (CPK): CPT

## 2019-03-10 PROCEDURE — 83605 ASSAY OF LACTIC ACID: CPT

## 2019-03-10 PROCEDURE — 84100 ASSAY OF PHOSPHORUS: CPT

## 2019-03-10 PROCEDURE — 93005 ELECTROCARDIOGRAM TRACING: CPT

## 2019-03-10 PROCEDURE — 82330 ASSAY OF CALCIUM: CPT

## 2019-03-10 PROCEDURE — 2580000003 HC RX 258: Performed by: EMERGENCY MEDICINE

## 2019-03-10 PROCEDURE — 2000000000 HC ICU R&B

## 2019-03-10 PROCEDURE — 96374 THER/PROPH/DIAG INJ IV PUSH: CPT

## 2019-03-10 PROCEDURE — 85610 PROTHROMBIN TIME: CPT

## 2019-03-10 PROCEDURE — 85730 THROMBOPLASTIN TIME PARTIAL: CPT

## 2019-03-10 PROCEDURE — 51702 INSERT TEMP BLADDER CATH: CPT

## 2019-03-10 PROCEDURE — 84484 ASSAY OF TROPONIN QUANT: CPT

## 2019-03-10 PROCEDURE — 80307 DRUG TEST PRSMV CHEM ANLYZR: CPT

## 2019-03-10 PROCEDURE — 6360000004 HC RX CONTRAST MEDICATION: Performed by: EMERGENCY MEDICINE

## 2019-03-10 PROCEDURE — 80053 COMPREHEN METABOLIC PANEL: CPT

## 2019-03-10 PROCEDURE — 83690 ASSAY OF LIPASE: CPT

## 2019-03-10 PROCEDURE — 87086 URINE CULTURE/COLONY COUNT: CPT

## 2019-03-10 PROCEDURE — 83735 ASSAY OF MAGNESIUM: CPT

## 2019-03-10 PROCEDURE — 94640 AIRWAY INHALATION TREATMENT: CPT

## 2019-03-10 PROCEDURE — 36415 COLL VENOUS BLD VENIPUNCTURE: CPT

## 2019-03-10 PROCEDURE — 74177 CT ABD & PELVIS W/CONTRAST: CPT

## 2019-03-10 PROCEDURE — 87040 BLOOD CULTURE FOR BACTERIA: CPT

## 2019-03-10 PROCEDURE — 94761 N-INVAS EAR/PLS OXIMETRY MLT: CPT

## 2019-03-10 PROCEDURE — 99285 EMERGENCY DEPT VISIT HI MDM: CPT

## 2019-03-10 PROCEDURE — 85025 COMPLETE CBC W/AUTO DIFF WBC: CPT

## 2019-03-10 PROCEDURE — 82553 CREATINE MB FRACTION: CPT

## 2019-03-10 PROCEDURE — 2580000003 HC RX 258: Performed by: INTERNAL MEDICINE

## 2019-03-10 PROCEDURE — 94664 DEMO&/EVAL PT USE INHALER: CPT

## 2019-03-10 RX ORDER — SODIUM CHLORIDE 0.9 % (FLUSH) 0.9 %
10 SYRINGE (ML) INJECTION PRN
Status: DISCONTINUED | OUTPATIENT
Start: 2019-03-10 | End: 2019-03-10 | Stop reason: SDUPTHER

## 2019-03-10 RX ORDER — ONDANSETRON 2 MG/ML
4 INJECTION INTRAMUSCULAR; INTRAVENOUS EVERY 6 HOURS PRN
Status: DISCONTINUED | OUTPATIENT
Start: 2019-03-10 | End: 2019-03-10 | Stop reason: SDUPTHER

## 2019-03-10 RX ORDER — IPRATROPIUM BROMIDE AND ALBUTEROL SULFATE 2.5; .5 MG/3ML; MG/3ML
1 SOLUTION RESPIRATORY (INHALATION)
Status: DISCONTINUED | OUTPATIENT
Start: 2019-03-10 | End: 2019-03-10

## 2019-03-10 RX ORDER — ALBUTEROL SULFATE 90 UG/1
2 AEROSOL, METERED RESPIRATORY (INHALATION)
Status: DISCONTINUED | OUTPATIENT
Start: 2019-03-10 | End: 2019-03-10

## 2019-03-10 RX ORDER — SODIUM CHLORIDE 0.9 % (FLUSH) 0.9 %
10 SYRINGE (ML) INJECTION EVERY 12 HOURS SCHEDULED
Status: DISCONTINUED | OUTPATIENT
Start: 2019-03-10 | End: 2019-03-15 | Stop reason: HOSPADM

## 2019-03-10 RX ORDER — POTASSIUM CHLORIDE 20 MEQ/1
40 TABLET, EXTENDED RELEASE ORAL PRN
Status: DISCONTINUED | OUTPATIENT
Start: 2019-03-10 | End: 2019-03-15 | Stop reason: HOSPADM

## 2019-03-10 RX ORDER — MAGNESIUM SULFATE 1 G/100ML
1 INJECTION INTRAVENOUS PRN
Status: DISCONTINUED | OUTPATIENT
Start: 2019-03-10 | End: 2019-03-15 | Stop reason: HOSPADM

## 2019-03-10 RX ORDER — ACETAMINOPHEN 325 MG/1
650 TABLET ORAL EVERY 4 HOURS PRN
Status: DISCONTINUED | OUTPATIENT
Start: 2019-03-10 | End: 2019-03-12 | Stop reason: SDUPTHER

## 2019-03-10 RX ORDER — ONDANSETRON 4 MG/1
4 TABLET, ORALLY DISINTEGRATING ORAL EVERY 6 HOURS PRN
Status: DISCONTINUED | OUTPATIENT
Start: 2019-03-10 | End: 2019-03-15 | Stop reason: HOSPADM

## 2019-03-10 RX ORDER — OSELTAMIVIR PHOSPHATE 6 MG/ML
75 FOR SUSPENSION ORAL 2 TIMES DAILY
Status: DISCONTINUED | OUTPATIENT
Start: 2019-03-10 | End: 2019-03-12

## 2019-03-10 RX ORDER — ALBUTEROL SULFATE 2.5 MG/3ML
2.5 SOLUTION RESPIRATORY (INHALATION)
Status: DISCONTINUED | OUTPATIENT
Start: 2019-03-10 | End: 2019-03-10

## 2019-03-10 RX ORDER — ALBUTEROL SULFATE 2.5 MG/3ML
2.5 SOLUTION RESPIRATORY (INHALATION)
Status: DISCONTINUED | OUTPATIENT
Start: 2019-03-11 | End: 2019-03-15 | Stop reason: HOSPADM

## 2019-03-10 RX ORDER — MORPHINE SULFATE 4 MG/ML
4 INJECTION, SOLUTION INTRAMUSCULAR; INTRAVENOUS
Status: DISCONTINUED | OUTPATIENT
Start: 2019-03-10 | End: 2019-03-15 | Stop reason: HOSPADM

## 2019-03-10 RX ORDER — ALBUTEROL SULFATE 2.5 MG/3ML
2.5 SOLUTION RESPIRATORY (INHALATION) EVERY 6 HOURS PRN
Status: DISCONTINUED | OUTPATIENT
Start: 2019-03-10 | End: 2019-03-10

## 2019-03-10 RX ORDER — ALBUTEROL SULFATE 2.5 MG/3ML
5 SOLUTION RESPIRATORY (INHALATION)
Status: DISCONTINUED | OUTPATIENT
Start: 2019-03-10 | End: 2019-03-10

## 2019-03-10 RX ORDER — SODIUM CHLORIDE 0.9 % (FLUSH) 0.9 %
10 SYRINGE (ML) INJECTION PRN
Status: DISCONTINUED | OUTPATIENT
Start: 2019-03-10 | End: 2019-03-15 | Stop reason: HOSPADM

## 2019-03-10 RX ORDER — SODIUM CHLORIDE 9 MG/ML
INJECTION, SOLUTION INTRAVENOUS CONTINUOUS
Status: DISCONTINUED | OUTPATIENT
Start: 2019-03-10 | End: 2019-03-14

## 2019-03-10 RX ORDER — NICOTINE 21 MG/24HR
1 PATCH, TRANSDERMAL 24 HOURS TRANSDERMAL DAILY PRN
Status: DISCONTINUED | OUTPATIENT
Start: 2019-03-10 | End: 2019-03-11

## 2019-03-10 RX ORDER — POTASSIUM CHLORIDE 7.45 MG/ML
10 INJECTION INTRAVENOUS PRN
Status: DISCONTINUED | OUTPATIENT
Start: 2019-03-10 | End: 2019-03-15 | Stop reason: HOSPADM

## 2019-03-10 RX ORDER — ALBUTEROL SULFATE 2.5 MG/3ML
2.5 SOLUTION RESPIRATORY (INHALATION) EVERY 4 HOURS PRN
Status: DISCONTINUED | OUTPATIENT
Start: 2019-03-10 | End: 2019-03-15 | Stop reason: HOSPADM

## 2019-03-10 RX ORDER — DEXAMETHASONE SODIUM PHOSPHATE 10 MG/ML
10 INJECTION, SOLUTION INTRAMUSCULAR; INTRAVENOUS ONCE
Status: COMPLETED | OUTPATIENT
Start: 2019-03-10 | End: 2019-03-10

## 2019-03-10 RX ORDER — ONDANSETRON 2 MG/ML
4 INJECTION INTRAMUSCULAR; INTRAVENOUS EVERY 6 HOURS PRN
Status: DISCONTINUED | OUTPATIENT
Start: 2019-03-10 | End: 2019-03-15 | Stop reason: HOSPADM

## 2019-03-10 RX ORDER — 0.9 % SODIUM CHLORIDE 0.9 %
80 INTRAVENOUS SOLUTION INTRAVENOUS ONCE
Status: COMPLETED | OUTPATIENT
Start: 2019-03-10 | End: 2019-03-10

## 2019-03-10 RX ORDER — MORPHINE SULFATE 2 MG/ML
2 INJECTION, SOLUTION INTRAMUSCULAR; INTRAVENOUS
Status: DISCONTINUED | OUTPATIENT
Start: 2019-03-10 | End: 2019-03-15 | Stop reason: HOSPADM

## 2019-03-10 RX ADMIN — IOPAMIDOL 75 ML: 755 INJECTION, SOLUTION INTRAVENOUS at 16:41

## 2019-03-10 RX ADMIN — Medication 10 ML: at 16:41

## 2019-03-10 RX ADMIN — SODIUM CHLORIDE: 9 INJECTION, SOLUTION INTRAVENOUS at 22:30

## 2019-03-10 RX ADMIN — DEXAMETHASONE SODIUM PHOSPHATE 10 MG: 10 INJECTION INTRAMUSCULAR; INTRAVENOUS at 15:16

## 2019-03-10 RX ADMIN — ALBUTEROL SULFATE 5 MG: 5 SOLUTION RESPIRATORY (INHALATION) at 14:45

## 2019-03-10 RX ADMIN — ACETAMINOPHEN 650 MG: 325 TABLET ORAL at 22:19

## 2019-03-10 RX ADMIN — Medication 10 ML: at 22:19

## 2019-03-10 RX ADMIN — SODIUM CHLORIDE 80 ML: 9 INJECTION, SOLUTION INTRAVENOUS at 16:41

## 2019-03-10 RX ADMIN — ALBUTEROL SULFATE 5 MG: 5 SOLUTION RESPIRATORY (INHALATION) at 14:35

## 2019-03-10 RX ADMIN — Medication 75 MG: at 22:19

## 2019-03-10 ASSESSMENT — PAIN SCALES - GENERAL
PAINLEVEL_OUTOF10: 0
PAINLEVEL_OUTOF10: 0

## 2019-03-11 PROBLEM — J96.01 ACUTE RESPIRATORY FAILURE WITH HYPOXIA (HCC): Status: ACTIVE | Noted: 2019-03-11

## 2019-03-11 LAB
ANION GAP SERPL CALCULATED.3IONS-SCNC: 12 MMOL/L (ref 9–17)
BUN BLDV-MCNC: 9 MG/DL (ref 6–20)
BUN/CREAT BLD: ABNORMAL (ref 9–20)
CALCIUM SERPL-MCNC: 8.5 MG/DL (ref 8.6–10.4)
CHLORIDE BLD-SCNC: 105 MMOL/L (ref 98–107)
CO2: 26 MMOL/L (ref 20–31)
CREAT SERPL-MCNC: <0.4 MG/DL (ref 0.7–1.2)
CULTURE: NORMAL
EKG ATRIAL RATE: 111 BPM
EKG P AXIS: 58 DEGREES
EKG P-R INTERVAL: 146 MS
EKG Q-T INTERVAL: 342 MS
EKG QRS DURATION: 92 MS
EKG QTC CALCULATION (BAZETT): 465 MS
EKG R AXIS: 135 DEGREES
EKG T AXIS: 47 DEGREES
EKG VENTRICULAR RATE: 111 BPM
GFR AFRICAN AMERICAN: ABNORMAL ML/MIN
GFR NON-AFRICAN AMERICAN: ABNORMAL ML/MIN
GFR SERPL CREATININE-BSD FRML MDRD: ABNORMAL ML/MIN/{1.73_M2}
GFR SERPL CREATININE-BSD FRML MDRD: ABNORMAL ML/MIN/{1.73_M2}
GLUCOSE BLD-MCNC: 119 MG/DL (ref 70–99)
HCT VFR BLD CALC: 40.4 % (ref 41–53)
HEMOGLOBIN: 13.4 G/DL (ref 13.5–17.5)
KEPPRA: 10 UG/ML
Lab: NORMAL
MCH RBC QN AUTO: 30.8 PG (ref 26–34)
MCHC RBC AUTO-ENTMCNC: 33 G/DL (ref 31–37)
MCV RBC AUTO: 93.2 FL (ref 80–100)
NRBC AUTOMATED: ABNORMAL PER 100 WBC
PDW BLD-RTO: 15.6 % (ref 11.5–14.5)
PLATELET # BLD: 324 K/UL (ref 130–400)
PMV BLD AUTO: 8.6 FL (ref 6–12)
POTASSIUM SERPL-SCNC: 4.1 MMOL/L (ref 3.7–5.3)
RBC # BLD: 4.34 M/UL (ref 4.5–5.9)
SODIUM BLD-SCNC: 143 MMOL/L (ref 135–144)
SPECIMEN DESCRIPTION: NORMAL
WBC # BLD: 6.1 K/UL (ref 3.5–11)

## 2019-03-11 PROCEDURE — 2700000000 HC OXYGEN THERAPY PER DAY

## 2019-03-11 PROCEDURE — C9113 INJ PANTOPRAZOLE SODIUM, VIA: HCPCS | Performed by: INTERNAL MEDICINE

## 2019-03-11 PROCEDURE — 6360000002 HC RX W HCPCS: Performed by: INTERNAL MEDICINE

## 2019-03-11 PROCEDURE — 6370000000 HC RX 637 (ALT 250 FOR IP): Performed by: INTERNAL MEDICINE

## 2019-03-11 PROCEDURE — 85027 COMPLETE CBC AUTOMATED: CPT

## 2019-03-11 PROCEDURE — C9254 INJECTION, LACOSAMIDE: HCPCS | Performed by: NURSE PRACTITIONER

## 2019-03-11 PROCEDURE — 80048 BASIC METABOLIC PNL TOTAL CA: CPT

## 2019-03-11 PROCEDURE — 99233 SBSQ HOSP IP/OBS HIGH 50: CPT | Performed by: INTERNAL MEDICINE

## 2019-03-11 PROCEDURE — 94761 N-INVAS EAR/PLS OXIMETRY MLT: CPT

## 2019-03-11 PROCEDURE — 80177 DRUG SCRN QUAN LEVETIRACETAM: CPT

## 2019-03-11 PROCEDURE — 36415 COLL VENOUS BLD VENIPUNCTURE: CPT

## 2019-03-11 PROCEDURE — 94640 AIRWAY INHALATION TREATMENT: CPT

## 2019-03-11 PROCEDURE — 2060000000 HC ICU INTERMEDIATE R&B

## 2019-03-11 PROCEDURE — 6360000002 HC RX W HCPCS: Performed by: NURSE PRACTITIONER

## 2019-03-11 PROCEDURE — 2580000003 HC RX 258: Performed by: INTERNAL MEDICINE

## 2019-03-11 PROCEDURE — 2580000003 HC RX 258: Performed by: NURSE PRACTITIONER

## 2019-03-11 RX ORDER — LACOSAMIDE 10 MG/ML
150 SOLUTION ORAL 2 TIMES DAILY
Status: ON HOLD | COMMUNITY
End: 2019-04-16 | Stop reason: HOSPADM

## 2019-03-11 RX ORDER — LAMOTRIGINE 200 MG/1
200 TABLET ORAL 2 TIMES DAILY
Status: ON HOLD | COMMUNITY
End: 2019-03-14 | Stop reason: HOSPADM

## 2019-03-11 RX ORDER — PANTOPRAZOLE SODIUM 40 MG/10ML
40 INJECTION, POWDER, LYOPHILIZED, FOR SOLUTION INTRAVENOUS 2 TIMES DAILY
Status: DISCONTINUED | OUTPATIENT
Start: 2019-03-11 | End: 2019-03-15 | Stop reason: HOSPADM

## 2019-03-11 RX ORDER — 0.9 % SODIUM CHLORIDE 0.9 %
10 VIAL (ML) INJECTION 2 TIMES DAILY
Status: DISCONTINUED | OUTPATIENT
Start: 2019-03-11 | End: 2019-03-15 | Stop reason: HOSPADM

## 2019-03-11 RX ORDER — POLYETHYLENE GLYCOL 3350 17 G/17G
17 POWDER, FOR SOLUTION ORAL DAILY
COMMUNITY
End: 2020-12-20

## 2019-03-11 RX ORDER — METHYLPREDNISOLONE SODIUM SUCCINATE 125 MG/2ML
80 INJECTION, POWDER, LYOPHILIZED, FOR SOLUTION INTRAMUSCULAR; INTRAVENOUS EVERY 8 HOURS
Status: DISCONTINUED | OUTPATIENT
Start: 2019-03-11 | End: 2019-03-13

## 2019-03-11 RX ORDER — BUDESONIDE 0.5 MG/2ML
1 INHALANT ORAL 2 TIMES DAILY
COMMUNITY

## 2019-03-11 RX ORDER — BISACODYL 10 MG
10 SUPPOSITORY, RECTAL RECTAL DAILY PRN
Status: DISCONTINUED | OUTPATIENT
Start: 2019-03-11 | End: 2019-03-12 | Stop reason: SDUPTHER

## 2019-03-11 RX ORDER — LACTULOSE 10 G/15ML
20 SOLUTION ORAL 2 TIMES DAILY
COMMUNITY

## 2019-03-11 RX ADMIN — DEXTROSE MONOHYDRATE 150 MG: 50 INJECTION, SOLUTION INTRAVENOUS at 10:36

## 2019-03-11 RX ADMIN — PANTOPRAZOLE SODIUM 40 MG: 40 INJECTION, POWDER, FOR SOLUTION INTRAVENOUS at 10:35

## 2019-03-11 RX ADMIN — Medication 10 ML: at 10:37

## 2019-03-11 RX ADMIN — LEVETIRACETAM 1500 MG: 100 INJECTION, SOLUTION INTRAVENOUS at 08:14

## 2019-03-11 RX ADMIN — MORPHINE SULFATE 2 MG: 2 INJECTION, SOLUTION INTRAMUSCULAR; INTRAVENOUS at 06:16

## 2019-03-11 RX ADMIN — MORPHINE SULFATE 2 MG: 2 INJECTION, SOLUTION INTRAMUSCULAR; INTRAVENOUS at 20:00

## 2019-03-11 RX ADMIN — SODIUM CHLORIDE: 9 INJECTION, SOLUTION INTRAVENOUS at 06:45

## 2019-03-11 RX ADMIN — METHYLPREDNISOLONE SODIUM SUCCINATE 80 MG: 125 INJECTION, POWDER, FOR SOLUTION INTRAMUSCULAR; INTRAVENOUS at 10:35

## 2019-03-11 RX ADMIN — Medication 10 ML: at 10:44

## 2019-03-11 RX ADMIN — ALBUTEROL SULFATE 2.5 MG: 2.5 SOLUTION RESPIRATORY (INHALATION) at 07:15

## 2019-03-11 RX ADMIN — LEVETIRACETAM 1500 MG: 100 INJECTION, SOLUTION INTRAVENOUS at 20:35

## 2019-03-11 RX ADMIN — ALBUTEROL SULFATE 2.5 MG: 2.5 SOLUTION RESPIRATORY (INHALATION) at 20:19

## 2019-03-11 RX ADMIN — ALBUTEROL SULFATE 2.5 MG: 2.5 SOLUTION RESPIRATORY (INHALATION) at 15:24

## 2019-03-11 RX ADMIN — ENOXAPARIN SODIUM 40 MG: 40 INJECTION SUBCUTANEOUS at 08:14

## 2019-03-11 RX ADMIN — DEXTROSE MONOHYDRATE 150 MG: 50 INJECTION, SOLUTION INTRAVENOUS at 22:02

## 2019-03-11 RX ADMIN — METHYLPREDNISOLONE SODIUM SUCCINATE 80 MG: 125 INJECTION, POWDER, FOR SOLUTION INTRAMUSCULAR; INTRAVENOUS at 17:16

## 2019-03-11 RX ADMIN — Medication 10 ML: at 20:35

## 2019-03-11 RX ADMIN — Medication 75 MG: at 08:14

## 2019-03-11 RX ADMIN — PANTOPRAZOLE SODIUM 40 MG: 40 INJECTION, POWDER, FOR SOLUTION INTRAVENOUS at 20:35

## 2019-03-11 RX ADMIN — MORPHINE SULFATE 2 MG: 2 INJECTION, SOLUTION INTRAMUSCULAR; INTRAVENOUS at 08:14

## 2019-03-11 RX ADMIN — ALBUTEROL SULFATE 2.5 MG: 2.5 SOLUTION RESPIRATORY (INHALATION) at 12:11

## 2019-03-11 ASSESSMENT — PAIN SCALES - GENERAL
PAINLEVEL_OUTOF10: 10
PAINLEVEL_OUTOF10: 6

## 2019-03-12 LAB
ANION GAP SERPL CALCULATED.3IONS-SCNC: 10 MMOL/L (ref 9–17)
BUN BLDV-MCNC: 8 MG/DL (ref 6–20)
BUN/CREAT BLD: ABNORMAL (ref 9–20)
CALCIUM SERPL-MCNC: 7.7 MG/DL (ref 8.6–10.4)
CHLORIDE BLD-SCNC: 111 MMOL/L (ref 98–107)
CO2: 25 MMOL/L (ref 20–31)
CREAT SERPL-MCNC: <0.4 MG/DL (ref 0.7–1.2)
GFR AFRICAN AMERICAN: ABNORMAL ML/MIN
GFR NON-AFRICAN AMERICAN: ABNORMAL ML/MIN
GFR SERPL CREATININE-BSD FRML MDRD: ABNORMAL ML/MIN/{1.73_M2}
GFR SERPL CREATININE-BSD FRML MDRD: ABNORMAL ML/MIN/{1.73_M2}
GLUCOSE BLD-MCNC: 117 MG/DL (ref 70–99)
HCT VFR BLD CALC: 36.6 % (ref 41–53)
HEMOGLOBIN: 12.4 G/DL (ref 13.5–17.5)
MCH RBC QN AUTO: 30.8 PG (ref 26–34)
MCHC RBC AUTO-ENTMCNC: 34 G/DL (ref 31–37)
MCV RBC AUTO: 90.6 FL (ref 80–100)
NRBC AUTOMATED: ABNORMAL PER 100 WBC
PDW BLD-RTO: 15.1 % (ref 11.5–14.5)
PLATELET # BLD: 296 K/UL (ref 130–400)
PMV BLD AUTO: 8.2 FL (ref 6–12)
POTASSIUM SERPL-SCNC: 3.5 MMOL/L (ref 3.7–5.3)
RBC # BLD: 4.04 M/UL (ref 4.5–5.9)
SODIUM BLD-SCNC: 146 MMOL/L (ref 135–144)
WBC # BLD: 8.9 K/UL (ref 3.5–11)

## 2019-03-12 PROCEDURE — 2500000003 HC RX 250 WO HCPCS: Performed by: INTERNAL MEDICINE

## 2019-03-12 PROCEDURE — 6360000002 HC RX W HCPCS: Performed by: INTERNAL MEDICINE

## 2019-03-12 PROCEDURE — 99233 SBSQ HOSP IP/OBS HIGH 50: CPT | Performed by: INTERNAL MEDICINE

## 2019-03-12 PROCEDURE — 2700000000 HC OXYGEN THERAPY PER DAY

## 2019-03-12 PROCEDURE — 2580000003 HC RX 258: Performed by: INTERNAL MEDICINE

## 2019-03-12 PROCEDURE — 6370000000 HC RX 637 (ALT 250 FOR IP): Performed by: INTERNAL MEDICINE

## 2019-03-12 PROCEDURE — 94640 AIRWAY INHALATION TREATMENT: CPT

## 2019-03-12 PROCEDURE — 80048 BASIC METABOLIC PNL TOTAL CA: CPT

## 2019-03-12 PROCEDURE — 85027 COMPLETE CBC AUTOMATED: CPT

## 2019-03-12 PROCEDURE — 2060000000 HC ICU INTERMEDIATE R&B

## 2019-03-12 PROCEDURE — 36415 COLL VENOUS BLD VENIPUNCTURE: CPT

## 2019-03-12 PROCEDURE — C9113 INJ PANTOPRAZOLE SODIUM, VIA: HCPCS | Performed by: INTERNAL MEDICINE

## 2019-03-12 RX ORDER — SENNA AND DOCUSATE SODIUM 50; 8.6 MG/1; MG/1
2 TABLET, FILM COATED ORAL DAILY
Status: DISCONTINUED | OUTPATIENT
Start: 2019-03-12 | End: 2019-03-15 | Stop reason: HOSPADM

## 2019-03-12 RX ORDER — OSELTAMIVIR PHOSPHATE 6 MG/ML
75 FOR SUSPENSION ORAL 2 TIMES DAILY
Status: COMPLETED | OUTPATIENT
Start: 2019-03-12 | End: 2019-03-15

## 2019-03-12 RX ORDER — FUROSEMIDE 20 MG/1
20 TABLET ORAL DAILY
Status: DISCONTINUED | OUTPATIENT
Start: 2019-03-12 | End: 2019-03-15 | Stop reason: HOSPADM

## 2019-03-12 RX ORDER — GLYCOPYRROLATE 1 MG/1
1 TABLET ORAL 3 TIMES DAILY
Status: DISCONTINUED | OUTPATIENT
Start: 2019-03-12 | End: 2019-03-15 | Stop reason: HOSPADM

## 2019-03-12 RX ORDER — MONTELUKAST SODIUM 10 MG/1
10 TABLET ORAL NIGHTLY
Status: DISCONTINUED | OUTPATIENT
Start: 2019-03-12 | End: 2019-03-15 | Stop reason: HOSPADM

## 2019-03-12 RX ORDER — ALBUTEROL SULFATE 2.5 MG/3ML
2.5 SOLUTION RESPIRATORY (INHALATION) 3 TIMES DAILY PRN
Status: DISCONTINUED | OUTPATIENT
Start: 2019-03-12 | End: 2019-03-15 | Stop reason: HOSPADM

## 2019-03-12 RX ORDER — FLUTICASONE PROPIONATE 50 MCG
2 SPRAY, SUSPENSION (ML) NASAL DAILY
Status: DISCONTINUED | OUTPATIENT
Start: 2019-03-12 | End: 2019-03-15 | Stop reason: HOSPADM

## 2019-03-12 RX ORDER — NUTRITIONAL SUPPLEMENT/FIBER
75 LIQUID (ML) ORAL DAILY
Status: DISCONTINUED | OUTPATIENT
Start: 2019-03-12 | End: 2019-03-12 | Stop reason: RX

## 2019-03-12 RX ORDER — POLYETHYLENE GLYCOL 3350 17 G/17G
17 POWDER, FOR SOLUTION ORAL DAILY
Status: DISCONTINUED | OUTPATIENT
Start: 2019-03-12 | End: 2019-03-15 | Stop reason: HOSPADM

## 2019-03-12 RX ORDER — LANSOPRAZOLE 15 MG/1
30 CAPSULE, DELAYED RELEASE ORAL DAILY
Status: DISCONTINUED | OUTPATIENT
Start: 2019-03-12 | End: 2019-03-12

## 2019-03-12 RX ORDER — LAMOTRIGINE 100 MG/1
200 TABLET ORAL 2 TIMES DAILY
Status: DISCONTINUED | OUTPATIENT
Start: 2019-03-12 | End: 2019-03-12

## 2019-03-12 RX ORDER — LANOLIN ALCOHOL/MO/W.PET/CERES
3 CREAM (GRAM) TOPICAL NIGHTLY PRN
Status: DISCONTINUED | OUTPATIENT
Start: 2019-03-12 | End: 2019-03-15 | Stop reason: HOSPADM

## 2019-03-12 RX ORDER — LACTULOSE 10 G/15ML
20 SOLUTION ORAL 2 TIMES DAILY
Status: DISCONTINUED | OUTPATIENT
Start: 2019-03-12 | End: 2019-03-15 | Stop reason: HOSPADM

## 2019-03-12 RX ORDER — PSEUDOEPHEDRINE HYDROCHLORIDE 30 MG/1
30 TABLET ORAL 3 TIMES DAILY
Status: DISCONTINUED | OUTPATIENT
Start: 2019-03-12 | End: 2019-03-15 | Stop reason: HOSPADM

## 2019-03-12 RX ORDER — CETIRIZINE HYDROCHLORIDE 5 MG/1
5 TABLET ORAL DAILY
Status: DISCONTINUED | OUTPATIENT
Start: 2019-03-12 | End: 2019-03-12

## 2019-03-12 RX ORDER — ACETAMINOPHEN 325 MG/1
650 TABLET ORAL EVERY 6 HOURS PRN
Status: DISCONTINUED | OUTPATIENT
Start: 2019-03-12 | End: 2019-03-15 | Stop reason: HOSPADM

## 2019-03-12 RX ORDER — SODIUM PHOSPHATE, DIBASIC AND SODIUM PHOSPHATE, MONOBASIC 7; 19 G/133ML; G/133ML
1 ENEMA RECTAL DAILY PRN
Status: DISCONTINUED | OUTPATIENT
Start: 2019-03-12 | End: 2019-03-15 | Stop reason: HOSPADM

## 2019-03-12 RX ORDER — LAMOTRIGINE 100 MG/1
100 TABLET ORAL EVERY MORNING
Status: DISCONTINUED | OUTPATIENT
Start: 2019-03-12 | End: 2019-03-15 | Stop reason: HOSPADM

## 2019-03-12 RX ORDER — BACLOFEN 10 MG/1
10 TABLET ORAL 3 TIMES DAILY
Status: DISCONTINUED | OUTPATIENT
Start: 2019-03-12 | End: 2019-03-15 | Stop reason: HOSPADM

## 2019-03-12 RX ORDER — LAMOTRIGINE 100 MG/1
200 TABLET ORAL 2 TIMES DAILY
Status: DISCONTINUED | OUTPATIENT
Start: 2019-03-12 | End: 2019-03-15 | Stop reason: HOSPADM

## 2019-03-12 RX ORDER — LEVETIRACETAM 100 MG/ML
1500 SOLUTION ORAL 2 TIMES DAILY
Status: DISCONTINUED | OUTPATIENT
Start: 2019-03-12 | End: 2019-03-15 | Stop reason: HOSPADM

## 2019-03-12 RX ORDER — CALCIUM CARBONATE 200(500)MG
1250 TABLET,CHEWABLE ORAL DAILY
Status: DISCONTINUED | OUTPATIENT
Start: 2019-03-12 | End: 2019-03-15 | Stop reason: HOSPADM

## 2019-03-12 RX ORDER — LACOSAMIDE 100 MG/1
150 TABLET ORAL 2 TIMES DAILY
Status: DISCONTINUED | OUTPATIENT
Start: 2019-03-12 | End: 2019-03-15 | Stop reason: HOSPADM

## 2019-03-12 RX ORDER — BISACODYL 10 MG
10 SUPPOSITORY, RECTAL RECTAL DAILY PRN
Status: DISCONTINUED | OUTPATIENT
Start: 2019-03-12 | End: 2019-03-15 | Stop reason: HOSPADM

## 2019-03-12 RX ORDER — BUDESONIDE 0.5 MG/2ML
500 INHALANT ORAL 2 TIMES DAILY
Status: DISCONTINUED | OUTPATIENT
Start: 2019-03-12 | End: 2019-03-15 | Stop reason: HOSPADM

## 2019-03-12 RX ORDER — CETIRIZINE HYDROCHLORIDE 5 MG/1
5 TABLET ORAL DAILY
Status: DISCONTINUED | OUTPATIENT
Start: 2019-03-13 | End: 2019-03-15 | Stop reason: HOSPADM

## 2019-03-12 RX ORDER — CLONAZEPAM 0.5 MG/1
2 TABLET ORAL 2 TIMES DAILY PRN
Status: DISCONTINUED | OUTPATIENT
Start: 2019-03-12 | End: 2019-03-15 | Stop reason: HOSPADM

## 2019-03-12 RX ADMIN — MORPHINE SULFATE 4 MG: 4 INJECTION INTRAVENOUS at 22:59

## 2019-03-12 RX ADMIN — ALBUTEROL SULFATE 2.5 MG: 2.5 SOLUTION RESPIRATORY (INHALATION) at 06:27

## 2019-03-12 RX ADMIN — VITAMIN D, TAB 1000IU (100/BT) 1000 UNITS: 25 TAB at 22:35

## 2019-03-12 RX ADMIN — PSEUDOEPHEDRINE HCL 30 MG: 30 TABLET, FILM COATED ORAL at 10:45

## 2019-03-12 RX ADMIN — BUDESONIDE 500 MCG: 0.5 SUSPENSION RESPIRATORY (INHALATION) at 18:35

## 2019-03-12 RX ADMIN — LACOSAMIDE 150 MG: 100 TABLET, FILM COATED ORAL at 22:16

## 2019-03-12 RX ADMIN — MICONAZOLE NITRATE: 20.6 POWDER TOPICAL at 10:45

## 2019-03-12 RX ADMIN — Medication 75 MG: at 10:47

## 2019-03-12 RX ADMIN — GLYCOPYRROLATE 1 MG: 1 TABLET ORAL at 11:03

## 2019-03-12 RX ADMIN — MORPHINE SULFATE 2 MG: 2 INJECTION, SOLUTION INTRAMUSCULAR; INTRAVENOUS at 17:31

## 2019-03-12 RX ADMIN — BACLOFEN 10 MG: 10 TABLET ORAL at 10:42

## 2019-03-12 RX ADMIN — MONTELUKAST 10 MG: 10 TABLET, FILM COATED ORAL at 22:16

## 2019-03-12 RX ADMIN — LAMOTRIGINE 200 MG: 100 TABLET ORAL at 12:32

## 2019-03-12 RX ADMIN — PANTOPRAZOLE SODIUM 40 MG: 40 INJECTION, POWDER, FOR SOLUTION INTRAVENOUS at 22:16

## 2019-03-12 RX ADMIN — SODIUM CHLORIDE: 9 INJECTION, SOLUTION INTRAVENOUS at 17:22

## 2019-03-12 RX ADMIN — GLYCOPYRROLATE 1 MG: 1 TABLET ORAL at 22:18

## 2019-03-12 RX ADMIN — FUROSEMIDE 20 MG: 20 TABLET ORAL at 10:42

## 2019-03-12 RX ADMIN — LACOSAMIDE 150 MG: 100 TABLET, FILM COATED ORAL at 12:29

## 2019-03-12 RX ADMIN — ALBUTEROL SULFATE 2.5 MG: 2.5 SOLUTION RESPIRATORY (INHALATION) at 16:33

## 2019-03-12 RX ADMIN — POLYETHYLENE GLYCOL 3350 17 G: 17 POWDER, FOR SOLUTION ORAL at 12:29

## 2019-03-12 RX ADMIN — LEVETIRACETAM 1500 MG: 100 SOLUTION ORAL at 22:20

## 2019-03-12 RX ADMIN — CETIRIZINE HYDROCHLORIDE 5 MG: 5 TABLET ORAL at 10:42

## 2019-03-12 RX ADMIN — ANTACID TABLETS 1250 MG: 500 TABLET, CHEWABLE ORAL at 10:44

## 2019-03-12 RX ADMIN — SODIUM CHLORIDE: 9 INJECTION, SOLUTION INTRAVENOUS at 05:51

## 2019-03-12 RX ADMIN — Medication 10 ML: at 22:29

## 2019-03-12 RX ADMIN — PANTOPRAZOLE SODIUM 40 MG: 40 INJECTION, POWDER, FOR SOLUTION INTRAVENOUS at 10:43

## 2019-03-12 RX ADMIN — ENOXAPARIN SODIUM 40 MG: 40 INJECTION SUBCUTANEOUS at 10:42

## 2019-03-12 RX ADMIN — FLUTICASONE PROPIONATE 2 SPRAY: 50 SPRAY, METERED NASAL at 10:45

## 2019-03-12 RX ADMIN — METHYLPREDNISOLONE SODIUM SUCCINATE 80 MG: 125 INJECTION, POWDER, FOR SOLUTION INTRAMUSCULAR; INTRAVENOUS at 00:49

## 2019-03-12 RX ADMIN — LACTULOSE 20 G: 20 SOLUTION ORAL at 22:16

## 2019-03-12 RX ADMIN — METHYLPREDNISOLONE SODIUM SUCCINATE 80 MG: 125 INJECTION, POWDER, FOR SOLUTION INTRAMUSCULAR; INTRAVENOUS at 10:43

## 2019-03-12 RX ADMIN — MICONAZOLE NITRATE: 20.6 POWDER TOPICAL at 22:21

## 2019-03-12 RX ADMIN — ALBUTEROL SULFATE 2.5 MG: 2.5 SOLUTION RESPIRATORY (INHALATION) at 11:34

## 2019-03-12 RX ADMIN — LEVETIRACETAM 1500 MG: 100 SOLUTION ORAL at 10:42

## 2019-03-12 RX ADMIN — Medication 10 ML: at 11:44

## 2019-03-12 RX ADMIN — Medication 75 MG: at 22:54

## 2019-03-12 RX ADMIN — SENNOSIDES AND DOCUSATE SODIUM 2 TABLET: 8.6; 5 TABLET ORAL at 10:44

## 2019-03-12 RX ADMIN — LAMOTRIGINE 200 MG: 100 TABLET ORAL at 22:19

## 2019-03-12 RX ADMIN — METHYLPREDNISOLONE SODIUM SUCCINATE 80 MG: 125 INJECTION, POWDER, FOR SOLUTION INTRAMUSCULAR; INTRAVENOUS at 17:22

## 2019-03-12 RX ADMIN — PSEUDOEPHEDRINE HCL 30 MG: 30 TABLET, FILM COATED ORAL at 22:23

## 2019-03-12 RX ADMIN — BACLOFEN 10 MG: 10 TABLET ORAL at 22:17

## 2019-03-12 RX ADMIN — LAMOTRIGINE 100 MG: 100 TABLET ORAL at 11:03

## 2019-03-12 RX ADMIN — VITAMIN D, TAB 1000IU (100/BT) 1000 UNITS: 25 TAB at 12:29

## 2019-03-12 RX ADMIN — ALBUTEROL SULFATE 2.5 MG: 2.5 SOLUTION RESPIRATORY (INHALATION) at 18:34

## 2019-03-12 RX ADMIN — LACTULOSE 20 G: 20 SOLUTION ORAL at 12:29

## 2019-03-12 RX ADMIN — POTASSIUM CHLORIDE 40 MEQ: 20 TABLET, EXTENDED RELEASE ORAL at 12:33

## 2019-03-12 ASSESSMENT — PAIN SCALES - GENERAL
PAINLEVEL_OUTOF10: 6
PAINLEVEL_OUTOF10: 7
PAINLEVEL_OUTOF10: 0
PAINLEVEL_OUTOF10: 4

## 2019-03-12 ASSESSMENT — PAIN SCALES - WONG BAKER
WONGBAKER_NUMERICALRESPONSE: 6

## 2019-03-13 LAB
ALBUMIN SERPL-MCNC: 3.2 G/DL (ref 3.5–5.2)
ALBUMIN/GLOBULIN RATIO: ABNORMAL (ref 1–2.5)
ALP BLD-CCNC: 60 U/L (ref 40–129)
ALT SERPL-CCNC: 35 U/L (ref 5–41)
ANION GAP SERPL CALCULATED.3IONS-SCNC: 10 MMOL/L (ref 9–17)
AST SERPL-CCNC: 31 U/L
BILIRUB SERPL-MCNC: 0.11 MG/DL (ref 0.3–1.2)
BUN BLDV-MCNC: 7 MG/DL (ref 6–20)
BUN/CREAT BLD: ABNORMAL (ref 9–20)
CALCIUM SERPL-MCNC: 7.8 MG/DL (ref 8.6–10.4)
CHLORIDE BLD-SCNC: 110 MMOL/L (ref 98–107)
CO2: 25 MMOL/L (ref 20–31)
CREAT SERPL-MCNC: <0.4 MG/DL (ref 0.7–1.2)
GFR AFRICAN AMERICAN: ABNORMAL ML/MIN
GFR NON-AFRICAN AMERICAN: ABNORMAL ML/MIN
GFR SERPL CREATININE-BSD FRML MDRD: ABNORMAL ML/MIN/{1.73_M2}
GFR SERPL CREATININE-BSD FRML MDRD: ABNORMAL ML/MIN/{1.73_M2}
GLUCOSE BLD-MCNC: 122 MG/DL (ref 70–99)
HCT VFR BLD CALC: 35.5 % (ref 41–53)
HEMOGLOBIN: 11.7 G/DL (ref 13.5–17.5)
MAGNESIUM: 2 MG/DL (ref 1.6–2.6)
MCH RBC QN AUTO: 30.5 PG (ref 26–34)
MCHC RBC AUTO-ENTMCNC: 33 G/DL (ref 31–37)
MCV RBC AUTO: 92.5 FL (ref 80–100)
NRBC AUTOMATED: ABNORMAL PER 100 WBC
PDW BLD-RTO: 14.8 % (ref 11.5–14.5)
PHOSPHORUS: 1.8 MG/DL (ref 2.5–4.5)
PLATELET # BLD: 271 K/UL (ref 130–400)
PMV BLD AUTO: 8.6 FL (ref 6–12)
POTASSIUM SERPL-SCNC: 3.5 MMOL/L (ref 3.7–5.3)
RBC # BLD: 3.84 M/UL (ref 4.5–5.9)
SODIUM BLD-SCNC: 145 MMOL/L (ref 135–144)
TOTAL PROTEIN: 6 G/DL (ref 6.4–8.3)
TRIGL SERPL-MCNC: 61 MG/DL
WBC # BLD: 6 K/UL (ref 3.5–11)

## 2019-03-13 PROCEDURE — 83735 ASSAY OF MAGNESIUM: CPT

## 2019-03-13 PROCEDURE — 2580000003 HC RX 258: Performed by: INTERNAL MEDICINE

## 2019-03-13 PROCEDURE — 6360000002 HC RX W HCPCS: Performed by: INTERNAL MEDICINE

## 2019-03-13 PROCEDURE — C9113 INJ PANTOPRAZOLE SODIUM, VIA: HCPCS | Performed by: INTERNAL MEDICINE

## 2019-03-13 PROCEDURE — 80053 COMPREHEN METABOLIC PANEL: CPT

## 2019-03-13 PROCEDURE — 94640 AIRWAY INHALATION TREATMENT: CPT

## 2019-03-13 PROCEDURE — 2700000000 HC OXYGEN THERAPY PER DAY

## 2019-03-13 PROCEDURE — 2060000000 HC ICU INTERMEDIATE R&B

## 2019-03-13 PROCEDURE — 84478 ASSAY OF TRIGLYCERIDES: CPT

## 2019-03-13 PROCEDURE — 84100 ASSAY OF PHOSPHORUS: CPT

## 2019-03-13 PROCEDURE — 6370000000 HC RX 637 (ALT 250 FOR IP): Performed by: INTERNAL MEDICINE

## 2019-03-13 PROCEDURE — 85027 COMPLETE CBC AUTOMATED: CPT

## 2019-03-13 PROCEDURE — 36415 COLL VENOUS BLD VENIPUNCTURE: CPT

## 2019-03-13 PROCEDURE — 94761 N-INVAS EAR/PLS OXIMETRY MLT: CPT

## 2019-03-13 PROCEDURE — 99232 SBSQ HOSP IP/OBS MODERATE 35: CPT | Performed by: INTERNAL MEDICINE

## 2019-03-13 RX ORDER — METHYLPREDNISOLONE SODIUM SUCCINATE 40 MG/ML
40 INJECTION, POWDER, LYOPHILIZED, FOR SOLUTION INTRAMUSCULAR; INTRAVENOUS EVERY 8 HOURS
Status: DISCONTINUED | OUTPATIENT
Start: 2019-03-13 | End: 2019-03-14

## 2019-03-13 RX ADMIN — SODIUM CHLORIDE: 9 INJECTION, SOLUTION INTRAVENOUS at 12:10

## 2019-03-13 RX ADMIN — VITAMIN D, TAB 1000IU (100/BT) 1000 UNITS: 25 TAB at 22:24

## 2019-03-13 RX ADMIN — GLYCOPYRROLATE 1 MG: 1 TABLET ORAL at 15:25

## 2019-03-13 RX ADMIN — LACOSAMIDE 150 MG: 100 TABLET, FILM COATED ORAL at 22:21

## 2019-03-13 RX ADMIN — PANTOPRAZOLE SODIUM 40 MG: 40 INJECTION, POWDER, FOR SOLUTION INTRAVENOUS at 22:25

## 2019-03-13 RX ADMIN — LACOSAMIDE 150 MG: 100 TABLET, FILM COATED ORAL at 09:17

## 2019-03-13 RX ADMIN — METHYLPREDNISOLONE SODIUM SUCCINATE 80 MG: 125 INJECTION, POWDER, FOR SOLUTION INTRAMUSCULAR; INTRAVENOUS at 02:15

## 2019-03-13 RX ADMIN — VITAMIN D, TAB 1000IU (100/BT) 1000 UNITS: 25 TAB at 09:44

## 2019-03-13 RX ADMIN — FLUTICASONE PROPIONATE 2 SPRAY: 50 SPRAY, METERED NASAL at 09:14

## 2019-03-13 RX ADMIN — Medication 10 ML: at 09:19

## 2019-03-13 RX ADMIN — MONTELUKAST 10 MG: 10 TABLET, FILM COATED ORAL at 22:24

## 2019-03-13 RX ADMIN — CETIRIZINE HYDROCHLORIDE 5 MG: 5 TABLET ORAL at 09:15

## 2019-03-13 RX ADMIN — FUROSEMIDE 20 MG: 20 TABLET ORAL at 09:16

## 2019-03-13 RX ADMIN — PSEUDOEPHEDRINE HCL 30 MG: 30 TABLET, FILM COATED ORAL at 22:23

## 2019-03-13 RX ADMIN — BACLOFEN 10 MG: 10 TABLET ORAL at 22:24

## 2019-03-13 RX ADMIN — Medication 10 ML: at 22:26

## 2019-03-13 RX ADMIN — MICONAZOLE NITRATE: 20.6 POWDER TOPICAL at 09:14

## 2019-03-13 RX ADMIN — PANTOPRAZOLE SODIUM 40 MG: 40 INJECTION, POWDER, FOR SOLUTION INTRAVENOUS at 09:16

## 2019-03-13 RX ADMIN — BUDESONIDE 500 MCG: 0.5 SUSPENSION RESPIRATORY (INHALATION) at 08:10

## 2019-03-13 RX ADMIN — LEVETIRACETAM 1500 MG: 100 SOLUTION ORAL at 09:15

## 2019-03-13 RX ADMIN — PSEUDOEPHEDRINE HCL 30 MG: 30 TABLET, FILM COATED ORAL at 15:25

## 2019-03-13 RX ADMIN — Medication 10 ML: at 09:17

## 2019-03-13 RX ADMIN — GLYCOPYRROLATE 1 MG: 1 TABLET ORAL at 09:16

## 2019-03-13 RX ADMIN — METHYLPREDNISOLONE SODIUM SUCCINATE 40 MG: 40 INJECTION, POWDER, FOR SOLUTION INTRAMUSCULAR; INTRAVENOUS at 15:25

## 2019-03-13 RX ADMIN — BACLOFEN 10 MG: 10 TABLET ORAL at 15:25

## 2019-03-13 RX ADMIN — LAMOTRIGINE 100 MG: 100 TABLET ORAL at 09:44

## 2019-03-13 RX ADMIN — LACTULOSE 20 G: 20 SOLUTION ORAL at 22:25

## 2019-03-13 RX ADMIN — BACLOFEN 10 MG: 10 TABLET ORAL at 09:15

## 2019-03-13 RX ADMIN — LACTULOSE 20 G: 20 SOLUTION ORAL at 09:16

## 2019-03-13 RX ADMIN — BUDESONIDE 500 MCG: 0.5 SUSPENSION RESPIRATORY (INHALATION) at 20:06

## 2019-03-13 RX ADMIN — LAMOTRIGINE 200 MG: 100 TABLET ORAL at 09:15

## 2019-03-13 RX ADMIN — ALBUTEROL SULFATE 2.5 MG: 2.5 SOLUTION RESPIRATORY (INHALATION) at 20:05

## 2019-03-13 RX ADMIN — Medication 75 MG: at 09:16

## 2019-03-13 RX ADMIN — ALBUTEROL SULFATE 2.5 MG: 2.5 SOLUTION RESPIRATORY (INHALATION) at 12:01

## 2019-03-13 RX ADMIN — ENOXAPARIN SODIUM 40 MG: 40 INJECTION SUBCUTANEOUS at 09:15

## 2019-03-13 RX ADMIN — ALBUTEROL SULFATE 2.5 MG: 2.5 SOLUTION RESPIRATORY (INHALATION) at 08:10

## 2019-03-13 RX ADMIN — PSEUDOEPHEDRINE HCL 30 MG: 30 TABLET, FILM COATED ORAL at 09:15

## 2019-03-13 RX ADMIN — METHYLPREDNISOLONE SODIUM SUCCINATE 80 MG: 125 INJECTION, POWDER, FOR SOLUTION INTRAMUSCULAR; INTRAVENOUS at 09:16

## 2019-03-13 RX ADMIN — LEVETIRACETAM 1500 MG: 100 SOLUTION ORAL at 22:25

## 2019-03-13 RX ADMIN — MICONAZOLE NITRATE: 20.6 POWDER TOPICAL at 22:25

## 2019-03-13 RX ADMIN — POLYETHYLENE GLYCOL 3350 17 G: 17 POWDER, FOR SOLUTION ORAL at 09:14

## 2019-03-13 RX ADMIN — SENNOSIDES AND DOCUSATE SODIUM 2 TABLET: 8.6; 5 TABLET ORAL at 09:15

## 2019-03-13 RX ADMIN — Medication 75 MG: at 22:27

## 2019-03-13 RX ADMIN — ANTACID TABLETS 1250 MG: 500 TABLET, CHEWABLE ORAL at 09:15

## 2019-03-13 RX ADMIN — ALBUTEROL SULFATE 2.5 MG: 2.5 SOLUTION RESPIRATORY (INHALATION) at 16:20

## 2019-03-13 RX ADMIN — LAMOTRIGINE 200 MG: 100 TABLET ORAL at 22:24

## 2019-03-13 RX ADMIN — MORPHINE SULFATE 4 MG: 4 INJECTION INTRAVENOUS at 02:19

## 2019-03-13 RX ADMIN — POTASSIUM BICARBONATE 40 MEQ: 782 TABLET, EFFERVESCENT ORAL at 09:16

## 2019-03-13 RX ADMIN — GLYCOPYRROLATE 1 MG: 1 TABLET ORAL at 22:24

## 2019-03-13 ASSESSMENT — PAIN SCALES - WONG BAKER
WONGBAKER_NUMERICALRESPONSE: 6

## 2019-03-13 ASSESSMENT — PAIN SCALES - GENERAL: PAINLEVEL_OUTOF10: 6

## 2019-03-14 ENCOUNTER — APPOINTMENT (OUTPATIENT)
Dept: GENERAL RADIOLOGY | Age: 49
DRG: 189 | End: 2019-03-14
Payer: MEDICARE

## 2019-03-14 LAB
ANION GAP SERPL CALCULATED.3IONS-SCNC: 9 MMOL/L (ref 9–17)
BUN BLDV-MCNC: 7 MG/DL (ref 6–20)
BUN/CREAT BLD: ABNORMAL (ref 9–20)
CALCIUM SERPL-MCNC: 7.8 MG/DL (ref 8.6–10.4)
CHLORIDE BLD-SCNC: 108 MMOL/L (ref 98–107)
CO2: 28 MMOL/L (ref 20–31)
CREAT SERPL-MCNC: <0.4 MG/DL (ref 0.7–1.2)
GFR AFRICAN AMERICAN: ABNORMAL ML/MIN
GFR NON-AFRICAN AMERICAN: ABNORMAL ML/MIN
GFR SERPL CREATININE-BSD FRML MDRD: ABNORMAL ML/MIN/{1.73_M2}
GFR SERPL CREATININE-BSD FRML MDRD: ABNORMAL ML/MIN/{1.73_M2}
GLUCOSE BLD-MCNC: 113 MG/DL (ref 70–99)
HCT VFR BLD CALC: 35.1 % (ref 41–53)
HEMOGLOBIN: 11.7 G/DL (ref 13.5–17.5)
MAGNESIUM: 2.1 MG/DL (ref 1.6–2.6)
MCH RBC QN AUTO: 30.8 PG (ref 26–34)
MCHC RBC AUTO-ENTMCNC: 33.4 G/DL (ref 31–37)
MCV RBC AUTO: 92.3 FL (ref 80–100)
NRBC AUTOMATED: ABNORMAL PER 100 WBC
PDW BLD-RTO: 15.4 % (ref 11.5–14.5)
PHOSPHORUS: 1.6 MG/DL (ref 2.5–4.5)
PLATELET # BLD: 269 K/UL (ref 130–400)
PMV BLD AUTO: 9 FL (ref 6–12)
POTASSIUM SERPL-SCNC: 3.8 MMOL/L (ref 3.7–5.3)
RBC # BLD: 3.8 M/UL (ref 4.5–5.9)
SODIUM BLD-SCNC: 145 MMOL/L (ref 135–144)
WBC # BLD: 8 K/UL (ref 3.5–11)

## 2019-03-14 PROCEDURE — 94761 N-INVAS EAR/PLS OXIMETRY MLT: CPT

## 2019-03-14 PROCEDURE — 6370000000 HC RX 637 (ALT 250 FOR IP): Performed by: INTERNAL MEDICINE

## 2019-03-14 PROCEDURE — 2580000003 HC RX 258: Performed by: INTERNAL MEDICINE

## 2019-03-14 PROCEDURE — 74018 RADEX ABDOMEN 1 VIEW: CPT

## 2019-03-14 PROCEDURE — 36415 COLL VENOUS BLD VENIPUNCTURE: CPT

## 2019-03-14 PROCEDURE — 6360000002 HC RX W HCPCS: Performed by: INTERNAL MEDICINE

## 2019-03-14 PROCEDURE — 83735 ASSAY OF MAGNESIUM: CPT

## 2019-03-14 PROCEDURE — 2700000000 HC OXYGEN THERAPY PER DAY

## 2019-03-14 PROCEDURE — 80048 BASIC METABOLIC PNL TOTAL CA: CPT

## 2019-03-14 PROCEDURE — 99232 SBSQ HOSP IP/OBS MODERATE 35: CPT | Performed by: INTERNAL MEDICINE

## 2019-03-14 PROCEDURE — C9113 INJ PANTOPRAZOLE SODIUM, VIA: HCPCS | Performed by: INTERNAL MEDICINE

## 2019-03-14 PROCEDURE — APPNB30 APP NON BILLABLE TIME 0-30 MINS: Performed by: NURSE PRACTITIONER

## 2019-03-14 PROCEDURE — 94640 AIRWAY INHALATION TREATMENT: CPT

## 2019-03-14 PROCEDURE — 84100 ASSAY OF PHOSPHORUS: CPT

## 2019-03-14 PROCEDURE — 2060000000 HC ICU INTERMEDIATE R&B

## 2019-03-14 PROCEDURE — 85027 COMPLETE CBC AUTOMATED: CPT

## 2019-03-14 RX ORDER — METHYLPREDNISOLONE SODIUM SUCCINATE 40 MG/ML
40 INJECTION, POWDER, LYOPHILIZED, FOR SOLUTION INTRAMUSCULAR; INTRAVENOUS EVERY 6 HOURS
Status: ON HOLD | DISCHARGE
Start: 2019-03-14 | End: 2019-04-08 | Stop reason: ALTCHOICE

## 2019-03-14 RX ORDER — LAMOTRIGINE 200 MG/1
200 TABLET ORAL 2 TIMES DAILY
Qty: 30 TABLET | Refills: 3 | Status: ON HOLD | DISCHARGE
Start: 2019-03-14 | End: 2019-04-16 | Stop reason: HOSPADM

## 2019-03-14 RX ORDER — ALBUTEROL SULFATE 2.5 MG/3ML
2.5 SOLUTION RESPIRATORY (INHALATION) EVERY 4 HOURS PRN
Qty: 120 EACH | Refills: 3 | Status: ON HOLD | DISCHARGE
Start: 2019-03-14 | End: 2019-04-08

## 2019-03-14 RX ORDER — METHYLPREDNISOLONE SODIUM SUCCINATE 40 MG/ML
40 INJECTION, POWDER, LYOPHILIZED, FOR SOLUTION INTRAMUSCULAR; INTRAVENOUS EVERY 6 HOURS
Status: DISCONTINUED | OUTPATIENT
Start: 2019-03-14 | End: 2019-03-15 | Stop reason: HOSPADM

## 2019-03-14 RX ORDER — LAMOTRIGINE 100 MG/1
100 TABLET ORAL EVERY MORNING
Qty: 30 TABLET | Refills: 3 | Status: ON HOLD | DISCHARGE
Start: 2019-03-14 | End: 2019-04-16 | Stop reason: HOSPADM

## 2019-03-14 RX ORDER — OSELTAMIVIR PHOSPHATE 6 MG/ML
75 FOR SUSPENSION ORAL 2 TIMES DAILY
Qty: 125 ML | DISCHARGE
Start: 2019-03-14 | End: 2019-03-15

## 2019-03-14 RX ORDER — ALBUTEROL SULFATE 2.5 MG/3ML
2.5 SOLUTION RESPIRATORY (INHALATION)
Qty: 120 EACH | Refills: 3 | Status: ON HOLD | DISCHARGE
Start: 2019-03-14 | End: 2019-04-08

## 2019-03-14 RX ADMIN — LACTULOSE 20 G: 20 SOLUTION ORAL at 09:33

## 2019-03-14 RX ADMIN — Medication 75 MG: at 22:01

## 2019-03-14 RX ADMIN — METHYLPREDNISOLONE SODIUM SUCCINATE 40 MG: 40 INJECTION, POWDER, FOR SOLUTION INTRAMUSCULAR; INTRAVENOUS at 09:04

## 2019-03-14 RX ADMIN — ENOXAPARIN SODIUM 40 MG: 40 INJECTION SUBCUTANEOUS at 09:05

## 2019-03-14 RX ADMIN — BUDESONIDE 500 MCG: 0.5 SUSPENSION RESPIRATORY (INHALATION) at 18:23

## 2019-03-14 RX ADMIN — MORPHINE SULFATE 2 MG: 2 INJECTION, SOLUTION INTRAMUSCULAR; INTRAVENOUS at 11:26

## 2019-03-14 RX ADMIN — GLYCOPYRROLATE 1 MG: 1 TABLET ORAL at 14:50

## 2019-03-14 RX ADMIN — Medication 10 ML: at 09:05

## 2019-03-14 RX ADMIN — PSEUDOEPHEDRINE HCL 30 MG: 30 TABLET, FILM COATED ORAL at 14:50

## 2019-03-14 RX ADMIN — POLYETHYLENE GLYCOL 3350 17 G: 17 POWDER, FOR SOLUTION ORAL at 09:24

## 2019-03-14 RX ADMIN — Medication 10 ML: at 21:50

## 2019-03-14 RX ADMIN — BACLOFEN 10 MG: 10 TABLET ORAL at 14:50

## 2019-03-14 RX ADMIN — LACOSAMIDE 150 MG: 100 TABLET, FILM COATED ORAL at 10:09

## 2019-03-14 RX ADMIN — GLYCOPYRROLATE 1 MG: 1 TABLET ORAL at 21:48

## 2019-03-14 RX ADMIN — GLYCOPYRROLATE 1 MG: 1 TABLET ORAL at 09:19

## 2019-03-14 RX ADMIN — LAMOTRIGINE 200 MG: 100 TABLET ORAL at 10:16

## 2019-03-14 RX ADMIN — ALBUTEROL SULFATE 2.5 MG: 2.5 SOLUTION RESPIRATORY (INHALATION) at 11:10

## 2019-03-14 RX ADMIN — LAMOTRIGINE 100 MG: 100 TABLET ORAL at 10:08

## 2019-03-14 RX ADMIN — LACTULOSE 20 G: 20 SOLUTION ORAL at 21:49

## 2019-03-14 RX ADMIN — METHYLPREDNISOLONE SODIUM SUCCINATE 40 MG: 40 INJECTION, POWDER, FOR SOLUTION INTRAMUSCULAR; INTRAVENOUS at 03:36

## 2019-03-14 RX ADMIN — ALBUTEROL SULFATE 2.5 MG: 2.5 SOLUTION RESPIRATORY (INHALATION) at 14:58

## 2019-03-14 RX ADMIN — VITAMIN D, TAB 1000IU (100/BT) 1000 UNITS: 25 TAB at 09:29

## 2019-03-14 RX ADMIN — PANTOPRAZOLE SODIUM 40 MG: 40 INJECTION, POWDER, FOR SOLUTION INTRAVENOUS at 21:49

## 2019-03-14 RX ADMIN — FUROSEMIDE 20 MG: 20 TABLET ORAL at 09:25

## 2019-03-14 RX ADMIN — MICONAZOLE NITRATE: 20.6 POWDER TOPICAL at 09:51

## 2019-03-14 RX ADMIN — MORPHINE SULFATE 2 MG: 2 INJECTION, SOLUTION INTRAMUSCULAR; INTRAVENOUS at 18:31

## 2019-03-14 RX ADMIN — ALBUTEROL SULFATE 2.5 MG: 2.5 SOLUTION RESPIRATORY (INHALATION) at 07:21

## 2019-03-14 RX ADMIN — PSEUDOEPHEDRINE HCL 30 MG: 30 TABLET, FILM COATED ORAL at 09:18

## 2019-03-14 RX ADMIN — PSEUDOEPHEDRINE HCL 30 MG: 30 TABLET, FILM COATED ORAL at 21:49

## 2019-03-14 RX ADMIN — SENNOSIDES AND DOCUSATE SODIUM 2 TABLET: 8.6; 5 TABLET ORAL at 09:30

## 2019-03-14 RX ADMIN — LEVETIRACETAM 1500 MG: 100 SOLUTION ORAL at 21:48

## 2019-03-14 RX ADMIN — FLUTICASONE PROPIONATE 2 SPRAY: 50 SPRAY, METERED NASAL at 09:51

## 2019-03-14 RX ADMIN — Medication 10 ML: at 09:52

## 2019-03-14 RX ADMIN — METHYLPREDNISOLONE SODIUM SUCCINATE 40 MG: 40 INJECTION, POWDER, FOR SOLUTION INTRAMUSCULAR; INTRAVENOUS at 16:18

## 2019-03-14 RX ADMIN — LEVETIRACETAM 1500 MG: 100 SOLUTION ORAL at 10:09

## 2019-03-14 RX ADMIN — LAMOTRIGINE 200 MG: 100 TABLET ORAL at 21:49

## 2019-03-14 RX ADMIN — VITAMIN D, TAB 1000IU (100/BT) 1000 UNITS: 25 TAB at 21:49

## 2019-03-14 RX ADMIN — ANTACID TABLETS 1250 MG: 500 TABLET, CHEWABLE ORAL at 09:30

## 2019-03-14 RX ADMIN — BACLOFEN 10 MG: 10 TABLET ORAL at 09:21

## 2019-03-14 RX ADMIN — ALBUTEROL SULFATE 2.5 MG: 2.5 SOLUTION RESPIRATORY (INHALATION) at 18:23

## 2019-03-14 RX ADMIN — PANTOPRAZOLE SODIUM 40 MG: 40 INJECTION, POWDER, FOR SOLUTION INTRAVENOUS at 09:04

## 2019-03-14 RX ADMIN — CETIRIZINE HYDROCHLORIDE 5 MG: 5 TABLET ORAL at 09:22

## 2019-03-14 RX ADMIN — BACLOFEN 10 MG: 10 TABLET ORAL at 21:49

## 2019-03-14 RX ADMIN — MORPHINE SULFATE 4 MG: 4 INJECTION INTRAVENOUS at 23:16

## 2019-03-14 RX ADMIN — MICONAZOLE NITRATE: 20.6 POWDER TOPICAL at 21:50

## 2019-03-14 RX ADMIN — METHYLPREDNISOLONE SODIUM SUCCINATE 40 MG: 40 INJECTION, POWDER, FOR SOLUTION INTRAMUSCULAR; INTRAVENOUS at 21:49

## 2019-03-14 RX ADMIN — Medication 75 MG: at 09:51

## 2019-03-14 RX ADMIN — MONTELUKAST 10 MG: 10 TABLET, FILM COATED ORAL at 21:49

## 2019-03-14 RX ADMIN — LACOSAMIDE 150 MG: 100 TABLET, FILM COATED ORAL at 21:48

## 2019-03-14 RX ADMIN — BUDESONIDE 500 MCG: 0.5 SUSPENSION RESPIRATORY (INHALATION) at 07:20

## 2019-03-14 ASSESSMENT — PAIN SCALES - GENERAL
PAINLEVEL_OUTOF10: 8
PAINLEVEL_OUTOF10: 0

## 2019-03-14 ASSESSMENT — PAIN SCALES - WONG BAKER
WONGBAKER_NUMERICALRESPONSE: 4
WONGBAKER_NUMERICALRESPONSE: 0

## 2019-03-15 ENCOUNTER — HOSPITAL ENCOUNTER (OUTPATIENT)
Dept: MEDSURG UNIT | Age: 49
Discharge: NURSING, REHAB OR LONG-TERM CARE | DRG: 189 | End: 2019-04-02
Attending: INTERNAL MEDICINE | Admitting: INTERNAL MEDICINE
Payer: MEDICARE

## 2019-03-15 VITALS
TEMPERATURE: 98.4 F | RESPIRATION RATE: 20 BRPM | HEIGHT: 60 IN | HEART RATE: 70 BPM | WEIGHT: 128.1 LBS | OXYGEN SATURATION: 95 % | DIASTOLIC BLOOD PRESSURE: 85 MMHG | BODY MASS INDEX: 25.15 KG/M2 | SYSTOLIC BLOOD PRESSURE: 144 MMHG

## 2019-03-15 LAB
ANION GAP SERPL CALCULATED.3IONS-SCNC: 6 MMOL/L (ref 9–17)
BUN BLDV-MCNC: 7 MG/DL (ref 6–20)
BUN/CREAT BLD: ABNORMAL (ref 9–20)
CALCIUM SERPL-MCNC: 8.8 MG/DL (ref 8.6–10.4)
CHLORIDE BLD-SCNC: 102 MMOL/L (ref 98–107)
CO2: 36 MMOL/L (ref 20–31)
CREAT SERPL-MCNC: <0.4 MG/DL (ref 0.7–1.2)
GFR AFRICAN AMERICAN: ABNORMAL ML/MIN
GFR NON-AFRICAN AMERICAN: ABNORMAL ML/MIN
GFR SERPL CREATININE-BSD FRML MDRD: ABNORMAL ML/MIN/{1.73_M2}
GFR SERPL CREATININE-BSD FRML MDRD: ABNORMAL ML/MIN/{1.73_M2}
GLUCOSE BLD-MCNC: 121 MG/DL (ref 70–99)
HCT VFR BLD CALC: 37.6 % (ref 41–53)
HEMOGLOBIN: 12.4 G/DL (ref 13.5–17.5)
MAGNESIUM: 2.2 MG/DL (ref 1.6–2.6)
MCH RBC QN AUTO: 30.7 PG (ref 26–34)
MCHC RBC AUTO-ENTMCNC: 33 G/DL (ref 31–37)
MCV RBC AUTO: 92.9 FL (ref 80–100)
NRBC AUTOMATED: ABNORMAL PER 100 WBC
PDW BLD-RTO: 15.3 % (ref 11.5–14.5)
PHOSPHORUS: 1.8 MG/DL (ref 2.5–4.5)
PLATELET # BLD: 302 K/UL (ref 130–400)
PMV BLD AUTO: 8.5 FL (ref 6–12)
POTASSIUM SERPL-SCNC: 3.2 MMOL/L (ref 3.7–5.3)
RBC # BLD: 4.04 M/UL (ref 4.5–5.9)
SODIUM BLD-SCNC: 144 MMOL/L (ref 135–144)
WBC # BLD: 6.6 K/UL (ref 3.5–11)

## 2019-03-15 PROCEDURE — 83735 ASSAY OF MAGNESIUM: CPT

## 2019-03-15 PROCEDURE — 36415 COLL VENOUS BLD VENIPUNCTURE: CPT

## 2019-03-15 PROCEDURE — 99239 HOSP IP/OBS DSCHRG MGMT >30: CPT | Performed by: INTERNAL MEDICINE

## 2019-03-15 PROCEDURE — 85027 COMPLETE CBC AUTOMATED: CPT

## 2019-03-15 PROCEDURE — 80048 BASIC METABOLIC PNL TOTAL CA: CPT

## 2019-03-15 PROCEDURE — 6360000002 HC RX W HCPCS: Performed by: INTERNAL MEDICINE

## 2019-03-15 PROCEDURE — 2500000003 HC RX 250 WO HCPCS: Performed by: INTERNAL MEDICINE

## 2019-03-15 PROCEDURE — 94761 N-INVAS EAR/PLS OXIMETRY MLT: CPT

## 2019-03-15 PROCEDURE — 84100 ASSAY OF PHOSPHORUS: CPT

## 2019-03-15 PROCEDURE — APPNB30 APP NON BILLABLE TIME 0-30 MINS: Performed by: NURSE PRACTITIONER

## 2019-03-15 PROCEDURE — 2580000003 HC RX 258: Performed by: INTERNAL MEDICINE

## 2019-03-15 PROCEDURE — 2700000000 HC OXYGEN THERAPY PER DAY

## 2019-03-15 PROCEDURE — 6370000000 HC RX 637 (ALT 250 FOR IP): Performed by: INTERNAL MEDICINE

## 2019-03-15 PROCEDURE — C9113 INJ PANTOPRAZOLE SODIUM, VIA: HCPCS | Performed by: INTERNAL MEDICINE

## 2019-03-15 PROCEDURE — 94640 AIRWAY INHALATION TREATMENT: CPT

## 2019-03-15 RX ADMIN — Medication 75 MG: at 10:15

## 2019-03-15 RX ADMIN — Medication 10 ML: at 10:24

## 2019-03-15 RX ADMIN — ALBUTEROL SULFATE 2.5 MG: 2.5 SOLUTION RESPIRATORY (INHALATION) at 07:31

## 2019-03-15 RX ADMIN — ANTACID TABLETS 1250 MG: 500 TABLET, CHEWABLE ORAL at 10:18

## 2019-03-15 RX ADMIN — PSEUDOEPHEDRINE HCL 30 MG: 30 TABLET, FILM COATED ORAL at 14:43

## 2019-03-15 RX ADMIN — LACTULOSE 20 G: 20 SOLUTION ORAL at 10:16

## 2019-03-15 RX ADMIN — SENNOSIDES AND DOCUSATE SODIUM 2 TABLET: 8.6; 5 TABLET ORAL at 10:17

## 2019-03-15 RX ADMIN — PANTOPRAZOLE SODIUM 40 MG: 40 INJECTION, POWDER, FOR SOLUTION INTRAVENOUS at 10:23

## 2019-03-15 RX ADMIN — FUROSEMIDE 20 MG: 20 TABLET ORAL at 10:18

## 2019-03-15 RX ADMIN — LAMOTRIGINE 200 MG: 100 TABLET ORAL at 10:18

## 2019-03-15 RX ADMIN — LACOSAMIDE 150 MG: 100 TABLET, FILM COATED ORAL at 10:15

## 2019-03-15 RX ADMIN — ENOXAPARIN SODIUM 40 MG: 40 INJECTION SUBCUTANEOUS at 10:17

## 2019-03-15 RX ADMIN — BACLOFEN 10 MG: 10 TABLET ORAL at 10:17

## 2019-03-15 RX ADMIN — GLYCOPYRROLATE 1 MG: 1 TABLET ORAL at 10:17

## 2019-03-15 RX ADMIN — ALBUTEROL SULFATE 2.5 MG: 2.5 SOLUTION RESPIRATORY (INHALATION) at 12:20

## 2019-03-15 RX ADMIN — BUDESONIDE 500 MCG: 0.5 SUSPENSION RESPIRATORY (INHALATION) at 07:31

## 2019-03-15 RX ADMIN — PSEUDOEPHEDRINE HCL 30 MG: 30 TABLET, FILM COATED ORAL at 10:18

## 2019-03-15 RX ADMIN — POTASSIUM PHOSPHATE, MONOBASIC AND POTASSIUM PHOSPHATE, DIBASIC 10 MMOL: 224; 236 INJECTION, SOLUTION INTRAVENOUS at 10:24

## 2019-03-15 RX ADMIN — LAMOTRIGINE 100 MG: 100 TABLET ORAL at 10:18

## 2019-03-15 RX ADMIN — METHYLPREDNISOLONE SODIUM SUCCINATE 40 MG: 40 INJECTION, POWDER, FOR SOLUTION INTRAMUSCULAR; INTRAVENOUS at 04:50

## 2019-03-15 RX ADMIN — CETIRIZINE HYDROCHLORIDE 5 MG: 5 TABLET ORAL at 10:17

## 2019-03-15 RX ADMIN — POLYETHYLENE GLYCOL 3350 17 G: 17 POWDER, FOR SOLUTION ORAL at 10:15

## 2019-03-15 RX ADMIN — BACLOFEN 10 MG: 10 TABLET ORAL at 14:43

## 2019-03-15 RX ADMIN — VITAMIN D, TAB 1000IU (100/BT) 1000 UNITS: 25 TAB at 10:23

## 2019-03-15 RX ADMIN — GLYCOPYRROLATE 1 MG: 1 TABLET ORAL at 14:43

## 2019-03-15 RX ADMIN — FLUTICASONE PROPIONATE 2 SPRAY: 50 SPRAY, METERED NASAL at 10:25

## 2019-03-15 RX ADMIN — METHYLPREDNISOLONE SODIUM SUCCINATE 40 MG: 40 INJECTION, POWDER, FOR SOLUTION INTRAMUSCULAR; INTRAVENOUS at 14:46

## 2019-03-15 RX ADMIN — LEVETIRACETAM 1500 MG: 100 SOLUTION ORAL at 10:14

## 2019-03-15 RX ADMIN — MICONAZOLE NITRATE: 20.6 POWDER TOPICAL at 10:24

## 2019-03-15 RX ADMIN — METHYLPREDNISOLONE SODIUM SUCCINATE 40 MG: 40 INJECTION, POWDER, FOR SOLUTION INTRAMUSCULAR; INTRAVENOUS at 10:16

## 2019-03-15 ASSESSMENT — PAIN SCALES - WONG BAKER: WONGBAKER_NUMERICALRESPONSE: 4

## 2019-03-15 ASSESSMENT — PAIN SCALES - GENERAL: PAINLEVEL_OUTOF10: 4

## 2019-03-16 LAB
AMMONIA: 100 UMOL/L (ref 16–60)
CULTURE: NORMAL
CULTURE: NORMAL
Lab: NORMAL
Lab: NORMAL
SPECIMEN DESCRIPTION: NORMAL
SPECIMEN DESCRIPTION: NORMAL

## 2019-03-16 PROCEDURE — 82140 ASSAY OF AMMONIA: CPT

## 2019-03-22 LAB
-: NORMAL
KEPPRA: 56 UG/ML
REASON FOR REJECTION: NORMAL
ZZ NTE CLEAN UP: ORDERED TEST: NORMAL
ZZ NTE WITH NAME CLEAN UP: SPECIMEN SOURCE: NORMAL

## 2019-03-22 PROCEDURE — 80177 DRUG SCRN QUAN LEVETIRACETAM: CPT

## 2019-04-08 ENCOUNTER — APPOINTMENT (OUTPATIENT)
Dept: CT IMAGING | Age: 49
DRG: 189 | End: 2019-04-08
Payer: MEDICARE

## 2019-04-08 ENCOUNTER — APPOINTMENT (OUTPATIENT)
Dept: GENERAL RADIOLOGY | Age: 49
DRG: 189 | End: 2019-04-08
Payer: MEDICARE

## 2019-04-08 ENCOUNTER — HOSPITAL ENCOUNTER (INPATIENT)
Age: 49
LOS: 1 days | Discharge: ANOTHER HOSPITAL | DRG: 189 | End: 2019-04-09
Attending: EMERGENCY MEDICINE | Admitting: INTERNAL MEDICINE
Payer: MEDICARE

## 2019-04-08 DIAGNOSIS — J96.20 ACUTE ON CHRONIC RESPIRATORY FAILURE, UNSPECIFIED WHETHER WITH HYPOXIA OR HYPERCAPNIA (HCC): Primary | ICD-10-CM

## 2019-04-08 PROBLEM — J96.01 ACUTE RESPIRATORY FAILURE WITH HYPOXIA (HCC): Status: ACTIVE | Noted: 2019-04-08

## 2019-04-08 PROBLEM — J96.21 ACUTE AND CHRONIC RESPIRATORY FAILURE WITH HYPOXIA (HCC): Status: ACTIVE | Noted: 2019-04-08

## 2019-04-08 LAB
ABSOLUTE EOS #: 0.1 K/UL (ref 0–0.4)
ABSOLUTE IMMATURE GRANULOCYTE: ABNORMAL K/UL (ref 0–0.3)
ABSOLUTE LYMPH #: 0.9 K/UL (ref 1–4.8)
ABSOLUTE MONO #: 0.3 K/UL (ref 0.2–0.8)
ANION GAP SERPL CALCULATED.3IONS-SCNC: 16 MMOL/L (ref 9–17)
BASOPHILS # BLD: 0 % (ref 0–2)
BASOPHILS ABSOLUTE: 0 K/UL (ref 0–0.2)
BNP INTERPRETATION: NORMAL
BUN BLDV-MCNC: 8 MG/DL (ref 6–20)
BUN/CREAT BLD: ABNORMAL (ref 9–20)
CALCIUM SERPL-MCNC: 9.2 MG/DL (ref 8.6–10.4)
CHLORIDE BLD-SCNC: 98 MMOL/L (ref 98–107)
CO2: 24 MMOL/L (ref 20–31)
CREAT SERPL-MCNC: <0.4 MG/DL (ref 0.7–1.2)
DIFFERENTIAL TYPE: ABNORMAL
EOSINOPHILS RELATIVE PERCENT: 1 % (ref 1–4)
FIO2: 50
GFR AFRICAN AMERICAN: ABNORMAL ML/MIN
GFR NON-AFRICAN AMERICAN: ABNORMAL ML/MIN
GFR SERPL CREATININE-BSD FRML MDRD: ABNORMAL ML/MIN/{1.73_M2}
GFR SERPL CREATININE-BSD FRML MDRD: ABNORMAL ML/MIN/{1.73_M2}
GLUCOSE BLD-MCNC: 210 MG/DL (ref 70–99)
HCT VFR BLD CALC: 37.2 % (ref 41–53)
HEMOGLOBIN: 12.7 G/DL (ref 13.5–17.5)
IMMATURE GRANULOCYTES: ABNORMAL %
LACTIC ACID: 1.9 MMOL/L (ref 0.5–2.2)
LYMPHOCYTES # BLD: 11 % (ref 24–44)
MCH RBC QN AUTO: 31.2 PG (ref 26–34)
MCHC RBC AUTO-ENTMCNC: 34.2 G/DL (ref 31–37)
MCV RBC AUTO: 91.1 FL (ref 80–100)
MONOCYTES # BLD: 3 % (ref 1–7)
MYOGLOBIN: <21 NG/ML (ref 28–72)
NEGATIVE BASE EXCESS, ART: ABNORMAL (ref 0–2)
NRBC AUTOMATED: ABNORMAL PER 100 WBC
O2 DEVICE/FLOW/%: ABNORMAL
PATIENT TEMP: 37
PDW BLD-RTO: 16.5 % (ref 11.5–14.5)
PLATELET # BLD: 302 K/UL (ref 130–400)
PLATELET ESTIMATE: ABNORMAL
PMV BLD AUTO: 8.4 FL (ref 6–12)
POC HCO3: 28.1 MMOL/L (ref 22–27)
POC O2 SATURATION: 99 %
POC PCO2 TEMP: ABNORMAL MM HG
POC PCO2: 46 MM HG (ref 32–45)
POC PH TEMP: ABNORMAL
POC PH: 7.4 (ref 7.35–7.45)
POC PO2 TEMP: ABNORMAL MM HG
POC PO2: 144 MM HG (ref 75–95)
POSITIVE BASE EXCESS, ART: 3 (ref 0–2)
POTASSIUM SERPL-SCNC: 4.3 MMOL/L (ref 3.7–5.3)
PRO-BNP: 162 PG/ML
PROCALCITONIN: 0.12 NG/ML
RBC # BLD: 4.09 M/UL (ref 4.5–5.9)
RBC # BLD: ABNORMAL 10*6/UL
SEG NEUTROPHILS: 85 % (ref 36–66)
SEGMENTED NEUTROPHILS ABSOLUTE COUNT: 7.2 K/UL (ref 1.8–7.7)
SODIUM BLD-SCNC: 138 MMOL/L (ref 135–144)
TCO2 (CALC), ART: 29 MMOL/L (ref 23–28)
TROPONIN INTERP: ABNORMAL
TROPONIN T: ABNORMAL NG/ML
TROPONIN, HIGH SENSITIVITY: 15 NG/L (ref 0–22)
WBC # BLD: 8.4 K/UL (ref 3.5–11)
WBC # BLD: ABNORMAL 10*3/UL

## 2019-04-08 PROCEDURE — 99223 1ST HOSP IP/OBS HIGH 75: CPT | Performed by: INTERNAL MEDICINE

## 2019-04-08 PROCEDURE — 6360000002 HC RX W HCPCS: Performed by: NURSE PRACTITIONER

## 2019-04-08 PROCEDURE — 2060000000 HC ICU INTERMEDIATE R&B

## 2019-04-08 PROCEDURE — 94761 N-INVAS EAR/PLS OXIMETRY MLT: CPT

## 2019-04-08 PROCEDURE — 2500000003 HC RX 250 WO HCPCS: Performed by: INTERNAL MEDICINE

## 2019-04-08 PROCEDURE — 96375 TX/PRO/DX INJ NEW DRUG ADDON: CPT

## 2019-04-08 PROCEDURE — 6370000000 HC RX 637 (ALT 250 FOR IP): Performed by: INTERNAL MEDICINE

## 2019-04-08 PROCEDURE — 99285 EMERGENCY DEPT VISIT HI MDM: CPT

## 2019-04-08 PROCEDURE — 74177 CT ABD & PELVIS W/CONTRAST: CPT

## 2019-04-08 PROCEDURE — 36600 WITHDRAWAL OF ARTERIAL BLOOD: CPT

## 2019-04-08 PROCEDURE — 74022 RADEX COMPL AQT ABD SERIES: CPT

## 2019-04-08 PROCEDURE — 84484 ASSAY OF TROPONIN QUANT: CPT

## 2019-04-08 PROCEDURE — 6360000004 HC RX CONTRAST MEDICATION: Performed by: NURSE PRACTITIONER

## 2019-04-08 PROCEDURE — 2580000003 HC RX 258: Performed by: NURSE PRACTITIONER

## 2019-04-08 PROCEDURE — 6360000002 HC RX W HCPCS: Performed by: INTERNAL MEDICINE

## 2019-04-08 PROCEDURE — 2580000003 HC RX 258: Performed by: INTERNAL MEDICINE

## 2019-04-08 PROCEDURE — 2700000000 HC OXYGEN THERAPY PER DAY

## 2019-04-08 PROCEDURE — 85025 COMPLETE CBC W/AUTO DIFF WBC: CPT

## 2019-04-08 PROCEDURE — 84145 PROCALCITONIN (PCT): CPT

## 2019-04-08 PROCEDURE — 96374 THER/PROPH/DIAG INJ IV PUSH: CPT

## 2019-04-08 PROCEDURE — 94660 CPAP INITIATION&MGMT: CPT

## 2019-04-08 PROCEDURE — 80048 BASIC METABOLIC PNL TOTAL CA: CPT

## 2019-04-08 PROCEDURE — 83874 ASSAY OF MYOGLOBIN: CPT

## 2019-04-08 PROCEDURE — 83605 ASSAY OF LACTIC ACID: CPT

## 2019-04-08 PROCEDURE — 83880 ASSAY OF NATRIURETIC PEPTIDE: CPT

## 2019-04-08 PROCEDURE — 94640 AIRWAY INHALATION TREATMENT: CPT

## 2019-04-08 PROCEDURE — 82803 BLOOD GASES ANY COMBINATION: CPT

## 2019-04-08 RX ORDER — SODIUM CHLORIDE 9 MG/ML
INJECTION, SOLUTION INTRAVENOUS CONTINUOUS
Status: DISCONTINUED | OUTPATIENT
Start: 2019-04-08 | End: 2019-04-09 | Stop reason: HOSPADM

## 2019-04-08 RX ORDER — IPRATROPIUM BROMIDE AND ALBUTEROL SULFATE 2.5; .5 MG/3ML; MG/3ML
1 SOLUTION RESPIRATORY (INHALATION)
Status: DISCONTINUED | OUTPATIENT
Start: 2019-04-08 | End: 2019-04-09 | Stop reason: HOSPADM

## 2019-04-08 RX ORDER — FUROSEMIDE 20 MG/1
20 TABLET ORAL DAILY
Status: DISCONTINUED | OUTPATIENT
Start: 2019-04-08 | End: 2019-04-09 | Stop reason: HOSPADM

## 2019-04-08 RX ORDER — METHYLPREDNISOLONE SODIUM SUCCINATE 125 MG/2ML
125 INJECTION, POWDER, LYOPHILIZED, FOR SOLUTION INTRAMUSCULAR; INTRAVENOUS ONCE
Status: COMPLETED | OUTPATIENT
Start: 2019-04-08 | End: 2019-04-08

## 2019-04-08 RX ORDER — RANITIDINE 15 MG/ML
150 SOLUTION ORAL 2 TIMES DAILY
Status: DISCONTINUED | OUTPATIENT
Start: 2019-04-08 | End: 2019-04-09 | Stop reason: HOSPADM

## 2019-04-08 RX ORDER — NUTRITIONAL SUPPLEMENT/FIBER
75 LIQUID (ML) ORAL DAILY
Status: DISCONTINUED | OUTPATIENT
Start: 2019-04-08 | End: 2019-04-08 | Stop reason: CLARIF

## 2019-04-08 RX ORDER — ALBUTEROL SULFATE 90 UG/1
2 AEROSOL, METERED RESPIRATORY (INHALATION)
Status: DISCONTINUED | OUTPATIENT
Start: 2019-04-08 | End: 2019-04-08

## 2019-04-08 RX ORDER — BACITRACIN, NEOMYCIN, POLYMYXIN B 400; 3.5; 5 [USP'U]/G; MG/G; [USP'U]/G
OINTMENT TOPICAL PRN
COMMUNITY

## 2019-04-08 RX ORDER — SODIUM CHLORIDE 0.9 % (FLUSH) 0.9 %
10 SYRINGE (ML) INJECTION PRN
Status: DISCONTINUED | OUTPATIENT
Start: 2019-04-08 | End: 2019-04-09 | Stop reason: HOSPADM

## 2019-04-08 RX ORDER — LORATADINE ORAL 5 MG/5ML
10 SOLUTION ORAL DAILY
Status: DISCONTINUED | OUTPATIENT
Start: 2019-04-09 | End: 2019-04-09 | Stop reason: HOSPADM

## 2019-04-08 RX ORDER — SENNA AND DOCUSATE SODIUM 50; 8.6 MG/1; MG/1
2 TABLET, FILM COATED ORAL DAILY
Status: DISCONTINUED | OUTPATIENT
Start: 2019-04-08 | End: 2019-04-09 | Stop reason: HOSPADM

## 2019-04-08 RX ORDER — ONDANSETRON 2 MG/ML
4 INJECTION INTRAMUSCULAR; INTRAVENOUS ONCE
Status: COMPLETED | OUTPATIENT
Start: 2019-04-08 | End: 2019-04-08

## 2019-04-08 RX ORDER — ALBUTEROL SULFATE 2.5 MG/3ML
5 SOLUTION RESPIRATORY (INHALATION)
Status: DISCONTINUED | OUTPATIENT
Start: 2019-04-08 | End: 2019-04-08

## 2019-04-08 RX ORDER — ONDANSETRON 2 MG/ML
4 INJECTION INTRAMUSCULAR; INTRAVENOUS EVERY 6 HOURS PRN
Status: DISCONTINUED | OUTPATIENT
Start: 2019-04-08 | End: 2019-04-09 | Stop reason: HOSPADM

## 2019-04-08 RX ORDER — SODIUM PHOSPHATE, DIBASIC AND SODIUM PHOSPHATE, MONOBASIC 7; 19 G/133ML; G/133ML
1 ENEMA RECTAL DAILY PRN
Status: DISCONTINUED | OUTPATIENT
Start: 2019-04-08 | End: 2019-04-09 | Stop reason: HOSPADM

## 2019-04-08 RX ORDER — MONTELUKAST SODIUM 10 MG/1
10 TABLET ORAL NIGHTLY
Status: DISCONTINUED | OUTPATIENT
Start: 2019-04-08 | End: 2019-04-09 | Stop reason: HOSPADM

## 2019-04-08 RX ORDER — PSEUDOEPHEDRINE HYDROCHLORIDE 30 MG/1
30 TABLET ORAL 3 TIMES DAILY
Status: DISCONTINUED | OUTPATIENT
Start: 2019-04-08 | End: 2019-04-08 | Stop reason: CLARIF

## 2019-04-08 RX ORDER — ONDANSETRON 4 MG/1
4 TABLET, ORALLY DISINTEGRATING ORAL EVERY 6 HOURS PRN
Status: DISCONTINUED | OUTPATIENT
Start: 2019-04-08 | End: 2019-04-09 | Stop reason: HOSPADM

## 2019-04-08 RX ORDER — GLYCOPYRROLATE 1 MG/1
1 TABLET ORAL 3 TIMES DAILY
Status: DISCONTINUED | OUTPATIENT
Start: 2019-04-08 | End: 2019-04-09 | Stop reason: HOSPADM

## 2019-04-08 RX ORDER — LAMOTRIGINE 100 MG/1
200 TABLET ORAL 2 TIMES DAILY
Status: DISCONTINUED | OUTPATIENT
Start: 2019-04-08 | End: 2019-04-09 | Stop reason: HOSPADM

## 2019-04-08 RX ORDER — LAMOTRIGINE 100 MG/1
100 TABLET ORAL EVERY MORNING
Status: DISCONTINUED | OUTPATIENT
Start: 2019-04-09 | End: 2019-04-09 | Stop reason: HOSPADM

## 2019-04-08 RX ORDER — 0.9 % SODIUM CHLORIDE 0.9 %
80 INTRAVENOUS SOLUTION INTRAVENOUS ONCE
Status: COMPLETED | OUTPATIENT
Start: 2019-04-08 | End: 2019-04-08

## 2019-04-08 RX ORDER — LORAZEPAM 1 MG/1
1 TABLET ORAL PRN
Status: ON HOLD | COMMUNITY
End: 2019-04-09 | Stop reason: HOSPADM

## 2019-04-08 RX ORDER — KETOCONAZOLE 20 MG/ML
SHAMPOO TOPICAL
COMMUNITY

## 2019-04-08 RX ORDER — FLUTICASONE PROPIONATE 50 MCG
2 SPRAY, SUSPENSION (ML) NASAL DAILY
Status: DISCONTINUED | OUTPATIENT
Start: 2019-04-08 | End: 2019-04-09 | Stop reason: HOSPADM

## 2019-04-08 RX ORDER — LANOLIN ALCOHOL/MO/W.PET/CERES
3 CREAM (GRAM) TOPICAL NIGHTLY PRN
Status: DISCONTINUED | OUTPATIENT
Start: 2019-04-08 | End: 2019-04-09 | Stop reason: HOSPADM

## 2019-04-08 RX ORDER — CLONAZEPAM 0.5 MG/1
2 TABLET ORAL 2 TIMES DAILY PRN
Status: DISCONTINUED | OUTPATIENT
Start: 2019-04-08 | End: 2019-04-09 | Stop reason: HOSPADM

## 2019-04-08 RX ORDER — CALCIUM CARBONATE 200(500)MG
1000 TABLET,CHEWABLE ORAL DAILY
Status: DISCONTINUED | OUTPATIENT
Start: 2019-04-08 | End: 2019-04-09 | Stop reason: HOSPADM

## 2019-04-08 RX ORDER — POLYETHYLENE GLYCOL 3350 17 G/17G
17 POWDER, FOR SOLUTION ORAL DAILY
Status: DISCONTINUED | OUTPATIENT
Start: 2019-04-09 | End: 2019-04-09 | Stop reason: HOSPADM

## 2019-04-08 RX ORDER — FAMOTIDINE 20 MG/1
20 TABLET, FILM COATED ORAL 2 TIMES DAILY
Status: DISCONTINUED | OUTPATIENT
Start: 2019-04-08 | End: 2019-04-08 | Stop reason: CLARIF

## 2019-04-08 RX ORDER — FUROSEMIDE 10 MG/ML
20 INJECTION INTRAMUSCULAR; INTRAVENOUS ONCE
Status: COMPLETED | OUTPATIENT
Start: 2019-04-08 | End: 2019-04-08

## 2019-04-08 RX ORDER — LACTULOSE 10 G/15ML
20 SOLUTION ORAL 2 TIMES DAILY
Status: DISCONTINUED | OUTPATIENT
Start: 2019-04-08 | End: 2019-04-09 | Stop reason: HOSPADM

## 2019-04-08 RX ORDER — BISACODYL 10 MG
10 SUPPOSITORY, RECTAL RECTAL DAILY PRN
Status: DISCONTINUED | OUTPATIENT
Start: 2019-04-08 | End: 2019-04-09 | Stop reason: HOSPADM

## 2019-04-08 RX ORDER — SODIUM CHLORIDE 0.9 % (FLUSH) 0.9 %
10 SYRINGE (ML) INJECTION EVERY 12 HOURS SCHEDULED
Status: DISCONTINUED | OUTPATIENT
Start: 2019-04-08 | End: 2019-04-09 | Stop reason: HOSPADM

## 2019-04-08 RX ORDER — ONDANSETRON 2 MG/ML
4 INJECTION INTRAMUSCULAR; INTRAVENOUS EVERY 6 HOURS PRN
Status: DISCONTINUED | OUTPATIENT
Start: 2019-04-08 | End: 2019-04-08 | Stop reason: CLARIF

## 2019-04-08 RX ORDER — METHYLPREDNISOLONE SODIUM SUCCINATE 40 MG/ML
40 INJECTION, POWDER, LYOPHILIZED, FOR SOLUTION INTRAMUSCULAR; INTRAVENOUS EVERY 6 HOURS
Status: DISCONTINUED | OUTPATIENT
Start: 2019-04-08 | End: 2019-04-09

## 2019-04-08 RX ORDER — BACLOFEN 10 MG/1
10 TABLET ORAL 3 TIMES DAILY
Status: DISCONTINUED | OUTPATIENT
Start: 2019-04-08 | End: 2019-04-09 | Stop reason: HOSPADM

## 2019-04-08 RX ORDER — LORAZEPAM 2 MG/ML
1 INJECTION INTRAMUSCULAR ONCE
Status: COMPLETED | OUTPATIENT
Start: 2019-04-08 | End: 2019-04-08

## 2019-04-08 RX ORDER — SODIUM CHLORIDE 0.9 % (FLUSH) 0.9 %
10 SYRINGE (ML) INJECTION PRN
Status: DISCONTINUED | OUTPATIENT
Start: 2019-04-08 | End: 2019-04-08 | Stop reason: SDUPTHER

## 2019-04-08 RX ORDER — LEVETIRACETAM 100 MG/ML
1500 SOLUTION ORAL 2 TIMES DAILY
Status: DISCONTINUED | OUTPATIENT
Start: 2019-04-08 | End: 2019-04-09 | Stop reason: HOSPADM

## 2019-04-08 RX ORDER — LACOSAMIDE 100 MG/1
150 TABLET ORAL 2 TIMES DAILY
Status: DISCONTINUED | OUTPATIENT
Start: 2019-04-08 | End: 2019-04-09 | Stop reason: HOSPADM

## 2019-04-08 RX ORDER — ALBUTEROL SULFATE 2.5 MG/3ML
2.5 SOLUTION RESPIRATORY (INHALATION) 3 TIMES DAILY
COMMUNITY
End: 2019-06-11

## 2019-04-08 RX ORDER — PREDNISONE 5 MG/ML
40 SOLUTION ORAL DAILY
Status: DISCONTINUED | OUTPATIENT
Start: 2019-04-11 | End: 2019-04-09

## 2019-04-08 RX ORDER — ACETAMINOPHEN 160 MG/5ML
650 SOLUTION ORAL EVERY 6 HOURS PRN
Status: DISCONTINUED | OUTPATIENT
Start: 2019-04-08 | End: 2019-04-09 | Stop reason: HOSPADM

## 2019-04-08 RX ADMIN — VITAMIN D 1000 UNITS: 25 TAB ORAL at 20:54

## 2019-04-08 RX ADMIN — LEVETIRACETAM 1500 MG: 100 SOLUTION ORAL at 20:53

## 2019-04-08 RX ADMIN — Medication 10 ML: at 21:09

## 2019-04-08 RX ADMIN — FLUTICASONE PROPIONATE 2 SPRAY: 50 SPRAY, METERED NASAL at 18:19

## 2019-04-08 RX ADMIN — Medication 10 ML: at 16:00

## 2019-04-08 RX ADMIN — GLYCOPYRROLATE 1 MG: 1 TABLET ORAL at 20:53

## 2019-04-08 RX ADMIN — PSEUDOEPHEDRINE HYDROCHLORIDE 30 MG: 15 LIQUID ORAL at 20:52

## 2019-04-08 RX ADMIN — SODIUM CHLORIDE 80 ML: 9 INJECTION, SOLUTION INTRAVENOUS at 11:20

## 2019-04-08 RX ADMIN — Medication 10 ML: at 17:15

## 2019-04-08 RX ADMIN — ALBUTEROL SULFATE 5 MG: 5 SOLUTION RESPIRATORY (INHALATION) at 09:45

## 2019-04-08 RX ADMIN — IPRATROPIUM BROMIDE AND ALBUTEROL SULFATE 1 AMPULE: .5; 3 SOLUTION RESPIRATORY (INHALATION) at 19:15

## 2019-04-08 RX ADMIN — AZITHROMYCIN MONOHYDRATE 500 MG: 500 INJECTION, POWDER, LYOPHILIZED, FOR SOLUTION INTRAVENOUS at 18:19

## 2019-04-08 RX ADMIN — METHYLPREDNISOLONE SODIUM SUCCINATE 125 MG: 125 INJECTION, POWDER, FOR SOLUTION INTRAMUSCULAR; INTRAVENOUS at 09:39

## 2019-04-08 RX ADMIN — LACOSAMIDE 150 MG: 100 TABLET, FILM COATED ORAL at 20:54

## 2019-04-08 RX ADMIN — Medication 10 ML: at 11:20

## 2019-04-08 RX ADMIN — Medication 150 MG: at 20:52

## 2019-04-08 RX ADMIN — BACLOFEN 10 MG: 10 TABLET ORAL at 20:53

## 2019-04-08 RX ADMIN — SENNOSIDES AND DOCUSATE SODIUM 2 TABLET: 8.6; 5 TABLET ORAL at 20:54

## 2019-04-08 RX ADMIN — LACTULOSE 20 G: 20 SOLUTION ORAL at 20:54

## 2019-04-08 RX ADMIN — MICONAZOLE NITRATE: 20.6 POWDER TOPICAL at 20:55

## 2019-04-08 RX ADMIN — FUROSEMIDE 20 MG: 20 TABLET ORAL at 18:19

## 2019-04-08 RX ADMIN — FUROSEMIDE 20 MG: 10 INJECTION, SOLUTION INTRAMUSCULAR; INTRAVENOUS at 14:27

## 2019-04-08 RX ADMIN — METHYLPREDNISOLONE SODIUM SUCCINATE 40 MG: 40 INJECTION, POWDER, FOR SOLUTION INTRAMUSCULAR; INTRAVENOUS at 18:17

## 2019-04-08 RX ADMIN — IOPAMIDOL 75 ML: 755 INJECTION, SOLUTION INTRAVENOUS at 11:20

## 2019-04-08 RX ADMIN — ONDANSETRON 4 MG: 2 INJECTION INTRAMUSCULAR; INTRAVENOUS at 09:36

## 2019-04-08 RX ADMIN — ANTACID TABLETS 1000 MG: 500 TABLET, CHEWABLE ORAL at 18:19

## 2019-04-08 RX ADMIN — MONTELUKAST 10 MG: 10 TABLET, FILM COATED ORAL at 20:54

## 2019-04-08 RX ADMIN — ENOXAPARIN SODIUM 40 MG: 40 INJECTION SUBCUTANEOUS at 18:19

## 2019-04-08 RX ADMIN — LAMOTRIGINE 200 MG: 100 TABLET ORAL at 20:53

## 2019-04-08 RX ADMIN — LORAZEPAM 1 MG: 2 INJECTION, SOLUTION INTRAMUSCULAR; INTRAVENOUS at 13:08

## 2019-04-08 RX ADMIN — SODIUM CHLORIDE: 9 INJECTION, SOLUTION INTRAVENOUS at 17:12

## 2019-04-08 ASSESSMENT — ENCOUNTER SYMPTOMS
ABDOMINAL PAIN: 0
COUGH: 0
CONSTIPATION: 0
SINUS PRESSURE: 0
NAUSEA: 1
SORE THROAT: 0
RHINORRHEA: 0
DIARRHEA: 0
SHORTNESS OF BREATH: 0
VOMITING: 1
WHEEZING: 0
COLOR CHANGE: 0

## 2019-04-08 NOTE — PROGRESS NOTES
· Bronchodilator assessment   []    Bronchodilator Assessment    FEV1 % PREDICTED MRDD  FEV1 actual:    PEFR     PEFR % Predicted    RR 26  Bronchodilator assessment at level  4  BRONCHODILATOR ASSESSMENT SCORE  Score 1 2 3 4   Breath Sounds   []  Clear []  Mild Wheezing with good aeration []  Moderate I/E wheezing with adequate aeration [x]  Poor Aeration or diffuse wheezing   Respiratory Rate []  Less than 20 []  20-25 [x]  Greater than 25  []  Greater than 35    Dyspnea []  No SOB  []  SOB with minimal activity []  Speaking in partial sentences [x]  Acute/ At rest   Peakflow (asthma) []  80 % or greater predicted/PB  []  Unable []  70% or greater predicted/PB  []  Unable []  51%-70% predicted/PB  [x]  Unable []  Less than 50% predicted/PB  []  Unable due to distress   FEV1 % Predicted []  Greater than 69%  []  Unable  []  Less than 50%-69%  []  Unable  []  Less than 35%-49%  [x]  Unable  []  Less than 35%  []  Unable due to distress     · Bronchodilator assessment   []    Bronchodilator Assessment    FEV1 % PREDICTED    FEV1 actual:    PEFR     PEFR % Predicted    RR 25  Bronchodilator assessment at level  No tx @ this time  BRONCHODILATOR ASSESSMENT SCORE  Score 1 2 3 4   Breath Sounds   []  Clear []  Mild Wheezing with good aeration []  Moderate I/E wheezing with adequate aeration []  Poor Aeration or diffuse wheezing   Respiratory Rate []  Less than 20 [x]  20-25 []  Greater than 25  []  Greater than 35    Dyspnea []  No SOB  []  SOB with minimal activity []  Speaking in partial sentences [x]  Acute/ At rest   Peakflow (asthma) []  80 % or greater predicted/PB  [x]  Unable []  70% or greater predicted/PB  [x]  Unable []  51%-70% predicted/PB  [x]  Unable []  Less than 50% predicted/PB  []  Unable due to distress   FEV1 % Predicted []  Greater than 69%  [x]  Unable  []  Less than 50%-69%  [x]  Unable  []  Less than 35%-49%  [x]  Unable  []  Less than 35%  []  Unable due to distress   · Bronchodilator assessment

## 2019-04-08 NOTE — PROGRESS NOTES
Transitions of Care Pharmacy Service   Medication Review    The patient's list of current home medications has been reviewed. Source(s) of information: [facility] med record from [facility] home    Please feel free to call with any questions about this encounter. Thank you. Jessica Bryant [facility]  Transitions of Care Pharmacy Service  Phone:  [phone]  Fax: [phone]            Prior to Admission medications    Medication Sig       albuterol (PROVENTIL) (2.5 MG/3ML) 0.083% nebulizer solution Take 2.5 mg by nebulization 3 times daily       lansoprazole (PREVACID SOLUTAB) 30 MG disintegrating tablet 30 mg by Per G Tube route daily       LORazepam (ATIVAN) 1 MG tablet Take 1 mg by mouth as needed (exam). 1mg given 1 hour prior to exam. May repeat x 1       carbamide peroxide (DEBROX) 6.5 % otic solution 5 drops See Admin Instructions 5 drops BID on the 1st and 15th of the month       ketoconazole (NIZORAL) 2 % shampoo Apply topically Twice a Week Wednesdays and Saturdays       zinc oxide (DESITIN) 40 % PSTE paste Apply topically as needed (irritation)       neomycin-bacitracin-polymyxin (NEOSPORIN) 400-5-5000 ointment Apply topically as needed (prn per SNF plan of treatment)       lamoTRIgine (LAMICTAL) 200 MG tablet 1 tablet by Per G Tube route 2 times daily Indications: 300MG IN am AND 200MG IN pm       lamoTRIgine (LAMICTAL) 100 MG tablet 1 tablet by Per G Tube route every morning Indications: 300mg in AM and 200mg in PM       lacosamide (VIMPAT) 10 MG/ML SOLN oral solution 150 mg by PEG Tube route 2 times daily. budesonide (PULMICORT) 0.5 MG/2ML nebulizer suspension Take 1 ampule by nebulization 2 times daily       lactulose (CHRONULAC) 10 GM/15ML solution 20 g by Per G Tube route 2 times daily       polyethylene glycol (MIRALAX) PACK packet 17 g by Per G Tube route daily       glycopyrrolate (ROBINUL) 1 MG tablet 1 mg by Per G Tube route 3 times daily FOR INCREASED SECRETIONS. furosemide (LASIX) 20 MG tablet 20 mg by Per G Tube route daily       nystatin (MYCOSTATIN) 587418 UNIT/GM powder Apply topically daily Apply between toes.        baclofen (LIORESAL) 10 MG tablet 10 mg by Per G Tube route 3 times daily       Potassium Chloride (KLOR-CON SPRINKLE PO) 10 mEq by Per G Tube route 2 times daily        montelukast (SINGULAIR) 10 MG tablet 10 mg by Per G Tube route nightly       fluticasone (FLONASE) 50 MCG/ACT nasal spray 2 sprays by Nasal route daily       Multiple Vitamin (DAILY-JAY PO) 1 tablet by Gastric Tube route daily        calcium carbonate 1250 MG/5ML SUSP suspension 1,250 mg by Per G Tube route daily       levETIRAcetam (KEPPRA) 100 MG/ML solution 1,500 mg by Per G Tube route 2 times daily        loratadine (CLARITIN) 10 MG tablet 10 mg by Per G Tube route daily       pseudoephedrine (SUDAFED) 30 MG tablet 30 mg by Per G Tube route 3 times daily       senna-docusate (PERICOLACE) 8.6-50 MG per tablet 2 tablets by Per G Tube route daily       Cholecalciferol (VITAMIN D3) 1000 units TABS 1,000 Units by Feeding Tube route 2 times daily

## 2019-04-08 NOTE — H&P
Profound mental retardation     Scoliosis     Seizures (HCC)     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HCC)         Past Surgical History:    Past Surgical History:   Procedure Laterality Date    CHOLECYSTECTOMY  08/18/2018    laparoscopic    ERCP  08/16/2018    GASTROSTOMY TUBE PLACEMENT      GA ERCP DX COLLECTION SPECIMEN BRUSHING/WASHING N/A 8/16/2018    ERCP PAPILOTOMY SWEEPING STONE EXTRACTION ENDOSCOPIC RETROGRADE CHOLANGIOPANCREATOGRAPHY performed by Marcy Weber MD at [facility] N/A 8/18/2018    CHOLECYSTECTOMY LAPAROSCOPIC WITH UMBILICAL HERNIA REPAIR performed by Ck Blanco DO at [address]      has Feeding tube        Medications Prior to Admission:     Prior to Admission medications    Medication Sig Start Date End Date Taking? Authorizing Provider   albuterol (PROVENTIL) (2.5 MG/3ML) 0.083% nebulizer solution Take 2.5 mg by nebulization 3 times daily   Yes Historical Provider, MD   lansoprazole (PREVACID SOLUTAB) 30 MG disintegrating tablet 30 mg by Per G Tube route daily   Yes Historical Provider, MD   LORazepam (ATIVAN) 1 MG tablet Take 1 mg by mouth as needed (exam).  1mg given 1 hour prior to exam. May repeat x 1   Yes Historical Provider, MD   carbamide peroxide (DEBROX) 6.5 % otic solution 5 drops See Admin Instructions 5 drops BID on the 1st and 15th of the month   Yes Historical Provider, MD   ketoconazole (NIZORAL) 2 % shampoo Apply topically Twice a Week Wednesdays and Saturdays   Yes Historical Provider, MD   zinc oxide (DESITIN) 40 % PSTE paste Apply topically as needed (irritation)   Yes Historical Provider, MD   neomycin-bacitracin-polymyxin (NEOSPORIN) 400-5-5000 ointment Apply topically as needed (prn per SNF plan of treatment)   Yes Historical Provider, MD   lamoTRIgine (LAMICTAL) 200 MG tablet 1 tablet by Per G Tube route 2 times daily Indications: 300MG IN am AND 200MG IN pm 3/14/19  Yes Ayden Man, DO   lamoTRIgine (LAMICTAL) 100 MG tablet 1 tablet by Per G Tube route every morning Indications: 300mg in AM and 200mg in PM 3/14/19  Yes Roger Man,    lacosamide (VIMPAT) 10 MG/ML SOLN oral solution 150 mg by PEG Tube route 2 times daily. Yes Historical Provider, MD   budesonide (PULMICORT) 0.5 MG/2ML nebulizer suspension Take 1 ampule by nebulization 2 times daily   Yes Historical Provider, MD   lactulose (CHRONULAC) 10 GM/15ML solution 20 g by Per G Tube route 2 times daily   Yes Historical Provider, MD   polyethylene glycol (MIRALAX) PACK packet 17 g by Per G Tube route daily   Yes Historical Provider, MD   glycopyrrolate (ROBINUL) 1 MG tablet 1 mg by Per G Tube route 3 times daily FOR INCREASED SECRETIONS. Yes Historical Provider, MD   furosemide (LASIX) 20 MG tablet 20 mg by Per G Tube route daily   Yes Historical Provider, MD   nystatin (MYCOSTATIN) 432092 UNIT/GM powder Apply topically daily Apply between toes.    Yes Historical Provider, MD   baclofen (LIORESAL) 10 MG tablet 10 mg by Per G Tube route 3 times daily   Yes Historical Provider, MD   Potassium Chloride (KLOR-CON SPRINKLE PO) 10 mEq by Per G Tube route 2 times daily    Yes Historical Provider, MD   montelukast (SINGULAIR) 10 MG tablet 10 mg by Per G Tube route nightly   Yes Historical Provider, MD   fluticasone (FLONASE) 50 MCG/ACT nasal spray 2 sprays by Nasal route daily   Yes Historical Provider, MD   Multiple Vitamin (DAILY-JAY PO) 1 tablet by Gastric Tube route daily    Yes Historical Provider, MD   calcium carbonate 1250 MG/5ML SUSP suspension 1,250 mg by Per G Tube route daily   Yes Historical Provider, MD   levETIRAcetam (KEPPRA) 100 MG/ML solution 1,500 mg by Per G Tube route 2 times daily    Yes Historical Provider, MD   loratadine (CLARITIN) 10 MG tablet 10 mg by Per G Tube route daily   Yes Historical Provider, MD   pseudoephedrine (SUDAFED) 30 MG tablet 30 mg by Per G Tube route 3 times daily   Yes Historical Provider, MD   senna-docusate (PERICOLACE) 8.6-50 MG per tablet 2 tablets by Per G Tube route daily   Yes Historical Provider, MD   Cholecalciferol (VITAMIN D3) 1000 units TABS 1,000 Units by Feeding Tube route 2 times daily    Yes Historical Provider, MD   Nutritional Supplements (REPLETE/FIBER) LIQD 75 mLs by Feeding Tube route daily    Historical Provider, MD        Allergies:  Ampicillin; Valium [diazepam]; and Other    Social History:   Tobacco:    reports that he has never smoked. He has never used smokeless tobacco.  Alcohol:      reports that he does not drink alcohol. Drug Use:  reports that he does not use drugs. Family History:   Family History   Family history unknown: Yes       REVIEW OF SYSTEMS:  Unable to obtain any additional review of systems secondary to the patient's underlying developmental delay. Patient is nonverbal.    Code Status:  Full Code    PHYSICAL EXAM:  /74   Pulse 97   Temp 98.8 °F (37.1 °C) (Axillary)   Resp 25   Wt 120 lb (54.4 kg)   SpO2 94%   BMI 38.19 kg/m² No intake or output data in the 24 hours ending 04/08/19 1748    General Appearance:    Currently the patient is resting comfortably. He is either agitated or resting as his baseline. Head:    Normocephalic, without obvious abnormality, atraumatic   Eyes:    PERRL, conjunctiva/corneas clear, EOM's intact        Ears:    Normal external ear canals, both ears   Nose:   Nares normal   Throat:   Lips, mucosa, and tongue normal   Neck:   Supple, symmetrical, trachea midline, no carotid    bruit or JVD   Back:     Marked scoliosis, no further evaluation at this time    Lungs:     Clear to auscultation bilaterally, respirations unlabored   Chest wall:    Marked scoliosis    Heart:    Regular rate and rhythm, S1 and S2 normal, no murmur, rub   or gallop   Abdomen:     Soft, non-tender, bowel sounds active all four quadrants,     no masses, no organomegaly.  G-tube is in place    Extremities:   Spastic quadriplegia, no cyanosis or edema   Pulses:   2+ and symmetric all extremities   Skin:   Skin color, texture, turgor normal, no rashes or lesions   Lymph nodes:   Cervical, supraclavicular, and axillary nodes normal   Neurologic:   CNII-XII intact       DATA:    XR Acute Abd Series Chest 1 Vw  Result Date: 4/8/2019  COMPARISON: Radiographs from 03/15/2019 and CT from 03/10/2019  FINDINGS:   Chest: Shallow inspiration. Marked cardiomegaly. Moderate pulmonary edema with right basilar atelectasis and rightward shift of the mediastinum, a chronic finding. No pneumothorax. No free subdiaphragmatic air. Chronic deformity of the right glenohumeral joint. Abdomen and pelvis: Cholecystectomy clips in right upper quadrant. Gaseous distention of large and small bowel with moderate to large rectal stool. Left upper quadrant gastrostomy tube. Deformity of bilateral hips. Impression:  Diffuse bowel dilatation with air concerning for ileus versus at least partial obstruction. CT should be considered. CT Abdomen Pelvis W Iv Contrast  Result Date: 4/8/2019  COMPARISON: 10 March 2019  FINDINGS:   Lower Chest: Basilar atelectasis is noted. Heart size is normal.  No gross effusions are noted. Artifact is created due to arm position and respiratory motion. Organs: Liver, spleen, pancreas, and adrenals are unremarkable. Gallbladder is surgically absent. Kidneys excrete contrast bilaterally. Stable renal cysts are present. GI/Bowel: No free fluid or free air is noted. Gastrostomy tube is positioned with balloon within the confines of the stomach. Significant distension of the colon with both gas and stool is noted. No small bowel obstruction is noted although there is fluid scattered throughout much of the intestinal tract which may be related to mild enteritis. No bowel wall thickening is seen. Pelvis: No evidence of appendicitis. No abnormal fluid collections. Both inguinal rings are dilated and fat containing without strangulation. No inguinal adenopathy is seen. Peritoneum/Retroperitoneum: No aortic aneurysm, retroperitoneal mass, retroperitoneal or mesenteric adenopathy is noted. Bones/Soft Tissues: Severe scoliotic curvature is redemonstrated. Bony structures are slightly gracile. No acute osseous abnormality. Dense breast tissue is noted possibly gynecomastia. Estimated biologic radiation dose for this procedure:568.07 mGy/cm2. Impression:  1. Diffuse distention of the colon with stool and gas. 2.  No small bowel obstruction. Fluid is scattered throughout the intestinal tract which may be related to mild enteritis. No free air. 3.  Colon is redundant. No bowel wall thickening. Findings suggest retrosigmoid stool impaction. Little change from prior exam.   4.  Gastrostomy tube in appropriate position. Hematology:  Recent Labs     04/08/19  0921   WBC 8.4   RBC 4.09*   HGB 12.7*   HCT 37.2*   MCV 91.1   MCH 31.2   MCHC 34.2   RDW 16.5*      MPV 8.4     Chemistry:  Recent Labs     04/08/19  0921      K 4.3   CL 98   CO2 24   GLUCOSE 210*   BUN 8   CREATININE <0.40*   ANIONGAP 16   LABGLOM CANNOT BE CALCULATED   GFRAA CANNOT BE CALCULATED   CALCIUM 9.2   PROBNP 162   TROPHS 15   MYOGLOBIN <21*   LACTA 1.9     Glucose:No results for input(s): LABA1C, POCGLU in the last 72 hours.     Current Medications:  Scheduled Meds:    ipratropium-albuterol  1 ampule Inhalation Q4H WA    baclofen  10 mg Per G Tube TID    calcium carbonate  1,000 mg Per G Tube Daily    vitamin D  1,000 Units PEG Tube BID    fluticasone  2 spray Nasal Daily    furosemide  20 mg Per G Tube Daily    glycopyrrolate  1 mg Per G Tube TID    lacosamide  150 mg PEG Tube BID    lactulose  20 g Per G Tube BID    [START ON 4/9/2019] lamoTRIgine  100 mg Per G Tube QAM    lamoTRIgine  200 mg Per G Tube BID    levETIRAcetam  1,500 mg Per G Tube BID    [START ON 4/9/2019] loratadine  10 mg Per G Tube Daily    montelukast  10 mg Per G Tube Nightly    miconazole Topical BID    [START ON 4/9/2019] polyethylene glycol  17 g Per G Tube Daily    sennosides-docusate sodium  2 tablet Per G Tube Daily    sodium chloride flush  10 mL Intravenous 2 times per day    enoxaparin  40 mg Subcutaneous Daily    methylPREDNISolone  40 mg Intravenous Q6H    Followed by   Pam Arreguin ON 4/11/2019] predniSONE  40 mg Per G Tube Daily    ranitidine  150 mg Per G Tube BID    pseudoephedrine HCl  30 mg Per G Tube TID    azithromycin  500 mg Intravenous Q24H     PRN Meds:   acetaminophen 650 mg Per G Tube Q6H PRN   bisacodyl 10 mg Rectal Daily PRN   clonazePAM 2 mg Per G Tube BID PRN   melatonin 3 mg Per G Tube Nightly PRN   fleet 1 enema Rectal Daily PRN   sodium chloride flush 10 mL Intravenous PRN   ondansetron 4 mg Oral Q6H PRN   Or      ondansetron 4 mg Intravenous Q6H PRN     Consultations:  IP CONSULT TO HOSPITALIST  IP CONSULT TO PULMONOLOGY    ASSESSMENT:    Primary Problem  Acute respiratory failure with hypoxia [facility])    Active Hospital Problems    Diagnosis Date Noted    Acute respiratory failure with hypoxia ([facility] [J96.01] 04/08/2019    Acute and chronic respiratory failure with hypoxia (HCC) [J96.21] 04/08/2019    Developmental disability [F89]     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 Resume home medications G-tube  2. Resume tube feedings  3. Chest x-ray to rule out aspiration pneumonia  4. Pro calcitonin  5. Empiric treatment with azithromycin  6. DVT prophylaxis  7. GI prophylaxis  8. Recheck laboratories in the a.m.  9. Aerosol treatments  10. Maintain O2 at greater than 92%  11. O2 nasal cannula  12. Pulmonary consultation  13. May move to stepdown    The severity of this patient's signs and symptoms indicate the need for an inpatient admission.   This patient's medical condition would be significantly

## 2019-04-08 NOTE — ED NOTES
Bed: 17  Expected date: 4/8/19  Expected time: 8:57 AM  Means of arrival: [address]  Comments:  [facility] [address]  [facility]  04/08/19 0662

## 2019-04-08 NOTE — FLOWSHEET NOTE
Patient sleeping. Writer left a note with a prayer /information card for possible further follow and offered a silent prayer. Patient did not awaken, and no family was present.        04/08/19 2718   Encounter Summary   Services provided to: Patient   Referral/Consult From: [address] [facility] Family members   Continue Visiting   (4/8/19 Pt sleeping)   Complexity of Encounter Low   Length of Encounter 15 minutes   Routine   Type Initial   Assessment Sleeping   Intervention Prayer;Sustaining presence/ Ministry of presence   Outcome Did not respond

## 2019-04-08 NOTE — ED PROVIDER NOTES
eMERGENCY dEPARTMENT eNCOUnter   Attending Attestation     Pt Name: Sosa Hadley  MRN: [id number]  [place] 1970  Date of evaluation: 4/8/19   Sosa Hadley is a 51 y.o. male with CC: Emesis    MDM:            CRITICAL CARE:       EKG: All EKG's are interpreted by the Emergency Department Physician who either signs or Co-signs this chart in the absence of a cardiologist.      RADIOLOGY:All plain film, CT, MRI, and formal ultrasound images (except ED bedside ultrasound) are read by the radiologist, see reports below, unless otherwise noted in MDM or here. CT ABDOMEN PELVIS W IV CONTRAST   Preliminary Result   1. Diffuse distention of the colon with stool and gas. 2.  No small bowel obstruction. Fluid is scattered throughout the intestinal   tract which may be related to mild enteritis. No free air. 3.  Colon is redundant. No bowel wall thickening. Findings suggest   retrosigmoid stool impaction. Little change from prior exam.      4.  Gastrostomy tube in appropriate position. XR Acute Abd Series Chest 1 VW   Final Result   Diffuse bowel dilatation with air concerning for ileus versus at least   partial obstruction. CT should be considered. LABS: All lab results were reviewed by myself, and all abnormals are listed below.   Labs Reviewed   CBC WITH AUTO DIFFERENTIAL - Abnormal; Notable for the following components:       Result Value    RBC 4.09 (*)     Hemoglobin 12.7 (*)     Hematocrit 37.2 (*)     RDW 16.5 (*)     Seg Neutrophils 85 (*)     Lymphocytes 11 (*)     Absolute Lymph # 0.90 (*)     All other components within normal limits   BASIC METABOLIC PANEL - Abnormal; Notable for the following components:    Glucose 210 (*)     CREATININE <0.40 (*)     All other components within normal limits   POC PANEL (G3)-ART - Abnormal; Notable for the following components:    POC pCO2 46 (*)     POC PO2 144 (*)     TCO2 (calc), Art 29 (*)     POC HCO3 28.1 (*)     Positive Base Excess, Art 3 (*)     All other components within normal limits   LACTIC ACID   BRAIN NATRIURETIC PEPTIDE   TROP/MYOGLOBIN           I personally evaluated and examined the patient in conjunction with the APC and agree with the assessment, treatment plan, and disposition of the patient as recorded by the APC.    Sylvia Wellington MD  Attending Emergency Physician          Zhanna Hong MD  04/08/19 3150

## 2019-04-08 NOTE — ED PROVIDER NOTES
[address] [place] [address] ED  eMERGENCY dEPARTMENT eNCOUnter      Pt Name: Amanda Chen  MRN: [id number]  [place] 1970  Date of evaluation: 4/8/2019  Provider: Rojelio Collazo NP, KARIME  [address]       Chief Complaint   Patient presents with    Emesis         HISTORY OF PRESENT ILLNESS  (Location/Symptom, Timing/Onset, Context/Setting, Quality, Duration, Modifying Factors, Severity.)   Amanda Chen is a 51 y.o. male who presents to the emergency department today by EMS for evaluation of vomiting and a cough. The patient lives at her [facility] route home and the caregiver states that the patient has had a cough and shortness of breath. He has also had nausea and vomiting that started today. She states any takes nothing by mouth everything goes to his feeding tube. She states she was recently admitted in the middle of March for respiratory failure she states that he has not had any fevers or chills  Nursing Notes were reviewed.     ALLERGIES     Ampicillin; Valium [diazepam]; and Other    CURRENT MEDICATIONS       Previous Medications    ACETAMINOPHEN (TYLENOL) 325 MG TABLET    650 mg by Per G Tube route every 6 hours as needed for Pain     ALBUTEROL (PROVENTIL) (2.5 MG/3ML) 0.083% NEBULIZER SOLUTION    Take 3 mLs by nebulization every 4 hours (while awake)    ALBUTEROL (PROVENTIL) (2.5 MG/3ML) 0.083% NEBULIZER SOLUTION    Take 3 mLs by nebulization every 4 hours as needed for Wheezing    BACLOFEN (LIORESAL) 10 MG TABLET    10 mg by Per G Tube route 3 times daily    BISACODYL (DULCOLAX) 10 MG SUPPOSITORY    Place 10 mg rectally daily as needed for Constipation    BUDESONIDE (PULMICORT) 0.5 MG/2ML NEBULIZER SUSPENSION    Take 1 ampule by nebulization 2 times daily    CALCIUM CARBONATE 1250 MG/5ML SUSP SUSPENSION    1,250 mg by Per G Tube route daily    CHOLECALCIFEROL (VITAMIN D3) 2000 UNITS CAPS    1,000 Units by Feeding Tube route 2 times daily    CLONAZEPAM (KLONOPIN) 2 MG TABLET    2 mg by Per Venetta Shells Tube route 2 times daily as needed (for seizure lasting more than3 min). ENOXAPARIN (LOVENOX) 40 MG/0.4ML INJECTION    Inject 0.4 mLs into the skin daily    FLUTICASONE (FLONASE) 50 MCG/ACT NASAL SPRAY    2 sprays by Nasal route daily    FUROSEMIDE (LASIX) 20 MG TABLET    20 mg by Per G Tube route daily    GLYCOPYRROLATE (ROBINUL) 1 MG TABLET    1 mg by Per G Tube route 3 times daily FOR INCREASED SECRETIONS. LACOSAMIDE (VIMPAT) 50 MG TABS TABLET    150 mg by PEG Tube route 2 times daily. LACTULOSE (CHRONULAC) 10 GM/15ML SOLUTION    20 g by Per G Tube route 2 times daily    LAMOTRIGINE (LAMICTAL) 100 MG TABLET    1 tablet by Per G Tube route every morning Indications: 300mg in AM and 200mg in PM    LAMOTRIGINE (LAMICTAL) 200 MG TABLET    1 tablet by Per G Tube route 2 times daily Indications: 300MG IN am AND 200MG IN pm    LANSOPRAZOLE (PREVACID 24HR PO)    30 mg by Feeding Tube route daily    LEVETIRACETAM (KEPPRA) 100 MG/ML SOLUTION    1,500 mg by Per G Tube route 2 times daily     LORATADINE (CLARITIN) 10 MG TABLET    10 mg by Per G Tube route daily    MELATONIN 1 MG TABLET    2 mg by Per G Tube route nightly as needed for Sleep    METHYLPREDNISOLONE SODIUM (SOLU-MEDROL) 40 MG INJECTION    Infuse 1 mL intravenously every 6 hours    MONTELUKAST (SINGULAIR) 10 MG TABLET    10 mg by Per G Tube route nightly    MULTIPLE VITAMIN (DAILY-JAY PO)    by Gastric Tube route daily    NUTRITIONAL SUPPLEMENTS (REPLETE/FIBER) LIQD    75 mLs by Feeding Tube route daily    NYSTATIN (MYCOSTATIN) 432370 UNIT/GM POWDER    Apply topically daily Apply between toes.     POLYETHYLENE GLYCOL (MIRALAX) PACK PACKET    17 g by Per G Tube route daily    POTASSIUM CHLORIDE (KLOR-CON SPRINKLE PO)    10 mEq by Per G Tube route 2 times daily     PSEUDOEPHEDRINE (SUDAFED) 30 MG TABLET    30 mg by Per G Tube route 3 times daily    SENNA-DOCUSATE (PERICOLACE) 8.6-50 MG PER TABLET    2 tablets by Per G Tube route daily    SODIUM PHOSPHATES (FLEET) 7-19 GM/118ML    Place 1 enema rectally daily as needed       PAST MEDICAL HISTORY         Diagnosis Date    Cerebral palsy ([facility] [address]     Cholelithiasis with chronic cholecystitis     Constipation     Delayed gastric emptying     Dysphagia     GERD (gastroesophageal reflux disease)     Hearing loss     Mental disability     MRDD    Profound mental retardation     Scoliosis     Seizures (HCC)     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HCC)        SURGICAL HISTORY           Procedure Laterality Date    CHOLECYSTECTOMY  08/18/2018    laparoscopic    ERCP  08/16/2018    GASTROSTOMY TUBE PLACEMENT      IN ERCP DX COLLECTION SPECIMEN BRUSHING/WASHING N/A 8/16/2018    ERCP PAPILOTOMY SWEEPING STONE EXTRACTION ENDOSCOPIC RETROGRADE CHOLANGIOPANCREATOGRAPHY performed by Lynn English MD at [facility] N/A 8/18/2018    CHOLECYSTECTOMY LAPAROSCOPIC WITH UMBILICAL HERNIA REPAIR performed by Patricio Johnson DO at [address]      has Feeding tube         FAMILY HISTORY           Family history unknown: Yes     No family status information on file. SOCIAL HISTORY      reports that he has never smoked. He has never used smokeless tobacco. He reports that he does not drink alcohol or use drugs. REVIEW OF SYSTEMS    (2-9 systems for level 4, 10 or more for level 5)     Review of Systems   Constitutional: Negative for chills, fever and unexpected weight change. HENT: Negative for congestion, rhinorrhea, sinus pressure and sore throat. Respiratory: Negative for cough, shortness of breath and wheezing. Cardiovascular: Negative for chest pain and palpitations. Gastrointestinal: Positive for nausea and vomiting. Negative for abdominal pain, constipation and diarrhea. Genitourinary: Negative for dysuria and hematuria. Musculoskeletal: Negative for arthralgias and myalgias. Skin: Negative for color change and rash.    Neurological: Negative for dizziness, weakness and headaches. Hematological: Negative for adenopathy. Except as noted above the remainder of the review of systems was reviewed and negative. PHYSICAL EXAM    (up to 7 for level 4, 8 or more for level 5)     ED Triage Vitals [04/08/19 0922]   BP Temp Temp Source Pulse Resp SpO2 Height Weight   (!) 135/91 98.8 °F (37.1 °C) Axillary 106 (!) 32 90 % -- 120 lb (54.4 kg)       Physical Exam   Constitutional: He is oriented to person, place, and time. He appears well-developed and well-nourished. HENT:   Head: Normocephalic and atraumatic. Mouth/Throat: Oropharynx is clear and moist.   Eyes: Pupils are equal, round, and reactive to light. Conjunctivae are normal.   Neck: Normal range of motion. Neck supple. Cardiovascular: Normal rate and regular rhythm. Pulmonary/Chest: Accessory muscle usage present. No stridor. Tachypnea noted. He is in respiratory distress. He has rhonchi in the right upper field, the right lower field, the left upper field and the left lower field. He has rales. Abdominal: Soft. Bowel sounds are normal.   Musculoskeletal: Normal range of motion. Lymphadenopathy:     He has no cervical adenopathy. Neurological: He is alert and oriented to person, place, and time. Skin: Skin is warm and dry. No rash noted. Psychiatric: He has a normal mood and affect. RADIOLOGY:   Non-plain film images such as CT, Ultrasound and MRI are read by the radiologist. Cherry Noellean radiographic images are visualized and preliminarily interpreted by the emergency physician with the below findings:    Xr Abdomen (kub) (single Ap View)    Result Date: 3/15/2019  EXAMINATION: SINGLE SUPINE XRAY VIEW(S) OF THE ABDOMEN 3/14/2019 11:57 pm COMPARISON: None. HISTORY: ORDERING SYSTEM PROVIDED HISTORY: abdominal pain, evaluate g tube placement TECHNOLOGIST PROVIDED HISTORY: abdominal pain, evaluate g tube placement FINDINGS: No gross evidence of bowel obstruction. There is large fecal material rectum. Gastrostomy tube overlies left upper quadrant. Gastrostomy tube overlies left upper quadrant. If intraluminal confirmation is required, contrast should be injected. Xr Acute Abd Series Chest 1 Vw    Result Date: 4/8/2019  EXAMINATION: TWO XRAY VIEWS OF THE ABDOMEN AND SINGLE  XRAY VIEW OF THE CHEST 4/8/2019 9:34 am COMPARISON: Radiographs from 03/15/2019 and CT from 03/10/2019 HISTORY: ORDERING SYSTEM PROVIDED HISTORY: Pain TECHNOLOGIST PROVIDED HISTORY: Pain Ordering Physician Provided Reason for Exam: Patient states having abdominal pain, and nausea. Patient states was recently in the hospital for SBO. Acuity: Acute Type of Exam: Unknown Additional signs and symptoms: Patient states having abdominal pain, and nausea. Patient states was recently in the hospital for SBO. FINDINGS: Chest: Shallow inspiration. Marked cardiomegaly. Moderate pulmonary edema with right basilar atelectasis and rightward shift of the mediastinum, a chronic finding. No pneumothorax. No free subdiaphragmatic air. Chronic deformity of the right glenohumeral joint. Abdomen and pelvis: Cholecystectomy clips in right upper quadrant. Gaseous distention of large and small bowel with moderate to large rectal stool. Left upper quadrant gastrostomy tube. Deformity of bilateral hips. Diffuse bowel dilatation with air concerning for ileus versus at least partial obstruction. CT should be considered. Xr Acute Abd Series Chest 1 Vw    Result Date: 3/10/2019  EXAMINATION: TWO XRAY VIEWS OF THE ABDOMEN AND SINGLE  XRAY VIEW OF THE CHEST 3/10/2019 2:28 pm COMPARISON: 08/17/2018 HISTORY: ORDERING SYSTEM PROVIDED HISTORY: distended, shorrtness of breath TECHNOLOGIST PROVIDED HISTORY: distended, shorrtness of breath Acuity: Acute Type of Exam: Unknown FINDINGS: There is severe S-shaped scoliosis. The lungs are grossly clear allowing for difficult interpretation due to shift of mediastinal structures and patient anatomy.  There are air-filled distended loops of colon. No dilated small bowel loop is identified. There are multiple abdominal calcifications of uncertain etiology. Gaseous distention of the colon. Ct Abdomen Pelvis W Iv Contrast    Result Date: 4/8/2019  EXAMINATION: CT OF THE ABDOMEN AND PELVIS WITH CONTRAST 4/8/2019 11:02 am TECHNIQUE: CT of the abdomen and pelvis was performed with the administration of intravenous contrast. Multiplanar reformatted images are provided for review. Dose modulation, iterative reconstruction, and/or weight based adjustment of the mA/kV was utilized to reduce the radiation dose to as low as reasonably achievable. COMPARISON: 10 March 2019 HISTORY: ORDERING SYSTEM PROVIDED HISTORY: vomiting TECHNOLOGIST PROVIDED HISTORY: IV Only Contrast FINDINGS: Lower Chest: Basilar atelectasis is noted. Heart size is normal.  No gross effusions are noted. Artifact is created due to arm position and respiratory motion. Organs: Liver, spleen, pancreas, and adrenals are unremarkable. Gallbladder is surgically absent. Kidneys excrete contrast bilaterally. Stable renal cysts are present. GI/Bowel: No free fluid or free air is noted. Gastrostomy tube is positioned with balloon within the confines of the stomach. Significant distension of the colon with both gas and stool is noted. No small bowel obstruction is noted although there is fluid scattered throughout much of the intestinal tract which may be related to mild enteritis. No bowel wall thickening is seen. Pelvis: No evidence of appendicitis. No abnormal fluid collections. Both inguinal rings are dilated and fat containing without strangulation. No inguinal adenopathy is seen. Peritoneum/Retroperitoneum: No aortic aneurysm, retroperitoneal mass, retroperitoneal or mesenteric adenopathy is noted. Bones/Soft Tissues: Severe scoliotic curvature is redemonstrated. Bony structures are slightly gracile. No acute osseous abnormality.   Dense breast tissue is noted possibly gynecomastia. Estimated biologic radiation dose for this procedure:568.07 mGy/cm2.     1.  Diffuse distention of the colon with stool and gas. 2.  No small bowel obstruction. Fluid is scattered throughout the intestinal tract which may be related to mild enteritis. No free air. 3.  Colon is redundant. No bowel wall thickening. Findings suggest retrosigmoid stool impaction. Little change from prior exam. 4.  Gastrostomy tube in appropriate position. Ct Abdomen Pelvis W Iv Contrast    Result Date: 3/10/2019  EXAMINATION: CT OF THE ABDOMEN AND PELVIS WITH CONTRAST 3/10/2019 4:37 pm TECHNIQUE: CT of the abdomen and pelvis was performed with the administration of intravenous contrast. Multiplanar reformatted images are provided for review. Dose modulation, iterative reconstruction, and/or weight based adjustment of the mA/kV was utilized to reduce the radiation dose to as low as reasonably achievable. COMPARISON: 08/13/2018 HISTORY: ORDERING SYSTEM PROVIDED HISTORY: bowel distension TECHNOLOGIST PROVIDED HISTORY: IV Only Contrast FINDINGS: Lower Chest: The visualized heart and lungs show no acute abnormalities. Organs: The liver, spleen, pancreas, adrenal glands show no signal abnormalities. Cholecystectomy noted. Bilateral renal cysts again demonstrated. GI/Bowel: There is limited evaluation due to absence of contrast within the GI tract lumen. Severe scoliosis also results in some distortion and displacement of the usual lie of the bowel further limiting the exam.  There is NG tube terminating in the stomach and a percutaneous gastrostomy tube is also present. Small bowel loops are normal caliber showing no evidence for obstruction or obvious focal lesions. The colon is redundant and tortuous. There is increased stool in the left colon suggesting constipation. Gaseous prominence of the right colon.   In the left lower quadrant there is abrupt transition from mildly distended gas-filled sigmoid colon to relatively smaller caliber rectum and distal sigmoid colon which is loaded with stool. Stool impaction causing some degree of obstruction is likely. Findings represent a change from prior. Pelvis: Small bladder calculi present. Right inguinal hernia contains portion of outer wall of urinary bladder also evident on prior. Peritoneum/Retroperitoneum: No ascites. No significant lymphadenopathy. Bones/Soft Tissues: Severe reverse S-shaped thoracolumbar scoliosis with superimposed diffuse degenerative changes. 1. Limited evaluation of the GI tract due to absence of intraluminal contrast along with severe scoliosis resulting in some distortion of the normal lie of the bowel. 2. No evidence for small bowel obstruction. 3. Some borderline gaseous distension of the tortuous redundant colon. Abrupt transition from mildly gas-filled distended sigmoid colon into the much smaller caliber distal sigmoid colon which is filled with stool through to the rectum. Findings suggests rectosigmoid stool impaction with mild obstructive signs. This represents a change from previous. 4. Interval placement of NG tube. A percutaneous gastrostomy tube is noted with one also present on prior. Interpretation per the Radiologist below, if available at the time of this note:    CT ABDOMEN PELVIS W IV CONTRAST   Preliminary Result   1. Diffuse distention of the colon with stool and gas. 2.  No small bowel obstruction. Fluid is scattered throughout the intestinal   tract which may be related to mild enteritis. No free air. 3.  Colon is redundant. No bowel wall thickening. Findings suggest   retrosigmoid stool impaction. Little change from prior exam.      4.  Gastrostomy tube in appropriate position. XR Acute Abd Series Chest 1 VW   Final Result   Diffuse bowel dilatation with air concerning for ileus versus at least   partial obstruction. CT should be considered. LABS:  Labs Reviewed   CBC WITH AUTO DIFFERENTIAL - Abnormal; Notable for the following components:       Result Value    RBC 4.09 (*)     Hemoglobin 12.7 (*)     Hematocrit 37.2 (*)     RDW 16.5 (*)     Seg Neutrophils 85 (*)     Lymphocytes 11 (*)     Absolute Lymph # 0.90 (*)     All other components within normal limits   BASIC METABOLIC PANEL - Abnormal; Notable for the following components:    Glucose 210 (*)     CREATININE <0.40 (*)     All other components within normal limits   POC PANEL (G3)-ART - Abnormal; Notable for the following components:    POC pCO2 46 (*)     POC PO2 144 (*)     TCO2 (calc), Art 29 (*)     POC HCO3 28.1 (*)     Positive Base Excess, Art 3 (*)     All other components within normal limits   TROP/MYOGLOBIN - Abnormal; Notable for the following components:    Myoglobin <21 (*)     All other components within normal limits   LACTIC ACID   BRAIN NATRIURETIC PEPTIDE       All other labs were within normal range or not returned as of this dictation. EMERGENCY DEPARTMENT COURSE and DIFFERENTIAL DIAGNOSIS/MDM:   Vitals:    Vitals:    04/08/19 1432 04/08/19 1447 04/08/19 1502 04/08/19 1517   BP: 116/82 124/83 118/72 111/74   Pulse: 97 97 97 97   Resp: (!) 33 (!) 33 29 25   Temp:       TempSrc:       SpO2: 94% 92% 90% 94%   Weight:           Medical Decision Making: Given breathing treatments and IV steroids. He still continued to have tachypnea and use of accessory muscles for breathing. We will give him a dose of IV Lasix and he will be placed on BiPAP. He will be admitted to the ICU for further evaluation follow-up.   Medications   albuterol (PROVENTIL) nebulizer solution 5 mg (5 mg Nebulization Given 4/8/19 6419)   ipratropium-albuterol (DUONEB) nebulizer solution 1 ampule (has no administration in time range)   albuterol (PROVENTIL) nebulizer solution 5 mg (has no administration in time range)   albuterol sulfate  (90 Base) MCG/ACT inhaler 2 puff (has no administration in time range)   albuterol sulfate  (90 Base) MCG/ACT inhaler 2 puff (has no administration in time range)     And   ipratropium (ATROVENT HFA) 17 MCG/ACT inhaler 2 puff (has no administration in time range)   ipratropium (ATROVENT) 0.02 % nebulizer solution 0.5 mg (has no administration in time range)   sodium chloride flush 0.9 % injection 10 mL (10 mLs Intravenous Given 4/8/19 1120)   ondansetron (ZOFRAN) injection 4 mg (4 mg Intravenous Given 4/8/19 0936)   methylPREDNISolone sodium (SOLU-MEDROL) injection 125 mg (125 mg Intravenous Given 4/8/19 0939)   0.9 % sodium chloride bolus (0 mLs Intravenous Stopped 4/8/19 1121)   iopamidol (ISOVUE-370) 76 % injection 75 mL (75 mLs Intravenous Given 4/8/19 1120)   LORazepam (ATIVAN) injection 1 mg (1 mg Intravenous Given 4/8/19 1308)   furosemide (LASIX) injection 20 mg (20 mg Intravenous Given 4/8/19 1427)     CRITICAL CARE TIME     Due to the high probability of sudden and clinically significant deterioration in the patient's condition he required highest level of my preparedness to intervene urgently. I provided critical care time including documentation time, medication orders and management, reevaluation, vital sign assessment, ordering and reviewing of of lab tests ordering and reviewing of x-ray studies, and admission orders. Aggregate critical care time is between 42 minutes including only time during which I was engaged in work directly related to his care and did not include time spent treating other patients simultaneously. CONSULTS:  IP CONSULT TO HOSPITALIST  IP CONSULT TO PULMONOLOGY      FINAL IMPRESSION      1.  Acute on chronic respiratory failure, unspecified whether with hypoxia or hypercapnia [facility])          DISPOSITION/PLAN   DISPOSITION Admitted 04/08/2019 02:36:32 PM      PATIENT REFERRED TO:   Jason Peters MD  [place] [address], [address]  [address]            DISCHARGE MEDICATIONS:     New Prescriptions    No medications on file           (Please note that portions of this note were completed with a voice recognition program.  Efforts were made to edit the dictations but occasionally words are mis-transcribed.)    [address] [facility] LOVE, APRN - CNP  Certified Nurse Practitioner         KARIME Tirado CNP  04/08/19 4425

## 2019-04-09 ENCOUNTER — APPOINTMENT (OUTPATIENT)
Dept: GENERAL RADIOLOGY | Age: 49
DRG: 189 | End: 2019-04-09
Payer: MEDICARE

## 2019-04-09 VITALS
RESPIRATION RATE: 18 BRPM | OXYGEN SATURATION: 98 % | WEIGHT: 110.23 LBS | SYSTOLIC BLOOD PRESSURE: 109 MMHG | BODY MASS INDEX: 35.08 KG/M2 | TEMPERATURE: 97.9 F | HEART RATE: 94 BPM | DIASTOLIC BLOOD PRESSURE: 71 MMHG

## 2019-04-09 LAB
ANION GAP SERPL CALCULATED.3IONS-SCNC: 13 MMOL/L (ref 9–17)
BUN BLDV-MCNC: 12 MG/DL (ref 6–20)
BUN/CREAT BLD: ABNORMAL (ref 9–20)
CALCIUM SERPL-MCNC: 8.7 MG/DL (ref 8.6–10.4)
CHLORIDE BLD-SCNC: 102 MMOL/L (ref 98–107)
CO2: 28 MMOL/L (ref 20–31)
CREAT SERPL-MCNC: <0.4 MG/DL (ref 0.7–1.2)
GFR AFRICAN AMERICAN: ABNORMAL ML/MIN
GFR NON-AFRICAN AMERICAN: ABNORMAL ML/MIN
GFR SERPL CREATININE-BSD FRML MDRD: ABNORMAL ML/MIN/{1.73_M2}
GFR SERPL CREATININE-BSD FRML MDRD: ABNORMAL ML/MIN/{1.73_M2}
GLUCOSE BLD-MCNC: 155 MG/DL (ref 70–99)
HCT VFR BLD CALC: 31.8 % (ref 41–53)
HEMOGLOBIN: 10.6 G/DL (ref 13.5–17.5)
MCH RBC QN AUTO: 30.9 PG (ref 26–34)
MCHC RBC AUTO-ENTMCNC: 33.2 G/DL (ref 31–37)
MCV RBC AUTO: 93.1 FL (ref 80–100)
NRBC AUTOMATED: ABNORMAL PER 100 WBC
PDW BLD-RTO: 15.8 % (ref 11.5–14.5)
PLATELET # BLD: 322 K/UL (ref 130–400)
PMV BLD AUTO: 7.7 FL (ref 6–12)
POTASSIUM SERPL-SCNC: 3.9 MMOL/L (ref 3.7–5.3)
RBC # BLD: 3.42 M/UL (ref 4.5–5.9)
SODIUM BLD-SCNC: 143 MMOL/L (ref 135–144)
WBC # BLD: 5.2 K/UL (ref 3.5–11)

## 2019-04-09 PROCEDURE — 6370000000 HC RX 637 (ALT 250 FOR IP): Performed by: INTERNAL MEDICINE

## 2019-04-09 PROCEDURE — 6360000002 HC RX W HCPCS: Performed by: INTERNAL MEDICINE

## 2019-04-09 PROCEDURE — 97110 THERAPEUTIC EXERCISES: CPT

## 2019-04-09 PROCEDURE — 99239 HOSP IP/OBS DSCHRG MGMT >30: CPT | Performed by: INTERNAL MEDICINE

## 2019-04-09 PROCEDURE — 71045 X-RAY EXAM CHEST 1 VIEW: CPT

## 2019-04-09 PROCEDURE — 2580000003 HC RX 258: Performed by: INTERNAL MEDICINE

## 2019-04-09 PROCEDURE — 80048 BASIC METABOLIC PNL TOTAL CA: CPT

## 2019-04-09 PROCEDURE — 94640 AIRWAY INHALATION TREATMENT: CPT

## 2019-04-09 PROCEDURE — 85027 COMPLETE CBC AUTOMATED: CPT

## 2019-04-09 PROCEDURE — 97163 PT EVAL HIGH COMPLEX 45 MIN: CPT

## 2019-04-09 PROCEDURE — 36415 COLL VENOUS BLD VENIPUNCTURE: CPT

## 2019-04-09 PROCEDURE — 2700000000 HC OXYGEN THERAPY PER DAY

## 2019-04-09 RX ORDER — ONDANSETRON 4 MG/1
4 TABLET, ORALLY DISINTEGRATING ORAL EVERY 6 HOURS PRN
DISCHARGE
Start: 2019-04-09

## 2019-04-09 RX ORDER — AZITHROMYCIN 200 MG/5ML
400 POWDER, FOR SUSPENSION ORAL DAILY
Status: ON HOLD | DISCHARGE
Start: 2019-04-09 | End: 2019-04-16 | Stop reason: HOSPADM

## 2019-04-09 RX ADMIN — PSEUDOEPHEDRINE HYDROCHLORIDE 30 MG: 15 LIQUID ORAL at 16:03

## 2019-04-09 RX ADMIN — LACOSAMIDE 150 MG: 100 TABLET, FILM COATED ORAL at 11:14

## 2019-04-09 RX ADMIN — SODIUM CHLORIDE: 9 INJECTION, SOLUTION INTRAVENOUS at 12:44

## 2019-04-09 RX ADMIN — GLYCOPYRROLATE 1 MG: 1 TABLET ORAL at 16:01

## 2019-04-09 RX ADMIN — LACTULOSE 20 G: 20 SOLUTION ORAL at 09:45

## 2019-04-09 RX ADMIN — Medication 10 ML: at 11:18

## 2019-04-09 RX ADMIN — FUROSEMIDE 20 MG: 20 TABLET ORAL at 09:44

## 2019-04-09 RX ADMIN — POLYETHYLENE GLYCOL (3350) 17 G: 17 POWDER, FOR SOLUTION ORAL at 09:45

## 2019-04-09 RX ADMIN — LAMOTRIGINE 100 MG: 100 TABLET ORAL at 09:44

## 2019-04-09 RX ADMIN — BACLOFEN 10 MG: 10 TABLET ORAL at 16:01

## 2019-04-09 RX ADMIN — LEVETIRACETAM 1500 MG: 100 SOLUTION ORAL at 09:44

## 2019-04-09 RX ADMIN — METHYLPREDNISOLONE SODIUM SUCCINATE 40 MG: 40 INJECTION, POWDER, FOR SOLUTION INTRAMUSCULAR; INTRAVENOUS at 11:14

## 2019-04-09 RX ADMIN — METHYLPREDNISOLONE SODIUM SUCCINATE 40 MG: 40 INJECTION, POWDER, FOR SOLUTION INTRAMUSCULAR; INTRAVENOUS at 01:34

## 2019-04-09 RX ADMIN — IPRATROPIUM BROMIDE AND ALBUTEROL SULFATE 1 AMPULE: .5; 3 SOLUTION RESPIRATORY (INHALATION) at 12:02

## 2019-04-09 RX ADMIN — GLYCOPYRROLATE 1 MG: 1 TABLET ORAL at 09:44

## 2019-04-09 RX ADMIN — MICONAZOLE NITRATE: 20.6 POWDER TOPICAL at 11:18

## 2019-04-09 RX ADMIN — LAMOTRIGINE 200 MG: 100 TABLET ORAL at 11:13

## 2019-04-09 RX ADMIN — METHYLPREDNISOLONE SODIUM SUCCINATE 40 MG: 40 INJECTION, POWDER, FOR SOLUTION INTRAMUSCULAR; INTRAVENOUS at 06:12

## 2019-04-09 RX ADMIN — PSEUDOEPHEDRINE HYDROCHLORIDE 30 MG: 15 LIQUID ORAL at 09:45

## 2019-04-09 RX ADMIN — IPRATROPIUM BROMIDE AND ALBUTEROL SULFATE 1 AMPULE: .5; 3 SOLUTION RESPIRATORY (INHALATION) at 08:16

## 2019-04-09 RX ADMIN — VITAMIN D 1000 UNITS: 25 TAB ORAL at 11:14

## 2019-04-09 RX ADMIN — IPRATROPIUM BROMIDE AND ALBUTEROL SULFATE 1 AMPULE: .5; 3 SOLUTION RESPIRATORY (INHALATION) at 16:25

## 2019-04-09 RX ADMIN — BACLOFEN 10 MG: 10 TABLET ORAL at 09:44

## 2019-04-09 RX ADMIN — Medication 10 MG: at 09:45

## 2019-04-09 RX ADMIN — Medication 150 MG: at 09:45

## 2019-04-09 RX ADMIN — ENOXAPARIN SODIUM 40 MG: 40 INJECTION SUBCUTANEOUS at 09:45

## 2019-04-09 RX ADMIN — FLUTICASONE PROPIONATE 2 SPRAY: 50 SPRAY, METERED NASAL at 11:25

## 2019-04-09 NOTE — PLAN OF CARE
Pt remains free from falls. No skin breakdown noted. Briefed, repositioned q 2 hours and resting on waffle mattress with heels elevated. Anticipate return home today. Has had 2 bowel movements.

## 2019-04-09 NOTE — PLAN OF CARE
Problem: Falls - Risk of:  Goal: Will remain free from falls  Description  Will remain free from falls  Outcome: Ongoing     Problem: Falls - Risk of:  Goal: Absence of physical injury  Description  Absence of physical injury  Outcome: Ongoing     Problem: Risk for Impaired Skin Integrity  Goal: Tissue integrity - skin and mucous membranes  Description  Structural intactness and normal physiological function of skin and  mucous membranes.   Outcome: Ongoing

## 2019-04-09 NOTE — CONSULTS
Pulmonary Medicine and [id number], CNP / Dr. Musa Bagley M.D.       Patient Celi Conklin   MRN -  [id number]   Acct # - [de-identified]   - 1970      Date of Admission -  2019  9:12 AM  Date of evaluation -  2019  [facility] [address] [facility],  Primary Care Physician - Jason Peters MD     Reason for Consult    Possible aspiration, history of dysphasia/spastic quadriparesis paresis/cerebral palsy    Assessment   · Possible aspiration  · Atelectasis/mild pulmonary edema  · Dysphagia status post PEG placement  · Cerebral palsy with spastic quadriparesis  · Chronic constipation    Recommendations   · 2 liters/min via nasal cannula if necessary  · Zithromax empirically  · Albuterol and Ipratropium Q 4 hours and prn  · Singulair  · Continue tube feedings per pain  · Constipation managed by primary team  · Passive range of motion  · DVT prophylaxis with low molecular weight heparin  · Discussed with Dr. Trae Alex   · Will follow with you    Problem List      Patient Active Problem List   Diagnosis    Acute exacerbation of chronic obstructive pulmonary disease (COPD) ([address]    Seizure disorder ([address]    Cerebral palsied ([address]    Severe protein-calorie malnutrition (Severiano Mandie: less than 60% of standard weight) ([address]    Multifocal pneumonia    Pneumonia due to organism    Positive blood cultures    Pneumonia of left lower lobe due to infectious organism ([address]    Spastic quadriplegic cerebral palsy (HCC)    Choledocholithiasis    Obstipation    Generalized abdominal pain    Constipation    Developmental disability    Ileus ([address]    Influenza with respiratory manifestation other than pneumonia    Hypoxia    Abdominal distension    Dyspnea and respiratory abnormalities    Acute respiratory failure with hypoxia (HCC)    Acute respiratory failure with hypoxia (HCC)    Acute and chronic respiratory failure with hypoxia ([address] RAFFAELE Hooks is 51 y.o.,  male, brought to the hospital from his group home with concern for aspiration. According to hospital notes his caregiver noted him to have cough with shortness of breath, as well as vomiting that started yesterday. He was recently treated/hospitalized at [facility]. and hospital for pneumonia/possible aspiration, was discharged to [facility].  Past medical history significant for spastic quadriparesis/cerebral palsy, seizures, scoliosis, gastroparesis, dysphagia status post PEG tube, GERD. He is nonverbal and unable to provide history. Nursing staff has not noted any significant hypoxia, no evidence of vomiting.     PMHx   Past Medical History      Diagnosis Date    Cerebral palsy ([address]     Cholelithiasis with chronic cholecystitis     Constipation     Delayed gastric emptying     Dysphagia     GERD (gastroesophageal reflux disease)     Hearing loss     Mental disability     MRDD    Profound mental retardation     Scoliosis     Seizures (HCC)     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HCC)       Past Surgical History        Procedure Laterality Date    CHOLECYSTECTOMY  08/18/2018    laparoscopic    ERCP  08/16/2018    GASTROSTOMY TUBE PLACEMENT      LA ERCP DX COLLECTION SPECIMEN BRUSHING/WASHING N/A 8/16/2018    ERCP PAPILOTOMY SWEEPING STONE EXTRACTION ENDOSCOPIC RETROGRADE CHOLANGIOPANCREATOGRAPHY performed by Oscar Rodríguez MD at [facility] 8/18/2018    CHOLECYSTECTOMY LAPAROSCOPIC WITH UMBILICAL HERNIA REPAIR performed by Juanito Curiel DO at [address]      has Feeding tube       Meds    Current Medications    ipratropium-albuterol  1 ampule Inhalation Q4H WA    baclofen  10 mg Per G Tube TID    calcium carbonate  1,000 mg Per G Tube Daily    vitamin D  1,000 Units PEG Tube BID    fluticasone  2 spray Nasal Daily    furosemide  20 mg Per G Tube Daily    glycopyrrolate  1 mg Per G Tube TID    lacosamide  150 mg PEG Tube BID    lactulose  20 g Per G Tube BID    lamoTRIgine  100 mg Per G Tube QAM    lamoTRIgine  200 mg Per G Tube BID    levETIRAcetam  1,500 mg Per G Tube BID    loratadine  10 mg Per G Tube Daily    montelukast  10 mg Per G Tube Nightly    miconazole   Topical BID    polyethylene glycol  17 g Per G Tube Daily    sennosides-docusate sodium  2 tablet Per G Tube Daily    sodium chloride flush  10 mL Intravenous 2 times per day    enoxaparin  40 mg Subcutaneous Daily    methylPREDNISolone  40 mg Intravenous Q6H    Followed by   Marycruz Quinonez ON 4/11/2019] predniSONE  40 mg Per G Tube Daily    ranitidine  150 mg Per G Tube BID    pseudoephedrine HCl  30 mg Per G Tube TID    azithromycin  500 mg Intravenous Q24H     acetaminophen, bisacodyl, clonazePAM, melatonin, fleet, sodium chloride flush, ondansetron **OR** ondansetron  IV Drips/Infusions   sodium chloride 75 mL/hr at 04/08/19 1712     Home Medications  Medications Prior to Admission: albuterol (PROVENTIL) (2.5 MG/3ML) 0.083% nebulizer solution, Take 2.5 mg by nebulization 3 times daily  lansoprazole (PREVACID SOLUTAB) 30 MG disintegrating tablet, 30 mg by Per G Tube route daily  LORazepam (ATIVAN) 1 MG tablet, Take 1 mg by mouth as needed (exam).  1mg given 1 hour prior to exam. May repeat x 1  carbamide peroxide (DEBROX) 6.5 % otic solution, 5 drops See Admin Instructions 5 drops BID on the 1st and 15th of the month  ketoconazole (NIZORAL) 2 % shampoo, Apply topically Twice a Week Wednesdays and Saturdays  zinc oxide (DESITIN) 40 % PSTE paste, Apply topically as needed (irritation)  neomycin-bacitracin-polymyxin (NEOSPORIN) 400-5-5000 ointment, Apply topically as needed (prn per [facility] plan of treatment)  lamoTRIgine (LAMICTAL) 200 MG tablet, 1 tablet by Per G Tube route 2 times daily Indications: 300MG IN am AND 200MG IN pm  lamoTRIgine (LAMICTAL) 100 MG tablet, 1 tablet by Per G Tube route every morning Indications: 300mg in AM and 200mg in PM  lacosamide (VIMPAT) 10 MG/ML SOLN oral solution, 150 mg by PEG Tube route 2 times daily. budesonide (PULMICORT) 0.5 MG/2ML nebulizer suspension, Take 1 ampule by nebulization 2 times daily  lactulose (CHRONULAC) 10 GM/15ML solution, 20 g by Per G Tube route 2 times daily  polyethylene glycol (MIRALAX) PACK packet, 17 g by Per G Tube route daily  glycopyrrolate (ROBINUL) 1 MG tablet, 1 mg by Per G Tube route 3 times daily FOR INCREASED SECRETIONS. furosemide (LASIX) 20 MG tablet, 20 mg by Per G Tube route daily  nystatin (MYCOSTATIN) 103589 UNIT/GM powder, Apply topically daily Apply between toes.   baclofen (LIORESAL) 10 MG tablet, 10 mg by Per G Tube route 3 times daily  Potassium Chloride (KLOR-CON SPRINKLE PO), 10 mEq by Per G Tube route 2 times daily   montelukast (SINGULAIR) 10 MG tablet, 10 mg by Per G Tube route nightly  fluticasone (FLONASE) 50 MCG/ACT nasal spray, 2 sprays by Nasal route daily  Multiple Vitamin (DAILY-JAY PO), 1 tablet by Gastric Tube route daily   calcium carbonate 1250 MG/5ML SUSP suspension, 1,250 mg by Per G Tube route daily  levETIRAcetam (KEPPRA) 100 MG/ML solution, 1,500 mg by Per G Tube route 2 times daily   loratadine (CLARITIN) 10 MG tablet, 10 mg by Per G Tube route daily  pseudoephedrine (SUDAFED) 30 MG tablet, 30 mg by Per G Tube route 3 times daily  senna-docusate (PERICOLACE) 8.6-50 MG per tablet, 2 tablets by Per G Tube route daily  Cholecalciferol (VITAMIN D3) 1000 units TABS, 1,000 Units by Feeding Tube route 2 times daily   [DISCONTINUED] albuterol (PROVENTIL) (2.5 MG/3ML) 0.083% nebulizer solution, Take 3 mLs by nebulization every 4 hours (while awake) (Patient taking differently: Take 2.5 mg by nebulization 3 times daily )  [DISCONTINUED] albuterol (PROVENTIL) (2.5 MG/3ML) 0.083% nebulizer solution, Take 3 mLs by nebulization every 4 hours as needed for Wheezing  [DISCONTINUED] enoxaparin (LOVENOX) 40 MG/0.4ML injection, Inject 0.4 mLs into the skin daily  [DISCONTINUED] melatonin 1 MG tablet, 2 mg by Per G Tube route nightly as needed for Sleep  [DISCONTINUED] clonazePAM (KLONOPIN) 2 MG tablet, 2 mg by Per G Tube route 2 times daily as needed (for seizure lasting more than3 min).    [DISCONTINUED] Sodium Phosphates (FLEET) 7-19 GM/118ML, Place 1 enema rectally daily as needed  Nutritional Supplements (REPLETE/FIBER) LIQD, 75 mLs by Feeding Tube route daily  [DISCONTINUED] acetaminophen (TYLENOL) 325 MG tablet, 650 mg by Per G Tube route every 6 hours as needed for Pain   [DISCONTINUED] bisacodyl (DULCOLAX) 10 MG suppository, Place 10 mg rectally daily as needed for Constipation    Allergies    Ampicillin; Valium [diazepam]; and Other  Social History     Social History     Socioeconomic History    Marital status: Single     Spouse name: Not on file    Number of children: Not on file    Years of education: Not on file    Highest education level: Not on file   Occupational History    Not on file   Social Needs    Financial resource strain: Not on file    Food insecurity:     Worry: Not on file     Inability: Not on file    Transportation needs:     Medical: Not on file     Non-medical: Not on file   Tobacco Use    Smoking status: Never Smoker    Smokeless tobacco: Never Used   Substance and Sexual Activity    Alcohol use: No    Drug use: No    Sexual activity: Not on file   Lifestyle    Physical activity:     Days per week: Not on file     Minutes per session: Not on file    Stress: Not on file   Relationships    Social connections:     Talks on phone: Not on file     Gets together: Not on file     Attends Confucianism service: Not on file     Active member of club or organization: Not on file     Attends meetings of clubs or organizations: Not on file     Relationship status: Not on file    Intimate partner violence:     Fear of current or ex partner: Not on file     Emotionally abused: Not on file     Physically abused: Not on file     Forced sexual activity: Not on file   Other Topics Concern    Not on file   Social History Narrative    Not on file     Family History          Family history unknown: Yes     ROS - 11 systems   Unable to obtain detailed review of systems, patient is nonverbal.      Vitals    /71   Pulse 94   Temp 97.9 °F (36.6 °C) (Axillary)   Resp 18   Wt 110 lb 3.7 oz (50 kg)   SpO2 96%   BMI 35.08 kg/m²   Body mass index is 35.08 kg/m². I/O        Intake/Output Summary (Last 24 hours) at 4/9/2019 1033  Last data filed at 4/9/2019 0600  Gross per 24 hour   Intake 2115 ml   Output 800 ml   Net 1315 ml     I/O last 3 completed shifts: In: 2115 [I.V.:980; NG/GT:1135]  Out: 800 [Urine:800]   Patient Vitals for the past 96 hrs (Last 3 readings):   Weight   04/08/19 1549 110 lb 3.7 oz (50 kg)   04/08/19 0922 120 lb (54.4 kg)     Exam   General Appearance Awake, alert, oriented, in no acute distress, nonverbal  HEENT - Head is normocephalic, atraumatic. Pupil reactive to light  Neck - Supple, symmetrical, trachea midline and Soft, trachea midline and straight  Lungs - moderate air exchange, no significant rhonchi or wheeze  Cardiovascular - Heart sounds are normal.  Regular rhythm normal rate without murmur, gallop or rub. Abdomen - Soft, nontender, nondistended, no masses or organomegaly  Neurologic - CN II-XII are grossly intact.  There are no focal motor or sensory deficits  Skin - No bruising or bleeding  Extremities - No cyanosis, mild edema lower extremities    Labs  - Old records and notes have been reviewed in [facility]   CBC     Lab Results   Component Value Date    WBC 5.2 04/09/2019    RBC 3.42 04/09/2019    HGB 10.6 04/09/2019    HCT 31.8 04/09/2019     04/09/2019    MCV 93.1 04/09/2019    MCH 30.9 04/09/2019    MCHC 33.2 04/09/2019    RDW 15.8 04/09/2019    LYMPHOPCT 11 04/08/2019    MONOPCT 3 04/08/2019    BASOPCT 0 04/08/2019    MONOSABS 0.30 04/08/2019    LYMPHSABS 0.90 04/08/2019    EOSABS 0.10 04/08/2019

## 2019-04-09 NOTE — DISCHARGE SUMMARY
[facility]    Discharge Summary     Patient ID: Burnice Opitz  :  1970   MRN: [id number]     ACCOUNT:  [de-identified]   Patient's PCP: Jaydon Adame MD  Admit Date: 2019   Discharge Date: 2019     Length of Stay: 1  Code Status:  Full Code  Admitting Physician: Sultana De Guzman DO  Discharge Physician: Sultana De Guzman DO     Active Discharge Diagnoses:     Hospital Problem Lists:  Principal Problem:    Acute respiratory failure with hypoxia ([address]  Active Problems:    Acute exacerbation of chronic obstructive pulmonary disease (COPD) ([address]    Seizure disorder ([address]    Spastic quadriplegic cerebral palsy ([address]    Obstipation    Developmental disability    Acute and chronic respiratory failure with hypoxia ([address]  Resolved Problems:    * No resolved hospital problems. *      Admission Condition:  fair     Discharged Condition: good    Hospital Stay:     Hospital Course:      C/C:       Chief Complaint   Patient presents with    Emesis         Interval History: Status: improved. Patient had a large bowel movement overnight. Repeat chest x-ray shows vascular congestion which has improved over admission. There is no evidence of active pneumonia. His pro-calcitonin is 0.12. White count is normal. He has not had any emesis since arriving here. Tube feedings have been restarted yesterday and he appears to be tolerating them at his normal rate of 75 ML's per hour ×13 hours. Case has been discussed with pulmonary. Discharge planning has been following. The patient may be able to be discharged today. patient will be continued on azithromycin 400 mg liquid daily ×5 days.     History: \"Enrrique Bañuelos is a 51 y.o.  male who presents to the emergency department by EMS for evaluation of vomiting and cough. The patient has history of cerebral palsy and developmental delay and lives in a group home.  Caregiver states the patient had shortness of breath and cough. He is also nausea and vomiting that started today. The patient takes nothing by mouth. Everything is through feeding tube. He was admitted last month for respiratory failure and obstipation. Patient's history includes cerebral palsy, COPD, congestive heart failure, developmental delay, and severe scoliosis. In the emergency room blood gases appear to show a PaO2 of only 46. He was placed on BiPAP. His PaO2 appeared to continue to run low until the sensor was changed to his earlobe. He is currently on nasal O2 and satting at 100%. X-rays performed included CT abdomen which showed basilar atelectasis. A chest x-ray is pending. Pro-calcitonin has been ordered. Pulmonary consultation has been ordered. He has been started on azithromycin. His BNP is 162. Currently stable. He was admitted to ICU although I had ordered him to be admitted to intermediate stepdown. He will likely be transferred there shortly. \"      Significant therapeutic interventions: Azithromycin 500 mg IV  Aerosol treatments    Significant Diagnostic Studies:     XR Chest Portable  Result Date: 4/9/2019  COMPARISON: 24 February 2019  FINDINGS: AP portable view of the chest time stamped at 642 hours demonstrates overlying cardiac monitoring electrodes. The patient is rotated toward the right. Stable cardiac size is noted. The patient has pulmonary vascular congestion with slight decreased compared to prior study of 1 day earlier. No gross effusion or extrapleural air is noted. The patient has a sharp angle scoliosis in the thoracic spine with a gibbus type deformity. No free air is noted. Mediastinal contours are stable. Impression:  Stable cardiomegaly. Pulmonary vascular congestion is noted with slight decreased compared to prior study of 1 day earlier.   No gross effusions.      XR Acute Abd Series Chest 1 Vw  Result Date: 4/8/2019  COMPARISON: Radiographs from 03/15/2019 and CT from 03/10/2019  FINDINGS:   Chest: Shallow inspiration.  Marked cardiomegaly.  Moderate pulmonary edema with right basilar atelectasis and rightward shift of the mediastinum, a chronic finding.  No pneumothorax.  No free subdiaphragmatic air.  Chronic deformity of the right glenohumeral joint. Abdomen and pelvis: Cholecystectomy clips in right upper quadrant.  Gaseous distention of large and small bowel with moderate to large rectal stool. Left upper quadrant gastrostomy tube.  Deformity of bilateral hips. Impression:  Diffuse bowel dilatation with air concerning for ileus versus at least partial obstruction.  CT should be considered.      CT Abdomen Pelvis W Iv Contrast  Result Date: 4/8/2019  COMPARISON: 10 March 2019  FINDINGS:   Lower Chest: Basilar atelectasis is noted.  Heart size is normal.  No gross effusions are noted.  Artifact is created due to arm position and respiratory motion. Organs: Liver, spleen, pancreas, and adrenals are unremarkable.  Gallbladder is surgically absent.  Kidneys excrete contrast bilaterally.  Stable renal cysts are present. GI/Bowel: No free fluid or free air is noted.  Gastrostomy tube is positioned with balloon within the confines of the stomach.  Significant distension of the colon with both gas and stool is noted.  No small bowel obstruction is noted although there is fluid scattered throughout much of the intestinal tract which may be related to mild enteritis.  No bowel wall thickening is seen. Pelvis: No evidence of appendicitis.  No abnormal fluid collections.  Both inguinal rings are dilated and fat containing without strangulation.  No inguinal adenopathy is seen. Peritoneum/Retroperitoneum: No aortic aneurysm, retroperitoneal mass, retroperitoneal or mesenteric adenopathy is noted. Bones/Soft Tissues: Severe scoliotic curvature is redemonstrated.  Bony structures are slightly gracile.  No acute osseous abnormality.  Dense breast tissue is noted possibly gynecomastia.  Estimated biologic radiation tablet  Commonly known as:  ZOFRAN-ODT  Take 1 tablet by mouth every 6 hours as needed for Nausea        CHANGE how you take these medications    albuterol (2.5 MG/3ML) 0.083% nebulizer solution  Commonly known as:  PROVENTIL  What changed:  Another medication with the same name was removed. Continue taking this medication, and follow the directions you see here.         CONTINUE taking these medications    baclofen 10 MG tablet  Commonly known as:  LIORESAL     budesonide 0.5 MG/2ML nebulizer suspension  Commonly known as:  PULMICORT     calcium carbonate 1250 MG/5ML Susp suspension     carbamide peroxide 6.5 % otic solution  Commonly known as:  DEBROX     DAILY-JAY PO     DESITIN 40 % Pste paste  Generic drug:  zinc oxide     fluticasone 50 MCG/ACT nasal spray  Commonly known as:  FLONASE     furosemide 20 MG tablet  Commonly known as:  LASIX     glycopyrrolate 1 MG tablet  Commonly known as:  ROBINUL     ketoconazole 2 % shampoo  Commonly known as:  NIZORAL     KLOR-CON SPRINKLE PO     lacosamide 10 MG/ML Soln oral solution  Commonly known as:  VIMPAT     lactulose 10 GM/15ML solution  Commonly known as:  CHRONULAC     * lamoTRIgine 200 MG tablet  Commonly known as:  LAMICTAL  1 tablet by Per G Tube route 2 times daily Indications: 300MG IN am AND 200MG IN pm     * lamoTRIgine 100 MG tablet  Commonly known as:  LAMICTAL  1 tablet by Per G Tube route every morning Indications: 300mg in AM and 200mg in PM     lansoprazole 30 MG disintegrating tablet  Commonly known as:  PREVACID SOLUTAB     levETIRAcetam 100 MG/ML solution  Commonly known as:  KEPPRA     loratadine 10 MG tablet  Commonly known as:  CLARITIN     montelukast 10 MG tablet  Commonly known as:  SINGULAIR     neomycin-bacitracin-polymyxin 400-5-5000 ointment  Commonly known as:  NEOSPORIN     nystatin 592468 UNIT/GM powder  Commonly known as:  MYCOSTATIN     polyethylene glycol Pack packet  Commonly known as:  MIRALAX     pseudoephedrine 30 MG tablet  Commonly known as:  SUDAFED     REPLETE/FIBER Liqd     senna-docusate 8.6-50 MG per tablet  Commonly known as:  PERICOLACE     vitamin D3 1000 units Tabs         * This list has 2 medication(s) that are the same as other medications prescribed for you. Read the directions carefully, and ask your doctor or other care provider to review them with you. STOP taking these medications    LORazepam 1 MG tablet  Commonly known as:  ATIVAN     methylPREDNISolone sodium 40 MG injection  Commonly known as:  SOLU-MEDROL           Where to Get Your Medications      Information about where to get these medications is not yet available    Ask your nurse or doctor about these medications  · azithromycin 200 MG/5ML suspension  · ondansetron 4 MG disintegrating tablet         Time Spent on discharge is  45 mins in patient examination, evaluation, counseling as well as medication reconciliation, prescriptions for required medications, discharge plan and follow up. Electronically signed by   Wendy Gupta DO  4/9/2019  4:12 PM      Thank you Dr. Santino Rai MD for the opportunity to be involved in this patient's care.

## 2019-04-09 NOTE — PROGRESS NOTES
Physical Therapy    Facility/Department: [facility] PROGRESSIVE CARE  Initial Assessment    NAME: Althea Velasquez  : 1970  MRN: [id number]    Date of Service: 2019    Discharge Recommendations:  (Return to facility)      Althea Velasquez is a 51 y.o.  male who presents to the emergency department by EMS for evaluation of vomiting and cough. The patient has history of cerebral palsy and developmental delay and lives in a group home. Caregiver states the patient had shortness of breath and cough. He is also nausea and vomiting that started today. The patient takes nothing by mouth. Everything is through feeding tube. He was admitted last month for respiratory failure and obstipation. Patient's history includes cerebral palsy, COPD, congestive heart failure, developmental delay, and severe scoliosis. In the emergency room blood gases appear to show a PaO2 of only 46. He was placed on BiPAP. His PaO2 appeared to continue to run low until the sensor was changed to his earlobe. He is currently on nasal O2 and satting at 100%. X-rays performed included CT abdomen which showed basilar atelectasis. A chest x-ray is pending. Pro-calcitonin has been ordered. Pulmonary consultation has been ordered. He has been started on azithromycin. His BNP is 162. Currently stable. He was admitted to ICU although I had ordered him to be admitted to intermediate stepdown. He will likely be transferred there shortly.         Assessment   Assessment: Pt. is at his baseline, however will see 2-3x/wk to maintain ROM necessary for proper hygiene  Prognosis: Poor  Decision Making: High Complexity  REQUIRES PT FOLLOW UP: Yes  Activity Tolerance  Activity Tolerance: Patient limited by cognitive status       Patient Diagnosis(es): The encounter diagnosis was Acute on chronic respiratory failure, unspecified whether with hypoxia or hypercapnia ([address]      has a past medical history of Cerebral palsy ([address] Cholelithiasis with chronic cholecystitis, Constipation, Delayed gastric emptying, Dysphagia, GERD (gastroesophageal reflux disease), Hearing loss, Mental disability, Profound mental retardation, Scoliosis, Seizures ([address] and 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[address] has a past surgical history that includes Stomach surgery; ERCP (08/16/2018); pr ercp dx collection specimen brushing/washing (N/A, 8/16/2018); Cholecystectomy (08/18/2018); pr lap,cholecystectomy (N/A, 8/18/2018); and Gastrostomy tube placement. Restrictions  Restrictions/Precautions  Restrictions/Precautions: General Precautions  Position Activity Restriction  Other position/activity restrictions: feeding tube; rodriguez catheter  Vision/Hearing        Subjective  General  Family / Caregiver Present: No  Follows Commands: Impaired  General Comment  Comments: Per RN and NP, pt. came from group home where he is completely dependent and non-verbal; camdne is used to transfer pt. to a special w/c with trunk/head support. Pt. has a feeding tube and rodriguez catheter          Orientation  Orientation  Overall Orientation Status: Impaired(unable to communicate; non-verbal)  Social/Functional History     Cognition        Objective     Observation/Palpation  Observation: 4L O2; feeding tube, catheter, IV       Strength Other  Other: Pt. has moderate flexion contractures bilat. UE and is fairly flaccid LEs; unable to actively move extremities  Tone RLE  RLE Tone: Hypotonic  Tone LLE  LLE Tone: Hypotonic     Bed mobility  Rolling to Left: Dependent/Total  Rolling to Right: Dependent/Total  Supine to Sit: Dependent/Total  Transfers  Sit to Stand: Dependent/Total  Stand to sit: Dependent/Total         EXERCISES:  PROM x 4 ext.            Plan   Plan  Times per week: 2-3x/week   Safety Devices  Type of devices: Left in bed, Nurse notified, Bed alarm in place      AM-PAC Score  AM-PAC Inpatient Mobility Raw Score : 6  AM-PAC Inpatient T-Scale Score : 23.55  Mobility Inpatient CMS 0-100% Score: 100  Mobility Inpatient CMS G-Code Modifier : CN          Goals  Short term goals  Time Frame for Short term goals: 12 visits:  Short term goal 1: Maintain LE ROM for hygiene purposes; PROM to 4 ext. as tolerated  Patient Goals   Patient goals : unable to state       Therapy Time   Individual Concurrent Group Co-treatment   Time In 1039         Time Out 1051(also 10 min.  chart review)         Minutes 12                 Chepe JONES, PT

## 2019-04-09 NOTE — CARE COORDINATION
Discharge planning    Chart reviewed and appreciate SS notes. Possible return to [place] today. Call to [facility] RN in group home at [phone] and VM left with request a call back     Call to  [facility] [phone]    Patient has 24 hour supervision.  After dc from [facility] no home care was set up    Patient may return and ask that transportation be set up with stretcher and to notify [facility] at [phone]

## 2019-04-09 NOTE — DISCHARGE INSTR - COC
Continuity of Care Form    Patient Name: Leroy Wilkins   :  1970  MRN:  [id number]    Admit date:  2019  Discharge date:  ***    Code Status Order: Full Code   Advance Directives:     Admitting Physician:  Amisha Uribe DO  PCP: Bernice Copeland MD    Discharging Nurse: [facility] Unit/Room#: 7898/3236-08  Discharging Unit Phone Number: ***    Emergency Contact:   Extended Emergency Contact Information  Primary Emergency Contact: Steven Alonzo of [facility] Phone: [phone]  Work Phone: [phone]  Relation: Brother/Sister  Secondary Emergency Contact: Palmira Lynch  [address] [facility] Phone: [phone]  Relation: Other    Past Surgical History:  Past Surgical History:   Procedure Laterality Date    CHOLECYSTECTOMY  2018    laparoscopic    ERCP  2018    GASTROSTOMY TUBE PLACEMENT      [place] ERCP DX COLLECTION SPECIMEN BRUSHING/WASHING N/A 2018    ERCP PAPILOTOMY SWEEPING STONE EXTRACTION ENDOSCOPIC RETROGRADE CHOLANGIOPANCREATOGRAPHY performed by Carina Sales MD at [facility] N/A 2018    CHOLECYSTECTOMY LAPAROSCOPIC WITH UMBILICAL HERNIA REPAIR performed by Hernandez Beatty DO at [address]      has Feeding tube       Immunization History:   Immunization History   Administered Date(s) Administered    Influenza Virus Vaccine 10/21/2017       Active Problems:  Patient Active Problem List   Diagnosis Code    Acute exacerbation of chronic obstructive pulmonary disease (COPD) ([address] J44.1    Seizure disorder ([address] G40.909    Cerebral palsied ([address] G80.9    Severe protein-calorie malnutrition (Renee Arcola: less than 60% of standard weight) ([address] E43    Multifocal pneumonia J18.9    Pneumonia due to organism J18.9    Positive blood cultures R78.81    Pneumonia of left lower lobe due to infectious organism ([address] J18.1    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    Choledocholithiasis K80.50    Obstipation K59.00    Generalized abdominal pain R10.84    Constipation K59.00    Developmental disability F89    Ileus (HCC) K56.7    Influenza with respiratory manifestation other than pneumonia J11.1    Hypoxia R09.02    Abdominal distension R14.0    Dyspnea and respiratory abnormalities R06.00, R06.89    Acute respiratory failure with hypoxia (HCC) J96.01    Acute respiratory failure with hypoxia (HCC) J96.01    Acute and chronic respiratory failure with hypoxia (HCC) J96.21       Isolation/Infection:   Isolation          No Isolation            Nurse Assessment:  Last Vital Signs: /71   Pulse 94   Temp 97.9 °F (36.6 °C) (Axillary)   Resp 18   Wt 110 lb 3.7 oz (50 kg)   SpO2 96%   BMI 35.08 kg/m²     Last documented pain score (0-10 scale):    Last Weight:   Wt Readings from Last 1 Encounters:   04/08/19 110 lb 3.7 oz (50 kg)     Mental Status:  {IP PT MENTAL STATUS:24472}    IV Access:  { DAYAN IV ACCESS:382806287}    Nursing Mobility/ADLs:  Walking   {CHP DME DWZR:085324910}  Transfer  {CHP DME QVEN:449218564}  Bathing  {CHP DME GRKO:757610628}  Dressing  {CHP DME QNKU:541058360}  Toileting  {CHP DME DCPH:657197891}  Feeding  {CHP DME RFIQ:820249170}  Med Admin  {CHP DME SHSJ:188929896}  Med Delivery   { DAYAN MED Delivery:596967388}    Wound Care Documentation and Therapy:  Incision 08/18/18 Abdomen (Active)   Number of days: 234        Elimination:  Continence:   · Bowel: {YES / WB:86404}  · Bladder: {YES / PC:33941}  Urinary Catheter: {Urinary Catheter:070166746}   Colostomy/Ileostomy/Ileal Conduit: {YES / CY:44105}       Date of Last BM: ***    Intake/Output Summary (Last 24 hours) at 4/9/2019 1609  Last data filed at 4/9/2019 0600  Gross per 24 hour   Intake 2115 ml   Output 800 ml   Net 1315 ml     I/O last 3 completed shifts:   In: 2115 [I.V.:980; NG/GT:1135]  Out: 800 [Urine:800]    Safety Concerns:     [address] HANLEY Safety Concerns:575852943}    Impairments/Disabilities: [address] [facility] Impairments/Disabilities:564599369}    Nutrition Therapy:  Current Nutrition Therapy:   [address] [facility] Diet List:464490596}    Routes of Feeding: {CHP DME Other Feedings:209985032}  Liquids: {Slp liquid thickness:76363}  Daily Fluid Restriction: {CHP DME Yes amt example:060070480}  Last Modified Barium Swallow with Video (Video Swallowing Test): {Done Not Done EKZE:374937526}    Treatments at the Time of Hospital Discharge:   Respiratory Treatments: ***  Oxygen Therapy:  {Therapy; copd oxygen:95750}  Ventilator:    { CC Vent XDZH:835936808}    Rehab Therapies: {THERAPEUTIC INTERVENTION:0762505399}  Weight Bearing Status/Restrictions: [address] [facility] Weight Bearin}  Other Medical Equipment (for information only, NOT a DME order):  {EQUIPMENT:909976067}  Other Treatments: ***    Patient's personal belongings (please select all that are sent with patient):  {[facility] DME Belongings:516234089}    RN SIGNATURE:  {Esignature:473232026}    CASE MANAGEMENT/SOCIAL WORK SECTION    Inpatient Status Date: ***    Readmission Risk Assessment Score:  Readmission Risk              Risk of Unplanned Readmission:        22           Discharging to Facility/ Agency   · Name:   · Address:  · Phone:  · Fax:    Dialysis Facility (if applicable)   · Name:  · Address:  · Dialysis Schedule:  · Phone:  · Fax:    / signature: {Esignature:224854964}    PHYSICIAN SECTION    Prognosis: Fair    Condition at Discharge: Stable    Rehab Potential (if transferring to Rehab): Fair    Recommended Labs or Other Treatments After Discharge:     Physician Certification: I certify the above information and transfer of Dariel Santamaria  is necessary for the continuing treatment of the diagnosis listed and that he requires Ismael Rebel for greater 30 days.      Update Admission H&P: No change in H&P    PHYSICIAN SIGNATURE:  Electronically signed by Sanaz Boyce DO on 19 at 4:10 PM

## 2019-04-09 NOTE — CARE COORDINATION
Case Management Initial Discharge Plan  Leroy Wilkins,         Readmission Risk              Risk of Unplanned Readmission:        24             Met with:family member patient and sister/Guardian Venita Lacy over the phone to discuss discharge plans. Information verified: address, contacts, phone number, , insurance Yes  PCP: Bernice Copeland MD  Date of last visit:   Recently not sure of date     Insurance Provider: Medicare and Medicaid     Discharge Planning  Current Residence:  [address] [place] [address]:    group home 24 hour care   Home has 1 stories/ 0 stairs to climb  Support Systems:     Current Services PTA:  Group home 24 hour care Agency: Donell Wing   Patient able to perform ADL's:Dependent  DME in home:  Hospital bed, [facility] lift, shower chair, nebulizer, wheel chair, feed pump, oxygen 24/7, ([facility]), hand and foot splints   DME used to aid ambulation prior to admission:  Wheel chair  DME used during admission:       Potential Assistance Needed:       Pharmacy: [address] Medications:   no  Does patient want to participate in local refill/ meds to beds program?       Patient agreeable to home care: Yes  Freedom of choice provided:  n/a      Type of Home Care Services:     Patient expects to be discharged to:   home    Prior SNF/Rehab Placement and Facility:   Agreeable to SNF/Rehab: No  Rew of choice provided: n/a   Evaluation: yes    Expected Discharge date: Follow Up Appointment: Best Day/ Time:      Transportation provider: ambulance   Transportation arrangements needed for discharge: Yes    Discharge Plan: DEEP spoke with pt sister/Guardian Venita Lacy regarding initial discharge plan, Venita Lacy reports no changes since last admission, pt returned home from [place] last week. The pt will discharge home at discharge. DEEP explained pt might be ready for discharge today or tomorrow.     DEEP provided Venita Lacy with the number to call the unit to speak with pt RN.      Pt will return home at discharge         Electronically signed by LEONOR Ordonez, ZACH on 4/9/19 at 11:41 AM

## 2019-04-09 NOTE — PROGRESS NOTES
[facility] - Progress Note    2019   9:38 AM    Name:  Dariel Santamaria  :    1970  Age:  51 y.o. male  MRN:    [id number]     Acct:     [de-identified]   Room:  101/1014-02   [facility] [address] [facility] Date: 2019  9:12 AM  PCP: Amy Garcia MD    Subjective:     C/C:   Chief Complaint   Patient presents with    Emesis       Interval History: Status: improved. Patient had a large bowel movement overnight. Repeat chest x-ray shows vascular congestion which has improved over admission. There is no evidence of active pneumonia. His pro-calcitonin is 0.12. White count is normal. He has not had any emesis since arriving here. Tube feedings have been restarted yesterday and he appears to be tolerating them at his normal rate of 75 ML's per hour ×13 hours. Case has been discussed with pulmonary. Discharge planning has been following. The patient may be able to be discharged later today. I am inclined to discontinue his azithromycin upon discharge but will discuss this with pulmonary. History: Franny Cueva is a 51 y.o.  male who presents to the emergency department by EMS for evaluation of vomiting and cough. The patient has history of cerebral palsy and developmental delay and lives in a group home. Caregiver states the patient had shortness of breath and cough. He is also nausea and vomiting that started today. The patient takes nothing by mouth. Everything is through feeding tube. He was admitted last month for respiratory failure and obstipation. Patient's history includes cerebral palsy, COPD, congestive heart failure, developmental delay, and severe scoliosis. In the emergency room blood gases appear to show a PaO2 of only 46. He was placed on BiPAP. His PaO2 appeared to continue to run low until the sensor was changed to his earlobe. He is currently on nasal O2 and satting at 100%.  X-rays performed included CT abdomen which showed basilar atelectasis. A chest x-ray is pending. Pro-calcitonin has been ordered. Pulmonary consultation has been ordered. He has been started on azithromycin. His BNP is 162. Currently stable. He was admitted to ICU although I had ordered him to be admitted to intermediate stepdown. He will likely be transferred there shortly. \"    ROS:  Unable to obtain any additional review of systems secondary to the patient's underlying developmental delay. Patient is nonverbal.      Medications: Allergies:    Allergies   Allergen Reactions    Ampicillin Other (See Comments)    Valium [Diazepam] Other (See Comments)    Other      Bubble bath products        Current Meds:    ipratropium-albuterol  1 ampule Inhalation Q4H WA    baclofen  10 mg Per G Tube TID    calcium carbonate  1,000 mg Per G Tube Daily    vitamin D  1,000 Units PEG Tube BID    fluticasone  2 spray Nasal Daily    furosemide  20 mg Per G Tube Daily    glycopyrrolate  1 mg Per G Tube TID    lacosamide  150 mg PEG Tube BID    lactulose  20 g Per G Tube BID    lamoTRIgine  100 mg Per G Tube QAM    lamoTRIgine  200 mg Per G Tube BID    levETIRAcetam  1,500 mg Per G Tube BID    loratadine  10 mg Per G Tube Daily    montelukast  10 mg Per G Tube Nightly    miconazole   Topical BID    polyethylene glycol  17 g Per G Tube Daily    sennosides-docusate sodium  2 tablet Per G Tube Daily    sodium chloride flush  10 mL Intravenous 2 times per day    enoxaparin  40 mg Subcutaneous Daily    methylPREDNISolone  40 mg Intravenous Q6H    Followed by   Danyell Mustafa ON 4/11/2019] predniSONE  40 mg Per G Tube Daily    ranitidine  150 mg Per G Tube BID    pseudoephedrine HCl  30 mg Per G Tube TID    azithromycin  500 mg Intravenous Q24H     PRN Meds:   acetaminophen 650 mg Per G Tube Q6H PRN   bisacodyl 10 mg Rectal Daily PRN   clonazePAM 2 mg Per G Tube BID PRN   melatonin 3 mg Per G Tube Nightly PRN   fleet 1 enema Rectal Daily PRN   sodium chloride flush 10 mL Intravenous PRN   ondansetron 4 mg Oral Q6H PRN   Or      ondansetron 4 mg Intravenous Q6H PRN       Data:     Code Status:  Full Code     XR Chest Portable  Result Date: 4/9/2019  COMPARISON: 24 February 2019  FINDINGS: AP portable view of the chest time stamped at 642 hours demonstrates overlying cardiac monitoring electrodes. The patient is rotated toward the right. Stable cardiac size is noted. The patient has pulmonary vascular congestion with slight decreased compared to prior study of 1 day earlier. No gross effusion or extrapleural air is noted. The patient has a sharp angle scoliosis in the thoracic spine with a gibbus type deformity. No free air is noted. Mediastinal contours are stable. Impression:  Stable cardiomegaly. Pulmonary vascular congestion is noted with slight decreased compared to prior study of 1 day earlier. No gross effusions. XR Acute Abd Series Chest 1 Vw  Result Date: 4/8/2019  COMPARISON: Radiographs from 03/15/2019 and CT from 03/10/2019  FINDINGS:   Chest: Shallow inspiration. Marked cardiomegaly. Moderate pulmonary edema with right basilar atelectasis and rightward shift of the mediastinum, a chronic finding. No pneumothorax. No free subdiaphragmatic air. Chronic deformity of the right glenohumeral joint. Abdomen and pelvis: Cholecystectomy clips in right upper quadrant. Gaseous distention of large and small bowel with moderate to large rectal stool. Left upper quadrant gastrostomy tube. Deformity of bilateral hips. Impression:  Diffuse bowel dilatation with air concerning for ileus versus at least partial obstruction. CT should be considered.      CT Abdomen Pelvis W Iv Contrast  Result Date: 4/8/2019  COMPARISON: 10 March 2019  FINDINGS:   Lower Chest: Basilar atelectasis is noted. Heart size is normal.  No gross effusions are noted. Artifact is created due to arm position and respiratory motion.    Organs: Liver, spleen, pancreas, and adrenals are unremarkable. Gallbladder is surgically absent. Kidneys excrete contrast bilaterally. Stable renal cysts are present. GI/Bowel: No free fluid or free air is noted. Gastrostomy tube is positioned with balloon within the confines of the stomach. Significant distension of the colon with both gas and stool is noted. No small bowel obstruction is noted although there is fluid scattered throughout much of the intestinal tract which may be related to mild enteritis. No bowel wall thickening is seen. Pelvis: No evidence of appendicitis. No abnormal fluid collections. Both inguinal rings are dilated and fat containing without strangulation. No inguinal adenopathy is seen. Peritoneum/Retroperitoneum: No aortic aneurysm, retroperitoneal mass, retroperitoneal or mesenteric adenopathy is noted. Bones/Soft Tissues: Severe scoliotic curvature is redemonstrated. Bony structures are slightly gracile. No acute osseous abnormality. Dense breast tissue is noted possibly gynecomastia. Estimated biologic radiation dose for this procedure:568.07 mGy/cm2. Impression:  1. Diffuse distention of the colon with stool and gas. 2.  No small bowel obstruction. Fluid is scattered throughout the intestinal tract which may be related to mild enteritis. No free air. 3.  Colon is redundant. No bowel wall thickening. Findings suggest retrosigmoid stool impaction.   Little change from prior exam.   4.  Gastrostomy tube in appropriate position.        Labs:    Hematology:  Recent Labs     04/08/19  0921 04/09/19  0631   WBC 8.4 5.2   RBC 4.09* 3.42*   HGB 12.7* 10.6*   HCT 37.2* 31.8*   MCV 91.1 93.1   MCH 31.2 30.9   MCHC 34.2 33.2   RDW 16.5* 15.8*    322   MPV 8.4 7.7     Chemistry:  Recent Labs     04/08/19  0921 04/09/19  0631    143   K 4.3 3.9   CL 98 102   CO2 24 28   GLUCOSE 210* 155*   BUN 8 12   CREATININE <0.40* <0.40*   ANIONGAP 16 13   LABGLOM CANNOT BE CALCULATED CANNOT BE CALCULATED   GFRAA CANNOT BE CALCULATED CANNOT BE CALCULATED   CALCIUM 9.2 8.7   PROBNP 162  --    TROPHS 15  --    MYOGLOBIN <21*  --    LACTA 1.9  --    PROCAL 0.12*  --      Glucose:No results for input(s): LABA1C, POCGLU in the last 72 hours. Physical Examination:    /71   Pulse 94   Temp 97.9 °F (36.6 °C) (Axillary)   Resp 18   Wt 110 lb 3.7 oz (50 kg)   SpO2 96%   BMI 35.08 kg/m²     Intake/Output Summary (Last 24 hours) at 4/9/2019 0938  Last data filed at 4/9/2019 0600  Gross per 24 hour   Intake 2115 ml   Output 800 ml   Net 1315 ml       General Appearance:   Currently the patient is resting comfortably. He is either agitated or resting as his baseline. Head:    Normocephalic, without obvious abnormality, atraumatic   Eyes:    PERRL, conjunctiva/corneas clear, EOM's intact        Ears:    Normal external ear canals, both ears   Nose:   Nares normal   Throat:   Lips, mucosa, and tongue normal   Neck:   Supple, symmetrical, trachea midline, no carotid    bruit or JVD   Back:     Marked scoliosis, no further evaluation at this time    Lungs:     Clear to auscultation bilaterally, respirations unlabored   Chest wall:    Marked scoliosis    Heart:    Regular rate and rhythm, S1 and S2 normal, no murmur, rub   or gallop   Abdomen:     Soft, non-tender, bowel sounds active all four quadrants,     no masses, no organomegaly.  G-tube is in place    Extremities:   Spastic quadriplegia, no cyanosis or edema   Pulses:   2+ and symmetric all extremities   Skin:   Skin color, texture, turgor normal, no rashes or lesions   Lymph nodes:   Cervical, supraclavicular, and axillary nodes normal   Neurologic:   CNII-XII            Assessment:     Primary Problem  Acute respiratory failure with hypoxia [facility])     Active Hospital Problems    Diagnosis Date Noted    Acute respiratory failure with hypoxia ([facility] [J96.01] 04/08/2019    Acute and chronic respiratory failure with hypoxia ([facility] [J96.21] 04/08/2019    Developmental disability [F89]     Spastic quadriplegic cerebral palsy (HCC) [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]     Acute exacerbation of chronic obstructive pulmonary disease (COPD) ([facility] [J44.1] 11/14/2017    Seizure disorder ([place] [address] [G40.909] 11/14/2017     Past Medical History:   Diagnosis Date    Cerebral palsy ([place] [address]     Cholelithiasis with chronic cholecystitis     Constipation     Delayed gastric emptying     Dysphagia     GERD (gastroesophageal reflux disease)     Hearing loss     Mental disability     MRDD    Profound mental retardation     Scoliosis     Seizures (HCC)     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[facility]         Consultations:     IP CONSULT TO HOSPITALIST  IP CONSULT TO PULMONOLOGY    Plan:     1. Resume home medications G-tube  2. Resume tube feedings  3. Pro calcitonin: 0.12(<0.25 speaks against URI) Discontinue azithromycin if okay with pulmonary   4. DVT prophylaxis  5. GI prophylaxis  6.  Possible discharge today if approved by pulmonary and arrangements can be made    Electronically signed by Celeste Morel DO on 4/9/2019 at 9:38 AM

## 2019-04-09 NOTE — PROGRESS NOTES
Occupational [address]  Occupational Therapy Not Seen Note    Patient not available for Occupational Therapy due to:    [] Testing:    [] Hemodialysis    [] Cancelled by RN:    []Refusal by Patient:    [] Surgery:     [] Intubation:     [] Pain Medication:    [] Sedation:     [] Spine Precautions :    [] Medical Instability:    [x] Other: DC OT eval; pt. at baseline-total dependent with ADLs and camden lift for transfers; PT completing PROM all four extremities.

## 2019-04-10 ENCOUNTER — HOSPITAL ENCOUNTER (INPATIENT)
Age: 49
LOS: 9 days | Discharge: NURSING, REHAB OR LONG-TERM CARE | DRG: 871 | End: 2019-04-21
Attending: EMERGENCY MEDICINE | Admitting: INTERNAL MEDICINE
Payer: MEDICARE

## 2019-04-10 ENCOUNTER — APPOINTMENT (OUTPATIENT)
Dept: GENERAL RADIOLOGY | Age: 49
DRG: 871 | End: 2019-04-10
Payer: MEDICARE

## 2019-04-10 DIAGNOSIS — M21.852 HIP DYSPLASIA, ACQUIRED, LEFT: ICD-10-CM

## 2019-04-10 DIAGNOSIS — G40.909 SEIZURE DISORDER (HCC): Primary | ICD-10-CM

## 2019-04-10 DIAGNOSIS — R10.84 GENERALIZED ABDOMINAL PAIN: ICD-10-CM

## 2019-04-10 DIAGNOSIS — J96.00 ACUTE RESPIRATORY FAILURE, UNSPECIFIED WHETHER WITH HYPOXIA OR HYPERCAPNIA (HCC): ICD-10-CM

## 2019-04-10 LAB
ABSOLUTE EOS #: 0 K/UL (ref 0–0.4)
ABSOLUTE IMMATURE GRANULOCYTE: ABNORMAL K/UL (ref 0–0.3)
ABSOLUTE LYMPH #: 1.6 K/UL (ref 1–4.8)
ABSOLUTE MONO #: 0.6 K/UL (ref 0.2–0.8)
ANION GAP SERPL CALCULATED.3IONS-SCNC: 13 MMOL/L (ref 9–17)
BASOPHILS # BLD: 0 % (ref 0–2)
BASOPHILS ABSOLUTE: 0 K/UL (ref 0–0.2)
BNP INTERPRETATION: ABNORMAL
BUN BLDV-MCNC: 12 MG/DL (ref 6–20)
BUN/CREAT BLD: ABNORMAL (ref 9–20)
CALCIUM SERPL-MCNC: 8.7 MG/DL (ref 8.6–10.4)
CHLORIDE BLD-SCNC: 104 MMOL/L (ref 98–107)
CO2: 27 MMOL/L (ref 20–31)
CREAT SERPL-MCNC: <0.4 MG/DL (ref 0.7–1.2)
D-DIMER QUANTITATIVE: <0.17 MG/L FEU
DIFFERENTIAL TYPE: ABNORMAL
EOSINOPHILS RELATIVE PERCENT: 1 % (ref 1–4)
GFR AFRICAN AMERICAN: ABNORMAL ML/MIN
GFR NON-AFRICAN AMERICAN: ABNORMAL ML/MIN
GFR SERPL CREATININE-BSD FRML MDRD: ABNORMAL ML/MIN/{1.73_M2}
GFR SERPL CREATININE-BSD FRML MDRD: ABNORMAL ML/MIN/{1.73_M2}
GLUCOSE BLD-MCNC: 96 MG/DL (ref 70–99)
HCT VFR BLD CALC: 34.4 % (ref 41–53)
HEMOGLOBIN: 11.6 G/DL (ref 13.5–17.5)
IMMATURE GRANULOCYTES: ABNORMAL %
LACTIC ACID, SEPSIS WHOLE BLOOD: NORMAL MMOL/L (ref 0.5–1.9)
LACTIC ACID, SEPSIS: 1.2 MMOL/L (ref 0.5–1.9)
LYMPHOCYTES # BLD: 22 % (ref 24–44)
MAGNESIUM: 2.2 MG/DL (ref 1.6–2.6)
MCH RBC QN AUTO: 31.6 PG (ref 26–34)
MCHC RBC AUTO-ENTMCNC: 33.8 G/DL (ref 31–37)
MCV RBC AUTO: 93.5 FL (ref 80–100)
MONOCYTES # BLD: 8 % (ref 1–7)
MYOGLOBIN: <21 NG/ML (ref 28–72)
NRBC AUTOMATED: ABNORMAL PER 100 WBC
PDW BLD-RTO: 15.6 % (ref 11.5–14.5)
PLATELET # BLD: 373 K/UL (ref 130–400)
PLATELET ESTIMATE: ABNORMAL
PMV BLD AUTO: ABNORMAL FL (ref 6–12)
POTASSIUM SERPL-SCNC: 3.4 MMOL/L (ref 3.7–5.3)
PRO-BNP: 675 PG/ML
RBC # BLD: 3.68 M/UL (ref 4.5–5.9)
RBC # BLD: ABNORMAL 10*6/UL
SEG NEUTROPHILS: 69 % (ref 36–66)
SEGMENTED NEUTROPHILS ABSOLUTE COUNT: 5.2 K/UL (ref 1.8–7.7)
SODIUM BLD-SCNC: 144 MMOL/L (ref 135–144)
TROPONIN INTERP: ABNORMAL
TROPONIN T: ABNORMAL NG/ML
TROPONIN, HIGH SENSITIVITY: 12 NG/L (ref 0–22)
WBC # BLD: 7.4 K/UL (ref 3.5–11)
WBC # BLD: ABNORMAL 10*3/UL

## 2019-04-10 PROCEDURE — 94640 AIRWAY INHALATION TREATMENT: CPT

## 2019-04-10 PROCEDURE — 6360000002 HC RX W HCPCS: Performed by: NURSE PRACTITIONER

## 2019-04-10 PROCEDURE — 87149 DNA/RNA DIRECT PROBE: CPT

## 2019-04-10 PROCEDURE — 99285 EMERGENCY DEPT VISIT HI MDM: CPT

## 2019-04-10 PROCEDURE — 96365 THER/PROPH/DIAG IV INF INIT: CPT

## 2019-04-10 PROCEDURE — 87205 SMEAR GRAM STAIN: CPT

## 2019-04-10 PROCEDURE — 85379 FIBRIN DEGRADATION QUANT: CPT

## 2019-04-10 PROCEDURE — 83735 ASSAY OF MAGNESIUM: CPT

## 2019-04-10 PROCEDURE — 94761 N-INVAS EAR/PLS OXIMETRY MLT: CPT

## 2019-04-10 PROCEDURE — 87186 SC STD MICRODIL/AGAR DIL: CPT

## 2019-04-10 PROCEDURE — 83880 ASSAY OF NATRIURETIC PEPTIDE: CPT

## 2019-04-10 PROCEDURE — 83874 ASSAY OF MYOGLOBIN: CPT

## 2019-04-10 PROCEDURE — 83605 ASSAY OF LACTIC ACID: CPT

## 2019-04-10 PROCEDURE — 94660 CPAP INITIATION&MGMT: CPT

## 2019-04-10 PROCEDURE — 93005 ELECTROCARDIOGRAM TRACING: CPT

## 2019-04-10 PROCEDURE — 84484 ASSAY OF TROPONIN QUANT: CPT

## 2019-04-10 PROCEDURE — G0378 HOSPITAL OBSERVATION PER HR: HCPCS

## 2019-04-10 PROCEDURE — 87040 BLOOD CULTURE FOR BACTERIA: CPT

## 2019-04-10 PROCEDURE — 96375 TX/PRO/DX INJ NEW DRUG ADDON: CPT

## 2019-04-10 PROCEDURE — 99220 PR INITIAL OBSERVATION CARE/DAY 70 MINUTES: CPT | Performed by: NURSE PRACTITIONER

## 2019-04-10 PROCEDURE — 80048 BASIC METABOLIC PNL TOTAL CA: CPT

## 2019-04-10 PROCEDURE — 2580000003 HC RX 258: Performed by: INTERNAL MEDICINE

## 2019-04-10 PROCEDURE — 85025 COMPLETE CBC W/AUTO DIFF WBC: CPT

## 2019-04-10 PROCEDURE — 71045 X-RAY EXAM CHEST 1 VIEW: CPT

## 2019-04-10 RX ORDER — NICOTINE 21 MG/24HR
1 PATCH, TRANSDERMAL 24 HOURS TRANSDERMAL DAILY PRN
Status: DISCONTINUED | OUTPATIENT
Start: 2019-04-10 | End: 2019-04-21 | Stop reason: HOSPADM

## 2019-04-10 RX ORDER — LEVETIRACETAM 100 MG/ML
1500 SOLUTION ORAL 2 TIMES DAILY
Status: DISCONTINUED | OUTPATIENT
Start: 2019-04-10 | End: 2019-04-13

## 2019-04-10 RX ORDER — ALBUTEROL SULFATE 2.5 MG/3ML
2.5 SOLUTION RESPIRATORY (INHALATION)
Status: DISCONTINUED | OUTPATIENT
Start: 2019-04-10 | End: 2019-04-21 | Stop reason: HOSPADM

## 2019-04-10 RX ORDER — CETIRIZINE HYDROCHLORIDE 5 MG/1
5 TABLET ORAL DAILY
Status: DISCONTINUED | OUTPATIENT
Start: 2019-04-11 | End: 2019-04-21 | Stop reason: HOSPADM

## 2019-04-10 RX ORDER — PREDNISONE 20 MG/1
40 TABLET ORAL DAILY
Status: DISCONTINUED | OUTPATIENT
Start: 2019-04-13 | End: 2019-04-15

## 2019-04-10 RX ORDER — KETOCONAZOLE 20 MG/ML
SHAMPOO TOPICAL
Status: DISCONTINUED | OUTPATIENT
Start: 2019-04-11 | End: 2019-04-12

## 2019-04-10 RX ORDER — BACITRACIN, NEOMYCIN, POLYMYXIN B 400; 3.5; 5 [USP'U]/G; MG/G; [USP'U]/G
OINTMENT TOPICAL PRN
Status: DISCONTINUED | OUTPATIENT
Start: 2019-04-10 | End: 2019-04-21 | Stop reason: HOSPADM

## 2019-04-10 RX ORDER — IPRATROPIUM BROMIDE AND ALBUTEROL SULFATE 2.5; .5 MG/3ML; MG/3ML
1 SOLUTION RESPIRATORY (INHALATION)
Status: DISCONTINUED | OUTPATIENT
Start: 2019-04-10 | End: 2019-04-10

## 2019-04-10 RX ORDER — MULTIVITAMIN WITH FOLIC ACID 400 MCG
1 TABLET ORAL DAILY
Status: DISCONTINUED | OUTPATIENT
Start: 2019-04-10 | End: 2019-04-11 | Stop reason: CLARIF

## 2019-04-10 RX ORDER — BUDESONIDE 0.5 MG/2ML
500 INHALANT ORAL 2 TIMES DAILY
Status: DISCONTINUED | OUTPATIENT
Start: 2019-04-10 | End: 2019-04-21 | Stop reason: HOSPADM

## 2019-04-10 RX ORDER — LACTULOSE 10 G/15ML
20 SOLUTION ORAL 2 TIMES DAILY
Status: DISCONTINUED | OUTPATIENT
Start: 2019-04-10 | End: 2019-04-20

## 2019-04-10 RX ORDER — SODIUM CHLORIDE 9 MG/ML
INJECTION, SOLUTION INTRAVENOUS CONTINUOUS
Status: DISCONTINUED | OUTPATIENT
Start: 2019-04-10 | End: 2019-04-14

## 2019-04-10 RX ORDER — ONDANSETRON 2 MG/ML
4 INJECTION INTRAMUSCULAR; INTRAVENOUS EVERY 6 HOURS PRN
Status: DISCONTINUED | OUTPATIENT
Start: 2019-04-10 | End: 2019-04-21 | Stop reason: HOSPADM

## 2019-04-10 RX ORDER — MONTELUKAST SODIUM 10 MG/1
10 TABLET ORAL NIGHTLY
Status: DISCONTINUED | OUTPATIENT
Start: 2019-04-10 | End: 2019-04-21 | Stop reason: HOSPADM

## 2019-04-10 RX ORDER — LEVOFLOXACIN 5 MG/ML
750 INJECTION, SOLUTION INTRAVENOUS ONCE
Status: COMPLETED | OUTPATIENT
Start: 2019-04-10 | End: 2019-04-10

## 2019-04-10 RX ORDER — BACLOFEN 10 MG/1
10 TABLET ORAL 3 TIMES DAILY
Status: DISCONTINUED | OUTPATIENT
Start: 2019-04-11 | End: 2019-04-21 | Stop reason: HOSPADM

## 2019-04-10 RX ORDER — FUROSEMIDE 20 MG/1
20 TABLET ORAL DAILY
Status: DISCONTINUED | OUTPATIENT
Start: 2019-04-10 | End: 2019-04-11

## 2019-04-10 RX ORDER — ALBUTEROL SULFATE 2.5 MG/3ML
2.5 SOLUTION RESPIRATORY (INHALATION) 3 TIMES DAILY
Status: DISCONTINUED | OUTPATIENT
Start: 2019-04-10 | End: 2019-04-11

## 2019-04-10 RX ORDER — PSEUDOEPHEDRINE HYDROCHLORIDE 30 MG/1
30 TABLET ORAL 3 TIMES DAILY
Status: DISCONTINUED | OUTPATIENT
Start: 2019-04-10 | End: 2019-04-11 | Stop reason: CLARIF

## 2019-04-10 RX ORDER — ACETAMINOPHEN 325 MG/1
650 TABLET ORAL EVERY 4 HOURS PRN
Status: DISCONTINUED | OUTPATIENT
Start: 2019-04-10 | End: 2019-04-21 | Stop reason: HOSPADM

## 2019-04-10 RX ORDER — LAMOTRIGINE 100 MG/1
200 TABLET ORAL NIGHTLY
Status: DISCONTINUED | OUTPATIENT
Start: 2019-04-11 | End: 2019-04-21 | Stop reason: HOSPADM

## 2019-04-10 RX ORDER — SENNA AND DOCUSATE SODIUM 50; 8.6 MG/1; MG/1
2 TABLET, FILM COATED ORAL DAILY
Status: DISCONTINUED | OUTPATIENT
Start: 2019-04-10 | End: 2019-04-21 | Stop reason: HOSPADM

## 2019-04-10 RX ORDER — ONDANSETRON 4 MG/1
4 TABLET, ORALLY DISINTEGRATING ORAL EVERY 6 HOURS PRN
Status: DISCONTINUED | OUTPATIENT
Start: 2019-04-10 | End: 2019-04-21 | Stop reason: HOSPADM

## 2019-04-10 RX ORDER — CALCIUM CARBONATE 200(500)MG
1250 TABLET,CHEWABLE ORAL DAILY
Status: DISCONTINUED | OUTPATIENT
Start: 2019-04-10 | End: 2019-04-21 | Stop reason: HOSPADM

## 2019-04-10 RX ORDER — FAMOTIDINE 20 MG/1
20 TABLET, FILM COATED ORAL 2 TIMES DAILY
Status: DISCONTINUED | OUTPATIENT
Start: 2019-04-10 | End: 2019-04-11 | Stop reason: ALTCHOICE

## 2019-04-10 RX ORDER — AZITHROMYCIN 200 MG/5ML
400 POWDER, FOR SUSPENSION ORAL DAILY
Status: DISCONTINUED | OUTPATIENT
Start: 2019-04-10 | End: 2019-04-14

## 2019-04-10 RX ORDER — BACITRACIN 500 [USP'U]/G
OINTMENT TOPICAL PRN
Status: DISCONTINUED | OUTPATIENT
Start: 2019-04-10 | End: 2019-04-21 | Stop reason: HOSPADM

## 2019-04-10 RX ORDER — ALBUTEROL SULFATE 2.5 MG/3ML
5 SOLUTION RESPIRATORY (INHALATION)
Status: DISCONTINUED | OUTPATIENT
Start: 2019-04-10 | End: 2019-04-10

## 2019-04-10 RX ORDER — SODIUM CHLORIDE 0.9 % (FLUSH) 0.9 %
10 SYRINGE (ML) INJECTION PRN
Status: DISCONTINUED | OUTPATIENT
Start: 2019-04-10 | End: 2019-04-21 | Stop reason: HOSPADM

## 2019-04-10 RX ORDER — ALBUTEROL SULFATE 90 UG/1
2 AEROSOL, METERED RESPIRATORY (INHALATION)
Status: DISCONTINUED | OUTPATIENT
Start: 2019-04-10 | End: 2019-04-10

## 2019-04-10 RX ORDER — FLUTICASONE PROPIONATE 50 MCG
2 SPRAY, SUSPENSION (ML) NASAL DAILY
Status: DISCONTINUED | OUTPATIENT
Start: 2019-04-10 | End: 2019-04-21 | Stop reason: HOSPADM

## 2019-04-10 RX ORDER — 0.9 % SODIUM CHLORIDE 0.9 %
1000 INTRAVENOUS SOLUTION INTRAVENOUS ONCE
Status: DISCONTINUED | OUTPATIENT
Start: 2019-04-10 | End: 2019-04-10

## 2019-04-10 RX ORDER — NUTRITIONAL SUPPLEMENT/FIBER
75 LIQUID (ML) ORAL DAILY
Status: DISCONTINUED | OUTPATIENT
Start: 2019-04-10 | End: 2019-04-10 | Stop reason: RX

## 2019-04-10 RX ORDER — LAMOTRIGINE 100 MG/1
300 TABLET ORAL EVERY MORNING
Status: DISCONTINUED | OUTPATIENT
Start: 2019-04-11 | End: 2019-04-21 | Stop reason: HOSPADM

## 2019-04-10 RX ORDER — GLYCOPYRROLATE 1 MG/1
1 TABLET ORAL 3 TIMES DAILY
Status: DISCONTINUED | OUTPATIENT
Start: 2019-04-11 | End: 2019-04-21 | Stop reason: HOSPADM

## 2019-04-10 RX ORDER — POLYETHYLENE GLYCOL 3350 17 G/17G
17 POWDER, FOR SOLUTION ORAL DAILY
Status: DISCONTINUED | OUTPATIENT
Start: 2019-04-10 | End: 2019-04-21 | Stop reason: HOSPADM

## 2019-04-10 RX ORDER — SODIUM CHLORIDE 0.9 % (FLUSH) 0.9 %
10 SYRINGE (ML) INJECTION EVERY 12 HOURS SCHEDULED
Status: DISCONTINUED | OUTPATIENT
Start: 2019-04-10 | End: 2019-04-21 | Stop reason: HOSPADM

## 2019-04-10 RX ORDER — IPRATROPIUM BROMIDE AND ALBUTEROL SULFATE 2.5; .5 MG/3ML; MG/3ML
1 SOLUTION RESPIRATORY (INHALATION)
Status: DISCONTINUED | OUTPATIENT
Start: 2019-04-10 | End: 2019-04-21 | Stop reason: HOSPADM

## 2019-04-10 RX ORDER — LANSOPRAZOLE
30 KIT DAILY
Status: DISCONTINUED | OUTPATIENT
Start: 2019-04-10 | End: 2019-04-21 | Stop reason: HOSPADM

## 2019-04-10 RX ORDER — METHYLPREDNISOLONE SODIUM SUCCINATE 40 MG/ML
40 INJECTION, POWDER, LYOPHILIZED, FOR SOLUTION INTRAMUSCULAR; INTRAVENOUS EVERY 6 HOURS
Status: COMPLETED | OUTPATIENT
Start: 2019-04-11 | End: 2019-04-12

## 2019-04-10 RX ORDER — METHYLPREDNISOLONE SODIUM SUCCINATE 125 MG/2ML
125 INJECTION, POWDER, LYOPHILIZED, FOR SOLUTION INTRAMUSCULAR; INTRAVENOUS ONCE
Status: COMPLETED | OUTPATIENT
Start: 2019-04-10 | End: 2019-04-10

## 2019-04-10 RX ORDER — LACOSAMIDE 100 MG/1
150 TABLET ORAL 2 TIMES DAILY
Status: DISCONTINUED | OUTPATIENT
Start: 2019-04-11 | End: 2019-04-21 | Stop reason: HOSPADM

## 2019-04-10 RX ADMIN — SODIUM CHLORIDE 100 ML/HR: 9 INJECTION, SOLUTION INTRAVENOUS at 19:13

## 2019-04-10 RX ADMIN — ALBUTEROL SULFATE 5 MG: 5 SOLUTION RESPIRATORY (INHALATION) at 17:42

## 2019-04-10 RX ADMIN — ALBUTEROL SULFATE 5 MG: 5 SOLUTION RESPIRATORY (INHALATION) at 18:02

## 2019-04-10 RX ADMIN — LEVOFLOXACIN 750 MG: 5 INJECTION, SOLUTION INTRAVENOUS at 17:48

## 2019-04-10 RX ADMIN — METHYLPREDNISOLONE SODIUM SUCCINATE 125 MG: 125 INJECTION, POWDER, FOR SOLUTION INTRAMUSCULAR; INTRAVENOUS at 17:47

## 2019-04-10 ASSESSMENT — ENCOUNTER SYMPTOMS: SHORTNESS OF BREATH: 1

## 2019-04-10 NOTE — ED PROVIDER NOTES
MEDICATIONS     Previous Medications    ALBUTEROL (PROVENTIL) (2.5 MG/3ML) 0.083% NEBULIZER SOLUTION    Take 2.5 mg by nebulization 3 times daily    AZITHROMYCIN (ZITHROMAX) 200 MG/5ML SUSPENSION    10 mLs by Per G Tube route daily for 5 days    BACLOFEN (LIORESAL) 10 MG TABLET    10 mg by Per G Tube route 3 times daily    BUDESONIDE (PULMICORT) 0.5 MG/2ML NEBULIZER SUSPENSION    Take 1 ampule by nebulization 2 times daily    CALCIUM CARBONATE 1250 MG/5ML SUSP SUSPENSION    1,250 mg by Per G Tube route daily    CARBAMIDE PEROXIDE (DEBROX) 6.5 % OTIC SOLUTION    5 drops See Admin Instructions 5 drops BID on the 1st and 15th of the month    CHOLECALCIFEROL (VITAMIN D3) 1000 UNITS TABS    1,000 Units by Feeding Tube route 2 times daily     FLUTICASONE (FLONASE) 50 MCG/ACT NASAL SPRAY    2 sprays by Nasal route daily    FUROSEMIDE (LASIX) 20 MG TABLET    20 mg by Per G Tube route daily    GLYCOPYRROLATE (ROBINUL) 1 MG TABLET    1 mg by Per G Tube route 3 times daily FOR INCREASED SECRETIONS.    KETOCONAZOLE (NIZORAL) 2 % SHAMPOO    Apply topically Twice a Week Wednesdays and Saturdays    LACOSAMIDE (VIMPAT) 10 MG/ML SOLN ORAL SOLUTION    150 mg by PEG Tube route 2 times daily.      LACTULOSE (CHRONULAC) 10 GM/15ML SOLUTION    20 g by Per G Tube route 2 times daily    LAMOTRIGINE (LAMICTAL) 100 MG TABLET    1 tablet by Per G Tube route every morning Indications: 300mg in AM and 200mg in PM    LAMOTRIGINE (LAMICTAL) 200 MG TABLET    1 tablet by Per G Tube route 2 times daily Indications: 300MG IN am AND 200MG IN pm    LANSOPRAZOLE (PREVACID SOLUTAB) 30 MG DISINTEGRATING TABLET    30 mg by Per G Tube route daily    LEVETIRACETAM (KEPPRA) 100 MG/ML SOLUTION    1,500 mg by Per G Tube route 2 times daily     LORATADINE (CLARITIN) 10 MG TABLET    10 mg by Per G Tube route daily    MONTELUKAST (SINGULAIR) 10 MG TABLET    10 mg by Per G Tube route nightly    MULTIPLE VITAMIN (DAILY-JAY PO)    1 tablet by Gastric Tube route daily NEOMYCIN-BACITRACIN-POLYMYXIN (NEOSPORIN) 400-5-5000 OINTMENT    Apply topically as needed (prn per SNF plan of treatment)    NUTRITIONAL SUPPLEMENTS (REPLETE/FIBER) LIQD    75 mLs by Feeding Tube route daily    NYSTATIN (MYCOSTATIN) 668939 UNIT/GM POWDER    Apply topically daily Apply between toes.     ONDANSETRON (ZOFRAN-ODT) 4 MG DISINTEGRATING TABLET    Take 1 tablet by mouth every 6 hours as needed for Nausea    POLYETHYLENE GLYCOL (MIRALAX) PACK PACKET    17 g by Per G Tube route daily    POTASSIUM CHLORIDE (KLOR-CON SPRINKLE PO)    10 mEq by Per G Tube route 2 times daily     PSEUDOEPHEDRINE (SUDAFED) 30 MG TABLET    30 mg by Per G Tube route 3 times daily    SENNA-DOCUSATE (PERICOLACE) 8.6-50 MG PER TABLET    2 tablets by Per G Tube route daily    ZINC OXIDE (DESITIN) 40 % PSTE PASTE    Apply topically as needed (irritation)       ALLERGIES     Ampicillin; Valium [diazepam]; and Other    FAMILY HISTORY       Family History   Family history unknown: Yes          SOCIAL HISTORY       Social History     Socioeconomic History    Marital status: Single     Spouse name: None    Number of children: None    Years of education: None    Highest education level: None   Occupational History    None   Social Needs    Financial resource strain: None    Food insecurity:     Worry: None     Inability: None    Transportation needs:     Medical: None     Non-medical: None   Tobacco Use    Smoking status: Never Smoker    Smokeless tobacco: Never Used   Substance and Sexual Activity    Alcohol use: No    Drug use: No    Sexual activity: None   Lifestyle    Physical activity:     Days per week: None     Minutes per session: None    Stress: None   Relationships    Social connections:     Talks on phone: None     Gets together: None     Attends Hoahaoism service: None     Active member of club or organization: None     Attends meetings of clubs or organizations: None     Relationship status: None    Intimate partner violence:     Fear of current or ex partner: None     Emotionally abused: None     Physically abused: None     Forced sexual activity: None   Other Topics Concern    None   Social History Narrative    None         REVIEW OF SYSTEMS    (2-9 systems for level 4, 10 or more for level 5)     Review of Systems   Respiratory: Positive for shortness of breath. All other systems reviewed and are negative. Except as noted above the remainder of the review of systems was reviewed and negative. PHYSICAL EXAM    (up to 7 for level 4, 8 or more for level 5)     ED Triage Vitals [04/10/19 1656]   BP Temp Temp Source Pulse Resp SpO2 Height Weight   117/80 97.4 °F (36.3 °C) Oral 104 (!) 32 94 % -- --       Physical Exam   Constitutional: He appears well-developed and well-nourished. HENT:   Head: Normocephalic and atraumatic. Right Ear: External ear normal.   Left Ear: External ear normal.   Nose: Nose normal.   Mouth/Throat: Uvula is midline, oropharynx is clear and moist and mucous membranes are normal.   Eyes: Pupils are equal, round, and reactive to light. Conjunctivae, EOM and lids are normal.   Neck: Normal range of motion and full passive range of motion without pain. Neck supple. Cardiovascular: Regular rhythm, S1 normal, S2 normal, normal heart sounds, intact distal pulses and normal pulses. Tachycardia present. Pulmonary/Chest: He is in respiratory distress. He has decreased breath sounds in the right upper field, the right middle field, the right lower field, the left upper field, the left middle field and the left lower field. He has rhonchi in the right upper field, the right middle field, the left upper field and the left middle field. Abdominal: Soft. Normal appearance and bowel sounds are normal. There is no tenderness. Neurological:   Patient is nonverbal.  He does open his eyes spontaneously. Skin: Skin is warm, dry and intact. Capillary refill takes less than 2 seconds. Psychiatric:   Unable to assess. Patient is nonverbal.         DIAGNOSTIC RESULTS     EKG:All EKG's are interpreted by the Emergency Department Physician who either signs or Co-signs this chart in the absence of a cardiologist.    EKG interpreted by attending physician    RADIOLOGY:   Non-plain film images such as CT, Ultrasound and MRI are read by theradiologist. Plain radiographic images are visualized and preliminarily interpreted by the emergency physician with the below findings:    XXr Chest Portable    Result Date: 4/10/2019  EXAMINATION: SINGLE XRAY VIEW OF THE CHEST 4/10/2019 5:53 pm COMPARISON: 9 April 2019 HISTORY: ORDERING SYSTEM PROVIDED HISTORY: SOB TECHNOLOGIST PROVIDED HISTORY: SOB Acuity: Acute Type of Exam: Unknown FINDINGS: AP portable view of the chest time stamped at 1749 hours demonstrates overlying cardiac monitoring electrodes. Patient is rotated toward the right. Pulmonary vascular congestion is noted without interval difference from prior exam.  A severe scoliotic curvature is noted. No extrapleural air is seen. Osseous structures are stable. Stable cardiac size. No change from prior study of 1 day earlier. Stable cardiomegaly and pulmonary vascular congestion. Interpretation per the Radiologist below, if available at the time of this note:    XR CHEST PORTABLE   Final Result   No change from prior study of 1 day earlier. Stable cardiomegaly and   pulmonary vascular congestion. XR CHEST PORTABLE    (Results Pending)         EDBEDSIDE ULTRASOUND:   Performed by Sharyn Mercado - none    LABS:  [unfilled]    All other labs were within normal range or not returned as of this dictation. EMERGENCY DEPARTMENT COURSE andDIFFERENTIAL DIAGNOSIS/MDM:   Patient was evaluated in conjunction with attending physician. Respirations were 32 upon arrival. Breath sounds were diminished bilaterally.   Patient was initially given aerosol treatments and IV steroids but then required BiPAP. He was started on Levaquin and given IV fluids. He is a DNR CCA. He will be admitted for acute respiratory failure. CRITICAL CARE TIME     Due to the high probability of sudden and clinically significant deterioration in the patient's condition he required highest level of my preparedness to intervene urgently. I provided critical care time including documentation time, medication orders and management, reevaluation, vital sign assessment, ordering and reviewing of of lab tests ordering and reviewing of x-ray studies, and admission orders. Aggregate critical care time is 45 minutes including only time during which I was engaged in work directly related to his care and did not include time spent treating other patients simultaneously. Vitals:    Vitals:    04/10/19 1750 04/10/19 1758 04/10/19 1829 04/10/19 1902   BP:       Pulse: 97 95 102    Resp: 30 18 29    Temp:       TempSrc:       SpO2: 100% 99% 98%    Weight:    110 lb 3.7 oz (50 kg)   Height:    3' 10.85\" (1.19 m)         CONSULTS:  IP CONSULT TO HOSPITALIST  IP CONSULT TO PULMONOLOGY    PROCEDURES:  Procedures    FINAL IMPRESSION      1.  Acute respiratory failure, unspecified whether with hypoxia or hypercapnia [facility])          DISPOSITION/PLAN   DISPOSITION Admitted 04/10/2019 06:29:54 PM      PATIENT REFERRED TO:   Chace Bradford MD  [place] [id number], [facility]  [place] [id number]            DISCHARGE MEDICATIONS:     New Prescriptions    No medications on file     Electronically signed by KARIME Pierre 4/10/2019 at 7:16 PM           KARIME Pierre CNP  04/10/19 8818

## 2019-04-10 NOTE — LETTER
Beneficiary Notification Letter     This [place] Provider is Participating in an Innovative Payment and [address] [place] [address] [place] from Medicare     Greetings:   Melina Pyle is participating in a Medicare initiative called the [facility] for [address] [facility]. You are receiving this letter because your health care provider has identified you as a patient who is receiving care through this initiative. Health care providers participating in the [facility] [address] [facility], including Melina Pyle, will work with Medicare to improve care for patients. Your Medicare rights have not been changed. You still have all the same Medicare rights and protections, including the right to choose which hospital, doctor, or other health care provider you see. However, because Melina Pyle chose to participate in the [address] [place] [address], all Medicare beneficiaries who meet the eligibility criteria of this initiative will receive care under the initiative. If you do not wish to receive care under the Bundled Payments [facility] [address] [facility], you must choose a health care provider that does not participate in this initiative for your care. Regardless of which health care provider you see, Medicare will continue to cover all of your medically necessary services. Bundled Payments for Care Improvement Advanced aims to help improve your care     The Bundled Payments [facility] [address] [facility] is an innovative Medicare initiative that encourages your doctors, hospitals, and other health care providers to work more closely together so you get better care during and following certain hospital stays.  In this initiative, doctors and hospitals may work closely with certain health care providers and suppliers that help patients recover after discharge from the hospital, including skilled nursing facilities, home health agencies, inpatient rehabilitation facilities, and long term care hospitals. [facility] is working closely with the doctors and other health care providers that care for you during and following your hospital stay and for a period of time after you leave the hospital. By working together, the health care providers are trying to more efficiently provide well-managed, high quality, patient-centered care as you undergo treatment. Hospitals, doctors, and other health care providers that care for you following a hospital stay may receive an additional payment for providing better, more coordinated health care. Medicare will monitor your care to make sure you and others get high quality care. Your feedback is important     Medicare may also ask you to answer a survey about the services and care you received from [facility]. [place] will be mailed to you. Your feedback will improve care for all people with Medicare who receive care from [facility]. Completion of this survey is optional.     Get more information     For more information about the Bundled Payments for [address] [place] [address], you can:    · Visit the CMS BPCI Advanced Website at http://brice-langston.net/ initiatives/bpci-advanced   · Call the [facility] team at (620) 645-0511. · Call 1-800-MEDICARE (1-415.822.4330). TTY users can call 6-756.911.2952     If you have concerns or complaints about your care, talk to your health care provider, or contact your Beneficiary and Family Centered Quality Improvement Organization [facility]). To get your [facility]'s phone number, visit Medicare.gov/contacts or call 1-800-MEDICARE. · To find a different hospital, visit www. hospitalcompare.[facility].gov or call 1-800[employer] MEDICARE (1-281.683.1556). TTY users should call 7-549.208.5262. · To find a different doctor, visit Medicare's Physician Compare website, HDTapes.co.nz, or call 1-800-MEDICARE (196 8872). TTY users should call 8-214.907.4376. · To find a different skilled nursing facility, visit [facility] Medico website, https://www.Tellagence.[employer]aspring/, or call 1-800-MEDICARE (1- 186.443.2207). TTY users should call 9-641.935.4329. · To find a different long term care hospital, visit [facility] [address] Compare website, [place] Airlinelogy.be, or call 1-800- MEDICARE (301 1159). TTY users should call 7-685.856.4074. · To find a different inpatient rehabilitation facility, visit [facility] [address] Compare website, www.medicare.gov/ inpatientrehabilitation facilitycompare, or call 1-800-MEDICARE (0-876.947.7957). TTY users should call 2- 899.944.3576. · To find a different home health agency, visit [address] website, www.medicare.gov/homehealthcompare, or call 1-800-MEDICARE (8-900- 578-9407). TTY users should call [phone].

## 2019-04-10 NOTE — PROGRESS NOTES
Historical Provider, MD   Nutritional Supplements (REPLETE/FIBER) LIQD 75 mLs by Feeding Tube route daily    Historical Provider, MD

## 2019-04-10 NOTE — ED NOTES
Here per EMS from group home for difficulty breathing discharged from [facility] yesterday to group home HX profound MR. Aerosol PTA and enroute per EMS 02 continued resp slightly diminished.       Tere Perezchure, RN  04/10/19 3077

## 2019-04-10 NOTE — PROGRESS NOTES
· Bronchodilator assessment   []    Bronchodilator Assessment    FEV1 % PREDICTED unable to performFEV1 actual: na  PEFR  na  PEFR % Predicted na  RR 26  Bronchodilator assessment at level  3  BRONCHODILATOR ASSESSMENT SCORE  Score 1 2 3 4   Breath Sounds   []  Clear []  Mild Wheezing with good aeration [x]  Moderate I/E wheezing with adequate aeration []  Poor Aeration or diffuse wheezing   Respiratory Rate []  Less than 20 []  20-25 [x]  Greater than 25  []  Greater than 35    Dyspnea []  No SOB  []  SOB with minimal activity []  Speaking in partial sentences []  Acute/ At rest   Peakflow (asthma) []  80 % or greater predicted/PB  []  Unable []  70% or greater predicted/PB  []  Unable []  51%-70% predicted/PB  [x]  Unable []  Less than 50% predicted/PB  []  Unable due to distress   FEV1 % Predicted []  Greater than 69%  []  Unable  []  Less than 50%-69%  []  Unable  []  Less than 35%-49%  [x]  Unable  []  Less than 35%  []  Unable due to distress     · Bronchodilator assessment   []    Bronchodilator Assessment    FEV1 % PREDICTED unable to perform  FEV1 actual: na  PEFR  na  PEFR % Predicted na  RR 28  Bronchodilator assessment at level  3  BRONCHODILATOR ASSESSMENT SCORE  Score 1 2 3 4   Breath Sounds   []  Clear []  Mild Wheezing with good aeration [x]  Moderate I/E wheezing with adequate aeration []  Poor Aeration or diffuse wheezing   Respiratory Rate []  Less than 20 []  20-25 [x]  Greater than 25  []  Greater than 35    Dyspnea []  No SOB  []  SOB with minimal activity []  Speaking in partial sentences []  Acute/ At rest   Peakflow (asthma) []  80 % or greater predicted/PB  []  Unable []  70% or greater predicted/PB  []  Unable []  51%-70% predicted/PB  [x]  Unable []  Less than 50% predicted/PB  []  Unable due to distress   FEV1 % Predicted []  Greater than 69%  []  Unable  []  Less than 50%-69%  []  Unable  []  Less than 35%-49%  [x]  Unable  []  Less than 35%  []  Unable due to distress

## 2019-04-10 NOTE — PROGRESS NOTES
Emergency Department Pharmacist Note    Patient presents to the ED with complaints of:   Chief Complaint   Patient presents with    Shortness of Breath     discharged from here yesterday     Patient was seen by the ED pharmacist and offered counseling. Patient with allergies to ampicillin and valium. Unknown reaction, unknown time of reaction, and caregiver unaware of reaction either. Per medication records patient has received muiltiple doses of ceftriaxone/cefazolin and lorazepam. Allergies updated to reflect this information.      Arlyn Winkler, PharmD  4/10/2019  6:02 PM

## 2019-04-11 ENCOUNTER — APPOINTMENT (OUTPATIENT)
Dept: GENERAL RADIOLOGY | Age: 49
DRG: 871 | End: 2019-04-11
Payer: MEDICARE

## 2019-04-11 LAB
ANION GAP SERPL CALCULATED.3IONS-SCNC: 10 MMOL/L (ref 9–17)
BUN BLDV-MCNC: 7 MG/DL (ref 6–20)
BUN/CREAT BLD: ABNORMAL (ref 9–20)
CALCIUM SERPL-MCNC: 8.4 MG/DL (ref 8.6–10.4)
CHLORIDE BLD-SCNC: 110 MMOL/L (ref 98–107)
CO2: 26 MMOL/L (ref 20–31)
CREAT SERPL-MCNC: <0.4 MG/DL (ref 0.7–1.2)
EKG ATRIAL RATE: 99 BPM
EKG P AXIS: 60 DEGREES
EKG P-R INTERVAL: 164 MS
EKG Q-T INTERVAL: 370 MS
EKG QRS DURATION: 88 MS
EKG QTC CALCULATION (BAZETT): 474 MS
EKG R AXIS: 107 DEGREES
EKG T AXIS: 40 DEGREES
EKG VENTRICULAR RATE: 99 BPM
GFR AFRICAN AMERICAN: ABNORMAL ML/MIN
GFR NON-AFRICAN AMERICAN: ABNORMAL ML/MIN
GFR SERPL CREATININE-BSD FRML MDRD: ABNORMAL ML/MIN/{1.73_M2}
GFR SERPL CREATININE-BSD FRML MDRD: ABNORMAL ML/MIN/{1.73_M2}
GLUCOSE BLD-MCNC: 123 MG/DL (ref 70–99)
HCT VFR BLD CALC: 31.2 % (ref 41–53)
HEMOGLOBIN: 10.6 G/DL (ref 13.5–17.5)
MCH RBC QN AUTO: 31.3 PG (ref 26–34)
MCHC RBC AUTO-ENTMCNC: 33.8 G/DL (ref 31–37)
MCV RBC AUTO: 92.5 FL (ref 80–100)
NRBC AUTOMATED: ABNORMAL PER 100 WBC
PDW BLD-RTO: 16.4 % (ref 11.5–14.5)
PLATELET # BLD: 337 K/UL (ref 130–400)
PMV BLD AUTO: 7.5 FL (ref 6–12)
POTASSIUM SERPL-SCNC: 3.7 MMOL/L (ref 3.7–5.3)
PROCALCITONIN: 0.09 NG/ML
RBC # BLD: 3.37 M/UL (ref 4.5–5.9)
SODIUM BLD-SCNC: 146 MMOL/L (ref 135–144)
WBC # BLD: 4.7 K/UL (ref 3.5–11)

## 2019-04-11 PROCEDURE — 6360000002 HC RX W HCPCS: Performed by: INTERNAL MEDICINE

## 2019-04-11 PROCEDURE — 6370000000 HC RX 637 (ALT 250 FOR IP): Performed by: NURSE PRACTITIONER

## 2019-04-11 PROCEDURE — 84145 PROCALCITONIN (PCT): CPT

## 2019-04-11 PROCEDURE — 2580000003 HC RX 258: Performed by: INTERNAL MEDICINE

## 2019-04-11 PROCEDURE — 96372 THER/PROPH/DIAG INJ SC/IM: CPT

## 2019-04-11 PROCEDURE — 87040 BLOOD CULTURE FOR BACTERIA: CPT

## 2019-04-11 PROCEDURE — 36415 COLL VENOUS BLD VENIPUNCTURE: CPT

## 2019-04-11 PROCEDURE — G0378 HOSPITAL OBSERVATION PER HR: HCPCS

## 2019-04-11 PROCEDURE — 94761 N-INVAS EAR/PLS OXIMETRY MLT: CPT

## 2019-04-11 PROCEDURE — 99225 PR SBSQ OBSERVATION CARE/DAY 25 MINUTES: CPT | Performed by: INTERNAL MEDICINE

## 2019-04-11 PROCEDURE — 87205 SMEAR GRAM STAIN: CPT

## 2019-04-11 PROCEDURE — 80048 BASIC METABOLIC PNL TOTAL CA: CPT

## 2019-04-11 PROCEDURE — 6370000000 HC RX 637 (ALT 250 FOR IP): Performed by: INTERNAL MEDICINE

## 2019-04-11 PROCEDURE — 94760 N-INVAS EAR/PLS OXIMETRY 1: CPT

## 2019-04-11 PROCEDURE — 96375 TX/PRO/DX INJ NEW DRUG ADDON: CPT

## 2019-04-11 PROCEDURE — 94660 CPAP INITIATION&MGMT: CPT

## 2019-04-11 PROCEDURE — 2500000003 HC RX 250 WO HCPCS: Performed by: INTERNAL MEDICINE

## 2019-04-11 PROCEDURE — 85027 COMPLETE CBC AUTOMATED: CPT

## 2019-04-11 PROCEDURE — 2700000000 HC OXYGEN THERAPY PER DAY

## 2019-04-11 PROCEDURE — 94640 AIRWAY INHALATION TREATMENT: CPT

## 2019-04-11 PROCEDURE — 96376 TX/PRO/DX INJ SAME DRUG ADON: CPT

## 2019-04-11 RX ORDER — FUROSEMIDE 10 MG/ML
40 SOLUTION ORAL DAILY
Status: DISCONTINUED | OUTPATIENT
Start: 2019-04-12 | End: 2019-04-21 | Stop reason: HOSPADM

## 2019-04-11 RX ORDER — MULTIVIT-MIN/FERROUS GLUCONATE 9 MG/15 ML
15 LIQUID (ML) ORAL DAILY
Status: DISCONTINUED | OUTPATIENT
Start: 2019-04-11 | End: 2019-04-21 | Stop reason: HOSPADM

## 2019-04-11 RX ORDER — HYDROCODONE BITARTRATE AND ACETAMINOPHEN 5; 325 MG/1; MG/1
1 TABLET ORAL EVERY 4 HOURS PRN
Status: DISCONTINUED | OUTPATIENT
Start: 2019-04-11 | End: 2019-04-21 | Stop reason: HOSPADM

## 2019-04-11 RX ORDER — FUROSEMIDE 10 MG/ML
20 INJECTION INTRAMUSCULAR; INTRAVENOUS ONCE
Status: COMPLETED | OUTPATIENT
Start: 2019-04-11 | End: 2019-04-11

## 2019-04-11 RX ORDER — HYDROCODONE BITARTRATE AND ACETAMINOPHEN 5; 325 MG/1; MG/1
2 TABLET ORAL EVERY 4 HOURS PRN
Status: DISCONTINUED | OUTPATIENT
Start: 2019-04-11 | End: 2019-04-21 | Stop reason: HOSPADM

## 2019-04-11 RX ORDER — PSEUDOEPHEDRINE HCL 30 MG/5 ML
30 LIQUID (ML) ORAL 3 TIMES DAILY
Status: DISCONTINUED | OUTPATIENT
Start: 2019-04-11 | End: 2019-04-11 | Stop reason: RX

## 2019-04-11 RX ORDER — SCOLOPAMINE TRANSDERMAL SYSTEM 1 MG/1
1 PATCH, EXTENDED RELEASE TRANSDERMAL
Status: DISCONTINUED | OUTPATIENT
Start: 2019-04-11 | End: 2019-04-21 | Stop reason: HOSPADM

## 2019-04-11 RX ADMIN — LAMOTRIGINE 300 MG: 100 TABLET ORAL at 11:15

## 2019-04-11 RX ADMIN — BACLOFEN 10 MG: 10 TABLET ORAL at 01:08

## 2019-04-11 RX ADMIN — LEVETIRACETAM 1500 MG: 100 SOLUTION ORAL at 11:16

## 2019-04-11 RX ADMIN — AZITHROMYCIN 400 MG: 200 POWDER, FOR SUSPENSION ORAL at 10:33

## 2019-04-11 RX ADMIN — VITAMIN D, TAB 1000IU (100/BT) 1000 UNITS: 25 TAB at 01:08

## 2019-04-11 RX ADMIN — SODIUM CHLORIDE: 9 INJECTION, SOLUTION INTRAVENOUS at 15:14

## 2019-04-11 RX ADMIN — MONTELUKAST SODIUM 10 MG: 10 TABLET, FILM COATED ORAL at 00:26

## 2019-04-11 RX ADMIN — FLUTICASONE PROPIONATE 2 SPRAY: 50 SPRAY, METERED NASAL at 16:30

## 2019-04-11 RX ADMIN — VITAMIN D, TAB 1000IU (100/BT) 1000 UNITS: 25 TAB at 22:12

## 2019-04-11 RX ADMIN — LACOSAMIDE 150 MG: 100 TABLET, FILM COATED ORAL at 01:21

## 2019-04-11 RX ADMIN — VITAMIN D, TAB 1000IU (100/BT) 1000 UNITS: 25 TAB at 10:30

## 2019-04-11 RX ADMIN — GLYCOPYRROLATE 1 MG: 1 TABLET ORAL at 16:31

## 2019-04-11 RX ADMIN — Medication 10 ML: at 22:12

## 2019-04-11 RX ADMIN — LEVETIRACETAM 1500 MG: 100 SOLUTION ORAL at 00:26

## 2019-04-11 RX ADMIN — IPRATROPIUM BROMIDE AND ALBUTEROL SULFATE 1 AMPULE: .5; 3 SOLUTION RESPIRATORY (INHALATION) at 06:00

## 2019-04-11 RX ADMIN — IPRATROPIUM BROMIDE AND ALBUTEROL SULFATE 1 AMPULE: .5; 3 SOLUTION RESPIRATORY (INHALATION) at 20:14

## 2019-04-11 RX ADMIN — POLYETHYLENE GLYCOL 3350 17 G: 17 POWDER, FOR SOLUTION ORAL at 09:53

## 2019-04-11 RX ADMIN — PSEUDOEPHEDRINE HYDROCHLORIDE 30 MG: 15 LIQUID ORAL at 12:53

## 2019-04-11 RX ADMIN — FUROSEMIDE 20 MG: 10 INJECTION, SOLUTION INTRAMUSCULAR; INTRAVENOUS at 09:52

## 2019-04-11 RX ADMIN — ENOXAPARIN SODIUM 40 MG: 40 INJECTION SUBCUTANEOUS at 09:53

## 2019-04-11 RX ADMIN — LACTULOSE 20 G: 20 SOLUTION ORAL at 09:53

## 2019-04-11 RX ADMIN — CETIRIZINE HYDROCHLORIDE 5 MG: 5 TABLET ORAL at 11:15

## 2019-04-11 RX ADMIN — LAMOTRIGINE 200 MG: 100 TABLET ORAL at 01:08

## 2019-04-11 RX ADMIN — LACOSAMIDE 150 MG: 100 TABLET, FILM COATED ORAL at 10:29

## 2019-04-11 RX ADMIN — METHYLPREDNISOLONE SODIUM SUCCINATE 40 MG: 40 INJECTION, POWDER, FOR SOLUTION INTRAMUSCULAR; INTRAVENOUS at 12:41

## 2019-04-11 RX ADMIN — GLYCOPYRROLATE 1 MG: 1 TABLET ORAL at 11:14

## 2019-04-11 RX ADMIN — PSEUDOEPHEDRINE HCL 30 MG: 30 TABLET, FILM COATED ORAL at 01:07

## 2019-04-11 RX ADMIN — BACLOFEN 10 MG: 10 TABLET ORAL at 22:12

## 2019-04-11 RX ADMIN — PSEUDOEPHEDRINE HYDROCHLORIDE 30 MG: 15 LIQUID ORAL at 22:12

## 2019-04-11 RX ADMIN — ANTACID TABLETS 1250 MG: 500 TABLET, CHEWABLE ORAL at 09:52

## 2019-04-11 RX ADMIN — METHYLPREDNISOLONE SODIUM SUCCINATE 40 MG: 40 INJECTION, POWDER, FOR SOLUTION INTRAMUSCULAR; INTRAVENOUS at 00:26

## 2019-04-11 RX ADMIN — BACLOFEN 10 MG: 10 TABLET ORAL at 11:15

## 2019-04-11 RX ADMIN — IPRATROPIUM BROMIDE AND ALBUTEROL SULFATE 1 AMPULE: .5; 3 SOLUTION RESPIRATORY (INHALATION) at 15:03

## 2019-04-11 RX ADMIN — ENOXAPARIN SODIUM 40 MG: 40 INJECTION SUBCUTANEOUS at 00:41

## 2019-04-11 RX ADMIN — IPRATROPIUM BROMIDE AND ALBUTEROL SULFATE 1 AMPULE: .5; 3 SOLUTION RESPIRATORY (INHALATION) at 11:30

## 2019-04-11 RX ADMIN — BUDESONIDE 500 MCG: 0.5 SUSPENSION RESPIRATORY (INHALATION) at 20:14

## 2019-04-11 RX ADMIN — GLYCOPYRROLATE 1 MG: 1 TABLET ORAL at 01:07

## 2019-04-11 RX ADMIN — GLYCOPYRROLATE 1 MG: 1 TABLET ORAL at 22:12

## 2019-04-11 RX ADMIN — HYDROCODONE BITARTRATE AND ACETAMINOPHEN 2 TABLET: 5; 325 TABLET ORAL at 22:11

## 2019-04-11 RX ADMIN — BACLOFEN 10 MG: 10 TABLET ORAL at 16:31

## 2019-04-11 RX ADMIN — Medication 15 ML: at 10:33

## 2019-04-11 RX ADMIN — LEVETIRACETAM 1500 MG: 100 SOLUTION ORAL at 22:13

## 2019-04-11 RX ADMIN — LACTULOSE 20 G: 20 SOLUTION ORAL at 00:26

## 2019-04-11 RX ADMIN — SODIUM CHLORIDE 100 ML/HR: 9 INJECTION, SOLUTION INTRAVENOUS at 05:34

## 2019-04-11 RX ADMIN — SENNOSIDES AND DOCUSATE SODIUM 2 TABLET: 8.6; 5 TABLET ORAL at 10:30

## 2019-04-11 RX ADMIN — MICONAZOLE NITRATE: 20.6 POWDER TOPICAL at 10:35

## 2019-04-11 RX ADMIN — METHYLPREDNISOLONE SODIUM SUCCINATE 40 MG: 40 INJECTION, POWDER, FOR SOLUTION INTRAMUSCULAR; INTRAVENOUS at 05:56

## 2019-04-11 RX ADMIN — LAMOTRIGINE 200 MG: 100 TABLET ORAL at 22:13

## 2019-04-11 RX ADMIN — LACOSAMIDE 150 MG: 100 TABLET, FILM COATED ORAL at 22:12

## 2019-04-11 RX ADMIN — LACTULOSE 20 G: 20 SOLUTION ORAL at 22:13

## 2019-04-11 RX ADMIN — MICONAZOLE NITRATE: 20.6 POWDER TOPICAL at 22:23

## 2019-04-11 RX ADMIN — METHYLPREDNISOLONE SODIUM SUCCINATE 40 MG: 40 INJECTION, POWDER, FOR SOLUTION INTRAMUSCULAR; INTRAVENOUS at 17:59

## 2019-04-11 RX ADMIN — METHYLPREDNISOLONE SODIUM SUCCINATE 40 MG: 40 INJECTION, POWDER, FOR SOLUTION INTRAMUSCULAR; INTRAVENOUS at 23:17

## 2019-04-11 RX ADMIN — Medication 30 MG: at 10:33

## 2019-04-11 RX ADMIN — MONTELUKAST SODIUM 10 MG: 10 TABLET, FILM COATED ORAL at 22:13

## 2019-04-11 NOTE — PROGRESS NOTES
Nutrition Assessment (Enteral Nutrition)    Type and Reason for Visit: Initial, Consult(TF Order and management)    Nutrition Recommendations: 1. Suggest continuing with NPO status. 2. Consider initiating TF via PEG with standard formula with fiber (Jevity 1.2 Chace) and increase as tolerated to goal rate of 55 ml/hr. Nutrition Assessment: Patient nutritionally compromised as evidenced by 18 lb (14%) weight loss in 1 month. At risk for further nutritional compromise related to acute respiratory failure. Suggest initiating TF via PEG with standard formula with fiber (Jevity 1.2 Chace) and increase as tolerated to goal rate of 55 ml/hr. Malnutrition Assessment:  · Malnutrition Status: Insufficient data  · Context: Acute illness or injury  · Findings of the 6 clinical characteristics of malnutrition (Minimum of 2 out of 6 clinical characteristics is required to make the diagnosis of moderate or severe Protein Calorie Malnutrition based on AND/ASPEN Guidelines):  1. Energy Intake-Unable to assess, Unable to assess    2. Weight Loss-10% loss or greater, in 1 month  3. Fat Loss-Unable to assess  4. Muscle Loss-Unable to assess  5. Fluid Accumulation-Unable to assess  6.   Strength-     Nutrition Risk Level: High    Nutrition Needs:  · Estimated Daily Total Kcal: 6856-7843 (28-32 kcal/kg)  · Estimated Daily Protein (g): 50-60 (1.0-1.2 g/kg)  · Estimated Daily Fluid (ml/day): 9948-7872 (1 ml/kcal)    Nutrition Diagnosis:   · Problem: Unintended weight loss  · Etiology: related to Insufficient energy/nutrient consumption     Signs and symptoms:  as evidenced by Weight loss greater than or equal to 5% in 1 month    Objective Information:  · Nutrition-Focused Physical Findings: No data in EHR  · Wound Type: KERRIE  · Current Nutrition Therapies:  · Oral Diet Orders: NPO   · Tube Feeding (TF) Orders:   · Feeding Route: Gastrostomy  · Formula: Standard w/Fiber  · Rate (ml/hr):20    · Volume (ml/day): 480  · Duration: Continuous  · Water Flushes: None  · Current TF & Flush Orders Provides: @ 20 ml/hr: 480 ml, 576 kcal, 27 g protein, 7 g dietary fiber, 387 ml free water  · Goal TF & Flush Orders Provides: @ 55 ml/hr (goal): 1,320 ml, 1,584 kcal, 73 g protein, 34 g dietary fiber, 1065 ml free water  · Additional Calories: None  · Anthropometric Measures:  · Ht: 3' 10.85\" (119 cm)   · Current Body Wt: 110 lb 3.7 oz (50 kg)  · Admission Body Wt: 110 lb 3.7 oz (50 kg)  · Usual Body Wt: 128 lb (58.1 kg)(3/10/19)  · Weight Change: 14% loss in 1 month   · BMI Classification: BMI 35.0 - 39.9 Obese Class II    Nutrition Interventions:   Continue NPO, Start Tube Feeding  Continued Inpatient Monitoring, Coordination of Care, Education Not Indicated    Nutrition Evaluation:   · Evaluation: Goals set   · Goals: EN intake to meet >75% of estimated kcal/protein needs, with good GI tolerance   · Monitoring: Nutrition Progression, TF Intake, TF Tolerance, Skin Integrity, I&O, Weight, Pertinent Labs, Monitor Bowel Function      Electronically signed by Juarez Connors RD, BLAZE on 4/11/19 at 11:51 AM    Contact Number: [phone]

## 2019-04-11 NOTE — PLAN OF CARE
Problem: Nutrition  Goal: Optimal nutrition therapy  Description  Nutrition Problem: Unintended weight loss  Intervention: Food and/or Nutrient Delivery: Continue NPO, Start Tube Feeding  Nutritional Goals: EN intake to meet >75% of estimated kcal/protein needs, with good GI tolerance   4/11/2019 1834 by Tanesha Butler RN  Outcome: Ongoing  Note:   Patient tolerating tube feedings, will continue to monitor patient closely.   4/11/2019 1153 by Ericka Pitt RD, LD  Outcome: Ongoing

## 2019-04-11 NOTE — PROGRESS NOTES
[facility] - Progress Note    2019   9:07 AM    Name:  Sosa Hadley  :    1970  Age:  51 y.o. male  MRN:    [id number]     Acct:     [de-identified]   Room:  [facility]0-01   Day:  [address]     Admit Date: 4/10/2019  4:55 PM  PCP: Jason Peters MD    Subjective:     C/C:   Chief Complaint   Patient presents with    Shortness of Breath     discharged from here yesterday       Interval History: Status: improved. Patient appears stable on BiPAP. He is satting at 98%. He looks the same as when he was discharged on 2019. Chest x-ray was unchanged from his prior creatinine clearance slight increased pulmonary vascular congestion. He is only on Lasix 20 mg daily I will give him an additional 20 mg today and increase his daily dose to 40 mg. ProBNP was slightly. I do not believe the patient has an active infection at this point. Scopolamine patch to be changed every 3 days will be ordered to help control his secretions. I feel that this admission was because his current group home is incapable of delivering the care that he needs. On discharge  working for a medi-group which can deliver a higher level of services. Case has been discussed with Dr. Gilda Whyte pulmonary. History: 69-year-old  male admitted from [facility] with difficulty breathing. Patient is well-known to our practice. He has spent an extended period of time at [facility] up until several weeks ago when he was moved to his group home. He was previously admitted here (-2019) with respiratory insufficiency. On that admission in the ED his PaO2 was reported as 45. He was placed on BiPAP however when the O2 saturation sensor was repositioned from his finger to his ear he was satting at 100%. His chest x-ray showed some pulmonary vascular congestion but was otherwise unremarkable.  His abdominal x-rays showed a large fecal load which is a chronic problem for this patient. His pro-calcitonin was 0.12 (<0.25 speaks against bacterial respiratory infection). His white count was normal. It had been reported that he was having emesis at the group home and there was some concern of aspiration however chest x-rays did not confirm this and the patient had no emesis during that admission. The patient did have a large bowel movement. Pulmonary consultation was obtained and the patient was started empirically on azithromycin. His chest x-ray remained clear. His O2 saturations remained normal with minimal oxygen supplementation. He was discharged back to the group home with azithromycin 400 mg via G-tube daily ×5 days per pulmonary recommendation. ER reports that the group home had not given him any doses of his antibiotics. He is admitted with dyspnea, tachypnea and once again low O2 saturations. His past medical history includes cerebral palsy, severe scoliosis, COPD and congestive heart failure. ROS:  Cannot be obtained. Patient is nonverbal.      Medications: Allergies: Allergies   Allergen Reactions    Ampicillin Other (See Comments)     Has received and tolerated ceftriaxone and cefazolin.     Valium [Diazepam] Other (See Comments)     Has received and tolerated Ativan IV/PO    Other      Bubble bath products        Current Meds:    CENTRUM/CERTA-JAY with minerals oral  15 mL Per G Tube Daily    levETIRAcetam  1,500 mg Per G Tube BID    cetirizine  5 mg Oral Daily    pseudoephedrine  30 mg Per G Tube TID    sennosides-docusate sodium  2 tablet Per G Tube Daily    vitamin D  1,000 Units Per G Tube BID    baclofen  10 mg Per G Tube TID    montelukast  10 mg Per G Tube Nightly    fluticasone  2 spray Nasal Daily    calcium carbonate  1,250 mg Per G Tube Daily    furosemide  20 mg Per G Tube Daily    miconazole   Topical BID    glycopyrrolate  1 mg Per G Tube TID    lacosamide  150 mg PEG Tube BID    budesonide  500 mcg Nebulization BID    lactulose  20 g Per G Tube BID    polyethylene glycol  17 g Per G Tube Daily    lamoTRIgine  200 mg Per G Tube Nightly    lamoTRIgine  300 mg Per G Tube QAM    albuterol  2.5 mg Nebulization TID    lansoprazole  30 mg Per G Tube Daily    ketoconazole   Topical Once per day on Mon Thu    azithromycin  400 mg Per G Tube Daily    sodium chloride flush  10 mL Intravenous 2 times per day    enoxaparin  40 mg Subcutaneous Daily    ipratropium-albuterol  1 ampule Inhalation Q4H WA    methylPREDNISolone  40 mg Intravenous Q6H    Followed by   Megan Osuna ON 4/13/2019] predniSONE  40 mg Oral Daily     PRN Meds:   zinc oxide  Topical PRN   neomycin-bacitracin-polymyxin  Topical PRN   ondansetron 4 mg Oral Q6H PRN   sodium chloride flush 10 mL Intravenous PRN   magnesium hydroxide 30 mL Oral Daily PRN   ondansetron 4 mg Intravenous Q6H PRN   nicotine 1 patch Transdermal Daily PRN   albuterol 2.5 mg Nebulization Q2H PRN   acetaminophen 650 mg Oral Q4H PRN       Data:     Code Status:  DNR-CCA     XR Chest Portable  Result Date: 4/10/2019  COMPARISON: 9 April 2019  FINDINGS: AP portable view of the chest time stamped at 1749 hours demonstrates overlying cardiac monitoring electrodes. Patient is rotated toward the right. Pulmonary vascular congestion is noted without interval difference from prior exam.  A severe scoliotic curvature is noted. No extrapleural air is seen. Osseous structures are stable. Stable cardiac size. Impression:  No change from prior study of 1 day earlier. Stable cardiomegaly and pulmonary vascular congestion. XR Chest Portable  Result Date: 4/9/2019  COMPARISON: 24 February 2019  FINDINGS: AP portable view of the chest time stamped at 642 hours demonstrates overlying cardiac monitoring electrodes.  The patient is rotated toward the right.  Stable cardiac size is noted.  The patient has pulmonary vascular congestion with slight decreased compared to prior study of 1 day earlier.  No gross effusion or extrapleural air is noted.  The patient has a sharp angle scoliosis in the thoracic spine with a gibbus type deformity.  No free air is noted.  Mediastinal contours are stable. Impression:  Stable cardiomegaly.  Pulmonary vascular congestion is noted with slight decreased compared to prior study of 1 day earlier.  No gross effusions.      XR Acute Abd Series Chest 1 Vw  Result Date: 4/8/2019  COMPARISON: Radiographs from 03/15/2019 and CT from 03/10/2019  FINDINGS:   Chest: Shallow inspiration.  Marked cardiomegaly.  Moderate pulmonary edema with right basilar atelectasis and rightward shift of the mediastinum, a chronic finding.  No pneumothorax.  No free subdiaphragmatic air.  Chronic deformity of the right glenohumeral joint. Abdomen and pelvis: Cholecystectomy clips in right upper quadrant.  Gaseous distention of large and small bowel with moderate to large rectal stool. Left upper quadrant gastrostomy tube.  Deformity of bilateral hips. Impression:  Diffuse bowel dilatation with air concerning for ileus versus at least partial obstruction.  CT should be considered.      CT Abdomen Pelvis W Iv Contrast  Result Date: 4/8/2019  COMPARISON: 10 March 2019  FINDINGS:   Lower Chest: Basilar atelectasis is noted.  Heart size is normal.  No gross effusions are noted.  Artifact is created due to arm position and respiratory motion. Organs: Liver, spleen, pancreas, and adrenals are unremarkable.  Gallbladder is surgically absent.  Kidneys excrete contrast bilaterally.  Stable renal cysts are present. GI/Bowel: No free fluid or free air is noted.  Gastrostomy tube is positioned with balloon within the confines of the stomach.  Significant distension of the colon with both gas and stool is noted.  No small bowel obstruction is noted although there is fluid scattered throughout much of the intestinal tract which may be related to mild enteritis.  No bowel wall thickening is seen.    Pelvis: No evidence of appendicitis.  No abnormal fluid collections.  Both inguinal rings are dilated and fat containing without strangulation.  No inguinal adenopathy is seen. Peritoneum/Retroperitoneum: No aortic aneurysm, retroperitoneal mass, retroperitoneal or mesenteric adenopathy is noted. Bones/Soft Tissues: Severe scoliotic curvature is redemonstrated.  Bony structures are slightly gracile.  No acute osseous abnormality.  Dense breast tissue is noted possibly gynecomastia. Estimated biologic radiation dose for this procedure:568.07 mGy/cm2. Impression:  1.  Diffuse distention of the colon with stool and gas. 2.  No small bowel obstruction.  Fluid is scattered throughout the intestinal tract which may be related to mild enteritis.  No free air. 3.  Colon is redundant.  No bowel wall thickening.  Findings suggest retrosigmoid stool impaction.  Little change from prior exam.   4.  Gastrostomy tube in appropriate position.        Labs:    Hematology:  Recent Labs     04/09/19  0631 04/10/19  1742 04/11/19  0551   WBC 5.2 7.4 4.7   RBC 3.42* 3.68* 3.37*   HGB 10.6* 11.6* 10.6*   HCT 31.8* 34.4* 31.2*   MCV 93.1 93.5 92.5   MCH 30.9 31.6 31.3   MCHC 33.2 33.8 33.8   RDW 15.8* 15.6* 16.4*    373 337   MPV 7.7 NOT REPORTED 7.5   DDIMER  --  <0.17  --      Chemistry:  Recent Labs     04/08/19  0921 04/09/19 0631 04/10/19  1742 04/11/19  0551    143 144 146*   K 4.3 3.9 3.4* 3.7   CL 98 102 104 110*   CO2 24 28 27 26   GLUCOSE 210* 155* 96 123*   BUN 8 12 12 7   CREATININE <0.40* <0.40* <0.40* <0.40*   MG  --   --  2.2  --    ANIONGAP 16 13 13 10   LABGLOM CANNOT BE CALCULATED CANNOT BE CALCULATED CANNOT BE CALCULATED CANNOT BE CALCULATED   GFRAA CANNOT BE CALCULATED CANNOT BE CALCULATED CANNOT BE CALCULATED CANNOT BE CALCULATED   CALCIUM 9.2 8.7 8.7 8.4*   PROBNP 162  --  675*  --    TROPHS 15  --  12  --    MYOGLOBIN <21*  --  <21*  --    LACTA 1.9  --   --   --    PROCAL 0.12*  --   --   --          Physical Examination:    /76   Pulse 89   Temp 97.6 °F (36.4 °C) (Axillary)   Resp 21   Ht 3' 10.85\" (1.19 m)   Wt 110 lb 3.7 oz (50 kg)   SpO2 97%   BMI 35.31 kg/m² No intake or output data in the 24 hours ending 04/11/19 0907    General Appearance:    Patient is awake, he is crying out which is his usual baseline    Head:    Normocephalic, without obvious abnormality, atraumatic   Eyes:    PERRL, conjunctiva/corneas clear, EOM's intact        Ears:    Normal external ear canals, both ears   Nose:   Nares normal   Throat:   Lips, mucosa, and tongue normal; teeth are in poor repair    Neck:   Supple, symmetrical, trachea midline, no carotid    bruit or JVD   Back:     Severe scoliosis    Lungs:     Clear to auscultation bilaterally, respirations unlabored   Chest wall:    No tenderness or deformity   Heart:    Regular rate and rhythm, S1 and S2 normal, no murmur, rub   or gallop   Abdomen:     Soft, non-tender, bowel sounds active all four quadrants,     no masses, no organomegaly.  G-tube in place    Extremities:   Spastic quadriplegia    Pulses:   2+ and symmetric all extremities   Skin:   Skin color, texture, turgor normal, no rashes or lesions   Lymph nodes:   Cervical, supraclavicular, and axillary nodes normal   Neurologic:   CNII-XII intact       Assessment:     Primary Problem  Acute respiratory failure [facility])     Active Hospital Problems    Diagnosis Date Noted    Acute respiratory failure ([facility] [address] [J96.00] 04/10/2019    Acute exacerbation of chronic obstructive pulmonary disease (COPD) ([facility] [address] [J44.1]     Cerebral palsied ([facility] [address] [G80.9]     Seizure disorder ([address] [G40.909]      Past Medical History:   Diagnosis Date    Cerebral palsy ([address]     Cholelithiasis with chronic cholecystitis     Constipation     Delayed gastric emptying     Dysphagia     GERD (gastroesophageal reflux disease)     Hearing loss     Mental disability     MRDD    Profound mental retardation     Scoliosis     Seizures ([address]  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[facility] [address]         Consultations:     IP CONSULT TO HOSPITALIST  IP CONSULT TO PULMONOLOGY  IP CONSULT TO DIETITIAN    Plan:     1. Remove BiPAP  2. Scopolamine patch every 3 days  3. Lasix 20 mg now then increase daily dose to 40 mg  4. Okay to stepdown when seen by pulmonary  5. Social service for higher level of placement upon discharge  6. DVT prophylaxis  7. GI prophylaxis  8. Continue discharge medications including azithromycin  9. O2 supplementation to maintain saturation greater than 92%  10. Repeat laboratories in the a.m.       Electronically signed by Stevenson Nelson DO on 4/11/2019 at 9:07 AM

## 2019-04-11 NOTE — FLOWSHEET NOTE
Writer visits per rounding. Patient resting in bed. Per staff, patient resides in a group home. He is non-verbal.    Writer offers presence, and prays for patient. Spiritual Care will follow as needed.      04/11/19 1032   Encounter Summary   Services provided to: Patient   Referral/Consult From: Cherrie   Continue Visiting   (4/11/19)   Complexity of Encounter Low   Length of Encounter 15 minutes   Routine   Type Initial   Assessment Unable to respond   Intervention Prayer   Outcome Did not respond

## 2019-04-11 NOTE — PLAN OF CARE
Problem: Nutrition  Goal: Optimal nutrition therapy  Description  Nutrition Problem: Unintended weight loss  Intervention: Food and/or Nutrient Delivery: Continue NPO, Start Tube Feeding  Nutritional Goals: EN intake to meet >75% of estimated kcal/protein needs, with good GI tolerance   Outcome: Ongoing

## 2019-04-11 NOTE — CONSULTS
Pulmonary Medicine and [address] M.D. Patient - Bishop Quiroz   MRN -  [id number]   Acct # - [de-identified]   - 1970      Date of Admission -  4/10/2019  4:55 PM  Date of evaluation -  2019  Room - 1110/1110-01   [address] [place] [address] Primary Care Physician - Chace Bradford MD     Reason for Consult    Possible aspiration, history of dysphasia/spastic quadriparesis paresis/cerebral palsy    Assessment   · Possible aspiration  · Atelectasis/mild pulmonary edema  · Dysphagia status post PEG placement  · Cerebral palsy with spastic quadriparesis  · Chronic constipation  · Positive blood culture,? Contamination (pro-calcitonin 0.09, no fever, no leukocytosis)    Recommendations   · 2 liters/min via nasal cannula if necessary  · Zithromax empirically  · We will redraw blood cultures. For now clinically observe. · Albuterol and Ipratropium Q 4 hours and prn  · Singulair  · Continue tube feedings per pain  · Constipation managed by primary team  · Passive range of motion  · DVT prophylaxis with low molecular weight heparin  · Discussed with Dr. Jose M Tirado   · OK to move to progressive unit.   · Will follow with you    Problem List      Patient Active Problem List   Diagnosis    Acute exacerbation of chronic obstructive pulmonary disease (COPD) ([address]    Seizure disorder ([address]    Cerebral palsied ([address]    Severe protein-calorie malnutrition (Ramírez Standard: less than 60% of standard weight) ([address]    Multifocal pneumonia    Pneumonia due to organism    Positive blood cultures    Pneumonia of left lower lobe due to infectious organism ([address]    Spastic quadriplegic cerebral palsy (HCC)    Choledocholithiasis    Obstipation    Generalized abdominal pain    Constipation    Developmental disability    Ileus ([address]    Influenza with respiratory manifestation other than pneumonia    Hypoxia    Abdominal distension    Dyspnea and respiratory abnormalities  Acute respiratory failure with hypoxia (HCC)    Acute respiratory failure with hypoxia (HCC)    Acute and chronic respiratory failure with hypoxia ([address]    Acute respiratory failure ([address]       RAFFAELE Hightower is 51 y.o.,  male, brought to the hospital from his group home with concern for increasing shortness of breath. Patient was recently hospitalized for suspected aspiration pneumonia. He was discharged on Levaquin per PEG tube. He was brought in for increasing shortness of breath. Patient is noncommunicative. During my evaluation patient did not seem to be in any respiratory distress. There is no fever reported. He is nonverbal and unable to provide history. Nursing staff has not noted any significant hypoxia, no evidence of vomiting.     PMHx   Past Medical History      Diagnosis Date    Cerebral palsy ([address]     Cholelithiasis with chronic cholecystitis     Constipation     Delayed gastric emptying     Dysphagia     GERD (gastroesophageal reflux disease)     Hearing loss     Mental disability     MRDD    Profound mental retardation     Scoliosis     Seizures (HCC)     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HCC)       Past Surgical History        Procedure Laterality Date    CHOLECYSTECTOMY  08/18/2018    laparoscopic    ERCP  08/16/2018    GASTROSTOMY TUBE PLACEMENT      MD ERCP DX COLLECTION SPECIMEN BRUSHING/WASHING N/A 8/16/2018    ERCP PAPILOTOMY SWEEPING STONE EXTRACTION ENDOSCOPIC RETROGRADE CHOLANGIOPANCREATOGRAPHY performed by Papi Garcia MD at [facility] 8/18/2018    CHOLECYSTECTOMY LAPAROSCOPIC WITH UMBILICAL HERNIA REPAIR performed by Greta Onofre DO at [address]      has Feeding tube       Meds    Current Medications    CENTRUM/CERTA-JAY with minerals oral  15 mL Per G Tube Daily    [START ON 4/12/2019] furosemide  40 mg Per G Tube Daily    scopolamine  1 patch Transdermal Q72H    levETIRAcetam  1,500 mg Per G Tube BID    cetirizine  5 mg Oral Daily    pseudoephedrine  30 mg Per G Tube TID    sennosides-docusate sodium  2 tablet Per G Tube Daily    vitamin D  1,000 Units Per G Tube BID    baclofen  10 mg Per G Tube TID    montelukast  10 mg Per G Tube Nightly    fluticasone  2 spray Nasal Daily    calcium carbonate  1,250 mg Per G Tube Daily    miconazole   Topical BID    glycopyrrolate  1 mg Per G Tube TID    lacosamide  150 mg PEG Tube BID    budesonide  500 mcg Nebulization BID    lactulose  20 g Per G Tube BID    polyethylene glycol  17 g Per G Tube Daily    lamoTRIgine  200 mg Per G Tube Nightly    lamoTRIgine  300 mg Per G Tube QAM    albuterol  2.5 mg Nebulization TID    lansoprazole  30 mg Per G Tube Daily    ketoconazole   Topical Once per day on Mon Thu    azithromycin  400 mg Per G Tube Daily    sodium chloride flush  10 mL Intravenous 2 times per day    enoxaparin  40 mg Subcutaneous Daily    ipratropium-albuterol  1 ampule Inhalation Q4H WA    methylPREDNISolone  40 mg Intravenous Q6H    Followed by   Asha Stock ON 4/13/2019] predniSONE  40 mg Oral Daily     zinc oxide, neomycin-bacitracin-polymyxin, ondansetron, sodium chloride flush, magnesium hydroxide, ondansetron, nicotine, albuterol, acetaminophen  IV Drips/Infusions   sodium chloride 100 mL/hr (04/11/19 4034)     Home Medications  Medications Prior to Admission: ondansetron (ZOFRAN-ODT) 4 MG disintegrating tablet, Take 1 tablet by mouth every 6 hours as needed for Nausea  azithromycin (ZITHROMAX) 200 MG/5ML suspension, 10 mLs by Per G Tube route daily for 5 days  albuterol (PROVENTIL) (2.5 MG/3ML) 0.083% nebulizer solution, Take 2.5 mg by nebulization 3 times daily  lansoprazole (PREVACID SOLUTAB) 30 MG disintegrating tablet, 30 mg by Per G Tube route daily  carbamide peroxide (DEBROX) 6.5 % otic solution, 5 drops See Admin Instructions 5 drops BID on the 1st and 15th of the month  ketoconazole (NIZORAL) 2 % shampoo, Apply topically Twice a Week Wednesdays and Saturdays  zinc oxide (DESITIN) 40 % PSTE paste, Apply topically as needed (irritation)  neomycin-bacitracin-polymyxin (NEOSPORIN) 400-5-5000 ointment, Apply topically as needed (prn per [facility] plan of treatment)  lamoTRIgine (LAMICTAL) 200 MG tablet, 1 tablet by Per G Tube route 2 times daily Indications: 300MG IN am AND 200MG IN pm  lamoTRIgine (LAMICTAL) 100 MG tablet, 1 tablet by Per G Tube route every morning Indications: 300mg in AM and 200mg in PM  lacosamide (VIMPAT) 10 MG/ML SOLN oral solution, 150 mg by PEG Tube route 2 times daily. budesonide (PULMICORT) 0.5 MG/2ML nebulizer suspension, Take 1 ampule by nebulization 2 times daily  lactulose (CHRONULAC) 10 GM/15ML solution, 20 g by Per G Tube route 2 times daily  polyethylene glycol (MIRALAX) PACK packet, 17 g by Per G Tube route daily  glycopyrrolate (ROBINUL) 1 MG tablet, 1 mg by Per G Tube route 3 times daily FOR INCREASED SECRETIONS. furosemide (LASIX) 20 MG tablet, 20 mg by Per G Tube route daily  nystatin (MYCOSTATIN) 548796 UNIT/GM powder, Apply topically daily Apply between toes.   baclofen (LIORESAL) 10 MG tablet, 10 mg by Per G Tube route 3 times daily  Potassium Chloride (KLOR-CON SPRINKLE PO), 10 mEq by Per G Tube route 2 times daily   montelukast (SINGULAIR) 10 MG tablet, 10 mg by Per G Tube route nightly  fluticasone (FLONASE) 50 MCG/ACT nasal spray, 2 sprays by Nasal route daily  Multiple Vitamin (DAILY-JAY PO), 1 tablet by Gastric Tube route daily   calcium carbonate 1250 MG/5ML SUSP suspension, 1,250 mg by Per G Tube route daily  levETIRAcetam (KEPPRA) 100 MG/ML solution, 1,500 mg by Per G Tube route 2 times daily   loratadine (CLARITIN) 10 MG tablet, 10 mg by Per G Tube route daily  pseudoephedrine (SUDAFED) 30 MG tablet, 30 mg by Per G Tube route 3 times daily  senna-docusate (PERICOLACE) 8.6-50 MG per tablet, 2 tablets by Per G Tube route daily  Cholecalciferol (VITAMIN D3) 1000 units TABS, 1,000 Units by Feeding Tube route 2 times daily   Nutritional Supplements (REPLETE/FIBER) LIQD, 75 mLs by Feeding Tube route daily    Allergies    Ampicillin; Valium [diazepam]; and Other  Social History     Social History     Socioeconomic History    Marital status: Single     Spouse name: Not on file    Number of children: Not on file    Years of education: Not on file    Highest education level: Not on file   Occupational History    Not on file   Social Needs    Financial resource strain: Not on file    Food insecurity:     Worry: Not on file     Inability: Not on file    Transportation needs:     Medical: Not on file     Non-medical: Not on file   Tobacco Use    Smoking status: Never Smoker    Smokeless tobacco: Never Used   Substance and Sexual Activity    Alcohol use: No    Drug use: No    Sexual activity: Not on file   Lifestyle    Physical activity:     Days per week: Not on file     Minutes per session: Not on file    Stress: Not on file   Relationships    Social connections:     Talks on phone: Not on file     Gets together: Not on file     Attends Orthodox service: Not on file     Active member of club or organization: Not on file     Attends meetings of clubs or organizations: Not on file     Relationship status: Not on file    Intimate partner violence:     Fear of current or ex partner: Not on file     Emotionally abused: Not on file     Physically abused: Not on file     Forced sexual activity: Not on file   Other Topics Concern    Not on file   Social History Narrative    Not on file     Family History          Family history unknown: Yes     ROS - 11 systems   Unable to obtain detailed review of systems, patient is nonverbal.    Vitals    /76   Pulse 89   Temp 97.6 °F (36.4 °C) (Axillary)   Resp 12   Ht 3' 10.85\" (1.19 m)   Wt 110 lb 3.7 oz (50 kg)   SpO2 96%   BMI 35.31 kg/m²   Body mass index is 35.31 kg/m².   I/O      No intake or output data in the 24 hours ending 04/11/19 1100  No intake/output data recorded. Patient Vitals for the past 96 hrs (Last 3 readings):   Weight   04/10/19 1902 110 lb 3.7 oz (50 kg)     Exam   General Appearance Awake, in no acute distress, nonverbal  HEENT - Head is normocephalic, atraumatic. Pupil reactive to light  Neck - Supple, symmetrical, trachea midline and Soft, trachea midline and straight  Lungs - moderate air exchange, no significant rhonchi or wheeze  Cardiovascular - Heart sounds are normal.  Regular rhythm normal rate without murmur, gallop or rub.   Abdomen - Soft, nontender, nondistended, no masses or organomegaly  Neurologic - unable to do detailed neurologic exam, chronic contractures secondary to cerebral palsy  Skin - No bruising or bleeding  Extremities - No cyanosis, mild edema lower extremities    Labs  - Old records and notes have been reviewed in [facility]   CBC     Lab Results   Component Value Date    WBC 4.7 04/11/2019    RBC 3.37 04/11/2019    HGB 10.6 04/11/2019    HCT 31.2 04/11/2019     04/11/2019    MCV 92.5 04/11/2019    MCH 31.3 04/11/2019    MCHC 33.8 04/11/2019    RDW 16.4 04/11/2019    LYMPHOPCT 22 04/10/2019    MONOPCT 8 04/10/2019    BASOPCT 0 04/10/2019    MONOSABS 0.60 04/10/2019    LYMPHSABS 1.60 04/10/2019    EOSABS 0.00 04/10/2019    BASOSABS 0.00 04/10/2019    DIFFTYPE NOT REPORTED 04/10/2019     BMP   Lab Results   Component Value Date     04/11/2019    K 3.7 04/11/2019     04/11/2019    CO2 26 04/11/2019    BUN 7 04/11/2019    CREATININE <0.40 04/11/2019    GLUCOSE 123 04/11/2019    CALCIUM 8.4 04/11/2019    MG 2.2 04/10/2019     LFTS  Lab Results   Component Value Date    ALKPHOS 60 03/13/2019    ALT 35 03/13/2019    AST 31 03/13/2019    PROT 6.0 03/13/2019    BILITOT 0.11 03/13/2019    BILIDIR 1.10 08/18/2018    IBILI 0.46 08/18/2018    LABALBU 3.2 03/13/2019     PTT  Lab Results   Component Value Date    APTT 30.8 03/10/2019     INR   Lab Results   Component Value Date    INR 1.3

## 2019-04-11 NOTE — ED NOTES
PT caregiver from [place] [phone] left. Pt cleaned, diaper changed and barrier cream applied. Pt resting comfortably in bed.       [place]  04/10/19 2051

## 2019-04-11 NOTE — H&P
[facility]    HISTORY AND PHYSICAL EXAMINATION            Date:   4/10/2019  Patient name:  Fani Avila  Date of admission:  4/10/2019  4:55 PM  MRN:   [id number]  Account:  [de-identified]  YOB: 1970  PCP:    Phyllis Mcdermott MD  Room:   [place] [address]  Code Status:    DNR-CCA    Chief Complaint:     Chief Complaint   Patient presents with    Shortness of Breath     discharged from here yesterday     History Obtained From:     Electronic medical record. History of Present Illness: The patient is a 51 y.o.  male who presents to the hospital with complaint of shortness of breath. The patient has a history of cerebral palsy and is nonverbal, HPI was pieced together from the medical record. ER notes indicate the patient was brought to the hospital by EMS accompanied by a long-term caregiver who stated the patient had difficulty breathing today. The patient was recently discharged from [facility] on 4/9/19 after being treated for respiratory failure, he was given IV azithromycin during his hospital stay and discharged with a prescription for liquid azithromycin to be administered through his G tube. ER notes state the patient had not yet been given his outpatient antibiotics. He has additional health history that includes COPD and congestive heart failure. He is currently on BiPAP and is unable to offer additional information. He will be admitted for further evaluation and treatment. CXR indicated vascular congestion. D dimer negative.      Past Medical History:     Past Medical History:   Diagnosis Date    Cerebral palsy ([address]     Cholelithiasis with chronic cholecystitis     Constipation     Delayed gastric emptying     Dysphagia     GERD (gastroesophageal reflux disease)     Hearing loss     Mental disability     MRDD    Profound mental retardation     Scoliosis     Seizures (HCC)     Spastic quadriparesis [facility])         Past Surgical History:     Past Surgical History:   Procedure Laterality Date    CHOLECYSTECTOMY  08/18/2018    laparoscopic    ERCP  08/16/2018    GASTROSTOMY TUBE PLACEMENT      NC ERCP DX COLLECTION SPECIMEN BRUSHING/WASHING N/A 8/16/2018    ERCP PAPILOTOMY SWEEPING STONE EXTRACTION ENDOSCOPIC RETROGRADE CHOLANGIOPANCREATOGRAPHY performed by Moncho Cuevas MD at [address] N/A 8/18/2018    CHOLECYSTECTOMY LAPAROSCOPIC WITH UMBILICAL HERNIA REPAIR performed by Marilin Roberts DO at [address]      has Feeding tube        Medications Prior to Admission:     Prior to Admission medications    Medication Sig Start Date End Date Taking?  Authorizing Provider   ondansetron (ZOFRAN-ODT) 4 MG disintegrating tablet Take 1 tablet by mouth every 6 hours as needed for Nausea 4/9/19   Sidra Martin DO   azithromycin [facility]) 200 MG/5ML suspension 10 mLs by Per G Tube route daily for 5 days 4/9/19 4/14/19  Aleks Alfaro DO   albuterol (PROVENTIL) (2.5 MG/3ML) 0.083% nebulizer solution Take 2.5 mg by nebulization 3 times daily    Historical Provider, MD   lansoprazole (PREVACID SOLUTAB) 30 MG disintegrating tablet 30 mg by Per G Tube route daily    Historical Provider, MD   carbamide peroxide (DEBROX) 6.5 % otic solution 5 drops See Admin Instructions 5 drops BID on the 1st and 15th of the month    Historical Provider, MD   ketoconazole (NIZORAL) 2 % shampoo Apply topically Twice a Week Wednesdays and Saturdays    Historical Provider, MD   zinc oxide (DESITIN) 40 % PSTE paste Apply topically as needed (irritation)    Historical Provider, MD   neomycin-bacitracin-polymyxin (NEOSPORIN) 400-5-5000 ointment Apply topically as needed (prn per SNF plan of treatment)    Historical Provider, MD   lamoTRIgine (LAMICTAL) 200 MG tablet 1 tablet by Per G Tube route 2 times daily Indications: 300MG IN am AND 200MG IN pm 3/14/19   Roger Man DO   lamoTRIgine (LAMICTAL) 100 MG tablet 1 tablet by Per G Tube route every morning Indications: 300mg in AM and 200mg in PM 3/14/19   Roger TORRES Blood,    lacosamide (VIMPAT) 10 MG/ML SOLN oral solution 150 mg by PEG Tube route 2 times daily. Historical Provider, MD   budesonide (PULMICORT) 0.5 MG/2ML nebulizer suspension Take 1 ampule by nebulization 2 times daily    Historical Provider, MD   lactulose (CHRONULAC) 10 GM/15ML solution 20 g by Per G Tube route 2 times daily    Historical Provider, MD   polyethylene glycol (MIRALAX) PACK packet 17 g by Per G Tube route daily    Historical Provider, MD   glycopyrrolate (ROBINUL) 1 MG tablet 1 mg by Per G Tube route 3 times daily FOR INCREASED SECRETIONS. Historical Provider, MD   furosemide (LASIX) 20 MG tablet 20 mg by Per G Tube route daily    Historical Provider, MD   nystatin (MYCOSTATIN) 567109 UNIT/GM powder Apply topically daily Apply between toes.     Historical Provider, MD   baclofen (LIORESAL) 10 MG tablet 10 mg by Per G Tube route 3 times daily    Historical Provider, MD   Potassium Chloride (KLOR-CON SPRINKLE PO) 10 mEq by Per G Tube route 2 times daily     Historical Provider, MD   montelukast (SINGULAIR) 10 MG tablet 10 mg by Per G Tube route nightly    Historical Provider, MD   fluticasone (FLONASE) 50 MCG/ACT nasal spray 2 sprays by Nasal route daily    Historical Provider, MD   Multiple Vitamin (DAILY-JAY PO) 1 tablet by Gastric Tube route daily     Historical Provider, MD   calcium carbonate 1250 MG/5ML SUSP suspension 1,250 mg by Per G Tube route daily    Historical Provider, MD   levETIRAcetam (KEPPRA) 100 MG/ML solution 1,500 mg by Per G Tube route 2 times daily     Historical Provider, MD   loratadine (CLARITIN) 10 MG tablet 10 mg by Per G Tube route daily    Historical Provider, MD   pseudoephedrine (SUDAFED) 30 MG tablet 30 mg by Per G Tube route 3 times daily    Historical Provider, MD   senna-docusate (PERICOLACE) 8.6-50 MG per tablet 2 tablets by Per G Tube route daily    Historical Provider, MD   Cholecalciferol (VITAMIN D3) 1000 units TABS 1,000 Units by Feeding Tube route 2 times daily     Historical Provider, MD   Nutritional Supplements (REPLETE/FIBER) LIQD 75 mLs by Feeding Tube route daily    Historical Provider, MD        Allergies:     Ampicillin; Valium [diazepam]; and Other    Social History:     Tobacco:    reports that he has never smoked. He has never used smokeless tobacco.  Alcohol:      reports that he does not drink alcohol. Drug Use:  reports that he does not use drugs. Family History:     Family History   Family history unknown: Yes       Review of Systems:     Positive and Negative as described in HPI. Unable to obtain ROS, patient nonverbal.     Physical Exam:   /70   Pulse 100   Temp 97.4 °F (36.3 °C) (Oral)   Resp 27   Ht 3' 10.85\" (1.19 m)   Wt 110 lb 3.7 oz (50 kg)   SpO2 95%   BMI 35.31 kg/m²   Temp (24hrs), Av.4 °F (36.3 °C), Min:97.4 °F (36.3 °C), Max:97.4 °F (36.3 °C)    No results for input(s): POCGLU in the last 72 hours. No intake or output data in the 24 hours ending 04/10/19 7023    General Appearance:  Opens eyes spontaneously, resting comfortably, on bipap. Mental status: Unable to assess, nonverbal.   Head:  Normocephalic, atraumatic. Eye: No icterus, redness, pupils equal and reactive, conjunctiva clear. Ear: Normal external ear, no discharge, hearing intact  Nose:  No drainage noted  Mouth: Mucous membranes moist  Neck: Supple, no carotid bruits, thyroid not palpable  Lungs: Bilateral equal air entry, diminished to auscultation, expiratory wheezing, no rales or rhonchi, normal effort. Cardiovascular: Normal rate, regular rhythm, no murmur, gallop, rub. Abdomen: Soft, nondistended, normal bowel sounds, no hepatomegaly or splenomegaly. Neurologic: Cranial nerves II through XII grossly intact.   Skin: No gross lesions, rashes, bruising or bleeding on exposed skin area  Extremities:  Peripheral pulses palpable, no pedal edema or calf pain with palpation  Psych: Unable to assess.      Investigations:      Laboratory Testing:  Recent Results (from the past 24 hour(s))   EKG 12 Lead    Collection Time: 04/10/19  5:31 PM   Result Value Ref Range    Ventricular Rate 99 BPM    Atrial Rate 99 BPM    P-R Interval 164 ms    QRS Duration 88 ms    Q-T Interval 370 ms    QTc Calculation (Bazett) 474 ms    P Axis 60 degrees    R Axis 107 degrees    T Axis 40 degrees   CBC with DIFF    Collection Time: 04/10/19  5:42 PM   Result Value Ref Range    WBC 7.4 3.5 - 11.0 k/uL    RBC 3.68 (L) 4.5 - 5.9 m/uL    Hemoglobin 11.6 (L) 13.5 - 17.5 g/dL    Hematocrit 34.4 (L) 41 - 53 %    MCV 93.5 80 - 100 fL    MCH 31.6 26 - 34 pg    MCHC 33.8 31 - 37 g/dL    RDW 15.6 (H) 11.5 - 14.5 %    Platelets 865 174 - 612 k/uL    MPV NOT REPORTED 6.0 - 12.0 fL    NRBC Automated NOT REPORTED per 100 WBC    Differential Type NOT REPORTED     Immature Granulocytes NOT REPORTED 0 %    Absolute Immature Granulocyte NOT REPORTED 0.00 - 0.30 k/uL    WBC Morphology NOT REPORTED     RBC Morphology NOT REPORTED     Platelet Estimate NOT REPORTED     Seg Neutrophils 69 (H) 36 - 66 %    Lymphocytes 22 (L) 24 - 44 %    Monocytes 8 (H) 1 - 7 %    Eosinophils % 1 1 - 4 %    Basophils 0 0 - 2 %    Segs Absolute 5.20 1.8 - 7.7 k/uL    Absolute Lymph # 1.60 1.0 - 4.8 k/uL    Absolute Mono # 0.60 0.2 - 0.8 k/uL    Absolute Eos # 0.00 0.0 - 0.4 k/uL    Basophils # 0.00 0.0 - 0.2 k/uL   Basic Metabolic Prof    Collection Time: 04/10/19  5:42 PM   Result Value Ref Range    Glucose 96 70 - 99 mg/dL    BUN 12 6 - 20 mg/dL    CREATININE <0.40 (L) 0.70 - 1.20 mg/dL    Bun/Cre Ratio CANNOT BE CALCULATED 9 - 20    Calcium 8.7 8.6 - 10.4 mg/dL    Sodium 144 135 - 144 mmol/L    Potassium 3.4 (L) 3.7 - 5.3 mmol/L    Chloride 104 98 - 107 mmol/L    CO2 27 20 - 31 mmol/L    Anion Gap 13 9 - 17 mmol/L    GFR Non- CANNOT BE CALCULATED >60 mL/min    GFR  American CANNOT BE CALCULATED >60 mL/min    GFR Comment          GFR Staging NOT REPORTED    Trop/Myoglobin    Collection Time: 04/10/19  5:42 PM   Result Value Ref Range    Troponin, High Sensitivity 12 0 - 22 ng/L    Troponin T NOT REPORTED <0.03 ng/mL    Troponin Interp NOT REPORTED     Myoglobin <21 (L) 28 - 72 ng/mL   Lactate, Sepsis    Collection Time: 04/10/19  5:42 PM   Result Value Ref Range    Lactic Acid, Sepsis 1.2 0.5 - 1.9 mmol/L    Lactic Acid, Sepsis, Whole Blood NOT REPORTED 0.5 - 1.9 mmol/L   Brain Natriuretic Peptide    Collection Time: 04/10/19  5:42 PM   Result Value Ref Range    Pro- (H) <300 pg/mL    BNP Interpretation Pro-BNP Reference Range:    Magnesium    Collection Time: 04/10/19  5:42 PM   Result Value Ref Range    Magnesium 2.2 1.6 - 2.6 mg/dL   D-Dimer, Quantitative    Collection Time: 04/10/19  5:42 PM   Result Value Ref Range    D-Dimer, Quant <0.17 mg/L FEU       Imaging/Diagnostics:    EXAMINATION: SINGLE XRAY VIEW OF THE CHEST   4/9/2019 6:49 am   COMPARISON: 24 February 2019   HISTORY: ORDERING SYSTEM PROVIDED HISTORY: AECOPD TECHNOLOGIST PROVIDED HISTORY: AECOPD   FINDINGS: AP portable view of the chest time stamped at 642 hours demonstrates overlying cardiac monitoring electrodes. The patient is rotated toward the right. Stable cardiac size is noted. The patient has pulmonary vascular congestion with slight decreased compared to prior study of 1 day earlier. No gross effusion or extrapleural air is noted. The patient has a sharp angle scoliosis in the thoracic spine with a gibbus type deformity. No free air is noted. Mediastinal contours are stable. Stable cardiomegaly. Pulmonary vascular congestion is noted with slight decreased compared to prior study of 1 day earlier. No gross effusions.      EXAMINATION: SINGLE XRAY VIEW OF THE CHEST   4/10/2019 5:53 pm   COMPARISON: 9 April 2019   HISTORY: ORDERING SYSTEM PROVIDED HISTORY: SOB TECHNOLOGIST PROVIDED HISTORY: SOB Acuity: Acute Type of Exam: Unknown   FINDINGS: AP portable view of the chest time stamped at 1749 hours demonstrates overlying cardiac monitoring electrodes. Patient is rotated toward the right. Pulmonary vascular congestion is noted without interval difference from prior exam.  A severe scoliotic curvature is noted. No extrapleural air is seen. Osseous structures are stable. Stable cardiac size. No change from prior study of 1 day earlier. Stable cardiomegaly and pulmonary vascular congestion. Assessment :      Primary Problem  Acute respiratory failure [facility])    Active Hospital Problems    Diagnosis Date Noted    Acute respiratory failure ([facility] [J96.00] 04/10/2019    Acute exacerbation of chronic obstructive pulmonary disease (COPD) ([facility] [J44.1]     Cerebral palsied ([facility] [G80.9]     Seizure disorder ([facility] [G40.909]        Plan:     Patient status Admit as inpatient to the intensive care unit. 1. Consult pulmonary. 2. Bipap. 3. Nebulizer treatments. 4. Consult dietician for tube feed recommendations and management. 5. Resume selected home mediations through [facility]. 6. Monitor labs, check procalcitonin. 7. Monitor vitals. 8. IV solumedrol. 9. IV hydration. 10. Telemetry. 11. Activity as tolerated with assist.  12. PT/OT as appropriate. Plan of care discussed with primary RN. Consultations:   IP CONSULT TO HOSPITALIST  IP CONSULT TO PULMONOLOGY  IP CONSULT TO DIETITIAN    Patient is admitted as inpatient status because of co-morbidities listed above, severity of signs and symptoms as outlined, requirement for current medical therapies and most importantly because of direct risk to patient if care not provided in a hospital setting.     Tay Fair, KARIME - CNP  4/10/2019  11:27 PM    Copy sent to Dr. Tigre Montez MD

## 2019-04-12 PROBLEM — J44.1 COPD EXACERBATION (HCC): Status: ACTIVE | Noted: 2019-04-12

## 2019-04-12 LAB
ABSOLUTE EOS #: 0 K/UL (ref 0–0.4)
ABSOLUTE IMMATURE GRANULOCYTE: ABNORMAL K/UL (ref 0–0.3)
ABSOLUTE LYMPH #: 0.9 K/UL (ref 1–4.8)
ABSOLUTE MONO #: 0.3 K/UL (ref 0.2–0.8)
ANION GAP SERPL CALCULATED.3IONS-SCNC: 11 MMOL/L (ref 9–17)
BASOPHILS # BLD: 0 % (ref 0–2)
BASOPHILS ABSOLUTE: 0 K/UL (ref 0–0.2)
BUN BLDV-MCNC: 7 MG/DL (ref 6–20)
BUN/CREAT BLD: ABNORMAL (ref 9–20)
CALCIUM SERPL-MCNC: 8.4 MG/DL (ref 8.6–10.4)
CHLORIDE BLD-SCNC: 109 MMOL/L (ref 98–107)
CO2: 24 MMOL/L (ref 20–31)
CREAT SERPL-MCNC: <0.4 MG/DL (ref 0.7–1.2)
CULTURE: ABNORMAL
DIFFERENTIAL TYPE: ABNORMAL
EOSINOPHILS RELATIVE PERCENT: 0 % (ref 1–4)
GFR AFRICAN AMERICAN: ABNORMAL ML/MIN
GFR NON-AFRICAN AMERICAN: ABNORMAL ML/MIN
GFR SERPL CREATININE-BSD FRML MDRD: ABNORMAL ML/MIN/{1.73_M2}
GFR SERPL CREATININE-BSD FRML MDRD: ABNORMAL ML/MIN/{1.73_M2}
GLUCOSE BLD-MCNC: 149 MG/DL (ref 70–99)
HCT VFR BLD CALC: 32.1 % (ref 41–53)
HEMOGLOBIN: 10.7 G/DL (ref 13.5–17.5)
IMMATURE GRANULOCYTES: ABNORMAL %
LYMPHOCYTES # BLD: 18 % (ref 24–44)
Lab: ABNORMAL
MAGNESIUM: 2.5 MG/DL (ref 1.6–2.6)
MCH RBC QN AUTO: 31.1 PG (ref 26–34)
MCHC RBC AUTO-ENTMCNC: 33.2 G/DL (ref 31–37)
MCV RBC AUTO: 93.7 FL (ref 80–100)
MONOCYTES # BLD: 7 % (ref 1–7)
NRBC AUTOMATED: ABNORMAL PER 100 WBC
PDW BLD-RTO: 15.1 % (ref 11.5–14.5)
PLATELET # BLD: 307 K/UL (ref 130–400)
PLATELET ESTIMATE: ABNORMAL
PMV BLD AUTO: ABNORMAL FL (ref 6–12)
POTASSIUM SERPL-SCNC: 3.4 MMOL/L (ref 3.7–5.3)
RBC # BLD: 3.43 M/UL (ref 4.5–5.9)
RBC # BLD: ABNORMAL 10*6/UL
SEG NEUTROPHILS: 75 % (ref 36–66)
SEGMENTED NEUTROPHILS ABSOLUTE COUNT: 4 K/UL (ref 1.8–7.7)
SODIUM BLD-SCNC: 144 MMOL/L (ref 135–144)
SPECIMEN DESCRIPTION: ABNORMAL
WBC # BLD: 5.2 K/UL (ref 3.5–11)
WBC # BLD: ABNORMAL 10*3/UL

## 2019-04-12 PROCEDURE — 96372 THER/PROPH/DIAG INJ SC/IM: CPT

## 2019-04-12 PROCEDURE — 2700000000 HC OXYGEN THERAPY PER DAY

## 2019-04-12 PROCEDURE — 6360000002 HC RX W HCPCS: Performed by: INTERNAL MEDICINE

## 2019-04-12 PROCEDURE — 2060000000 HC ICU INTERMEDIATE R&B

## 2019-04-12 PROCEDURE — 94660 CPAP INITIATION&MGMT: CPT

## 2019-04-12 PROCEDURE — 85025 COMPLETE CBC W/AUTO DIFF WBC: CPT

## 2019-04-12 PROCEDURE — 99225 PR SBSQ OBSERVATION CARE/DAY 25 MINUTES: CPT | Performed by: INTERNAL MEDICINE

## 2019-04-12 PROCEDURE — 6370000000 HC RX 637 (ALT 250 FOR IP): Performed by: INTERNAL MEDICINE

## 2019-04-12 PROCEDURE — 80048 BASIC METABOLIC PNL TOTAL CA: CPT

## 2019-04-12 PROCEDURE — 96367 TX/PROPH/DG ADDL SEQ IV INF: CPT

## 2019-04-12 PROCEDURE — 36415 COLL VENOUS BLD VENIPUNCTURE: CPT

## 2019-04-12 PROCEDURE — 94640 AIRWAY INHALATION TREATMENT: CPT

## 2019-04-12 PROCEDURE — 94760 N-INVAS EAR/PLS OXIMETRY 1: CPT

## 2019-04-12 PROCEDURE — 96376 TX/PRO/DX INJ SAME DRUG ADON: CPT

## 2019-04-12 PROCEDURE — 2580000003 HC RX 258: Performed by: INTERNAL MEDICINE

## 2019-04-12 PROCEDURE — 83735 ASSAY OF MAGNESIUM: CPT

## 2019-04-12 RX ORDER — FUROSEMIDE 10 MG/ML
40 INJECTION INTRAMUSCULAR; INTRAVENOUS DAILY
Status: DISCONTINUED | OUTPATIENT
Start: 2019-04-13 | End: 2019-04-12

## 2019-04-12 RX ORDER — POTASSIUM CHLORIDE 20 MEQ/1
40 TABLET, EXTENDED RELEASE ORAL PRN
Status: DISCONTINUED | OUTPATIENT
Start: 2019-04-12 | End: 2019-04-21 | Stop reason: HOSPADM

## 2019-04-12 RX ORDER — 0.9 % SODIUM CHLORIDE 0.9 %
10 VIAL (ML) INJECTION DAILY
Status: DISCONTINUED | OUTPATIENT
Start: 2019-04-12 | End: 2019-04-12

## 2019-04-12 RX ORDER — MAGNESIUM SULFATE 1 G/100ML
1 INJECTION INTRAVENOUS PRN
Status: DISCONTINUED | OUTPATIENT
Start: 2019-04-12 | End: 2019-04-21 | Stop reason: HOSPADM

## 2019-04-12 RX ORDER — LEVETIRACETAM 100 MG/ML
1500 SOLUTION ORAL 2 TIMES DAILY
Status: DISCONTINUED | OUTPATIENT
Start: 2019-04-12 | End: 2019-04-12

## 2019-04-12 RX ORDER — PANTOPRAZOLE SODIUM 40 MG/10ML
40 INJECTION, POWDER, LYOPHILIZED, FOR SOLUTION INTRAVENOUS DAILY
Status: DISCONTINUED | OUTPATIENT
Start: 2019-04-12 | End: 2019-04-12

## 2019-04-12 RX ORDER — POTASSIUM CHLORIDE 7.45 MG/ML
10 INJECTION INTRAVENOUS PRN
Status: DISCONTINUED | OUTPATIENT
Start: 2019-04-12 | End: 2019-04-21 | Stop reason: HOSPADM

## 2019-04-12 RX ORDER — VANCOMYCIN HYDROCHLORIDE 1 G/200ML
1000 INJECTION, SOLUTION INTRAVENOUS EVERY 12 HOURS
Status: DISCONTINUED | OUTPATIENT
Start: 2019-04-12 | End: 2019-04-12

## 2019-04-12 RX ADMIN — GLYCOPYRROLATE 1 MG: 1 TABLET ORAL at 21:52

## 2019-04-12 RX ADMIN — LAMOTRIGINE 300 MG: 100 TABLET ORAL at 13:58

## 2019-04-12 RX ADMIN — BUDESONIDE 500 MCG: 0.5 SUSPENSION RESPIRATORY (INHALATION) at 06:00

## 2019-04-12 RX ADMIN — SODIUM CHLORIDE: 9 INJECTION, SOLUTION INTRAVENOUS at 01:35

## 2019-04-12 RX ADMIN — Medication 30 MG: at 13:15

## 2019-04-12 RX ADMIN — LACTULOSE 20 G: 20 SOLUTION ORAL at 21:53

## 2019-04-12 RX ADMIN — POLYETHYLENE GLYCOL 3350 17 G: 17 POWDER, FOR SOLUTION ORAL at 13:15

## 2019-04-12 RX ADMIN — BUDESONIDE 500 MCG: 0.5 SUSPENSION RESPIRATORY (INHALATION) at 21:28

## 2019-04-12 RX ADMIN — IPRATROPIUM BROMIDE AND ALBUTEROL SULFATE 1 AMPULE: .5; 3 SOLUTION RESPIRATORY (INHALATION) at 21:28

## 2019-04-12 RX ADMIN — IPRATROPIUM BROMIDE AND ALBUTEROL SULFATE 1 AMPULE: .5; 3 SOLUTION RESPIRATORY (INHALATION) at 15:01

## 2019-04-12 RX ADMIN — MONTELUKAST SODIUM 10 MG: 10 TABLET, FILM COATED ORAL at 21:53

## 2019-04-12 RX ADMIN — ENOXAPARIN SODIUM 40 MG: 40 INJECTION SUBCUTANEOUS at 10:47

## 2019-04-12 RX ADMIN — VITAMIN D, TAB 1000IU (100/BT) 1000 UNITS: 25 TAB at 21:53

## 2019-04-12 RX ADMIN — IPRATROPIUM BROMIDE AND ALBUTEROL SULFATE 1 AMPULE: .5; 3 SOLUTION RESPIRATORY (INHALATION) at 11:52

## 2019-04-12 RX ADMIN — BACLOFEN 10 MG: 10 TABLET ORAL at 21:52

## 2019-04-12 RX ADMIN — MICONAZOLE NITRATE: 20.6 POWDER TOPICAL at 10:45

## 2019-04-12 RX ADMIN — VITAMIN D, TAB 1000IU (100/BT) 1000 UNITS: 25 TAB at 14:07

## 2019-04-12 RX ADMIN — VANCOMYCIN HYDROCHLORIDE 1500 MG: 1 INJECTION, POWDER, LYOPHILIZED, FOR SOLUTION INTRAVENOUS at 05:26

## 2019-04-12 RX ADMIN — CETIRIZINE HYDROCHLORIDE 5 MG: 5 TABLET ORAL at 14:01

## 2019-04-12 RX ADMIN — GLYCOPYRROLATE 1 MG: 1 TABLET ORAL at 14:00

## 2019-04-12 RX ADMIN — METHYLPREDNISOLONE SODIUM SUCCINATE 40 MG: 40 INJECTION, POWDER, FOR SOLUTION INTRAMUSCULAR; INTRAVENOUS at 11:11

## 2019-04-12 RX ADMIN — METHYLPREDNISOLONE SODIUM SUCCINATE 40 MG: 40 INJECTION, POWDER, FOR SOLUTION INTRAMUSCULAR; INTRAVENOUS at 17:42

## 2019-04-12 RX ADMIN — LACTULOSE 20 G: 20 SOLUTION ORAL at 13:15

## 2019-04-12 RX ADMIN — Medication 40 MG: at 13:15

## 2019-04-12 RX ADMIN — LEVETIRACETAM 1500 MG: 100 SOLUTION ORAL at 21:53

## 2019-04-12 RX ADMIN — BACLOFEN 10 MG: 10 TABLET ORAL at 14:00

## 2019-04-12 RX ADMIN — AZITHROMYCIN 400 MG: 200 POWDER, FOR SUSPENSION ORAL at 11:12

## 2019-04-12 RX ADMIN — LEVETIRACETAM 1500 MG: 100 SOLUTION ORAL at 13:15

## 2019-04-12 RX ADMIN — IPRATROPIUM BROMIDE AND ALBUTEROL SULFATE 1 AMPULE: .5; 3 SOLUTION RESPIRATORY (INHALATION) at 06:00

## 2019-04-12 RX ADMIN — PSEUDOEPHEDRINE HYDROCHLORIDE 30 MG: 15 LIQUID ORAL at 13:15

## 2019-04-12 RX ADMIN — Medication 10 ML: at 10:47

## 2019-04-12 RX ADMIN — LACOSAMIDE 150 MG: 100 TABLET, FILM COATED ORAL at 21:52

## 2019-04-12 RX ADMIN — PSEUDOEPHEDRINE HYDROCHLORIDE 30 MG: 15 LIQUID ORAL at 21:51

## 2019-04-12 RX ADMIN — SODIUM CHLORIDE: 9 INJECTION, SOLUTION INTRAVENOUS at 15:25

## 2019-04-12 RX ADMIN — Medication 15 ML: at 13:15

## 2019-04-12 RX ADMIN — FLUTICASONE PROPIONATE 2 SPRAY: 50 SPRAY, METERED NASAL at 10:49

## 2019-04-12 RX ADMIN — SENNOSIDES AND DOCUSATE SODIUM 2 TABLET: 8.6; 5 TABLET ORAL at 14:07

## 2019-04-12 RX ADMIN — ANTACID TABLETS 1250 MG: 500 TABLET, CHEWABLE ORAL at 14:01

## 2019-04-12 RX ADMIN — MICONAZOLE NITRATE: 20.6 POWDER TOPICAL at 22:07

## 2019-04-12 RX ADMIN — METHYLPREDNISOLONE SODIUM SUCCINATE 40 MG: 40 INJECTION, POWDER, FOR SOLUTION INTRAMUSCULAR; INTRAVENOUS at 06:22

## 2019-04-12 RX ADMIN — LAMOTRIGINE 200 MG: 100 TABLET ORAL at 21:53

## 2019-04-12 RX ADMIN — LACOSAMIDE 150 MG: 100 TABLET, FILM COATED ORAL at 14:04

## 2019-04-12 NOTE — PROGRESS NOTES
Pharmacy Note  Vancomycin Consult - Initial Note     Zeyad Larsen is a 51 y.o. male ordered Vancomycin. .  Current diagnosis for which MRSA is suspected/confirmed: COPD exacerbation  Vancomycin order/consult received from Dr. Evert Lacey . Additional antimicrobials:  zithromax    Patient Active Problem List   Diagnosis    Acute exacerbation of chronic obstructive pulmonary disease (COPD) ([facility] [address]    Seizure disorder ([facility] [facility]    Cerebral palsied ([facility] [facility]    Severe protein-calorie malnutrition ([facility]: less than 60% of standard weight) ([facility] [address]    Multifocal pneumonia    Pneumonia due to organism    Positive blood cultures    Pneumonia of left lower lobe due to infectious organism ([facility]    Spastic quadriplegic cerebral palsy (HCC)    Choledocholithiasis    Obstipation    Generalized abdominal pain    Constipation    Developmental disability    Ileus ([facility] [facility]    Influenza with respiratory manifestation other than pneumonia    Hypoxia    Abdominal distension    Dyspnea and respiratory abnormalities    Acute respiratory failure with hypoxia (HCC)    Acute respiratory failure with hypoxia (HCC)    Acute and chronic respiratory failure with hypoxia (HCC)    Acute respiratory failure ([facility] [address]       Actual Weight:    Wt Readings from Last 1 Encounters:   04/10/19 110 lb 3.7 oz (50 kg)     CrCl:  CrCl cannot be calculated (This lab value cannot be used to calculate CrCl because it is not a number: <0.40). Height:     Wt Readings from Last 1 Encounters:   04/10/19 110 lb 3.7 oz (50 kg)       Allergies:  Ampicillin; Valium [diazepam]; and Other     Temp: 98.2 F      Procalcitonin   Date Value Ref Range Status   04/11/2019 0.09 (H) <0.09 ng/mL Final     Comment:           Suspected Sepsis:  0.09-0.49 ng/mL     Low likelihood of sepsis. 0.50-2.00 ng/mL     Increased likelihood of sepsis. Antibiotics encouraged. >2.00 ng/mL     High risk of sepsis/shock. Antibiotics strongly encouraged.         Suspected Lower Resp Tract Infections:  0.09-0.24 ng/mL     Low likelihood of bacterial infection. >0.24 ng/mL     Increased likelihood of bacterial infection. Antibiotics encouraged. With successful antibiotic therapy, PCT levels should decrease rapidly. (Half-life of 24 to   36 hours.)        Procalcitonin values from samples collected within the first 6 hours of systemic infection   may still be low. Retesting may be indicated. Values from day 1 and day 4 can be entered into the Change in Procalcitonin Calculator   ([email or web address]. EverTrue) to determine the patient's Mortality Risk Prognosis         Recent Labs     04/10/19  1742 04/11/19  0551   BUN 12 7       Recent Labs     04/10/19  1742 04/11/19  0551   CREATININE <0.40* <0.40*       Recent Labs     04/10/19  1742 04/11/19  0551   WBC 7.4 4.7         Intake/Output Summary (Last 24 hours) at 4/12/2019 0436  Last data filed at 4/11/2019 1600  Gross per 24 hour   Intake 120 ml   Output --   Net 120 ml         Culture Date      Source                       Results  Blood- E. Faecalis isolates susceptible to Vancomycin        PLAN   Initial loading dose of 25mg/kg (max of 2500 mg) = 1500  mg.  2.   Vancomycin 1000 mg IV every 12 hours. 3.   Ensured BUN/sCr ordered at baseline and at least every 3rd day. 4. Trough ordered for: 4/13/19 @1600 . Trough Goals (Non-dialysis patient) Peaks are not routinely recommended. 10-20 mcg/mL for mild skin/soft tissue infections or UTI.  15-20 mcg/mL is the target trough for all other indications that MRSA infection is suspected. TROUGH TIMING: (Additional levels drawn based on renal function and/or clinical response). Dosing interval Timing of Trough   Every 8 hr, 12 hr, or 18 hr regimen Prior to the 4th dose  Twice weekly troughs for every 8 hour dosing   Every 24 hr regimen Prior to the 3rd or 4th dose   Every 36 hr regimen Prior to the 3rd dose           Thank you for the consult. Will continue to follow.   [place] Augusto/PharmMYRTLE  4/12/2019  4:36 AM

## 2019-04-12 NOTE — PROGRESS NOTES
Pulmonary Critical Care Progress Note  Carlo Amor CNP / Dr. Karolina Aguirre M.D. Patient seen for the follow up of  Acute respiratory failure ([facility] [address]     Subjective:  No acute events noted overnight. He has moderate secretions, started on scopolamine. He is nonverbal at baseline. Examination:  Vitals: /64   Pulse 92   Temp 98.4 °F (36.9 °C) (Axillary)   Resp 24   Ht 3' 10.85\" (1.19 m)   Wt 110 lb (49.9 kg)   SpO2 94%   BMI 35.24 kg/m²     General appearance: awake, responsive  Neck: No JVD  Lungs: Occasional rhonchi  Heart: regular rate and rhythm, S1, S2 normal, no gallop  Abdomen: Soft, non tender, + BS  Extremities: no cyanosis or clubbing. No significant edema    LABs:  CBC:   Recent Labs     04/11/19  0551 04/12/19  0641   WBC 4.7 5.2   HGB 10.6* 10.7*   HCT 31.2* 32.1*    307     BMP:   Recent Labs     04/11/19  0551 04/12/19  0641   * 144   K 3.7 3.4*   CO2 26 24   BUN 7 7   CREATININE <0.40* <0.40*   LABGLOM CANNOT BE CALCULATED CANNOT BE CALCULATED   GLUCOSE 123* 149*     Radiology:      Impression:  · Possible aspiration  · Atelectasis/mild pulmonary edema  · Dysphagia status post PEG placement  · Cerebral palsy with spastic quadriparesis  · Chronic constipation  · Positive blood culture,?  Contamination (pro-calcitonin 0.09, no fever, no leukocytosis)    Recommendations:  · 2 liters/min via nasal cannula if necessary   · Zithromax empirically  · + Blood cultures 4/4, will consult ID  · Budesonide aerosols every 12 hours  · Albuterol and Ipratropium Q 4 hours and prn  · Singulair  · Continue tube feedings per PEG  · Constipation managed by primary team  · Passive range of motion  · DVT prophylaxis with low molecular weight heparin  · Discussed with Dr. Leydi Thorne  · Will follow with you    Electronically signed by     KARIME Tobin CNP on 4/12/2019 at 2:25 PM  Patient was seen under the supervision of Dr. Kolby Morgan and Chanda

## 2019-04-12 NOTE — PROGRESS NOTES
[facility] - Progress Note    2019   11:09 AM    Name:  Ronnie Robertson  :    1970  Age:  51 y.o. male  MRN:    [id number]     Acct:     [de-identified]   Room:  1025/1025-01   Day: 0  Hospital: [facility]     Admit Date: 4/10/2019  4:55 PM  PCP: Lois Isbell MD    Subjective:     C/C:   Chief Complaint   Patient presents with    Shortness of Breath     discharged from here yesterday       Interval History: Status: improved. Patient appears stable on O2 by nasal cannula. He looks the same as when he was discharged on 2019. Chest x-ray was unchanged from his prior creatinine clearance slight increased pulmonary vascular congestion. His Lasix has been increased from 20-40 mg daily. ProBNP is only 0.09. His white count is normal. Once again there is no evidence of an active infection at this point. Scopolamine patch to be changed every 3 days will be ordered to help control his secretions. I feel that this admission was because his current group home is incapable of delivering the care that he needs. On discharge  working for a medi-group which can deliver a higher level of services. Case has been discussed with Dr. Lawyer Zelaya pulmonary. Patient may be discharged once arrangements have been made. History: 48-year-old  male admitted from [facility] living/halfway with difficulty breathing. Patient is well-known to our practice. He has spent an extended period of time at [facility] up until several weeks ago when he was moved to his group home. He was previously admitted here (-2019) with respiratory insufficiency. On that admission in the ED his PaO2 was reported as 45. He was placed on BiPAP however when the O2 saturation sensor was repositioned from his finger to his ear he was satting at 100%. His chest x-ray showed some pulmonary vascular congestion but was otherwise unremarkable.  His abdominal x-rays showed a large fecal load which is a chronic problem for this patient. His pro-calcitonin was 0.12 (<0.25 speaks against bacterial respiratory infection). His white count was normal. It had been reported that he was having emesis at the group home and there was some concern of aspiration however chest x-rays did not confirm this and the patient had no emesis during that admission. The patient did have a large bowel movement. Pulmonary consultation was obtained and the patient was started empirically on azithromycin. His chest x-ray remained clear. His O2 saturations remained normal with minimal oxygen supplementation. He was discharged back to the group home with azithromycin 400 mg via G-tube daily ×5 days per pulmonary recommendation. ER reports that the group home had not given him any doses of his antibiotics. He is admitted with dyspnea, tachypnea and once again low O2 saturations. His past medical history includes cerebral palsy, severe scoliosis, COPD and congestive heart failure. ROS:  Cannot be obtained. Patient is nonverbal.      Medications: Allergies: Allergies   Allergen Reactions    Ampicillin Other (See Comments)     Has received and tolerated ceftriaxone and cefazolin.     Valium [Diazepam] Other (See Comments)     Has received and tolerated Ativan IV/PO    Other      Bubble bath products        Current Meds:    vancomycin  1,000 mg Intravenous Q12H    vancomycin (VANCOCIN) intermittent dosing (placeholder)   Other RX Placeholder    CENTRUM/CERTA-JAY with minerals oral  15 mL Per G Tube Daily    furosemide  40 mg Per G Tube Daily    scopolamine  1 patch Transdermal Q72H    pseudoephedrine HCl  30 mg Per G Tube TID    levETIRAcetam  1,500 mg Per G Tube BID    cetirizine  5 mg Oral Daily    sennosides-docusate sodium  2 tablet Per G Tube Daily    vitamin D  1,000 Units Per G Tube BID    baclofen  10 mg Per G Tube TID    montelukast  10 mg Per G Tube Nightly    fluticasone  2 spray Nasal Daily    calcium carbonate  1,250 mg Per G Tube Daily    miconazole   Topical BID    glycopyrrolate  1 mg Per G Tube TID    lacosamide  150 mg PEG Tube BID    budesonide  500 mcg Nebulization BID    lactulose  20 g Per G Tube BID    polyethylene glycol  17 g Per G Tube Daily    lamoTRIgine  200 mg Per G Tube Nightly    lamoTRIgine  300 mg Per G Tube QAM    lansoprazole  30 mg Per G Tube Daily    ketoconazole   Topical Once per day on Mon Thu    azithromycin  400 mg Per G Tube Daily    sodium chloride flush  10 mL Intravenous 2 times per day    enoxaparin  40 mg Subcutaneous Daily    ipratropium-albuterol  1 ampule Inhalation Q4H WA    methylPREDNISolone  40 mg Intravenous Q6H    Followed by   Irl Montrell ON 4/13/2019] predniSONE  40 mg Oral Daily     PRN Meds:     HYDROcodone 5 mg - acetaminophen 1 tablet PEG Tube Q4H PRN   Or      HYDROcodone 5 mg - acetaminophen 2 tablet PEG Tube Q4H PRN   zinc oxide  Topical PRN   neomycin-bacitracin-polymyxin  Topical PRN   ondansetron 4 mg Oral Q6H PRN   sodium chloride flush 10 mL Intravenous PRN   magnesium hydroxide 30 mL Oral Daily PRN   ondansetron 4 mg Intravenous Q6H PRN   nicotine 1 patch Transdermal Daily PRN   albuterol 2.5 mg Nebulization Q2H PRN   acetaminophen 650 mg Oral Q4H PRN       Data:     Code Status:  DNR-CCA     XR Chest Portable  Result Date: 4/10/2019  COMPARISON: 9 April 2019  FINDINGS: AP portable view of the chest time stamped at 1749 hours demonstrates overlying cardiac monitoring electrodes. Patient is rotated toward the right. Pulmonary vascular congestion is noted without interval difference from prior exam.  A severe scoliotic curvature is noted. No extrapleural air is seen. Osseous structures are stable. Stable cardiac size. Impression:  No change from prior study of 1 day earlier. Stable cardiomegaly and pulmonary vascular congestion.      XR Chest Portable  Result Date: 4/9/2019  COMPARISON: 24 February 2019  FINDINGS: AP portable view of the chest time stamped at 642 hours demonstrates overlying cardiac monitoring electrodes.  The patient is rotated toward the right.  Stable cardiac size is noted.  The patient has pulmonary vascular congestion with slight decreased compared to prior study of 1 day earlier. No gross effusion or extrapleural air is noted.  The patient has a sharp angle scoliosis in the thoracic spine with a gibbus type deformity.  No free air is noted.  Mediastinal contours are stable. Impression:  Stable cardiomegaly.  Pulmonary vascular congestion is noted with slight decreased compared to prior study of 1 day earlier.  No gross effusions.      XR Acute Abd Series Chest 1 Vw  Result Date: 4/8/2019  COMPARISON: Radiographs from 03/15/2019 and CT from 03/10/2019  FINDINGS:   Chest: Shallow inspiration.  Marked cardiomegaly.  Moderate pulmonary edema with right basilar atelectasis and rightward shift of the mediastinum, a chronic finding.  No pneumothorax.  No free subdiaphragmatic air.  Chronic deformity of the right glenohumeral joint. Abdomen and pelvis: Cholecystectomy clips in right upper quadrant.  Gaseous distention of large and small bowel with moderate to large rectal stool. Left upper quadrant gastrostomy tube.  Deformity of bilateral hips. Impression:  Diffuse bowel dilatation with air concerning for ileus versus at least partial obstruction.  CT should be considered.      CT Abdomen Pelvis W Iv Contrast  Result Date: 4/8/2019  COMPARISON: 10 March 2019  FINDINGS:   Lower Chest: Basilar atelectasis is noted.  Heart size is normal.  No gross effusions are noted.  Artifact is created due to arm position and respiratory motion. Organs: Liver, spleen, pancreas, and adrenals are unremarkable.  Gallbladder is surgically absent.  Kidneys excrete contrast bilaterally.  Stable renal cysts are present.    GI/Bowel: No free fluid or free air is noted.  Gastrostomy tube is positioned with balloon within the confines of the stomach.  Significant distension of the colon with both gas and stool is noted.  No small bowel obstruction is noted although there is fluid scattered throughout much of the intestinal tract which may be related to mild enteritis.  No bowel wall thickening is seen. Pelvis: No evidence of appendicitis.  No abnormal fluid collections.  Both inguinal rings are dilated and fat containing without strangulation.  No inguinal adenopathy is seen. Peritoneum/Retroperitoneum: No aortic aneurysm, retroperitoneal mass, retroperitoneal or mesenteric adenopathy is noted. Bones/Soft Tissues: Severe scoliotic curvature is redemonstrated.  Bony structures are slightly gracile.  No acute osseous abnormality.  Dense breast tissue is noted possibly gynecomastia. Estimated biologic radiation dose for this procedure:568.07 mGy/cm2. Impression:  1.  Diffuse distention of the colon with stool and gas. 2.  No small bowel obstruction.  Fluid is scattered throughout the intestinal tract which may be related to mild enteritis.  No free air. 3.  Colon is redundant.  No bowel wall thickening.  Findings suggest retrosigmoid stool impaction.  Little change from prior exam.   4.  Gastrostomy tube in appropriate position.        Labs:    Hematology:  Recent Labs     04/10/19  1742 04/11/19  0551 04/12/19  0641   WBC 7.4 4.7 5.2   RBC 3.68* 3.37* 3.43*   HGB 11.6* 10.6* 10.7*   HCT 34.4* 31.2* 32.1*   MCV 93.5 92.5 93.7   MCH 31.6 31.3 31.1   MCHC 33.8 33.8 33.2   RDW 15.6* 16.4* 15.1*    337 307   MPV NOT REPORTED 7.5 NOT REPORTED   DDIMER <0.17  --   --      Chemistry:  Recent Labs     04/10/19  1742 04/11/19  0551 04/12/19  0641    146* 144   K 3.4* 3.7 3.4*    110* 109*   CO2 27 26 24   GLUCOSE 96 123* 149*   BUN 12 7 7   CREATININE <0.40* <0.40* <0.40*   MG 2.2  --  2.5   ANIONGAP 13 10 11   LABGLOM CANNOT BE CALCULATED CANNOT BE CALCULATED CANNOT BE CALCULATED   GFRAA CANNOT BE CALCULATED CANNOT BE CALCULATED CANNOT BE CALCULATED   CALCIUM 8.7 8.4* 8.4*   PROBNP 675*  --   --    TROPHS 12  --   --    MYOGLOBIN <21*  --   --    PROCAL  --  0.09*  --          Physical Examination:    /64   Pulse 92   Temp 98.4 °F (36.9 °C) (Axillary)   Resp 24   Ht 3' 10.85\" (1.19 m)   Wt 110 lb (49.9 kg)   SpO2 94%   BMI 35.24 kg/m²     Intake/Output Summary (Last 24 hours) at 4/12/2019 1109  Last data filed at 4/12/2019 8053  Gross per 24 hour   Intake 2478 ml   Output --   Net 2478 ml       General Appearance:    Patient is awake, he is currently not crying out and resting comfortably    Head:    Normocephalic, without obvious abnormality, atraumatic   Eyes:    PERRL, conjunctiva/corneas clear, EOM's intact        Ears:    Normal external ear canals, both ears   Nose:   Nares normal   Throat:   Lips, mucosa, and tongue normal; teeth are in poor repair    Neck:   Supple, symmetrical, trachea midline, no carotid    bruit or JVD   Back:     Severe scoliosis    Lungs:     Clear to auscultation bilaterally, respirations unlabored   Chest wall:    No tenderness or deformity   Heart:    Regular rate and rhythm, S1 and S2 normal, no murmur, rub   or gallop   Abdomen:     Soft, non-tender, bowel sounds active all four quadrants,     no masses, no organomegaly.  G-tube in place    Extremities:   Spastic quadriplegia    Pulses:   2+ and symmetric all extremities   Skin:   Skin color, texture, turgor normal, no rashes or lesions   Lymph nodes:   Cervical, supraclavicular, and axillary nodes normal   Neurologic:   CNII-XII intact       Assessment:     Primary Problem  Acute respiratory failure [facility])     Active Hospital Problems    Diagnosis Date Noted    Acute respiratory failure ([facility] [address] [J96.00] 04/10/2019    Acute exacerbation of chronic obstructive pulmonary disease (COPD) ([facility] [address] [J44.1]     Cerebral palsied ([facility] [address] [G80.9]     Seizure disorder ([facility] [address] [G40.909]      Past Medical History:   Diagnosis Date    Cerebral palsy ([address]     Cholelithiasis with chronic cholecystitis     Constipation     Delayed gastric emptying     Dysphagia     GERD (gastroesophageal reflux disease)     Hearing loss     Mental disability     MRDD    Profound mental retardation     Scoliosis     Seizures (HCC)     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HCC)         Consultations:     IP CONSULT TO HOSPITALIST  IP CONSULT TO PULMONOLOGY  IP CONSULT TO DIETITIAN  PHARMACY TO DOSE VANCOMYCIN    Plan:     1. O2 by nasal cannula  2. Scopolamine patch every 3 days  3. Discontinue vancomycin  4. Continue azithromycin through 4/14/2019  5. Lasix 40 mg daily  6. Social service for higher level of placement upon discharge  7. DVT prophylaxis  8. GI prophylaxis  9. Continue discharge medications including azithromycin  10. O2 supplementation to maintain saturation greater than 92%  11.  Potassium replacement      Electronically signed by Ryan Louise DO on 4/12/2019 at 11:09 AM

## 2019-04-13 PROBLEM — K94.23 MALFUNCTION OF PERCUTANEOUS ENDOSCOPIC GASTROSTOMY (PEG) TUBE (HCC): Status: ACTIVE | Noted: 2019-04-13

## 2019-04-13 PROBLEM — E66.9 OBESITY, CLASS II, BMI 35-39.9: Status: ACTIVE | Noted: 2019-04-13

## 2019-04-13 PROBLEM — E11.65 UNCONTROLLED TYPE 2 DIABETES MELLITUS WITH HYPERGLYCEMIA (HCC): Status: ACTIVE | Noted: 2019-04-13

## 2019-04-13 LAB
-: NORMAL
AMORPHOUS: NORMAL
ANION GAP SERPL CALCULATED.3IONS-SCNC: 13 MMOL/L (ref 9–17)
BACTERIA: NORMAL
BILIRUBIN URINE: NEGATIVE
BUN BLDV-MCNC: 8 MG/DL (ref 6–20)
BUN/CREAT BLD: ABNORMAL (ref 9–20)
CALCIUM SERPL-MCNC: 8 MG/DL (ref 8.6–10.4)
CASTS UA: NORMAL /LPF
CHLORIDE BLD-SCNC: 108 MMOL/L (ref 98–107)
CO2: 24 MMOL/L (ref 20–31)
COLOR: YELLOW
COMMENT UA: ABNORMAL
CREAT SERPL-MCNC: <0.4 MG/DL (ref 0.7–1.2)
CRYSTALS, UA: NORMAL /HPF
EPITHELIAL CELLS UA: NORMAL /HPF (ref 0–5)
FIO2: 28
GFR AFRICAN AMERICAN: ABNORMAL ML/MIN
GFR NON-AFRICAN AMERICAN: ABNORMAL ML/MIN
GFR SERPL CREATININE-BSD FRML MDRD: ABNORMAL ML/MIN/{1.73_M2}
GFR SERPL CREATININE-BSD FRML MDRD: ABNORMAL ML/MIN/{1.73_M2}
GLUCOSE BLD-MCNC: 95 MG/DL (ref 70–99)
GLUCOSE URINE: NEGATIVE
KEPPRA: 16 UG/ML
KETONES, URINE: NEGATIVE
LEUKOCYTE ESTERASE, URINE: NEGATIVE
MAGNESIUM: 2.3 MG/DL (ref 1.6–2.6)
MUCUS: NORMAL
NEGATIVE BASE EXCESS, ART: ABNORMAL (ref 0–2)
NITRITE, URINE: NEGATIVE
O2 DEVICE/FLOW/%: ABNORMAL
OTHER OBSERVATIONS UA: NORMAL
PATIENT TEMP: 37
PH UA: 6.5 (ref 5–8)
POC HCO3: 28.1 MMOL/L (ref 22–27)
POC O2 SATURATION: 97 %
POC PCO2 TEMP: ABNORMAL MM HG
POC PCO2: 44 MM HG (ref 32–45)
POC PH TEMP: ABNORMAL
POC PH: 7.42 (ref 7.35–7.45)
POC PO2 TEMP: ABNORMAL MM HG
POC PO2: 92 MM HG (ref 75–95)
POSITIVE BASE EXCESS, ART: 4 (ref 0–2)
POTASSIUM SERPL-SCNC: 3 MMOL/L (ref 3.7–5.3)
PROTEIN UA: NEGATIVE
RBC UA: NORMAL /HPF (ref 0–2)
RENAL EPITHELIAL, UA: NORMAL /HPF
SODIUM BLD-SCNC: 145 MMOL/L (ref 135–144)
SPECIFIC GRAVITY UA: 1.01 (ref 1–1.03)
TCO2 (CALC), ART: 29 MMOL/L (ref 23–28)
TRICHOMONAS: NORMAL
TURBIDITY: CLEAR
URINE HGB: ABNORMAL
UROBILINOGEN, URINE: NORMAL
WBC UA: NORMAL /HPF (ref 0–5)
YEAST: NORMAL

## 2019-04-13 PROCEDURE — 6370000000 HC RX 637 (ALT 250 FOR IP): Performed by: INTERNAL MEDICINE

## 2019-04-13 PROCEDURE — 94640 AIRWAY INHALATION TREATMENT: CPT

## 2019-04-13 PROCEDURE — 6360000002 HC RX W HCPCS: Performed by: INTERNAL MEDICINE

## 2019-04-13 PROCEDURE — 80177 DRUG SCRN QUAN LEVETIRACETAM: CPT

## 2019-04-13 PROCEDURE — 36600 WITHDRAWAL OF ARTERIAL BLOOD: CPT

## 2019-04-13 PROCEDURE — 2000000000 HC ICU R&B

## 2019-04-13 PROCEDURE — 2580000003 HC RX 258: Performed by: INTERNAL MEDICINE

## 2019-04-13 PROCEDURE — 99222 1ST HOSP IP/OBS MODERATE 55: CPT | Performed by: NURSE PRACTITIONER

## 2019-04-13 PROCEDURE — 83735 ASSAY OF MAGNESIUM: CPT

## 2019-04-13 PROCEDURE — 36415 COLL VENOUS BLD VENIPUNCTURE: CPT

## 2019-04-13 PROCEDURE — 94660 CPAP INITIATION&MGMT: CPT

## 2019-04-13 PROCEDURE — 2700000000 HC OXYGEN THERAPY PER DAY

## 2019-04-13 PROCEDURE — 2580000003 HC RX 258: Performed by: PSYCHIATRY & NEUROLOGY

## 2019-04-13 PROCEDURE — 99232 SBSQ HOSP IP/OBS MODERATE 35: CPT | Performed by: INTERNAL MEDICINE

## 2019-04-13 PROCEDURE — 6360000002 HC RX W HCPCS: Performed by: NURSE PRACTITIONER

## 2019-04-13 PROCEDURE — 81001 URINALYSIS AUTO W/SCOPE: CPT

## 2019-04-13 PROCEDURE — 6360000002 HC RX W HCPCS: Performed by: PSYCHIATRY & NEUROLOGY

## 2019-04-13 PROCEDURE — 82803 BLOOD GASES ANY COMBINATION: CPT

## 2019-04-13 PROCEDURE — 99222 1ST HOSP IP/OBS MODERATE 55: CPT | Performed by: INTERNAL MEDICINE

## 2019-04-13 PROCEDURE — 80048 BASIC METABOLIC PNL TOTAL CA: CPT

## 2019-04-13 PROCEDURE — 99222 1ST HOSP IP/OBS MODERATE 55: CPT | Performed by: PSYCHIATRY & NEUROLOGY

## 2019-04-13 RX ORDER — NICOTINE POLACRILEX 4 MG
15 LOZENGE BUCCAL PRN
Status: DISCONTINUED | OUTPATIENT
Start: 2019-04-13 | End: 2019-04-13

## 2019-04-13 RX ORDER — VANCOMYCIN HYDROCHLORIDE 1 G/200ML
1000 INJECTION, SOLUTION INTRAVENOUS EVERY 12 HOURS
Status: DISCONTINUED | OUTPATIENT
Start: 2019-04-14 | End: 2019-04-14

## 2019-04-13 RX ORDER — LEVETIRACETAM 100 MG/ML
1500 SOLUTION ORAL 2 TIMES DAILY
Status: DISCONTINUED | OUTPATIENT
Start: 2019-04-13 | End: 2019-04-21 | Stop reason: HOSPADM

## 2019-04-13 RX ORDER — LORAZEPAM 2 MG/ML
1 INJECTION INTRAMUSCULAR EVERY 4 HOURS PRN
Status: DISCONTINUED | OUTPATIENT
Start: 2019-04-13 | End: 2019-04-21 | Stop reason: HOSPADM

## 2019-04-13 RX ORDER — DEXTROSE MONOHYDRATE 25 G/50ML
12.5 INJECTION, SOLUTION INTRAVENOUS PRN
Status: DISCONTINUED | OUTPATIENT
Start: 2019-04-13 | End: 2019-04-13

## 2019-04-13 RX ORDER — MORPHINE SULFATE 2 MG/ML
1 INJECTION, SOLUTION INTRAMUSCULAR; INTRAVENOUS EVERY 4 HOURS PRN
Status: DISCONTINUED | OUTPATIENT
Start: 2019-04-13 | End: 2019-04-21 | Stop reason: HOSPADM

## 2019-04-13 RX ORDER — DEXTROSE MONOHYDRATE 50 MG/ML
100 INJECTION, SOLUTION INTRAVENOUS PRN
Status: DISCONTINUED | OUTPATIENT
Start: 2019-04-13 | End: 2019-04-13

## 2019-04-13 RX ADMIN — CETIRIZINE HYDROCHLORIDE 5 MG: 5 TABLET ORAL at 16:25

## 2019-04-13 RX ADMIN — VANCOMYCIN HYDROCHLORIDE 1000 MG: 1 INJECTION, SOLUTION INTRAVENOUS at 23:42

## 2019-04-13 RX ADMIN — POTASSIUM CHLORIDE 10 MEQ: 7.46 INJECTION, SOLUTION INTRAVENOUS at 12:06

## 2019-04-13 RX ADMIN — IPRATROPIUM BROMIDE AND ALBUTEROL SULFATE 1 AMPULE: .5; 3 SOLUTION RESPIRATORY (INHALATION) at 19:31

## 2019-04-13 RX ADMIN — POTASSIUM CHLORIDE 10 MEQ: 7.46 INJECTION, SOLUTION INTRAVENOUS at 16:31

## 2019-04-13 RX ADMIN — VANCOMYCIN HYDROCHLORIDE 1250 MG: 5 INJECTION, POWDER, LYOPHILIZED, FOR SOLUTION INTRAVENOUS at 16:32

## 2019-04-13 RX ADMIN — LAMOTRIGINE 300 MG: 100 TABLET ORAL at 16:27

## 2019-04-13 RX ADMIN — BACLOFEN 10 MG: 10 TABLET ORAL at 20:34

## 2019-04-13 RX ADMIN — LEVETIRACETAM 1500 MG: 100 SOLUTION ORAL at 10:37

## 2019-04-13 RX ADMIN — LEVETIRACETAM 1500 MG: 100 SOLUTION ORAL at 20:35

## 2019-04-13 RX ADMIN — LEVETIRACETAM 1500 MG: 100 INJECTION, SOLUTION INTRAVENOUS at 15:41

## 2019-04-13 RX ADMIN — Medication 40 MG: at 16:26

## 2019-04-13 RX ADMIN — FLUTICASONE PROPIONATE 2 SPRAY: 50 SPRAY, METERED NASAL at 10:39

## 2019-04-13 RX ADMIN — GLYCOPYRROLATE 1 MG: 1 TABLET ORAL at 20:34

## 2019-04-13 RX ADMIN — GLYCOPYRROLATE 1 MG: 1 TABLET ORAL at 16:37

## 2019-04-13 RX ADMIN — BUDESONIDE 500 MCG: 0.5 SUSPENSION RESPIRATORY (INHALATION) at 19:31

## 2019-04-13 RX ADMIN — ANTACID TABLETS 1250 MG: 500 TABLET, CHEWABLE ORAL at 16:17

## 2019-04-13 RX ADMIN — MICONAZOLE NITRATE: 20.6 POWDER TOPICAL at 10:34

## 2019-04-13 RX ADMIN — AZITHROMYCIN 400 MG: 200 POWDER, FOR SUSPENSION ORAL at 16:16

## 2019-04-13 RX ADMIN — IPRATROPIUM BROMIDE AND ALBUTEROL SULFATE 1 AMPULE: .5; 3 SOLUTION RESPIRATORY (INHALATION) at 06:01

## 2019-04-13 RX ADMIN — SENNOSIDES AND DOCUSATE SODIUM 2 TABLET: 8.6; 5 TABLET ORAL at 10:37

## 2019-04-13 RX ADMIN — LACOSAMIDE 150 MG: 100 TABLET, FILM COATED ORAL at 16:26

## 2019-04-13 RX ADMIN — MICONAZOLE NITRATE: 20.6 POWDER TOPICAL at 20:52

## 2019-04-13 RX ADMIN — POLYETHYLENE GLYCOL 3350 17 G: 17 POWDER, FOR SOLUTION ORAL at 10:37

## 2019-04-13 RX ADMIN — VITAMIN D, TAB 1000IU (100/BT) 1000 UNITS: 25 TAB at 20:32

## 2019-04-13 RX ADMIN — MONTELUKAST SODIUM 10 MG: 10 TABLET, FILM COATED ORAL at 20:34

## 2019-04-13 RX ADMIN — POTASSIUM CHLORIDE 10 MEQ: 7.46 INJECTION, SOLUTION INTRAVENOUS at 18:03

## 2019-04-13 RX ADMIN — VITAMIN D, TAB 1000IU (100/BT) 1000 UNITS: 25 TAB at 10:36

## 2019-04-13 RX ADMIN — PREDNISONE 40 MG: 20 TABLET ORAL at 10:37

## 2019-04-13 RX ADMIN — BACLOFEN 10 MG: 10 TABLET ORAL at 16:16

## 2019-04-13 RX ADMIN — LACOSAMIDE 150 MG: 100 TABLET, FILM COATED ORAL at 20:32

## 2019-04-13 RX ADMIN — MORPHINE SULFATE 1 MG: 2 INJECTION, SOLUTION INTRAMUSCULAR; INTRAVENOUS at 16:27

## 2019-04-13 RX ADMIN — SODIUM CHLORIDE: 9 INJECTION, SOLUTION INTRAVENOUS at 03:20

## 2019-04-13 RX ADMIN — Medication 30 MG: at 10:37

## 2019-04-13 RX ADMIN — IPRATROPIUM BROMIDE AND ALBUTEROL SULFATE 1 AMPULE: .5; 3 SOLUTION RESPIRATORY (INHALATION) at 15:31

## 2019-04-13 RX ADMIN — ENOXAPARIN SODIUM 40 MG: 40 INJECTION SUBCUTANEOUS at 10:36

## 2019-04-13 RX ADMIN — PSEUDOEPHEDRINE HYDROCHLORIDE 30 MG: 15 LIQUID ORAL at 20:35

## 2019-04-13 RX ADMIN — POTASSIUM CHLORIDE 10 MEQ: 7.46 INJECTION, SOLUTION INTRAVENOUS at 13:55

## 2019-04-13 RX ADMIN — LAMOTRIGINE 200 MG: 100 TABLET ORAL at 20:33

## 2019-04-13 RX ADMIN — POTASSIUM CHLORIDE 10 MEQ: 7.46 INJECTION, SOLUTION INTRAVENOUS at 10:46

## 2019-04-13 RX ADMIN — LORAZEPAM 1 MG: 2 INJECTION, SOLUTION INTRAMUSCULAR; INTRAVENOUS at 14:14

## 2019-04-13 RX ADMIN — IPRATROPIUM BROMIDE AND ALBUTEROL SULFATE 1 AMPULE: .5; 3 SOLUTION RESPIRATORY (INHALATION) at 11:25

## 2019-04-13 RX ADMIN — LACTULOSE 20 G: 20 SOLUTION ORAL at 20:32

## 2019-04-13 RX ADMIN — BUDESONIDE 500 MCG: 0.5 SUSPENSION RESPIRATORY (INHALATION) at 06:01

## 2019-04-13 RX ADMIN — PSEUDOEPHEDRINE HYDROCHLORIDE 30 MG: 15 LIQUID ORAL at 16:38

## 2019-04-13 RX ADMIN — SODIUM CHLORIDE: 9 INJECTION, SOLUTION INTRAVENOUS at 15:22

## 2019-04-13 RX ADMIN — POTASSIUM CHLORIDE 10 MEQ: 7.46 INJECTION, SOLUTION INTRAVENOUS at 20:37

## 2019-04-13 NOTE — PROGRESS NOTES
Pt seen by Dr. Prashanth Baez who replaced his peg tube with a # 21 and it is now working well. Medications resumed. Pt has received IV Keppra and a dose of Morphine to ease his discomfort/possible pain. He is quieter now than previously.

## 2019-04-13 NOTE — PROGRESS NOTES
Nutrition Assessment (Enteral Nutrition)    Type and Reason for Visit: Reassess    Nutrition Recommendations: 1. Suggest continuing on NPO status. 2. Consider maintaining continuous Tube Feeding per G-tube with:  Jevity 1.2 Chace (standard with fiber) @ 55 ml/hr (goal). Nutrition Assessment: SAUL Martinez doing \"Pt Care\" @ time of visit. Pt remains @ high nutrition risk, with dx of Enterococcus faecalis bacteremia. TF residuals:  0-20 ml. Suggest continuing on TF with Jevity 1.2 Chace (standard with fiber formula) @ 55 ml/hr (goal). Malnutrition Assessment:  · Malnutrition Status: Insufficient data  · Findings of the 6 clinical characteristics of malnutrition (Minimum of 2 out of 6 clinical characteristics is required to make the diagnosis of moderate or severe Protein Calorie Malnutrition based on AND/ASPEN Guidelines):  1. Energy Intake-Unable to assess, Unable to assess    2. Weight Loss-10% loss or greater, in 1 month  3. Fat Loss-Unable to assess  4. Muscle Loss-Unable to assess  5. Fluid Accumulation-Severe fluid accumulation, Extremities  6.   Strength-     Nutrition Risk Level: High    Nutrition Needs:  · Estimated Daily Total Kcal: 6289-7240 (28-32 kcal/kg)  · Estimated Daily Protein (g): 50-60 (1.0-1.2 g/kg)  · Estimated Daily Fluid (ml/day): 4881-5960 (1 ml/kcal)    Nutrition Diagnosis:   · Problem: Unintended weight loss  · Etiology: related to Insufficient energy/nutrient consumption     Signs and symptoms:  as evidenced by Weight loss greater than or equal to 5% in 1 month    Objective Information:  · Nutrition-Focused Physical Findings: GI:  rotund, soft, active bowel sounds; PV:  BLE, +4; Skin:  rash/redness (upper body, face)  · Wound Type: None  · Current Nutrition Therapies:  · Oral Diet Orders: NPO   · Tube Feeding (TF) Orders:   · Feeding Route: Gastrostomy  · Formula: Standard w/Fiber  · Rate (ml/hr):@ 55 ml/hr    · Volume (ml/day): 1,320 ml  · Duration: Continuous  · Water Flushes: None  · Current TF & Flush Orders Provides: See below  · Goal TF & Flush Orders Provides: @ 55 ml/hr (goal):  1,320 ml, 1,584 kcal, 73 g protein, 24 g dietary fiber & 1,065 ml free water/day  · Additional Calories: None  · Anthropometric Measures:  · Ht: 3' 10.85\" (119 cm)   · Current Body Wt: 110 lb 0.2 oz (49.9 kg)  · Admission Body Wt: 110 lb 3.7 oz (50 kg)  · Usual Body Wt: 128 lb (58.1 kg)(3/10/19)  · Weight Change: 14% loss in 1 month   · BMI Classification: BMI 35.0 - 39.9 Obese Class II    Nutrition Interventions:   Continue NPO, Continue current Tube Feeding  Continued Inpatient Monitoring, Coordination of Care    Nutrition Evaluation:   · Evaluation: Goal achieved   · Goals: EN intake to meet >75% of estimated kcal/protein needs, with good GI tolerance   · Monitoring: TF Intake, TF Tolerance, Skin Integrity, I&O, Weight, Pertinent Labs, Monitor Bowel Function      Electronically signed by Josselin Stone RDN, LD on 4/13/19 at 4:17 PM    Contact Number: [phone]

## 2019-04-13 NOTE — PROGRESS NOTES
Pt transferred to ICU 1110. VSS, pt grimacing and grinding teeth, but occasionally smiling which is closer to his baseline according to RN. Pt stable, will continue to monitor for seizure activity.

## 2019-04-13 NOTE — PROGRESS NOTES
Pt has been crying out. When at bedside witness patient having uncontrolled muscle tremors; seizure like activity. Pt was awake but was in distress, moaning and not responding in his usual manner. This occurred several times. Dr. Jeanette Beyer notified and consults for GI and Neurology received as well as prn Ativan. Consults notified through page and secure message.

## 2019-04-13 NOTE — PROGRESS NOTES
Pulmonary Critical Care Progress Note  Ashli Weber CNP / Dr. Roslynn Ahumada, M.D. Patient seen for the follow up of  Acute respiratory failure ([address]     Subjective:  He appears significantly restless, thrashing about in his bed. Grinding his teeth? Pain? He is actively seizing intermittently throughout the morning and has been evaluated by neurology. Examination:  Vitals: /69   Pulse 64   Temp 98.7 °F (37.1 °C) (Oral)   Resp 16   Ht 3' 10.85\" (1.19 m)   Wt 110 lb (49.9 kg)   SpO2 97%   BMI 35.24 kg/m²     General appearance: Appears significantly restless, as well as noted seizure activity  Neck: No JVD  Lungs: Occasional rhonchi  Heart: regular rate and rhythm, S1, S2 normal, no gallop  Abdomen: Soft, non tender, + BS  Extremities: no cyanosis or clubbing.  No significant edema    LABs:  CBC:   Recent Labs     04/11/19  0551 04/12/19  0641   WBC 4.7 5.2   HGB 10.6* 10.7*   HCT 31.2* 32.1*    307     BMP:   Recent Labs     04/12/19  0641 04/13/19  0528    145*   K 3.4* 3.0*   CO2 24 24   BUN 7 8   CREATININE <0.40* <0.40*   LABGLOM CANNOT BE CALCULATED CANNOT BE CALCULATED   GLUCOSE 149* 95     Impression:  · Enterococcus faecalis sepsis  · Actively seizing, history of seizure disorder  · Possible aspiration  · Atelectasis/mild pulmonary edema  · Dysphagia status post PEG placement  · Cerebral palsy with spastic quadriparesis  · Chronic constipation      Recommendations:  · 2 liters/min via nasal cannula  · Stat x-ray chest  · Stat ABG  · Monitor closely for need to transfer to ICU  · BiPAP for sleep and as needed   · Zithromax, started on IV Vanco per infectious disease  · Budesonide aerosols every 12 hours  · Albuterol and Ipratropium Q 4 hours and prn  · Singulair  · Neurology on consult, IV Keppra  · Patient unable to stay still for CT had  · GI consulted for PEG tube  · Constipation managed by others  · Passive range of motion  · DVT prophylaxis with low molecular weight heparin  · Discussed with RN   · Discussed with Dr. Kylah Berry   · Will follow with you    Electronically signed by     KARIME Stauffer CNP on 4/13/2019 at 3:41 PM  Patient was seen under the supervision of Dr. Elias Thacker and Sleep Medicine,    [facility] AT [facility] [place]: [phone]

## 2019-04-13 NOTE — PROGRESS NOTES
Dr. Lelia Hale returned call and update on patient condition. He plans to round in approximately 1 hour. Ativan IM given to patient. Pt has had less episodes of tremors but still appears distressed . Periodic moaning continues.

## 2019-04-13 NOTE — CONSULTS
NEUROLOGY INPATIENT CONSULT NOTE    4/13/2019         Laura Keen is a  51 y.o. male admitted on 4/10/2019 with  Acute respiratory failure ([facility] [address] [J96.00]  COPD exacerbation ([facility] [J44.1]      History is obtained mostly from the medical record and from the caregivers. Chart is reviewed and patient is examined. Briefly, this is a  51 y.o. male, group home resident with cerebral palsy, mental retardation and seizure disorder was admitted on 4/10/2019 with acute respiratry distress. Admitted with the c/o shortness of breath. Patient was brought to the hospital by EMS accompanied by long-term caregiver who stated that the patient had difficulty breathing on the day of admit. Patient was recently discharged from [facility] on 4/19/19 after being treated for respiratory failure. He has received IV azithromycin during his hospital stay and discharged on liquid azithromycin to BE given through G-tube. History is also significant for COPD and CHF. For his seizure disorder, he was getting Lamictal 300 in am and 200 in pm and keppra 1500 bid daily through PEG tube. Due to PEG tube more functioning; he has not had any of his antiepileptics today. He has had one episode of jerking activity involving left upper and left lower extremities with staring episode and right gaze preference lasting for 2-3 minutes subsided with Ativan. No current facility-administered medications on file prior to encounter.       Current Outpatient Medications on File Prior to Encounter   Medication Sig Dispense Refill    ondansetron (ZOFRAN-ODT) 4 MG disintegrating tablet Take 1 tablet by mouth every 6 hours as needed for Nausea      azithromycin (ZITHROMAX) 200 MG/5ML suspension 10 mLs by Per G Tube route daily for 5 days      albuterol (PROVENTIL) (2.5 MG/3ML) 0.083% nebulizer solution Take 2.5 mg by nebulization 3 times daily      lansoprazole (PREVACID SOLUTAB) 30 MG disintegrating tablet 30 mg by Per G Tube route daily      carbamide peroxide (DEBROX) 6.5 % otic solution 5 drops See Admin Instructions 5 drops BID on the 1st and 15th of the month      ketoconazole (NIZORAL) 2 % shampoo Apply topically Twice a Week Wednesdays and Saturdays      zinc oxide (DESITIN) 40 % PSTE paste Apply topically as needed (irritation)      neomycin-bacitracin-polymyxin (NEOSPORIN) 400-5-5000 ointment Apply topically as needed (prn per [facility] plan of treatment)      lamoTRIgine (LAMICTAL) 200 MG tablet 1 tablet by Per G Tube route 2 times daily Indications: 300MG IN am AND 200MG IN pm 30 tablet 3    lamoTRIgine (LAMICTAL) 100 MG tablet 1 tablet by Per G Tube route every morning Indications: 300mg in AM and 200mg in PM 30 tablet 3    lacosamide (VIMPAT) 10 MG/ML SOLN oral solution 150 mg by PEG Tube route 2 times daily.  budesonide (PULMICORT) 0.5 MG/2ML nebulizer suspension Take 1 ampule by nebulization 2 times daily      lactulose (CHRONULAC) 10 GM/15ML solution 20 g by Per G Tube route 2 times daily      polyethylene glycol (MIRALAX) PACK packet 17 g by Per G Tube route daily      glycopyrrolate (ROBINUL) 1 MG tablet 1 mg by Per G Tube route 3 times daily FOR INCREASED SECRETIONS.  furosemide (LASIX) 20 MG tablet 20 mg by Per G Tube route daily      nystatin (MYCOSTATIN) 622225 UNIT/GM powder Apply topically daily Apply between toes.       baclofen (LIORESAL) 10 MG tablet 10 mg by Per G Tube route 3 times daily      Potassium Chloride (KLOR-CON SPRINKLE PO) 10 mEq by Per G Tube route 2 times daily       montelukast (SINGULAIR) 10 MG tablet 10 mg by Per G Tube route nightly      fluticasone (FLONASE) 50 MCG/ACT nasal spray 2 sprays by Nasal route daily      Multiple Vitamin (DAILY-JAY PO) 1 tablet by Gastric Tube route daily       calcium carbonate 1250 MG/5ML SUSP suspension 1,250 mg by Per G Tube route daily      levETIRAcetam (KEPPRA) 100 MG/ML solution 1,500 mg by Per G Tube route 2 times daily       loratadine (CLARITIN) 10 MG tablet 10 mg by Per G Tube route daily      pseudoephedrine (SUDAFED) 30 MG tablet 30 mg by Per G Tube route 3 times daily      senna-docusate (PERICOLACE) 8.6-50 MG per tablet 2 tablets by Per G Tube route daily      Cholecalciferol (VITAMIN D3) 1000 units TABS 1,000 Units by Feeding Tube route 2 times daily       Nutritional Supplements (REPLETE/FIBER) LIQD 75 mLs by Feeding Tube route daily       Allergies: Olivia Vaughn is allergic to ampicillin; valium [diazepam]; and other. Past Medical History:   Diagnosis Date    Cerebral palsy ([address]     Cholelithiasis with chronic cholecystitis     Constipation     Delayed gastric emptying     Dysphagia     GERD (gastroesophageal reflux disease)     Hearing loss     Mental disability     MRDD    Profound mental retardation     Scoliosis     Seizures (HCC)     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HCC)        Past Surgical History:   Procedure Laterality Date    CHOLECYSTECTOMY  08/18/2018    laparoscopic    ERCP  08/16/2018    GASTROSTOMY TUBE PLACEMENT      [place] ERCP DX COLLECTION SPECIMEN BRUSHING/WASHING N/A 8/16/2018    ERCP PAPILOTOMY SWEEPING STONE EXTRACTION ENDOSCOPIC RETROGRADE CHOLANGIOPANCREATOGRAPHY performed by Maylin Sharp MD at [facility] N/A 8/18/2018    CHOLECYSTECTOMY LAPAROSCOPIC WITH UMBILICAL HERNIA REPAIR performed by Sylvester Urbano DO at [address]      has Feeding tube     Social History: Olivia Vaughn  reports that he has never smoked. He has never used smokeless tobacco. He reports that he does not drink alcohol or use drugs.     Family History   Family history unknown: Yes       Current Medications:     vancomycin (VANCOCIN) intermittent dosing (placeholder)   Other RX Placeholder    vancomycin  1,250 mg Intravenous Once    [START ON 4/14/2019] vancomycin  1,000 mg Intravenous [id number]    CENTRUM/CERTA-JAY with minerals oral  15 mL Per G Tube Daily    furosemide  40 mg Per G Tube Daily    scopolamine  1 patch Transdermal Q72H    pseudoephedrine HCl  30 mg Per G Tube TID    levETIRAcetam  1,500 mg Per G Tube BID    cetirizine  5 mg Oral Daily    sennosides-docusate sodium  2 tablet Per G Tube Daily    vitamin D  1,000 Units Per G Tube BID    baclofen  10 mg Per G Tube TID    montelukast  10 mg Per G Tube Nightly    fluticasone  2 spray Nasal Daily    calcium carbonate  1,250 mg Per G Tube Daily    miconazole   Topical BID    glycopyrrolate  1 mg Per G Tube TID    lacosamide  150 mg PEG Tube BID    budesonide  500 mcg Nebulization BID    lactulose  20 g Per G Tube BID    polyethylene glycol  17 g Per G Tube Daily    lamoTRIgine  200 mg Per G Tube Nightly    lamoTRIgine  300 mg Per G Tube QAM    lansoprazole  30 mg Per G Tube Daily    azithromycin  400 mg Per G Tube Daily    sodium chloride flush  10 mL Intravenous 2 times per day    enoxaparin  40 mg Subcutaneous Daily    ipratropium-albuterol  1 ampule Inhalation Q4H WA    predniSONE  40 mg Oral Daily     PRN Meds include: LORazepam, magnesium sulfate, potassium chloride **OR** potassium alternative oral replacement **OR** potassium chloride, HYDROcodone 5 mg - acetaminophen **OR** HYDROcodone 5 mg - acetaminophen, zinc oxide, neomycin-bacitracin-polymyxin, ondansetron, sodium chloride flush, magnesium hydroxide, ondansetron, nicotine, albuterol, acetaminophen    ROS: could not be obtained due to patient's condition.           Objective:   /69   Pulse 64   Temp 98.7 °F (37.1 °C) (Oral)   Resp 16   Ht 3' 10.85\" (1.19 m)   Wt 110 lb (49.9 kg)   SpO2 97%   BMI 35.24 kg/m²     Blood pressure range: Systolic (82FHG), CELESTINO:601 , Min:107 , ILX:846   ; Diastolic (51IGY), JQH:90, Min:61, Max:69      Continuous infusions:    sodium chloride 50 mL/hr at 04/13/19 1426       ON EXAMINATION:  GENERAL  Appears comfortable and in no distress   HEENT  NC/ AT   NECK  Supple and no bruits heard   Cardiovascular S1, S2 heard; radial pulse intact   MENTAL STATUS:  Alert, oriented to name call only but not oriented to place and month and year; nonverbal, not following commands; rest of exam limited due to underlying severe cognitive impairment, no hallucination or delusion   CRANIAL NERVES: II     -       Pupils reactive b/l; Fundus exam: limited; blinks to threat bilaterally  III,IV,VI -  EOMs full, no afferent defect, no                      NATHLAY, no ptosis  V     -     Grimaces equally well bilaterally  VII    -     Normal facial symmetry  VIII   -     Intact hearing  IX,X, XI, XII - could not be assessed due to cognitive impairment   MOTOR FUNCTION:  motor exam significant for contractures in both upper extremities predominantly at both wrists involving all of the digtis; moves both upper and lower extremities spontaneously and purposefully with normal bulk, normal tone, no involuntary movements   SENSORY FUNCTION:  withdraws to pinprick equally well bilaterally    CEREBELLAR FUNCTION:  could not be assessed   REFLEX FUNCTION:  Symmetric and hyperactive DTRs and bilateral unsustained ankle clonus with upgoing toes   STATION and GAIT  Not tested         Data:    Lab Results:     Lab Results   Component Value Date    TRIG 61 03/13/2019    ALT 35 03/13/2019    AST 31 03/13/2019    INR 1.3 03/10/2019     Hematology:  Recent Labs     04/10/19  1742 04/11/19  0551 04/12/19  0641   WBC 7.4 4.7 5.2   HGB 11.6* 10.6* 10.7*   HCT 34.4* 31.2* 32.1*    337 307     Chemistry:  Recent Labs     04/10/19  1742 04/11/19  0551 04/12/19  0641 04/13/19  0528    146* 144 145*   K 3.4* 3.7 3.4* 3.0*    110* 109* 108*   CO2 27 26 24 24   GLUCOSE 96 123* 149* 95   BUN 12 7 7 8   CREATININE <0.40* <0.40* <0.40* <0.40*   MG 2.2  --  2.5 2.3   CALCIUM 8.7 8.4* 8.4* 8.0*     No results for input(s): PROT, LABALBU, LABA1C, V0ORNPT, A7BLWWQ, FT4, TSH, AST, ALT, LDH, AMMONIA, CHOL, HDL, LDLCHOLESTEROL, CHOLHDLRATIO, TRIG, VLDL, CKTOTAL, CKMB, CKMBINDEX, RF, JONAH in the last 72 hours. No results found for: PHENYTOIN, PHENYTOIN, VALPROATE, CBMZ        Diagnostic data reviewed:  Keppra level 3/22/19: 56    CT Head 11/17/17: Motion limits evaluation. No convincing acute intracranial abnormality. Extensive global parenchymal volume loss with asymmetric involvement involving posterior cerebral hemispheres bilaterally. Impression and Plan: Mr. Sharon Choudhury is a 51 y.o. male with   Breakthrough seizure as oral meds could not be given due to PEG tube malfunctioning  Cerebral palsy, mental retardation and seizure disorder    Change keppra to iv keppra 1500 mg q12 hr and first dose stat; will get keppra level today. Lamictal not available as IV or IM; waiting for GI as they are consulted for PEG tube malfunctioning. Patient's nurse clearly understood that patient should get NG tube while waiting for the PEG tube repair and start Lamictal 300 mg in am and 200 mg in pm.   continue seizure precautions; IV Lorazepam 1mg q 30 min prn prolonged seizure activity, max 4mg/ day    I do not see any need to get EEG as he has established diagnosis of seizure disorder. Care plan was discussed with patient's Nurse. Will follow with you. Thank you for consultation.

## 2019-04-13 NOTE — PLAN OF CARE
Problem: Nutrition  Goal: Optimal nutrition therapy  Description  Nutrition Problem: Unintended weight loss  Intervention: Food and/or Nutrient Delivery: Continue NPO, Start Tube Feeding  Nutritional Goals: EN intake to meet >75% of estimated kcal/protein needs, with good GI tolerance   Outcome: Ongoing  Note:   Review diet daily and as needed with pt. Provide supplement nutrition as ordered by physician & educate pt. Review nutritional guidelines with pt. Problem: Falls - Risk of:  Goal: Will remain free from falls  Description  Will remain free from falls  Outcome: Ongoing  Note:   Pt fall risk, fall band present, falling star, safety alarm activated and in use as needed. Hourly rounding performed. Pt encouraged to use call light. See Perfecto Robertson fall risk assessment. Goal: Absence of physical injury  Description  Absence of physical injury  Outcome: Ongoing  Note:   Non-skid socks in place, up with assistance, bed in lowest position, bed exit & alarm as needed, provide toileting every 2 hours an d as needed. Problem: Risk for Impaired Skin Integrity  Goal: Tissue integrity - skin and mucous membranes  Description  Structural intactness and normal physiological function of skin and  mucous membranes. Outcome: Ongoing  Note:   Continuing to monitor for skin integrity risks. Patient independent with turning/repositioning. Turning/repositioning encouraged at least once every 2 hrs, and prn basis. Hygiene care being completed independently per patient; assistance provided when deemed necessary.

## 2019-04-13 NOTE — CONSULTS
[facility] - Initial Consult Note  Today's Date and Time: 4/13/2019, 8:24 AM    Impression:   · Enterococcus faecalis sepsis, ampicillin sensitive, source not entirely clear at this time  · Acute respiratory failure requiring BiPAP initially, currently on oxygen per nasal cannula  · COPD  · Dysphagia, status post PEG tube placement  · Chronic constipation  · Cervical palsy  · Seizure disorder  · Penicillin allergy, unsure of reaction  · He has reportedly received and tolerated both ceftriaxone and cefazolin in the past    Recommendations   · Blood cultures, 4/4, positive for Enterococcus faecalis. · The enterococcus is ampicillin susceptible; however, patient does have a documented penicillin allergy and we are unsure of the reaction. I will start the patient on IV vancomycin  · Source of the enterococcus is not entirely clear at this time. · Check urinalysis with reflex urine culture prior to administration of IV vancomycin  · We will need to repeat blood cultures after initiation of the vancomycin. If persistent bacteremia, we will check an echocardiogram.  · Continue to follow culture and continue supportive care    Chief complaint/reason for consultation:   Enterococcus faecalis bacteremia     History of Present Illness: Carolin Pop is a 51y.o.-year-old male who was initially admitted on 4/10/2019. Patient has a known medical history of cerebral palsy and is nonverbal, he also has a history of COPD and congestive heart failure. He does reside in a long-term care facility, [facility]. Patient was recently discharged from [facility] 4/9/2019 after being admitted and treated for respiratory failure. In the hospitalization, his chest x-ray showed pulmonary vascular congestion. His abdominal x-ray showed large fecal load which is a chronic problem for this particular patient him understanding. He was given IV azithromycin during the hospitalization.  He was discharged on liquid levofloxacin to be given through the G-tube; however, per the ER notes, patient did not receive that medication at his facility. He was brought to [facility] due to shortness of breath as well as emesis. Patient was started back on azithromycin. I have personally reviewed the past medical history, past surgical history, medications, social history, and family history, and I have updated the database accordingly.   Past Medical History:     Past Medical History:   Diagnosis Date    Cerebral palsy ([address]     Cholelithiasis with chronic cholecystitis     Constipation     Delayed gastric emptying     Dysphagia     GERD (gastroesophageal reflux disease)     Hearing loss     Mental disability     MRDD    Profound mental retardation     Scoliosis     Seizures (HCC)     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HCC)      Past Surgical  History:     Past Surgical History:   Procedure Laterality Date    CHOLECYSTECTOMY  08/18/2018    laparoscopic    ERCP  08/16/2018    GASTROSTOMY TUBE PLACEMENT      [place] ERCP DX COLLECTION SPECIMEN BRUSHING/WASHING N/A 8/16/2018    ERCP PAPILOTOMY SWEEPING STONE EXTRACTION ENDOSCOPIC RETROGRADE CHOLANGIOPANCREATOGRAPHY performed by Alton Tafoya MD at [address] 8/18/2018    CHOLECYSTECTOMY LAPAROSCOPIC WITH UMBILICAL HERNIA REPAIR performed by Canelo Vera DO at [address]      has Feeding tube     Medications:      CENTRUM/CERTA-JAY with minerals oral  15 mL Per G Tube Daily    furosemide  40 mg Per G Tube Daily    scopolamine  1 patch Transdermal Q72H    pseudoephedrine HCl  30 mg Per G Tube TID    levETIRAcetam  1,500 mg Per G Tube BID    cetirizine  5 mg Oral Daily    sennosides-docusate sodium  2 tablet Per G Tube Daily    vitamin D  1,000 Units Per G Tube BID    baclofen  10 mg Per G Tube TID    montelukast  10 mg Per G Tube Nightly    fluticasone  2 spray Nasal Daily    calcium carbonate  1,250 mg SEDRATE      Imaging Studies:     Chest x-ray 4/10/2019    Impression:        No change from prior study of 1 day earlier.  Stable cardiomegaly and  pulmonary vascular congestion. CT abd/pelvis 4/8/19      Impression   1.  Diffuse distention of the colon with stool and gas.       2.  No small bowel obstruction.  Fluid is scattered throughout the intestinal   tract which may be related to mild enteritis.  No free air.       3.  Colon is redundant.  No bowel wall thickening.  Findings suggest   retrosigmoid stool impaction.  Little change from prior exam.       4.  Gastrostomy tube in appropriate position. Cultures:     4/12/2019  8:50 PM - Jalen, [facility] Incoming Lab Results From [facility]     Specimen Information: Blood        Component Collected Lab   Specimen Description 04/10/2019  5:47  [facility]   . BLOOD    Special Requests 04/10/2019  5:47  [facility]   12 ML RT UPPER ARM    Culture Abnormal  04/10/2019  5:47 PM [address] Blood Culture   Results called to and read back by:   Shwetha Benites. 4/11 1030    Culture 04/10/2019  5:47 PM [address] FROM BOTTLE:   GRAM POSITIVE COCCI IN CHAINS    Culture 04/10/2019  5:47  [address]   ID by [facility]:   ENTEROCOCCUS FAECALIS    Culture Abnormal  04/10/2019  5:47  [address]   Comment:  >99% of E. faecalis isolates are susceptible to Ampicillin and Vancomycin.     Culture Abnormal  04/10/2019  5:47 PM [address]    Testing Performed By     [employer] - Abbreviation Name Director Address Valid Date Range   [address] [address] 08/30/17 0801-Present   [facility] [address] LAB Álvaro Hernandez  [facility] [address] 08/30/17 0757-Present   Susceptibility     Enterococcus  faecalis (5)     Antibiotic Interpretation ANGELA Status    ampicillin Sensitive  Final     <=2  SUSCEPTIBLE   penicillin   Final     NOT REPORTED   ciprofloxacin   Final     NOT REPORTED   erythromycin   Final     NOT REPORTED   Gentamicin, High Level Resistant RESISTANT Final    levofloxacin   Final     NOT REPORTED   linezolid   Final     NOT REPORTED   nitrofurantoin   Final     NOT REPORTED   Synercid   Final     NOT REPORTED   Streptomycin, Hi Level Sensitive SUSCEPTIBLE Final    tetracycline   Final     NOT REPORTED   tigecycline   Final     NOT REPORTED   vancomycin Sensitive  Final     1  SUSCEPTIBLE   Lab and Collection     Cult,Blood #2 - 4/10/2019       CULTURE BLOOD #1 [[id number]] (Abnormal) Collected: 04/11/19 1145   Order Status: Completed Specimen: Blood Updated: 04/12/19 2055    Specimen Description . BLOOD    Special Requests LT HAND,2ML    Culture --Abnormal     POSITIVE BLOOD CULTURE, RN NOTIFIED:   RESULTS REPEATED BACK BY SAUL WHITMORE AT 0405 4/12/19   Abnormal     Culture --    DIRECT GRAM STAIN FROM BOTTLE:   GRAM POSITIVE COCCI IN PAIRS   AND CHAINS     Culture --Abnormal     ENTEROCOCCUS FAECALIS   For susceptibility, refer to previous culture. Abnormal    CULTURE BLOOD #2 [[id number]] (Abnormal) Collected: 04/11/19 1210   Order Status: Completed Specimen: Blood Updated: 04/12/19 2055    Specimen Description . BLOOD    Special Requests 4ML L WRIST    Culture --Abnormal     POSITIVE BLOOD CULTURE, RN NOTIFIED:   RESULTS READ BACK [facility] AT 0405 4/12/19   Abnormal     Culture --    DIRECT GRAM STAIN FROM BOTTLE:   GRAM POSITIVE COCCI IN PAIRS   AND CHAINS     Culture --Abnormal     ENTEROCOCCUS FAECALIS   For susceptibility, refer to previous culture. Abnormal    Cult,Blood #1 [[id number]] (Abnormal) Collected: 04/10/19 1742   Order Status: Completed Specimen: Blood Updated: 04/12/19 2052    Specimen Description . BLOOD    Special Requests 6ML RT FA    Culture --Abnormal     POSITIVE

## 2019-04-13 NOTE — PROGRESS NOTES
[facility]    Progress Note    4/13/2019    2:28 PM    Name:   Amanda Chen  MRN:     [id number]     [place]:      [de-identified]   Room:   [address] [place] [address] Day:  1  Admit Date:  4/10/2019  4:55 PM    PCP:   Tigre Montez MD  Code Status:  DNR-CCA    Subjective:     C/C:   Chief Complaint   Patient presents with    Shortness of Breath     discharged from here yesterday     Interval History Status: stable    Informed by nursing staff the PEG tube not functioning. Unable to give medications this morning. Called after evaluation with possible seizure-like activity. Patient awake but no meaningful response to examination or interview. Coarse respiratory sounds noted. Bowel movement recorded from this morning noted. Brief History:     78-year-old  male admitted from [facility] with difficulty breathing. Patient is well-known to our practice. He has spent an extended period of time at [facility] up until several weeks ago when he was moved to his group home. He was previously admitted here (4/8-4/9/2019) with respiratory insufficiency. On that admission in the ED his PaO2 was reported as 45. He was placed on BiPAP however when the O2 saturation sensor was repositioned from his finger to his ear he was satting at 100%. His chest x-ray showed some pulmonary vascular congestion but was otherwise unremarkable. His abdominal x-rays showed a large fecal load which is a chronic problem for this patient. His pro-calcitonin was 0.12 (<0.25 speaks against bacterial respiratory infection). His white count was normal. It had been reported that he was having emesis at the group home and there was some concern of aspiration however chest x-rays did not confirm this and the patient had no emesis during that admission. The patient did have a large bowel movement.  Pulmonary consultation was obtained and the patient was started empirically on azithromycin. His chest x-ray remained clear. His O2 saturations remained normal with minimal oxygen supplementation. He was discharged back to the group home with azithromycin 400 mg via G-tube daily ×5 days per pulmonary recommendation. ER reports that the group home had not given him any doses of his antibiotics. He is admitted with dyspnea, tachypnea and once again low O2 saturations. His past medical history includes cerebral palsy, severe scoliosis, COPD and congestive heart failure. Review of Systems:     Unable to obtain secondary to cerebral palsy    Medications: Allergies: Allergies   Allergen Reactions    Ampicillin Other (See Comments)     Has received and tolerated ceftriaxone and cefazolin.     Valium [Diazepam] Other (See Comments)     Has received and tolerated Ativan IV/PO    Other      Bubble bath products        Current Meds:   Scheduled Meds:    vancomycin (VANCOCIN) intermittent dosing (placeholder)   Other RX Placeholder    vancomycin  1,250 mg Intravenous Once    [START ON 4/14/2019] vancomycin  1,000 mg Intravenous S97P    CENTRUM/CERTA-JAY with minerals oral  15 mL Per G Tube Daily    furosemide  40 mg Per G Tube Daily    scopolamine  1 patch Transdermal Q72H    pseudoephedrine HCl  30 mg Per G Tube TID    levETIRAcetam  1,500 mg Per G Tube BID    cetirizine  5 mg Oral Daily    sennosides-docusate sodium  2 tablet Per G Tube Daily    vitamin D  1,000 Units Per G Tube BID    baclofen  10 mg Per G Tube TID    montelukast  10 mg Per G Tube Nightly    fluticasone  2 spray Nasal Daily    calcium carbonate  1,250 mg Per G Tube Daily    miconazole   Topical BID    glycopyrrolate  1 mg Per G Tube TID    lacosamide  150 mg PEG Tube BID    budesonide  500 mcg Nebulization BID    lactulose  20 g Per G Tube BID    polyethylene glycol  17 g Per G Tube Daily    lamoTRIgine  200 mg Per G Tube Nightly    lamoTRIgine  300 mg Per G Tube QAM    lansoprazole 30 mg Per G Tube Daily    azithromycin  400 mg Per G Tube Daily    sodium chloride flush  10 mL Intravenous 2 times per day    enoxaparin  40 mg Subcutaneous Daily    ipratropium-albuterol  1 ampule Inhalation Q4H WA    predniSONE  40 mg Oral Daily     Continuous Infusions:    sodium chloride 50 mL/hr at 19 1426     PRN Meds: LORazepam, magnesium sulfate, potassium chloride **OR** potassium alternative oral replacement **OR** potassium chloride, HYDROcodone 5 mg - acetaminophen **OR** HYDROcodone 5 mg - acetaminophen, zinc oxide, neomycin-bacitracin-polymyxin, ondansetron, sodium chloride flush, magnesium hydroxide, ondansetron, nicotine, albuterol, acetaminophen    Data:     Past Medical History:   has a past medical history of Cerebral palsy ([facility] Cholelithiasis with chronic cholecystitis, Constipation, Delayed gastric emptying, Dysphagia, GERD (gastroesophageal reflux disease), Hearing loss, Mental disability, Profound mental retardation, Scoliosis, Seizures ([address] and 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[facility] [address] Social History:   reports that he has never smoked. He has never used smokeless tobacco. He reports that he does not drink alcohol or use drugs. Family History:   Family History   Family history unknown: Yes       Vitals:  /69   Pulse 64   Temp 98.7 °F (37.1 °C) (Oral)   Resp 16   Ht 3' 10.85\" (1.19 m)   Wt 110 lb (49.9 kg)   SpO2 97%   BMI 35.24 kg/m²   Temp (24hrs), Av.3 °F (36.8 °C), Min:97.5 °F (36.4 °C), Max:98.7 °F (37.1 °C)    No results for input(s): POCGLU in the last 72 hours. I/O (24Hr):     Intake/Output Summary (Last 24 hours) at 2019 1428  Last data filed at 2019 0404  Gross per 24 hour   Intake 3492 ml   Output --   Net 3492 ml       Labs:    Hematology:  Recent Labs     04/10/19  1742 19  0551 19  0641   WBC 7.4 4.7 5.2   RBC 3.68* 3.37* 3.43*   HGB 11.6* 10.6* 10.7*   HCT 34.4* 31.2* 32.1*   MCV 93.5 92.5 93.7   MCH 31.6 31.3 31.1   MCHC 33.8 33.8 33.2   RDW 15.6* 16.4* 15.1*    337 307   MPV NOT REPORTED 7.5 NOT REPORTED   DDIMER <0.17  --   --      Chemistry:  Recent Labs     04/10/19  1742 04/11/19  0551 04/12/19  0641 04/13/19  0528    146* 144 145*   K 3.4* 3.7 3.4* 3.0*    110* 109* 108*   CO2 27 26 24 24   GLUCOSE 96 123* 149* 95   BUN 12 7 7 8   CREATININE <0.40* <0.40* <0.40* <0.40*   MG 2.2  --  2.5 2.3   ANIONGAP 13 10 11 13   LABGLOM CANNOT BE CALCULATED CANNOT BE CALCULATED CANNOT BE CALCULATED CANNOT BE CALCULATED   GFRAA CANNOT BE CALCULATED CANNOT BE CALCULATED CANNOT BE CALCULATED CANNOT BE CALCULATED   CALCIUM 8.7 8.4* 8.4* 8.0*   PROBNP 675*  --   --   --    TROPHS 12  --   --   --    MYOGLOBIN <21*  --   --   --      No results for input(s): PROT, LABALBU, LABA1C, W3SXOIO, H0FQGXY, FT4, TSH, AST, ALT, LDH, GGT, ALKPHOS, LABGGT, BILITOT, BILIDIR, AMMONIA, AMYLASE, LIPASE, LACTATE, CHOL, HDL, LDLCHOLESTEROL, CHOLHDLRATIO, TRIG, VLDL, DTH62TF, PHENYTOIN, PHENYF, URICACID, POCGLU in the last 72 hours. Lab Results   Component Value Date/Time    SPECIAL 4ML L WRIST 04/11/2019 12:10 PM     Lab Results   Component Value Date/Time    CULTURE (A) 04/11/2019 12:10 PM     POSITIVE BLOOD CULTURE, RN NOTIFIED:  RESULTS READ BACK BY LUPE WHITMORE ([facility]) AT 0405 4/12/19      CULTURE  04/11/2019 12:10 PM     DIRECT GRAM STAIN FROM BOTTLE:  GRAM POSITIVE COCCI IN PAIRS  AND CHAINS      CULTURE (A) 04/11/2019 12:10 PM     ENTEROCOCCUS FAECALIS  For susceptibility, refer to previous culture.          Lab Results   Component Value Date    POCPH 7.40 04/08/2019    POCPCO2 46 04/08/2019    POCPO2 144 04/08/2019    POCHCO3 28.1 04/08/2019    NBEA NOT REPORTED 04/08/2019    PBEA 3 04/08/2019    DVF0LTY 29 04/08/2019    KQFI7FXI 99 04/08/2019    FIO2 50.0 04/08/2019       Radiology:    No new imaging studies today    Physical Examination:        General appearance:  Awake with examination and interview; nurse present in the room  Mental Status:

## 2019-04-13 NOTE — CONSULTS
Pharmacy Note  Vancomycin Consult - Initial Note     Olivia Vaughn is a 51 y.o. male ordered Vancomycin. .  Current diagnosis for which MRSA is suspected/confirmed: bacteremia/pcn allergic  Vancomycin order/consult received from Dr. Myrtle Monterroso. Additional antimicrobials:  azithromycin    Patient Active Problem List   Diagnosis    Acute exacerbation of chronic obstructive pulmonary disease (COPD) ([facility]    Seizure disorder ([facility] [address]    Cerebral palsied ([facility] [address]    Severe protein-calorie malnutrition (Quintella Maru: less than 60% of standard weight) ([facility] [address] [facility]    Multifocal pneumonia    Pneumonia due to organism    Positive blood cultures    Pneumonia of left lower lobe due to infectious organism ([facility] [address] [facility]    Spastic quadriplegic cerebral palsy (HCC)    Choledocholithiasis    Obstipation    Generalized abdominal pain    Constipation    Developmental disability    Ileus (HCC)    Influenza with respiratory manifestation other than pneumonia    Hypoxia    Abdominal distension    Dyspnea and respiratory abnormalities    Acute respiratory failure with hypoxia (HCC)    Acute respiratory failure with hypoxia (HCC)    Acute and chronic respiratory failure with hypoxia (HCC)    Acute respiratory failure (HCC)    COPD exacerbation ([facility] [address] [facility]       Actual Weight:    Wt Readings from Last 1 Encounters:   04/12/19 110 lb (49.9 kg)     CrCl:  CrCl cannot be calculated (This lab value cannot be used to calculate CrCl because it is not a number: <0.40). Height:     Wt Readings from Last 1 Encounters:   04/12/19 110 lb (49.9 kg)       Allergies:  Ampicillin; Valium [diazepam]; and Other     Temp: 97.5 F      Procalcitonin   Date Value Ref Range Status   04/11/2019 0.09 (H) <0.09 ng/mL Final     Comment:           Suspected Sepsis:  0.09-0.49 ng/mL     Low likelihood of sepsis. 0.50-2.00 ng/mL     Increased likelihood of sepsis. Antibiotics encouraged. >2.00 ng/mL     High risk of sepsis/shock. Antibiotics strongly encouraged. consult. Will continue to follow.   Aashish Cornell RPh/Edith  4/13/2019  11:39 AM

## 2019-04-13 NOTE — PROGRESS NOTES
Pt is being transferred to ICU. Report given to Paramjit Garcia. Telephone call to patient's sister Robert Gamboa and she's has been updated on day's events and pt progress. Pt now much quieter and even responds with a smile with addressed as he has previously. No further tremors or seizure like activity noted.  Pt does continue to grind his teeth with notes indicate he has don in the past.

## 2019-04-14 ENCOUNTER — APPOINTMENT (OUTPATIENT)
Dept: GENERAL RADIOLOGY | Age: 49
DRG: 871 | End: 2019-04-14
Payer: MEDICARE

## 2019-04-14 PROBLEM — M21.852 HIP DYSPLASIA, ACQUIRED, LEFT: Status: ACTIVE | Noted: 2019-04-14

## 2019-04-14 PROBLEM — M41.45 NEUROMUSCULAR SCOLIOSIS OF THORACOLUMBAR REGION: Status: ACTIVE | Noted: 2019-04-14

## 2019-04-14 LAB
ABSOLUTE EOS #: 0.1 K/UL (ref 0–0.4)
ABSOLUTE IMMATURE GRANULOCYTE: ABNORMAL K/UL (ref 0–0.3)
ABSOLUTE LYMPH #: 1.5 K/UL (ref 1–4.8)
ABSOLUTE MONO #: 0.6 K/UL (ref 0.2–0.8)
ANION GAP SERPL CALCULATED.3IONS-SCNC: 14 MMOL/L (ref 9–17)
BASOPHILS # BLD: 0 % (ref 0–2)
BASOPHILS ABSOLUTE: 0 K/UL (ref 0–0.2)
BUN BLDV-MCNC: 4 MG/DL (ref 6–20)
BUN BLDV-MCNC: 5 MG/DL (ref 6–20)
BUN/CREAT BLD: ABNORMAL (ref 9–20)
CALCIUM SERPL-MCNC: 8.8 MG/DL (ref 8.6–10.4)
CHLORIDE BLD-SCNC: 104 MMOL/L (ref 98–107)
CO2: 27 MMOL/L (ref 20–31)
CREAT SERPL-MCNC: <0.4 MG/DL (ref 0.7–1.2)
CREAT SERPL-MCNC: <0.4 MG/DL (ref 0.7–1.2)
CULTURE: NORMAL
CULTURE: NORMAL
DIFFERENTIAL TYPE: ABNORMAL
DIRECT EXAM: NORMAL
EOSINOPHILS RELATIVE PERCENT: 1 % (ref 1–4)
GFR AFRICAN AMERICAN: ABNORMAL ML/MIN
GFR AFRICAN AMERICAN: ABNORMAL ML/MIN
GFR NON-AFRICAN AMERICAN: ABNORMAL ML/MIN
GFR NON-AFRICAN AMERICAN: ABNORMAL ML/MIN
GFR SERPL CREATININE-BSD FRML MDRD: ABNORMAL ML/MIN/{1.73_M2}
GLUCOSE BLD-MCNC: 99 MG/DL (ref 70–99)
HCT VFR BLD CALC: 33.8 % (ref 41–53)
HEMOGLOBIN: 11.1 G/DL (ref 13.5–17.5)
IMMATURE GRANULOCYTES: ABNORMAL %
LYMPHOCYTES # BLD: 20 % (ref 24–44)
Lab: NORMAL
MCH RBC QN AUTO: 30.4 PG (ref 26–34)
MCHC RBC AUTO-ENTMCNC: 33 G/DL (ref 31–37)
MCV RBC AUTO: 92.1 FL (ref 80–100)
MONOCYTES # BLD: 8 % (ref 1–7)
NRBC AUTOMATED: ABNORMAL PER 100 WBC
PDW BLD-RTO: 16.4 % (ref 11.5–14.5)
PLATELET # BLD: 392 K/UL (ref 130–400)
PLATELET ESTIMATE: ABNORMAL
PMV BLD AUTO: 8.4 FL (ref 6–12)
POTASSIUM SERPL-SCNC: 3.4 MMOL/L (ref 3.7–5.3)
RBC # BLD: 3.67 M/UL (ref 4.5–5.9)
RBC # BLD: ABNORMAL 10*6/UL
SEG NEUTROPHILS: 71 % (ref 36–66)
SEGMENTED NEUTROPHILS ABSOLUTE COUNT: 5.2 K/UL (ref 1.8–7.7)
SODIUM BLD-SCNC: 145 MMOL/L (ref 135–144)
SPECIMEN DESCRIPTION: NORMAL
VANCOMYCIN TROUGH DATE LAST DOSE: NORMAL
VANCOMYCIN TROUGH DOSE AMOUNT: NORMAL
VANCOMYCIN TROUGH TIME LAST DOSE: NORMAL
VANCOMYCIN TROUGH: 11.6 UG/ML (ref 10–20)
WBC # BLD: 7.5 K/UL (ref 3.5–11)
WBC # BLD: ABNORMAL 10*3/UL

## 2019-04-14 PROCEDURE — 6370000000 HC RX 637 (ALT 250 FOR IP): Performed by: INTERNAL MEDICINE

## 2019-04-14 PROCEDURE — 85025 COMPLETE CBC W/AUTO DIFF WBC: CPT

## 2019-04-14 PROCEDURE — 6360000002 HC RX W HCPCS: Performed by: INTERNAL MEDICINE

## 2019-04-14 PROCEDURE — 94640 AIRWAY INHALATION TREATMENT: CPT

## 2019-04-14 PROCEDURE — 2580000003 HC RX 258: Performed by: INTERNAL MEDICINE

## 2019-04-14 PROCEDURE — 87040 BLOOD CULTURE FOR BACTERIA: CPT

## 2019-04-14 PROCEDURE — 80202 ASSAY OF VANCOMYCIN: CPT

## 2019-04-14 PROCEDURE — 6370000000 HC RX 637 (ALT 250 FOR IP): Performed by: NURSE PRACTITIONER

## 2019-04-14 PROCEDURE — 71045 X-RAY EXAM CHEST 1 VIEW: CPT

## 2019-04-14 PROCEDURE — 99232 SBSQ HOSP IP/OBS MODERATE 35: CPT | Performed by: INTERNAL MEDICINE

## 2019-04-14 PROCEDURE — 73562 X-RAY EXAM OF KNEE 3: CPT

## 2019-04-14 PROCEDURE — 2500000003 HC RX 250 WO HCPCS: Performed by: ORTHOPAEDIC SURGERY

## 2019-04-14 PROCEDURE — 36415 COLL VENOUS BLD VENIPUNCTURE: CPT

## 2019-04-14 PROCEDURE — 6360000002 HC RX W HCPCS: Performed by: NURSE PRACTITIONER

## 2019-04-14 PROCEDURE — 87205 SMEAR GRAM STAIN: CPT

## 2019-04-14 PROCEDURE — 87070 CULTURE OTHR SPECIMN AEROBIC: CPT

## 2019-04-14 PROCEDURE — 99232 SBSQ HOSP IP/OBS MODERATE 35: CPT | Performed by: PSYCHIATRY & NEUROLOGY

## 2019-04-14 PROCEDURE — 87075 CULTR BACTERIA EXCEPT BLOOD: CPT

## 2019-04-14 PROCEDURE — 94660 CPAP INITIATION&MGMT: CPT

## 2019-04-14 PROCEDURE — 2060000000 HC ICU INTERMEDIATE R&B

## 2019-04-14 PROCEDURE — 86403 PARTICLE AGGLUT ANTBDY SCRN: CPT

## 2019-04-14 PROCEDURE — 99233 SBSQ HOSP IP/OBS HIGH 50: CPT | Performed by: NURSE PRACTITIONER

## 2019-04-14 PROCEDURE — 80048 BASIC METABOLIC PNL TOTAL CA: CPT

## 2019-04-14 PROCEDURE — 94761 N-INVAS EAR/PLS OXIMETRY MLT: CPT

## 2019-04-14 RX ORDER — LIDOCAINE HYDROCHLORIDE 10 MG/ML
10 INJECTION, SOLUTION EPIDURAL; INFILTRATION; INTRACAUDAL; PERINEURAL ONCE
Status: COMPLETED | OUTPATIENT
Start: 2019-04-14 | End: 2019-04-14

## 2019-04-14 RX ADMIN — ANTACID TABLETS 1250 MG: 500 TABLET, CHEWABLE ORAL at 09:36

## 2019-04-14 RX ADMIN — MONTELUKAST SODIUM 10 MG: 10 TABLET, FILM COATED ORAL at 20:54

## 2019-04-14 RX ADMIN — ZINC OXIDE: 200 OINTMENT TOPICAL at 09:32

## 2019-04-14 RX ADMIN — MORPHINE SULFATE 1 MG: 2 INJECTION, SOLUTION INTRAMUSCULAR; INTRAVENOUS at 13:27

## 2019-04-14 RX ADMIN — VANCOMYCIN HYDROCHLORIDE 1000 MG: 1 INJECTION, SOLUTION INTRAVENOUS at 12:17

## 2019-04-14 RX ADMIN — Medication 10 ML: at 20:52

## 2019-04-14 RX ADMIN — MORPHINE SULFATE 1 MG: 2 INJECTION, SOLUTION INTRAMUSCULAR; INTRAVENOUS at 18:33

## 2019-04-14 RX ADMIN — Medication 15 ML: at 09:33

## 2019-04-14 RX ADMIN — IPRATROPIUM BROMIDE AND ALBUTEROL SULFATE 1 AMPULE: .5; 3 SOLUTION RESPIRATORY (INHALATION) at 11:44

## 2019-04-14 RX ADMIN — IPRATROPIUM BROMIDE AND ALBUTEROL SULFATE 1 AMPULE: .5; 3 SOLUTION RESPIRATORY (INHALATION) at 19:47

## 2019-04-14 RX ADMIN — LACTULOSE 20 G: 20 SOLUTION ORAL at 09:02

## 2019-04-14 RX ADMIN — HYDROCODONE BITARTRATE AND ACETAMINOPHEN 2 TABLET: 5; 325 TABLET ORAL at 11:22

## 2019-04-14 RX ADMIN — POTASSIUM BICARBONATE 40 MEQ: 782 TABLET, EFFERVESCENT ORAL at 16:07

## 2019-04-14 RX ADMIN — VITAMIN D, TAB 1000IU (100/BT) 1000 UNITS: 25 TAB at 10:16

## 2019-04-14 RX ADMIN — IPRATROPIUM BROMIDE AND ALBUTEROL SULFATE 1 AMPULE: .5; 3 SOLUTION RESPIRATORY (INHALATION) at 07:58

## 2019-04-14 RX ADMIN — LAMOTRIGINE 300 MG: 100 TABLET ORAL at 10:16

## 2019-04-14 RX ADMIN — GLYCOPYRROLATE 1 MG: 1 TABLET ORAL at 15:15

## 2019-04-14 RX ADMIN — FLUTICASONE PROPIONATE 2 SPRAY: 50 SPRAY, METERED NASAL at 09:52

## 2019-04-14 RX ADMIN — BUDESONIDE 500 MCG: 0.5 SUSPENSION RESPIRATORY (INHALATION) at 19:47

## 2019-04-14 RX ADMIN — Medication 30 MG: at 11:20

## 2019-04-14 RX ADMIN — VANCOMYCIN HYDROCHLORIDE 1250 MG: 5 INJECTION, POWDER, LYOPHILIZED, FOR SOLUTION INTRAVENOUS at 23:59

## 2019-04-14 RX ADMIN — MICONAZOLE NITRATE: 20.6 POWDER TOPICAL at 09:34

## 2019-04-14 RX ADMIN — Medication 40 MG: at 11:20

## 2019-04-14 RX ADMIN — ENOXAPARIN SODIUM 40 MG: 40 INJECTION SUBCUTANEOUS at 09:34

## 2019-04-14 RX ADMIN — LACOSAMIDE 150 MG: 100 TABLET, FILM COATED ORAL at 09:34

## 2019-04-14 RX ADMIN — MICONAZOLE NITRATE: 20.6 POWDER TOPICAL at 20:52

## 2019-04-14 RX ADMIN — GLYCOPYRROLATE 1 MG: 1 TABLET ORAL at 09:33

## 2019-04-14 RX ADMIN — LIDOCAINE HYDROCHLORIDE 10 ML: 10 INJECTION, SOLUTION EPIDURAL; INFILTRATION; INTRACAUDAL; PERINEURAL at 13:28

## 2019-04-14 RX ADMIN — BUDESONIDE 500 MCG: 0.5 SUSPENSION RESPIRATORY (INHALATION) at 08:01

## 2019-04-14 RX ADMIN — PSEUDOEPHEDRINE HYDROCHLORIDE 30 MG: 15 LIQUID ORAL at 20:51

## 2019-04-14 RX ADMIN — LEVETIRACETAM 1500 MG: 100 SOLUTION ORAL at 20:52

## 2019-04-14 RX ADMIN — BACLOFEN 10 MG: 10 TABLET ORAL at 13:30

## 2019-04-14 RX ADMIN — SENNOSIDES AND DOCUSATE SODIUM 2 TABLET: 8.6; 5 TABLET ORAL at 11:23

## 2019-04-14 RX ADMIN — LACTULOSE 20 G: 20 SOLUTION ORAL at 20:52

## 2019-04-14 RX ADMIN — SODIUM CHLORIDE: 9 INJECTION, SOLUTION INTRAVENOUS at 09:01

## 2019-04-14 RX ADMIN — VITAMIN D, TAB 1000IU (100/BT) 1000 UNITS: 25 TAB at 20:50

## 2019-04-14 RX ADMIN — BACLOFEN 10 MG: 10 TABLET ORAL at 09:32

## 2019-04-14 RX ADMIN — PSEUDOEPHEDRINE HYDROCHLORIDE 30 MG: 15 LIQUID ORAL at 09:33

## 2019-04-14 RX ADMIN — PREDNISONE 40 MG: 20 TABLET ORAL at 09:01

## 2019-04-14 RX ADMIN — LAMOTRIGINE 200 MG: 100 TABLET ORAL at 20:51

## 2019-04-14 RX ADMIN — LEVETIRACETAM 1500 MG: 100 SOLUTION ORAL at 10:11

## 2019-04-14 RX ADMIN — LACOSAMIDE 150 MG: 100 TABLET, FILM COATED ORAL at 20:48

## 2019-04-14 RX ADMIN — HYDROCODONE BITARTRATE AND ACETAMINOPHEN 2 TABLET: 5; 325 TABLET ORAL at 20:49

## 2019-04-14 RX ADMIN — BACLOFEN 10 MG: 10 TABLET ORAL at 20:50

## 2019-04-14 RX ADMIN — POLYETHYLENE GLYCOL 3350 17 G: 17 POWDER, FOR SOLUTION ORAL at 11:22

## 2019-04-14 RX ADMIN — CETIRIZINE HYDROCHLORIDE 5 MG: 5 TABLET ORAL at 09:36

## 2019-04-14 RX ADMIN — GLYCOPYRROLATE 1 MG: 1 TABLET ORAL at 20:50

## 2019-04-14 RX ADMIN — PSEUDOEPHEDRINE HYDROCHLORIDE 30 MG: 15 LIQUID ORAL at 13:32

## 2019-04-14 ASSESSMENT — PAIN SCALES - GENERAL: PAINLEVEL_OUTOF10: 5

## 2019-04-14 NOTE — CONSULTS
Geovanni Armenta MD        vancomycin (VANCOCIN) 1000 mg in dextrose 5% 200 mL IVPB  1,000 mg Intravenous Q12H Bekah Jaramillo MD   Stopped at 04/14/19 1320    LORazepam (ATIVAN) injection 1 mg  1 mg Intramuscular Q4H PRN Arreguin Pascual, DO   1 mg at 04/13/19 1414    morphine (PF) injection 1 mg  1 mg Intravenous Q4H PRN Bernice Byers APRN - CNP   1 mg at 04/14/19 1327    levETIRAcetam (KEPPRA) 100 MG/ML solution 1,500 mg  1,500 mg PEG Tube BID Arreguin Pascual, DO   1,500 mg at 04/14/19 1011    magnesium sulfate 1 g in dextrose 5% 100 mL IVPB  1 g Intravenous PRN Jamse Adriel, DO        potassium chloride (KLOR-CON M) extended release tablet 40 mEq  40 mEq Oral PRN Jamse Adriel, DO        Or    potassium bicarb-citric acid (EFFER-K) effervescent tablet 40 mEq  40 mEq Oral PRN Jamse Adriel, DO        Or    potassium chloride 10 mEq/100 mL IVPB (Peripheral Line)  10 mEq Intravenous PRN Jamse Adriel, DO   10 mEq at 04/13/19 0335    CENTRUM/CERTA-JAY with minerals oral solution 15 mL  15 mL Per G Tube Daily Roger TORRES Blood, DO   15 mL at 04/14/19 0933    furosemide (LASIX) 10 MG/ML solution 40 mg  40 mg Per G Tube Daily Julianne Alfaro, DO   40 mg at 04/14/19 1120    scopolamine (TRANSDERM-SCOP) transdermal patch 1 patch  1 patch Transdermal Q72H Jamse Adriel, DO   1 patch at 04/14/19 1100    pseudoephedrine HCl (SUDAFED) liquid 30 mg  30 mg Per G Tube TID Roger P Blood, DO   30 mg at 04/14/19 1332    HYDROcodone-acetaminophen (NORCO) 5-325 MG per tablet 1 tablet  1 tablet PEG Tube Q4H PRN Valariebeto Baileye, APRN - CNP        Or    HYDROcodone-acetaminophen (NORCO) 5-325 MG per tablet 2 tablet  2 tablet PEG Tube Q4H PRN Valarie Baileye, APRN - CNP   2 tablet at 04/14/19 1122    cetirizine (ZYRTEC) tablet 5 mg  5 mg Oral Daily Roger TORRES Blood, DO   5 mg at 04/14/19 0936    sennosides-docusate sodium (SENOKOT-S) 8.6-50 MG tablet 2 tablet  2 tablet Per G Tube Daily Roger P Blood, DO   2 tablet at 04/14/19 1123    vitamin D (CHOLECALCIFEROL) tablet 1,000 Units  1,000 Units Per G Tube BID Roger P Blood, DO   1,000 Units at 04/14/19 1016    baclofen (LIORESAL) tablet 10 mg  10 mg Per G Tube TID Roger P Blood, DO   10 mg at 04/14/19 1330    montelukast (SINGULAIR) tablet 10 mg  10 mg Per G Tube Nightly Roger P Blood, DO   10 mg at 04/13/19 2034    fluticasone (FLONASE) 50 MCG/ACT nasal spray 2 spray  2 spray Nasal Daily Roger P Blood, DO   2 spray at 04/14/19 0952    calcium carbonate (TUMS) chewable tablet 1,250 mg  1,250 mg Per G Tube Daily Roger P Blood, DO   1,250 mg at 04/14/19 0936    miconazole (MICOTIN) 2 % powder   Topical BID Roger P Blood, DO        glycopyrrolate (ROBINUL) tablet 1 mg  1 mg Per G Tube TID Roger P Blood, DO   1 mg at 04/14/19 0933    lacosamide (VIMPAT) tablet 150 mg  150 mg PEG Tube BID Roger P Blood, DO   150 mg at 04/14/19 0934    budesonide (PULMICORT) nebulizer suspension 500 mcg  500 mcg Nebulization BID Roger P Blood, DO   500 mcg at 04/14/19 0801    lactulose (CHRONULAC) 10 GM/15ML solution 20 g  20 g Per G Tube BID Roger P Blood, DO   20 g at 04/14/19 0902    polyethylene glycol (GLYCOLAX) packet 17 g  17 g Per G Tube Daily Roger P Blood, DO   17 g at 04/14/19 1122    lamoTRIgine (LAMICTAL) tablet 200 mg  200 mg Per G Tube Nightly Roger P Blood, DO   200 mg at 04/13/19 2033    lamoTRIgine (LAMICTAL) tablet 300 mg  300 mg Per G Tube QAM Roger P Blood, DO   300 mg at 04/14/19 1016    lansoprazole suspension SUSP 30 mg  30 mg Per G Tube Daily Roger P Blood, DO   30 mg at 04/14/19 1120    zinc oxide 20 % ointment   Topical PRN Roger P Blood, DO        neomycin-bacitracin-polymyxin (NEOSPORIN) ointment   Topical PRN Roger TORRES Blood, DO        ondansetron (ZOFRAN-ODT) disintegrating tablet 4 mg  4 mg Oral Q6H PRN Roger TORRES Blood, DO        sodium chloride flush 0.9 % injection 10 mL  10 mL Intravenous 2 times per day Roger P Blood, DO   10 mL at 04/12/19 1047    sodium chloride flush 0.9 % injection 10 mL  10 mL Intravenous PRN Roger P Blood, DO        magnesium hydroxide (MILK OF MAGNESIA) 400 MG/5ML suspension 30 mL  30 mL Oral Daily PRN Roger P Blood, DO        ondansetron (ZOFRAN) injection 4 mg  4 mg Intravenous Q6H PRN Roger P Blood, DO        nicotine (NICODERM CQ) 21 MG/24HR 1 patch  1 patch Transdermal Daily PRN Roger P Blood, DO        enoxaparin (LOVENOX) injection 40 mg  40 mg Subcutaneous Daily Roger P Blood, DO   40 mg at 04/14/19 0934    albuterol (PROVENTIL) nebulizer solution 2.5 mg  2.5 mg Nebulization Q2H PRN Roger P Blood, DO        ipratropium-albuterol (DUONEB) nebulizer solution 1 ampule  1 ampule Inhalation Q4H WA Roger P Blood, DO   1 ampule at 04/14/19 1144    acetaminophen (TYLENOL) tablet 650 mg  650 mg Oral Q4H PRN Roger P Blood, DO        predniSONE (DELTASONE) tablet 40 mg  40 mg Oral Daily Roger P Blood, DO   40 mg at 04/14/19 0901         Allergies:  Ampicillin; Valium [diazepam]; and Other    Social History:   Social History     Tobacco Use   Smoking Status Never Smoker   Smokeless Tobacco Never Used     Social History     Substance and Sexual Activity   Alcohol Use No     Social History     Substance and Sexual Activity   Drug Use No       Family History:  Family History   Family history unknown: Yes         REVIEW OF SYSTEMS:  Gen: Negative for nausea, vomiting, diarrhea, fever, chills, night sweats  Heart: Negative for HTN, palpitations, chest pain  Lungs: Negative for wheezes, asthma or SOB  GI: Negative for nausea, vomiting  Endo: Negative for diabetes  Heme: Negative for DVT       PHYSICAL EXAM:  Patient Vitals for the past 24 hrs:   BP Temp Temp src Pulse Resp SpO2   04/14/19 1200 (!) 138/104 -- -- 113 24 95 %   04/14/19 1145 -- -- -- -- 29 92 %   04/14/19 1100 139/78 -- -- 110 26 95 %   04/14/19 1028 -- -- -- 105 -- -- 04/14/19 1000 (!) 154/88 -- -- 96 (!) 37 --   04/14/19 0900 124/72 -- -- 92 28 96 %   04/14/19 0802 -- -- -- -- 26 97 %   04/14/19 0800 99/68 -- -- 86 21 96 %   04/14/19 0730 -- 96.6 °F (35.9 °C) Axillary -- -- --   04/14/19 0700 -- -- -- -- -- 100 %   04/14/19 0600 127/81 -- -- 81 25 100 %   04/14/19 0500 124/89 -- -- 85 28 100 %   04/14/19 0400 (!) 121/102 98.3 °F (36.8 °C) Axillary 84 18 100 %   04/14/19 0329 -- -- -- -- 19 --   04/14/19 0300 118/83 -- -- 85 20 98 %   04/14/19 0200 122/79 -- -- 93 19 --   04/14/19 0100 137/61 -- -- 97 24 98 %   04/14/19 0000 133/75 98.3 °F (36.8 °C) -- 100 25 100 %   04/13/19 2336 -- -- -- -- 23 --   04/13/19 2300 135/84 -- -- 113 29 99 %   04/13/19 2200 130/76 -- -- 104 25 100 %   04/13/19 2100 (!) 137/117 -- -- 101 23 98 %   04/13/19 2000 120/76 -- -- 102 23 100 %   04/13/19 1932 -- -- -- -- 24 --   04/13/19 1925 123/73 98.3 °F (36.8 °C) Infrared 107 20 96 %   04/13/19 1825 (!) 157/143 98 °F (36.7 °C) Axillary 105 18 97 %   04/13/19 1531 -- -- -- -- 16 --     Gen: alert and oriented  Head: normorcephalic, atraumatic  Neck: supple  Heart: RRR  Lungs: No audible wheezes  Abdomen: soft  Pelvis: stable  Extremity he shows minimal effusion within his knees. The ICU nurse at bedside reports that the earlier erythema, warmth, swelling is considerably improved. He does have pelvic obliquity and kyphoscoliosis in a joint contractures are present within both upper and lower extremities secondary to his cerebral palsy. There is no evidence of collateral cruciate instability and his days. Intact extensor mechanism. A peroneal and tibial motor sensory function is intact. Intact capillary refill.     DATA:  CBC:   Lab Results   Component Value Date    WBC 7.5 04/14/2019    HGB 11.1 04/14/2019     04/14/2019     BMP:    Lab Results   Component Value Date     04/14/2019    K 3.4 04/14/2019     04/14/2019    CO2 27 04/14/2019    BUN 5 04/14/2019    BUN 4 04/14/2019 CREATININE <0.40 04/14/2019    CREATININE <0.40 04/14/2019    CALCIUM 8.8 04/14/2019    GLUCOSE 99 04/14/2019     PT/INR:    Lab Results   Component Value Date    PROTIME 13.0 03/10/2019    INR 1.3 03/10/2019     Troponin:  No results found for: [address]    Radiology:     Radiographs of his right and left knee from 4- reviewed. They showed dysmorphic Skeletal anatomy with widening of the metaphysis. Its associated with disuse osteoporosis, moderate tricompartmental OA, laterally. The right knee only shows a healed metaphyseal fracture with minimal posterior displacement. Satisfactory remodeled chronic periosteal, endosteal callus. No evidence of osteomyelitis. Previous studies include 4-8-2019 CT of the abdomen and pelvis reviewed for bone windows. Left thoracic right lumbar scoliosis with pelvic obliquity, hip dysplasia with incomplete the femoral head coverage on the left with irregular flattening of the femoral head. Chondrolysis. ASSESSMENT:  Principal Problem:    Acute respiratory failure ([facility])  Active Problems:    Acute exacerbation of chronic obstructive pulmonary disease (COPD) ([facility])    Seizure disorder ([facility])    Cerebral palsied ([address]    Severe protein-calorie malnutrition (Cheril Krishna: less than 60% of standard weight) ([facility])    Developmental disability    COPD exacerbation ([facility])    Obesity, Class II, BMI 35-39.9    Malfunction of percutaneous endoscopic gastrostomy (PEG) tube ([facility])    Breakthrough seizure ([address]    Mental retardation    Neuromuscular scoliosis of thoracolumbar region    Hip dysplasia, acquired, left  Resolved Problems:    * No resolved hospital problems. *       PLAN:  1)  arthrocentesis right and left knee. After sterile prep using sterile technique an 18-gauge needle was used. 1% lidocaine was used to infiltrate skin, subcutaneous tissue until the joint was entered. The right knee had only a trace i.e. 1 mL of nonpurulent, straw-colored synovial fluid.   It was sent for Gram stain, CNS. The contralateral left knee was similarly prepped with Betadine and again 1% lidocaine used with an 18-gauge 1-1/2 inch length needle. This knee had approximately 2-3 mL again straw-colored nonpurulent synovial fluid. It as well was sent for Gram stain C&S.    2.    Antibiotics    3. We'll continue to follow with you. If septic arthritis was identified he would require arthroscopic lavage. A with the scant quantities, and improvement of his clinical examination to this point I will await the definitive synovial fluid the result. 4.  Nutritional support    5. We'll follow closely with you.   Thank you very much for the consultation        Tenisha Phillips

## 2019-04-14 NOTE — PROGRESS NOTES
[facility]    Progress Note    4/14/2019    10:34 AM    Name:   Irish Hodge  MRN:     [id number]     Acct:      [de-identified]   Room:   [facility] Day:  2  Admit Date:  4/10/2019  4:55 PM    PCP:   Jaswinder Dinh MD  Code Status:  DNR-CCA    Subjective:     C/C:   Chief Complaint   Patient presents with    Shortness of Breath     discharged from here yesterday     Interval History Status: stable    No new problems reported overnight in the intensive care unit. No further reports of seizure-like activity. Quite agitated and yelling out today. No respiratory distress. 100% oxygen saturation on room air. Spoke with nursing staff at length. Plan to transfer out of intensive care unit    Brief History:     58-year-old  male admitted from [facility] with difficulty breathing. Patient is well-known to our practice. He has spent an extended period of time at [facility] up until several weeks ago when he was moved to his group home. He was previously admitted here (4/8-4/9/2019) with respiratory insufficiency. On that admission in the ED his PaO2 was reported as 45. He was placed on BiPAP however when the O2 saturation sensor was repositioned from his finger to his ear he was satting at 100%. His chest x-ray showed some pulmonary vascular congestion but was otherwise unremarkable. His abdominal x-rays showed a large fecal load which is a chronic problem for this patient. His pro-calcitonin was 0.12 (<0.25 speaks against bacterial respiratory infection). His white count was normal. It had been reported that he was having emesis at the group home and there was some concern of aspiration however chest x-rays did not confirm this and the patient had no emesis during that admission. The patient did have a large bowel movement.  Pulmonary consultation was obtained and the patient was started empirically on azithromycin. His chest x-ray remained clear. His O2 saturations remained normal with minimal oxygen supplementation. He was discharged back to the group home with azithromycin 400 mg via G-tube daily ×5 days per pulmonary recommendation. ER reports that the group home had not given him any doses of his antibiotics. He is admitted with dyspnea, tachypnea and once again low O2 saturations. His past medical history includes cerebral palsy, severe scoliosis, COPD and congestive heart failure. Review of Systems:     Unable to obtain secondary to cerebral palsy    Medications: Allergies: Allergies   Allergen Reactions    Ampicillin Other (See Comments)     Has received and tolerated ceftriaxone and cefazolin.     Valium [Diazepam] Other (See Comments)     Has received and tolerated Ativan IV/PO    Other      Bubble bath products        Current Meds:   Scheduled Meds:    vancomycin (VANCOCIN) intermittent dosing (placeholder)   Other RX Placeholder    vancomycin  1,000 mg Intravenous Q12H    levETIRAcetam  1,500 mg PEG Tube BID    CENTRUM/CERTA-JAY with minerals oral  15 mL Per G Tube Daily    furosemide  40 mg Per G Tube Daily    scopolamine  1 patch Transdermal Q72H    pseudoephedrine HCl  30 mg Per G Tube TID    cetirizine  5 mg Oral Daily    sennosides-docusate sodium  2 tablet Per G Tube Daily    vitamin D  1,000 Units Per G Tube BID    baclofen  10 mg Per G Tube TID    montelukast  10 mg Per G Tube Nightly    fluticasone  2 spray Nasal Daily    calcium carbonate  1,250 mg Per G Tube Daily    miconazole   Topical BID    glycopyrrolate  1 mg Per G Tube TID    lacosamide  150 mg PEG Tube BID    budesonide  500 mcg Nebulization BID    lactulose  20 g Per G Tube BID    polyethylene glycol  17 g Per G Tube Daily    lamoTRIgine  200 mg Per G Tube Nightly    lamoTRIgine  300 mg Per G Tube QAM    lansoprazole  30 mg Per G Tube Daily    sodium chloride flush  10 mL Intravenous 2 times per 24 24 27   GLUCOSE 149* 95 99   BUN 7 8 4*  5*   CREATININE <0.40* <0.40* <0.40*  <0.40*   MG 2.5 2.3  --    ANIONGAP 11 13 14   LABGLOM CANNOT BE CALCULATED CANNOT BE CALCULATED CANNOT BE CALCULATED  CANNOT BE CALCULATED   GFRAA CANNOT BE CALCULATED CANNOT BE CALCULATED CANNOT BE CALCULATED  CANNOT BE CALCULATED   CALCIUM 8.4* 8.0* 8.8     No results for input(s): PROT, LABALBU, LABA1C, F8DXHCR, J4RSOOY, FT4, TSH, AST, ALT, LDH, GGT, ALKPHOS, LABGGT, BILITOT, BILIDIR, AMMONIA, AMYLASE, LIPASE, LACTATE, CHOL, HDL, LDLCHOLESTEROL, CHOLHDLRATIO, TRIG, VLDL, SIZ42OM, PHENYTOIN, PHENYF, URICACID, POCGLU in the last 72 hours. Lab Results   Component Value Date/Time    SPECIAL 4ML L WRIST 04/11/2019 12:10 PM     Lab Results   Component Value Date/Time    CULTURE (A) 04/11/2019 12:10 PM     POSITIVE BLOOD CULTURE, RN NOTIFIED:  RESULTS READ BACK BY LUPE WHITMORE ([facility]) AT 0405 4/12/19      CULTURE  04/11/2019 12:10 PM     DIRECT GRAM STAIN FROM BOTTLE:  GRAM POSITIVE COCCI IN PAIRS  AND CHAINS      CULTURE (A) 04/11/2019 12:10 PM     ENTEROCOCCUS FAECALIS  For susceptibility, refer to previous culture. Lab Results   Component Value Date    POCPH 7.42 04/13/2019    POCPCO2 44 04/13/2019    POCPO2 92 04/13/2019    POCHCO3 28.1 04/13/2019    NBEA NOT REPORTED 04/13/2019    PBEA 4 04/13/2019    CNU5UQY 29 04/13/2019    HOYR2VVN 97 04/13/2019    FIO2 28.0 04/13/2019       Radiology:    Today's chest x-ray essentially unchanged. Shows some vascular congestion.     Physical Examination:        General appearance:  Awake with examination and interview; nurse present in the room  Mental Status:  Unable to assess  Lungs:  diminished to auscultation bilaterally, normal effort; coarse breath sounds with secretions noted  Heart:  regular rate and rhythm, no murmur  Abdomen:  soft, nondistended, normal bowel sounds, no masses, hepatomegaly, splenomegaly; obese  Extremities:  no edema  Skin:  no gross lesions, rashes,

## 2019-04-14 NOTE — PROGRESS NOTES
[facility]  Progress Note    Dontrell Hsu  MRN: [id number]  Date: 4/14/2019    Reason for F/U :   Enterococcus faecalis sepsis    Impression :   1. Enterococcus faecalis sepsis, ampicillin sensitive, source not entirely clear at this time  2. Bilateral knee erythema/warmth, concern for septic joint  3. Acute respiratory failure  4. Dysphagia, status post PEG tube placement  5. Chronic constipation  6. Cerebral palsy  7. Seizure disorder  8. Penicillin allergy, unsure of reaction  · He has reportedly tolerated cephalosporins  9. COPD    Recommendations:   · Blood cultures 4/4, positive for Enterococcus faecalis, all prior to initiation of antibiotics  · Patient continues on IV vancomycin  · The enterococcus is ampicillin Susceptible; However, Patient Has Documented Allergy of Penicillin. · The source of the enterococcus is not entirely clear at this time; however, given the clinical presentation of bilateral knees today, I am concerned he may have septic arthritis in his knees  · I have consulted Dr. Whitney Magallanes, orthopedic surgery due to the concern for septic arthritis in his knees. I do not appreciate any obvious effusion; however, will ask if he is able to aspirate any fluid for culture from the knees. · Check xray of bilateral knees   · Urinalysis is unremarkable  · Check ECHO to rule out endocarditis  · He did have a CT abd/pelvis 4/8/19 which was fairly unremarkable; patient did have large BM last night per RN. · Will repeat blood cultures today  · Continue to follow clinical progress    Summary of Stay:   · Patient admitted due to shortness of breath. Intermittently requiring BiPAP  · Blood cultures 4/4 Enterococcus faecalis  · 4/13/2019 patient started having seizure-like activity as well as increased restlessness, patient transferred to the ICU  · 4/14/19 Bilateral knees with erythema/ warmth, concern for septic arthritis, ortho consulted. UA negative.     History of Present Illness: Praful Lemus is a 51y.o.-year-old male who was initially admitted on 4/10/2019. Patient has a known medical history of cerebral palsy and is nonverbal, he also has a history of COPD and congestive heart failure. He does reside in a long-term care facility, [facility]. Patient was recently discharged from [facility] 2019 after being admitted and treated for respiratory failure. In the hospitalization, his chest x-ray showed pulmonary vascular congestion. His abdominal x-ray showed large fecal load which is a chronic problem for this particular patient him understanding. He was given IV azithromycin during the hospitalization. He was discharged on liquid levofloxacin to be given through the G-tube; however, per the ER notes, patient did not receive that medication at his facility. He was brought to [facility] due to shortness of breath as well as emesis. Patient was started back on azithromycin. Current evaluation:2019    Patient seen and evaluated at bedside. He is non verbal. He seems more comfortable than yesterday; however, he does continue to move head side to side in bed. Bilateral knees are red and warm today, patient does squeal out when pressure applied to right knee. Discussed at bedside with RN. Patient did have a large BM last night. PEG tube was not working yesterday, his PEG tube was replaced at the bedside yesterday. Patient was transferred to the ICU yesterday. He continued to be restless, thrashing in bed, as well as starting to have seizures. /81   Pulse 81   Temp 96.6 °F (35.9 °C) (Axillary)   Resp 25   Ht 3' 10.85\" (1.19 m)   Wt 110 lb (49.9 kg)   SpO2 100%   BMI 35.24 kg/m²     Temperature Range: Temp: 96.6 °F (35.9 °C) Temp  Av °F (36.7 °C)  Min: 96.6 °F (35.9 °C)  Max: 98.7 °F (37.1 °C)  Unable to perform ROS    Physical Examination :     Physical Exam   HENT:   Head: Normocephalic and atraumatic. Neck: Neck supple.    Cardiovascular: Normal rate and regular rhythm. Pulmonary/Chest: Breath sounds normal.   Abdominal:   g tube in place, abrasions on abdomen appear to be self-inflicted  Rounded abdomen   Musculoskeletal:   Bilateral knees are warmth, erythematous, swollen, right greater than left. I do not appreciate any effusion. Patient does yell out when pressure applied to right knee  Spastic quadriplegia   Neurological: He is alert. Does not follow commands. Moving head around in bed   Skin:   Bilateral knees erythematous and warm           Laboratory data:   I have independently reviewed the following labs:  CBC with Differential:   Recent Labs     04/12/19  0641 04/14/19  0459   WBC 5.2 7.5   HGB 10.7* 11.1*   HCT 32.1* 33.8*    392   LYMPHOPCT 18* 20*   MONOPCT 7 8*     BMP:   Recent Labs     04/12/19  0641 04/13/19  0528 04/14/19  0459    145* 145*   K 3.4* 3.0* 3.4*   * 108* 104   CO2 24 24 27   BUN 7 8 4*  5*   CREATININE <0.40* <0.40* <0.40*  <0.40*   MG 2.5 2.3  --      Hepatic Function Panel: No results for input(s): PROT, LABALBU, BILIDIR, IBILI, BILITOT, ALKPHOS, ALT, AST in the last 72 hours. No results for input(s): VANCOTROUGH in the last 72 hours. No results found for: CRP  No results found for: [place] Reading    Results for Diana Alvares (MRN [id number]) as of 4/14/2019 07:36   Ref.  Range 4/13/2019 12:30   Color, UA Latest Ref Range: YELLOW  YELLOW   Turbidity UA Latest Ref Range: CLEAR  CLEAR   Glucose, UA Latest Ref Range: NEGATIVE  NEGATIVE   Bilirubin, Urine Latest Ref Range: NEGATIVE  NEGATIVE   Ketones, Urine Latest Ref Range: NEGATIVE  NEGATIVE   Specific Gravity, UA Latest Ref Range: 1.005 - 1.030  1.008   pH, UA Latest Ref Range: 5.0 - 8.0  6.5   Protein, UA Latest Ref Range: NEGATIVE  NEGATIVE   Urobilinogen, Urine Latest Ref Range: Normal  Normal   Nitrite, Urine Latest Ref Range: NEGATIVE  NEGATIVE   Leukocyte Esterase, Urine Latest Ref Range: NEGATIVE  NEGATIVE   Urinalysis Comments Unknown NOT REPORTED   Casts UA Latest Units: /LPF NOT REPORTED   Mucus, UA Latest Ref Range: None  NOT REPORTED   WBC, UA Latest Ref Range: 0 - 5 /HPF 2 TO 5   RBC, UA Latest Ref Range: 0 - 2 /HPF 2 TO 5   Epi Cells Latest Ref Range: 0 - 5 /HPF 0 TO 2   Renal Epithelial, Urine Latest Ref Range: 0 /HPF NOT REPORTED   Bacteria, UA Latest Ref Range: None  NOT REPORTED   Amorphous, UA Latest Ref Range: None  NOT REPORTED   Yeast, Urine Latest Ref Range: None  NOT REPORTED   Crystals Latest Ref Range: None /HPF NOT REPORTED   Urine Hgb Latest Ref Range: NEGATIVE  TRACE (A)   Trichomonas, UA Latest Ref Range: None  NOT REPORTED   Other Observations UA Latest Ref Range: NOT REQ.  NOT REPORTED   URINE RT REFLEX TO CULTURE Unknown Rpt (A)       Imaging Studies:       Cultures:     CULTURE BLOOD #1 [[id number]] (Abnormal) Collected: 04/11/19 1145   Order Status: Completed Specimen: Blood Updated: 04/12/19 2055    Specimen Description . BLOOD    Special Requests LT HAND,2ML    Culture --Abnormal     POSITIVE BLOOD CULTURE, RN NOTIFIED:   RESULTS REPEATED BACK BY SAUL WHITMORE AT 0405 4/12/19   Abnormal     Culture --    DIRECT GRAM STAIN FROM BOTTLE:   GRAM POSITIVE COCCI IN PAIRS   AND CHAINS     Culture --Abnormal     ENTEROCOCCUS FAECALIS   For susceptibility, refer to previous culture. Abnormal    CULTURE BLOOD #2 [[id number]] (Abnormal) Collected: 04/11/19 1210   Order Status: Completed Specimen: Blood Updated: 04/12/19 2055    Specimen Description . BLOOD    Special Requests 4ML L WRIST    Culture --Abnormal     POSITIVE BLOOD CULTURE, RN NOTIFIED:   [facility] AT 0405 4/12/19   Abnormal     Culture --    DIRECT GRAM STAIN FROM BOTTLE:   GRAM POSITIVE COCCI IN PAIRS   AND CHAINS     Culture --Abnormal     ENTEROCOCCUS FAECALIS   For susceptibility, refer to previous culture.    Abnormal    Cult,Blood #1 [[id number]] (Abnormal) Collected: 04/10/19 1742   Order Status: Completed Specimen: Blood Updated: 04/12/19 2052 Specimen Description . BLOOD    Special Requests 6ML RT FA    Culture --Abnormal     POSITIVE Blood Culture   Results called to and read back by:   Celi Duque. 4/11 1030   Abnormal     Culture --    DIRECT GRAM STAIN FROM BOTTLE:   GRAM POSITIVE COCCI IN CHAINS     Culture ENTEROCOCCUS FAECALISAbnormal     Culture For susceptibility, refer to previous culture. Cult,Blood #2 [[id number]] (Abnormal)  Collected: 04/10/19 1747   Order Status: Completed Specimen: Blood Updated: 04/12/19 2050    Specimen Description . BLOOD    Special Requests 12 ML RT UPPER ARM    Culture --Abnormal     POSITIVE Blood Culture   Results called to and read back by:   Celi Duque. 4/11 1030   Abnormal     Culture --    DIRECT GRAM STAIN FROM BOTTLE:   GRAM POSITIVE COCCI IN CHAINS     Culture --    ID by PNAFISH:   ENTEROCOCCUS FAECALIS     Culture Comment:  >99% of E. faecalis isolates are susceptible to Ampicillin and Vancomycin. Abnormal     Culture ENTEROCOCCUS FAECALISAbnormal          Medications:      vancomycin (VANCOCIN) intermittent dosing (placeholder)   Other RX Placeholder    vancomycin  1,000 mg Intravenous Q12H    levETIRAcetam  1,500 mg PEG Tube BID    CENTRUM/CERTA-JAY with minerals oral  15 mL Per G Tube Daily    furosemide  40 mg Per G Tube Daily    scopolamine  1 patch Transdermal Q72H    pseudoephedrine HCl  30 mg Per G Tube TID    cetirizine  5 mg Oral Daily    sennosides-docusate sodium  2 tablet Per G Tube Daily    vitamin D  1,000 Units Per G Tube BID    baclofen  10 mg Per G Tube TID    montelukast  10 mg Per G Tube Nightly    fluticasone  2 spray Nasal Daily    calcium carbonate  1,250 mg Per G Tube Daily    miconazole   Topical BID    glycopyrrolate  1 mg Per G Tube TID    lacosamide  150 mg PEG Tube BID    budesonide  500 mcg Nebulization BID    lactulose  20 g Per G Tube BID    polyethylene glycol  17 g Per G Tube Daily    lamoTRIgine  200 mg Per G Tube Nightly    lamoTRIgine  300 mg Per G

## 2019-04-14 NOTE — PROGRESS NOTES
Pt transferred from ICU to 1011. Crying on arrival and was able to be comforted for a short time, the crying ceased and now is intermittent.  Tube feeding restarted

## 2019-04-14 NOTE — PROGRESS NOTES
NEUROLOGY INPATIENT PROGRESS NOTE    4/14/2019         Carolin Pop is a  51 y.o. male admitted on 4/10/2019 with  Acute respiratory failure (HCC) [J96.00]  COPD exacerbation ([facility] [address] [J44.1]  Chart reviewed. Discussed with caregivers. No acute issues overnight. Patient has not had any recurrence of seizure activity. PEG tube is functioning well now. He is back on all of his AEDs. Briefly, this is a  51 y.o. male, group home resident with cerebral palsy, mental retardation and seizure disorder was admitted on 4/10/2019 with acute respiratry distress. Admitted with the c/o shortness of breath. Patient was brought to the hospital by EMS accompanied by long-term caregiver who stated that the patient had difficulty breathing on the day of admit. Patient was recently discharged from [facility] on 4/19/19 after being treated for respiratory failure. He has received IV azithromycin during his hospital stay and discharged on liquid azithromycin to BE given through G-tube. History is also significant for COPD and CHF. For his seizure disorder, he was getting Lamictal 300 in am and 200 in pm and keppra 1500 bid daily through PEG tube. Due to PEG tube more functioning; he has not had any of his antiepileptics today. He has had one episode of jerking activity involving left upper and left lower extremities with staring episode and right gaze preference lasting for 2-3 minutes subsided with Ativan. No current facility-administered medications on file prior to encounter.       Current Outpatient Medications on File Prior to Encounter   Medication Sig Dispense Refill    ondansetron (ZOFRAN-ODT) 4 MG disintegrating tablet Take 1 tablet by mouth every 6 hours as needed for Nausea      azithromycin (ZITHROMAX) 200 MG/5ML suspension 10 mLs by Per G Tube route daily for 5 days      albuterol (PROVENTIL) (2.5 MG/3ML) 0.083% nebulizer solution Take 2.5 mg by nebulization 3 times daily      lansoprazole (PREVACID SOLUTAB) 30 MG disintegrating tablet 30 mg by Per G Tube route daily      carbamide peroxide (DEBROX) 6.5 % otic solution 5 drops See Admin Instructions 5 drops BID on the 1st and 15th of the month      ketoconazole (NIZORAL) 2 % shampoo Apply topically Twice a Week Wednesdays and Saturdays      zinc oxide (DESITIN) 40 % PSTE paste Apply topically as needed (irritation)      neomycin-bacitracin-polymyxin (NEOSPORIN) 400-5-5000 ointment Apply topically as needed (prn per [facility] plan of treatment)      lamoTRIgine (LAMICTAL) 200 MG tablet 1 tablet by Per G Tube route 2 times daily Indications: 300MG IN am AND 200MG IN pm 30 tablet 3    lamoTRIgine (LAMICTAL) 100 MG tablet 1 tablet by Per G Tube route every morning Indications: 300mg in AM and 200mg in PM 30 tablet 3    lacosamide (VIMPAT) 10 MG/ML SOLN oral solution 150 mg by PEG Tube route 2 times daily.  budesonide (PULMICORT) 0.5 MG/2ML nebulizer suspension Take 1 ampule by nebulization 2 times daily      lactulose (CHRONULAC) 10 GM/15ML solution 20 g by Per G Tube route 2 times daily      polyethylene glycol (MIRALAX) PACK packet 17 g by Per G Tube route daily      glycopyrrolate (ROBINUL) 1 MG tablet 1 mg by Per G Tube route 3 times daily FOR INCREASED SECRETIONS.  furosemide (LASIX) 20 MG tablet 20 mg by Per G Tube route daily      nystatin (MYCOSTATIN) 441462 UNIT/GM powder Apply topically daily Apply between toes.       baclofen (LIORESAL) 10 MG tablet 10 mg by Per G Tube route 3 times daily      Potassium Chloride (KLOR-CON SPRINKLE PO) 10 mEq by Per G Tube route 2 times daily       montelukast (SINGULAIR) 10 MG tablet 10 mg by Per G Tube route nightly      fluticasone (FLONASE) 50 MCG/ACT nasal spray 2 sprays by Nasal route daily      Multiple Vitamin (DAILY-JAY PO) 1 tablet by Gastric Tube route daily       calcium carbonate 1250 MG/5ML SUSP suspension 1,250 mg by Per G Tube route daily      levETIRAcetam (KEPPRA) 100 MG/ML solution 1,500 mg by Per G Tube route 2 times daily       loratadine (CLARITIN) 10 MG tablet 10 mg by Per G Tube route daily      pseudoephedrine (SUDAFED) 30 MG tablet 30 mg by Per G Tube route 3 times daily      senna-docusate (PERICOLACE) 8.6-50 MG per tablet 2 tablets by Per G Tube route daily      Cholecalciferol (VITAMIN D3) 1000 units TABS 1,000 Units by Feeding Tube route 2 times daily       Nutritional Supplements (REPLETE/FIBER) LIQD 75 mLs by Feeding Tube route daily       Allergies: Laura Keen is allergic to ampicillin; valium [diazepam]; and other. Past Medical History:   Diagnosis Date    Cerebral palsy ([address]     Cholelithiasis with chronic cholecystitis     Constipation     Delayed gastric emptying     Dysphagia     GERD (gastroesophageal reflux disease)     Hearing loss     Mental disability     MRDD    Profound mental retardation     Scoliosis     Seizures (HCC)     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HCC)        Past Surgical History:   Procedure Laterality Date    CHOLECYSTECTOMY  08/18/2018    laparoscopic    ERCP  08/16/2018    GASTROSTOMY TUBE PLACEMENT      [place] ERCP DX COLLECTION SPECIMEN BRUSHING/WASHING N/A 8/16/2018    ERCP PAPILOTOMY SWEEPING STONE EXTRACTION ENDOSCOPIC RETROGRADE CHOLANGIOPANCREATOGRAPHY performed by Moncho Cuevas MD at [address] [place] [address] N/A 8/18/2018    CHOLECYSTECTOMY LAPAROSCOPIC WITH UMBILICAL HERNIA REPAIR [facility]      has Feeding tube     Social History: Laura Keen  reports that he has never smoked. He has never used smokeless tobacco. He reports that he does not drink alcohol or use drugs.     Family History   Family history unknown: Yes       Current Medications:     lidocaine PF  10 mL Intradermal Once    vancomycin (VANCOCIN) intermittent dosing (placeholder)   Other RX Placeholder    vancomycin  1,000 mg Intravenous Q12H    levETIRAcetam  1,500 mg PEG Tube BID    MENTAL STATUS:  Alert, oriented to name call only but not oriented to place and month and year; nonverbal, not following commands; rest of exam limited due to underlying severe cognitive impairment, no hallucination or delusion   CRANIAL NERVES: II     -       Pupils reactive b/l; Fundus exam: limited; blinks to threat bilaterally  III,IV,VI -  EOMs full, no afferent defect, no                      NATHALY, no ptosis  V     -     Grimaces equally well bilaterally  VII    -     Normal facial symmetry  VIII   -     Intact hearing  IX,X, XI, XII - could not be assessed due to cognitive impairment   MOTOR FUNCTION:  motor exam significant for contractures in both upper extremities predominantly at both wrists involving all of the digtis; moves both upper and lower extremities spontaneously and purposefully with normal bulk, normal tone, no involuntary movements   SENSORY FUNCTION:  withdraws to pinprick equally well bilaterally    CEREBELLAR FUNCTION:  could not be assessed   REFLEX FUNCTION:  Symmetric and hyperactive DTRs and bilateral unsustained ankle clonus with upgoing toes   STATION and GAIT  Not tested         Data:    Lab Results:     Lab Results   Component Value Date    TRIG 61 03/13/2019    ALT 35 03/13/2019    AST 31 03/13/2019    INR 1.3 03/10/2019     Hematology:  Recent Labs     04/12/19  0641 04/14/19  0459   WBC 5.2 7.5   HGB 10.7* 11.1*   HCT 32.1* 33.8*    392     Chemistry:  Recent Labs     04/12/19  0641 04/13/19  0528 04/14/19  0459    145* 145*   K 3.4* 3.0* 3.4*   * 108* 104   CO2 24 24 27   GLUCOSE 149* 95 99   BUN 7 8 4*  5*   CREATININE <0.40* <0.40* <0.40*  <0.40*   MG 2.5 2.3  --    CALCIUM 8.4* 8.0* 8.8     No results for input(s): PROT, LABALBU, LABA1C, F4ABKDB, G9YSIDR, FT4, TSH, AST, ALT, LDH, AMMONIA, CHOL, HDL, LDLCHOLESTEROL, CHOLHDLRATIO, TRIG, VLDL, CKTOTAL, CKMB, CKMBINDEX, RF, JONAH in the last 72 hours.     No results found for: PHENYTOIN, PHENYTOIN, VALPROATE, [facility]        Diagnostic data reviewed:  Keppra level 3/22/19: 56  Keppra level 4/13/19: 16    CT Head 11/17/17: Motion limits evaluation. No convincing acute intracranial abnormality. Extensive global parenchymal volume loss with asymmetric involvement involving posterior cerebral hemispheres bilaterally. Impression and Plan: Mr. Cathie Corbin is a 51 y.o. male with   Breakthrough seizure as oral meds could not be given due to PEG tube malfunctioning earlier; but he remained seizure free once all of his AED (antiepileptic drugs) are resumed. Cerebral palsy, mental retardation and seizure disorder    Change iv keppra 1500 mg q12 hr to keppra 1500 mg thro PEG tube and also to continue Lamictal 300 mg in am and 200 mg in pm & Vimpat 150 mg thro PEG tube bid.     continue seizure precautions; IV Lorazepam 1mg q 30 min prn prolonged seizure activity, max 4mg/ day  I do not see any need to get EEG as he has established diagnosis of seizure disorder. Care plan was discussed with patient's Nurse. Will follow with you.

## 2019-04-14 NOTE — CONSULTS
DX COLLECTION SPECIMEN BRUSHING/WASHING N/A 8/16/2018    ERCP PAPILOTOMY SWEEPING STONE EXTRACTION ENDOSCOPIC RETROGRADE CHOLANGIOPANCREATOGRAPHY performed by Edward Chacon MD at [address] [employer] N/A 8/18/2018    CHOLECYSTECTOMY LAPAROSCOPIC WITH UMBILICAL HERNIA REPAIR performed by Ashly Layton DO at [address]      has Feeding tube        Medications Prior to Admission:       Prior to Admission medications    Medication Sig Start Date End Date Taking?  Authorizing Provider   ondansetron (ZOFRAN-ODT) 4 MG disintegrating tablet Take 1 tablet by mouth every 6 hours as needed for Nausea 4/9/19   Saanz Boyce DO   azithromycin [facility]) 200 MG/5ML suspension 10 mLs by Per G Tube route daily for 5 days 4/9/19 4/14/19  Eleanor Alfaro DO   albuterol (PROVENTIL) (2.5 MG/3ML) 0.083% nebulizer solution Take 2.5 mg by nebulization 3 times daily    Historical Provider, MD   lansoprazole (PREVACID SOLUTAB) 30 MG disintegrating tablet 30 mg by Per G Tube route daily    Historical Provider, MD   carbamide peroxide (DEBROX) 6.5 % otic solution 5 drops See Admin Instructions 5 drops BID on the 1st and 15th of the month    Historical Provider, MD   ketoconazole (NIZORAL) 2 % shampoo Apply topically Twice a Week Wednesdays and Saturdays    Historical Provider, MD   zinc oxide (DESITIN) 40 % PSTE paste Apply topically as needed (irritation)    Historical Provider, MD   neomycin-bacitracin-polymyxin (NEOSPORIN) 400-5-5000 ointment Apply topically as needed (prn per [facility] plan of treatment)    Historical Provider, MD   lamoTRIgine (LAMICTAL) 200 MG tablet 1 tablet by Per G Tube route 2 times daily Indications: 300MG IN am AND 200MG IN pm 3/14/19   Roger P Blood, DO   lamoTRIgine (LAMICTAL) 100 MG tablet 1 tablet by Per G Tube route every morning Indications: 300mg in AM and 200mg in PM 3/14/19   Roger P Blood, DO   lacosamide (VIMPAT) 10 MG/ML SOLN oral solution 150 mg by PEG Tube route 2 LIQD 75 mLs by Feeding Tube route daily    Historical Provider, MD        Allergies:       Ampicillin; Valium [diazepam]; and Other    Social History:     Tobacco:    reports that he has never smoked. He has never used smokeless tobacco.  Alcohol:      reports that he does not drink alcohol. Drug Use: reports that he does not use drugs. Family History:     Family History   Family history unknown: Yes       Review of Systems:     Review of Systems   Unable to perform ROS: Other   Patient has cerebral palsy, MRDD. Chart reviewed and noted the review of systems. Code Status:  DNR-CCA    Physical Exam:     Vitals:  /81   Pulse 81   Temp 96.6 °F (35.9 °C) (Axillary)   Resp 26   Ht 3' 10.85\" (1.19 m)   Wt 110 lb (49.9 kg)   SpO2 97%   BMI 35.24 kg/m²   Temp (24hrs), Av °F (36.7 °C), Min:96.6 °F (35.9 °C), Max:98.7 °F (37.1 °C)    Patient is not verbalizing. Is not cooperating 2. Abdominal examination is benign. Has bowel sounds. G-tube site has minimal inflammation. G-tube present in the upper abdomen on the left side of the midline. He takes shallow respirations. Not in distress. Patient has contractures.       Physical Exam  Data:   CBC:   Lab Results   Component Value Date    WBC 7.5 2019    RBC 3.67 2019    HGB 11.1 2019    HCT 33.8 2019    MCV 92.1 2019    MCH 30.4 2019    MCHC 33.0 2019    RDW 16.4 2019     2019    MPV 8.4 2019     CBC with Differential:  Lab Results   Component Value Date    WBC 7.5 2019    RBC 3.67 2019    HGB 11.1 2019    HCT 33.8 2019     2019    MCV 92.1 2019    MCH 30.4 2019    MCHC 33.0 2019    RDW 16.4 2019    LYMPHOPCT 20 2019    MONOPCT 8 2019    BASOPCT 0 2019    MONOSABS 0.60 2019    LYMPHSABS 1.50 2019    EOSABS 0.10 2019    BASOSABS 0.00 2019    DIFFTYPE NOT REPORTED 2019 Hemoglobin/Hematocrit:  Lab Results   Component Value Date    HGB 11.1 04/14/2019    HCT 33.8 04/14/2019     CMP:    Lab Results   Component Value Date     04/14/2019    K 3.4 04/14/2019     04/14/2019    CO2 27 04/14/2019    BUN 5 04/14/2019    BUN 4 04/14/2019    CREATININE <0.40 04/14/2019    CREATININE <0.40 04/14/2019    GFRAA CANNOT BE CALCULATED 04/14/2019    GFRAA CANNOT BE CALCULATED 04/14/2019    LABGLOM CANNOT BE CALCULATED 04/14/2019    LABGLOM CANNOT BE CALCULATED 04/14/2019    GLUCOSE 99 04/14/2019    PROT 6.0 03/13/2019    LABALBU 3.2 03/13/2019    CALCIUM 8.8 04/14/2019    BILITOT 0.11 03/13/2019    ALKPHOS 60 03/13/2019    AST 31 03/13/2019    ALT 35 03/13/2019     BMP:  Lab Results   Component Value Date     04/14/2019    K 3.4 04/14/2019     04/14/2019    CO2 27 04/14/2019    BUN 5 04/14/2019    BUN 4 04/14/2019    LABALBU 3.2 03/13/2019    CREATININE <0.40 04/14/2019    CREATININE <0.40 04/14/2019    CALCIUM 8.8 04/14/2019    GFRAA CANNOT BE CALCULATED 04/14/2019    GFRAA CANNOT BE CALCULATED 04/14/2019    LABGLOM CANNOT BE CALCULATED 04/14/2019    LABGLOM CANNOT BE CALCULATED 04/14/2019    GLUCOSE 99 04/14/2019     PT/INR:    Lab Results   Component Value Date    PROTIME 13.0 03/10/2019    INR 1.3 03/10/2019     PTT:    Lab Results   Component Value Date    APTT 30.8 03/10/2019   [APTT}    Assesment:     Primary Problem  Acute respiratory failure [facility])    Active Hospital Problems    Diagnosis Date Noted    Obesity, Class II, BMI 35-39.9 [E66.9] 04/13/2019    Malfunction of percutaneous endoscopic gastrostomy (PEG) tube (HCC) [K94.23] 04/13/2019    Breakthrough seizure ([facility] [address] [G40.919]     Mental retardation [F79]     COPD exacerbation ([facility] [address] [J44.1] 04/12/2019    Acute respiratory failure ([facility] [J96.00] 04/10/2019    Acute exacerbation of chronic obstructive pulmonary disease (COPD) ([facility] [J44.1]     Cerebral palsied ([facility] [G80.9]     Seizure disorder ([facility] [O19.581]        Plan:     G-tube site appears to have minimal inflammation. G-tube appears to be clogged. I tried different techniques to unclogg the G-tube several times. This was not successful. Subsequently, I did remove the [place] G-tube by deflating the balloon. The fistula site cleaned. Subsequently IUs and [place] ANGELA replacing G-tube. The balloon was checked and it was good condition. In the usual recommended method the [address] [place] G-tube was passed through the fistula into the gastric cavity. The balloon was inflated 10 mL of water. Gastric contents aspirated. The position of the G-tube or the anterior abdominal wall was adjusted. Patient tolerated this procedure well. Advised the nursing staff to start feedings. To keep had side of the bed elevated. This process took almost an hour. Thank you for allowing me to participate in the care of your patient. Please feel free to contact me with anyquestions or concerns. Electronically signed by Juan Talavera MD on 4/14/2019 at 8:35 AM     Copy sent to Dr. Prem Daniel MD    Note is dictated utilizing voice recognition software. Unfortunately this leads to occasional typographical errors. Please contact our office if you have any questions.

## 2019-04-14 NOTE — FLOWSHEET NOTE
Patient unable to talk, , hear , responds by touch. Patient was anxious ,restless, moaning, has chest tubes. RN states there is some family , yet not present.  shared in presence, prayers.  leaves prayer card for possible follow up.     04/14/19 1429   Encounter Summary   Services provided to: Patient   Referral/Consult From: [facility] Family members   Continue Visiting   ([id number])   Complexity of Encounter Low   Length of Encounter 15 minutes   Spiritual Assessment Completed Yes   Routine   Type Follow up   Assessment Anxious; Unable to respond   Intervention Prayer   Outcome Did not respond

## 2019-04-14 NOTE — PROGRESS NOTES
Pulmonary Critical Care Progress Note  Soledad Larson M.D. Patient seen for the follow up of  [facility]ute [facility] failure [facility]     Subjective:  Patient is doing better than yesterday. He is more calm. No obvious seizure noted. He is tolerating his tube feeds. Examination:  Vitals: /68   Pulse 108   Temp 97.9 °F (36.6 °C) (Axillary)   Resp 26   Ht 3' 10.85\" (1.19 m)   Wt 110 lb (49.9 kg)   SpO2 95%   BMI 35.24 kg/m²     General appearance: Patient appears with no obvious seizure activity  Neck: No JVD  Lungs: Occasional rhonchi, with moderate air exchange  Heart: regular rate and rhythm, S1, S2 normal, no gallop  Abdomen: Soft, non tender, + BS  Extremities: no cyanosis or clubbing.  No significant edema, chronic contractures    LABs:  CBC:   Recent Labs     04/12/19  0641 04/14/19  0459   WBC 5.2 7.5   HGB 10.7* 11.1*   HCT 32.1* 33.8*    392     BMP:   Recent Labs     04/13/19  0528 04/14/19  0459   * 145*   K 3.0* 3.4*   CO2 24 27   BUN 8 4*  5*   CREATININE <0.40* <0.40*  <0.40*   LABGLOM CANNOT BE CALCULATED CANNOT BE CALCULATED  CANNOT BE CALCULATED   GLUCOSE 95 99     Radiology:   4/14/19    Impression:  · Enterococcus faecalis sepsis  · Actively seizing, history of seizure disorder  · Possible aspiration  · Atelectasis/mild pulmonary edema  · Dysphagia status post PEG placement  · Cerebral palsy with spastic quadriparesis  · Chronic constipation    Recommendations:  · 2 liters/min via nasal cannula  · BiPAP for sleep and as needed   · Zithromax, started on IV Vanco per infectious disease  · Budesonide aerosols every 12 hours  · Albuterol and Ipratropium Q 4 hours and prn  · Singulair  · Neurology on consult, IV Keppra  · Patient unable to stay still for CT had  · GI consulted for PEG tube  · Constipation managed by others  · Passive range of motion  · DVT prophylaxis with low molecular weight heparin  · OK to move to progressive unit, Discussed with RN   · Will follow with you  Electronically signed by     Jama Bañuelos MD on 4/14/2019 at 3:35 PM  Pulmonary Critical Care and Sleep Medicine,  [facility]  Cell: [phone]  Office: [phone]

## 2019-04-14 NOTE — PROGRESS NOTES
Patient seen in ICU around 9 AM.  Discussed with nursing staff. As patient had seizure activity, he was transferred to ICU last night for close observation. Overnight, after receiving medications through the G-tube he did well. He has been tolerating G-tube feedings without issues. No residuals. On examination:    Patient vital signs stable. He is afebrile. Blood pressure 120/70 pulse 100/m respirations 23/m. Abdomen is mildly obese. Bowel sounds active. No acute distention. G-tube site has a minimal inflammation. No acute tenderness. No signs of cellulitis. No new changes as far as cardiovascular, pulmonary exams are concerned. Difficult to examine the patient because of poor cooperation. He takes shallow respirations. Assessment:    Patient is tolerating feedings without issues. Recommendations:    Discussed with nursing staff. To keep had side of the bed elevated. Take precautions so that patient does not pull out G-tube. If he has any further issues to contact me. Otherwise we'll see the patient on intermittent basis if needed.

## 2019-04-15 LAB
ABSOLUTE EOS #: 0.2 K/UL (ref 0–0.4)
ABSOLUTE IMMATURE GRANULOCYTE: ABNORMAL K/UL (ref 0–0.3)
ABSOLUTE LYMPH #: 2.7 K/UL (ref 1–4.8)
ABSOLUTE MONO #: 0.6 K/UL (ref 0.2–0.8)
ANION GAP SERPL CALCULATED.3IONS-SCNC: 10 MMOL/L (ref 9–17)
BASOPHILS # BLD: 0 % (ref 0–2)
BASOPHILS ABSOLUTE: 0 K/UL (ref 0–0.2)
BUN BLDV-MCNC: 8 MG/DL (ref 6–20)
BUN/CREAT BLD: ABNORMAL (ref 9–20)
CALCIUM SERPL-MCNC: 8.7 MG/DL (ref 8.6–10.4)
CHLORIDE BLD-SCNC: 98 MMOL/L (ref 98–107)
CO2: 33 MMOL/L (ref 20–31)
CREAT SERPL-MCNC: <0.4 MG/DL (ref 0.7–1.2)
DIFFERENTIAL TYPE: ABNORMAL
EOSINOPHILS RELATIVE PERCENT: 2 % (ref 1–4)
GFR AFRICAN AMERICAN: ABNORMAL ML/MIN
GFR NON-AFRICAN AMERICAN: ABNORMAL ML/MIN
GFR SERPL CREATININE-BSD FRML MDRD: ABNORMAL ML/MIN/{1.73_M2}
GFR SERPL CREATININE-BSD FRML MDRD: ABNORMAL ML/MIN/{1.73_M2}
GLUCOSE BLD-MCNC: 90 MG/DL (ref 70–99)
HCT VFR BLD CALC: 34.2 % (ref 41–53)
HEMOGLOBIN: 11.3 G/DL (ref 13.5–17.5)
IMMATURE GRANULOCYTES: ABNORMAL %
LV EF: 74 %
LVEF MODALITY: NORMAL
LYMPHOCYTES # BLD: 36 % (ref 24–44)
MCH RBC QN AUTO: 30.5 PG (ref 26–34)
MCHC RBC AUTO-ENTMCNC: 33 G/DL (ref 31–37)
MCV RBC AUTO: 92.3 FL (ref 80–100)
MONOCYTES # BLD: 9 % (ref 1–7)
NRBC AUTOMATED: ABNORMAL PER 100 WBC
PDW BLD-RTO: 15.8 % (ref 11.5–14.5)
PLATELET # BLD: 415 K/UL (ref 130–400)
PLATELET ESTIMATE: ABNORMAL
PMV BLD AUTO: 7.7 FL (ref 6–12)
POTASSIUM SERPL-SCNC: 3.4 MMOL/L (ref 3.7–5.3)
RBC # BLD: 3.7 M/UL (ref 4.5–5.9)
RBC # BLD: ABNORMAL 10*6/UL
SEG NEUTROPHILS: 53 % (ref 36–66)
SEGMENTED NEUTROPHILS ABSOLUTE COUNT: 3.9 K/UL (ref 1.8–7.7)
SODIUM BLD-SCNC: 141 MMOL/L (ref 135–144)
WBC # BLD: 7.3 K/UL (ref 3.5–11)
WBC # BLD: ABNORMAL 10*3/UL

## 2019-04-15 PROCEDURE — 6360000002 HC RX W HCPCS: Performed by: INTERNAL MEDICINE

## 2019-04-15 PROCEDURE — 94761 N-INVAS EAR/PLS OXIMETRY MLT: CPT

## 2019-04-15 PROCEDURE — 6370000000 HC RX 637 (ALT 250 FOR IP): Performed by: INTERNAL MEDICINE

## 2019-04-15 PROCEDURE — 2580000003 HC RX 258: Performed by: INTERNAL MEDICINE

## 2019-04-15 PROCEDURE — 2060000000 HC ICU INTERMEDIATE R&B

## 2019-04-15 PROCEDURE — 99232 SBSQ HOSP IP/OBS MODERATE 35: CPT | Performed by: INTERNAL MEDICINE

## 2019-04-15 PROCEDURE — 99232 SBSQ HOSP IP/OBS MODERATE 35: CPT | Performed by: PSYCHIATRY & NEUROLOGY

## 2019-04-15 PROCEDURE — 36415 COLL VENOUS BLD VENIPUNCTURE: CPT

## 2019-04-15 PROCEDURE — 80048 BASIC METABOLIC PNL TOTAL CA: CPT

## 2019-04-15 PROCEDURE — 94640 AIRWAY INHALATION TREATMENT: CPT

## 2019-04-15 PROCEDURE — 93306 TTE W/DOPPLER COMPLETE: CPT

## 2019-04-15 PROCEDURE — 94760 N-INVAS EAR/PLS OXIMETRY 1: CPT

## 2019-04-15 PROCEDURE — 85025 COMPLETE CBC W/AUTO DIFF WBC: CPT

## 2019-04-15 PROCEDURE — 94660 CPAP INITIATION&MGMT: CPT

## 2019-04-15 RX ORDER — PREDNISONE 20 MG/1
40 TABLET ORAL DAILY
Qty: 10 TABLET | Refills: 0 | Status: CANCELLED | OUTPATIENT
Start: 2019-04-16 | End: 2019-04-21

## 2019-04-15 RX ADMIN — MONTELUKAST SODIUM 10 MG: 10 TABLET, FILM COATED ORAL at 20:41

## 2019-04-15 RX ADMIN — CETIRIZINE HYDROCHLORIDE 5 MG: 5 TABLET ORAL at 07:57

## 2019-04-15 RX ADMIN — Medication 10 ML: at 07:57

## 2019-04-15 RX ADMIN — BUDESONIDE 500 MCG: 0.5 SUSPENSION RESPIRATORY (INHALATION) at 07:28

## 2019-04-15 RX ADMIN — LACTULOSE 20 G: 20 SOLUTION ORAL at 20:42

## 2019-04-15 RX ADMIN — ENOXAPARIN SODIUM 40 MG: 40 INJECTION SUBCUTANEOUS at 07:56

## 2019-04-15 RX ADMIN — PSEUDOEPHEDRINE HYDROCHLORIDE 30 MG: 15 LIQUID ORAL at 13:31

## 2019-04-15 RX ADMIN — FLUTICASONE PROPIONATE 2 SPRAY: 50 SPRAY, METERED NASAL at 07:58

## 2019-04-15 RX ADMIN — PSEUDOEPHEDRINE HYDROCHLORIDE 30 MG: 15 LIQUID ORAL at 08:00

## 2019-04-15 RX ADMIN — BACLOFEN 10 MG: 10 TABLET ORAL at 20:41

## 2019-04-15 RX ADMIN — Medication 40 MG: at 08:13

## 2019-04-15 RX ADMIN — POLYETHYLENE GLYCOL 3350 17 G: 17 POWDER, FOR SOLUTION ORAL at 07:57

## 2019-04-15 RX ADMIN — PREDNISONE 40 MG: 20 TABLET ORAL at 07:57

## 2019-04-15 RX ADMIN — Medication 15 ML: at 08:13

## 2019-04-15 RX ADMIN — LAMOTRIGINE 300 MG: 100 TABLET ORAL at 08:16

## 2019-04-15 RX ADMIN — LEVETIRACETAM 1500 MG: 100 SOLUTION ORAL at 08:16

## 2019-04-15 RX ADMIN — GLYCOPYRROLATE 1 MG: 1 TABLET ORAL at 20:41

## 2019-04-15 RX ADMIN — IPRATROPIUM BROMIDE AND ALBUTEROL SULFATE 1 AMPULE: .5; 3 SOLUTION RESPIRATORY (INHALATION) at 15:16

## 2019-04-15 RX ADMIN — LACOSAMIDE 150 MG: 100 TABLET, FILM COATED ORAL at 07:57

## 2019-04-15 RX ADMIN — MICONAZOLE NITRATE: 20.6 POWDER TOPICAL at 20:42

## 2019-04-15 RX ADMIN — IPRATROPIUM BROMIDE AND ALBUTEROL SULFATE 1 AMPULE: .5; 3 SOLUTION RESPIRATORY (INHALATION) at 07:28

## 2019-04-15 RX ADMIN — BUDESONIDE 500 MCG: 0.5 SUSPENSION RESPIRATORY (INHALATION) at 19:40

## 2019-04-15 RX ADMIN — LACOSAMIDE 150 MG: 100 TABLET, FILM COATED ORAL at 20:40

## 2019-04-15 RX ADMIN — PSEUDOEPHEDRINE HYDROCHLORIDE 30 MG: 15 LIQUID ORAL at 20:40

## 2019-04-15 RX ADMIN — VANCOMYCIN HYDROCHLORIDE 1250 MG: 5 INJECTION, POWDER, LYOPHILIZED, FOR SOLUTION INTRAVENOUS at 23:30

## 2019-04-15 RX ADMIN — LACTULOSE 20 G: 20 SOLUTION ORAL at 07:56

## 2019-04-15 RX ADMIN — LEVETIRACETAM 1500 MG: 100 SOLUTION ORAL at 20:41

## 2019-04-15 RX ADMIN — IPRATROPIUM BROMIDE AND ALBUTEROL SULFATE 1 AMPULE: .5; 3 SOLUTION RESPIRATORY (INHALATION) at 19:40

## 2019-04-15 RX ADMIN — VITAMIN D, TAB 1000IU (100/BT) 1000 UNITS: 25 TAB at 20:41

## 2019-04-15 RX ADMIN — VANCOMYCIN HYDROCHLORIDE 1250 MG: 5 INJECTION, POWDER, LYOPHILIZED, FOR SOLUTION INTRAVENOUS at 11:52

## 2019-04-15 RX ADMIN — VITAMIN D, TAB 1000IU (100/BT) 1000 UNITS: 25 TAB at 07:57

## 2019-04-15 RX ADMIN — IPRATROPIUM BROMIDE AND ALBUTEROL SULFATE 1 AMPULE: .5; 3 SOLUTION RESPIRATORY (INHALATION) at 11:17

## 2019-04-15 RX ADMIN — MICONAZOLE NITRATE: 20.6 POWDER TOPICAL at 07:57

## 2019-04-15 RX ADMIN — GLYCOPYRROLATE 1 MG: 1 TABLET ORAL at 13:31

## 2019-04-15 RX ADMIN — GLYCOPYRROLATE 1 MG: 1 TABLET ORAL at 07:59

## 2019-04-15 RX ADMIN — LAMOTRIGINE 200 MG: 100 TABLET ORAL at 20:41

## 2019-04-15 RX ADMIN — ANTACID TABLETS 1250 MG: 500 TABLET, CHEWABLE ORAL at 07:56

## 2019-04-15 RX ADMIN — BACLOFEN 10 MG: 10 TABLET ORAL at 13:31

## 2019-04-15 RX ADMIN — BACLOFEN 10 MG: 10 TABLET ORAL at 07:57

## 2019-04-15 RX ADMIN — SENNOSIDES AND DOCUSATE SODIUM 2 TABLET: 8.6; 5 TABLET ORAL at 07:56

## 2019-04-15 RX ADMIN — Medication 30 MG: at 07:59

## 2019-04-15 RX ADMIN — Medication 10 ML: at 20:40

## 2019-04-15 NOTE — PROGRESS NOTES
[address] [facility]    Progress Note    4/15/2019    7:30 AM    Name:   Fani Avila  MRN:     [id number]     Zac:      [de-identified]   Room:   [address] [place] [address] Day:  3  Admit Date:  4/10/2019  4:55 PM    PCP:   Phyllis Mcdermott MD  Code Status:  DNR-CCA    Subjective:     C/C:   Chief Complaint   Patient presents with    Shortness of Breath     discharged from here yesterday     Interval History Status:    No seizures overnight, was transferred back to progressive unit  Seizure medicines were adjusted by nephrology  No respiratory distress  Still requires BiPAP more at night  Brief History:   69-year-old  male admitted from [facility] living/[facility] with difficulty breathing. Patient is well-known to our practice. He has spent an extended period of time at [facility] up until several weeks ago when he was moved to his group home. He was previously admitted here (4/8-4/9/2019) with respiratory insufficiency. On that admission in the ED his PaO2 was reported as 45. He was placed on BiPAP however when the O2 saturation sensor was repositioned from his finger to his ear he was satting at 100%. His chest x-ray showed some pulmonary vascular congestion but was otherwise unremarkable. His abdominal x-rays showed a large fecal load which is a chronic problem for this patient. His pro-calcitonin was 0.12 (<0.25 speaks against bacterial respiratory infection). His white count was normal. It had been reported that he was having emesis at the group home and there was some concern of aspiration however chest x-rays did not confirm this and the patient had no emesis during that admission. The patient did have a large bowel movement. Pulmonary consultation was obtained and the patient was started empirically on azithromycin. His chest x-ray remained clear. His O2 saturations remained normal with minimal oxygen supplementation.  He was discharged back to the group home with azithromycin 400 mg via G-tube daily ×5 days per pulmonary recommendation. ER reports that the group home had not given him any doses of his antibiotics. He is admitted with dyspnea, tachypnea and once again low O2 saturations. His past medical history includes cerebral palsy, severe scoliosis, COPD and congestive heart failure. Review of Systems:     Unable to obtain due to cerebral palsy    Medications: Allergies: Allergies   Allergen Reactions    Ampicillin Other (See Comments)     Has received and tolerated ceftriaxone and cefazolin.     Valium [Diazepam] Other (See Comments)     Has received and tolerated Ativan IV/PO    Other      Bubble bath products        Current Meds:   Scheduled Meds:    vancomycin  1,250 mg Intravenous Q12H    vancomycin (VANCOCIN) intermittent dosing (placeholder)   Other RX Placeholder    levETIRAcetam  1,500 mg PEG Tube BID    CENTRUM/CERTA-JAY with minerals oral  15 mL Per G Tube Daily    furosemide  40 mg Per G Tube Daily    scopolamine  1 patch Transdermal Q72H    pseudoephedrine HCl  30 mg Per G Tube TID    cetirizine  5 mg Oral Daily    sennosides-docusate sodium  2 tablet Per G Tube Daily    vitamin D  1,000 Units Per G Tube BID    baclofen  10 mg Per G Tube TID    montelukast  10 mg Per G Tube Nightly    fluticasone  2 spray Nasal Daily    calcium carbonate  1,250 mg Per G Tube Daily    miconazole   Topical BID    glycopyrrolate  1 mg Per G Tube TID    lacosamide  150 mg PEG Tube BID    budesonide  500 mcg Nebulization BID    lactulose  20 g Per G Tube BID    polyethylene glycol  17 g Per G Tube Daily    lamoTRIgine  200 mg Per G Tube Nightly    lamoTRIgine  300 mg Per G Tube QAM    lansoprazole  30 mg Per G Tube Daily    sodium chloride flush  10 mL Intravenous 2 times per day    enoxaparin  40 mg Subcutaneous Daily    ipratropium-albuterol  1 ampule Inhalation Q4H WA    predniSONE  40 mg Oral Daily CALCULATED CANNOT BE CALCULATED  CANNOT BE CALCULATED CANNOT BE CALCULATED   CALCIUM 8.0* 8.8 8.7     No results for input(s): PROT, LABALBU, LABA1C, J2FYQBW, W9VDQDO, FT4, TSH, AST, ALT, LDH, GGT, ALKPHOS, BILITOT, BILIDIR, AMMONIA, AMYLASE, LIPASE, LACTATE, CHOL, HDL, LDLCHOLESTEROL, CHOLHDLRATIO, TRIG, VLDL, PHENYTOIN, PHENYF in the last 72 hours. Lab Results   Component Value Date/Time    SPECIAL NOT REPORTED 04/14/2019 09:00 PM     Lab Results   Component Value Date/Time    CULTURE PENDING 04/14/2019 09:00 PM       Lab Results   Component Value Date    POCPH 7.42 04/13/2019    POCPCO2 44 04/13/2019    POCPO2 92 04/13/2019    POCHCO3 28.1 04/13/2019    NBEA NOT REPORTED 04/13/2019    PBEA 4 04/13/2019    RZE7KSH 29 04/13/2019    SPES5STA 97 04/13/2019    FIO2 28.0 04/13/2019       Radiology:    Xr Knee Left (3 Views)    Result Date: 4/14/2019  EXAMINATION: 3 XRAY VIEWS OF THE RIGHT KNEE; 3 XRAY VIEWS OF THE LEFT KNEE 4/14/2019 9:44 am COMPARISON: None. HISTORY: ORDERING SYSTEM PROVIDED HISTORY: Bilateral knee erythema/warmth, enterococcus bacteremia TECHNOLOGIST PROVIDED HISTORY: Bilateral knee erythema/warmth, enterococcus bacteremia Ordering Physician Provided Reason for Exam: Bilateral knee erythema, enterococcus bacteremia Acuity: Acute Type of Exam: Initial FINDINGS: Right knee: Severe osteopenia is noted with deformity of the distal femur consistent with old healed trauma. A smoothly demarcated exostosis is noted medial dorsal aspect of the distal femur likely secondary to old healed trauma. Moderate arthritic changes present in the knee. No acute fracture, effusion or cortical destruction is noted. Bony structures are gracile in appearance. Left knee: Severe osteopenia is noted. Mild to moderate arthritic changes are present without acute fracture, dislocation, effusion or cortical destruction. Bony structures are gracile in appearance.      Right knee: Deformity distal femur due to old healed trauma. Bony structures are gracile and severely osteopenic, limiting assessment. Degenerative changes are present without acute fracture or bony destruction. Left knee: Bony structures are gracile and severely osteopenic limiting assessment. Degenerative changes are present without acute osseous abnormality or effusion. RECOMMENDATION: Radionuclide bone imaging may prove helpful for further assessment given severe osteopenia and sepsis. Xr Knee Right (3 Views)    Result Date: 4/14/2019  EXAMINATION: 3 XRAY VIEWS OF THE RIGHT KNEE; 3 XRAY VIEWS OF THE LEFT KNEE 4/14/2019 9:44 am COMPARISON: None. HISTORY: ORDERING SYSTEM PROVIDED HISTORY: Bilateral knee erythema/warmth, enterococcus bacteremia TECHNOLOGIST PROVIDED HISTORY: Bilateral knee erythema/warmth, enterococcus bacteremia Ordering Physician Provided Reason for Exam: Bilateral knee erythema, enterococcus bacteremia Acuity: Acute Type of Exam: Initial FINDINGS: Right knee: Severe osteopenia is noted with deformity of the distal femur consistent with old healed trauma. A smoothly demarcated exostosis is noted medial dorsal aspect of the distal femur likely secondary to old healed trauma. Moderate arthritic changes present in the knee. No acute fracture, effusion or cortical destruction is noted. Bony structures are gracile in appearance. Left knee: Severe osteopenia is noted. Mild to moderate arthritic changes are present without acute fracture, dislocation, effusion or cortical destruction. Bony structures are gracile in appearance. Right knee: Deformity distal femur due to old healed trauma. Bony structures are gracile and severely osteopenic, limiting assessment. Degenerative changes are present without acute fracture or bony destruction. Left knee: Bony structures are gracile and severely osteopenic limiting assessment. Degenerative changes are present without acute osseous abnormality or effusion.  RECOMMENDATION: Radionuclide bone imaging may prove helpful for further assessment given severe osteopenia and sepsis. Xr Acute Abd Series Chest 1 Vw    Result Date: 4/8/2019  EXAMINATION: TWO XRAY VIEWS OF THE ABDOMEN AND SINGLE  XRAY VIEW OF THE CHEST 4/8/2019 9:34 am COMPARISON: Radiographs from 03/15/2019 and CT from 03/10/2019 HISTORY: ORDERING SYSTEM PROVIDED HISTORY: Pain TECHNOLOGIST PROVIDED HISTORY: Pain Ordering Physician Provided Reason for Exam: Patient states having abdominal pain, and nausea. Patient states was recently in the hospital for SBO. Acuity: Acute Type of Exam: Unknown Additional signs and symptoms: Patient states having abdominal pain, and nausea. Patient states was recently in the hospital for SBO. FINDINGS: Chest: Shallow inspiration. Marked cardiomegaly. Moderate pulmonary edema with right basilar atelectasis and rightward shift of the mediastinum, a chronic finding. No pneumothorax. No free subdiaphragmatic air. Chronic deformity of the right glenohumeral joint. Abdomen and pelvis: Cholecystectomy clips in right upper quadrant. Gaseous distention of large and small bowel with moderate to large rectal stool. Left upper quadrant gastrostomy tube. Deformity of bilateral hips. Diffuse bowel dilatation with air concerning for ileus versus at least partial obstruction. CT should be considered. Ct Abdomen Pelvis W Iv Contrast    Result Date: 4/8/2019  EXAMINATION: CT OF THE ABDOMEN AND PELVIS WITH CONTRAST 4/8/2019 11:02 am TECHNIQUE: CT of the abdomen and pelvis was performed with the administration of intravenous contrast. Multiplanar reformatted images are provided for review. Dose modulation, iterative reconstruction, and/or weight based adjustment of the mA/kV was utilized to reduce the radiation dose to as low as reasonably achievable.  COMPARISON: 10 March 2019 HISTORY: ORDERING SYSTEM PROVIDED HISTORY: vomiting TECHNOLOGIST PROVIDED HISTORY: IV Only Contrast FINDINGS: Lower Chest: Basilar Pulmonary vascular congestion is unchanged. No focal consolidation. No pleural effusion or pneumothorax. Severe levoconvex curvature of the thoracic spine is similar to prior. Pulmonary vascular congestion is not significantly changed. Xr Chest Portable    Result Date: 4/10/2019  EXAMINATION: SINGLE XRAY VIEW OF THE CHEST 4/10/2019 5:53 pm COMPARISON: 9 April 2019 HISTORY: ORDERING SYSTEM PROVIDED HISTORY: SOB TECHNOLOGIST PROVIDED HISTORY: SOB Acuity: Acute Type of Exam: Unknown FINDINGS: AP portable view of the chest time stamped at 1749 hours demonstrates overlying cardiac monitoring electrodes. Patient is rotated toward the right. Pulmonary vascular congestion is noted without interval difference from prior exam.  A severe scoliotic curvature is noted. No extrapleural air is seen. Osseous structures are stable. Stable cardiac size. No change from prior study of 1 day earlier. Stable cardiomegaly and pulmonary vascular congestion. Xr Chest Portable    Result Date: 4/9/2019  EXAMINATION: SINGLE XRAY VIEW OF THE CHEST 4/9/2019 6:49 am COMPARISON: 24 February 2019 HISTORY: ORDERING SYSTEM PROVIDED HISTORY: AECOPD TECHNOLOGIST PROVIDED HISTORY: AECOPD FINDINGS: AP portable view of the chest time stamped at 642 hours demonstrates overlying cardiac monitoring electrodes. The patient is rotated toward the right. Stable cardiac size is noted. The patient has pulmonary vascular congestion with slight decreased compared to prior study of 1 day earlier. No gross effusion or extrapleural air is noted. The patient has a sharp angle scoliosis in the thoracic spine with a gibbus type deformity. No free air is noted. Mediastinal contours are stable. Stable cardiomegaly. Pulmonary vascular congestion is noted with slight decreased compared to prior study of 1 day earlier. No gross effusions.        Physical Examination:        General appearance:  Awake but not communicative, oxygen via nasal cannula in place  Mental Status:  Unable to evaluate due to cerebral palsy  Lungs:  Conducted breath sounds all over  Heart:  regular rate and rhythm, no murmur  Abdomen:  soft, nontender, nondistended, normal bowel sounds, no masses, hepatomegaly, splenomegaly, PEG tube in place  Extremities:  + edema,no  redness, tenderness in the calves,+ swelling both knees  Skin:  no gross lesions, rashes, induration    Assessment:        Primary Problem  Acute respiratory failure [facility])    Active Hospital Problems    Diagnosis Date Noted    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] 04/14/2019    Hip dysplasia, acquired, left [M21.852] 04/14/2019    Obesity, Class II, BMI 35-39.9 [E66.9] 04/13/2019    Malfunction of percutaneous endoscopic gastrostomy (PEG) tube ([address] [K94.23] 04/13/2019    Breakthrough seizure ([address] [G40.919]     Mental retardation [F79]     COPD exacerbation ([address] [J44.1] 04/12/2019    Acute respiratory failure ([address] [J96.00] 04/10/2019    Developmental disability [F89]     Severe protein-calorie malnutrition Callum Caprice: less than 60% of standard weight) ([address] [E43] 11/15/2017    Acute exacerbation of chronic obstructive pulmonary disease (COPD) ([address] [J44.1]     Cerebral palsied ([address] [G80.9]     Seizure disorder ([address] [G40.909]        Plan:        1. Seizure medicines already adjusted by neurology, to be given by PEG tube  2. Continue antibiotics as per ID evaluation  3. Oxygen/BiPAP as needed  4. DVT and GI prophylaxis  5.  Transfer to [place] [address] when bed available    Dalia Martin MD  4/15/2019  7:30 AM

## 2019-04-15 NOTE — PROGRESS NOTES
[facility]  Progress Note    Praful Lemus  MRN: [id number]  Date: 4/15/2019    Reason for F/U :   Enterococcus faecalis sepsis    Impression :   1. Enterococcus faecalis sepsis  2. Possible septic arthritis-bilateral knee joints  · Status post aspiration 4/14/19  3. Cerebral palsy  4. Penicillin allergy-has tolerated cephalosporins    Recommendations:   · Continue intravenous antimicrobial therapy with vancomycin  · The patient is status post aspiration of the knees bilaterally and there was not enough fluid to do self counts. · The patient had the 2-D echocardiogram done today and it is pending a report. · Repeat blood cultures sent today and so far remain negative. Infection Control Recommendations:   Universal precautions    Discharge Planning:   Estimated Length of IV antimicrobials: To be determined  Patient will need PICC line Insertion  Patient will need: LTAC  Patient willneed outpatient wound care: No    MedicalDecision making / Summary of Stay:   Jose Alfredo Rush a 51y.o.-year-old male who was initially admitted on 4/10/2019. Patient has a known medical history of cerebral palsy and is nonverbal, he also has a history of COPD and congestive heart failure. He does reside in a long-term care facility, [facility]. Patient was recently discharged from [facility] 4/9/2019 after being admitted and treated for respiratory failure. In the hospitalization, his chest x-ray showed pulmonary vascular congestion. His abdominal x-ray showed large fecal load which is a chronic problem for this particular patient him understanding. He was given IV azithromycin during the hospitalization. He was discharged on liquid levofloxacin to be given through the G-tube; however, per the ER notes, patient did not receive that medication at his facility. He was brought to [facility] due to shortness of breath as well as emesis. Patient was started back on azithromycin.   · Patient admitted due 24 27 33*   BUN 8 4*  5* 8   CREATININE <0.40* <0.40*  <0.40* <0.40*   MG 2.3  --   --      Hepatic Function Panel: No results for input(s): PROT, LABALBU, BILIDIR, IBILI, BILITOT, ALKPHOS, ALT, AST in the last 72 hours. Recent Labs     04/14/19  2255   VANCOTROUGH 11.6      No results found for: CRP  No results found for: SEDRATE    No results for input(s): PROCAL in the last 72 hours. Imaging Studies:   No new imaging    Cultures:     Culture Blood #1 [[id number]] Collected: 04/14/19 0934   Order Status: Completed Specimen: Blood Updated: 04/15/19 1028    Specimen Description . BLOOD    Special Requests RIGHT HAND. 9ML AEROBIC 1ML ANAEROBIC    Culture NO GROWTH 23 HOURS   Culture Blood #1 [[id number]] Collected: 04/14/19 1999   Order Status: Completed Specimen: Blood Updated: 04/15/19 1028    Specimen Description . BLOOD    Special Requests LEFT HAND 4ML AEROBIC ONLY    Culture NO GROWTH 23 HOURS   Anaerobic and Aerobic Culture [[id number]] Collected: 04/14/19 2100   Order Status: Completed Specimen: Joint, Knee Updated: 04/14/19 2303    Specimen Description . KNEE LEFT    Special Requests NOT REPORTED    Direct Exam NO NEUTROPHILS SEEN     NO BACTERIA SEEN    Culture PENDING   Anaerobic and Aerobic Culture [[id number]] Collected: 04/14/19 1632   Order Status: Completed Specimen: Joint, Knee Updated: 04/14/19 2302    Specimen Description . KNEE RIGHT    Special Requests NOT REPORTED    Direct Exam NO NEUTROPHILS SEEN     NO BACTERIA SEEN    Culture PENDING     CULTURE BLOOD #1 [[id number]] (Abnormal) Collected: 04/11/19 1145   Order Status: Completed Specimen: Blood Updated: 04/12/19 2055    Specimen Description . BLOOD    Special Requests LT HAND,2ML    Culture --Abnormal     POSITIVE BLOOD CULTURE, RN NOTIFIED:   RESULTS REPEATED BACK BY SAUL Manley AT 0405 4/12/19   Abnormal     Culture --    DIRECT GRAM STAIN FROM BOTTLE:   GRAM POSITIVE COCCI IN PAIRS   AND CHAINS     Culture --Abnormal     ENTEROCOCCUS FAECALIS For susceptibility, refer to previous culture. Abnormal    CULTURE BLOOD #2 [[id number]] (Abnormal) Collected: 04/11/19 1210   Order Status: Completed Specimen: Blood Updated: 04/12/19 2055    Specimen Description . BLOOD    Special Requests 4ML L WRIST    Culture --Abnormal     POSITIVE BLOOD CULTURE, RN NOTIFIED:   RESULTS [facility] AT 0405 4/12/19   Abnormal     Culture --    DIRECT GRAM STAIN FROM BOTTLE:   GRAM POSITIVE COCCI IN PAIRS   AND CHAINS     Culture --Abnormal     ENTEROCOCCUS FAECALIS   For susceptibility, refer to previous culture. Abnormal    Cult,Blood #1 [[id number]] (Abnormal) Collected: 04/10/19 1742   Order Status: Completed Specimen: Blood Updated: 04/12/19 2052    Specimen Description . BLOOD    Special Requests 6ML RT FA    Culture --Abnormal     POSITIVE Blood Culture   Results called to and read back by:   Claudia Chavez. 4/11 1030   Abnormal     Culture --    DIRECT GRAM STAIN FROM BOTTLE:   GRAM POSITIVE COCCI IN CHAINS     Culture ENTEROCOCCUS FAECALISAbnormal     Culture For susceptibility, refer to previous culture. Cult,Blood #2 [[id number]] (Abnormal)  Collected: 04/10/19 1747   Order Status: Completed Specimen: Blood Updated: 04/12/19 2050    Specimen Description . BLOOD    Special Requests 12 ML RT UPPER ARM    Culture --Abnormal     POSITIVE Blood Culture   Results called to and read back by:   Claudia Chavez. 4/11 1030   Abnormal     Culture --    DIRECT GRAM STAIN FROM BOTTLE:   GRAM POSITIVE COCCI IN CHAINS     Culture --    ID by PNAFISH:   ENTEROCOCCUS FAECALIS     Culture Comment:  >99% of E. faecalis isolates are susceptible to Ampicillin and Vancomycin. Abnormal     Culture ENTEROCOCCUS FAECALISAbnormal    Enterococcus  faecalis (5)     Antibiotic Interpretation ANGELA Status    ampicillin Sensitive  Final     <=2  SUSCEPTIBLE   penicillin   Final     NOT REPORTED   ciprofloxacin   Final     NOT REPORTED   erythromycin   Final     NOT REPORTED   Gentamicin, High Level Resistant RESISTANT Final    levofloxacin   Final     NOT REPORTED   linezolid   Final     NOT REPORTED   nitrofurantoin   Final     NOT REPORTED   Synercid   Final     NOT REPORTED   Streptomycin, Hi Level Sensitive SUSCEPTIBLE Final    tetracycline   Final     NOT REPORTED   tigecycline   Final     NOT REPORTED   vancomycin Sensitive  Final     1  SUSCEPTIBLE     Medications:      vancomycin  1,250 mg Intravenous Q12H    vancomycin (VANCOCIN) intermittent dosing (placeholder)   Other RX Placeholder    levETIRAcetam  1,500 mg PEG Tube BID    CENTRUM/CERTA-JAY with minerals oral  15 mL Per G Tube Daily    furosemide  40 mg Per G Tube Daily    scopolamine  1 patch Transdermal Q72H    pseudoephedrine HCl  30 mg Per G Tube TID    cetirizine  5 mg Oral Daily    sennosides-docusate sodium  2 tablet Per G Tube Daily    vitamin D  1,000 Units Per G Tube BID    baclofen  10 mg Per G Tube TID    montelukast  10 mg Per G Tube Nightly    fluticasone  2 spray Nasal Daily    calcium carbonate  1,250 mg Per G Tube Daily    miconazole   Topical BID    glycopyrrolate  1 mg Per G Tube TID    lacosamide  150 mg PEG Tube BID    budesonide  500 mcg Nebulization BID    lactulose  20 g Per G Tube BID    polyethylene glycol  17 g Per G Tube Daily    lamoTRIgine  200 mg Per G Tube Nightly    lamoTRIgine  300 mg Per G Tube QAM    lansoprazole  30 mg Per G Tube Daily    sodium chloride flush  10 mL Intravenous 2 times per day    enoxaparin  40 mg Subcutaneous Daily    ipratropium-albuterol  1 ampule Inhalation Q4H WA    predniSONE  40 mg Oral Daily     Thank you for allowing us to participate in the care of this patient. Please call with questions.       [facility]  Geena Villatoro MD  [employer]  OFFICE: [phone]  CELL:     [phone]    Electronically signed by Geena Villatoro MD on 4/15/2019 at 11:20 AM    This note iscreated with the assistance of a speech recognition program.  While intending to generate a document that actually reflects the content of the visit, the document can still have some errors including those of syntax andsound a like substitutions which may escape proof reading. It such instances, actual meaning can be extrapolated by contextual diversion.

## 2019-04-15 NOTE — DISCHARGE INSTR - COC
Choledocholithiasis K80.50    Obstipation K59.00    Generalized abdominal pain R10.84    Constipation K59.00    Developmental disability F89    Ileus (HCC) K56.7    Influenza with respiratory manifestation other than pneumonia J11.1    Hypoxia R09.02    Abdominal distension R14.0    Dyspnea and respiratory abnormalities R06.00, R06.89    Acute respiratory failure with hypoxia (HCC) J96.01    Acute respiratory failure with hypoxia (HCC) J96.01    Acute and chronic respiratory failure with hypoxia (HCC) J96.21    Acute respiratory failure (HCC) J96.00    COPD exacerbation (HCC) J44.1    Obesity, Class II, BMI 35-39.9 E66.9    Malfunction of percutaneous endoscopic gastrostomy (PEG) tube ([facility]) K94.23    Breakthrough seizure ([address] G40.919    Mental retardation F79    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  Placed 4/16/19 1550    Nursing Mobility/ADLs:  Walking   Dependent  Transfer  Dependent  Bathing  Dependent  Dressing  Dependent  Toileting  Dependent  Feeding  Dependent  Med Admin  Dependent  Med Delivery   crushed- PEG tube    Wound Care Documentation and Therapy:  Incision 08/18/18 Abdomen (Active)   Number of days: 239        Elimination:  Continence:   · Bowel: No  · Bladder: No  Urinary Catheter: None   Colostomy/Ileostomy/Ileal Conduit: No       Date of Last BM: 4/20/19      Intake/Output Summary (Last 24 hours) at 4/15/2019 1118  Last data filed at 4/15/2019 0542  Gross per 24 hour   Intake 1640 ml   Output --   Net 1640 ml     I/O last 3 completed shifts: In: 1940 [NG/GT:1940]  Out: -     Safety Concerns:     History of Seizures    Impairments/Disabilities:      Speech and Contractures - non verbal    Nutrition Therapy:  Current Nutrition Therapy:   - Tube Feedings:  Standard with fiber    Routes of Feeding: Gastrostomy Tube  Liquids: NPO  Daily Fluid Restriction: no  Last Modified Barium Swallow with Video (Video Swallowing Test): not done    Treatments at the Time of Hospital Discharge:   Respiratory Treatments: yes  Oxygen Therapy:  is on oxygen at 2 liters  bipap at hs and prn L/min per nasal cannula. Ventilator:    - BiPAP   IPAP: 16 cmH20, EPAP: 6 cmH2O with 2 L/min oxygen    Rehab Therapies: Physical Therapy, OT and pulmonary therapy   Weight Bearing Status/Restrictions: No weight bearing restirctions  Other Medical Equipment (for information only, NOT a DME order):  hospital bed  Other Treatments:  1.  water flushes to peg tube -100 ml q 6 hours. Tube feeding Jevity 1.2 (Complete, balanced nutrition with fiber) at 55 ml/hr continuous.    2. Turn q 2 hours, brief changes,    Patient's personal belongings (please select all that are sent with patient):  None    RN SIGNATURE:  Electronically signed by Marily River RN on 4/15/19 at 11:25 AM    CASE MANAGEMENT/SOCIAL WORK SECTION    Inpatient Status Date: 4/12/19    Readmission Risk Assessment Score:  Readmission Risk              Risk of Unplanned Readmission:        31           Discharging to Facility/ Agency   · Name: [facility]  · Address:  · Phone: [phone]  · Fax: [phone]    Dialysis Facility (if applicable)   · Name:  · Address:  · Dialysis Schedule:  · Phone:  · Fax:    / signature: Electronically signed by Clarice Ayala on 4/15/19 at 2:42 PM  Electronically signed by Shwetha Pineda RN on 4/16/2019 at 7:50 AM     PHYSICIAN SECTION    Prognosis: Fair    Condition at Discharge: Stable    Rehab Potential (if transferring to Rehab): Fair    Recommended Labs or Other Treatments After Discharge: needs follow up by ID and neurology at [facility]    Physician Certification: I certify the above information and transfer of Tc Hooks  is necessary for the continuing treatment of the diagnosis listed and that he requires LTAC for less 30 days.      Update Admission H&P: No change in H&P    PHYSICIAN SIGNATURE:  Electronically signed by Bre Hawkins MD on 4/16/19 at 9:01 AM

## 2019-04-15 NOTE — PLAN OF CARE
Continue to turn every 2 hours side to side staying off back. Brief changes and oral care done every 2 hours and prn, Waffle mattress inflated accordingly. Pt [facility]. Pillow support provided t/o.  Will continue to assess skin integrity each shift

## 2019-04-15 NOTE — PROGRESS NOTES
LAPAROSCOPIC WITH UMBILICAL HERNIA REPAIR performed by Olga Childress DO at [address]      has Feeding tube        Current Medications:     Current Facility-Administered Medications   Medication Dose Route Frequency Provider Last Rate Last Dose    vancomycin (VANCOCIN) 1,250 mg in dextrose 5 % 250 mL IVPB  1,250 mg Intravenous Q12H Liz Merida MD   Stopped at 04/15/19 1449    vancomycin (VANCOCIN) intermittent dosing (placeholder)   Other RX Placeholder Liz Merida MD        LORazepam (ATIVAN) injection 1 mg  1 mg Intramuscular Q4H PRN Tobi Robertson DO   1 mg at 04/13/19 1414    morphine (PF) injection 1 mg  1 mg Intravenous Q4H PRN KARIME Payne - CNP   1 mg at 04/14/19 1833    levETIRAcetam (KEPPRA) 100 MG/ML solution 1,500 mg  1,500 mg PEG Tube BID Tobi Robertson DO   1,500 mg at 04/15/19 3890    magnesium sulfate 1 g in dextrose 5% 100 mL IVPB  1 g Intravenous PRN Carvin Rehan, DO        potassium chloride (KLOR-CON M) extended release tablet 40 mEq  40 mEq Oral PRN Carvin Rehan, DO        Or    potassium bicarb-citric acid (EFFER-K) effervescent tablet 40 mEq  40 mEq Oral PRN Carvin Rehan, DO   40 mEq at 04/14/19 1607    Or    potassium chloride 10 mEq/100 mL IVPB (Peripheral Line)  10 mEq Intravenous PRN Carvin Rehan, DO   10 mEq at 04/13/19 4215    CENTRUM/CERTA-JAY with minerals oral solution 15 mL  15 mL Per G Tube Daily Roger TORRES Blood, DO   15 mL at 04/15/19 0813    furosemide (LASIX) 10 MG/ML solution 40 mg  40 mg Per G Tube Daily Alvaro Alfaro DO   40 mg at 04/15/19 0813    scopolamine (TRANSDERM-SCOP) transdermal patch 1 patch  1 patch Transdermal Q72H Alvaro Alfaro DO   1 patch at 04/14/19 1100    pseudoephedrine HCl (SUDAFED) liquid 30 mg  30 mg Per G Tube TID Roger TORRES Blood, DO   30 mg at 04/15/19 1331    HYDROcodone-acetaminophen (NORCO) 5-325 MG per tablet 1 tablet  1 tablet PEG Tube Q4H PRN Day Sours Stanley Pastel - CNP        Or    HYDROcodone-acetaminophen (NORCO) 5-325 MG per tablet 2 tablet  2 tablet PEG Tube Q4H PRN Kilo LainezKARIME - CNP   2 tablet at 04/14/19 2049    cetirizine (ZYRTEC) tablet 5 mg  5 mg Oral Daily Roger P Blood, DO   5 mg at 04/15/19 0757    sennosides-docusate sodium (SENOKOT-S) 8.6-50 MG tablet 2 tablet  2 tablet Per G Tube Daily Roger P Blood, DO   2 tablet at 04/15/19 0756    vitamin D (CHOLECALCIFEROL) tablet 1,000 Units  1,000 Units Per G Tube BID Roger P Blood, DO   1,000 Units at 04/15/19 0757    baclofen (LIORESAL) tablet 10 mg  10 mg Per G Tube TID Roger P Blood, DO   10 mg at 04/15/19 1331    montelukast (SINGULAIR) tablet 10 mg  10 mg Per G Tube Nightly Roger P Blood, DO   10 mg at 04/14/19 2054    fluticasone (FLONASE) 50 MCG/ACT nasal spray 2 spray  2 spray Nasal Daily Roger P Blood, DO   2 spray at 04/15/19 0758    calcium carbonate (TUMS) chewable tablet 1,250 mg  1,250 mg Per G Tube Daily Roger P Blood, DO   1,250 mg at 04/15/19 0756    miconazole (MICOTIN) 2 % powder   Topical BID Roger P Blood, DO        glycopyrrolate (ROBINUL) tablet 1 mg  1 mg Per G Tube TID Roger P Blood, DO   1 mg at 04/15/19 1331    lacosamide (VIMPAT) tablet 150 mg  150 mg PEG Tube BID Roger P Blood, DO   150 mg at 04/15/19 0757    budesonide (PULMICORT) nebulizer suspension 500 mcg  500 mcg Nebulization BID Roger P Blood, DO   500 mcg at 04/15/19 0728    lactulose (CHRONULAC) 10 GM/15ML solution 20 g  20 g Per G Tube BID Roger P Blood, DO   20 g at 04/15/19 0756    polyethylene glycol (GLYCOLAX) packet 17 g  17 g Per G Tube Daily Roger P Blood, DO   17 g at 04/15/19 0757    lamoTRIgine (LAMICTAL) tablet 200 mg  200 mg Per G Tube Nightly Roger P Blood, DO   200 mg at 04/14/19 2051    lamoTRIgine (LAMICTAL) tablet 300 mg  300 mg Per G Tube QAM Roger TORRES Blood, DO   300 mg at 04/15/19 0816    lansoprazole suspension SUSP 30 mg  30 mg Per G Tube Daily Roger P Blood, DO   30 mg at 04/15/19 0759    zinc oxide 20 % ointment   Topical PRN Roger P Blood, DO        neomycin-bacitracin-polymyxin (NEOSPORIN) ointment   Topical PRN Roger P Blood, DO        ondansetron (ZOFRAN-ODT) disintegrating tablet 4 mg  4 mg Oral Q6H PRN Roger P Blood, DO        sodium chloride flush 0.9 % injection 10 mL  10 mL Intravenous 2 times per day Roger P Blood, DO   10 mL at 04/15/19 0757    sodium chloride flush 0.9 % injection 10 mL  10 mL Intravenous PRN Roger P Blood, DO        magnesium hydroxide (MILK OF MAGNESIA) 400 MG/5ML suspension 30 mL  30 mL Oral Daily PRN Roger P Blood, DO        ondansetron (ZOFRAN) injection 4 mg  4 mg Intravenous Q6H PRN Roger P Blood, DO        nicotine (NICODERM CQ) 21 MG/24HR 1 patch  1 patch Transdermal Daily PRN Roger P Blood, DO        enoxaparin (LOVENOX) injection 40 mg  40 mg Subcutaneous Daily Roger P Blood, DO   40 mg at 04/15/19 0756    albuterol (PROVENTIL) nebulizer solution 2.5 mg  2.5 mg Nebulization Q2H PRN Roger P Blood, DO        ipratropium-albuterol (DUONEB) nebulizer solution 1 ampule  1 ampule Inhalation Q4H WA Roger P Blood, DO   1 ampule at 04/15/19 1516    acetaminophen (TYLENOL) tablet 650 mg  650 mg Oral Q4H PRN Roger P Blood, DO        predniSONE (DELTASONE) tablet 40 mg  40 mg Oral Daily Roger P Blood, DO   40 mg at 04/15/19 0757       Allergies:     Ampicillin; Valium [diazepam]; and Other    Social History:     Tobacco:    reports that he has never smoked. He has never used smokeless tobacco.  Alcohol:      reports that he does not drink alcohol. Drug Use:  reports that he does not use drugs.     Family History:     Family History   Family history unknown: Yes       Review of Systems:     ROS:  Patient nonverbal    Physical Exam:   BP 94/65   Pulse 91   Temp 97.3 °F (36.3 °C) (Axillary)   Resp 22   Ht 3' 10.85\" (1.19 m)   Wt 115 lb 1.6 oz (52.2 kg)   SpO2 94%   BMI GFRAA CANNOT BE CALCULATED 04/15/2019    GLOB NOT REPORTED 08/13/2018       Imaging/Diagnostics:  Noncontrast head CT 11/17/17:  1. Motion limits evaluation. 2. No convincing acute intracranial abnormality. 3. Extensive global parenchymal volume loss with asymmetric involvement   involving the posterior cerebral hemispheres bilaterally. Assessment and Plan:      Patient is a 51 y.o. male who is seen for neurological consult for through seizures, was unable to get his antiepileptic medications due to malfunctioning G-tube. This has not been fixed, and he is now back to his prior antiepileptic regimen of Keppra 1500 mg twice a day, Vimpat 150 mg twice a day and Lamictal 300 mg in the morning and 200 mg at night. He is scheduled to return to his nursing facility, we'll need to follow up with neurology within 8 weeks as an outpatient. Will sign off, please call with questions. Signed: Eliel Cox MD  Neurology and Sleep Medicine  [facility] [address]    Please note that this chart was generated using voice recognition Dragon dictation software. Although every effort was made to ensure the accuracy of this automated transcription, some errors in transcription may have occurred.

## 2019-04-16 ENCOUNTER — APPOINTMENT (OUTPATIENT)
Dept: INTERVENTIONAL RADIOLOGY/VASCULAR | Age: 49
DRG: 871 | End: 2019-04-16
Payer: MEDICARE

## 2019-04-16 LAB
ABSOLUTE EOS #: 0.2 K/UL (ref 0–0.4)
ABSOLUTE IMMATURE GRANULOCYTE: ABNORMAL K/UL (ref 0–0.3)
ABSOLUTE LYMPH #: 2.9 K/UL (ref 1–4.8)
ABSOLUTE MONO #: 0.6 K/UL (ref 0.2–0.8)
ANION GAP SERPL CALCULATED.3IONS-SCNC: 12 MMOL/L (ref 9–17)
BASOPHILS # BLD: 1 % (ref 0–2)
BASOPHILS ABSOLUTE: 0.1 K/UL (ref 0–0.2)
BUN BLDV-MCNC: 9 MG/DL (ref 6–20)
BUN/CREAT BLD: ABNORMAL (ref 9–20)
CALCIUM SERPL-MCNC: 9.3 MG/DL (ref 8.6–10.4)
CHLORIDE BLD-SCNC: 98 MMOL/L (ref 98–107)
CO2: 33 MMOL/L (ref 20–31)
CREAT SERPL-MCNC: <0.4 MG/DL (ref 0.7–1.2)
DIFFERENTIAL TYPE: ABNORMAL
EOSINOPHILS RELATIVE PERCENT: 2 % (ref 1–4)
GFR AFRICAN AMERICAN: ABNORMAL ML/MIN
GFR NON-AFRICAN AMERICAN: ABNORMAL ML/MIN
GFR SERPL CREATININE-BSD FRML MDRD: ABNORMAL ML/MIN/{1.73_M2}
GFR SERPL CREATININE-BSD FRML MDRD: ABNORMAL ML/MIN/{1.73_M2}
GLUCOSE BLD-MCNC: 119 MG/DL (ref 70–99)
HCT VFR BLD CALC: 36.2 % (ref 41–53)
HEMOGLOBIN: 11.9 G/DL (ref 13.5–17.5)
IMMATURE GRANULOCYTES: ABNORMAL %
LYMPHOCYTES # BLD: 33 % (ref 24–44)
MCH RBC QN AUTO: 30.3 PG (ref 26–34)
MCHC RBC AUTO-ENTMCNC: 32.8 G/DL (ref 31–37)
MCV RBC AUTO: 92.4 FL (ref 80–100)
MONOCYTES # BLD: 7 % (ref 1–7)
NRBC AUTOMATED: ABNORMAL PER 100 WBC
PDW BLD-RTO: 16.4 % (ref 11.5–14.5)
PLATELET # BLD: 481 K/UL (ref 130–400)
PLATELET ESTIMATE: ABNORMAL
PMV BLD AUTO: 7.5 FL (ref 6–12)
POTASSIUM SERPL-SCNC: 3.5 MMOL/L (ref 3.7–5.3)
RBC # BLD: 3.92 M/UL (ref 4.5–5.9)
RBC # BLD: ABNORMAL 10*6/UL
SEG NEUTROPHILS: 57 % (ref 36–66)
SEGMENTED NEUTROPHILS ABSOLUTE COUNT: 4.9 K/UL (ref 1.8–7.7)
SODIUM BLD-SCNC: 143 MMOL/L (ref 135–144)
VANCOMYCIN TROUGH DATE LAST DOSE: NORMAL
VANCOMYCIN TROUGH DOSE AMOUNT: NORMAL
VANCOMYCIN TROUGH TIME LAST DOSE: NORMAL
VANCOMYCIN TROUGH: 18.5 UG/ML (ref 10–20)
WBC # BLD: 8.8 K/UL (ref 3.5–11)
WBC # BLD: ABNORMAL 10*3/UL

## 2019-04-16 PROCEDURE — 6370000000 HC RX 637 (ALT 250 FOR IP): Performed by: INTERNAL MEDICINE

## 2019-04-16 PROCEDURE — C1751 CATH, INF, PER/CENT/MIDLINE: HCPCS

## 2019-04-16 PROCEDURE — 99232 SBSQ HOSP IP/OBS MODERATE 35: CPT | Performed by: INTERNAL MEDICINE

## 2019-04-16 PROCEDURE — 2580000003 HC RX 258: Performed by: INTERNAL MEDICINE

## 2019-04-16 PROCEDURE — 80048 BASIC METABOLIC PNL TOTAL CA: CPT

## 2019-04-16 PROCEDURE — 6360000002 HC RX W HCPCS: Performed by: INTERNAL MEDICINE

## 2019-04-16 PROCEDURE — 94660 CPAP INITIATION&MGMT: CPT

## 2019-04-16 PROCEDURE — 36415 COLL VENOUS BLD VENIPUNCTURE: CPT

## 2019-04-16 PROCEDURE — 36573 INSJ PICC RS&I 5 YR+: CPT

## 2019-04-16 PROCEDURE — 2060000000 HC ICU INTERMEDIATE R&B

## 2019-04-16 PROCEDURE — 85025 COMPLETE CBC W/AUTO DIFF WBC: CPT

## 2019-04-16 PROCEDURE — 2700000000 HC OXYGEN THERAPY PER DAY

## 2019-04-16 PROCEDURE — 94761 N-INVAS EAR/PLS OXIMETRY MLT: CPT

## 2019-04-16 PROCEDURE — 80202 ASSAY OF VANCOMYCIN: CPT

## 2019-04-16 PROCEDURE — 02HV33Z INSERTION OF INFUSION DEVICE INTO SUPERIOR VENA CAVA, PERCUTANEOUS APPROACH: ICD-10-PCS | Performed by: RADIOLOGY

## 2019-04-16 PROCEDURE — 94640 AIRWAY INHALATION TREATMENT: CPT

## 2019-04-16 RX ORDER — LAMOTRIGINE 150 MG/1
300 TABLET ORAL EVERY MORNING
Qty: 30 TABLET | Refills: 3 | Status: SHIPPED | OUTPATIENT
Start: 2019-04-16

## 2019-04-16 RX ORDER — FUROSEMIDE 10 MG/ML
40 SOLUTION ORAL DAILY
Qty: 100 MG | Refills: 0 | Status: SHIPPED | OUTPATIENT
Start: 2019-04-16 | End: 2019-09-20

## 2019-04-16 RX ORDER — HYDROCODONE BITARTRATE AND ACETAMINOPHEN 5; 325 MG/1; MG/1
1 TABLET ORAL EVERY 4 HOURS PRN
Qty: 10 TABLET | Refills: 0 | Status: SHIPPED | OUTPATIENT
Start: 2019-04-16 | End: 2019-04-19

## 2019-04-16 RX ORDER — IPRATROPIUM BROMIDE AND ALBUTEROL SULFATE 2.5; .5 MG/3ML; MG/3ML
3 SOLUTION RESPIRATORY (INHALATION)
Qty: 360 ML | Refills: 0 | Status: SHIPPED | OUTPATIENT
Start: 2019-04-16 | End: 2019-09-20

## 2019-04-16 RX ORDER — CETIRIZINE HYDROCHLORIDE 5 MG/1
5 TABLET ORAL DAILY
Qty: 5 TABLET | Refills: 0 | Status: SHIPPED | OUTPATIENT
Start: 2019-04-16 | End: 2019-09-20 | Stop reason: ALTCHOICE

## 2019-04-16 RX ORDER — LACOSAMIDE 150 MG/1
150 TABLET ORAL 2 TIMES DAILY
Qty: 60 TABLET | Refills: 0 | Status: SHIPPED | OUTPATIENT
Start: 2019-04-16 | End: 2019-09-20

## 2019-04-16 RX ORDER — LEVETIRACETAM 100 MG/ML
1500 SOLUTION ORAL 2 TIMES DAILY
Qty: 300 ML | Refills: 3 | Status: SHIPPED | OUTPATIENT
Start: 2019-04-16

## 2019-04-16 RX ORDER — LAMOTRIGINE 200 MG/1
200 TABLET ORAL NIGHTLY
Qty: 30 TABLET | Refills: 3 | Status: SHIPPED | OUTPATIENT
Start: 2019-04-16

## 2019-04-16 RX ORDER — SCOLOPAMINE TRANSDERMAL SYSTEM 1 MG/1
1 PATCH, EXTENDED RELEASE TRANSDERMAL
Qty: 5 PATCH | Refills: 0 | Status: SHIPPED | OUTPATIENT
Start: 2019-04-17 | End: 2019-09-20

## 2019-04-16 RX ORDER — PREDNISONE 20 MG/1
20 TABLET ORAL DAILY
Status: DISCONTINUED | OUTPATIENT
Start: 2019-04-17 | End: 2019-04-21 | Stop reason: HOSPADM

## 2019-04-16 RX ADMIN — BACLOFEN 10 MG: 10 TABLET ORAL at 15:11

## 2019-04-16 RX ADMIN — GLYCOPYRROLATE 1 MG: 1 TABLET ORAL at 15:12

## 2019-04-16 RX ADMIN — LEVETIRACETAM 1500 MG: 100 SOLUTION ORAL at 21:04

## 2019-04-16 RX ADMIN — CEFTRIAXONE 2 G: 2 INJECTION, POWDER, FOR SOLUTION INTRAMUSCULAR; INTRAVENOUS at 16:50

## 2019-04-16 RX ADMIN — PSEUDOEPHEDRINE HYDROCHLORIDE 30 MG: 15 LIQUID ORAL at 08:23

## 2019-04-16 RX ADMIN — IPRATROPIUM BROMIDE AND ALBUTEROL SULFATE 1 AMPULE: .5; 3 SOLUTION RESPIRATORY (INHALATION) at 14:43

## 2019-04-16 RX ADMIN — MICONAZOLE NITRATE: 20.6 POWDER TOPICAL at 21:06

## 2019-04-16 RX ADMIN — CETIRIZINE HYDROCHLORIDE 5 MG: 5 TABLET ORAL at 08:22

## 2019-04-16 RX ADMIN — Medication 40 MG: at 08:23

## 2019-04-16 RX ADMIN — FLUTICASONE PROPIONATE 2 SPRAY: 50 SPRAY, METERED NASAL at 08:25

## 2019-04-16 RX ADMIN — Medication 15 ML: at 08:23

## 2019-04-16 RX ADMIN — BACLOFEN 10 MG: 10 TABLET ORAL at 21:03

## 2019-04-16 RX ADMIN — PSEUDOEPHEDRINE HYDROCHLORIDE 30 MG: 15 LIQUID ORAL at 15:12

## 2019-04-16 RX ADMIN — ENOXAPARIN SODIUM 40 MG: 40 INJECTION SUBCUTANEOUS at 08:23

## 2019-04-16 RX ADMIN — BACLOFEN 10 MG: 10 TABLET ORAL at 08:21

## 2019-04-16 RX ADMIN — PSEUDOEPHEDRINE HYDROCHLORIDE 30 MG: 15 LIQUID ORAL at 21:03

## 2019-04-16 RX ADMIN — GLYCOPYRROLATE 1 MG: 1 TABLET ORAL at 21:03

## 2019-04-16 RX ADMIN — Medication 30 MG: at 09:06

## 2019-04-16 RX ADMIN — LAMOTRIGINE 200 MG: 100 TABLET ORAL at 21:05

## 2019-04-16 RX ADMIN — BUDESONIDE 500 MCG: 0.5 SUSPENSION RESPIRATORY (INHALATION) at 05:41

## 2019-04-16 RX ADMIN — SENNOSIDES AND DOCUSATE SODIUM 2 TABLET: 8.6; 5 TABLET ORAL at 08:22

## 2019-04-16 RX ADMIN — IPRATROPIUM BROMIDE AND ALBUTEROL SULFATE 1 AMPULE: .5; 3 SOLUTION RESPIRATORY (INHALATION) at 05:42

## 2019-04-16 RX ADMIN — ANTACID TABLETS 1250 MG: 500 TABLET, CHEWABLE ORAL at 08:20

## 2019-04-16 RX ADMIN — LACOSAMIDE 150 MG: 100 TABLET, FILM COATED ORAL at 09:05

## 2019-04-16 RX ADMIN — LAMOTRIGINE 300 MG: 100 TABLET ORAL at 09:05

## 2019-04-16 RX ADMIN — VITAMIN D, TAB 1000IU (100/BT) 1000 UNITS: 25 TAB at 08:22

## 2019-04-16 RX ADMIN — IPRATROPIUM BROMIDE AND ALBUTEROL SULFATE 1 AMPULE: .5; 3 SOLUTION RESPIRATORY (INHALATION) at 18:19

## 2019-04-16 RX ADMIN — POLYETHYLENE GLYCOL 3350 17 G: 17 POWDER, FOR SOLUTION ORAL at 08:24

## 2019-04-16 RX ADMIN — GLYCOPYRROLATE 1 MG: 1 TABLET ORAL at 08:22

## 2019-04-16 RX ADMIN — Medication 10 ML: at 21:04

## 2019-04-16 RX ADMIN — LACOSAMIDE 150 MG: 100 TABLET, FILM COATED ORAL at 21:03

## 2019-04-16 RX ADMIN — VITAMIN D, TAB 1000IU (100/BT) 1000 UNITS: 25 TAB at 21:03

## 2019-04-16 RX ADMIN — Medication 10 ML: at 08:24

## 2019-04-16 RX ADMIN — POTASSIUM BICARBONATE 40 MEQ: 782 TABLET, EFFERVESCENT ORAL at 08:22

## 2019-04-16 RX ADMIN — PREDNISONE 30 MG: 20 TABLET ORAL at 08:23

## 2019-04-16 RX ADMIN — BUDESONIDE 500 MCG: 0.5 SUSPENSION RESPIRATORY (INHALATION) at 18:19

## 2019-04-16 RX ADMIN — VANCOMYCIN HYDROCHLORIDE 1250 MG: 5 INJECTION, POWDER, LYOPHILIZED, FOR SOLUTION INTRAVENOUS at 18:02

## 2019-04-16 RX ADMIN — IPRATROPIUM BROMIDE AND ALBUTEROL SULFATE 1 AMPULE: .5; 3 SOLUTION RESPIRATORY (INHALATION) at 10:10

## 2019-04-16 RX ADMIN — LACTULOSE 20 G: 20 SOLUTION ORAL at 08:23

## 2019-04-16 RX ADMIN — MICONAZOLE NITRATE: 20.6 POWDER TOPICAL at 09:07

## 2019-04-16 RX ADMIN — LEVETIRACETAM 1500 MG: 100 SOLUTION ORAL at 08:23

## 2019-04-16 RX ADMIN — LACTULOSE 20 G: 20 SOLUTION ORAL at 21:04

## 2019-04-16 RX ADMIN — MONTELUKAST SODIUM 10 MG: 10 TABLET, FILM COATED ORAL at 21:03

## 2019-04-16 NOTE — PROGRESS NOTES
[facility]  Progress Note    Michelle Orantes  MRN: [id number]  Date: 4/16/2019    Reason for F/U :   Enterococcus faecalis sepsis    Impression :   1. Enterococcus faecalis sepsis  2. Possible septic arthritis-bilateral knee joints  · Status post aspiration 4/14/19 - cultures negative thus far  3. Repeat blood cultures 1 out of 2 sets with GPC's in clusters likely a contaminant   4. Cerebral palsy  5. Penicillin allergy-has tolerated cephalosporins    Recommendations:   · Continue intravenous antimicrobial therapy with vancomycin  · The 2-D echocardiogram was a poor study and therefore endocarditis cannot really be evaluated. · Given that we don't really have a clear source I do feel that we should treat him for endocarditis. · The patient has respiratory insufficiency on BiPAP and a JAQUELIN would be difficult to do. · I will add Rocephin to the antimicrobial regimen and plan on a six-week course of therapy through May 22, 2019  · The other choice would also be treating him for 2 weeks and doing surveillance blood cultures thereafter  · Repeat blood cultures with 1 out of 2 sets with gram-positive cocci in clusters which would mean this is not enterococcus and would make it more likely a contaminated blood culture. · The plan is for discharge to an LTAC once a bed is available. · Infectious diseases to be consulted when the patient gets there. Infection Control Recommendations:   Universal precautions    Discharge Planning:   Estimated Length of IV antimicrobials: 6 weeks through May 22, 2019  Patient will need PICC line Insertion  Patient will need: LTAC  Patient willneed outpatient wound care: No    MedicalDecision making / Summary of Stay:   Alejandra Claros a 51y.o.-year-old male who was initially admitted on 4/10/2019. Patient has a known medical history of cerebral palsy and is nonverbal, he also has a history of COPD and congestive heart failure.  He does reside in a long-term care [facility] normal. He has no wheezes. He has rales. Abdominal: Soft. Bowel sounds are normal. He exhibits no mass. There is no tenderness. Musculoskeletal: He exhibits no edema. Minimal effusions noted but decreased erythroderma   Neurological: He is alert and oriented to person, place, and time. Skin: Skin is warm and dry. Laboratory data:   I have independently reviewed the followinglabs:  CBC with Differential:   Recent Labs     04/15/19  0622 04/16/19  0612   WBC 7.3 8.8   HGB 11.3* 11.9*   HCT 34.2* 36.2*   * 481*   LYMPHOPCT 36 33   MONOPCT 9* 7     BMP:   Recent Labs     04/15/19  0622 04/16/19  0612    143   K 3.4* 3.5*   CL 98 98   CO2 33* 33*   BUN 8 9   CREATININE <0.40* <0.40*     Hepatic Function Panel: No results for input(s): PROT, LABALBU, BILIDIR, IBILI, BILITOT, ALKPHOS, ALT, AST in the last 72 hours. Recent Labs     04/14/19  2255 04/16/19  1054   VANCOTROUGH 11.6 18.5      No results found for: CRP  No results found for: SEDRATE    No results for input(s): PROCAL in the last 72 hours. Imaging Studies:   Transthoracic Echocardiography Report (TTE) 04/15/2019    CONCLUSIONS    Summary  Very technically difficult study  Left ventricle was not well visualized. It is probably normal in size. Global left ventricular systolic function is grossly normal however an  ejection fraction cannot be estimated. Due to the technical limitations of this study cannot comment wall segments  motion. Valves were not adequately visualized either    Cultures:     Culture Blood #1 [[id number]] Collected: 04/14/19 0923   Order Status: Completed Specimen: Blood Updated: 04/16/19 0001    Specimen Description . BLOOD    Special Requests LEFT HAND 4ML AEROBIC ONLY    Culture NO GROWTH 2 DAYS   Culture Blood #1 [[id number]] (Abnormal) Collected: 04/14/19 0934   Order Status: Completed Specimen: Blood Updated: 04/15/19 2248    Specimen Description . BLOOD    Special Requests RIGHT HAND.  9ML AEROBIC 1ML ANAEROBIC    Culture --Abnormal     POSITIVE Blood Culture   Results called to and read back by:   SAUL Richardson [id number]   Abnormal     Culture --    DIRECT GRAM STAIN FROM BOTTLE:   GRAM POSITIVE COCCI IN CLUSTERS      Anaerobic and Aerobic Culture [[id number]] Collected: 04/14/19 1632   Order Status: Completed Specimen: Joint, Knee Updated: 04/15/19 1837    Specimen Description . KNEE RIGHT    Special Requests NOT REPORTED    Direct Exam NO NEUTROPHILS SEEN     NO BACTERIA SEEN    Culture NO GROWTH 18 HOURS   Anaerobic and Aerobic Culture [[id number]] Collected: 04/14/19 2100   Order Status: Completed Specimen: Joint, Knee Updated: 04/15/19 1836    Specimen Description . KNEE LEFT    Special Requests NOT REPORTED    Direct Exam NO NEUTROPHILS SEEN     NO BACTERIA SEEN    Culture NO GROWTH 18 HOURS         naerobic and Aerobic Culture [[id number]] Collected: 04/14/19 2100   Order Status: Completed Specimen: Joint, Knee Updated: 04/14/19 2303    Specimen Description . KNEE LEFT    Special Requests NOT REPORTED    Direct Exam NO NEUTROPHILS SEEN     NO BACTERIA SEEN    Culture PENDING   Anaerobic and Aerobic Culture [[id number]] Collected: 04/14/19 1632   Order Status: Completed Specimen: Joint, Knee Updated: 04/14/19 2302    Specimen Description . KNEE RIGHT    Special Requests NOT REPORTED    Direct Exam NO NEUTROPHILS SEEN     NO BACTERIA SEEN    Culture PENDING     CULTURE BLOOD #1 [[id number]] (Abnormal) Collected: 04/11/19 1145   Order Status: Completed Specimen: Blood Updated: 04/12/19 2055    Specimen Description . BLOOD    Special Requests LT HAND,2ML    Culture --Abnormal     POSITIVE BLOOD CULTURE, RN NOTIFIED:   RESULTS REPEATED BACK BY SAUL WHITMORE AT 0405 4/12/19   Abnormal     Culture --    DIRECT GRAM STAIN FROM BOTTLE:   GRAM POSITIVE COCCI IN PAIRS   AND CHAINS     Culture --Abnormal     ENTEROCOCCUS FAECALIS   For susceptibility, refer to previous culture.    Abnormal    CULTURE BLOOD #2 [[id number]] (Abnormal) Collected: 04/11/19 1210   Order Status: Completed Specimen: Blood Updated: 04/12/19 2055    Specimen Description . BLOOD    Special Requests 4ML L WRIST    Culture --Abnormal     POSITIVE BLOOD CULTURE, RN NOTIFIED:   RESULTS READ BACK BY LUPE WHITMORE ([facility]) AT 0405 4/12/19   Abnormal     Culture --    DIRECT GRAM STAIN FROM BOTTLE:   GRAM POSITIVE COCCI IN PAIRS   AND CHAINS     Culture --Abnormal     ENTEROCOCCUS FAECALIS   For susceptibility, refer to previous culture. Abnormal    Cult,Blood #1 [[id number]] (Abnormal) Collected: 04/10/19 1742   Order Status: Completed Specimen: Blood Updated: 04/12/19 2052    Specimen Description . BLOOD    Special Requests 6ML RT FA    Culture --Abnormal     POSITIVE Blood Culture   Results called to and read back by:   Nehemiah Pineda 4/11 1030   Abnormal     Culture --    DIRECT GRAM STAIN FROM BOTTLE:   GRAM POSITIVE COCCI IN CHAINS     Culture ENTEROCOCCUS FAECALISAbnormal     Culture For susceptibility, refer to previous culture. Cult,Blood #2 [[id number]] (Abnormal)  Collected: 04/10/19 1747   Order Status: Completed Specimen: Blood Updated: 04/12/19 2050    Specimen Description . BLOOD    Special Requests 12 ML RT UPPER ARM    Culture --Abnormal     POSITIVE Blood Culture   Results called to and read back by:   Nehemiah Pineda 4/11 1030   Abnormal     Culture --    DIRECT GRAM STAIN FROM BOTTLE:   GRAM POSITIVE COCCI IN CHAINS     Culture --    ID by PNAFISH:   ENTEROCOCCUS FAECALIS     Culture Comment:  >99% of E. faecalis isolates are susceptible to Ampicillin and Vancomycin. Abnormal     Culture ENTEROCOCCUS FAECALISAbnormal    Enterococcus  faecalis (5)     Antibiotic Interpretation ANGELA Status    ampicillin Sensitive  Final     <=2  SUSCEPTIBLE   penicillin   Final     NOT REPORTED   ciprofloxacin   Final     NOT REPORTED   erythromycin   Final     NOT REPORTED   Gentamicin, High Level Resistant RESISTANT Final    levofloxacin   Final     NOT REPORTED   linezolid   Final NOT REPORTED   nitrofurantoin   Final     NOT REPORTED   Synercid   Final     NOT REPORTED   Streptomycin, Hi Level Sensitive SUSCEPTIBLE Final    tetracycline   Final     NOT REPORTED   tigecycline   Final     NOT REPORTED   vancomycin Sensitive  Final     1  SUSCEPTIBLE     Medications:      predniSONE  30 mg Oral Daily    vancomycin  1,250 mg Intravenous Q12H    vancomycin (VANCOCIN) intermittent dosing (placeholder)   Other RX Placeholder    levETIRAcetam  1,500 mg PEG Tube BID    CENTRUM/CERTA-JAY with minerals oral  15 mL Per G Tube Daily    furosemide  40 mg Per G Tube Daily    scopolamine  1 patch Transdermal Q72H    pseudoephedrine HCl  30 mg Per G Tube TID    cetirizine  5 mg Oral Daily    sennosides-docusate sodium  2 tablet Per G Tube Daily    vitamin D  1,000 Units Per G Tube BID    baclofen  10 mg Per G Tube TID    montelukast  10 mg Per G Tube Nightly    fluticasone  2 spray Nasal Daily    calcium carbonate  1,250 mg Per G Tube Daily    miconazole   Topical BID    glycopyrrolate  1 mg Per G Tube TID    lacosamide  150 mg PEG Tube BID    budesonide  500 mcg Nebulization BID    lactulose  20 g Per G Tube BID    polyethylene glycol  17 g Per G Tube Daily    lamoTRIgine  200 mg Per G Tube Nightly    lamoTRIgine  300 mg Per G Tube QAM    lansoprazole  30 mg Per G Tube Daily    sodium chloride flush  10 mL Intravenous 2 times per day    enoxaparin  40 mg Subcutaneous Daily    ipratropium-albuterol  1 ampule Inhalation Q4H WA     Thank you for allowing us to participate in the care of this patient. Please call with questions.       [facility]  Bunny Lorenzo MD  [employer]  OFFICE: [phone]  CELL:     [phone]    Electronically signed by Bunny Lorenzo MD on 4/16/2019 at 11:37 AM    This note iscreated with the assistance of a speech recognition program.  While intending to generate a document that actually reflects the content of the visit, the document can still have some errors including those of syntax andsound a like substitutions which may escape proof reading. It such instances, actual meaning can be extrapolated by contextual diversion.

## 2019-04-16 NOTE — PROGRESS NOTES
Pulmonary Critical Care Progress Note       Patient seen for the follow up of  Acute respiratory failure ([address]     Subjective:  Patient   is on BIPAP at 40 % O2. He has breaks and is on 4 liters NC. No obvious seizure noted. PEG tube feeds are tolerated and at goal .      Examination:  Vitals: BP (!) 95/58   Pulse 99   Temp 98.6 °F (37 °C) (Oral)   Resp 18   Ht 3' 10.85\" (1.19 m)   Wt 115 lb 1.6 oz (52.2 kg)   SpO2 94%   BMI 36.87 kg/m²     General appearance:awake no verbal   Neck: No JVD  Lungs: decreased breath sounds no wheezing   Heart: regular rate and rhythm, S1, S2 normal, no gallop  Abdomen: Soft, non tender, + BS PEG   Extremities: no cyanosis or clubbing. No significant edema, chronic contractures    LABs:  CBC:   Recent Labs     04/15/19  0622 04/16/19  0612   WBC 7.3 8.8   HGB 11.3* 11.9*   HCT 34.2* 36.2*   * 481*     BMP:   Recent Labs     04/15/19  0622 04/16/19  0612    143   K 3.4* 3.5*   CO2 33* 33*   BUN 8 9   CREATININE <0.40* <0.40*   LABGLOM CANNOT BE CALCULATED CANNOT BE CALCULATED   GLUCOSE 90 119*     Radiology:   4/14/19  CXR  Pulmonary vascular congestion is not significantly changed.     Impression:  · Acute respiratory failure requiring BIPAP  · Enterococcus faecalis sepsis  · Possible aspiration  · Atelectasis/mild pulmonary edema  · Dysphagia status post PEG placement  · Cerebral palsy with spastic quadriparesis/ history of seizure disorder  · Chronic constipation    Recommendations:  · O2 2 liters/min via nasal cannula  · BiPAP for sleep and as needed   ·  IV Vanco Rocephin  per infectious disease  · Budesonide aerosols every 12 hours  · Albuterol and Ipratropium Q 4 hours and prn  · Singulair  · Make prednisone 20 QD   · Neurology on consult, IV Keppra  · Patient unable to stay still for CT head  · Passive range of motion  · Picc line by IR per ID   · Discussed with RN   · DNR CCA no intubation  · LTAC discharge planning   · DVT prophylaxis with low molecular weight heparin    Electronically signed by     Ashlee Murguia MD on 4/16/2019 at [facility]  Pulmonary Critical Care and Sleep Medicine,  [facility]  Cell: [phone]  Office: [phone]

## 2019-04-16 NOTE — PROGRESS NOTES
Nutrition Assessment (Enteral Nutrition)    Type and Reason for Visit: Reassess    Nutrition Recommendations: 1. Suggest continuing with NPO status. 2. Suggest continuing with TF via PEG with standard formula with fiber (Jevity 1.2 Chace) at 55 ml/hr (goal rate). Nutrition Assessment: Patient improving from a nutritional standpoint as evidenced by tube feedings running at goal rate of 55 ml/hr. Remains at risk for nutritional compromise related to mild fluid accumulation. Suggest continuing with TF via PEG with standard formula with fiber (Jevity 1.2 Chace) at 55 ml/hr (goal rate). Malnutrition Assessment:  · Malnutrition Status: At risk for malnutrition  · Context: Acute illness or injury  · Findings of the 6 clinical characteristics of malnutrition (Minimum of 2 out of 6 clinical characteristics is required to make the diagnosis of moderate or severe Protein Calorie Malnutrition based on AND/ASPEN Guidelines):  1. Weight Loss-10% loss or greater, in 1 month  2. Fat Loss-Unable to assess,    3. Muscle Loss-Unable to assess,    4. Fluid Accumulation-Mild fluid accumulation, Extremities  5.   Strength-Not measured    Nutrition Risk Level: High    Nutrition Needs:  · Estimated Daily Total Kcal: 5160-8373 (28-32 kcal/kg)  · Estimated Daily Protein (g): 50-60 (1.0-1.2 g/kg)  · Estimated Daily Fluid (ml/day): 6168-6661 (1 ml/kcal)    Nutrition Diagnosis:   · Problem: Unintended weight loss  · Etiology: related to Insufficient energy/nutrient consumption     Signs and symptoms:  as evidenced by Weight loss greater than or equal to 5% in 1 month    Objective Information:  · Nutrition-Focused Physical Findings: GI: soft, rounded, last BM 4/16, active bowel sounds; PV: +2 non-pitting RLE/LLE; Skin: WDL  · Wound Type: None  · Current Nutrition Therapies:  · Oral Diet Orders: NPO   · Tube Feeding (TF) Orders:   · Feeding Route: Gastrostomy  · Formula: Standard w/Fiber  · Rate (ml/hr):55    · Volume (ml/day): 1,320  · Duration: Continuous  · Water Flushes: None  · Current TF & Flush Orders Provides: See below  · Goal TF & Flush Orders Provides: @ 55 ml/hr (goal):  1,320 ml, 1,584 kcal, 73 g protein, 24 g dietary fiber & 1,065 ml free water/day  · Additional Calories: None  · Anthropometric Measures:  · Ht: 3' 10.85\" (119 cm)   · Current Body Wt: 15 lb 1.6 oz (6.849 kg)  · Admission Body Wt: 110 lb 3.7 oz (50 kg)  · Usual Body Wt: 128 lb (58.1 kg)(3/10/19)  · Weight Change: 14% loss in 1 month   · BMI Classification: BMI 35.0 - 39.9 Obese Class II    Nutrition Interventions:   Continue current Tube Feeding, Continue NPO  Continued Inpatient Monitoring, Coordination of Care    Nutrition Evaluation:   · Evaluation: Progressing toward goals   · Goals: EN intake to meet >75% of estimated kcal/protein needs, with good GI tolerance   · Monitoring: Nutrition Progression, TF Intake, TF Tolerance, Skin Integrity, I&O, Weight, Pertinent Labs, Monitor Bowel Function      Electronically signed by Davonte Delgadillo RD, LD on 4/16/19 at 3:08 PM    Contact Number: [phone]

## 2019-04-16 NOTE — PROGRESS NOTES
Post Operative Progress Note    4/16/2019 5:35 PM  Subjective:   Admit Date: 4/10/2019  PCP: Jaja Martins MD  Date of Discharge: unknown    Medications:   Scheduled Meds:   cefTRIAXone (ROCEPHIN) IV  2 g Intravenous Q12H    vancomycin  1,250 mg Intravenous Q12H    [START ON 4/17/2019] predniSONE  20 mg Oral Daily    vancomycin (VANCOCIN) intermittent dosing (placeholder)   Other RX Placeholder    levETIRAcetam  1,500 mg PEG Tube BID    CENTRUM/CERTA-JAY with minerals oral  15 mL Per G Tube Daily    furosemide  40 mg Per G Tube Daily    scopolamine  1 patch Transdermal Q72H    pseudoephedrine HCl  30 mg Per G Tube TID    cetirizine  5 mg Oral Daily    sennosides-docusate sodium  2 tablet Per G Tube Daily    vitamin D  1,000 Units Per G Tube BID    baclofen  10 mg Per G Tube TID    montelukast  10 mg Per G Tube Nightly    fluticasone  2 spray Nasal Daily    calcium carbonate  1,250 mg Per G Tube Daily    miconazole   Topical BID    glycopyrrolate  1 mg Per G Tube TID    lacosamide  150 mg PEG Tube BID    budesonide  500 mcg Nebulization BID    lactulose  20 g Per G Tube BID    polyethylene glycol  17 g Per G Tube Daily    lamoTRIgine  200 mg Per G Tube Nightly    lamoTRIgine  300 mg Per G Tube QAM    lansoprazole  30 mg Per G Tube Daily    sodium chloride flush  10 mL Intravenous 2 times per day    enoxaparin  40 mg Subcutaneous Daily    ipratropium-albuterol  1 ampule Inhalation Q4H WA     Continuous Infusions:  PRN Meds:LORazepam, morphine, magnesium sulfate, potassium chloride **OR** potassium alternative oral replacement **OR** potassium chloride, HYDROcodone 5 mg - acetaminophen **OR** HYDROcodone 5 mg - acetaminophen, zinc oxide, neomycin-bacitracin-polymyxin, ondansetron, sodium chloride flush, magnesium hydroxide, ondansetron, nicotine, albuterol, acetaminophen    Diet:   Diet: DIET TUBE FEED CONTINUOUS/CYCLIC NPO; STANDARD WITH FIBER (Jevity 1.2 Chace);  Nasogastric; 20; 55; 24    Subjective:   Systemic or Specific Complaints:No Complaints    Objective:     Patient Vitals for the past 24 hrs:   BP Temp Temp src Pulse Resp SpO2   04/16/19 1606 114/75 97.5 °F (36.4 °C) Axillary 90 16 97 %   04/16/19 1447 -- -- -- -- 18 --   04/16/19 1252 (!) 95/58 98.6 °F (37 °C) Oral 99 16 94 %   04/16/19 1128 -- -- -- 103 -- --   04/16/19 1010 -- -- -- -- 27 --   04/16/19 0847 137/77 98.2 °F (36.8 °C) Axillary 102 20 95 %   04/16/19 0338 -- -- -- -- -- 95 %   04/16/19 0337 -- -- -- -- 20 --   04/16/19 0050 122/84 98.2 °F (36.8 °C) Oral 87 16 98 %   04/15/19 2330 -- -- -- -- 16 --   04/15/19 2035 104/71 97.2 °F (36.2 °C) Axillary 80 18 97 %   04/15/19 1942 -- -- -- -- 23 --     I/O last 3 completed shifts: In: 9040 [NG/GT:1791]  Out: -   I/O this shift: In: 76 [NG/GT:75]  Out: -       General: alert, appears stated age, combative, fatigued, flushed, moderate distress and slowed mentation   Wound: Wound clean and dry no evidence of infection. , No Erythema, No Edema, No Drainage and Wound Intact   Motion: Painful range of Motion in affected extremity   DVT Exam: No evidence of DVT seen on physical exam.  Negative Trinity's sign. No cords or calf tenderness. Additional exam:     Data Review  CBC:   Recent Labs     04/14/19  0459 04/15/19  0622 04/16/19  0612   WBC 7.5 7.3 8.8   HGB 11.1* 11.3* 11.9*    415* 481*     BMP:    Recent Labs     04/14/19  0459 04/15/19  0622 04/16/19  0612   * 141 143   K 3.4* 3.4* 3.5*    98 98   CO2 27 33* 33*   BUN 4*  5* 8 9   CREATININE <0.40*  <0.40* <0.40* <0.40*   GLUCOSE 99 90 119*     Hepatic: No results for input(s): AST, ALT, ALB, BILITOT, ALKPHOS in the last 72 hours. Troponin: No results for input(s): TROPONINI in the last 72 hours. BNP: No results for input(s): BNP in the last 72 hours. Lipids: No results for input(s): CHOL, HDL in the last 72 hours.     Invalid input(s): LDLCALCU  INR: No results for input(s): INR in the last 72

## 2019-04-16 NOTE — PROGRESS NOTES
Intravenous 2 times per day    enoxaparin  40 mg Subcutaneous Daily    ipratropium-albuterol  1 ampule Inhalation Q4H WA     Continuous Infusions:   PRN Meds: LORazepam, morphine, magnesium sulfate, potassium chloride **OR** potassium alternative oral replacement **OR** potassium chloride, HYDROcodone 5 mg - acetaminophen **OR** HYDROcodone 5 mg - acetaminophen, zinc oxide, neomycin-bacitracin-polymyxin, ondansetron, sodium chloride flush, magnesium hydroxide, ondansetron, nicotine, albuterol, acetaminophen    Data:     Past Medical History:   has a past medical history of Cerebral palsy ([facility] [address] Cholelithiasis with chronic cholecystitis, Constipation, Delayed gastric emptying, Dysphagia, GERD (gastroesophageal reflux disease), Hearing loss, Mental disability, Profound mental retardation, Scoliosis, Seizures ([facility] [address] and 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[facility] [address] Social History:   reports that he has never smoked. He has never used smokeless tobacco. He reports that he does not drink alcohol or use drugs. Family History:   Family History   Family history unknown: Yes       Vitals:  /77   Pulse 103   Temp 98.2 °F (36.8 °C) (Axillary)   Resp 27   Ht 3' 10.85\" (1.19 m)   Wt 115 lb 1.6 oz (52.2 kg)   SpO2 95%   BMI 36.87 kg/m²   Temp (24hrs), Av.7 °F (36.5 °C), Min:97.2 °F (36.2 °C), Max:98.2 °F (36.8 °C)    No results for input(s): POCGLU in the last 72 hours. I/O (24Hr):     Intake/Output Summary (Last 24 hours) at 2019 1226  Last data filed at 2019 0900  Gross per 24 hour   Intake 1791 ml   Output --   Net 1791 ml       Labs:    Hematology:  Recent Labs     04/14/19  0459 04/15/19  0622 19  0612   WBC 7.5 7.3 8.8   HGB 11.1* 11.3* 11.9*   HCT 33.8* 34.2* 36.2*    415* 481*     Chemistry:  Recent Labs     1915/19  0622 19  0612   * 141 143   K 3.4* 3.4* 3.5*    98 98   CO2 27 33* 33*   GLUCOSE 99 90 119*   BUN 4*  5* 8 9   CREATININE <0.40*  <0.40* <0.40* <0.40*   ANIONGAP 14 10 12   LABGLOM CANNOT BE CALCULATED  CANNOT BE CALCULATED CANNOT BE CALCULATED CANNOT BE CALCULATED   GFRAA CANNOT BE CALCULATED  CANNOT BE CALCULATED CANNOT BE CALCULATED CANNOT BE CALCULATED   CALCIUM 8.8 8.7 9.3     No results for input(s): PROT, LABALBU, LABA1C, F0XUIHY, S7DWQXH, FT4, TSH, AST, ALT, LDH, GGT, ALKPHOS, BILITOT, BILIDIR, AMMONIA, AMYLASE, LIPASE, LACTATE, CHOL, HDL, LDLCHOLESTEROL, CHOLHDLRATIO, TRIG, VLDL, PHENYTOIN, PHENYF in the last 72 hours. Lab Results   Component Value Date/Time    SPECIAL NOT REPORTED 04/14/2019 09:00 PM     Lab Results   Component Value Date/Time    CULTURE NO GROWTH 18 HOURS 04/14/2019 09:00 PM       Lab Results   Component Value Date    POCPH 7.42 04/13/2019    POCPCO2 44 04/13/2019    POCPO2 92 04/13/2019    POCHCO3 28.1 04/13/2019    NBEA NOT REPORTED 04/13/2019    PBEA 4 04/13/2019    BAF1FCD 29 04/13/2019    OYVF1SQQ 97 04/13/2019    FIO2 28.0 04/13/2019       Radiology:    Xr Knee Left (3 Views)    Result Date: 4/14/2019  EXAMINATION: 3 XRAY VIEWS OF THE RIGHT KNEE; 3 XRAY VIEWS OF THE LEFT KNEE 4/14/2019 9:44 am COMPARISON: None. HISTORY: ORDERING SYSTEM PROVIDED HISTORY: Bilateral knee erythema/warmth, enterococcus bacteremia TECHNOLOGIST PROVIDED HISTORY: Bilateral knee erythema/warmth, enterococcus bacteremia Ordering Physician Provided Reason for Exam: Bilateral knee erythema, enterococcus bacteremia Acuity: Acute Type of Exam: Initial FINDINGS: Right knee: Severe osteopenia is noted with deformity of the distal femur consistent with old healed trauma. A smoothly demarcated exostosis is noted medial dorsal aspect of the distal femur likely secondary to old healed trauma. Moderate arthritic changes present in the knee. No acute fracture, effusion or cortical destruction is noted. Bony structures are gracile in appearance. Left knee: Severe osteopenia is noted.   Mild to moderate arthritic changes are present without acute PROVIDED HISTORY: infiltrate TECHNOLOGIST PROVIDED HISTORY: infiltrate Acuity: Unknown Type of Exam: Ongoing FINDINGS: Stable cardiomegaly. Pulmonary vascular congestion is unchanged. No focal consolidation. No pleural effusion or pneumothorax. Severe levoconvex curvature of the thoracic spine is similar to prior. Pulmonary vascular congestion is not significantly changed. Xr Chest Portable    Result Date: 4/10/2019  EXAMINATION: SINGLE XRAY VIEW OF THE CHEST 4/10/2019 5:53 pm COMPARISON: 9 April 2019 HISTORY: ORDERING SYSTEM PROVIDED HISTORY: SOB TECHNOLOGIST PROVIDED HISTORY: SOB Acuity: Acute Type of Exam: Unknown FINDINGS: AP portable view of the chest time stamped at 1749 hours demonstrates overlying cardiac monitoring electrodes. Patient is rotated toward the right. Pulmonary vascular congestion is noted without interval difference from prior exam.  A severe scoliotic curvature is noted. No extrapleural air is seen. Osseous structures are stable. Stable cardiac size. No change from prior study of 1 day earlier. Stable cardiomegaly and pulmonary vascular congestion. Xr Chest Portable    Result Date: 4/9/2019  EXAMINATION: SINGLE XRAY VIEW OF THE CHEST 4/9/2019 6:49 am COMPARISON: 24 February 2019 HISTORY: ORDERING SYSTEM PROVIDED HISTORY: AECOPD TECHNOLOGIST PROVIDED HISTORY: AECOPD FINDINGS: AP portable view of the chest time stamped at 642 hours demonstrates overlying cardiac monitoring electrodes. The patient is rotated toward the right. Stable cardiac size is noted. The patient has pulmonary vascular congestion with slight decreased compared to prior study of 1 day earlier. No gross effusion or extrapleural air is noted. The patient has a sharp angle scoliosis in the thoracic spine with a gibbus type deformity. No free air is noted. Mediastinal contours are stable. Stable cardiomegaly.   Pulmonary vascular congestion is noted with slight decreased compared to prior study of

## 2019-04-16 NOTE — PLAN OF CARE
Problem: Safety:  Goal: Ability to remain free from injury will improve  Description  Ability to remain free from injury will improve  Outcome: Ongoing   Note:  Patient will remain free from injury this shift, bed in locked and lowest position, seizure precautions in place. Side rails up x2. Hourly rounding ongoing. Will continue to monitor.

## 2019-04-17 ENCOUNTER — APPOINTMENT (OUTPATIENT)
Dept: GENERAL RADIOLOGY | Age: 49
DRG: 871 | End: 2019-04-17
Payer: MEDICARE

## 2019-04-17 LAB
ABSOLUTE EOS #: 0.1 K/UL (ref 0–0.4)
ABSOLUTE IMMATURE GRANULOCYTE: ABNORMAL K/UL (ref 0–0.3)
ABSOLUTE LYMPH #: 3 K/UL (ref 1–4.8)
ABSOLUTE MONO #: 1.1 K/UL (ref 0.2–0.8)
ANION GAP SERPL CALCULATED.3IONS-SCNC: 13 MMOL/L (ref 9–17)
BASOPHILS # BLD: 0 % (ref 0–2)
BASOPHILS ABSOLUTE: 0 K/UL (ref 0–0.2)
BUN BLDV-MCNC: 9 MG/DL (ref 6–20)
BUN/CREAT BLD: ABNORMAL (ref 9–20)
CALCIUM SERPL-MCNC: 9.6 MG/DL (ref 8.6–10.4)
CHLORIDE BLD-SCNC: 98 MMOL/L (ref 98–107)
CO2: 33 MMOL/L (ref 20–31)
CREAT SERPL-MCNC: <0.4 MG/DL (ref 0.7–1.2)
CULTURE: ABNORMAL
DIFFERENTIAL TYPE: ABNORMAL
EOSINOPHILS RELATIVE PERCENT: 2 % (ref 1–4)
GFR AFRICAN AMERICAN: ABNORMAL ML/MIN
GFR NON-AFRICAN AMERICAN: ABNORMAL ML/MIN
GFR SERPL CREATININE-BSD FRML MDRD: ABNORMAL ML/MIN/{1.73_M2}
GFR SERPL CREATININE-BSD FRML MDRD: ABNORMAL ML/MIN/{1.73_M2}
GLUCOSE BLD-MCNC: 81 MG/DL (ref 70–99)
HCT VFR BLD CALC: 38.5 % (ref 41–53)
HEMOGLOBIN: 12.4 G/DL (ref 13.5–17.5)
IMMATURE GRANULOCYTES: ABNORMAL %
LYMPHOCYTES # BLD: 31 % (ref 24–44)
Lab: ABNORMAL
MCH RBC QN AUTO: 29.8 PG (ref 26–34)
MCHC RBC AUTO-ENTMCNC: 32.1 G/DL (ref 31–37)
MCV RBC AUTO: 93 FL (ref 80–100)
MONOCYTES # BLD: 11 % (ref 1–7)
NRBC AUTOMATED: ABNORMAL PER 100 WBC
PDW BLD-RTO: 16.8 % (ref 11.5–14.5)
PLATELET # BLD: 492 K/UL (ref 130–400)
PLATELET ESTIMATE: ABNORMAL
PMV BLD AUTO: 7.9 FL (ref 6–12)
POTASSIUM SERPL-SCNC: 3.5 MMOL/L (ref 3.7–5.3)
RBC # BLD: 4.15 M/UL (ref 4.5–5.9)
RBC # BLD: ABNORMAL 10*6/UL
SEG NEUTROPHILS: 56 % (ref 36–66)
SEGMENTED NEUTROPHILS ABSOLUTE COUNT: 5.3 K/UL (ref 1.8–7.7)
SODIUM BLD-SCNC: 144 MMOL/L (ref 135–144)
SPECIMEN DESCRIPTION: ABNORMAL
WBC # BLD: 9.6 K/UL (ref 3.5–11)
WBC # BLD: ABNORMAL 10*3/UL

## 2019-04-17 PROCEDURE — 6360000002 HC RX W HCPCS: Performed by: NURSE PRACTITIONER

## 2019-04-17 PROCEDURE — 6370000000 HC RX 637 (ALT 250 FOR IP): Performed by: INTERNAL MEDICINE

## 2019-04-17 PROCEDURE — 36415 COLL VENOUS BLD VENIPUNCTURE: CPT

## 2019-04-17 PROCEDURE — 94761 N-INVAS EAR/PLS OXIMETRY MLT: CPT

## 2019-04-17 PROCEDURE — 94640 AIRWAY INHALATION TREATMENT: CPT

## 2019-04-17 PROCEDURE — 94660 CPAP INITIATION&MGMT: CPT

## 2019-04-17 PROCEDURE — 6360000002 HC RX W HCPCS: Performed by: INTERNAL MEDICINE

## 2019-04-17 PROCEDURE — 71045 X-RAY EXAM CHEST 1 VIEW: CPT

## 2019-04-17 PROCEDURE — 2700000000 HC OXYGEN THERAPY PER DAY

## 2019-04-17 PROCEDURE — 2580000003 HC RX 258: Performed by: INTERNAL MEDICINE

## 2019-04-17 PROCEDURE — 80048 BASIC METABOLIC PNL TOTAL CA: CPT

## 2019-04-17 PROCEDURE — 99232 SBSQ HOSP IP/OBS MODERATE 35: CPT | Performed by: INTERNAL MEDICINE

## 2019-04-17 PROCEDURE — 2060000000 HC ICU INTERMEDIATE R&B

## 2019-04-17 PROCEDURE — 85025 COMPLETE CBC W/AUTO DIFF WBC: CPT

## 2019-04-17 RX ADMIN — ENOXAPARIN SODIUM 40 MG: 40 INJECTION SUBCUTANEOUS at 10:54

## 2019-04-17 RX ADMIN — CETIRIZINE HYDROCHLORIDE 5 MG: 5 TABLET ORAL at 10:57

## 2019-04-17 RX ADMIN — MONTELUKAST SODIUM 10 MG: 10 TABLET, FILM COATED ORAL at 21:16

## 2019-04-17 RX ADMIN — PREDNISONE 20 MG: 20 TABLET ORAL at 10:57

## 2019-04-17 RX ADMIN — SENNOSIDES AND DOCUSATE SODIUM 2 TABLET: 8.6; 5 TABLET ORAL at 10:56

## 2019-04-17 RX ADMIN — BACLOFEN 10 MG: 10 TABLET ORAL at 15:29

## 2019-04-17 RX ADMIN — IPRATROPIUM BROMIDE AND ALBUTEROL SULFATE 1 AMPULE: .5; 3 SOLUTION RESPIRATORY (INHALATION) at 20:22

## 2019-04-17 RX ADMIN — LACOSAMIDE 150 MG: 100 TABLET, FILM COATED ORAL at 21:15

## 2019-04-17 RX ADMIN — LEVETIRACETAM 1500 MG: 100 SOLUTION ORAL at 10:55

## 2019-04-17 RX ADMIN — MICONAZOLE NITRATE: 20.6 POWDER TOPICAL at 10:53

## 2019-04-17 RX ADMIN — GLYCOPYRROLATE 1 MG: 1 TABLET ORAL at 10:57

## 2019-04-17 RX ADMIN — Medication 10 ML: at 11:38

## 2019-04-17 RX ADMIN — POTASSIUM BICARBONATE 40 MEQ: 782 TABLET, EFFERVESCENT ORAL at 10:56

## 2019-04-17 RX ADMIN — Medication 15 ML: at 10:55

## 2019-04-17 RX ADMIN — Medication 30 MG: at 10:54

## 2019-04-17 RX ADMIN — LAMOTRIGINE 200 MG: 100 TABLET ORAL at 21:16

## 2019-04-17 RX ADMIN — CEFTRIAXONE 2 G: 2 INJECTION, POWDER, FOR SOLUTION INTRAMUSCULAR; INTRAVENOUS at 17:38

## 2019-04-17 RX ADMIN — VANCOMYCIN HYDROCHLORIDE 1250 MG: 5 INJECTION, POWDER, LYOPHILIZED, FOR SOLUTION INTRAVENOUS at 18:41

## 2019-04-17 RX ADMIN — BUDESONIDE 500 MCG: 0.5 SUSPENSION RESPIRATORY (INHALATION) at 20:22

## 2019-04-17 RX ADMIN — VITAMIN D, TAB 1000IU (100/BT) 1000 UNITS: 25 TAB at 10:57

## 2019-04-17 RX ADMIN — IPRATROPIUM BROMIDE AND ALBUTEROL SULFATE 1 AMPULE: .5; 3 SOLUTION RESPIRATORY (INHALATION) at 11:36

## 2019-04-17 RX ADMIN — Medication 30 MG: at 15:28

## 2019-04-17 RX ADMIN — GLYCOPYRROLATE 1 MG: 1 TABLET ORAL at 15:29

## 2019-04-17 RX ADMIN — LACTULOSE 20 G: 20 SOLUTION ORAL at 10:55

## 2019-04-17 RX ADMIN — BACLOFEN 10 MG: 10 TABLET ORAL at 10:57

## 2019-04-17 RX ADMIN — LAMOTRIGINE 300 MG: 100 TABLET ORAL at 10:56

## 2019-04-17 RX ADMIN — BUDESONIDE 500 MCG: 0.5 SUSPENSION RESPIRATORY (INHALATION) at 07:35

## 2019-04-17 RX ADMIN — LEVETIRACETAM 1500 MG: 100 SOLUTION ORAL at 21:16

## 2019-04-17 RX ADMIN — POLYETHYLENE GLYCOL 3350 17 G: 17 POWDER, FOR SOLUTION ORAL at 10:54

## 2019-04-17 RX ADMIN — BACLOFEN 10 MG: 10 TABLET ORAL at 21:15

## 2019-04-17 RX ADMIN — VANCOMYCIN HYDROCHLORIDE 1250 MG: 5 INJECTION, POWDER, LYOPHILIZED, FOR SOLUTION INTRAVENOUS at 04:27

## 2019-04-17 RX ADMIN — IPRATROPIUM BROMIDE AND ALBUTEROL SULFATE 1 AMPULE: .5; 3 SOLUTION RESPIRATORY (INHALATION) at 07:35

## 2019-04-17 RX ADMIN — ANTACID TABLETS 1250 MG: 500 TABLET, CHEWABLE ORAL at 10:57

## 2019-04-17 RX ADMIN — MICONAZOLE NITRATE: 20.6 POWDER TOPICAL at 21:25

## 2019-04-17 RX ADMIN — FLUTICASONE PROPIONATE 2 SPRAY: 50 SPRAY, METERED NASAL at 10:54

## 2019-04-17 RX ADMIN — LACOSAMIDE 150 MG: 100 TABLET, FILM COATED ORAL at 10:58

## 2019-04-17 RX ADMIN — MORPHINE SULFATE 1 MG: 2 INJECTION, SOLUTION INTRAMUSCULAR; INTRAVENOUS at 23:21

## 2019-04-17 RX ADMIN — VITAMIN D, TAB 1000IU (100/BT) 1000 UNITS: 25 TAB at 21:35

## 2019-04-17 RX ADMIN — Medication 30 MG: at 10:55

## 2019-04-17 RX ADMIN — GLYCOPYRROLATE 1 MG: 1 TABLET ORAL at 21:16

## 2019-04-17 RX ADMIN — LACTULOSE 20 G: 20 SOLUTION ORAL at 21:16

## 2019-04-17 RX ADMIN — Medication 30 MG: at 22:00

## 2019-04-17 RX ADMIN — IPRATROPIUM BROMIDE AND ALBUTEROL SULFATE 1 AMPULE: .5; 3 SOLUTION RESPIRATORY (INHALATION) at 15:39

## 2019-04-17 RX ADMIN — CEFTRIAXONE 2 G: 2 INJECTION, POWDER, FOR SOLUTION INTRAMUSCULAR; INTRAVENOUS at 03:43

## 2019-04-17 RX ADMIN — Medication 40 MG: at 10:54

## 2019-04-17 ASSESSMENT — PAIN SCALES - GENERAL: PAINLEVEL_OUTOF10: 6

## 2019-04-17 NOTE — PROGRESS NOTES
Pulmonary Critical Care Progress Note  Nicky Garcia CNP / Dr. Cookie Ovalles M.D. Patient seen for the follow up of Acute respiratory failure ([address]     Subjective:  He was noted to have decreased oxygen saturation earlier today, currently on BiPAP. He opens eyes with stimulation. Nonverbal at baseline. Examination:  Vitals: BP (!) 136/97   Pulse 111   Temp 97.6 °F (36.4 °C) (Axillary)   Resp 20   Ht 3' 10.85\" (1.19 m)   Wt 115 lb 3.2 oz (52.3 kg)   SpO2 92%   BMI 36.90 kg/m²     General appearance: Nonverbal, resting in bed on BiPAP, no seizure activity noted, appears comfortable  Neck: No JVD  Lungs: Change, no wheezing, occasional rhonchi  Heart: regular rate and rhythm, S1, S2 normal, no gallop  Abdomen: Soft, non tender, + BS  Extremities: no cyanosis or clubbing. No significant edema    LABs:  CBC:   Recent Labs     04/16/19  0612 04/17/19  0634   WBC 8.8 9.6   HGB 11.9* 12.4*   HCT 36.2* 38.5*   * 492*     BMP:   Recent Labs     04/16/19  0612 04/17/19  0634    144   K 3.5* 3.5*   CO2 33* 33*   BUN 9 9   CREATININE <0.40* <0.40*   LABGLOM CANNOT BE CALCULATED CANNOT BE CALCULATED   GLUCOSE 119* 81     Radiology:      Impression   New right-sided PICC with satisfactory tip position.  Improved vascular   congestion but otherwise unchanged findings.      Impression:  · Acute respiratory failure requiring BiPAP  · Enterococcus faecalis sepsis  · Suspected aspiration  · Atelectasis/pulmonary edema  · Dysphagia s/p PEG  · Cerebral palsy with spastic quadriparesis/seizure disorder  · Chronic constipation    Recommendations:  · 2 liters/min via nasal cannula  · Oral care every shift   · BiPAP with sleep and as needed  · Continue IV Vanco, Rocephin per infectious disease  · Budesonide aerosols every 12 hours  · Albuterol and Ipratropium Q 4 hours and prn  · Singulair  · Prednisone 20 mg daily  · Neurology following  · PT/passive ROM  · DNR CCA  · Discharge planning to LTAC  · Will follow with you    Electronically signed by     KARIME Harmon CNP on 4/17/2019 at 2:54 PM  Patient was seen under the supervision of Dr. Kristofer Her and Sleep Medicine,    [facility] AT [place]: [phone]

## 2019-04-17 NOTE — PROGRESS NOTES
chest x-ray remained clear. His O2 saturations remained normal with minimal oxygen supplementation. He was discharged back to the group home with azithromycin 400 mg via G-tube daily ×5 days per pulmonary recommendation. ER reports that the group home had not given him any doses of his antibiotics. He is admitted with dyspnea, tachypnea and once again low O2 saturations. His past medical history includes cerebral palsy, severe scoliosis, COPD and congestive heart failure. Review of Systems:     Unable to obtain due to cerebral palsy    Medications: Allergies: Allergies   Allergen Reactions    Ampicillin Other (See Comments)     Has received and tolerated ceftriaxone and cefazolin.     Valium [Diazepam] Other (See Comments)     Has received and tolerated Ativan IV/PO    Other      Bubble bath products        Current Meds:   Scheduled Meds:    cefTRIAXone (ROCEPHIN) IV  2 g Intravenous Q12H    vancomycin  1,250 mg Intravenous Q12H    predniSONE  20 mg Oral Daily    pseudoephedrine HCl  30 mg Per G Tube TID    vancomycin (VANCOCIN) intermittent dosing (placeholder)   Other RX Placeholder    levETIRAcetam  1,500 mg PEG Tube BID    CENTRUM/CERTA-JAY with minerals oral  15 mL Per G Tube Daily    furosemide  40 mg Per G Tube Daily    scopolamine  1 patch Transdermal Q72H    cetirizine  5 mg Oral Daily    sennosides-docusate sodium  2 tablet Per G Tube Daily    vitamin D  1,000 Units Per G Tube BID    baclofen  10 mg Per G Tube TID    montelukast  10 mg Per G Tube Nightly    fluticasone  2 spray Nasal Daily    calcium carbonate  1,250 mg Per G Tube Daily    miconazole   Topical BID    glycopyrrolate  1 mg Per G Tube TID    lacosamide  150 mg PEG Tube BID    budesonide  500 mcg Nebulization BID    lactulose  20 g Per G Tube BID    polyethylene glycol  17 g Per G Tube Daily    lamoTRIgine  200 mg Per G Tube Nightly    lamoTRIgine  300 mg Per G Tube QAM    lansoprazole  30 mg Per G Tube Daily    sodium chloride flush  10 mL Intravenous 2 times per day    enoxaparin  40 mg Subcutaneous Daily    ipratropium-albuterol  1 ampule Inhalation Q4H WA     Continuous Infusions:   PRN Meds: LORazepam, morphine, magnesium sulfate, potassium chloride **OR** potassium alternative oral replacement **OR** potassium chloride, HYDROcodone 5 mg - acetaminophen **OR** HYDROcodone 5 mg - acetaminophen, zinc oxide, neomycin-bacitracin-polymyxin, ondansetron, sodium chloride flush, magnesium hydroxide, ondansetron, nicotine, albuterol, acetaminophen    Data:     Past Medical History:   has a past medical history of Cerebral palsy ([facility] [address] Cholelithiasis with chronic cholecystitis, Constipation, Delayed gastric emptying, Dysphagia, GERD (gastroesophageal reflux disease), Hearing loss, Mental disability, Profound mental retardation, Scoliosis, Seizures ([facility] [address] and 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[facility] [address] Social History:   reports that he has never smoked. He has never used smokeless tobacco. He reports that he does not drink alcohol or use drugs. Family History:   Family History   Family history unknown: Yes       Vitals:  /89   Pulse 92   Temp 98.3 °F (36.8 °C) (Axillary)   Resp 25   Ht 3' 10.85\" (1.19 m)   Wt 115 lb 3.2 oz (52.3 kg)   SpO2 97%   BMI 36.90 kg/m²   Temp (24hrs), Av.2 °F (36.8 °C), Min:97.5 °F (36.4 °C), Max:98.6 °F (37 °C)    No results for input(s): POCGLU in the last 72 hours. I/O (24Hr):     Intake/Output Summary (Last 24 hours) at 2019 0732  Last data filed at 2019 0500  Gross per 24 hour   Intake 2285 ml   Output --   Net 2285 ml       Labs:    Hematology:  Recent Labs     04/15/19  0622 04/16/19  0612   WBC 7.3 8.8   HGB 11.3* 11.9*   HCT 34.2* 36.2*   * 481*     Chemistry:  Recent Labs     04/15/19  0622 04/16/19  0612    143   K 3.4* 3.5*   CL 98 98   CO2 33* 33*   GLUCOSE 90 119*   BUN 8 9   CREATININE <0.40* <0.40*   ANIONGAP 10 12   LABGLOM CANNOT BE old healed trauma. Bony structures are gracile and severely osteopenic, limiting assessment. Degenerative changes are present without acute fracture or bony destruction. Left knee: Bony structures are gracile and severely osteopenic limiting assessment. Degenerative changes are present without acute osseous abnormality or effusion. RECOMMENDATION: Radionuclide bone imaging may prove helpful for further assessment given severe osteopenia and sepsis. Xr Knee Right (3 Views)    Result Date: 4/14/2019  EXAMINATION: 3 XRAY VIEWS OF THE RIGHT KNEE; 3 XRAY VIEWS OF THE LEFT KNEE 4/14/2019 9:44 am COMPARISON: None. HISTORY: ORDERING SYSTEM PROVIDED HISTORY: Bilateral knee erythema/warmth, enterococcus bacteremia TECHNOLOGIST PROVIDED HISTORY: Bilateral knee erythema/warmth, enterococcus bacteremia Ordering Physician Provided Reason for Exam: Bilateral knee erythema, enterococcus bacteremia Acuity: Acute Type of Exam: Initial FINDINGS: Right knee: Severe osteopenia is noted with deformity of the distal femur consistent with old healed trauma. A smoothly demarcated exostosis is noted medial dorsal aspect of the distal femur likely secondary to old healed trauma. Moderate arthritic changes present in the knee. No acute fracture, effusion or cortical destruction is noted. Bony structures are gracile in appearance. Left knee: Severe osteopenia is noted. Mild to moderate arthritic changes are present without acute fracture, dislocation, effusion or cortical destruction. Bony structures are gracile in appearance. Right knee: Deformity distal femur due to old healed trauma. Bony structures are gracile and severely osteopenic, limiting assessment. Degenerative changes are present without acute fracture or bony destruction. Left knee: Bony structures are gracile and severely osteopenic limiting assessment. Degenerative changes are present without acute osseous abnormality or effusion.  RECOMMENDATION: Radionuclide Pulmonary vascular congestion is unchanged. No focal consolidation. No pleural effusion or pneumothorax. Severe levoconvex curvature of the thoracic spine is similar to prior. Pulmonary vascular congestion is not significantly changed. Xr Chest Portable    Result Date: 4/10/2019  EXAMINATION: SINGLE XRAY VIEW OF THE CHEST 4/10/2019 5:53 pm COMPARISON: 9 April 2019 HISTORY: ORDERING SYSTEM PROVIDED HISTORY: SOB TECHNOLOGIST PROVIDED HISTORY: SOB Acuity: Acute Type of Exam: Unknown FINDINGS: AP portable view of the chest time stamped at 1749 hours demonstrates overlying cardiac monitoring electrodes. Patient is rotated toward the right. Pulmonary vascular congestion is noted without interval difference from prior exam.  A severe scoliotic curvature is noted. No extrapleural air is seen. Osseous structures are stable. Stable cardiac size. No change from prior study of 1 day earlier. Stable cardiomegaly and pulmonary vascular congestion. Xr Chest Portable    Result Date: 4/9/2019  EXAMINATION: SINGLE XRAY VIEW OF THE CHEST 4/9/2019 6:49 am COMPARISON: 24 February 2019 HISTORY: ORDERING SYSTEM PROVIDED HISTORY: AECOPD TECHNOLOGIST PROVIDED HISTORY: AECOPD FINDINGS: AP portable view of the chest time stamped at 642 hours demonstrates overlying cardiac monitoring electrodes. The patient is rotated toward the right. Stable cardiac size is noted. The patient has pulmonary vascular congestion with slight decreased compared to prior study of 1 day earlier. No gross effusion or extrapleural air is noted. The patient has a sharp angle scoliosis in the thoracic spine with a gibbus type deformity. No free air is noted. Mediastinal contours are stable. Stable cardiomegaly. Pulmonary vascular congestion is noted with slight decreased compared to prior study of 1 day earlier. No gross effusions.        Physical Examination:        General appearance:  Awake but not communicative, oxygen via nasal cannula in place  Mental Status:  Unable to evaluate due to cerebral palsy  Lungs:  Conducted breath sounds all over  Heart:  regular rate and rhythm, no murmur  Abdomen:  soft, nontender, nondistended, normal bowel sounds, no masses, hepatomegaly, splenomegaly, PEG tube in place  Extremities:  + edema,no  redness, tenderness in the calves,+ swelling both knees which is improving  Skin:  no gross lesions, rashes, induration    Assessment:        Primary Problem  Acute respiratory failure [facility])    Active Hospital Problems    Diagnosis Date Noted    Enterococcal sepsis ([address] [A41.81]     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] 04/14/2019    Hip dysplasia, acquired, left [M21.852] 04/14/2019    Obesity, Class II, BMI 35-39.9 [E66.9] 04/13/2019    Malfunction of percutaneous endoscopic gastrostomy (PEG) tube ([address] [K94.23] 04/13/2019    Breakthrough seizure ([address] [G40.919]     Mental retardation [F79]     COPD exacerbation ([address] [J44.1] 04/12/2019    Acute respiratory failure ([address] [J96.00] 04/10/2019    Developmental disability [F89]     Severe protein-calorie malnutrition Julia Martinez: less than 60% of standard weight) ([address] [E43] 11/15/2017    Acute exacerbation of chronic obstructive pulmonary disease (COPD) ([address] [J44.1]     Cerebral palsied ([address] [G80.9]     Seizure disorder ([address] [G40.909]        Plan:        1. Seizure medicines already adjusted by neurology, to be given by PEG tube  2. Continue antibiotics as per ID evaluation-currently on vancomycin, ID also recommending to add Rocephin for 6 weeks through May 22, 2019  3. Oxygen/BiPAP as needed  4. DVT and GI prophylaxis  5.  Transfer to [place] [address] when bed available, ID will follow him over there    Ok Sánchez MD  4/17/2019  7:32 AM

## 2019-04-17 NOTE — PROGRESS NOTES
[facility]  Progress Note    Milly Wall  MRN: [id number]  Date: 4/17/2019    Reason for F/U :   Enterococcus faecalis sepsis    Impression :   1. Respiratory insufficiency currently on BiPAP  2. Enterococcus faecalis sepsis  · 2-D echocardiogram was a poor study  · Not a good candidate for JAQUELIN  3. Possible septic arthritis-bilateral knee joints  · Status post aspiration 4/14/19 - cultures negative thus far  4. Coagulase negative staph 1 out of 2 sets likely a contaminant   5. Cerebral palsy  6. Penicillin allergy-has tolerated cephalosporins    Recommendations:   · Continue intravenous antimicrobial therapy with vancomycin and Rocephin through May 22, 2019 to complete a 6 week course of therapy  · The plan is for discharge to an LTAC once a bed is available. · Infectious diseases to be consulted when the patient gets there. Infection Control Recommendations:   Universal precautions    Discharge Planning:   Estimated Length of IV antimicrobials: 6 weeks through May 22, 2019  Patient will need PICC line Insertion  Patient will need: LTAC  Patient willneed outpatient wound care: No    MedicalDecision making / Summary of Stay:   Chester Kumar a 51y.o.-year-old male who was initially admitted on 4/10/2019. Patient has a known medical history of cerebral palsy and is nonverbal, he also has a history of COPD and congestive heart failure. He does reside in a long-term care facility, [facility]. Patient was recently discharged from [facility] 4/9/2019 after being admitted and treated for respiratory failure. In the hospitalization, his chest x-ray showed pulmonary vascular congestion. His abdominal x-ray showed large fecal load which is a chronic problem for this particular patient him understanding. He was given IV azithromycin during the hospitalization.  He was discharged on liquid levofloxacin to be given through the G-tube; however, per the ER notes, patient did not receive that 04/16/19  0612 04/17/19  0634   WBC 8.8 9.6   HGB 11.9* 12.4*   HCT 36.2* 38.5*   * 492*   LYMPHOPCT 33 31   MONOPCT 7 11*     BMP:   Recent Labs     04/16/19  0612 04/17/19  0634    144   K 3.5* 3.5*   CL 98 98   CO2 33* 33*   BUN 9 9   CREATININE <0.40* <0.40*     Hepatic Function Panel: No results for input(s): PROT, LABALBU, BILIDIR, IBILI, BILITOT, ALKPHOS, ALT, AST in the last 72 hours. Recent Labs     04/14/19  2255 04/16/19  1054   VANCOTROUGH 11.6 18.5      No results found for: CRP  No results found for: SEDRATE    No results for input(s): PROCAL in the last 72 hours. Imaging Studies:   Transthoracic Echocardiography Report (TTE) 04/15/2019    CONCLUSIONS    Summary  Very technically difficult study  Left ventricle was not well visualized. It is probably normal in size. Global left ventricular systolic function is grossly normal however an  ejection fraction cannot be estimated. Due to the technical limitations of this study cannot comment wall segments  motion. Valves were not adequately visualized either    Cultures:     Culture Blood #1 [[id number]] Collected: 04/14/19 0923   Order Status: Completed Specimen: Blood Updated: 04/17/19 9242    Specimen Description . BLOOD    Special Requests LEFT HAND 4ML AEROBIC ONLY    Culture NO GROWTH 3 DAYS   Culture Blood #1 [[id number]] (Abnormal) Collected: 04/14/19 0934   Order Status: Completed Specimen: Blood Updated: 04/16/19 0858    Specimen Description . BLOOD    Special Requests RIGHT HAND.  9ML AEROBIC 1ML ANAEROBIC    Culture --Abnormal     POSITIVE Blood Culture   Results called to and read back by:   SAUL Sparks. [id number]   Abnormal     Culture --    DIRECT GRAM STAIN FROM BOTTLE:   GRAM POSITIVE COCCI IN CLUSTERS     Culture --Abnormal     STAPHYLOCOCCUS SPECIES, COAGULASE NEGATIVE   A single positive blood culture of coagulase negative staphylococci, micrococci, diphtheroids or Bacillus species should be interpreted with caution and viewed as likely a skin contaminant. Abnormal      Anaerobic and Aerobic Culture [[id number]] Collected: 04/14/19 1632   Order Status: Completed Specimen: Joint, Knee Updated: 04/16/19 1736    Specimen Description . KNEE RIGHT    Special Requests NOT REPORTED    Direct Exam NO NEUTROPHILS SEEN     NO BACTERIA SEEN    Culture NO GROWTH 1 DAY   Anaerobic and Aerobic Culture [[id number]] Collected: 04/14/19 2100   Order Status: Completed Specimen: Joint, Knee Updated: 04/16/19 1736    Specimen Description . KNEE LEFT    Special Requests NOT REPORTED    Direct Exam NO NEUTROPHILS SEEN     NO BACTERIA SEEN    Culture NO GROWTH 1 DAY     CULTURE BLOOD #1 [[id number]] (Abnormal) Collected: 04/11/19 1145   Order Status: Completed Specimen: Blood Updated: 04/12/19 2055    Specimen Description . BLOOD    Special Requests LT HAND,2ML    Culture --Abnormal     POSITIVE BLOOD CULTURE, RN NOTIFIED:   RESULTS REPEATED BACK BY SAUL WHITMORE AT 0405 4/12/19   Abnormal     Culture --    DIRECT GRAM STAIN FROM BOTTLE:   GRAM POSITIVE COCCI IN PAIRS   AND CHAINS     Culture --Abnormal     ENTEROCOCCUS FAECALIS   For susceptibility, refer to previous culture. Abnormal    CULTURE BLOOD #2 [[id number]] (Abnormal) Collected: 04/11/19 1210   Order Status: Completed Specimen: Blood Updated: 04/12/19 2055    Specimen Description . BLOOD    Special Requests 4ML L WRIST    Culture --Abnormal     POSITIVE BLOOD CULTURE, RN NOTIFIED:   [facility] AT 0405 4/12/19   Abnormal     Culture --    DIRECT GRAM STAIN FROM BOTTLE:   GRAM POSITIVE COCCI IN PAIRS   AND CHAINS     Culture --Abnormal     ENTEROCOCCUS FAECALIS   For susceptibility, refer to previous culture. Abnormal    Cult,Blood #1 [[id number]] (Abnormal) Collected: 04/10/19 1742   Order Status: Completed Specimen: Blood Updated: 04/12/19 2052    Specimen Description . BLOOD    Special Requests 6ML RT FA    Culture --Abnormal     POSITIVE Blood Culture   Results called to 5 mg Oral Daily    sennosides-docusate sodium  2 tablet Per G Tube Daily    vitamin D  1,000 Units Per G Tube BID    baclofen  10 mg Per G Tube TID    montelukast  10 mg Per G Tube Nightly    fluticasone  2 spray Nasal Daily    calcium carbonate  1,250 mg Per G Tube Daily    miconazole   Topical BID    glycopyrrolate  1 mg Per G Tube TID    lacosamide  150 mg PEG Tube BID    budesonide  500 mcg Nebulization BID    lactulose  20 g Per G Tube BID    polyethylene glycol  17 g Per G Tube Daily    lamoTRIgine  200 mg Per G Tube Nightly    lamoTRIgine  300 mg Per G Tube QAM    lansoprazole  30 mg Per G Tube Daily    sodium chloride flush  10 mL Intravenous 2 times per day    enoxaparin  40 mg Subcutaneous Daily    ipratropium-albuterol  1 ampule Inhalation Q4H WA     Thank you for allowing us to participate in the care of this patient. Please call with questions. [facility]  Sonu Marks MD  [employer] messaging  OFFICE: [phone]  CELL:     [phone]    Electronically signed by Sonu Marks MD on 4/17/2019 at 11:52 AM    This note iscreated with the assistance of a speech recognition program.  While intending to generate a document that actually reflects the content of the visit, the document can still have some errors including those of syntax andsound a like substitutions which may escape proof reading. It such instances, actual meaning can be extrapolated by contextual diversion.

## 2019-04-17 NOTE — PLAN OF CARE
Problem: Falls - Risk of:  Goal: Will remain free from falls  Description  Will remain free from falls  Outcome: Met This Shift  Note:   Patient remains free from falls this shift, bed alarm activated, bed locked and in lowest position, call light within reach, seizure precautions in place. Will continue to monitor. Problem: Nutrition  Goal: Optimal nutrition therapy  Description  Nutrition Problem: Unintended weight loss  Intervention: Food and/or Nutrient Delivery: Continue NPO, Start Tube Feeding  Nutritional Goals: EN intake to meet >75% of estimated kcal/protein needs, with good GI tolerance   Outcome: Ongoing     Problem: Risk for Impaired Skin Integrity  Goal: Tissue integrity - skin and mucous membranes  Description  Structural intactness and normal physiological function of skin and  mucous membranes. Outcome: Ongoing  Note:   Will do skin assessment every shift, turn patient every 2 hours, check and change brief. Skin and linens kept clean and dry, waffle mattress in place, heels of feet elevated off bed, pillow support provided. Will continue to monitor.

## 2019-04-18 LAB
-: NORMAL
ABSOLUTE EOS #: 0 K/UL (ref 0–0.4)
ABSOLUTE IMMATURE GRANULOCYTE: ABNORMAL K/UL (ref 0–0.3)
ABSOLUTE LYMPH #: 2.8 K/UL (ref 1–4.8)
ABSOLUTE MONO #: 0.9 K/UL (ref 0.2–0.8)
ANION GAP SERPL CALCULATED.3IONS-SCNC: 11 MMOL/L (ref 9–17)
BASOPHILS # BLD: 1 % (ref 0–2)
BASOPHILS ABSOLUTE: 0.1 K/UL (ref 0–0.2)
BUN BLDV-MCNC: 10 MG/DL (ref 6–20)
BUN/CREAT BLD: ABNORMAL (ref 9–20)
CALCIUM SERPL-MCNC: 9.3 MG/DL (ref 8.6–10.4)
CHLORIDE BLD-SCNC: 98 MMOL/L (ref 98–107)
CO2: 33 MMOL/L (ref 20–31)
CREAT SERPL-MCNC: <0.4 MG/DL (ref 0.7–1.2)
DIFFERENTIAL TYPE: ABNORMAL
EOSINOPHILS RELATIVE PERCENT: 0 % (ref 1–4)
GFR AFRICAN AMERICAN: ABNORMAL ML/MIN
GFR NON-AFRICAN AMERICAN: ABNORMAL ML/MIN
GFR SERPL CREATININE-BSD FRML MDRD: ABNORMAL ML/MIN/{1.73_M2}
GFR SERPL CREATININE-BSD FRML MDRD: ABNORMAL ML/MIN/{1.73_M2}
GLUCOSE BLD-MCNC: 102 MG/DL (ref 70–99)
GLUCOSE BLD-MCNC: 102 MG/DL (ref 75–110)
HCT VFR BLD CALC: 36.5 % (ref 41–53)
HEMOGLOBIN: 11.9 G/DL (ref 13.5–17.5)
IMMATURE GRANULOCYTES: ABNORMAL %
LYMPHOCYTES # BLD: 29 % (ref 24–44)
MCH RBC QN AUTO: 30.1 PG (ref 26–34)
MCHC RBC AUTO-ENTMCNC: 32.8 G/DL (ref 31–37)
MCV RBC AUTO: 91.8 FL (ref 80–100)
MONOCYTES # BLD: 9 % (ref 1–7)
NRBC AUTOMATED: ABNORMAL PER 100 WBC
PDW BLD-RTO: 16.5 % (ref 11.5–14.5)
PLATELET # BLD: 392 K/UL (ref 130–400)
PLATELET ESTIMATE: ABNORMAL
PMV BLD AUTO: 7.3 FL (ref 6–12)
POTASSIUM SERPL-SCNC: 3.9 MMOL/L (ref 3.7–5.3)
RBC # BLD: 3.97 M/UL (ref 4.5–5.9)
RBC # BLD: ABNORMAL 10*6/UL
REASON FOR REJECTION: NORMAL
SEG NEUTROPHILS: 61 % (ref 36–66)
SEGMENTED NEUTROPHILS ABSOLUTE COUNT: 6 K/UL (ref 1.8–7.7)
SODIUM BLD-SCNC: 142 MMOL/L (ref 135–144)
WBC # BLD: 9.7 K/UL (ref 3.5–11)
WBC # BLD: ABNORMAL 10*3/UL
ZZ NTE CLEAN UP: ORDERED TEST: NORMAL
ZZ NTE WITH NAME CLEAN UP: SPECIMEN SOURCE: NORMAL

## 2019-04-18 PROCEDURE — 82947 ASSAY GLUCOSE BLOOD QUANT: CPT

## 2019-04-18 PROCEDURE — 6360000002 HC RX W HCPCS: Performed by: INTERNAL MEDICINE

## 2019-04-18 PROCEDURE — 99232 SBSQ HOSP IP/OBS MODERATE 35: CPT | Performed by: INTERNAL MEDICINE

## 2019-04-18 PROCEDURE — 6370000000 HC RX 637 (ALT 250 FOR IP): Performed by: INTERNAL MEDICINE

## 2019-04-18 PROCEDURE — 2060000000 HC ICU INTERMEDIATE R&B

## 2019-04-18 PROCEDURE — 80048 BASIC METABOLIC PNL TOTAL CA: CPT

## 2019-04-18 PROCEDURE — 36415 COLL VENOUS BLD VENIPUNCTURE: CPT

## 2019-04-18 PROCEDURE — 2580000003 HC RX 258: Performed by: INTERNAL MEDICINE

## 2019-04-18 PROCEDURE — 2700000000 HC OXYGEN THERAPY PER DAY

## 2019-04-18 PROCEDURE — 94640 AIRWAY INHALATION TREATMENT: CPT

## 2019-04-18 PROCEDURE — 94761 N-INVAS EAR/PLS OXIMETRY MLT: CPT

## 2019-04-18 PROCEDURE — 85025 COMPLETE CBC W/AUTO DIFF WBC: CPT

## 2019-04-18 RX ADMIN — CEFTRIAXONE 2 G: 2 INJECTION, POWDER, FOR SOLUTION INTRAMUSCULAR; INTRAVENOUS at 15:32

## 2019-04-18 RX ADMIN — Medication 30 MG: at 08:53

## 2019-04-18 RX ADMIN — ANTACID TABLETS 1250 MG: 500 TABLET, CHEWABLE ORAL at 08:52

## 2019-04-18 RX ADMIN — Medication 30 MG: at 21:43

## 2019-04-18 RX ADMIN — PREDNISONE 20 MG: 20 TABLET ORAL at 08:51

## 2019-04-18 RX ADMIN — ENOXAPARIN SODIUM 40 MG: 40 INJECTION SUBCUTANEOUS at 08:53

## 2019-04-18 RX ADMIN — MICONAZOLE NITRATE: 20.6 POWDER TOPICAL at 21:43

## 2019-04-18 RX ADMIN — LACOSAMIDE 150 MG: 100 TABLET, FILM COATED ORAL at 21:42

## 2019-04-18 RX ADMIN — LEVETIRACETAM 1500 MG: 100 SOLUTION ORAL at 08:52

## 2019-04-18 RX ADMIN — BUDESONIDE 500 MCG: 0.5 SUSPENSION RESPIRATORY (INHALATION) at 19:16

## 2019-04-18 RX ADMIN — VANCOMYCIN HYDROCHLORIDE 1250 MG: 5 INJECTION, POWDER, LYOPHILIZED, FOR SOLUTION INTRAVENOUS at 17:14

## 2019-04-18 RX ADMIN — BUDESONIDE 500 MCG: 0.5 SUSPENSION RESPIRATORY (INHALATION) at 07:38

## 2019-04-18 RX ADMIN — VITAMIN D, TAB 1000IU (100/BT) 1000 UNITS: 25 TAB at 21:42

## 2019-04-18 RX ADMIN — Medication 30 MG: at 16:56

## 2019-04-18 RX ADMIN — LAMOTRIGINE 200 MG: 100 TABLET ORAL at 21:43

## 2019-04-18 RX ADMIN — BACLOFEN 10 MG: 10 TABLET ORAL at 15:32

## 2019-04-18 RX ADMIN — FLUTICASONE PROPIONATE 2 SPRAY: 50 SPRAY, METERED NASAL at 08:53

## 2019-04-18 RX ADMIN — BACLOFEN 10 MG: 10 TABLET ORAL at 08:52

## 2019-04-18 RX ADMIN — LACTULOSE 20 G: 20 SOLUTION ORAL at 08:53

## 2019-04-18 RX ADMIN — IPRATROPIUM BROMIDE AND ALBUTEROL SULFATE 1 AMPULE: .5; 3 SOLUTION RESPIRATORY (INHALATION) at 19:16

## 2019-04-18 RX ADMIN — POLYETHYLENE GLYCOL 3350 17 G: 17 POWDER, FOR SOLUTION ORAL at 08:51

## 2019-04-18 RX ADMIN — LACOSAMIDE 150 MG: 100 TABLET, FILM COATED ORAL at 08:51

## 2019-04-18 RX ADMIN — GLYCOPYRROLATE 1 MG: 1 TABLET ORAL at 21:43

## 2019-04-18 RX ADMIN — LAMOTRIGINE 300 MG: 100 TABLET ORAL at 08:52

## 2019-04-18 RX ADMIN — BACLOFEN 10 MG: 10 TABLET ORAL at 21:42

## 2019-04-18 RX ADMIN — SENNOSIDES AND DOCUSATE SODIUM 2 TABLET: 8.6; 5 TABLET ORAL at 08:52

## 2019-04-18 RX ADMIN — GLYCOPYRROLATE 1 MG: 1 TABLET ORAL at 15:32

## 2019-04-18 RX ADMIN — CETIRIZINE HYDROCHLORIDE 5 MG: 5 TABLET ORAL at 08:52

## 2019-04-18 RX ADMIN — CEFTRIAXONE 2 G: 2 INJECTION, POWDER, FOR SOLUTION INTRAMUSCULAR; INTRAVENOUS at 03:55

## 2019-04-18 RX ADMIN — Medication 40 MG: at 08:53

## 2019-04-18 RX ADMIN — IPRATROPIUM BROMIDE AND ALBUTEROL SULFATE 1 AMPULE: .5; 3 SOLUTION RESPIRATORY (INHALATION) at 07:37

## 2019-04-18 RX ADMIN — IPRATROPIUM BROMIDE AND ALBUTEROL SULFATE 1 AMPULE: .5; 3 SOLUTION RESPIRATORY (INHALATION) at 11:39

## 2019-04-18 RX ADMIN — GLYCOPYRROLATE 1 MG: 1 TABLET ORAL at 08:52

## 2019-04-18 RX ADMIN — MICONAZOLE NITRATE: 20.6 POWDER TOPICAL at 08:53

## 2019-04-18 RX ADMIN — LEVETIRACETAM 1500 MG: 100 SOLUTION ORAL at 21:42

## 2019-04-18 RX ADMIN — VANCOMYCIN HYDROCHLORIDE 1250 MG: 5 INJECTION, POWDER, LYOPHILIZED, FOR SOLUTION INTRAVENOUS at 05:05

## 2019-04-18 RX ADMIN — VITAMIN D, TAB 1000IU (100/BT) 1000 UNITS: 25 TAB at 08:51

## 2019-04-18 RX ADMIN — Medication 15 ML: at 08:53

## 2019-04-18 RX ADMIN — IPRATROPIUM BROMIDE AND ALBUTEROL SULFATE 1 AMPULE: .5; 3 SOLUTION RESPIRATORY (INHALATION) at 15:48

## 2019-04-18 RX ADMIN — MONTELUKAST SODIUM 10 MG: 10 TABLET, FILM COATED ORAL at 21:43

## 2019-04-18 NOTE — PLAN OF CARE
Problem: Falls - Risk of:  Goal: Will remain free from falls  Description  Will remain free from falls  Outcome: Met This Shift     Problem: Falls - Risk of:  Goal: Absence of physical injury  Description  Absence of physical injury  Outcome: Met This Shift     Problem: Nutrition  Goal: Optimal nutrition therapy  Description  Nutrition Problem: Unintended weight loss  Intervention: Food and/or Nutrient Delivery: Continue NPO, Start Tube Feeding  Nutritional Goals: EN intake to meet >75% of estimated kcal/protein needs, with good GI tolerance   4/18/2019 1018 by Jerry Correa  Outcome: Ongoing     Problem: Risk for Impaired Skin Integrity  Goal: Tissue integrity - skin and mucous membranes  Description  Structural intactness and normal physiological function of skin and  mucous membranes. 4/18/2019 1018 by Jerry [place]  Outcome: Ongoing  Note:   Patient receiving adequate nutrition via PEG tube, patient linens and skin will be kept clean and dry, patient's brief will be check and changed often and powders/barrier creams will be applied, patient will be repositioned/turned every 2 hours, patient's PEG tube will be checked to monitor for healing or infection. Will continue to monitor patient.      Problem: Respiratory:  Goal: Ability to maintain adequate ventilation will improve  Description  Ability to maintain adequate ventilation will improve  Outcome: Ongoing     Problem: Safety:  Goal: Ability to remain free from injury will improve  Description  Ability to remain free from injury will improve  Outcome: Ongoing

## 2019-04-18 NOTE — PROGRESS NOTES
Pulmonary Critical Care Progress Note  Crescencio Saleem CNP / Dr. Donald Cruz M.D. Patient seen for the follow up of Acute respiratory failure ([facility] [address]     Subjective:  No acute events noted overnight, has been doing well according to nursing staff. He wore BiPAP throughout the night, and has been off BiPAP during the morning. He is awake and somewhat responsive, at patient's baseline. Examination:  Vitals: /81   Pulse 84   Temp 97.9 °F (36.6 °C) (Oral)   Resp 16   Ht 3' 10.85\" (1.19 m)   Wt 115 lb (52.2 kg)   SpO2 100%   BMI 36.84 kg/m²     General appearance: Awake, nonverbal, nursing at bedside  Neck: No JVD  Lungs: Good air exchange with no significant rhonchi at this time  Heart: regular rate and rhythm, S1, S2 normal, no gallop  Abdomen: Rounded, soft, hypoactive  Extremities: no cyanosis or clubbing.   Mild generalized edema    LABs:  CBC:   Recent Labs     04/17/19  0634 04/18/19  0719   WBC 9.6 9.7   HGB 12.4* 11.9*   HCT 38.5* 36.5*   * 392     BMP:   Recent Labs     04/17/19  0634 04/18/19  0719    142   K 3.5* 3.9   CO2 33* 33*   BUN 9 10   CREATININE <0.40* <0.40*   LABGLOM CANNOT BE CALCULATED CANNOT BE CALCULATED   GLUCOSE 81 102*     Impression:  · Acute respiratory failure requiring BiPAP  · Enterococcus faecalis sepsis  · Suspected aspiration  · Atelectasis/pulmonary edema  · Dysphagia s/p PEG  · Cerebral palsy with spastic quadriparesis/seizure disorder  · Chronic constipation    Recommendations:  · 2 liters/min via nasal cannula - humidification   · Monitor oxygen   · Oral care every shift   · BiPAP with sleep and as needed  · Continue IV Vanco, Rocephin per infectious disease  · Budesonide aerosols every 12 hours  · Albuterol and Ipratropium Q 4 hours and prn  · Singulair  · Prednisone 20 mg daily  · Neurology following  · PT/passive ROM  · DNR CCA  · Discharge planning to LTAC OK from pulmonary standpoint   · Will follow with you    Electronically signed by KARIME Terrell CNP on 4/18/2019 at 11:07 AM  Patient was seen under the supervision of Dr. Heidi Callahan and Sleep Medicine,    Patient seen and evaluated by me. He is resting in the bed, no acute events noted overnight. He appears comfortable. He tolerated BiPAP during the night. Lung exam reveals moderate air exchange with no wheeze. Plan will be continue bronchodilators, discharge planning to [facility] when bed is available. Above was reviewed and discussed with Sosa Prather CNP. And I agree with assessment and plan of care.   Electronically signed by     Luz Hodgkins, MD on 4/18/2019 at 12:26 PM  Pulmonary Critical Care and Sleep Medicine,  [facility]  Cell: [phone]  Office: [phone]

## 2019-04-18 NOTE — PROGRESS NOTES
[facility]    Progress Note    4/18/2019    1:32 PM    Name:   Sangeeta Braun  MRN:     [id number]     Zac:      [de-identified]   Room:   [address] [place] [address] Day:  6  Admit Date:  4/10/2019  4:55 PM    PCP:   Loyda Jarvis MD  Code Status:  DNR-CCA    Subjective:     C/C:   Chief Complaint   Patient presents with    Shortness of Breath     discharged from here yesterday     Interval History Status:    No seizures after he was transferred back to progressive unit 4 days back  Seizure medicines were adjusted by neurology  No respiratory distress  Still requires BiPAP more at night  Blood cultures coming positive for gram-positive cocci in clusters-final report Staphylococcus coagulation negative  Knees swelling is much better  Brief History:   50-year-old  male admitted from [facility] with difficulty breathing. Patient is well-known to our practice. He has spent an extended period of time at [facility] up until several weeks ago when he was moved to his group home. He was previously admitted here (4/8-4/9/2019) with respiratory insufficiency. On that admission in the ED his PaO2 was reported as 45. He was placed on BiPAP however when the O2 saturation sensor was repositioned from his finger to his ear he was satting at 100%. His chest x-ray showed some pulmonary vascular congestion but was otherwise unremarkable. His abdominal x-rays showed a large fecal load which is a chronic problem for this patient. His pro-calcitonin was 0.12 (<0.25 speaks against bacterial respiratory infection). His white count was normal. It had been reported that he was having emesis at the group home and there was some concern of aspiration however chest x-rays did not confirm this and the patient had no emesis during that admission. The patient did have a large bowel movement.  Pulmonary consultation was obtained and the patient was started empirically on azithromycin. His chest x-ray remained clear. His O2 saturations remained normal with minimal oxygen supplementation. He was discharged back to the group home with azithromycin 400 mg via G-tube daily ×5 days per pulmonary recommendation. ER reports that the group home had not given him any doses of his antibiotics. He is admitted with dyspnea, tachypnea and once again low O2 saturations. His past medical history includes cerebral palsy, severe scoliosis, COPD and congestive heart failure. Review of Systems:     Unable to obtain due to cerebral palsy    Medications: Allergies: Allergies   Allergen Reactions    Ampicillin Other (See Comments)     Has received and tolerated ceftriaxone and cefazolin.     Valium [Diazepam] Other (See Comments)     Has received and tolerated Ativan IV/PO    Other      Bubble bath products        Current Meds:   Scheduled Meds:    cefTRIAXone (ROCEPHIN) IV  2 g Intravenous Q12H    vancomycin  1,250 mg Intravenous Q12H    predniSONE  20 mg Oral Daily    pseudoephedrine HCl  30 mg Per G Tube TID    vancomycin (VANCOCIN) intermittent dosing (placeholder)   Other RX Placeholder    levETIRAcetam  1,500 mg PEG Tube BID    CENTRUM/CERTA-JAY with minerals oral  15 mL Per G Tube Daily    furosemide  40 mg Per G Tube Daily    scopolamine  1 patch Transdermal Q72H    cetirizine  5 mg Oral Daily    sennosides-docusate sodium  2 tablet Per G Tube Daily    vitamin D  1,000 Units Per G Tube BID    baclofen  10 mg Per G Tube TID    montelukast  10 mg Per G Tube Nightly    fluticasone  2 spray Nasal Daily    calcium carbonate  1,250 mg Per G Tube Daily    miconazole   Topical BID    glycopyrrolate  1 mg Per G Tube TID    lacosamide  150 mg PEG Tube BID    budesonide  500 mcg Nebulization BID    lactulose  20 g Per G Tube BID    polyethylene glycol  17 g Per G Tube Daily    lamoTRIgine  200 mg Per G Tube Nightly    lamoTRIgine  300 mg Per G Tube QAM    lansoprazole  30 mg Per G Tube Daily    sodium chloride flush  10 mL Intravenous 2 times per day    enoxaparin  40 mg Subcutaneous Daily    ipratropium-albuterol  1 ampule Inhalation Q4H WA     Continuous Infusions:   PRN Meds: LORazepam, morphine, magnesium sulfate, potassium chloride **OR** potassium alternative oral replacement **OR** potassium chloride, HYDROcodone 5 mg - acetaminophen **OR** HYDROcodone 5 mg - acetaminophen, zinc oxide, neomycin-bacitracin-polymyxin, ondansetron, sodium chloride flush, magnesium hydroxide, ondansetron, nicotine, albuterol, acetaminophen    Data:     Past Medical History:   has a past medical history of Cerebral palsy ([facility] [address] Cholelithiasis with chronic cholecystitis, Constipation, Delayed gastric emptying, Dysphagia, GERD (gastroesophageal reflux disease), Hearing loss, Mental disability, Profound mental retardation, Scoliosis, Seizures ([facility] [address] and 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[facility] [address] Social History:   reports that he has never smoked. He has never used smokeless tobacco. He reports that he does not drink alcohol or use drugs. Family History:   Family History   Family history unknown: Yes       Vitals:  /70   Pulse 89   Temp 98.2 °F (36.8 °C) (Axillary)   Resp 16   Ht 3' 10.85\" (1.19 m)   Wt 115 lb (52.2 kg)   SpO2 98%   BMI 36.84 kg/m²   Temp (24hrs), Av.1 °F (36.7 °C), Min:97.9 °F (36.6 °C), Max:98.4 °F (36.9 °C)    Recent Labs     19  0807   POCGLU 102       I/O (24Hr):     Intake/Output Summary (Last 24 hours) at 2019 1332  Last data filed at 2019 0916  Gross per 24 hour   Intake 1532 ml   Output 587 ml   Net 945 ml       Labs:    Hematology:  Recent Labs     19  0612 19  0634 19  0719   WBC 8.8 9.6 9.7   HGB 11.9* 12.4* 11.9*   HCT 36.2* 38.5* 36.5*   * 492* 392     Chemistry:  Recent Labs     19  0612 19  0634 19  0719    144 142   K 3.5* 3.5* 3.9   CL 98 98 98   CO2 33* 33* 33* COMPARISON: 10 March 2019 HISTORY: ORDERING SYSTEM PROVIDED HISTORY: vomiting TECHNOLOGIST PROVIDED HISTORY: IV Only Contrast FINDINGS: Lower Chest: Basilar atelectasis is noted. Heart size is normal.  No gross effusions are noted. Artifact is created due to arm position and respiratory motion. Organs: Liver, spleen, pancreas, and adrenals are unremarkable. Gallbladder is surgically absent. Kidneys excrete contrast bilaterally. Stable renal cysts are present. GI/Bowel: No free fluid or free air is noted. Gastrostomy tube is positioned with balloon within the confines of the stomach. Significant distension of the colon with both gas and stool is noted. No small bowel obstruction is noted although there is fluid scattered throughout much of the intestinal tract which may be related to mild enteritis. No bowel wall thickening is seen. Pelvis: No evidence of appendicitis. No abnormal fluid collections. Both inguinal rings are dilated and fat containing without strangulation. No inguinal adenopathy is seen. Peritoneum/Retroperitoneum: No aortic aneurysm, retroperitoneal mass, retroperitoneal or mesenteric adenopathy is noted. Bones/Soft Tissues: Severe scoliotic curvature is redemonstrated. Bony structures are slightly gracile. No acute osseous abnormality. Dense breast tissue is noted possibly gynecomastia. Estimated biologic radiation dose for this procedure:568.07 mGy/cm2.     1.  Diffuse distention of the colon with stool and gas. 2.  No small bowel obstruction. Fluid is scattered throughout the intestinal tract which may be related to mild enteritis. No free air. 3.  Colon is redundant. No bowel wall thickening. Findings suggest retrosigmoid stool impaction. Little change from prior exam. 4.  Gastrostomy tube in appropriate position.      Xr Chest Portable    Result Date: 4/15/2019  EXAMINATION: SINGLE XRAY VIEW OF THE CHEST 4/14/2019 6:44 am COMPARISON: Chest radiograph 04/10/2019 HISTORY: ORDERING SYSTEM PROVIDED HISTORY: infiltrate TECHNOLOGIST PROVIDED HISTORY: infiltrate Acuity: Unknown Type of Exam: Ongoing FINDINGS: Stable cardiomegaly. Pulmonary vascular congestion is unchanged. No focal consolidation. No pleural effusion or pneumothorax. Severe levoconvex curvature of the thoracic spine is similar to prior. Pulmonary vascular congestion is not significantly changed. Xr Chest Portable    Result Date: 4/10/2019  EXAMINATION: SINGLE XRAY VIEW OF THE CHEST 4/10/2019 5:53 pm COMPARISON: 9 April 2019 HISTORY: ORDERING SYSTEM PROVIDED HISTORY: SOB TECHNOLOGIST PROVIDED HISTORY: SOB Acuity: Acute Type of Exam: Unknown FINDINGS: AP portable view of the chest time stamped at 1749 hours demonstrates overlying cardiac monitoring electrodes. Patient is rotated toward the right. Pulmonary vascular congestion is noted without interval difference from prior exam.  A severe scoliotic curvature is noted. No extrapleural air is seen. Osseous structures are stable. Stable cardiac size. No change from prior study of 1 day earlier. Stable cardiomegaly and pulmonary vascular congestion. Xr Chest Portable    Result Date: 4/9/2019  EXAMINATION: SINGLE XRAY VIEW OF THE CHEST 4/9/2019 6:49 am COMPARISON: 24 February 2019 HISTORY: ORDERING SYSTEM PROVIDED HISTORY: AECOPD TECHNOLOGIST PROVIDED HISTORY: AECOPD FINDINGS: AP portable view of the chest time stamped at 642 hours demonstrates overlying cardiac monitoring electrodes. The patient is rotated toward the right. Stable cardiac size is noted. The patient has pulmonary vascular congestion with slight decreased compared to prior study of 1 day earlier. No gross effusion or extrapleural air is noted. The patient has a sharp angle scoliosis in the thoracic spine with a gibbus type deformity. No free air is noted. Mediastinal contours are stable. Stable cardiomegaly.   Pulmonary vascular congestion is noted with slight decreased compared to prior study of 1 day earlier. No gross effusions. Physical Examination:        General appearance:  Awake but not communicative, more alert than before, oxygen via nasal cannula in place  Mental Status:  Unable to evaluate due to cerebral palsy  Lungs:  Conducted breath sounds all over  Heart:  regular rate and rhythm, no murmur  Abdomen:  soft, nontender, nondistended, normal bowel sounds, no masses, hepatomegaly, splenomegaly, PEG tube in place  Extremities:  + edema,no  redness, tenderness in the calves,+ swelling both knees which is improving  Skin:  no gross lesions, rashes, induration    Assessment:        Primary Problem  Acute respiratory failure (HCC)   Possible suspected aspiration  Possible bilateral septic arthritis both knee joints-improving  Active Hospital Problems    Diagnosis Date Noted    Enterococcal sepsis ([address] [A41.81]     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] 04/14/2019    Hip dysplasia, acquired, left [M21.852] 04/14/2019    Obesity, Class II, BMI 35-39.9 [E66.9] 04/13/2019    Malfunction of percutaneous endoscopic gastrostomy (PEG) tube ([address] [K94.23] 04/13/2019    Breakthrough seizure ([address] [G40.919]     Mental retardation [F79]     COPD exacerbation ([address] [J44.1] 04/12/2019    Acute respiratory failure (HCC) [J96.00] 04/10/2019    Developmental disability [F89]     Severe protein-calorie malnutrition Di Ivanoff: less than 60% of standard weight) ([address] [E43] 11/15/2017    Acute exacerbation of chronic obstructive pulmonary disease (COPD) ([address] [J44.1]     Cerebral palsied ([address] [G80.9]     Seizure disorder ([address] [G40.909]        Plan:        1. Seizure medicines already adjusted by neurology, to be given by PEG tube  2. Continue antibiotics as per ID evaluation-currently on vancomycin, ID also recommending to add Rocephin for 6 weeks through May 22, 2019  3. Oxygen/BiPAP as needed  4. DVT and GI prophylaxis  5.  Transfer to [facility] when bed available, ID will follow him over there    Bre Hawkins MD  4/18/2019  1:32 PM

## 2019-04-18 NOTE — PROGRESS NOTES
Post Operative Progress Note    4/18/2019 7:09 AM  Subjective:   Admit Date: 4/10/2019  PCP: Jaspreet Dai MD  Date of Discharge: unknown    Medications:   Scheduled Meds:   cefTRIAXone (ROCEPHIN) IV  2 g Intravenous Q12H    vancomycin  1,250 mg Intravenous Q12H    predniSONE  20 mg Oral Daily    pseudoephedrine HCl  30 mg Per G Tube TID    vancomycin (VANCOCIN) intermittent dosing (placeholder)   Other RX Placeholder    levETIRAcetam  1,500 mg PEG Tube BID    CENTRUM/CERTA-JAY with minerals oral  15 mL Per G Tube Daily    furosemide  40 mg Per G Tube Daily    scopolamine  1 patch Transdermal Q72H    cetirizine  5 mg Oral Daily    sennosides-docusate sodium  2 tablet Per G Tube Daily    vitamin D  1,000 Units Per G Tube BID    baclofen  10 mg Per G Tube TID    montelukast  10 mg Per G Tube Nightly    fluticasone  2 spray Nasal Daily    calcium carbonate  1,250 mg Per G Tube Daily    miconazole   Topical BID    glycopyrrolate  1 mg Per G Tube TID    lacosamide  150 mg PEG Tube BID    budesonide  500 mcg Nebulization BID    lactulose  20 g Per G Tube BID    polyethylene glycol  17 g Per G Tube Daily    lamoTRIgine  200 mg Per G Tube Nightly    lamoTRIgine  300 mg Per G Tube QAM    lansoprazole  30 mg Per G Tube Daily    sodium chloride flush  10 mL Intravenous 2 times per day    enoxaparin  40 mg Subcutaneous Daily    ipratropium-albuterol  1 ampule Inhalation Q4H WA     Continuous Infusions:  PRN Meds:LORazepam, morphine, magnesium sulfate, potassium chloride **OR** potassium alternative oral replacement **OR** potassium chloride, HYDROcodone 5 mg - acetaminophen **OR** HYDROcodone 5 mg - acetaminophen, zinc oxide, neomycin-bacitracin-polymyxin, ondansetron, sodium chloride flush, magnesium hydroxide, ondansetron, nicotine, albuterol, acetaminophen    Diet:   Diet: DIET TUBE FEED CONTINUOUS/CYCLIC NPO; STANDARD WITH FIBER (Jevity 1.2 Chace);  Nasogastric; 20; 55; 24    Subjective: Systemic or Specific Complaints:No Complaints    Objective:     Patient Vitals for the past 24 hrs:   BP Temp Temp src Pulse Resp SpO2 Weight   04/18/19 0615 -- -- -- -- -- -- 115 lb (52.2 kg)   04/18/19 0345 (!) 143/78 98.1 °F (36.7 °C) Axillary 100 20 95 % --   04/17/19 2315 128/82 97.9 °F (36.6 °C) Axillary 91 20 98 % --   04/17/19 2117 (!) 99/58 98.4 °F (36.9 °C) Axillary 88 18 99 % --   04/17/19 2022 -- -- -- -- -- 97 % --   04/17/19 1540 -- -- -- -- -- 98 % --   04/17/19 1524 128/76 98.2 °F (36.8 °C) Axillary 97 18 97 % --   04/17/19 1230 -- -- -- -- 20 -- --   04/17/19 1203 (!) 136/97 97.6 °F (36.4 °C) Axillary 111 20 92 % --   04/17/19 1138 -- -- -- -- -- 98 % --   04/17/19 0813 (!) 94/54 98.6 °F (37 °C) Axillary 101 20 91 % --   04/17/19 0806 -- -- -- -- -- 95 % --   04/17/19 0738 -- -- -- -- -- 99 % --     I/O last 3 completed shifts: In: 1860 [I.V.:754; NG/GT:1106]  Out: 587 [Emesis/NG output:587]  No intake/output data recorded. General: alert, appears stated age, combative, fatigued, flushed, moderate distress and slowed mentation   Wound: Wound clean and dry no evidence of infection. , No Erythema, No Edema, No Drainage and Wound Intact   Motion: Painful range of Motion in affected extremity   DVT Exam: No evidence of DVT seen on physical exam.  Negative Trinity's sign. No cords or calf tenderness. Additional exam:     Data Review  CBC:   Recent Labs     04/16/19  0612 04/17/19  0634   WBC 8.8 9.6   HGB 11.9* 12.4*   * 492*     BMP:    Recent Labs     04/16/19  0612 04/17/19  0634    144   K 3.5* 3.5*   CL 98 98   CO2 33* 33*   BUN 9 9   CREATININE <0.40* <0.40*   GLUCOSE 119* 81     Hepatic: No results for input(s): AST, ALT, ALB, BILITOT, ALKPHOS in the last 72 hours. Troponin: No results for input(s): TROPONINI in the last 72 hours. BNP: No results for input(s): BNP in the last 72 hours. Lipids: No results for input(s): CHOL, HDL in the last 72 hours.     Invalid input(s): LDLCALCU  INR: No results for input(s): INR in the last 72 hours. Assessment:   Sabi Cordero has improved from yesterday. Pain is well controlled. He has no nausea and no vomiting.     Current activity is up with assistance  Incision: clean, dry and intact      Plan:      1:  Synovial fluid no growth and negative gram stain bilaterally at 3 days  2:  Continue Deep venous thrombosis prophylaxis  3:  Continue Pain Control        Electronically signed by Wojciech Jiménez MD on 4/18/2019 at 7:09 AM

## 2019-04-18 NOTE — PLAN OF CARE
Problem: Nutrition  Goal: Optimal nutrition therapy  Description  Nutrition Problem: Unintended weight loss  Intervention: Food and/or Nutrient Delivery: Continue NPO, Start Tube Feeding  Nutritional Goals: EN intake to meet >75% of estimated kcal/protein needs, with good GI tolerance   4/18/2019 0224 by Kavin Calhoun RN  Outcome: Ongoing  Note:   Patient receiving tube feed through PEG tube and tolerating appropriately. Problem: Risk for Impaired Skin Integrity  Goal: Tissue integrity - skin and mucous membranes  Description  Structural intactness and normal physiological function of skin and  mucous membranes. 4/18/2019 0224 by Kavin Calhoun RN  Outcome: Ongoing  Note:   Turn and reposition every 2 hours and  as needed, brief checks every 2 hours and as needed, and pressure relieving mattress in place.

## 2019-04-19 LAB
ABSOLUTE EOS #: 0.1 K/UL (ref 0–0.4)
ABSOLUTE IMMATURE GRANULOCYTE: ABNORMAL K/UL (ref 0–0.3)
ABSOLUTE LYMPH #: 3.2 K/UL (ref 1–4.8)
ABSOLUTE MONO #: 1.1 K/UL (ref 0.2–0.8)
ANION GAP SERPL CALCULATED.3IONS-SCNC: 13 MMOL/L (ref 9–17)
BASOPHILS # BLD: 1 % (ref 0–2)
BASOPHILS ABSOLUTE: 0 K/UL (ref 0–0.2)
BUN BLDV-MCNC: 8 MG/DL (ref 6–20)
BUN/CREAT BLD: ABNORMAL (ref 9–20)
CALCIUM SERPL-MCNC: 9.4 MG/DL (ref 8.6–10.4)
CHLORIDE BLD-SCNC: 95 MMOL/L (ref 98–107)
CO2: 33 MMOL/L (ref 20–31)
CREAT SERPL-MCNC: <0.4 MG/DL (ref 0.7–1.2)
CULTURE: NORMAL
CULTURE: NORMAL
DIFFERENTIAL TYPE: ABNORMAL
DIRECT EXAM: NORMAL
EOSINOPHILS RELATIVE PERCENT: 1 % (ref 1–4)
GFR AFRICAN AMERICAN: ABNORMAL ML/MIN
GFR NON-AFRICAN AMERICAN: ABNORMAL ML/MIN
GFR SERPL CREATININE-BSD FRML MDRD: ABNORMAL ML/MIN/{1.73_M2}
GFR SERPL CREATININE-BSD FRML MDRD: ABNORMAL ML/MIN/{1.73_M2}
GLUCOSE BLD-MCNC: 136 MG/DL (ref 70–99)
HCT VFR BLD CALC: 37.2 % (ref 41–53)
HEMOGLOBIN: 12.4 G/DL (ref 13.5–17.5)
IMMATURE GRANULOCYTES: ABNORMAL %
LYMPHOCYTES # BLD: 30 % (ref 24–44)
Lab: NORMAL
Lab: NORMAL
MCH RBC QN AUTO: 30.5 PG (ref 26–34)
MCHC RBC AUTO-ENTMCNC: 33.3 G/DL (ref 31–37)
MCV RBC AUTO: 91.5 FL (ref 80–100)
MONOCYTES # BLD: 10 % (ref 1–7)
NRBC AUTOMATED: ABNORMAL PER 100 WBC
PDW BLD-RTO: 15.9 % (ref 11.5–14.5)
PLATELET # BLD: 476 K/UL (ref 130–400)
PLATELET ESTIMATE: ABNORMAL
PMV BLD AUTO: 7.6 FL (ref 6–12)
POTASSIUM SERPL-SCNC: 3.8 MMOL/L (ref 3.7–5.3)
RBC # BLD: 4.07 M/UL (ref 4.5–5.9)
RBC # BLD: ABNORMAL 10*6/UL
SEG NEUTROPHILS: 58 % (ref 36–66)
SEGMENTED NEUTROPHILS ABSOLUTE COUNT: 6.1 K/UL (ref 1.8–7.7)
SODIUM BLD-SCNC: 141 MMOL/L (ref 135–144)
SPECIMEN DESCRIPTION: NORMAL
SPECIMEN DESCRIPTION: NORMAL
WBC # BLD: 10.5 K/UL (ref 3.5–11)
WBC # BLD: ABNORMAL 10*3/UL

## 2019-04-19 PROCEDURE — 2580000003 HC RX 258: Performed by: INTERNAL MEDICINE

## 2019-04-19 PROCEDURE — 2700000000 HC OXYGEN THERAPY PER DAY

## 2019-04-19 PROCEDURE — 6360000002 HC RX W HCPCS: Performed by: INTERNAL MEDICINE

## 2019-04-19 PROCEDURE — 36415 COLL VENOUS BLD VENIPUNCTURE: CPT

## 2019-04-19 PROCEDURE — 94761 N-INVAS EAR/PLS OXIMETRY MLT: CPT

## 2019-04-19 PROCEDURE — 80048 BASIC METABOLIC PNL TOTAL CA: CPT

## 2019-04-19 PROCEDURE — 99232 SBSQ HOSP IP/OBS MODERATE 35: CPT | Performed by: INTERNAL MEDICINE

## 2019-04-19 PROCEDURE — 6360000002 HC RX W HCPCS: Performed by: NURSE PRACTITIONER

## 2019-04-19 PROCEDURE — 6370000000 HC RX 637 (ALT 250 FOR IP): Performed by: INTERNAL MEDICINE

## 2019-04-19 PROCEDURE — 2060000000 HC ICU INTERMEDIATE R&B

## 2019-04-19 PROCEDURE — 85025 COMPLETE CBC W/AUTO DIFF WBC: CPT

## 2019-04-19 PROCEDURE — 94640 AIRWAY INHALATION TREATMENT: CPT

## 2019-04-19 RX ADMIN — GLYCOPYRROLATE 1 MG: 1 TABLET ORAL at 20:20

## 2019-04-19 RX ADMIN — MONTELUKAST SODIUM 10 MG: 10 TABLET, FILM COATED ORAL at 20:20

## 2019-04-19 RX ADMIN — Medication 30 MG: at 08:59

## 2019-04-19 RX ADMIN — BACLOFEN 10 MG: 10 TABLET ORAL at 15:16

## 2019-04-19 RX ADMIN — CEFTRIAXONE 2 G: 2 INJECTION, POWDER, FOR SOLUTION INTRAMUSCULAR; INTRAVENOUS at 03:49

## 2019-04-19 RX ADMIN — Medication 30 MG: at 15:16

## 2019-04-19 RX ADMIN — ENOXAPARIN SODIUM 40 MG: 40 INJECTION SUBCUTANEOUS at 08:59

## 2019-04-19 RX ADMIN — GLYCOPYRROLATE 1 MG: 1 TABLET ORAL at 15:16

## 2019-04-19 RX ADMIN — IPRATROPIUM BROMIDE AND ALBUTEROL SULFATE 1 AMPULE: .5; 3 SOLUTION RESPIRATORY (INHALATION) at 10:55

## 2019-04-19 RX ADMIN — Medication 30 MG: at 22:30

## 2019-04-19 RX ADMIN — IPRATROPIUM BROMIDE AND ALBUTEROL SULFATE 1 AMPULE: .5; 3 SOLUTION RESPIRATORY (INHALATION) at 14:44

## 2019-04-19 RX ADMIN — Medication 40 MG: at 08:58

## 2019-04-19 RX ADMIN — GLYCOPYRROLATE 1 MG: 1 TABLET ORAL at 08:58

## 2019-04-19 RX ADMIN — LEVETIRACETAM 1500 MG: 100 SOLUTION ORAL at 20:31

## 2019-04-19 RX ADMIN — MICONAZOLE NITRATE: 20.6 POWDER TOPICAL at 08:59

## 2019-04-19 RX ADMIN — MORPHINE SULFATE 1 MG: 2 INJECTION, SOLUTION INTRAMUSCULAR; INTRAVENOUS at 00:16

## 2019-04-19 RX ADMIN — LAMOTRIGINE 300 MG: 100 TABLET ORAL at 08:58

## 2019-04-19 RX ADMIN — ANTACID TABLETS 1250 MG: 500 TABLET, CHEWABLE ORAL at 08:58

## 2019-04-19 RX ADMIN — PREDNISONE 20 MG: 20 TABLET ORAL at 08:58

## 2019-04-19 RX ADMIN — CEFTRIAXONE 2 G: 2 INJECTION, POWDER, FOR SOLUTION INTRAMUSCULAR; INTRAVENOUS at 15:37

## 2019-04-19 RX ADMIN — VANCOMYCIN HYDROCHLORIDE 1250 MG: 5 INJECTION, POWDER, LYOPHILIZED, FOR SOLUTION INTRAVENOUS at 04:55

## 2019-04-19 RX ADMIN — FLUTICASONE PROPIONATE 2 SPRAY: 50 SPRAY, METERED NASAL at 08:59

## 2019-04-19 RX ADMIN — BACLOFEN 10 MG: 10 TABLET ORAL at 08:58

## 2019-04-19 RX ADMIN — LAMOTRIGINE 200 MG: 100 TABLET ORAL at 20:31

## 2019-04-19 RX ADMIN — POLYETHYLENE GLYCOL 3350 17 G: 17 POWDER, FOR SOLUTION ORAL at 08:58

## 2019-04-19 RX ADMIN — VITAMIN D, TAB 1000IU (100/BT) 1000 UNITS: 25 TAB at 08:58

## 2019-04-19 RX ADMIN — Medication 15 ML: at 09:00

## 2019-04-19 RX ADMIN — LACOSAMIDE 150 MG: 100 TABLET, FILM COATED ORAL at 08:58

## 2019-04-19 RX ADMIN — IPRATROPIUM BROMIDE AND ALBUTEROL SULFATE 1 AMPULE: .5; 3 SOLUTION RESPIRATORY (INHALATION) at 07:42

## 2019-04-19 RX ADMIN — CETIRIZINE HYDROCHLORIDE 5 MG: 5 TABLET ORAL at 08:58

## 2019-04-19 RX ADMIN — BACLOFEN 10 MG: 10 TABLET ORAL at 20:20

## 2019-04-19 RX ADMIN — IPRATROPIUM BROMIDE AND ALBUTEROL SULFATE 1 AMPULE: .5; 3 SOLUTION RESPIRATORY (INHALATION) at 19:44

## 2019-04-19 RX ADMIN — MICONAZOLE NITRATE: 20.6 POWDER TOPICAL at 20:20

## 2019-04-19 RX ADMIN — LEVETIRACETAM 1500 MG: 100 SOLUTION ORAL at 08:58

## 2019-04-19 RX ADMIN — LACOSAMIDE 150 MG: 100 TABLET, FILM COATED ORAL at 20:31

## 2019-04-19 RX ADMIN — BUDESONIDE 500 MCG: 0.5 SUSPENSION RESPIRATORY (INHALATION) at 19:44

## 2019-04-19 RX ADMIN — MORPHINE SULFATE 1 MG: 2 INJECTION, SOLUTION INTRAMUSCULAR; INTRAVENOUS at 11:07

## 2019-04-19 RX ADMIN — VITAMIN D, TAB 1000IU (100/BT) 1000 UNITS: 25 TAB at 20:20

## 2019-04-19 RX ADMIN — VANCOMYCIN HYDROCHLORIDE 1250 MG: 5 INJECTION, POWDER, LYOPHILIZED, FOR SOLUTION INTRAVENOUS at 17:50

## 2019-04-19 RX ADMIN — BUDESONIDE 500 MCG: 0.5 SUSPENSION RESPIRATORY (INHALATION) at 07:42

## 2019-04-19 ASSESSMENT — PAIN SCALES - GENERAL
PAINLEVEL_OUTOF10: 6
PAINLEVEL_OUTOF10: 5

## 2019-04-19 ASSESSMENT — PAIN SCALES - WONG BAKER
WONGBAKER_NUMERICALRESPONSE: 0
WONGBAKER_NUMERICALRESPONSE: 0

## 2019-04-19 NOTE — PROGRESS NOTES
[facility]  Progress Note    Austin Bridges  MRN: [id number]  Date: 4/19/2019    Reason for F/U :   Enterococcus faecalis sepsis    Impression :   1. Respiratory insufficiency   · Intermittently has required BiPAP  2. Enterococcus faecalis sepsis-concern for endocarditis  · 2-D echocardiogram was a poor study  · Not a good candidate for JAQUELIN  3. Possible septic arthritis-bilateral knee joints  · Status post aspiration 4/14/19 - cultures negative thus far  4. Coagulase negative staph 1 out of 2 sets likely a contaminant   5. Cerebral palsy  6. Penicillin allergy-has tolerated cephalosporins    Recommendations:   · Continue intravenous antimicrobial therapy with vancomycin and Rocephin through May 22, 2019 to complete a 6 week course of therapy for presumed endocarditis  · The patient is awaiting a bed at [facility].  · He remains clinically stable and will be followed by infectious diseases at the [place] [id number]. Infection Control Recommendations:   Universal precautions    Discharge Planning:   Estimated Length of IV antimicrobials: 6 weeks through May 22, 2019  Patient will need PICC line Insertion  Patient will need: LTAC  Patient willneed outpatient wound care: No    MedicalDecision making / Summary of Stay:   Elidia Anderson a 51y.o.-year-old male who was initially admitted on 4/10/2019. Patient has a known medical history of cerebral palsy and is nonverbal, he also has a history of COPD and congestive heart failure. He does reside in a long-term care facility, [facility]. Patient was recently discharged from [facility] 4/9/2019 after being admitted and treated for respiratory failure. In the hospitalization, his chest x-ray showed pulmonary vascular congestion. His abdominal x-ray showed large fecal load which is a chronic problem for this particular patient him understanding. He was given IV azithromycin during the hospitalization.  He was discharged on liquid levofloxacin to be given through the G-tube; however, per the ER notes, patient did not receive that medication at his facility. He was brought to [facility] due to shortness of breath as well as emesis. Patient was started back on azithromycin. · Patient admitted due to shortness of breath. Intermittently requiring BiPAP  · Blood cultures  Enterococcus faecalis  · 2019 patient started having seizure-like activity as well as increased restlessness, patient transferred to the ICU  · 19 Bilateral knees with erythema/ warmth, concern for septic arthritis, ortho consulted. UA negative. · Arthrocentesis of the left and right knees was done 19 and both had  non-purulent synovial fluid aspirated      Currentevaluation:2019    BP (!) 100/50   Pulse 103   Temp 98.4 °F (36.9 °C) (Axillary)   Resp 16   Ht 3' 10.85\" (1.19 m)   Wt 119 lb 12.8 oz (54.3 kg)   SpO2 96%   BMI 38.37 kg/m²     Temperature Range: Temp: 98.4 °F (36.9 °C) Temp  Av.2 °F (36.8 °C)  Min: 97.8 °F (36.6 °C)  Max: 98.6 °F (37 °C)  The patient is seen and evaluated at bedside he is awake and alert currently on O2 by nasal cannula. He has continued to require BiPAP overnight. Review of Systems   Unable to perform ROS: Patient nonverbal       Physical Examination :     Physical Exam   Constitutional: He is oriented to person, place, and time. He is of small stature   HENT:   Head: Normocephalic and atraumatic. Dry oral mucosa   Cardiovascular: Normal rate and normal heart sounds. Exam reveals no gallop and no friction rub. Pulmonary/Chest: Effort normal. He has no wheezes. He has rales. Abdominal: Soft. Bowel sounds are normal. He exhibits no mass. There is no tenderness. Musculoskeletal: He exhibits no edema. Minimal effusions noted and erythroderma has resolved   Neurological: He is alert and oriented to person, place, and time. Skin: Skin is warm and dry.        Laboratory data:   I have independently reviewed the followinglabs:  CBC with Differential:   Recent Labs     04/18/19  0719 04/19/19  0530   WBC 9.7 10.5   HGB 11.9* 12.4*   HCT 36.5* 37.2*    476*   LYMPHOPCT 29 30   MONOPCT 9* 10*     BMP:   Recent Labs     04/18/19  0719 04/19/19  0530    141   K 3.9 3.8   CL 98 95*   CO2 33* 33*   BUN 10 8   CREATININE <0.40* <0.40*     Hepatic Function Panel: No results for input(s): PROT, LABALBU, BILIDIR, IBILI, BILITOT, ALKPHOS, ALT, AST in the last 72 hours. No results for input(s): VANCOTROUGH in the last 72 hours. No results found for: CRP  No results found for: SEDRATE    No results for input(s): PROCAL in the last 72 hours. Imaging Studies:   Transthoracic Echocardiography Report (TTE) 04/15/2019    CONCLUSIONS    Summary  Very technically difficult study  Left ventricle was not well visualized. It is probably normal in size. Global left ventricular systolic function is grossly normal however an  ejection fraction cannot be estimated. Due to the technical limitations of this study cannot comment wall segments  motion. Valves were not adequately visualized either    Cultures:     Culture Blood #1 [[id number]] Collected: 04/14/19 0923   Order Status: Completed Specimen: Blood Updated: 04/19/19 2903    Specimen Description . BLOOD    Special Requests LEFT HAND 4ML AEROBIC ONLY    Culture NO GROWTH 5 DAYS     Culture Blood #1 [[id number]] (Abnormal) Collected: 04/14/19 0934   Order Status: Completed Specimen: Blood Updated: 04/16/19 1751    Specimen Description . BLOOD    Special Requests RIGHT HAND.  9ML AEROBIC 1ML ANAEROBIC    Culture --Abnormal     POSITIVE Blood Culture   Results called to and read back by:   SAUL Rojas. [id number]   Abnormal     Culture --    DIRECT GRAM STAIN FROM BOTTLE:   GRAM POSITIVE COCCI IN CLUSTERS     Culture --Abnormal     STAPHYLOCOCCUS SPECIES, COAGULASE NEGATIVE   A single positive blood culture of coagulase negative staphylococci, micrococci, diphtheroids or Bacillus species should be interpreted with caution and viewed as likely a skin contaminant. Abnormal      Anaerobic and Aerobic Culture [[id number]] Collected: 04/14/19 1632   Order Status: Completed Specimen: Joint, Knee Updated: 04/18/19 1654    Specimen Description . KNEE RIGHT    Special Requests NOT REPORTED    Direct Exam NO NEUTROPHILS SEEN     NO BACTERIA SEEN    Culture NO GROWTH 4 DAYS   Anaerobic and Aerobic Culture [[id number]] Collected: 04/14/19 2100   Order Status: Completed Specimen: Joint, Knee Updated: 04/18/19 1654    Specimen Description . KNEE LEFT    Special Requests NOT REPORTED    Direct Exam NO NEUTROPHILS SEEN     NO BACTERIA SEEN    Culture NO GROWTH 4 DAYS       CULTURE BLOOD #1 [[id number]] (Abnormal) Collected: 04/11/19 1145   Order Status: Completed Specimen: Blood Updated: 04/12/19 2055    Specimen Description . BLOOD    Special Requests LT HAND,2ML    Culture --Abnormal     POSITIVE BLOOD CULTURE, RN NOTIFIED:   RESULTS REPEATED BACK BY SAUL WHITMORE AT 0405 4/12/19   Abnormal     Culture --    DIRECT GRAM STAIN FROM BOTTLE:   GRAM POSITIVE COCCI IN PAIRS   AND CHAINS     Culture --Abnormal     ENTEROCOCCUS FAECALIS   For susceptibility, refer to previous culture. Abnormal    CULTURE BLOOD #2 [[id number]] (Abnormal) Collected: 04/11/19 1210   Order Status: Completed Specimen: Blood Updated: 04/12/19 2055    Specimen Description . BLOOD    Special Requests 4ML L WRIST    Culture --Abnormal     POSITIVE BLOOD CULTURE, RN NOTIFIED:   RESULTS [facility] AT 0405 4/12/19   Abnormal     Culture --    DIRECT GRAM STAIN FROM BOTTLE:   GRAM POSITIVE COCCI IN PAIRS   AND CHAINS     Culture --Abnormal     ENTEROCOCCUS FAECALIS   For susceptibility, refer to previous culture. Abnormal    Cult,Blood #1 [[id number]] (Abnormal) Collected: 04/10/19 1742   Order Status: Completed Specimen: Blood Updated: 04/12/19 2052    Specimen Description . BLOOD    Special Requests 6ML RT FA    Culture --Abnormal     POSITIVE Blood Culture   Results called to and read back by:   Antoine Abel. 4/11 1030   Abnormal     Culture --    DIRECT GRAM STAIN FROM BOTTLE:   GRAM POSITIVE COCCI IN CHAINS     Culture ENTEROCOCCUS FAECALISAbnormal     Culture For susceptibility, refer to previous culture. Cult,Blood #2 [[id number]] (Abnormal)  Collected: 04/10/19 1747   Order Status: Completed Specimen: Blood Updated: 04/12/19 2050    Specimen Description . BLOOD    Special Requests 12 ML RT UPPER ARM    Culture --Abnormal     POSITIVE Blood Culture   Results called to and read back by:   Antoine Abel. 4/11 1030   Abnormal     Culture --    DIRECT GRAM STAIN FROM BOTTLE:   GRAM POSITIVE COCCI IN CHAINS     Culture --    ID by PNAFISH:   ENTEROCOCCUS FAECALIS     Culture Comment:  >99% of E. faecalis isolates are susceptible to Ampicillin and Vancomycin. Abnormal     Culture ENTEROCOCCUS FAECALISAbnormal    Enterococcus  faecalis (5)     Antibiotic Interpretation ANGELA Status    ampicillin Sensitive  Final     <=2  SUSCEPTIBLE   penicillin   Final     NOT REPORTED   ciprofloxacin   Final     NOT REPORTED   erythromycin   Final     NOT REPORTED   Gentamicin, High Level Resistant RESISTANT Final    levofloxacin   Final     NOT REPORTED   linezolid   Final     NOT REPORTED   nitrofurantoin   Final     NOT REPORTED   Synercid   Final     NOT REPORTED   Streptomycin, Hi Level Sensitive SUSCEPTIBLE Final    tetracycline   Final     NOT REPORTED   tigecycline   Final     NOT REPORTED   vancomycin Sensitive  Final     1  SUSCEPTIBLE     Medications:      cefTRIAXone (ROCEPHIN) IV  2 g Intravenous Q12H    vancomycin  1,250 mg Intravenous Q12H    predniSONE  20 mg Oral Daily    pseudoephedrine HCl  30 mg Per G Tube TID    vancomycin (VANCOCIN) intermittent dosing (placeholder)   Other RX Placeholder    levETIRAcetam  1,500 mg PEG Tube BID    CENTRUM/CERTA-JAY with minerals oral  15 mL Per G Tube Daily    furosemide  40 mg Per G Tube Daily  scopolamine  1 patch Transdermal Q72H    cetirizine  5 mg Oral Daily    sennosides-docusate sodium  2 tablet Per G Tube Daily    vitamin D  1,000 Units Per G Tube BID    baclofen  10 mg Per G Tube TID    montelukast  10 mg Per G Tube Nightly    fluticasone  2 spray Nasal Daily    calcium carbonate  1,250 mg Per G Tube Daily    miconazole   Topical BID    glycopyrrolate  1 mg Per G Tube TID    lacosamide  150 mg PEG Tube BID    budesonide  500 mcg Nebulization BID    lactulose  20 g Per G Tube BID    polyethylene glycol  17 g Per G Tube Daily    lamoTRIgine  200 mg Per G Tube Nightly    lamoTRIgine  300 mg Per G Tube QAM    lansoprazole  30 mg Per G Tube Daily    sodium chloride flush  10 mL Intravenous 2 times per day    enoxaparin  40 mg Subcutaneous Daily    ipratropium-albuterol  1 ampule Inhalation Q4H WA     Thank you for allowing us to participate in the care of this patient. Please call with questions. [facility]  Liz Merida MD  [employer] messaging  OFFICE: [phone]  CELL:     [phone]    Electronically signed by Liz Merida MD on 4/19/2019 at 2:21 PM    This note iscreated with the assistance of a speech recognition program.  While intending to generate a document that actually reflects the content of the visit, the document can still have some errors including those of syntax andsound a like substitutions which may escape proof reading. It such instances, actual meaning can be extrapolated by contextual diversion.

## 2019-04-19 NOTE — PLAN OF CARE
Problem: Nutrition  Goal: Optimal nutrition therapy  Description  Nutrition Problem: Unintended weight loss  Intervention: Food and/or Nutrient Delivery: Continue NPO, Start Tube Feeding  Nutritional Goals: EN intake to meet >75% of estimated kcal/protein needs, with good GI tolerance   Outcome: Ongoing  Note:   Patient receiving tube feed and tolerating well. Problem: Risk for Impaired Skin Integrity  Goal: Tissue integrity - skin and mucous membranes  Description  Structural intactness and normal physiological function of skin and  mucous membranes. Outcome: Ongoing  Note:   Turn and reposition every 2 hours and as needed, brief checks, pressure relieving mattress, and barrier cream applied.

## 2019-04-19 NOTE — PROGRESS NOTES
Pulmonary Critical Care Progress Note  Pierre Rose M.D. Patient seen for the follow up of Acute respiratory failure ([facility] [address]     Subjective:  No acute events noted overnight, has been doing well according to nursing staff. He wore BiPAP throughout the night, and has been off BiPAP during the morning. He is awake and somewhat responsive, at patient's baseline. Examination:  Vitals: /63   Pulse 107   Temp 98.6 °F (37 °C) (Oral)   Resp 16   Ht 3' 10.85\" (1.19 m)   Wt 119 lb 12.8 oz (54.3 kg)   SpO2 96%   BMI 38.37 kg/m²     General appearance: Awake, nonverbal, nursing at bedside  Neck: No JVD  Lungs: Good air exchange with no significant rhonchi at this time  Heart: regular rate and rhythm, S1, S2 normal, no gallop  Abdomen: Rounded, soft, hypoactive  Extremities: no cyanosis or clubbing.   Mild generalized edema    LABs:  CBC:   Recent Labs     04/18/19  0719 04/19/19  0530   WBC 9.7 10.5   HGB 11.9* 12.4*   HCT 36.5* 37.2*    476*     BMP:   Recent Labs     04/18/19  0719 04/19/19  0530    141   K 3.9 3.8   CO2 33* 33*   BUN 10 8   CREATININE <0.40* <0.40*   LABGLOM CANNOT BE CALCULATED CANNOT BE CALCULATED   GLUCOSE 102* 136*     Impression:  · Acute respiratory failure requiring BiPAP  · Enterococcus faecalis sepsis  · Suspected aspiration  · Atelectasis/pulmonary edema  · Dysphagia s/p PEG  · Cerebral palsy with spastic quadriparesis/seizure disorder  · Chronic constipation    Recommendations:  · 2 liters/min via nasal cannula - humidification   · Monitor oxygen   · Oral care every shift   · BiPAP with sleep and as needed  · Continue IV Vanco, Rocephin per infectious disease  · Budesonide aerosols every 12 hours  · Albuterol and Ipratropium Q 4 hours and prn  · Singulair  · Prednisone 20 mg daily  · Neurology following  · PT/passive ROM  · DNR CCA  · Discharge planning to LTAC OK from pulmonary standpoint   · Will follow with you    Electronically signed by     Yola Saldana MD on 4/19/2019 at 12:29 PM  Pulmonary Critical Care and Sleep Medicine,  [facility]  Cell: [phone]  Office: [phone]

## 2019-04-19 NOTE — PROGRESS NOTES
[facility]    Progress Note    4/19/2019    12:04 PM    Name:   Sangeeta Braun  MRN:     [id number]     Mariusz Wilkes:      [de-identified]   Room:   [address] [place] [address] Day:  7  Admit Date:  4/10/2019  4:55 PM    PCP:   Loyda Jarvis MD  Code Status:  DNR-CCA    Subjective:     C/C:   Chief Complaint   Patient presents with    Shortness of Breath     discharged from here yesterday     Interval History Status:    No seizures after he was transferred back to progressive unit 5 days back  Seizure medicines were adjusted by neurology  No respiratory distress  Still requires BiPAP more at night  Blood cultures coming positive for gram-positive cocci in clusters-final report Staphylococcus coagulation negative  Knees swelling is much better  Patient is getting more awake and alert  Brief History:   25-year-old  male admitted from [facility] with difficulty breathing. Patient is well-known to our practice. He has spent an extended period of time at [facility] up until several weeks ago when he was moved to his group home. He was previously admitted here (4/8-4/9/2019) with respiratory insufficiency. On that admission in the ED his PaO2 was reported as 45. He was placed on BiPAP however when the O2 saturation sensor was repositioned from his finger to his ear he was satting at 100%. His chest x-ray showed some pulmonary vascular congestion but was otherwise unremarkable. His abdominal x-rays showed a large fecal load which is a chronic problem for this patient. His pro-calcitonin was 0.12 (<0.25 speaks against bacterial respiratory infection). His white count was normal. It had been reported that he was having emesis at the group home and there was some concern of aspiration however chest x-rays did not confirm this and the patient had no emesis during that admission. The patient did have a large bowel movement.  Pulmonary consultation was obtained and the patient was started empirically on azithromycin. His chest x-ray remained clear. His O2 saturations remained normal with minimal oxygen supplementation. He was discharged back to the group home with azithromycin 400 mg via G-tube daily ×5 days per pulmonary recommendation. ER reports that the group home had not given him any doses of his antibiotics. He is admitted with dyspnea, tachypnea and once again low O2 saturations. His past medical history includes cerebral palsy, severe scoliosis, COPD and congestive heart failure. Review of Systems:     Unable to obtain due to cerebral palsy    Medications: Allergies: Allergies   Allergen Reactions    Ampicillin Other (See Comments)     Has received and tolerated ceftriaxone and cefazolin.     Valium [Diazepam] Other (See Comments)     Has received and tolerated Ativan IV/PO    Other      Bubble bath products        Current Meds:   Scheduled Meds:    cefTRIAXone (ROCEPHIN) IV  2 g Intravenous Q12H    vancomycin  1,250 mg Intravenous Q12H    predniSONE  20 mg Oral Daily    pseudoephedrine HCl  30 mg Per G Tube TID    vancomycin (VANCOCIN) intermittent dosing (placeholder)   Other RX Placeholder    levETIRAcetam  1,500 mg PEG Tube BID    CENTRUM/CERTA-JAY with minerals oral  15 mL Per G Tube Daily    furosemide  40 mg Per G Tube Daily    scopolamine  1 patch Transdermal Q72H    cetirizine  5 mg Oral Daily    sennosides-docusate sodium  2 tablet Per G Tube Daily    vitamin D  1,000 Units Per G Tube BID    baclofen  10 mg Per G Tube TID    montelukast  10 mg Per G Tube Nightly    fluticasone  2 spray Nasal Daily    calcium carbonate  1,250 mg Per G Tube Daily    miconazole   Topical BID    glycopyrrolate  1 mg Per G Tube TID    lacosamide  150 mg PEG Tube BID    budesonide  500 mcg Nebulization BID    lactulose  20 g Per G Tube BID    polyethylene glycol  17 g Per G Tube Daily    lamoTRIgine  200 mg Per G Tube Nightly  lamoTRIgine  300 mg Per G Tube QAM    lansoprazole  30 mg Per G Tube Daily    sodium chloride flush  10 mL Intravenous 2 times per day    enoxaparin  40 mg Subcutaneous Daily    ipratropium-albuterol  1 ampule Inhalation Q4H WA     Continuous Infusions:   PRN Meds: LORazepam, morphine, magnesium sulfate, potassium chloride **OR** potassium alternative oral replacement **OR** potassium chloride, HYDROcodone 5 mg - acetaminophen **OR** HYDROcodone 5 mg - acetaminophen, zinc oxide, neomycin-bacitracin-polymyxin, ondansetron, sodium chloride flush, magnesium hydroxide, ondansetron, nicotine, albuterol, acetaminophen    Data:     Past Medical History:   has a past medical history of Cerebral palsy ([address] Cholelithiasis with chronic cholecystitis, Constipation, Delayed gastric emptying, Dysphagia, GERD (gastroesophageal reflux disease), Hearing loss, Mental disability, Profound mental retardation, Scoliosis, Seizures ([address] and 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[facility] [address] Social History:   reports that he has never smoked. He has never used smokeless tobacco. He reports that he does not drink alcohol or use drugs. Family History:   Family History   Family history unknown: Yes       Vitals:  /63   Pulse 107   Temp 98.6 °F (37 °C) (Oral)   Resp 16   Ht 3' 10.85\" (1.19 m)   Wt 119 lb 12.8 oz (54.3 kg)   SpO2 96%   BMI 38.37 kg/m²   Temp (24hrs), Av.2 °F (36.8 °C), Min:97.8 °F (36.6 °C), Max:98.6 °F (37 °C)    Recent Labs     19  0807   POCGLU 102       I/O (24Hr):     Intake/Output Summary (Last 24 hours) at 2019 1204  Last data filed at 2019 0530  Gross per 24 hour   Intake 1805 ml   Output 617 ml   Net 1188 ml       Labs:    Hematology:  Recent Labs     19  0634 19  0719 19  0530   WBC 9.6 9.7 10.5   HGB 12.4* 11.9* 12.4*   HCT 38.5* 36.5* 37.2*   * 392 476*     Chemistry:  Recent Labs     19  0634 19  0719 19  0530    142 141   K 3.5* 3.9 3.8 CL 98 98 95*   CO2 33* 33* 33*   GLUCOSE 81 102* 136*   BUN 9 10 8   CREATININE <0.40* <0.40* <0.40*   ANIONGAP 13 11 13   LABGLOM CANNOT BE CALCULATED CANNOT BE CALCULATED CANNOT BE CALCULATED   GFRAA CANNOT BE CALCULATED CANNOT BE CALCULATED CANNOT BE CALCULATED   CALCIUM 9.6 9.3 9.4     No results for input(s): PROT, LABALBU, LABA1C, T3IJPSX, L9IBGQC, FT4, TSH, AST, ALT, LDH, GGT, ALKPHOS, BILITOT, BILIDIR, AMMONIA, AMYLASE, LIPASE, LACTATE, CHOL, HDL, LDLCHOLESTEROL, CHOLHDLRATIO, TRIG, VLDL, PHENYTOIN, PHENYF in the last 72 hours. Lab Results   Component Value Date/Time    SPECIAL NOT REPORTED 04/14/2019 09:00 PM     Lab Results   Component Value Date/Time    CULTURE NO GROWTH 4 DAYS 04/14/2019 09:00 PM       Lab Results   Component Value Date    POCPH 7.42 04/13/2019    POCPCO2 44 04/13/2019    POCPO2 92 04/13/2019    POCHCO3 28.1 04/13/2019    NBEA NOT REPORTED 04/13/2019    PBEA 4 04/13/2019    GPA8CRG 29 04/13/2019    JXZD1HRG 97 04/13/2019    FIO2 28.0 04/13/2019       Radiology:    Xr Knee Left (3 Views)    Result Date: 4/14/2019  EXAMINATION: 3 XRAY VIEWS OF THE RIGHT KNEE; 3 XRAY VIEWS OF THE LEFT KNEE 4/14/2019 9:44 am COMPARISON: None. HISTORY: ORDERING SYSTEM PROVIDED HISTORY: Bilateral knee erythema/warmth, enterococcus bacteremia TECHNOLOGIST PROVIDED HISTORY: Bilateral knee erythema/warmth, enterococcus bacteremia Ordering Physician Provided Reason for Exam: Bilateral knee erythema, enterococcus bacteremia Acuity: Acute Type of Exam: Initial FINDINGS: Right knee: Severe osteopenia is noted with deformity of the distal femur consistent with old healed trauma. A smoothly demarcated exostosis is noted medial dorsal aspect of the distal femur likely secondary to old healed trauma. Moderate arthritic changes present in the knee. No acute fracture, effusion or cortical destruction is noted. Bony structures are gracile in appearance. Left knee: Severe osteopenia is noted.   Mild to moderate arthritic changes are present without acute fracture, dislocation, effusion or cortical destruction. Bony structures are gracile in appearance. Right knee: Deformity distal femur due to old healed trauma. Bony structures are gracile and severely osteopenic, limiting assessment. Degenerative changes are present without acute fracture or bony destruction. Left knee: Bony structures are gracile and severely osteopenic limiting assessment. Degenerative changes are present without acute osseous abnormality or effusion. RECOMMENDATION: Radionuclide bone imaging may prove helpful for further assessment given severe osteopenia and sepsis. Xr Knee Right (3 Views)    Result Date: 4/14/2019  EXAMINATION: 3 XRAY VIEWS OF THE RIGHT KNEE; 3 XRAY VIEWS OF THE LEFT KNEE 4/14/2019 9:44 am COMPARISON: None. HISTORY: ORDERING SYSTEM PROVIDED HISTORY: Bilateral knee erythema/warmth, enterococcus bacteremia TECHNOLOGIST PROVIDED HISTORY: Bilateral knee erythema/warmth, enterococcus bacteremia Ordering Physician Provided Reason for Exam: Bilateral knee erythema, enterococcus bacteremia Acuity: Acute Type of Exam: Initial FINDINGS: Right knee: Severe osteopenia is noted with deformity of the distal femur consistent with old healed trauma. A smoothly demarcated exostosis is noted medial dorsal aspect of the distal femur likely secondary to old healed trauma. Moderate arthritic changes present in the knee. No acute fracture, effusion or cortical destruction is noted. Bony structures are gracile in appearance. Left knee: Severe osteopenia is noted. Mild to moderate arthritic changes are present without acute fracture, dislocation, effusion or cortical destruction. Bony structures are gracile in appearance. Right knee: Deformity distal femur due to old healed trauma. Bony structures are gracile and severely osteopenic, limiting assessment. Degenerative changes are present without acute fracture or bony destruction. Left knee: Bony structures are gracile and severely osteopenic limiting assessment. Degenerative changes are present without acute osseous abnormality or effusion. RECOMMENDATION: Radionuclide bone imaging may prove helpful for further assessment given severe osteopenia and sepsis. Xr Acute Abd Series Chest 1 Vw    Result Date: 4/8/2019  EXAMINATION: TWO XRAY VIEWS OF THE ABDOMEN AND SINGLE  XRAY VIEW OF THE CHEST 4/8/2019 9:34 am COMPARISON: Radiographs from 03/15/2019 and CT from 03/10/2019 HISTORY: ORDERING SYSTEM PROVIDED HISTORY: Pain TECHNOLOGIST PROVIDED HISTORY: Pain Ordering Physician Provided Reason for Exam: Patient states having abdominal pain, and nausea. Patient states was recently in the hospital for SBO. Acuity: Acute Type of Exam: Unknown Additional signs and symptoms: Patient states having abdominal pain, and nausea. Patient states was recently in the hospital for SBO. FINDINGS: Chest: Shallow inspiration. Marked cardiomegaly. Moderate pulmonary edema with right basilar atelectasis and rightward shift of the mediastinum, a chronic finding. No pneumothorax. No free subdiaphragmatic air. Chronic deformity of the right glenohumeral joint. Abdomen and pelvis: Cholecystectomy clips in right upper quadrant. Gaseous distention of large and small bowel with moderate to large rectal stool. Left upper quadrant gastrostomy tube. Deformity of bilateral hips. Diffuse bowel dilatation with air concerning for ileus versus at least partial obstruction. CT should be considered. Ct Abdomen Pelvis W Iv Contrast    Result Date: 4/8/2019  EXAMINATION: CT OF THE ABDOMEN AND PELVIS WITH CONTRAST 4/8/2019 11:02 am TECHNIQUE: CT of the abdomen and pelvis was performed with the administration of intravenous contrast. Multiplanar reformatted images are provided for review.  Dose modulation, iterative reconstruction, and/or weight based adjustment of the mA/kV was utilized to reduce the radiation dose to as low as reasonably achievable. COMPARISON: 10 March 2019 HISTORY: ORDERING SYSTEM PROVIDED HISTORY: vomiting TECHNOLOGIST PROVIDED HISTORY: IV Only Contrast FINDINGS: Lower Chest: Basilar atelectasis is noted. Heart size is normal.  No gross effusions are noted. Artifact is created due to arm position and respiratory motion. Organs: Liver, spleen, pancreas, and adrenals are unremarkable. Gallbladder is surgically absent. Kidneys excrete contrast bilaterally. Stable renal cysts are present. GI/Bowel: No free fluid or free air is noted. Gastrostomy tube is positioned with balloon within the confines of the stomach. Significant distension of the colon with both gas and stool is noted. No small bowel obstruction is noted although there is fluid scattered throughout much of the intestinal tract which may be related to mild enteritis. No bowel wall thickening is seen. Pelvis: No evidence of appendicitis. No abnormal fluid collections. Both inguinal rings are dilated and fat containing without strangulation. No inguinal adenopathy is seen. Peritoneum/Retroperitoneum: No aortic aneurysm, retroperitoneal mass, retroperitoneal or mesenteric adenopathy is noted. Bones/Soft Tissues: Severe scoliotic curvature is redemonstrated. Bony structures are slightly gracile. No acute osseous abnormality. Dense breast tissue is noted possibly gynecomastia. Estimated biologic radiation dose for this procedure:568.07 mGy/cm2.     1.  Diffuse distention of the colon with stool and gas. 2.  No small bowel obstruction. Fluid is scattered throughout the intestinal tract which may be related to mild enteritis. No free air. 3.  Colon is redundant. No bowel wall thickening. Findings suggest retrosigmoid stool impaction. Little change from prior exam. 4.  Gastrostomy tube in appropriate position.      Xr Chest Portable    Result Date: 4/15/2019  EXAMINATION: SINGLE XRAY VIEW OF THE CHEST 4/14/2019 6:44 am COMPARISON: Chest radiograph 04/10/2019 HISTORY: ORDERING SYSTEM PROVIDED HISTORY: infiltrate TECHNOLOGIST PROVIDED HISTORY: infiltrate Acuity: Unknown Type of Exam: Ongoing FINDINGS: Stable cardiomegaly. Pulmonary vascular congestion is unchanged. No focal consolidation. No pleural effusion or pneumothorax. Severe levoconvex curvature of the thoracic spine is similar to prior. Pulmonary vascular congestion is not significantly changed. Xr Chest Portable    Result Date: 4/10/2019  EXAMINATION: SINGLE XRAY VIEW OF THE CHEST 4/10/2019 5:53 pm COMPARISON: 9 April 2019 HISTORY: ORDERING SYSTEM PROVIDED HISTORY: SOB TECHNOLOGIST PROVIDED HISTORY: SOB Acuity: Acute Type of Exam: Unknown FINDINGS: AP portable view of the chest time stamped at 1749 hours demonstrates overlying cardiac monitoring electrodes. Patient is rotated toward the right. Pulmonary vascular congestion is noted without interval difference from prior exam.  A severe scoliotic curvature is noted. No extrapleural air is seen. Osseous structures are stable. Stable cardiac size. No change from prior study of 1 day earlier. Stable cardiomegaly and pulmonary vascular congestion. Xr Chest Portable    Result Date: 4/9/2019  EXAMINATION: SINGLE XRAY VIEW OF THE CHEST 4/9/2019 6:49 am COMPARISON: 24 February 2019 HISTORY: ORDERING SYSTEM PROVIDED HISTORY: AECOPD TECHNOLOGIST PROVIDED HISTORY: AECOPD FINDINGS: AP portable view of the chest time stamped at 642 hours demonstrates overlying cardiac monitoring electrodes. The patient is rotated toward the right. Stable cardiac size is noted. The patient has pulmonary vascular congestion with slight decreased compared to prior study of 1 day earlier. No gross effusion or extrapleural air is noted. The patient has a sharp angle scoliosis in the thoracic spine with a gibbus type deformity. No free air is noted. Mediastinal contours are stable. Stable cardiomegaly.   Pulmonary vascular congestion is noted

## 2019-04-19 NOTE — CARE COORDINATION
BPCI-A Notification Inpatient Visit:  (READMISSION)  Patient/family seen: No: PT with developmental delays- no family in room   Informed patient/family of BPCI-A Medical Bundle Program with potential outreach by either Care Transitions Team or naviHealth Team based on hospital admission and location. BPCI-A Notification Letter given: Yes- letter placed in paper chart to be included with discharge paperwork to [facility]   Current discharge plan: to [address] today when bed available   Collaboration completed with case management: Yes- writer notified CM Jeffry- states waiting on bed at [facility] to open.
Discharge planning    Have been in contact with [facility] and still no bed available. May need to consider speaking with family to see if could go to advanced.  Sister does prefer [facility] on initial conversation as he has been there in the past.     If patient still here tomorrow will discuss with supervisor kenton HUGHES
Discharge planning    No response to the messages left with Annabella Schlatter [facility] RN and house supervisor . Attempted to call phone on face sheet, no answer. Call to central [facility] center number at [phone] and was directed to call the on call supervisor at [phone] with Wendy López and directed to call Luisa Andres the  at [phone] ext [phone]. Called the above number and VM left with request to call back to discuss potential respite stay at the main [facility] due to readmissions    Patient has positive BC and ID now being consulted. Call back from [address] at [facility] and was directed to call shoaib below the DON of [facility] to see if patient can be dc to the center at [facility]. Call to Jennifer Watts Below [phone] to discuss patient and possible respite care center but Jennifer Watts stated that family wont allow him to go back to [facility]. ( they have wanted him there for a while now)     In prior admit he went to [facility]. inpatient order for progressive started today and will have referral to [facility] will pend on ID and possible atb. Will call to [facility] to  Discuss options. Phone [phone]  Cell [phone]     [facility] stated that she is very open to going to [facility] if he qualified. She will consider the [facility] but has certain requests regarding roommate. Await ID input if atb will be a candidate for LTAC> will give referral to [facility].   He would need to have a 3 night stay at intermediate progressive status
Discharge planning    Notified per Kathy Mask that patient is highest priority for admission.  Will notify writer when and if bed becomes available
Agency: [facility]   Patient able to perform ADL's:Dependent  DME in home:  Hospital bed, [facility] lift, shower chair, nebulizer, wheel chair, feed pump, oxygen 24/7, ([facility]), hand and foot splints   DME used to aid ambulation prior to admission:  Wheel chair  DME used during admission:        Potential Assistance Needed:        Pharmacy: [facility]              Potential Assistance Purchasing Medications:   no  Does patient want to participate in local refill/ meds to beds program?        Patient agreeable to home care: Yes  Freedom of choice provided:  n/a        Type of Home Care Services:     Patient expects to be discharged to:   home     Prior SNF/Rehab Placement and Facility:   Agreeable to SNF/Rehab: No  [place] of choice provided: n/a   Evaluation: yes     Expected Discharge date: Follow Up Appointment: Best Day/ Time:       Transportation provider: ambulance   Transportation arrangements needed for discharge: Yes     Discharge Plan: DEEP spoke with pt sister/Guardian Ninfa Hoffman regarding initial discharge plan, Ninfa Hoffman reports no changes since last admission, pt returned home from [facility] last week. The pt will discharge home at discharge. DEEP explained pt might be ready for discharge today or tomorrow.     DEEP provided Ninfa Hoffman with the number to call the unit to speak with pt RN.      Pt will return home at discharge

## 2019-04-19 NOTE — PLAN OF CARE
Problem: Falls - Risk of:  Goal: Will remain free from falls  Description  Will remain free from falls. Fall risk assessment completed. Patient instructed to use call light. Bed locked and in lowest position, side rails up 2/4, call light and bedside table within reach, clutter removed, and non-skid footwear on when pt out of bed. Seizure precautions in place. Bed alarm on and side-rails up/padded. Hourly rounds will continue. Outcome: Ongoing     Problem: Risk for Impaired Skin Integrity  Goal: Tissue integrity - skin and mucous membranes  Description  Structural intactness and normal physiological function of skin and  mucous membranes. Skin assessment completed. Patient repositioned every 2 hours and PRN, risk for pressure ulcer assessed as well as bony prominences. Patient instructed to turn self when applicable. Will continue to monitor. 4/19/2019 1148 by Carina Treadwell RN  Outcome: Ongoing     Problem: Respiratory:  Goal: Ability to maintain adequate ventilation will improve  Description  Ability to maintain adequate ventilation will improve. Will continue to monitor.    Outcome: Ongoing

## 2019-04-19 NOTE — PROGRESS NOTES
Nutrition Assessment (Enteral Nutrition)    Type and Reason for Visit: Reassess    Nutrition Recommendations: 1. Suggest continuing NPO status. 2. Consider maintaining continuous Tube Feeding per G-tube with:  Jevity 1.2 Chace (standard with fiber) @ 55 ml/hr (goal). Additional water flushes of 100 ml, every 6 hrs. Nutrition Assessment: Pt remains @ risk for malnutrition, with dx of acute respiratory failure requiring BiPAP, suspected aspiration & sepsis. +Wt gain of 4.3 kg since admission (4/10); on lasix (diuretic) medication. TF residuals=10-60 ml. Suggest continuing on Jevity 1.2 (standard with fiber formula) @ 55 ml/hr. Nursing providing additional water flushes of 100 ml, every 6 hrs. Malnutrition Assessment:  · Malnutrition Status: At risk for malnutrition  · Findings of the 6 clinical characteristics of malnutrition (Minimum of 2 out of 6 clinical characteristics is required to make the diagnosis of moderate or severe Protein Calorie Malnutrition based on AND/ASPEN Guidelines):  1. Energy Intake-Unable to assess, Unable to assess    2. Weight Loss-10% loss or greater, in 1 month  3. Fat Loss-Unable to assess  4. Muscle Loss-Unable to assess  5. Fluid Accumulation-Mild fluid accumulation, Extremities  6.   Strength-Not measured    Nutrition Risk Level: High    Nutrition Needs:  · Estimated Daily Total Kcal: 2288-4698 (28-32 kcal/kg)  · Estimated Daily Protein (g): 50-60 (1.0-1.2 g/kg)  · Estimated Daily Fluid (ml/day): 4674-3821 (1 ml/kcal)    Nutrition Diagnosis:   · Problem: Unintended weight loss  · Etiology: related to Insufficient energy/nutrient consumption     Signs and symptoms:  as evidenced by NPO status due to medical condition, Nutrition support - EN, Weight loss greater than or equal to 5% in 1 month, Localized or generalized fluid accumulation    Objective Information:  · Nutrition-Focused Physical Findings: GI:  soft, rounded, last BM (4/16), no guarding, active bowel sounds; PV:  BLE, +2; Skin:  bruising, body  · Wound Type: None  · Current Nutrition Therapies:  · Oral Diet Orders: NPO   · Tube Feeding (TF) Orders:   · Feeding Route: Gastrostomy  · Formula: Standard without Fiber  · Rate (ml/hr):55 ml/hr    · Volume (ml/day): 1,320 ml  · Duration: Continuous  · Water Flushes: 100 ml, every 6 hrs (400 ml)  · Current TF & Flush Orders Provides: See below  · Goal TF & Flush Orders Provides: @ 55 ml/hr (goal):  1,320 ml, 1,584 kcal, 73 g protein, 24 g dietary fiber & 1,065 ml free water/day  · Additional Calories: None  · Anthropometric Measures:  · Ht: 3' 10.85\" (119 cm)   · Current Body Wt: 119 lb 11.4 oz (54.3 kg)  · Admission Body Wt: 110 lb 3.7 oz (50 kg)  · Usual Body Wt: 128 lb (58.1 kg)(3/10/19)  · Weight Change: 14% loss in 1 month   · BMI Classification: BMI 35.0 - 39.9 Obese Class II    Nutrition Interventions:   Continue NPO, Continue current Tube Feeding  Continued Inpatient Monitoring    Nutrition Evaluation:   · Evaluation: Goal achieved   · Goals: EN intake to meet >75% of estimated kcal/protein needs, with good GI tolerance   · Monitoring: Nutrition Progression, TF Intake, TF Tolerance, Skin Integrity, I&O, Weight, Pertinent Labs, Nausea or Vomiting, Constipation, Monitor Bowel Function      Electronically signed by Darlene Perera RDN, LD on 4/19/19 at 12:26 PM    Contact Number: [phone]

## 2019-04-20 LAB
ABSOLUTE EOS #: 0.1 K/UL (ref 0–0.4)
ABSOLUTE IMMATURE GRANULOCYTE: ABNORMAL K/UL (ref 0–0.3)
ABSOLUTE LYMPH #: 3 K/UL (ref 1–4.8)
ABSOLUTE MONO #: 1.2 K/UL (ref 0.2–0.8)
ANION GAP SERPL CALCULATED.3IONS-SCNC: 12 MMOL/L (ref 9–17)
BASOPHILS # BLD: 0 % (ref 0–2)
BASOPHILS ABSOLUTE: 0 K/UL (ref 0–0.2)
BUN BLDV-MCNC: 9 MG/DL (ref 6–20)
BUN/CREAT BLD: ABNORMAL (ref 9–20)
CALCIUM SERPL-MCNC: 9.4 MG/DL (ref 8.6–10.4)
CHLORIDE BLD-SCNC: 97 MMOL/L (ref 98–107)
CO2: 32 MMOL/L (ref 20–31)
CREAT SERPL-MCNC: <0.4 MG/DL (ref 0.7–1.2)
CULTURE: NORMAL
DIFFERENTIAL TYPE: ABNORMAL
EOSINOPHILS RELATIVE PERCENT: 1 % (ref 1–4)
GFR AFRICAN AMERICAN: ABNORMAL ML/MIN
GFR NON-AFRICAN AMERICAN: ABNORMAL ML/MIN
GFR SERPL CREATININE-BSD FRML MDRD: ABNORMAL ML/MIN/{1.73_M2}
GFR SERPL CREATININE-BSD FRML MDRD: ABNORMAL ML/MIN/{1.73_M2}
GLUCOSE BLD-MCNC: 117 MG/DL (ref 70–99)
HCT VFR BLD CALC: 35.2 % (ref 41–53)
HEMOGLOBIN: 11.8 G/DL (ref 13.5–17.5)
IMMATURE GRANULOCYTES: ABNORMAL %
LYMPHOCYTES # BLD: 29 % (ref 24–44)
Lab: NORMAL
MCH RBC QN AUTO: 30.6 PG (ref 26–34)
MCHC RBC AUTO-ENTMCNC: 33.5 G/DL (ref 31–37)
MCV RBC AUTO: 91.4 FL (ref 80–100)
MONOCYTES # BLD: 11 % (ref 1–7)
NRBC AUTOMATED: ABNORMAL PER 100 WBC
PDW BLD-RTO: 16.2 % (ref 11.5–14.5)
PLATELET # BLD: 474 K/UL (ref 130–400)
PLATELET ESTIMATE: ABNORMAL
PMV BLD AUTO: 7.6 FL (ref 6–12)
POTASSIUM SERPL-SCNC: 3.6 MMOL/L (ref 3.7–5.3)
RBC # BLD: 3.86 M/UL (ref 4.5–5.9)
RBC # BLD: ABNORMAL 10*6/UL
SEG NEUTROPHILS: 59 % (ref 36–66)
SEGMENTED NEUTROPHILS ABSOLUTE COUNT: 6 K/UL (ref 1.8–7.7)
SODIUM BLD-SCNC: 141 MMOL/L (ref 135–144)
SPECIMEN DESCRIPTION: NORMAL
WBC # BLD: 10.4 K/UL (ref 3.5–11)
WBC # BLD: ABNORMAL 10*3/UL

## 2019-04-20 PROCEDURE — 94761 N-INVAS EAR/PLS OXIMETRY MLT: CPT

## 2019-04-20 PROCEDURE — 6370000000 HC RX 637 (ALT 250 FOR IP): Performed by: NURSE PRACTITIONER

## 2019-04-20 PROCEDURE — 2700000000 HC OXYGEN THERAPY PER DAY

## 2019-04-20 PROCEDURE — 6370000000 HC RX 637 (ALT 250 FOR IP): Performed by: INTERNAL MEDICINE

## 2019-04-20 PROCEDURE — 94640 AIRWAY INHALATION TREATMENT: CPT

## 2019-04-20 PROCEDURE — 2580000003 HC RX 258: Performed by: INTERNAL MEDICINE

## 2019-04-20 PROCEDURE — 6360000002 HC RX W HCPCS: Performed by: INTERNAL MEDICINE

## 2019-04-20 PROCEDURE — 36415 COLL VENOUS BLD VENIPUNCTURE: CPT

## 2019-04-20 PROCEDURE — 80048 BASIC METABOLIC PNL TOTAL CA: CPT

## 2019-04-20 PROCEDURE — 99232 SBSQ HOSP IP/OBS MODERATE 35: CPT | Performed by: INTERNAL MEDICINE

## 2019-04-20 PROCEDURE — 85025 COMPLETE CBC W/AUTO DIFF WBC: CPT

## 2019-04-20 PROCEDURE — 2060000000 HC ICU INTERMEDIATE R&B

## 2019-04-20 RX ORDER — DIPHENHYDRAMINE HCL 12.5MG/5ML
12.5 LIQUID (ML) ORAL EVERY 6 HOURS PRN
Status: DISCONTINUED | OUTPATIENT
Start: 2019-04-20 | End: 2019-04-21 | Stop reason: HOSPADM

## 2019-04-20 RX ADMIN — BACLOFEN 10 MG: 10 TABLET ORAL at 20:38

## 2019-04-20 RX ADMIN — Medication 15 ML: at 08:38

## 2019-04-20 RX ADMIN — FLUTICASONE PROPIONATE 2 SPRAY: 50 SPRAY, METERED NASAL at 09:00

## 2019-04-20 RX ADMIN — BACLOFEN 10 MG: 10 TABLET ORAL at 08:40

## 2019-04-20 RX ADMIN — BUDESONIDE 500 MCG: 0.5 SUSPENSION RESPIRATORY (INHALATION) at 07:29

## 2019-04-20 RX ADMIN — BUDESONIDE 500 MCG: 0.5 SUSPENSION RESPIRATORY (INHALATION) at 20:03

## 2019-04-20 RX ADMIN — ANTACID TABLETS 1250 MG: 500 TABLET, CHEWABLE ORAL at 08:39

## 2019-04-20 RX ADMIN — GLYCOPYRROLATE 1 MG: 1 TABLET ORAL at 15:17

## 2019-04-20 RX ADMIN — MONTELUKAST SODIUM 10 MG: 10 TABLET, FILM COATED ORAL at 20:38

## 2019-04-20 RX ADMIN — IPRATROPIUM BROMIDE AND ALBUTEROL SULFATE 1 AMPULE: .5; 3 SOLUTION RESPIRATORY (INHALATION) at 07:28

## 2019-04-20 RX ADMIN — BACLOFEN 10 MG: 10 TABLET ORAL at 15:17

## 2019-04-20 RX ADMIN — VANCOMYCIN HYDROCHLORIDE 1250 MG: 5 INJECTION, POWDER, LYOPHILIZED, FOR SOLUTION INTRAVENOUS at 04:58

## 2019-04-20 RX ADMIN — LACOSAMIDE 150 MG: 100 TABLET, FILM COATED ORAL at 08:39

## 2019-04-20 RX ADMIN — CETIRIZINE HYDROCHLORIDE 5 MG: 5 TABLET ORAL at 08:40

## 2019-04-20 RX ADMIN — VITAMIN D, TAB 1000IU (100/BT) 1000 UNITS: 25 TAB at 08:39

## 2019-04-20 RX ADMIN — LAMOTRIGINE 300 MG: 100 TABLET ORAL at 08:39

## 2019-04-20 RX ADMIN — CEFTRIAXONE 2 G: 2 INJECTION, POWDER, FOR SOLUTION INTRAMUSCULAR; INTRAVENOUS at 15:17

## 2019-04-20 RX ADMIN — CEFTRIAXONE 2 G: 2 INJECTION, POWDER, FOR SOLUTION INTRAMUSCULAR; INTRAVENOUS at 04:10

## 2019-04-20 RX ADMIN — LEVETIRACETAM 1500 MG: 100 SOLUTION ORAL at 20:38

## 2019-04-20 RX ADMIN — PREDNISONE 20 MG: 20 TABLET ORAL at 08:40

## 2019-04-20 RX ADMIN — LEVETIRACETAM 1500 MG: 100 SOLUTION ORAL at 08:38

## 2019-04-20 RX ADMIN — Medication 10 ML: at 20:39

## 2019-04-20 RX ADMIN — IPRATROPIUM BROMIDE AND ALBUTEROL SULFATE 1 AMPULE: .5; 3 SOLUTION RESPIRATORY (INHALATION) at 14:38

## 2019-04-20 RX ADMIN — ENOXAPARIN SODIUM 40 MG: 40 INJECTION SUBCUTANEOUS at 08:40

## 2019-04-20 RX ADMIN — GLYCOPYRROLATE 1 MG: 1 TABLET ORAL at 20:38

## 2019-04-20 RX ADMIN — VANCOMYCIN HYDROCHLORIDE 1250 MG: 5 INJECTION, POWDER, LYOPHILIZED, FOR SOLUTION INTRAVENOUS at 17:31

## 2019-04-20 RX ADMIN — LACOSAMIDE 150 MG: 100 TABLET, FILM COATED ORAL at 20:37

## 2019-04-20 RX ADMIN — Medication 40 MG: at 08:38

## 2019-04-20 RX ADMIN — IPRATROPIUM BROMIDE AND ALBUTEROL SULFATE 1 AMPULE: .5; 3 SOLUTION RESPIRATORY (INHALATION) at 11:24

## 2019-04-20 RX ADMIN — MICONAZOLE NITRATE: 20.6 POWDER TOPICAL at 08:35

## 2019-04-20 RX ADMIN — Medication 10 ML: at 08:35

## 2019-04-20 RX ADMIN — LAMOTRIGINE 200 MG: 100 TABLET ORAL at 20:38

## 2019-04-20 RX ADMIN — MICONAZOLE NITRATE: 20.6 POWDER TOPICAL at 20:38

## 2019-04-20 RX ADMIN — Medication 30 MG: at 08:38

## 2019-04-20 RX ADMIN — HYDROCODONE BITARTRATE AND ACETAMINOPHEN 1 TABLET: 5; 325 TABLET ORAL at 17:56

## 2019-04-20 RX ADMIN — GLYCOPYRROLATE 1 MG: 1 TABLET ORAL at 08:39

## 2019-04-20 RX ADMIN — IPRATROPIUM BROMIDE AND ALBUTEROL SULFATE 1 AMPULE: .5; 3 SOLUTION RESPIRATORY (INHALATION) at 20:02

## 2019-04-20 RX ADMIN — Medication 30 MG: at 15:17

## 2019-04-20 RX ADMIN — VITAMIN D, TAB 1000IU (100/BT) 1000 UNITS: 25 TAB at 20:38

## 2019-04-20 RX ADMIN — Medication 30 MG: at 20:38

## 2019-04-20 ASSESSMENT — PAIN SCALES - GENERAL
PAINLEVEL_OUTOF10: 6
PAINLEVEL_OUTOF10: 0

## 2019-04-20 ASSESSMENT — PAIN DESCRIPTION - DESCRIPTORS: DESCRIPTORS: PATIENT UNABLE TO DESCRIBE

## 2019-04-20 ASSESSMENT — PAIN DESCRIPTION - LOCATION: LOCATION: GENERALIZED

## 2019-04-20 ASSESSMENT — PAIN DESCRIPTION - PAIN TYPE: TYPE: OTHER (COMMENT)

## 2019-04-20 ASSESSMENT — PAIN DESCRIPTION - FREQUENCY: FREQUENCY: INTERMITTENT

## 2019-04-20 ASSESSMENT — PAIN DESCRIPTION - ORIENTATION: ORIENTATION: OTHER (COMMENT)

## 2019-04-20 NOTE — PROGRESS NOTES
[address]    Progress Note    4/20/2019    7:26 AM    Name:   Althea Velasquez  MRN:     [id number]     Acct:      [de-identified]   Room:   [address] [place] [address] Day:  8  Admit Date:  4/10/2019  4:55 PM    PCP:   Jenna Plascencia MD  Code Status:  DNR-CCA    Subjective:     C/C:   Chief Complaint   Patient presents with    Shortness of Breath     discharged from here yesterday     Interval History Status:    No seizures after he was transferred back to progressive unit 6 days back  Seizure medicines were adjusted by neurology  No respiratory distress  Still requires BiPAP more at night  Blood cultures coming positive for gram-positive cocci in clusters-final report Staphylococcus coagulation negative  Knees swelling is much better  Lactulose is being held today due to loose stools  Brief History:   39-year-old  male admitted from [facility] with difficulty breathing. Patient is well-known to our practice. He has spent an extended period of time at [facility] up until several weeks ago when he was moved to his group home. He was previously admitted here (4/8-4/9/2019) with respiratory insufficiency. On that admission in the ED his PaO2 was reported as 45. He was placed on BiPAP however when the O2 saturation sensor was repositioned from his finger to his ear he was satting at 100%. His chest x-ray showed some pulmonary vascular congestion but was otherwise unremarkable. His abdominal x-rays showed a large fecal load which is a chronic problem for this patient. His pro-calcitonin was 0.12 (<0.25 speaks against bacterial respiratory infection). His white count was normal. It had been reported that he was having emesis at the group home and there was some concern of aspiration however chest x-rays did not confirm this and the patient had no emesis during that admission. The patient did have a large bowel movement.  Pulmonary consultation was obtained and the patient was started empirically on azithromycin. His chest x-ray remained clear. His O2 saturations remained normal with minimal oxygen supplementation. He was discharged back to the group home with azithromycin 400 mg via G-tube daily ×5 days per pulmonary recommendation. ER reports that the group home had not given him any doses of his antibiotics. He is admitted with dyspnea, tachypnea and once again low O2 saturations. His past medical history includes cerebral palsy, severe scoliosis, COPD and congestive heart failure. Review of Systems:     Unable to obtain due to cerebral palsy    Medications: Allergies: Allergies   Allergen Reactions    Ampicillin Other (See Comments)     Has received and tolerated ceftriaxone and cefazolin.     Valium [Diazepam] Other (See Comments)     Has received and tolerated Ativan IV/PO    Other      Bubble bath products        Current Meds:   Scheduled Meds:    cefTRIAXone (ROCEPHIN) IV  2 g Intravenous Q12H    vancomycin  1,250 mg Intravenous Q12H    predniSONE  20 mg Oral Daily    pseudoephedrine HCl  30 mg Per G Tube TID    vancomycin (VANCOCIN) intermittent dosing (placeholder)   Other RX Placeholder    levETIRAcetam  1,500 mg PEG Tube BID    CENTRUM/CERTA-JAY with minerals oral  15 mL Per G Tube Daily    furosemide  40 mg Per G Tube Daily    scopolamine  1 patch Transdermal Q72H    cetirizine  5 mg Oral Daily    sennosides-docusate sodium  2 tablet Per G Tube Daily    vitamin D  1,000 Units Per G Tube BID    baclofen  10 mg Per G Tube TID    montelukast  10 mg Per G Tube Nightly    fluticasone  2 spray Nasal Daily    calcium carbonate  1,250 mg Per G Tube Daily    miconazole   Topical BID    glycopyrrolate  1 mg Per G Tube TID    lacosamide  150 mg PEG Tube BID    budesonide  500 mcg Nebulization BID    lactulose  20 g Per G Tube BID    polyethylene glycol  17 g Per G Tube Daily    lamoTRIgine  200 mg 141 141   K 3.9 3.8 3.6*   CL 98 95* 97*   CO2 33* 33* 32*   GLUCOSE 102* 136* 117*   BUN 10 8 9   CREATININE <0.40* <0.40* <0.40*   ANIONGAP 11 13 12   LABGLOM CANNOT BE CALCULATED CANNOT BE CALCULATED CANNOT BE CALCULATED   GFRAA CANNOT BE CALCULATED CANNOT BE CALCULATED CANNOT BE CALCULATED   CALCIUM 9.3 9.4 9.4     No results for input(s): PROT, LABALBU, LABA1C, D3AJJZS, D5DSLPN, FT4, TSH, AST, ALT, LDH, GGT, ALKPHOS, BILITOT, BILIDIR, AMMONIA, AMYLASE, LIPASE, LACTATE, CHOL, HDL, LDLCHOLESTEROL, CHOLHDLRATIO, TRIG, VLDL, PHENYTOIN, PHENYF in the last 72 hours. Lab Results   Component Value Date/Time    SPECIAL NOT REPORTED 04/14/2019 09:00 PM     Lab Results   Component Value Date/Time    CULTURE NO GROWTH 5 DAYS 04/14/2019 09:00 PM       Lab Results   Component Value Date    POCPH 7.42 04/13/2019    POCPCO2 44 04/13/2019    POCPO2 92 04/13/2019    POCHCO3 28.1 04/13/2019    NBEA NOT REPORTED 04/13/2019    PBEA 4 04/13/2019    WQL8LRM 29 04/13/2019    JDFM6TRJ 97 04/13/2019    FIO2 28.0 04/13/2019       Radiology:    Xr Knee Left (3 Views)    Result Date: 4/14/2019  EXAMINATION: 3 XRAY VIEWS OF THE RIGHT KNEE; 3 XRAY VIEWS OF THE LEFT KNEE 4/14/2019 9:44 am COMPARISON: None. HISTORY: ORDERING SYSTEM PROVIDED HISTORY: Bilateral knee erythema/warmth, enterococcus bacteremia TECHNOLOGIST PROVIDED HISTORY: Bilateral knee erythema/warmth, enterococcus bacteremia Ordering Physician Provided Reason for Exam: Bilateral knee erythema, enterococcus bacteremia Acuity: Acute Type of Exam: Initial FINDINGS: Right knee: Severe osteopenia is noted with deformity of the distal femur consistent with old healed trauma. A smoothly demarcated exostosis is noted medial dorsal aspect of the distal femur likely secondary to old healed trauma. Moderate arthritic changes present in the knee. No acute fracture, effusion or cortical destruction is noted. Bony structures are gracile in appearance.  Left knee: Severe osteopenia is noted.  Mild to moderate arthritic changes are present without acute fracture, dislocation, effusion or cortical destruction. Bony structures are gracile in appearance. Right knee: Deformity distal femur due to old healed trauma. Bony structures are gracile and severely osteopenic, limiting assessment. Degenerative changes are present without acute fracture or bony destruction. Left knee: Bony structures are gracile and severely osteopenic limiting assessment. Degenerative changes are present without acute osseous abnormality or effusion. RECOMMENDATION: Radionuclide bone imaging may prove helpful for further assessment given severe osteopenia and sepsis. Xr Knee Right (3 Views)    Result Date: 4/14/2019  EXAMINATION: 3 XRAY VIEWS OF THE RIGHT KNEE; 3 XRAY VIEWS OF THE LEFT KNEE 4/14/2019 9:44 am COMPARISON: None. HISTORY: ORDERING SYSTEM PROVIDED HISTORY: Bilateral knee erythema/warmth, enterococcus bacteremia TECHNOLOGIST PROVIDED HISTORY: Bilateral knee erythema/warmth, enterococcus bacteremia Ordering Physician Provided Reason for Exam: Bilateral knee erythema, enterococcus bacteremia Acuity: Acute Type of Exam: Initial FINDINGS: Right knee: Severe osteopenia is noted with deformity of the distal femur consistent with old healed trauma. A smoothly demarcated exostosis is noted medial dorsal aspect of the distal femur likely secondary to old healed trauma. Moderate arthritic changes present in the knee. No acute fracture, effusion or cortical destruction is noted. Bony structures are gracile in appearance. Left knee: Severe osteopenia is noted. Mild to moderate arthritic changes are present without acute fracture, dislocation, effusion or cortical destruction. Bony structures are gracile in appearance. Right knee: Deformity distal femur due to old healed trauma. Bony structures are gracile and severely osteopenic, limiting assessment.   Degenerative changes are present without acute fracture or bony destruction. Left knee: Bony structures are gracile and severely osteopenic limiting assessment. Degenerative changes are present without acute osseous abnormality or effusion. RECOMMENDATION: Radionuclide bone imaging may prove helpful for further assessment given severe osteopenia and sepsis. Xr Acute Abd Series Chest 1 Vw    Result Date: 4/8/2019  EXAMINATION: TWO XRAY VIEWS OF THE ABDOMEN AND SINGLE  XRAY VIEW OF THE CHEST 4/8/2019 9:34 am COMPARISON: Radiographs from 03/15/2019 and CT from 03/10/2019 HISTORY: ORDERING SYSTEM PROVIDED HISTORY: Pain TECHNOLOGIST PROVIDED HISTORY: Pain Ordering Physician Provided Reason for Exam: Patient states having abdominal pain, and nausea. Patient states was recently in the hospital for SBO. Acuity: Acute Type of Exam: Unknown Additional signs and symptoms: Patient states having abdominal pain, and nausea. Patient states was recently in the hospital for SBO. FINDINGS: Chest: Shallow inspiration. Marked cardiomegaly. Moderate pulmonary edema with right basilar atelectasis and rightward shift of the mediastinum, a chronic finding. No pneumothorax. No free subdiaphragmatic air. Chronic deformity of the right glenohumeral joint. Abdomen and pelvis: Cholecystectomy clips in right upper quadrant. Gaseous distention of large and small bowel with moderate to large rectal stool. Left upper quadrant gastrostomy tube. Deformity of bilateral hips. Diffuse bowel dilatation with air concerning for ileus versus at least partial obstruction. CT should be considered. Ct Abdomen Pelvis W Iv Contrast    Result Date: 4/8/2019  EXAMINATION: CT OF THE ABDOMEN AND PELVIS WITH CONTRAST 4/8/2019 11:02 am TECHNIQUE: CT of the abdomen and pelvis was performed with the administration of intravenous contrast. Multiplanar reformatted images are provided for review.  Dose modulation, iterative reconstruction, and/or weight based adjustment of the mA/kV was utilized to reduce the radiation dose to as low as reasonably achievable. COMPARISON: 10 March 2019 HISTORY: ORDERING SYSTEM PROVIDED HISTORY: vomiting TECHNOLOGIST PROVIDED HISTORY: IV Only Contrast FINDINGS: Lower Chest: Basilar atelectasis is noted. Heart size is normal.  No gross effusions are noted. Artifact is created due to arm position and respiratory motion. Organs: Liver, spleen, pancreas, and adrenals are unremarkable. Gallbladder is surgically absent. Kidneys excrete contrast bilaterally. Stable renal cysts are present. GI/Bowel: No free fluid or free air is noted. Gastrostomy tube is positioned with balloon within the confines of the stomach. Significant distension of the colon with both gas and stool is noted. No small bowel obstruction is noted although there is fluid scattered throughout much of the intestinal tract which may be related to mild enteritis. No bowel wall thickening is seen. Pelvis: No evidence of appendicitis. No abnormal fluid collections. Both inguinal rings are dilated and fat containing without strangulation. No inguinal adenopathy is seen. Peritoneum/Retroperitoneum: No aortic aneurysm, retroperitoneal mass, retroperitoneal or mesenteric adenopathy is noted. Bones/Soft Tissues: Severe scoliotic curvature is redemonstrated. Bony structures are slightly gracile. No acute osseous abnormality. Dense breast tissue is noted possibly gynecomastia. Estimated biologic radiation dose for this procedure:568.07 mGy/cm2.     1.  Diffuse distention of the colon with stool and gas. 2.  No small bowel obstruction. Fluid is scattered throughout the intestinal tract which may be related to mild enteritis. No free air. 3.  Colon is redundant. No bowel wall thickening. Findings suggest retrosigmoid stool impaction. Little change from prior exam. 4.  Gastrostomy tube in appropriate position.      Xr Chest Portable    Result Date: 4/15/2019  EXAMINATION: SINGLE XRAY VIEW OF THE CHEST 4/14/2019 6:44 am COMPARISON: Chest radiograph 04/10/2019 HISTORY: ORDERING SYSTEM PROVIDED HISTORY: infiltrate TECHNOLOGIST PROVIDED HISTORY: infiltrate Acuity: Unknown Type of Exam: Ongoing FINDINGS: Stable cardiomegaly. Pulmonary vascular congestion is unchanged. No focal consolidation. No pleural effusion or pneumothorax. Severe levoconvex curvature of the thoracic spine is similar to prior. Pulmonary vascular congestion is not significantly changed. Xr Chest Portable    Result Date: 4/10/2019  EXAMINATION: SINGLE XRAY VIEW OF THE CHEST 4/10/2019 5:53 pm COMPARISON: 9 April 2019 HISTORY: ORDERING SYSTEM PROVIDED HISTORY: SOB TECHNOLOGIST PROVIDED HISTORY: SOB Acuity: Acute Type of Exam: Unknown FINDINGS: AP portable view of the chest time stamped at 1749 hours demonstrates overlying cardiac monitoring electrodes. Patient is rotated toward the right. Pulmonary vascular congestion is noted without interval difference from prior exam.  A severe scoliotic curvature is noted. No extrapleural air is seen. Osseous structures are stable. Stable cardiac size. No change from prior study of 1 day earlier. Stable cardiomegaly and pulmonary vascular congestion. Xr Chest Portable    Result Date: 4/9/2019  EXAMINATION: SINGLE XRAY VIEW OF THE CHEST 4/9/2019 6:49 am COMPARISON: 24 February 2019 HISTORY: ORDERING SYSTEM PROVIDED HISTORY: AECOPD TECHNOLOGIST PROVIDED HISTORY: AECOPD FINDINGS: AP portable view of the chest time stamped at 642 hours demonstrates overlying cardiac monitoring electrodes. The patient is rotated toward the right. Stable cardiac size is noted. The patient has pulmonary vascular congestion with slight decreased compared to prior study of 1 day earlier. No gross effusion or extrapleural air is noted. The patient has a sharp angle scoliosis in the thoracic spine with a gibbus type deformity. No free air is noted. Mediastinal contours are stable. Stable cardiomegaly.   Pulmonary vascular congestion is noted with slight decreased compared to prior study of 1 day earlier. No gross effusions. Physical Examination:        General appearance:  Awake but not communicative, more alert than before, grinds teeth frequently, oxygen via nasal cannula in place  Mental Status:  Unable to evaluate due to cerebral palsy  Lungs:  Conducted breath sounds all over  Heart:  regular rate and rhythm, no murmur  Abdomen:  soft, nontender, nondistended, normal bowel sounds, no masses, hepatomegaly, splenomegaly, PEG tube in place  Extremities:  + edema,no  redness, tenderness in the calves,+ swelling both knees which is improving  Skin:  Nonspecific rash on neck, arms and abdomen    Assessment:        Primary Problem  Acute respiratory failure (HCC)   Possible suspected aspiration pneumonia  Possible bilateral septic arthritis both knee joints-improving  Nonspecific rash on body  Active Hospital Problems    Diagnosis Date Noted    Enterococcal sepsis ([address] [A41.81]     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] 04/14/2019    Hip dysplasia, acquired, left [M21.852] 04/14/2019    Obesity, Class II, BMI 35-39.9 [E66.9] 04/13/2019    Malfunction of percutaneous endoscopic gastrostomy (PEG) tube ([address] [K94.23] 04/13/2019    Breakthrough seizure ([address] [G40.919]     Mental retardation [F79]     COPD exacerbation ([address] [J44.1] 04/12/2019    Acute respiratory failure (HCC) [J96.00] 04/10/2019    Developmental disability [F89]     Severe protein-calorie malnutrition Sherrin Cue: less than 60% of standard weight) ([address] [E43] 11/15/2017    Acute exacerbation of chronic obstructive pulmonary disease (COPD) ([address] [J44.1]     Cerebral palsied ([address] [G80.9]     Seizure disorder ([address] [G40.909]        Plan:        1. Seizure medicines already adjusted by neurology, to be given by PEG tube  2.  Continue antibiotics as per ID evaluation-currently on vancomycin, ID also recommending to add Rocephin for 6 weeks through May 22, 2019  3. Oxygen/BiPAP as needed  4. DVT and GI prophylaxis  5. Transfer to LTAC when bed available, ID will follow him over there  6. Keep close watch on rash  7.  But 25 mg 3 times a day when necessary for rash    [address] Scott MD  4/20/2019  7:26 AM

## 2019-04-20 NOTE — PROGRESS NOTES
PULMONARY PROGRESS NOTE:  Reason for visit; Pneumonia, sepsis  Interval History:     Unable to review systems    Events since last visit: none    PAST MEDICAL HISTORY:    Smoking: no    PHYSICAL EXAMINATION:  afebrile  General : not verbally responsive - baseline  Neck - supple, no lymphadenopathy, JVD not raised  Heart - regular rhythm, S1 and S2 normal; no additional sounds heard  Lungs - Air Entry- fair bilaterally; breath sounds : vesicular, 96% on 3 l nc  Abdomen - soft, no tenderness  Upper Extremities  - no cyanosis, mottling; edema : dependent edema  Lower Extremities: no cyanosis, mottling; edema : dependent edema    Current Laboratory, Radiologic, Microbiologic, and Diagnostic studies reviewed    ASSESSMENT / PLAN:    Impression:  · Acute respiratory failure requiring BiPAP  · Enterococcus faecalis sepsis  · Suspected aspiration  · Atelectasis/pulmonary edema  · Dysphagia s/p PEG  · Cerebral palsy with spastic quadriparesis/seizure disorder  · Chronic constipation     Recommendations:  · 2 liters/min via nasal cannula - humidification   · Monitor oxygen   · Oral care every shift   · BiPAP with sleep and as needed  · Continue IV Vanco, Rocephin per infectious disease  · Budesonide aerosols every 12 hours  · Albuterol and Ipratropium Q 4 hours and prn  · Singulair  · Prednisone 20 mg daily  · Neurology following  · PT/passive ROM  · DNR CCA  · Discharge planning to LTAC OK from pulmonary standpoint     Plan of care discussed with KARIME Salamanca - CNP     REASON FOR VISIT:  Pneumonia, sepsis  I examined the patient myself  The assessment and Plan in the note per my discussion with NP    Electronically signed by Patsy Beth on 04/20/19

## 2019-04-20 NOTE — PLAN OF CARE
Problem: Nutrition  Goal: Optimal nutrition therapy  Description  Nutrition Problem: Unintended weight loss  Intervention: Food and/or Nutrient Delivery: Continue NPO, Continue current Tube Feeding  Nutritional Goals: EN intake to meet >75% of estimated kcal/protein needs, with good GI tolerance   Outcome: Ongoing   Pt tolerating tube feeding this shift. Will continue to monitor. Problem: Falls - Risk of:  Goal: Will remain free from falls  Description  Will remain free from falls  4/20/2019 1339 by Jane Liao RN  Outcome: Ongoing   No falls this shift. Problem: Falls - Risk of:  Goal: Absence of physical injury  Description  Absence of physical injury  Outcome: Ongoing   No injury noted this shift. Problem: Risk for Impaired Skin Integrity  Goal: Tissue integrity - skin and mucous membranes  Description  Structural intactness and normal physiological function of skin and  mucous membranes. 4/20/2019 1339 by Jane Liao RN  Outcome: Ongoing   Pt has no new altered skin this shift. Will continue to monitor. Problem: Respiratory:  Goal: Ability to maintain adequate ventilation will improve  Description  Ability to maintain adequate ventilation will improve  4/20/2019 1339 by Jane Liao RN  Outcome: Ongoing   Pt continues on oxygen per Nc. Continues also on continuous pulse ox. Will continue to monitor. Problem: Safety:  Goal: Ability to remain free from injury will improve  Description  Ability to remain free from injury will improve  Outcome: Ongoing   No injury noted this shift.

## 2019-04-20 NOTE — PROGRESS NOTES
[facility]  Progress Note    Cathie Corbin  MRN: [id number]  Date: 4/20/2019    Reason for F/U :   Enterococcus faecalis sepsis    Impression :   1. Respiratory insufficiency   · Intermittently has required BiPAP  2. Enterococcus faecalis sepsis-concern for endocarditis  · 2-D echocardiogram was a poor study  · Not a good candidate for JAQUELIN  3. Possible septic arthritis-bilateral knee joints  · Status post aspiration 4/14/19 - cultures negative thus far  4. Coagulase negative staph 1 out of 2 sets likely a contaminant   5. Cerebral palsy  6. Penicillin allergy-has tolerated cephalosporins    Recommendations:   · Continue intravenous antimicrobial therapy with vancomycin and Rocephin through May 22, 2019 to complete a 6 week course of therapy for presumed endocarditis  · The patient is awaiting a bed at [facility].  · He remains clinically stable and will be followed by infectious diseases at the [place] [id number]. Infection Control Recommendations:   Universal precautions    Discharge Planning:   Estimated Length of IV antimicrobials: 6 weeks through May 22, 2019  Patient will need PICC line Insertion  Patient will need: LTAC  Patient willneed outpatient wound care: No    MedicalDecision making / Summary of Stay:   Rosalva Lacey a 51y.o.-year-old male who was initially admitted on 4/10/2019. INITIAL HISTORY:     Patient has a known medical history of cerebral palsy and is nonverbal, he also has a history of COPD and congestive heart failure. He does reside in a long-term care facility, [facility]. Patient was recently discharged from [facility] 4/9/2019 after being admitted and treated for respiratory failure. In the hospitalization, his chest x-ray showed pulmonary vascular congestion. His abdominal x-ray showed large fecal load which is a chronic problem for this particular patient him understanding. He was given IV azithromycin during the hospitalization.  He was discharged on liquid levofloxacin to be given through the G-tube; however, per the ER notes, patient did not receive that medication at his facility. He was brought to [facility] due to shortness of breath as well as emesis. Patient was started back on azithromycin. · Patient admitted due to shortness of breath. Intermittently requiring BiPAP  · Blood cultures  Enterococcus faecalis  · 2019 patient started having seizure-like activity as well as increased restlessness, patient transferred to the ICU  · 19 Bilateral knees with erythema/ warmth, concern for septic arthritis, ortho consulted. UA negative. · Arthrocentesis of the left and right knees was done 19 and both had  non-purulent synovial fluid aspirated      CURRENT EVALUATION : :2019    BP (!) 153/82   Pulse 80   Temp 97.3 °F (36.3 °C) (Axillary)   Resp 18   Ht 3' 10.85\" (1.19 m)   Wt 119 lb 12.8 oz (54.3 kg)   SpO2 96%   BMI 38.37 kg/m²     Temperature Range: Temp: 97.3 °F (36.3 °C) Temp  Av.7 °F (36.5 °C)  Min: 97.3 °F (36.3 °C)  Max: 98.4 °F (36.9 °C)     The patient is seen and evaluated at bedside he is awake and alert currently on O2 by nasal cannula. He has continued to require BiPAP overnight. Afebrile  VS stable    No new issues  Awaiting bed availability at [facility]. Review of Systems   Unable to perform ROS: Patient nonverbal       Physical Examination :     Physical Exam   Constitutional: He is oriented to person, place, and time. He is of small stature   HENT:   Head: Normocephalic and atraumatic. Dry oral mucosa   Cardiovascular: Normal rate and normal heart sounds. Exam reveals no gallop and no friction rub. Pulmonary/Chest: Effort normal. He has no wheezes. He has rales. Abdominal: Soft. Bowel sounds are normal. He exhibits no mass. There is no tenderness. Musculoskeletal: He exhibits no edema.    Minimal effusions noted and erythroderma has resolved   Neurological: He is alert and oriented to person, place, and time. Skin: Skin is warm and dry. Laboratory data:   I have independently reviewed the followinglabs:  CBC with Differential:   Recent Labs     04/19/19  0530 04/20/19  0644   WBC 10.5 10.4   HGB 12.4* 11.8*   HCT 37.2* 35.2*   * 474*   LYMPHOPCT 30 29   MONOPCT 10* 11*     BMP:   Recent Labs     04/19/19  0530 04/20/19  0644    141   K 3.8 3.6*   CL 95* 97*   CO2 33* 32*   BUN 8 9   CREATININE <0.40* <0.40*     Hepatic Function Panel: No results for input(s): PROT, LABALBU, BILIDIR, IBILI, BILITOT, ALKPHOS, ALT, AST in the last 72 hours. No results for input(s): VANCOTROUGH in the last 72 hours. No results found for: CRP  No results found for: SEDRATE    No results for input(s): PROCAL in the last 72 hours. Imaging Studies:   Transthoracic Echocardiography Report (TTE) 04/15/2019    CONCLUSIONS    Summary  Very technically difficult study  Left ventricle was not well visualized. It is probably normal in size. Global left ventricular systolic function is grossly normal however an  ejection fraction cannot be estimated. Due to the technical limitations of this study cannot comment wall segments  motion. Valves were not adequately visualized either    Cultures:     Culture Blood #1 [[id number]] Collected: 04/14/19 0923   Order Status: Completed Specimen: Blood Updated: 04/19/19 3606    Specimen Description . BLOOD    Special Requests LEFT HAND 4ML AEROBIC ONLY    Culture NO GROWTH 5 DAYS     Culture Blood #1 [[id number]] (Abnormal) Collected: 04/14/19 0934   Order Status: Completed Specimen: Blood Updated: 04/16/19 8501    Specimen Description . BLOOD    Special Requests RIGHT HAND.  9ML AEROBIC 1ML ANAEROBIC    Culture --Abnormal     POSITIVE Blood Culture   Results called to and read back by:   SAUL Murillo. [id number]   Abnormal     Culture --    DIRECT GRAM STAIN FROM BOTTLE:   GRAM POSITIVE COCCI IN CLUSTERS     Culture --Abnormal     STAPHYLOCOCCUS SPECIES, COAGULASE Specimen: Blood Updated: 04/12/19 2052    Specimen Description . BLOOD    Special Requests 6ML RT FA    Culture --Abnormal     POSITIVE Blood Culture   Results called to and read back by:   Claudia Chavez. 4/11 1030   Abnormal     Culture --    DIRECT GRAM STAIN FROM BOTTLE:   GRAM POSITIVE COCCI IN CHAINS     Culture ENTEROCOCCUS FAECALISAbnormal     Culture For susceptibility, refer to previous culture. Cult,Blood #2 [[id number]] (Abnormal)  Collected: 04/10/19 1747   Order Status: Completed Specimen: Blood Updated: 04/12/19 2050    Specimen Description . BLOOD    Special Requests 12 ML RT UPPER ARM    Culture --Abnormal     POSITIVE Blood Culture   Results called to and read back by:   Claudia Chavez. 4/11 1030   Abnormal     Culture --    DIRECT GRAM STAIN FROM BOTTLE:   GRAM POSITIVE COCCI IN CHAINS     Culture --    ID by PNAFISH:   ENTEROCOCCUS FAECALIS     Culture Comment:  >99% of E. faecalis isolates are susceptible to Ampicillin and Vancomycin. Abnormal     Culture ENTEROCOCCUS FAECALISAbnormal    Enterococcus  faecalis (5)     Antibiotic Interpretation ANGELA Status    ampicillin Sensitive  Final     <=2  SUSCEPTIBLE   penicillin   Final     NOT REPORTED   ciprofloxacin   Final     NOT REPORTED   erythromycin   Final     NOT REPORTED   Gentamicin, High Level Resistant RESISTANT Final    levofloxacin   Final     NOT REPORTED   linezolid   Final     NOT REPORTED   nitrofurantoin   Final     NOT REPORTED   Synercid   Final     NOT REPORTED   Streptomycin, Hi Level Sensitive SUSCEPTIBLE Final    tetracycline   Final     NOT REPORTED   tigecycline   Final     NOT REPORTED   vancomycin Sensitive  Final     1  SUSCEPTIBLE     Medications:      cefTRIAXone (ROCEPHIN) IV  2 g Intravenous Q12H    vancomycin  1,250 mg Intravenous Q12H    predniSONE  20 mg Oral Daily    pseudoephedrine HCl  30 mg Per G Tube TID    vancomycin (VANCOCIN) intermittent dosing (placeholder)   Other RX Placeholder    levETIRAcetam  1,500 mg PEG Tube BID    CENTRUM/CERTA-JAY with minerals oral  15 mL Per G Tube Daily    furosemide  40 mg Per G Tube Daily    scopolamine  1 patch Transdermal Q72H    cetirizine  5 mg Oral Daily    sennosides-docusate sodium  2 tablet Per G Tube Daily    vitamin D  1,000 Units Per G Tube BID    baclofen  10 mg Per G Tube TID    montelukast  10 mg Per G Tube Nightly    fluticasone  2 spray Nasal Daily    calcium carbonate  1,250 mg Per G Tube Daily    miconazole   Topical BID    glycopyrrolate  1 mg Per G Tube TID    lacosamide  150 mg PEG Tube BID    budesonide  500 mcg Nebulization BID    lactulose  20 g Per G Tube BID    polyethylene glycol  17 g Per G Tube Daily    lamoTRIgine  200 mg Per G Tube Nightly    lamoTRIgine  300 mg Per G Tube QAM    lansoprazole  30 mg Per G Tube Daily    sodium chloride flush  10 mL Intravenous 2 times per day    enoxaparin  40 mg Subcutaneous Daily    ipratropium-albuterol  1 ampule Inhalation Q4H WA     Thank you for allowing us to participate in the care of this patient. Please call with questions. [facility]  Myron Rosales MD    [employer] messaging  OFFICE: [phone]  CELL:     [phone]    Electronically signed by Mariana Griffin MD on 4/20/2019 at 8:13 AM    This note iscreated with the assistance of a speech recognition program.  While intending to generate a document that actually reflects the content of the visit, the document can still have some errors including those of syntax andsound a like substitutions which may escape proof reading. It such instances, actual meaning can be extrapolated by contextual diversion.

## 2019-04-20 NOTE — PLAN OF CARE
Problem: Falls - Risk of:  Goal: Will remain free from falls  Description  Will remain free from falls  Outcome: Ongoing  Note:   Pt fall risk, fall band present, falling star, safety alarm activated and in use as needed. Hourly rounding performed. Pt encouraged to use call light. See Maria Luisa Knott fall risk assessment. Problem: Risk for Impaired Skin Integrity  Goal: Tissue integrity - skin and mucous membranes  Description  Structural intactness and normal physiological function of skin and  mucous membranes. Outcome: Ongoing  Note:   Turn and reposition in bed q2 hours. Pressure relief overlay on mattress. Monitoring skin with every assessment and prn. Offered restroom assistance to patient q2 hours. Problem: Respiratory:  Goal: Ability to maintain adequate ventilation will improve  Description  Ability to maintain adequate ventilation will improve  Outcome: Ongoing  Note:   Patient on continuous pulse ox sating 98% on 3L O2 via NC. Head of bed elevated. Vitals taken per unit protocol. Educating on deep breathing and coughing exercising. Will continue to monitor patient closely.

## 2019-04-21 ENCOUNTER — HOSPITAL ENCOUNTER (OUTPATIENT)
Dept: MEDSURG UNIT | Age: 49
Discharge: NURSING, REHAB OR LONG-TERM CARE | DRG: 871 | End: 2019-05-09
Attending: INTERNAL MEDICINE | Admitting: INTERNAL MEDICINE
Payer: MEDICARE

## 2019-04-21 VITALS
HEIGHT: 60 IN | WEIGHT: 119.8 LBS | BODY MASS INDEX: 23.52 KG/M2 | RESPIRATION RATE: 18 BRPM | DIASTOLIC BLOOD PRESSURE: 68 MMHG | TEMPERATURE: 98 F | OXYGEN SATURATION: 100 % | HEART RATE: 100 BPM | SYSTOLIC BLOOD PRESSURE: 126 MMHG

## 2019-04-21 LAB
ABSOLUTE EOS #: 0 K/UL (ref 0–0.4)
ABSOLUTE IMMATURE GRANULOCYTE: ABNORMAL K/UL (ref 0–0.3)
ABSOLUTE LYMPH #: 2.8 K/UL (ref 1–4.8)
ABSOLUTE MONO #: 1.2 K/UL (ref 0.2–0.8)
ANION GAP SERPL CALCULATED.3IONS-SCNC: 12 MMOL/L (ref 9–17)
BASOPHILS # BLD: 1 % (ref 0–2)
BASOPHILS ABSOLUTE: 0.1 K/UL (ref 0–0.2)
BUN BLDV-MCNC: 9 MG/DL (ref 6–20)
BUN/CREAT BLD: ABNORMAL (ref 9–20)
CALCIUM SERPL-MCNC: 9.5 MG/DL (ref 8.6–10.4)
CHLORIDE BLD-SCNC: 94 MMOL/L (ref 98–107)
CO2: 35 MMOL/L (ref 20–31)
CREAT SERPL-MCNC: <0.4 MG/DL (ref 0.7–1.2)
DIFFERENTIAL TYPE: ABNORMAL
EOSINOPHILS RELATIVE PERCENT: 0 % (ref 1–4)
GFR AFRICAN AMERICAN: ABNORMAL ML/MIN
GFR NON-AFRICAN AMERICAN: ABNORMAL ML/MIN
GFR SERPL CREATININE-BSD FRML MDRD: ABNORMAL ML/MIN/{1.73_M2}
GFR SERPL CREATININE-BSD FRML MDRD: ABNORMAL ML/MIN/{1.73_M2}
GLUCOSE BLD-MCNC: 107 MG/DL (ref 70–99)
HCT VFR BLD CALC: 34.8 % (ref 41–53)
HEMOGLOBIN: 11.7 G/DL (ref 13.5–17.5)
IMMATURE GRANULOCYTES: ABNORMAL %
LYMPHOCYTES # BLD: 27 % (ref 24–44)
MCH RBC QN AUTO: 30.5 PG (ref 26–34)
MCHC RBC AUTO-ENTMCNC: 33.6 G/DL (ref 31–37)
MCV RBC AUTO: 90.8 FL (ref 80–100)
MONOCYTES # BLD: 12 % (ref 1–7)
NRBC AUTOMATED: ABNORMAL PER 100 WBC
PDW BLD-RTO: 16.6 % (ref 11.5–14.5)
PLATELET # BLD: 488 K/UL (ref 130–400)
PLATELET ESTIMATE: ABNORMAL
PMV BLD AUTO: 7.6 FL (ref 6–12)
POTASSIUM SERPL-SCNC: 3.2 MMOL/L (ref 3.7–5.3)
RBC # BLD: 3.83 M/UL (ref 4.5–5.9)
RBC # BLD: ABNORMAL 10*6/UL
SEG NEUTROPHILS: 60 % (ref 36–66)
SEGMENTED NEUTROPHILS ABSOLUTE COUNT: 6.3 K/UL (ref 1.8–7.7)
SODIUM BLD-SCNC: 141 MMOL/L (ref 135–144)
WBC # BLD: 10.5 K/UL (ref 3.5–11)
WBC # BLD: ABNORMAL 10*3/UL

## 2019-04-21 PROCEDURE — 6370000000 HC RX 637 (ALT 250 FOR IP): Performed by: INTERNAL MEDICINE

## 2019-04-21 PROCEDURE — 6360000002 HC RX W HCPCS: Performed by: INTERNAL MEDICINE

## 2019-04-21 PROCEDURE — 94640 AIRWAY INHALATION TREATMENT: CPT

## 2019-04-21 PROCEDURE — 85025 COMPLETE CBC W/AUTO DIFF WBC: CPT

## 2019-04-21 PROCEDURE — 99239 HOSP IP/OBS DSCHRG MGMT >30: CPT | Performed by: INTERNAL MEDICINE

## 2019-04-21 PROCEDURE — 2580000003 HC RX 258: Performed by: INTERNAL MEDICINE

## 2019-04-21 PROCEDURE — 94761 N-INVAS EAR/PLS OXIMETRY MLT: CPT

## 2019-04-21 PROCEDURE — 2700000000 HC OXYGEN THERAPY PER DAY

## 2019-04-21 PROCEDURE — 36415 COLL VENOUS BLD VENIPUNCTURE: CPT

## 2019-04-21 PROCEDURE — 80048 BASIC METABOLIC PNL TOTAL CA: CPT

## 2019-04-21 RX ADMIN — VANCOMYCIN HYDROCHLORIDE 1250 MG: 5 INJECTION, POWDER, LYOPHILIZED, FOR SOLUTION INTRAVENOUS at 04:39

## 2019-04-21 RX ADMIN — LEVETIRACETAM 1500 MG: 100 SOLUTION ORAL at 09:47

## 2019-04-21 RX ADMIN — POTASSIUM BICARBONATE 40 MEQ: 782 TABLET, EFFERVESCENT ORAL at 10:27

## 2019-04-21 RX ADMIN — VITAMIN D, TAB 1000IU (100/BT) 1000 UNITS: 25 TAB at 09:49

## 2019-04-21 RX ADMIN — ANTACID TABLETS 1250 MG: 500 TABLET, CHEWABLE ORAL at 09:50

## 2019-04-21 RX ADMIN — Medication 30 MG: at 09:47

## 2019-04-21 RX ADMIN — ZINC OXIDE: 200 OINTMENT TOPICAL at 09:49

## 2019-04-21 RX ADMIN — SENNOSIDES AND DOCUSATE SODIUM 2 TABLET: 8.6; 5 TABLET ORAL at 09:49

## 2019-04-21 RX ADMIN — POLYETHYLENE GLYCOL 3350 17 G: 17 POWDER, FOR SOLUTION ORAL at 09:48

## 2019-04-21 RX ADMIN — PREDNISONE 20 MG: 20 TABLET ORAL at 09:50

## 2019-04-21 RX ADMIN — LAMOTRIGINE 300 MG: 100 TABLET ORAL at 09:50

## 2019-04-21 RX ADMIN — Medication 40 MG: at 09:48

## 2019-04-21 RX ADMIN — GLYCOPYRROLATE 1 MG: 1 TABLET ORAL at 09:50

## 2019-04-21 RX ADMIN — CETIRIZINE HYDROCHLORIDE 5 MG: 5 TABLET ORAL at 09:50

## 2019-04-21 RX ADMIN — BACLOFEN 10 MG: 10 TABLET ORAL at 09:49

## 2019-04-21 RX ADMIN — BUDESONIDE 500 MCG: 0.5 SUSPENSION RESPIRATORY (INHALATION) at 08:06

## 2019-04-21 RX ADMIN — IPRATROPIUM BROMIDE AND ALBUTEROL SULFATE 1 AMPULE: .5; 3 SOLUTION RESPIRATORY (INHALATION) at 08:06

## 2019-04-21 RX ADMIN — IPRATROPIUM BROMIDE AND ALBUTEROL SULFATE 1 AMPULE: .5; 3 SOLUTION RESPIRATORY (INHALATION) at 11:02

## 2019-04-21 RX ADMIN — LACOSAMIDE 150 MG: 100 TABLET, FILM COATED ORAL at 10:07

## 2019-04-21 RX ADMIN — FLUTICASONE PROPIONATE 2 SPRAY: 50 SPRAY, METERED NASAL at 09:48

## 2019-04-21 RX ADMIN — Medication 15 ML: at 09:47

## 2019-04-21 RX ADMIN — CEFTRIAXONE 2 G: 2 INJECTION, POWDER, FOR SOLUTION INTRAMUSCULAR; INTRAVENOUS at 03:59

## 2019-04-21 RX ADMIN — ENOXAPARIN SODIUM 40 MG: 40 INJECTION SUBCUTANEOUS at 09:49

## 2019-04-21 RX ADMIN — MICONAZOLE NITRATE: 20.6 POWDER TOPICAL at 09:48

## 2019-04-21 RX ADMIN — Medication 10 ML: at 09:51

## 2019-04-21 NOTE — PROGRESS NOTES
Nutrition Assessment (Enteral Nutrition)    Type and Reason for Visit: Reassess    Nutrition Recommendations: 1. Suggest continuing with NPO status. 2. Suggest maintaining TF via PEG with standard formula with fiber (Jevity 1.2 Chace) at 55 ml/hr (goal rate). Nutrition Assessment: Patient improving from a nutritional standpoint as evidenced by tube feedings running at goal rate of 55 ml/hr, which patient is tolerating well. Remains at risk for nutritional compromise related to mild fluid accumulation and dx of acute respiratory failure. Suggest continuing with TF via PEG with standard formula with fiber (Jevity 1.2 Chace) at 55 ml/hr (goal rate). Malnutrition Assessment:  · Malnutrition Status: At risk for malnutrition  · Context: Acute illness or injury  · Findings of the 6 clinical characteristics of malnutrition (Minimum of 2 out of 6 clinical characteristics is required to make the diagnosis of moderate or severe Protein Calorie Malnutrition based on AND/ASPEN Guidelines):  1. Weight Loss-10% loss or greater, in 1 month  2. Fat Loss-Unable to assess  3. Muscle Loss-Unable to assess  4. Fluid Accumulation-Mild fluid accumulation, Extremities  5.   Strength-Not measured    Nutrition Risk Level: High    Nutrition Needs:  · Estimated Daily Total Kcal: 3704-3122 (28-32 kcal/kg)  · Estimated Daily Protein (g): 50-60 (1.0-1.2 g/kg)  · Estimated Daily Fluid (ml/day): 0831-6005 (1 ml/kcal)    Nutrition Diagnosis:   · Problem: Unintended weight loss  · Etiology: related to Insufficient energy/nutrient consumption     Signs and symptoms:  as evidenced by NPO status due to medical condition, Nutrition support - EN, Weight loss greater than or equal to 5% in 1 month, Localized or generalized fluid accumulation    Objective Information:  · Nutrition-Focused Physical Findings: GI: soft, rounded, diarrhea, last BM 4/20, active bowel sounds; PV: +1 RLE/LLE; Skin: WDL  · Wound Type: None  · Current Nutrition Therapies:  · Oral Diet Orders: NPO   · Tube Feeding (TF) Orders:   · Feeding Route: Gastrostomy  · Formula: Standard w/Fiber  · Rate (ml/hr):55    · Volume (ml/day): 1,320  · Duration: Continuous  · Water Flushes: 100 ml, every 6 hrs (400 ml)  · Current TF & Flush Orders Provides: See below  · Goal TF & Flush Orders Provides: @ 55 ml/hr (goal): 1,320 ml, 1,584 kcal, 73 g protein, 24 g dietary fiber, 1,065 ml free water  · Additional Calories: None  · Anthropometric Measures:  · Ht: 3' 10.85\" (119 cm)   · Current Body Wt: 119 lb 12.8 oz (54.3 kg)  · Admission Body Wt: 110 lb 3.7 oz (50 kg)  · Usual Body Wt: 128 lb (58.1 kg)(3/10/19)  · Weight Change: 14% loss in 1 month   · BMI Classification: BMI 35.0 - 39.9 Obese Class II    Nutrition Interventions:   Continue NPO, Continue current Tube Feeding  Continued Inpatient Monitoring    Nutrition Evaluation:   · Evaluation: Goal achieved   · Goals: EN intake to meet >75% of estimated kcal/protein needs, with good GI tolerance   · Monitoring: Nutrition Progression, TF Intake, TF Tolerance, Skin Integrity, I&O, Weight, Pertinent Labs, Nausea or Vomiting, Constipation, Monitor Bowel Function      Electronically signed by Juarez Connors RD, BLAZE on 4/21/19 at 10:53 AM    Contact Number: [phone]

## 2019-04-21 NOTE — PLAN OF CARE
Problem: Nutrition  Goal: Optimal nutrition therapy  Description  Nutrition Problem: Unintended weight loss  Intervention: Food and/or Nutrient Delivery: Continue NPO, Continue current Tube Feeding  Nutritional Goals: EN intake to meet >75% of estimated kcal/protein needs, with good GI tolerance   Outcome: Ongoing

## 2019-04-21 NOTE — PROGRESS NOTES
Pharmacy Note  Vancomycin Consult - Brief Note     Vancomycin Day: 9 (end date 5/22/19)  Current Dose:  1250mg IVPB q12h  Patient's labs, cultures, vitals, and vancomycin regimen reviewed. Weekly trough ordered for 4/23    Current diagnosis for which MRSA is suspected/confirmed: Enterococcus faecalis sepsis             Temp Readings from Last 3 Encounters:   04/21/19 97.3 °F (36.3 °C) (Axillary)   04/09/19 97.9 °F (36.6 °C) (Axillary)   03/15/19 98.4 °F (36.9 °C) (Oral)     Recent Labs     04/20/19  0644 04/21/19  0616   WBC 10.4 10.5     Recent Labs     04/20/19  0644 04/21/19  0616   CREATININE <0.40* <0.40*     CrCl cannot be calculated (This lab value cannot be used to calculate CrCl because it is not a number: <0.40). Intake/Output Summary (Last 24 hours) at 4/21/2019 0802  Last data filed at 4/21/2019 0550  Gross per 24 hour   Intake 2359 ml   Output 0 ml   Net 2359 ml         Thank you for the consult. Pharmacy will continue to follow.   Patrick Al [facility]/PharmD  4/21/2019 8:02 AM

## 2019-04-21 NOTE — PLAN OF CARE
Problem: Nutrition  Goal: Optimal nutrition therapy  Description  Nutrition Problem: Unintended weight loss  Intervention: Food and/or Nutrient Delivery: Continue NPO, Continue current Tube Feeding  Nutritional Goals: EN intake to meet >75% of estimated kcal/protein needs, with good GI tolerance   4/21/2019 1331 by Alisson Timmons RN  Outcome: Completed  4/21/2019 1055 by Olivier Smith RD, LD  Outcome: Ongoing     Problem: Falls - Risk of:  Goal: Will remain free from falls  Description  Will remain free from falls  4/21/2019 1331 by Alissno Timmons RN  Outcome: Completed  4/21/2019 0309 by Terrell Simental RN  Outcome: Ongoing  Goal: Absence of physical injury  Description  Absence of physical injury  Outcome: Completed     Problem: Risk for Impaired Skin Integrity  Goal: Tissue integrity - skin and mucous membranes  Description  Structural intactness and normal physiological function of skin and  mucous membranes.   4/21/2019 1331 by Alisson Timmons RN  Outcome: Completed  4/21/2019 0309 by Terrell Simental RN  Outcome: Ongoing     Problem: Respiratory:  Goal: Ability to maintain adequate ventilation will improve  Description  Ability to maintain adequate ventilation will improve  Outcome: Completed     Problem: Safety:  Goal: Ability to remain free from injury will improve  Description  Ability to remain free from injury will improve  Outcome: Completed

## 2019-04-21 NOTE — PROGRESS NOTES
[facility]    Progress Note    4/21/2019    11:48 AM    Name:   Ronnie Robertson  MRN:     [id number]     Acct:      [de-identified]   Room:   [address] [place] [address] Day:  9  Admit Date:  4/10/2019  4:55 PM    PCP:   Lois Isbell MD  Code Status:  DNR-CCA    Subjective:     C/C:   Chief Complaint   Patient presents with    Shortness of Breath     discharged from here yesterday     Interval History Status:    No seizures after he was transferred back to progressive unit 7 days back  Seizure medicines were adjusted by neurology  No respiratory distress  Still requires BiPAP more at night  Blood cultures coming positive for gram-positive cocci in clusters-final report Staphylococcus coagulation negative  Knees swelling is much better  Lactulose is being held again today due to loose stools  Had nonspecific rash on the body yesterday which has cleared up today  Brief History:   26-year-old  male admitted from [facility] with difficulty breathing. Patient is well-known to our practice. He has spent an extended period of time at [facility] up until several weeks ago when he was moved to his group home. He was previously admitted here (4/8-4/9/2019) with respiratory insufficiency. On that admission in the ED his PaO2 was reported as 45. He was placed on BiPAP however when the O2 saturation sensor was repositioned from his finger to his ear he was satting at 100%. His chest x-ray showed some pulmonary vascular congestion but was otherwise unremarkable. His abdominal x-rays showed a large fecal load which is a chronic problem for this patient. His pro-calcitonin was 0.12 (<0.25 speaks against bacterial respiratory infection).  His white count was normal. It had been reported that he was having emesis at the group home and there was some concern of aspiration however chest x-rays did not confirm this and the patient had no emesis during that admission. The patient did have a large bowel movement. Pulmonary consultation was obtained and the patient was started empirically on azithromycin. His chest x-ray remained clear. His O2 saturations remained normal with minimal oxygen supplementation. He was discharged back to the group home with azithromycin 400 mg via G-tube daily ×5 days per pulmonary recommendation. ER reports that the group home had not given him any doses of his antibiotics. He is admitted with dyspnea, tachypnea and once again low O2 saturations. His past medical history includes cerebral palsy, severe scoliosis, COPD and congestive heart failure. Review of Systems:     Unable to obtain due to cerebral palsy    Medications: Allergies: Allergies   Allergen Reactions    Ampicillin Other (See Comments)     Has received and tolerated ceftriaxone and cefazolin.     Valium [Diazepam] Other (See Comments)     Has received and tolerated Ativan IV/PO    Other      Bubble bath products        Current Meds:   Scheduled Meds:    cefTRIAXone (ROCEPHIN) IV  2 g Intravenous Q12H    vancomycin  1,250 mg Intravenous Q12H    predniSONE  20 mg Oral Daily    pseudoephedrine HCl  30 mg Per G Tube TID    vancomycin (VANCOCIN) intermittent dosing (placeholder)   Other RX Placeholder    levETIRAcetam  1,500 mg PEG Tube BID    CENTRUM/CERTA-JAY with minerals oral  15 mL Per G Tube Daily    furosemide  40 mg Per G Tube Daily    scopolamine  1 patch Transdermal Q72H    cetirizine  5 mg Oral Daily    sennosides-docusate sodium  2 tablet Per G Tube Daily    vitamin D  1,000 Units Per G Tube BID    baclofen  10 mg Per G Tube TID    montelukast  10 mg Per G Tube Nightly    fluticasone  2 spray Nasal Daily    calcium carbonate  1,250 mg Per G Tube Daily    miconazole   Topical BID    glycopyrrolate  1 mg Per G Tube TID    lacosamide  150 mg PEG Tube BID    budesonide  500 mcg Nebulization BID    polyethylene glycol  17 g Per G Tube Daily    lamoTRIgine  200 mg Per G Tube Nightly    lamoTRIgine  300 mg Per G Tube QAM    lansoprazole  30 mg Per G Tube Daily    sodium chloride flush  10 mL Intravenous 2 times per day    enoxaparin  40 mg Subcutaneous Daily    ipratropium-albuterol  1 ampule Inhalation Q4H WA     Continuous Infusions:   PRN Meds: diphenhydrAMINE, LORazepam, morphine, magnesium sulfate, potassium chloride **OR** potassium alternative oral replacement **OR** potassium chloride, HYDROcodone 5 mg - acetaminophen **OR** HYDROcodone 5 mg - acetaminophen, zinc oxide, neomycin-bacitracin-polymyxin, ondansetron, sodium chloride flush, magnesium hydroxide, ondansetron, nicotine, albuterol, acetaminophen    Data:     Past Medical History:   has a past medical history of Cerebral palsy ([facility] [address] Cholelithiasis with chronic cholecystitis, Constipation, Delayed gastric emptying, Dysphagia, GERD (gastroesophageal reflux disease), Hearing loss, Mental disability, Profound mental retardation, Scoliosis, Seizures ([facility] [address] and 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[facility] [address] Social History:   reports that he has never smoked. He has never used smokeless tobacco. He reports that he does not drink alcohol or use drugs. Family History:   Family History   Family history unknown: Yes       Vitals:  /68   Pulse 100   Temp 98 °F (36.7 °C) (Axillary)   Resp 18   Ht 3' 10.85\" (1.19 m)   Wt 119 lb 12.8 oz (54.3 kg)   SpO2 100%   BMI 38.37 kg/m²   Temp (24hrs), Av.8 °F (36.6 °C), Min:97.3 °F (36.3 °C), Max:98.1 °F (36.7 °C)    No results for input(s): POCGLU in the last 72 hours. I/O (24Hr):     Intake/Output Summary (Last 24 hours) at 2019 1148  Last data filed at 2019 0950  Gross per 24 hour   Intake 2299 ml   Output 0 ml   Net 2299 ml       Labs:    Hematology:  Recent Labs     19  0530 19  0644 19  0616   WBC 10.5 10.4 10.5   HGB 12.4* 11.8* 11.7*   HCT 37.2* 35.2* 34.8*   * 474* 488*     Chemistry:  Recent Labs     04/19/19  0530 04/20/19  0644 04/21/19  0616    141 141   K 3.8 3.6* 3.2*   CL 95* 97* 94*   CO2 33* 32* 35*   GLUCOSE 136* 117* 107*   BUN 8 9 9   CREATININE <0.40* <0.40* <0.40*   ANIONGAP 13 12 12   LABGLOM CANNOT BE CALCULATED CANNOT BE CALCULATED CANNOT BE CALCULATED   GFRAA CANNOT BE CALCULATED CANNOT BE CALCULATED CANNOT BE CALCULATED   CALCIUM 9.4 9.4 9.5     No results for input(s): PROT, LABALBU, LABA1C, W6HPZGS, Q3DJSQW, FT4, TSH, AST, ALT, LDH, GGT, ALKPHOS, BILITOT, BILIDIR, AMMONIA, AMYLASE, LIPASE, LACTATE, CHOL, HDL, LDLCHOLESTEROL, CHOLHDLRATIO, TRIG, VLDL, PHENYTOIN, PHENYF in the last 72 hours. Lab Results   Component Value Date/Time    SPECIAL NOT REPORTED 04/14/2019 09:00 PM     Lab Results   Component Value Date/Time    CULTURE NO GROWTH 5 DAYS 04/14/2019 09:00 PM       Lab Results   Component Value Date    POCPH 7.42 04/13/2019    POCPCO2 44 04/13/2019    POCPO2 92 04/13/2019    POCHCO3 28.1 04/13/2019    NBEA NOT REPORTED 04/13/2019    PBEA 4 04/13/2019    CIT9QRP 29 04/13/2019    SUFY2CSM 97 04/13/2019    FIO2 28.0 04/13/2019       Radiology:    Xr Knee Left (3 Views)    Result Date: 4/14/2019  EXAMINATION: 3 XRAY VIEWS OF THE RIGHT KNEE; 3 XRAY VIEWS OF THE LEFT KNEE 4/14/2019 9:44 am COMPARISON: None. HISTORY: ORDERING SYSTEM PROVIDED HISTORY: Bilateral knee erythema/warmth, enterococcus bacteremia TECHNOLOGIST PROVIDED HISTORY: Bilateral knee erythema/warmth, enterococcus bacteremia Ordering Physician Provided Reason for Exam: Bilateral knee erythema, enterococcus bacteremia Acuity: Acute Type of Exam: Initial FINDINGS: Right knee: Severe osteopenia is noted with deformity of the distal femur consistent with old healed trauma. A smoothly demarcated exostosis is noted medial dorsal aspect of the distal femur likely secondary to old healed trauma. Moderate arthritic changes present in the knee. No acute fracture, effusion or cortical destruction is noted.   Bony reconstruction, and/or weight based adjustment of the mA/kV was utilized to reduce the radiation dose to as low as reasonably achievable. COMPARISON: 10 March 2019 HISTORY: ORDERING SYSTEM PROVIDED HISTORY: vomiting TECHNOLOGIST PROVIDED HISTORY: IV Only Contrast FINDINGS: Lower Chest: Basilar atelectasis is noted. Heart size is normal.  No gross effusions are noted. Artifact is created due to arm position and respiratory motion. Organs: Liver, spleen, pancreas, and adrenals are unremarkable. Gallbladder is surgically absent. Kidneys excrete contrast bilaterally. Stable renal cysts are present. GI/Bowel: No free fluid or free air is noted. Gastrostomy tube is positioned with balloon within the confines of the stomach. Significant distension of the colon with both gas and stool is noted. No small bowel obstruction is noted although there is fluid scattered throughout much of the intestinal tract which may be related to mild enteritis. No bowel wall thickening is seen. Pelvis: No evidence of appendicitis. No abnormal fluid collections. Both inguinal rings are dilated and fat containing without strangulation. No inguinal adenopathy is seen. Peritoneum/Retroperitoneum: No aortic aneurysm, retroperitoneal mass, retroperitoneal or mesenteric adenopathy is noted. Bones/Soft Tissues: Severe scoliotic curvature is redemonstrated. Bony structures are slightly gracile. No acute osseous abnormality. Dense breast tissue is noted possibly gynecomastia. Estimated biologic radiation dose for this procedure:568.07 mGy/cm2.     1.  Diffuse distention of the colon with stool and gas. 2.  No small bowel obstruction. Fluid is scattered throughout the intestinal tract which may be related to mild enteritis. No free air. 3.  Colon is redundant. No bowel wall thickening. Findings suggest retrosigmoid stool impaction. Little change from prior exam. 4.  Gastrostomy tube in appropriate position.      Xr Chest Portable    Result Date: 4/15/2019  EXAMINATION: SINGLE XRAY VIEW OF THE CHEST 4/14/2019 6:44 am COMPARISON: Chest radiograph 04/10/2019 HISTORY: ORDERING SYSTEM PROVIDED HISTORY: infiltrate TECHNOLOGIST PROVIDED HISTORY: infiltrate Acuity: Unknown Type of Exam: Ongoing FINDINGS: Stable cardiomegaly. Pulmonary vascular congestion is unchanged. No focal consolidation. No pleural effusion or pneumothorax. Severe levoconvex curvature of the thoracic spine is similar to prior. Pulmonary vascular congestion is not significantly changed. Xr Chest Portable    Result Date: 4/10/2019  EXAMINATION: SINGLE XRAY VIEW OF THE CHEST 4/10/2019 5:53 pm COMPARISON: 9 April 2019 HISTORY: ORDERING SYSTEM PROVIDED HISTORY: SOB TECHNOLOGIST PROVIDED HISTORY: SOB Acuity: Acute Type of Exam: Unknown FINDINGS: AP portable view of the chest time stamped at 1749 hours demonstrates overlying cardiac monitoring electrodes. Patient is rotated toward the right. Pulmonary vascular congestion is noted without interval difference from prior exam.  A severe scoliotic curvature is noted. No extrapleural air is seen. Osseous structures are stable. Stable cardiac size. No change from prior study of 1 day earlier. Stable cardiomegaly and pulmonary vascular congestion. Xr Chest Portable    Result Date: 4/9/2019  EXAMINATION: SINGLE XRAY VIEW OF THE CHEST 4/9/2019 6:49 am COMPARISON: 24 February 2019 HISTORY: ORDERING SYSTEM PROVIDED HISTORY: AECOPD TECHNOLOGIST PROVIDED HISTORY: AECOPD FINDINGS: AP portable view of the chest time stamped at 642 hours demonstrates overlying cardiac monitoring electrodes. The patient is rotated toward the right. Stable cardiac size is noted. The patient has pulmonary vascular congestion with slight decreased compared to prior study of 1 day earlier. No gross effusion or extrapleural air is noted. The patient has a sharp angle scoliosis in the thoracic spine with a gibbus type deformity.   No free air is noted. Mediastinal contours are stable. Stable cardiomegaly. Pulmonary vascular congestion is noted with slight decreased compared to prior study of 1 day earlier. No gross effusions. Physical Examination:        General appearance:  Awake but not communicative, more alert than before, grinds teeth frequently, oxygen via nasal cannula in place  Mental Status:  Unable to evaluate due to cerebral palsy  Lungs:  Conducted breath sounds all over  Heart:  regular rate and rhythm, no murmur  Abdomen:  soft, nontender, nondistended, normal bowel sounds, no masses, hepatomegaly, splenomegaly, PEG tube in place  Extremities:  + edema,no  redness, tenderness in the calves,+ swelling both knees which is improving  Skin:  Nonspecific rash on neck, arms and abdomen has cleared up today    Assessment:        Primary Problem  Acute respiratory failure (HCC)   Possible suspected aspiration pneumonia  Possible bilateral septic arthritis both knee joints-improving  Nonspecific rash on body-cleared up today  Active Hospital Problems    Diagnosis Date Noted    Enterococcal sepsis ([address] [A41.81]     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] 04/14/2019    Hip dysplasia, acquired, left [M21.852] 04/14/2019    Obesity, Class II, BMI 35-39.9 [E66.9] 04/13/2019    Malfunction of percutaneous endoscopic gastrostomy (PEG) tube ([address] [K94.23] 04/13/2019    Breakthrough seizure ([address] [G40.919]     Mental retardation [F79]     COPD exacerbation ([address] [J44.1] 04/12/2019    Acute respiratory failure (HCC) [J96.00] 04/10/2019    Developmental disability [F89]     Severe protein-calorie malnutrition [place] Pel: less than 60% of standard weight) ([address] [E43] 11/15/2017    Acute exacerbation of chronic obstructive pulmonary disease (COPD) ([address] [J44.1]     Cerebral palsied ([address] [G80.9]     Seizure disorder ([address] [G40.909]        Plan:        1.  Seizure medicines already adjusted by neurology, to be given by PEG tube  2. Continue antibiotics as per ID evaluation-currently on vancomycin, ID also recommending to add Rocephin for 6 weeks through May 22, 2019  3. Oxygen/BiPAP as needed  4. DVT and GI prophylaxis  5. Transfer to LTAC when bed available, ID will follow him over there  6. Keep close watch for rash recurrence  7.  Benadryl 25 mg 3 times a day when necessary for rash    [address] Scott MD  4/21/2019  11:48 AM

## 2019-04-21 NOTE — PROGRESS NOTES
PULMONARY PROGRESS NOTE:    Interval History: Pneumonia, sepsis    Unable to review systems    Events since last visit: none    PAST MEDICAL HISTORY:    Smoking:     PHYSICAL EXAMINATION:  afebrile  General : not verbal  Neck - supple, no lymphadenopathy, JVD not raised  Heart - regular rhythm, S1 and S2 normal; no additional sounds heard  Lungs - Air Entry- fair bilaterally; breath sounds : vesicular, 100% on 3 l nc  Abdomen - soft, no tenderness  Upper Extremities  - no cyanosis, mottling; edema : dependent edema  Lower Extremities: no cyanosis, mottling; edema : dependent edema    Current Laboratory, Radiologic, Microbiologic, and Diagnostic studies reviewed    ASSESSMENT / PLAN:  Impression:  · Acute respiratory failure requiring BiPAP  · PNA / sepsis  · Enterococcus faecalis sepsis  · Suspected aspiration  · Atelectasis/pulmonary edema  · Dysphagia s/p PEG  · Cerebral palsy with spastic quadriparesis/seizure disorder  · Chronic constipation     Recommendations:  · 2 liters/min via nasal cannula - humidification   · Monitor oxygen   · Oral care every shift   · BiPAP with sleep and as needed  · Continue IV Vanco, Rocephin per infectious disease  · Budesonide aerosols every 12 hours  · Albuterol and Ipratropium Q 4 hours and prn  · Singulair  · Prednisone 20 mg daily  · Neurology following  · PT/passive ROM  · DNR CCA  · Discharge planning to LTAC OK from pulmonary standpoint     Plan of care discussed with KARIME Naqvi - CNP     REASON FOR VISIT: pneumonia  I examined the patient myself  The assessment and Plan in the note per my discussion with NP    Electronically signed by Renetta Montague on 04/21/19

## 2019-04-23 LAB
VANCOMYCIN TROUGH DATE LAST DOSE: ABNORMAL
VANCOMYCIN TROUGH DOSE AMOUNT: ABNORMAL
VANCOMYCIN TROUGH TIME LAST DOSE: ABNORMAL
VANCOMYCIN TROUGH: 26.7 UG/ML (ref 10–20)

## 2019-04-23 PROCEDURE — 80202 ASSAY OF VANCOMYCIN: CPT

## 2019-04-29 PROCEDURE — 80202 ASSAY OF VANCOMYCIN: CPT

## 2019-05-06 LAB
ANION GAP SERPL CALCULATED.3IONS-SCNC: 13 MMOL/L (ref 9–17)
CHLORIDE BLD-SCNC: 102 MMOL/L (ref 98–107)
CO2: 29 MMOL/L (ref 20–31)
POTASSIUM SERPL-SCNC: 3.1 MMOL/L (ref 3.7–5.3)
SODIUM BLD-SCNC: 144 MMOL/L (ref 135–144)

## 2019-05-06 PROCEDURE — 80051 ELECTROLYTE PANEL: CPT

## 2019-05-07 LAB
ANION GAP SERPL CALCULATED.3IONS-SCNC: 8 MMOL/L (ref 9–17)
BUN BLDV-MCNC: 6 MG/DL (ref 6–20)
BUN/CREAT BLD: ABNORMAL (ref 9–20)
CALCIUM SERPL-MCNC: 8.9 MG/DL (ref 8.6–10.4)
CHLORIDE BLD-SCNC: 104 MMOL/L (ref 98–107)
CO2: 31 MMOL/L (ref 20–31)
CREAT SERPL-MCNC: <0.4 MG/DL (ref 0.7–1.2)
GFR AFRICAN AMERICAN: ABNORMAL ML/MIN
GFR NON-AFRICAN AMERICAN: ABNORMAL ML/MIN
GFR SERPL CREATININE-BSD FRML MDRD: ABNORMAL ML/MIN/{1.73_M2}
GFR SERPL CREATININE-BSD FRML MDRD: ABNORMAL ML/MIN/{1.73_M2}
GLUCOSE BLD-MCNC: 111 MG/DL (ref 70–99)
POTASSIUM SERPL-SCNC: 3.8 MMOL/L (ref 3.7–5.3)
SODIUM BLD-SCNC: 143 MMOL/L (ref 135–144)

## 2019-05-07 PROCEDURE — 80048 BASIC METABOLIC PNL TOTAL CA: CPT

## 2019-05-08 LAB
VANCOMYCIN TROUGH DATE LAST DOSE: NORMAL
VANCOMYCIN TROUGH DOSE AMOUNT: NORMAL
VANCOMYCIN TROUGH TIME LAST DOSE: NORMAL
VANCOMYCIN TROUGH: 17.4 UG/ML (ref 10–20)

## 2019-05-08 PROCEDURE — 80202 ASSAY OF VANCOMYCIN: CPT

## 2019-05-14 ENCOUNTER — TELEPHONE (OUTPATIENT)
Dept: INFECTIOUS DISEASES | Age: 49
End: 2019-05-14

## 2019-05-15 ENCOUNTER — TELEPHONE (OUTPATIENT)
Dept: INFECTIOUS DISEASES | Age: 49
End: 2019-05-15

## 2019-05-15 NOTE — TELEPHONE ENCOUNTER
Carlos Ponce MD   0\"   5/15/2019 9:31 AM   Good Morning Dr. Joslyn Aguilar - I have a Vanco Trough for Michelle Orantes (:1970) Trough is 12.6 he is on Vanco 750mg BID. Please advise, Nolan Hale 5/15/2019 9:31 AM   0\"   5/15/2019 9:32 AM   Looks ok please ask Jayla Oneil who is in office to check  0\"   5/15/2019 9:49 AM   Ok I sure will.  Thank you  Unread

## 2019-05-15 NOTE — TELEPHONE ENCOUNTER
Please increase the dose of Vancomycin to 1000 mg IV BID. Repeat trough prior to the 5 th dose after the increase starts. (5/18 prior to the afternoon dose)  Get a BMP at that time as well  Results to me and Dr Micheal Vallecillo

## 2019-05-21 ENCOUNTER — TELEPHONE (OUTPATIENT)
Dept: INFECTIOUS DISEASES | Age: 49
End: 2019-05-21

## 2019-05-21 NOTE — TELEPHONE ENCOUNTER
Leydi York NP   Forwarded 5/21/2019 9:10 AM   0\"   Admin, [facility] - 5/20/2019 9:52 AM   Dory Hazel 1970 Kedar Kimble ([phone]) from [facility] is calling to see if she can pull PT PICC after last dose on 5/23/19 of rocephin and vanco or if you want new cultures before the PICC is pulled. Please advise!! Thank David Almanzarutant  0\"   Admin, [facility] - 5/21/2019 8:53 AM   Maru Fong is calling this morning to see how you would like to proceed with this.  0\"   Alisia Roach MD - 5/21/2019 8:57 AM   Please ask Brianda Finley . She talked with them yesterday  Read 5/21/2019 2:11 PM   Forwarded 5/21/2019 9:10 AM   0\"   5/21/2019 9:10 AM   Please see above message and advise!! Thank David Almanzarutant  Read 5/21/2019 2:11 PM       Per Verbal orders from [facility] as Follows:   Keep PICC   Repeat Cultures on 5/29 -Not From PICC, 20 minutes apart, send results to us and F/U on 6/4/19. Spoke with [place] and she voiced understanding of orders.

## 2019-05-21 NOTE — TELEPHONE ENCOUNTER
Nasim Clark MD   0\"   5/20/2019 9:52 AM   Clementina Huber 1970    Olivia Leslie ([phone]) from [facility] is calling to see if she can pull PT PICC after last dose on 5/23/19 of rocephin and vanco or if you want new cultures before the PICC is pulled. Please advise!!  Thank Hollace Duverney  Read 5/20/2019 9:53 AM

## 2019-05-29 ENCOUNTER — HOSPITAL ENCOUNTER (OUTPATIENT)
Age: 49
Setting detail: SPECIMEN
Discharge: HOME | End: 2019-05-29
Payer: MEDICARE

## 2019-06-04 LAB
CULTURE: NORMAL
Lab: NORMAL
SPECIMEN DESCRIPTION: NORMAL

## 2019-06-11 ENCOUNTER — OFFICE VISIT (OUTPATIENT)
Dept: INFECTIOUS DISEASES | Age: 49
End: 2019-06-11
Payer: MEDICARE

## 2019-06-11 VITALS
BODY MASS INDEX: 19.24 KG/M2 | HEIGHT: 60 IN | SYSTOLIC BLOOD PRESSURE: 120 MMHG | DIASTOLIC BLOOD PRESSURE: 82 MMHG | WEIGHT: 98 LBS

## 2019-06-11 DIAGNOSIS — F89 DEVELOPMENTAL DISABILITY: ICD-10-CM

## 2019-06-11 DIAGNOSIS — G80.8 OTHER CEREBRAL PALSY (HCC): ICD-10-CM

## 2019-06-11 DIAGNOSIS — J96.21 ACUTE AND CHRONIC RESPIRATORY FAILURE WITH HYPOXIA (HCC): ICD-10-CM

## 2019-06-11 DIAGNOSIS — M00.869: ICD-10-CM

## 2019-06-11 DIAGNOSIS — A41.81 ENTEROCOCCAL SEPSIS (HCC): Primary | ICD-10-CM

## 2019-06-11 PROBLEM — K59.00 CONSTIPATION: Status: ACTIVE | Noted: 2019-04-24

## 2019-06-11 PROBLEM — J96.90 RESPIRATORY FAILURE (HCC): Status: ACTIVE | Noted: 2019-04-21

## 2019-06-11 PROBLEM — R56.9 SEIZURE (HCC): Status: ACTIVE | Noted: 2019-03-15

## 2019-06-11 PROBLEM — D64.9 NORMOCYTIC ANEMIA: Status: ACTIVE | Noted: 2019-04-24

## 2019-06-11 PROBLEM — R78.81 GRAM-POSITIVE BACTEREMIA: Status: ACTIVE | Noted: 2019-04-22

## 2019-06-11 PROBLEM — R09.02 HYPOXIA: Status: ACTIVE | Noted: 2019-03-15

## 2019-06-11 PROBLEM — J11.1 INFLUENZA: Status: ACTIVE | Noted: 2019-03-10

## 2019-06-11 PROBLEM — M81.0 OSTEOPOROSIS: Status: ACTIVE | Noted: 2019-05-01

## 2019-06-11 PROBLEM — M00.9 PYOGENIC ARTHRITIS OF KNEE (HCC): Status: ACTIVE | Noted: 2019-06-11

## 2019-06-11 PROBLEM — G80.0 SPASTIC QUADRIPLEGIC CEREBRAL PALSY (HCC): Status: ACTIVE | Noted: 2019-03-15

## 2019-06-11 PROBLEM — E87.0 HYPERNATREMIA: Status: ACTIVE | Noted: 2019-04-24

## 2019-06-11 PROBLEM — E46 PROTEIN-CALORIE MALNUTRITION (HCC): Status: ACTIVE | Noted: 2019-03-15

## 2019-06-11 PROBLEM — S72.401A CLOSED FRACTURE OF DISTAL END OF RIGHT FEMUR (HCC): Status: ACTIVE | Noted: 2019-05-01

## 2019-06-11 PROCEDURE — G8427 DOCREV CUR MEDS BY ELIG CLIN: HCPCS | Performed by: INTERNAL MEDICINE

## 2019-06-11 PROCEDURE — 1036F TOBACCO NON-USER: CPT | Performed by: INTERNAL MEDICINE

## 2019-06-11 PROCEDURE — G8417 CALC BMI ABV UP PARAM F/U: HCPCS | Performed by: INTERNAL MEDICINE

## 2019-06-11 PROCEDURE — 99214 OFFICE O/P EST MOD 30 MIN: CPT | Performed by: INTERNAL MEDICINE

## 2019-06-11 NOTE — LETTER
[facility] [address] [place] [address] [phone] [id number]  Phone: [phone]  Fax: [phone]    PCP: Varun To MD    providers: Varun To MD  [address] [place] [id number]  VIA Mail       June 11, 2019       Patient: Salome Hightower   MR Number: [id number]   YOB: 1970   Date of Visit: 6/11/2019     Dear Phu Monroy: Thank you for allowing me to see your patient Mr. Salome Hightower. Below are the relevant portions of my assessment and plan of care. [facility] - Office Progress Note  Today's Date and Time: 6/11/2019, 12:11 PM    Diagnostic Impression :     1. Enterococcal sepsis ([address]    2. Arthritis of knee due to other bacteria, unspecified laterality ([address]    3. Acute and chronic respiratory failure with hypoxia (HCC)    4. Other cerebral palsy ([address]    5. Developmental disability        Recommendations ·   Pt completed Vancomcyin and Ceftriaxone on 5-22  · Follow up blood culturesx 2 ordered for 5-30. It appears that one culture was drawn on 5-29. It is showing no growth   · PICC discontinued in office  · Monitor off antibiotics    Chief complaint/reason for consultation:     Chief Complaint   Patient presents with    Follow-up     Enterococcal sepsis, septic arthritis of bilateral knees       History of Present Illness: Salome Hightower is a 51y.o.-year-old  male who was evaluated on 6/11/2019. INITIAL HISTORY     Pt is a 52 yo gentleman with cerebral palsy and developmental disability. He has a chronic PEG tube for nutrition. Pt presented to [facility] on 3/10 with SOB and abdominal distension. He was found to have Influenza A. He required high flow O2, tamiflu and steroids. An NG was placed on admission as well. Initially he improved from a pulmonary standpoint, then started to decline. He was placed on higher doses of steroids.  Pt was discharged to his group home. He presented back to the ER on 4/8 with emesis and cough. His care givers denied fevers or chills. An ABG showed a PO2 of 46. He was placed on bipap with improvement. CT abdomen showed basilar atelctasis. Pt was treated with Zithromax had a large bowel movement and his status improved. He was discharged back to his group home on 4/9. He presented back to the ER on 4/10 again with respiratory distress and was placed on bipap. Zithromax was continued. Blood cultures were drawn on 4/10 and both showed Enterococcus, ampicillin sensitive. Repeat cultures on 4/11 both showed enterococcus as well. On 4/13 ID was consulted. Pt is noted to have a penicillin allergy so he was treated with Vancomycin. The source of the bacteremia was not clear. On 4/14 he was noted to have erythema and warmth of bilateral knee joints with concern for septic joints. Orthopedics was consulted. Synovial fluid shows no growth  Concern that pt has aspirated emesis - cultures show no growth  4/15 ECHO was a poor study and endocarditis could not be evaluated. He is a poor candidate for a JAQUELIN. Because of no clear source of infection, it was decided that the pt should be treated as if this is an endocarditis with 4-6 weeks of treatment. Rocephin was added to the antimicrobial treatment on 4/15. Repeat Blood cultures from 4/14 showed 1/2 with coagulase negative staph - likely a contaminate    Pt remained clinically stable and was transferred to [facility] on 4/21 4/30 Pt with a new red, inflammed area on Lt upper thigh. Does not appear cellulitic, appears to be local irritation  4/30 CT Abd showing resolved ileus. 5/1 Rashy area improved today. Tube feeds resumed at 25 cc/hr  CT abd showing an acute displaced fracture of the distal Rt femoral metadiaphyseal junction. Xray femur 5-3-19: Non displaced fracture  Orthopedics consulted for Rt femur fracture. Knee immobilizer ordered. Ortho has cleared pt from use of the immobilizer. Pt appears much more comfortable with immobilizer in place. It is difficult to assess pt for RLE pain as he is nonverbal. Staff reports that he is much less agitated when RLE wrapped    He was discharged to a SNF on 5-9 with plans to continue the Vancomycin and Ceftriaxone through 5-22-19. Repeat blood cultures were ordered for 5-30-19. CURRENT EXAMINATION: 6/11/2019  Pt is accompanied by his caregiver. She reports that he has been doing well and is back to his baseline function  He has not had any fevers, or change in mental status. She states that he had 2 blood cultures drawn on 5-29 and that they both showed no growth. Pt is non verbal, but is awake and currently is upset. Pts Rt UE PICC line was discontinued in the office. Site is clean. Lungs clear  HRR    DISCUSSION:  50 yo developmentally delayed gentleman with multiple admissions to the hospital  · Initially presented with respiratory distress and influenza a  · Returned to the hospital and found to have Enterococcal bacteremia  · No clear source, pt is not a candidate for a JAQUELIN. He is being treated for a presumed endocarditis  · antibiotics through 5-22-19  While hospitalized found to have an old distal Rt femoral fracture. Repeat blood cultures x 2 were ordered for 5-30-19. It appears that one blood culture was drawn on 5-29. It is showing no growth. Caregiver reports that 2 blood cultures were drawn and showed no growth. PLAN:  PICC discontinued in office  Monitor off antibiotics  Return to office as needed    Discussed with patient, caregiver. I have personally reviewed the past medical history, past surgical history, medications, social history, and family history, and I have updated the database accordingly.   Past Medical History:     Past Medical History:   Diagnosis Date    Abdominal distension     Acute exacerbation of chronic obstructive pulmonary disease (COPD) [facility]  Acute respiratory failure with hypoxia ([address] 3/11/2019    Cerebral palsied ([address]     Cerebral palsy ([address]     Choledocholithiasis 8/14/2018    Cholelithiasis with chronic cholecystitis     Constipation     Delayed gastric emptying     Dysphagia     GERD (gastroesophageal reflux disease)     Hearing loss     Hypoxia     Ileus ([address] 8/18/2018    Mental disability     MRDD    Multifocal pneumonia 11/15/2017    Obstipation 8/14/2018    Pneumonia of left lower lobe due to infectious organism ([address]     Positive blood cultures     Profound mental retardation     Scoliosis     Seizure disorder ([address]     Seizures ([address]     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[address]        Past Surgical  History:     Past Surgical History:   Procedure Laterality Date    CHOLECYSTECTOMY  08/18/2018    laparoscopic    ERCP  08/16/2018    GASTROSTOMY TUBE PLACEMENT      AL ERCP DX COLLECTION SPECIMEN BRUSHING/WASHING N/A 8/16/2018    ERCP PAPILOTOMY SWEEPING STONE EXTRACTION ENDOSCOPIC RETROGRADE CHOLANGIOPANCREATOGRAPHY performed by Karri Saucedo MD at [address] [place] [address] N/A 8/18/2018    CHOLECYSTECTOMY LAPAROSCOPIC WITH UMBILICAL HERNIA REPAIR performed by Gali Banks DO at [address]      has Feeding tube       Medications:     Current Outpatient Medications:     ipratropium-albuterol (DUONEB) 0.5-2.5 (3) MG/3ML SOLN nebulizer solution, Inhale 3 mLs into the lungs every 4 hours (while awake), Disp: 360 mL, Rfl: 0    lamoTRIgine (LAMICTAL) 150 MG tablet, 2 tablets by Per G Tube route every morning Indications: 300mg in AM and 200mg in PM, Disp: 30 tablet, Rfl: 3    levETIRAcetam (KEPPRA) 100 MG/ML solution, 15 mLs by PEG Tube route 2 times daily, Disp: 300 mL, Rfl: 3    enoxaparin (LOVENOX) 40 MG/0.4ML injection, Inject 0.4 mLs into the skin daily, Disp: 5 Syringe, Rfl: 0    lacosamide (VIMPAT) 150 MG TABS tablet, 1 tablet by PEG Tube route 2 times daily for 61 days. , Disp: 60 tablet, Rfl: 0    lamoTRIgine (LAMICTAL) 200 MG tablet, 1 tablet by Per G Tube route nightly Indications: 300MG IN am AND 200MG IN pm, Disp: 30 tablet, Rfl: 3    scopolamine (TRANSDERM-SCOP) transdermal patch, Place 1 patch onto the skin every 72 hours, Disp: 5 patch, Rfl: 0    cetirizine (ZYRTEC) 5 MG tablet, Take 1 tablet by mouth daily, Disp: 5 tablet, Rfl: 0    furosemide (LASIX) 10 MG/ML solution, 4 mLs by Per G Tube route daily, Disp: 100 mg, Rfl: 0    ondansetron (ZOFRAN-ODT) 4 MG disintegrating tablet, Take 1 tablet by mouth every 6 hours as needed for Nausea, Disp: , Rfl:     albuterol (PROVENTIL) (2.5 MG/3ML) 0.083% nebulizer solution, Take 2.5 mg by nebulization 3 times daily, Disp: , Rfl:     lansoprazole (PREVACID SOLUTAB) 30 MG disintegrating tablet, 30 mg by Per G Tube route daily, Disp: , Rfl:     carbamide peroxide (DEBROX) 6.5 % otic solution, 5 drops See Admin Instructions 5 drops BID on the 1st and 15th of the month, Disp: , Rfl:     ketoconazole (NIZORAL) 2 % shampoo, Apply topically Twice a Week Wednesdays and Saturdays, Disp: , Rfl:     zinc oxide (DESITIN) 40 % PSTE paste, Apply topically as needed (irritation), Disp: , Rfl:     neomycin-bacitracin-polymyxin (NEOSPORIN) 400-5-5000 ointment, Apply topically as needed (prn per [facility] plan of treatment), Disp: , Rfl:     budesonide (PULMICORT) 0.5 MG/2ML nebulizer suspension, Take 1 ampule by nebulization 2 times daily, Disp: , Rfl:     lactulose (CHRONULAC) 10 GM/15ML solution, 20 g by Per G Tube route 2 times daily, Disp: , Rfl:     polyethylene glycol (MIRALAX) PACK packet, 17 g by Per G Tube route daily, Disp: , Rfl:     glycopyrrolate (ROBINUL) 1 MG tablet, 1 mg by Per G Tube route 3 times daily FOR INCREASED SECRETIONS., Disp: , Rfl:     nystatin (MYCOSTATIN) 991296 UNIT/GM powder, Apply topically daily Apply between toes. , Disp: , Rfl:   baclofen (LIORESAL) 10 MG tablet, 10 mg by Per G Tube route 3 times daily, Disp: , Rfl:     Potassium Chloride (KLOR-CON SPRINKLE PO), 10 mEq by Per G Tube route 2 times daily , Disp: , Rfl:     montelukast (SINGULAIR) 10 MG tablet, 10 mg by Per G Tube route nightly, Disp: , Rfl:     fluticasone (FLONASE) 50 MCG/ACT nasal spray, 2 sprays by Nasal route daily, Disp: , Rfl:     Multiple Vitamin (DAILY-JAY PO), 1 tablet by Gastric Tube route daily , Disp: , Rfl:     calcium carbonate 1250 MG/5ML SUSP suspension, 1,250 mg by Per G Tube route daily, Disp: , Rfl:     pseudoephedrine (SUDAFED) 30 MG tablet, 30 mg by Per G Tube route 3 times daily, Disp: , Rfl:     senna-docusate (PERICOLACE) 8.6-50 MG per tablet, 2 tablets by Per G Tube route daily, Disp: , Rfl:     Cholecalciferol (VITAMIN D3) 1000 units TABS, 1,000 Units by Feeding Tube route 2 times daily , Disp: , Rfl:     Nutritional Supplements (REPLETE/FIBER) LIQD, 75 mLs by Feeding Tube route daily, Disp: , Rfl:      Social History:     Social History     Socioeconomic History    Marital status: Single     Spouse name: Not on file    Number of children: Not on file    Years of education: Not on file    Highest education level: Not on file   Occupational History    Not on file   Social Needs    Financial resource strain: Not on file    Food insecurity:     Worry: Not on file     Inability: Not on file    Transportation needs:     Medical: Not on file     Non-medical: Not on file   Tobacco Use    Smoking status: Never Smoker    Smokeless tobacco: Never Used   Substance and Sexual Activity    Alcohol use: No    Drug use: No    Sexual activity: Not on file   Lifestyle    Physical activity:     Days per week: Not on file     Minutes per session: Not on file    Stress: Not on file   Relationships    Social connections:     Talks on phone: Not on file     Gets together: Not on file     Attends Sikh service: Not on file LYMPHOPCT 29 04/20/2019    MONOPCT 12 04/21/2019    MONOPCT 11 04/20/2019     BMP:   Lab Results   Component Value Date     05/07/2019     05/06/2019    K 3.8 05/07/2019    K 3.1 05/06/2019     05/07/2019     05/06/2019    CO2 31 05/07/2019    CO2 29 05/06/2019    BUN 6 05/07/2019    BUN 9 04/21/2019    CREATININE <0.40 05/07/2019    CREATININE <0.40 04/21/2019    MG 2.3 04/13/2019    MG 2.5 04/12/2019     Hepatic Function Panel:   Lab Results   Component Value Date    PROT 6.0 03/13/2019    PROT 7.6 03/10/2019    LABALBU 3.2 03/13/2019    LABALBU 4.2 03/10/2019    BILIDIR 1.10 08/18/2018    BILIDIR 0.82 08/17/2018    IBILI 0.46 08/18/2018    IBILI 0.46 08/17/2018    BILITOT 0.11 03/13/2019    BILITOT 0.13 03/10/2019    ALKPHOS 60 03/13/2019    ALKPHOS 97 03/10/2019    ALT 35 03/13/2019    ALT 27 03/10/2019    AST 31 03/13/2019    AST 20 03/10/2019     No results found for: RPR  No results found for: HIV  No results found for: [facility]  Lab Results   Component Value Date    MUCUS NOT REPORTED 04/13/2019    RBC 3.83 04/21/2019    TRICHOMONAS NOT REPORTED 04/13/2019    WBC 10.5 04/21/2019    YEAST NOT REPORTED 04/13/2019    TURBIDITY CLEAR 04/13/2019     Lab Results   Component Value Date    CREATININE <0.40 05/07/2019    GLUCOSE 111 05/07/2019       Thank you for allowing us to participate in the care of this patient. Please call with questions. Wade Peters MD  Pager: [phone] - Office: [phone]        If you have questions, please do not hesitate to call me. I look forward to following Aly Laws along with you.     Sincerely,        Wade Peters MD

## 2019-06-11 NOTE — PROGRESS NOTES
[facility] - Office Progress Note  Today's Date and Time: 6/11/2019, 12:11 PM    Diagnostic Impression :     1. Enterococcal sepsis ([facility]    2. Arthritis of knee due to other bacteria, unspecified laterality ([address]    3. Acute and chronic respiratory failure with hypoxia (HCC)    4. Other cerebral palsy ([facility]    5. Developmental disability        Recommendations   · Pt completed Vancomcyin and Ceftriaxone on 5-22  · Follow up blood culturesx 2 ordered for 5-30. It appears that one culture was drawn on 5-29. It is showing no growth   · PICC discontinued in office  · Monitor off antibiotics    Chief complaint/reason for consultation:     Chief Complaint   Patient presents with    Follow-up     Enterococcal sepsis, septic arthritis of bilateral knees       History of Present Illness: Olivia Vaughn is a 51y.o.-year-old  male who was evaluated on 6/11/2019. INITIAL HISTORY     Pt is a 52 yo gentleman with cerebral palsy and developmental disability. He has a chronic PEG tube for nutrition. Pt presented to [facility] ER on 3/10 with SOB and abdominal distension. He was found to have Influenza A. He required high flow O2, tamiflu and steroids. An NG was placed on admission as well. Initially he improved from a pulmonary standpoint, then started to decline. He was placed on higher doses of steroids. Pt was discharged to his group home. He presented back to the ER on 4/8 with emesis and cough. His care givers denied fevers or chills. An ABG showed a PO2 of 46. He was placed on bipap with improvement. CT abdomen showed basilar atelctasis. Pt was treated with Zithromax had a large bowel movement and his status improved. He was discharged back to his group home on 4/9. He presented back to the ER on 4/10 again with respiratory distress and was placed on bipap. Zithromax was continued.     Blood cultures were drawn on 4/10 and both showed Enterococcus, ampicillin sensitive. Repeat cultures on 4/11 both showed enterococcus as well. On 4/13 ID was consulted. Pt is noted to have a penicillin allergy so he was treated with Vancomycin. The source of the bacteremia was not clear. On 4/14 he was noted to have erythema and warmth of bilateral knee joints with concern for septic joints. Orthopedics was consulted. Synovial fluid shows no growth  Concern that pt has aspirated emesis - cultures show no growth  4/15 ECHO was a poor study and endocarditis could not be evaluated. He is a poor candidate for a JAQUELIN. Because of no clear source of infection, it was decided that the pt should be treated as if this is an endocarditis with 4-6 weeks of treatment. Rocephin was added to the antimicrobial treatment on 4/15. Repeat Blood cultures from 4/14 showed 1/2 with coagulase negative staph - likely a contaminate    Pt remained clinically stable and was transferred to [facility] on 4/21 4/30 Pt with a new red, inflammed area on Lt upper thigh. Does not appear cellulitic, appears to be local irritation  4/30 CT Abd showing resolved ileus. 5/1 Rashy area improved today. Tube feeds resumed at 25 cc/hr  CT abd showing an acute displaced fracture of the distal Rt femoral metadiaphyseal junction. Xray femur 5-3-19: Non displaced fracture  Orthopedics consulted for Rt femur fracture. Knee immobilizer ordered. Ortho has cleared pt from use of the immobilizer. Pt appears much more comfortable with immobilizer in place. It is difficult to assess pt for RLE pain as he is nonverbal. Staff reports that he is much less agitated when RLE wrapped    He was discharged to a SNF on 5-9 with plans to continue the Vancomycin and Ceftriaxone through 5-22-19. Repeat blood cultures were ordered for 5-30-19. CURRENT EXAMINATION: 6/11/2019  Pt is accompanied by his caregiver.   She reports that he has been doing well and is back to his baseline function  He has not had any fevers, or change in mental status. She states that he had 2 blood cultures drawn on 5-29 and that they both showed no growth. Pt is non verbal, but is awake and currently is upset. Pts Rt UE PICC line was discontinued in the office. Site is clean. Lungs clear  HRR    DISCUSSION:  50 yo developmentally delayed gentleman with multiple admissions to the hospital  · Initially presented with respiratory distress and influenza a  · Returned to the hospital and found to have Enterococcal bacteremia  · No clear source, pt is not a candidate for a JAQUELIN. He is being treated for a presumed endocarditis  · antibiotics through 5-22-19  While hospitalized found to have an old distal Rt femoral fracture. Repeat blood cultures x 2 were ordered for 5-30-19. It appears that one blood culture was drawn on 5-29. It is showing no growth. Caregiver reports that 2 blood cultures were drawn and showed no growth. PLAN:  PICC discontinued in office  Monitor off antibiotics  Return to office as needed    Discussed with patient, caregiver. I have personally reviewed the past medical history, past surgical history, medications, social history, and family history, and I have updated the database accordingly.   Past Medical History:     Past Medical History:   Diagnosis Date    Abdominal distension     Acute exacerbation of chronic obstructive pulmonary disease (COPD) ([facility])     Acute respiratory failure with hypoxia ([facility]) 3/11/2019    Cerebral palsied ([address]     Cerebral palsy ([facility])     Choledocholithiasis 8/14/2018    Cholelithiasis with chronic cholecystitis     Constipation     Delayed gastric emptying     Dysphagia     GERD (gastroesophageal reflux disease)     Hearing loss     Hypoxia     Ileus ([address] 8/18/2018    Mental disability     MRDD    Multifocal pneumonia 11/15/2017    Obstipation 8/14/2018    Pneumonia of left lower lobe due to infectious organism ([address]     Positive blood cultures     Profound mental retardation     Scoliosis     [facility]     Seizures (HCC)     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HCC)        Past Surgical  History:     Past Surgical History:   Procedure Laterality Date    CHOLECYSTECTOMY  08/18/2018    laparoscopic    ERCP  08/16/2018    GASTROSTOMY TUBE PLACEMENT      CO ERCP DX COLLECTION SPECIMEN BRUSHING/WASHING N/A 8/16/2018    ERCP PAPILOTOMY SWEEPING STONE EXTRACTION ENDOSCOPIC RETROGRADE CHOLANGIOPANCREATOGRAPHY performed by Jasmin Lopez MD at [address] [place] [address] N/A 8/18/2018    CHOLECYSTECTOMY LAPAROSCOPIC WITH UMBILICAL HERNIA REPAIR performed by Zenaida Brian DO at [address]      has Feeding tube       Medications:     Current Outpatient Medications:     ipratropium-albuterol (DUONEB) 0.5-2.5 (3) MG/3ML SOLN nebulizer solution, Inhale 3 mLs into the lungs every 4 hours (while awake), Disp: 360 mL, Rfl: 0    lamoTRIgine (LAMICTAL) 150 MG tablet, 2 tablets by Per G Tube route every morning Indications: 300mg in AM and 200mg in PM, Disp: 30 tablet, Rfl: 3    levETIRAcetam (KEPPRA) 100 MG/ML solution, 15 mLs by PEG Tube route 2 times daily, Disp: 300 mL, Rfl: 3    enoxaparin (LOVENOX) 40 MG/0.4ML injection, Inject 0.4 mLs into the skin daily, Disp: 5 Syringe, Rfl: 0    lacosamide (VIMPAT) 150 MG TABS tablet, 1 tablet by PEG Tube route 2 times daily for 61 days. , Disp: 60 tablet, Rfl: 0    lamoTRIgine (LAMICTAL) 200 MG tablet, 1 tablet by Per G Tube route nightly Indications: 300MG IN am AND 200MG IN pm, Disp: 30 tablet, Rfl: 3    scopolamine (TRANSDERM-SCOP) transdermal patch, Place 1 patch onto the skin every 72 hours, Disp: 5 patch, Rfl: 0    cetirizine (ZYRTEC) 5 MG tablet, Take 1 tablet by mouth daily, Disp: 5 tablet, Rfl: 0    furosemide (LASIX) 10 MG/ML solution, 4 mLs by Per G Tube route daily, Disp: 100 mg, Rfl: 0    ondansetron (ZOFRAN-ODT) 4 MG disintegrating tablet, Take 1 tablet by mouth every 6 hours as needed for Nausea, Disp: , Rfl:   albuterol (PROVENTIL) (2.5 MG/3ML) 0.083% nebulizer solution, Take 2.5 mg by nebulization 3 times daily, Disp: , Rfl:     lansoprazole (PREVACID SOLUTAB) 30 MG disintegrating tablet, 30 mg by Per G Tube route daily, Disp: , Rfl:     carbamide peroxide (DEBROX) 6.5 % otic solution, 5 drops See Admin Instructions 5 drops BID on the 1st and 15th of the month, Disp: , Rfl:     ketoconazole (NIZORAL) 2 % shampoo, Apply topically Twice a Week Wednesdays and Saturdays, Disp: , Rfl:     zinc oxide (DESITIN) 40 % PSTE paste, Apply topically as needed (irritation), Disp: , Rfl:     neomycin-bacitracin-polymyxin (NEOSPORIN) 400-5-5000 ointment, Apply topically as needed (prn per [facility] plan of treatment), Disp: , Rfl:     budesonide (PULMICORT) 0.5 MG/2ML nebulizer suspension, Take 1 ampule by nebulization 2 times daily, Disp: , Rfl:     lactulose (CHRONULAC) 10 GM/15ML solution, 20 g by Per G Tube route 2 times daily, Disp: , Rfl:     polyethylene glycol (MIRALAX) PACK packet, 17 g by Per G Tube route daily, Disp: , Rfl:     glycopyrrolate (ROBINUL) 1 MG tablet, 1 mg by Per G Tube route 3 times daily FOR INCREASED SECRETIONS., Disp: , Rfl:     nystatin (MYCOSTATIN) 214596 UNIT/GM powder, Apply topically daily Apply between toes. , Disp: , Rfl:     baclofen (LIORESAL) 10 MG tablet, 10 mg by Per G Tube route 3 times daily, Disp: , Rfl:     Potassium Chloride (KLOR-CON SPRINKLE PO), 10 mEq by Per G Tube route 2 times daily , Disp: , Rfl:     montelukast (SINGULAIR) 10 MG tablet, 10 mg by Per G Tube route nightly, Disp: , Rfl:     fluticasone (FLONASE) 50 MCG/ACT nasal spray, 2 sprays by Nasal route daily, Disp: , Rfl:     Multiple Vitamin (DAILY-JAY PO), 1 tablet by Gastric Tube route daily , Disp: , Rfl:     calcium carbonate 1250 MG/5ML SUSP suspension, 1,250 mg by Per G Tube route daily, Disp: , Rfl:     pseudoephedrine (SUDAFED) 30 MG tablet, 30 mg by Per G Tube route 3 times daily, Disp: , Rfl:    senna-docusate (PERICOLACE) 8.6-50 MG per tablet, 2 tablets by Per G Tube route daily, Disp: , Rfl:     Cholecalciferol (VITAMIN D3) 1000 units TABS, 1,000 Units by Feeding Tube route 2 times daily , Disp: , Rfl:     Nutritional Supplements (REPLETE/FIBER) LIQD, 75 mLs by Feeding Tube route daily, Disp: , Rfl:      Social History:     Social History     Socioeconomic History    Marital status: Single     Spouse name: Not on file    Number of children: Not on file    Years of education: Not on file    Highest education level: Not on file   Occupational History    Not on file   Social Needs    Financial resource strain: Not on file    Food insecurity:     Worry: Not on file     Inability: Not on file    Transportation needs:     Medical: Not on file     Non-medical: Not on file   Tobacco Use    Smoking status: Never Smoker    Smokeless tobacco: Never Used   Substance and Sexual Activity    Alcohol use: No    Drug use: No    Sexual activity: Not on file   Lifestyle    Physical activity:     Days per week: Not on file     Minutes per session: Not on file    Stress: Not on file   Relationships    Social connections:     Talks on phone: Not on file     Gets together: Not on file     Attends Jainism service: Not on file     Active member of club or organization: Not on file     Attends meetings of clubs or organizations: Not on file     Relationship status: Not on file    Intimate partner violence:     Fear of current or ex partner: Not on file     Emotionally abused: Not on file     Physically abused: Not on file     Forced sexual activity: Not on file   Other Topics Concern    Not on file   Social History Narrative    Not on file       Family History:     Family History   Family history unknown: Yes        Allergies:   Ampicillin; Valium [diazepam]; and Other     Review of Systems:   NATHALIA pt is non verbal    Physical Examination :   There were no vitals taken for this visit.    General Appearance: BILITOT 0.11 03/13/2019    BILITOT 0.13 03/10/2019    ALKPHOS 60 03/13/2019    ALKPHOS 97 03/10/2019    ALT 35 03/13/2019    ALT 27 03/10/2019    AST 31 03/13/2019    AST 20 03/10/2019     No results found for: RPR  No results found for: HIV  No results found for: [facility]  Lab Results   Component Value Date    MUCUS NOT REPORTED 04/13/2019    RBC 3.83 04/21/2019    TRICHOMONAS NOT REPORTED 04/13/2019    WBC 10.5 04/21/2019    YEAST NOT REPORTED 04/13/2019    TURBIDITY CLEAR 04/13/2019     Lab Results   Component Value Date    CREATININE <0.40 05/07/2019    GLUCOSE 111 05/07/2019       Thank you for allowing us to participate in the care of this patient. Please call with questions.     Marvell Krabbe, MD  Pager: [phone] - Office: [phone]

## 2019-09-20 ENCOUNTER — APPOINTMENT (OUTPATIENT)
Dept: CT IMAGING | Age: 49
End: 2019-09-20
Payer: MEDICARE

## 2019-09-20 ENCOUNTER — APPOINTMENT (OUTPATIENT)
Dept: GENERAL RADIOLOGY | Age: 49
End: 2019-09-20
Payer: MEDICARE

## 2019-09-20 ENCOUNTER — HOSPITAL ENCOUNTER (EMERGENCY)
Age: 49
Discharge: HOME | End: 2019-09-20
Attending: EMERGENCY MEDICINE
Payer: MEDICARE

## 2019-09-20 VITALS
TEMPERATURE: 98.1 F | BODY MASS INDEX: 33.23 KG/M2 | WEIGHT: 100 LBS | DIASTOLIC BLOOD PRESSURE: 89 MMHG | OXYGEN SATURATION: 96 % | HEART RATE: 77 BPM | SYSTOLIC BLOOD PRESSURE: 137 MMHG | RESPIRATION RATE: 16 BRPM

## 2019-09-20 DIAGNOSIS — R41.82 ALTERED MENTAL STATUS, UNSPECIFIED ALTERED MENTAL STATUS TYPE: Primary | ICD-10-CM

## 2019-09-20 LAB
ABSOLUTE EOS #: 0.08 K/UL (ref 0–0.44)
ABSOLUTE IMMATURE GRANULOCYTE: 0.01 K/UL (ref 0–0.3)
ABSOLUTE LYMPH #: 1.87 K/UL (ref 1.1–3.7)
ABSOLUTE MONO #: 0.6 K/UL (ref 0.1–1.2)
ALBUMIN SERPL-MCNC: 4.5 G/DL (ref 3.5–5.2)
ALBUMIN/GLOBULIN RATIO: NORMAL (ref 1–2.5)
ALP BLD-CCNC: 101 U/L (ref 40–129)
ALT SERPL-CCNC: 22 U/L (ref 5–41)
ANION GAP SERPL CALCULATED.3IONS-SCNC: 13 MMOL/L (ref 9–17)
AST SERPL-CCNC: 18 U/L
BASOPHILS # BLD: 1 % (ref 0–2)
BASOPHILS ABSOLUTE: 0.05 K/UL (ref 0–0.2)
BILIRUB SERPL-MCNC: 0.3 MG/DL (ref 0.3–1.2)
BILIRUBIN DIRECT: 0.08 MG/DL
BILIRUBIN, INDIRECT: 0.22 MG/DL (ref 0–1)
BNP INTERPRETATION: NORMAL
BUN BLDV-MCNC: 8 MG/DL (ref 6–20)
BUN/CREAT BLD: 17 (ref 9–20)
CALCIUM SERPL-MCNC: 9.9 MG/DL (ref 8.6–10.4)
CHLORIDE BLD-SCNC: 101 MMOL/L (ref 98–107)
CO2: 25 MMOL/L (ref 20–31)
CREAT SERPL-MCNC: 0.47 MG/DL (ref 0.7–1.2)
DIFFERENTIAL TYPE: ABNORMAL
EKG ATRIAL RATE: 78 BPM
EKG P AXIS: 43 DEGREES
EKG P-R INTERVAL: 140 MS
EKG Q-T INTERVAL: 420 MS
EKG QRS DURATION: 84 MS
EKG QTC CALCULATION (BAZETT): 478 MS
EKG R AXIS: 104 DEGREES
EKG T AXIS: 44 DEGREES
EKG VENTRICULAR RATE: 78 BPM
EOSINOPHILS RELATIVE PERCENT: 2 % (ref 1–4)
GFR AFRICAN AMERICAN: >60 ML/MIN
GFR NON-AFRICAN AMERICAN: >60 ML/MIN
GFR SERPL CREATININE-BSD FRML MDRD: ABNORMAL ML/MIN/{1.73_M2}
GFR SERPL CREATININE-BSD FRML MDRD: ABNORMAL ML/MIN/{1.73_M2}
GLOBULIN: NORMAL G/DL (ref 1.5–3.8)
GLUCOSE BLD-MCNC: 109 MG/DL (ref 70–99)
HCT VFR BLD CALC: 43.6 % (ref 40.7–50.3)
HEMOGLOBIN: 13.9 G/DL (ref 13–17)
IMMATURE GRANULOCYTES: 0 %
KEPPRA: 79 UG/ML
LACTIC ACID, SEPSIS WHOLE BLOOD: NORMAL MMOL/L (ref 0.5–1.9)
LACTIC ACID, SEPSIS WHOLE BLOOD: NORMAL MMOL/L (ref 0.5–1.9)
LACTIC ACID, SEPSIS: 1.1 MMOL/L (ref 0.5–1.9)
LACTIC ACID, SEPSIS: 1.6 MMOL/L (ref 0.5–1.9)
LAMOTRIGINE LEVEL: 12.4 UG/ML (ref 3–15)
LIPASE: 22 U/L (ref 13–60)
LYMPHOCYTES # BLD: 36 % (ref 24–43)
MAGNESIUM: 2.4 MG/DL (ref 1.6–2.6)
MCH RBC QN AUTO: 28.1 PG (ref 25.2–33.5)
MCHC RBC AUTO-ENTMCNC: 31.9 G/DL (ref 28.4–34.8)
MCV RBC AUTO: 88.3 FL (ref 82.6–102.9)
MONOCYTES # BLD: 12 % (ref 3–12)
MYOGLOBIN: <21 NG/ML (ref 28–72)
NRBC AUTOMATED: 0 PER 100 WBC
PDW BLD-RTO: 17.8 % (ref 11.8–14.4)
PLATELET # BLD: 375 K/UL (ref 138–453)
PLATELET ESTIMATE: ABNORMAL
PMV BLD AUTO: 10.3 FL (ref 8.1–13.5)
POTASSIUM SERPL-SCNC: 3.9 MMOL/L (ref 3.7–5.3)
PRO-BNP: 51 PG/ML
RBC # BLD: 4.94 M/UL (ref 4.21–5.77)
RBC # BLD: ABNORMAL 10*6/UL
SEG NEUTROPHILS: 49 % (ref 36–65)
SEGMENTED NEUTROPHILS ABSOLUTE COUNT: 2.63 K/UL (ref 1.5–8.1)
SODIUM BLD-SCNC: 139 MMOL/L (ref 135–144)
TOTAL PROTEIN: 7.8 G/DL (ref 6.4–8.3)
TROPONIN INTERP: NORMAL
TROPONIN INTERP: NORMAL
TROPONIN T: NORMAL NG/ML
TROPONIN T: NORMAL NG/ML
TROPONIN, HIGH SENSITIVITY: 16 NG/L (ref 0–22)
TROPONIN, HIGH SENSITIVITY: 16 NG/L (ref 0–22)
WBC # BLD: 5.2 K/UL (ref 3.5–11.3)
WBC # BLD: ABNORMAL 10*3/UL

## 2019-09-20 PROCEDURE — 84484 ASSAY OF TROPONIN QUANT: CPT

## 2019-09-20 PROCEDURE — 74176 CT ABD & PELVIS W/O CONTRAST: CPT

## 2019-09-20 PROCEDURE — 71250 CT THORAX DX C-: CPT

## 2019-09-20 PROCEDURE — 83690 ASSAY OF LIPASE: CPT

## 2019-09-20 PROCEDURE — 87040 BLOOD CULTURE FOR BACTERIA: CPT

## 2019-09-20 PROCEDURE — 71045 X-RAY EXAM CHEST 1 VIEW: CPT

## 2019-09-20 PROCEDURE — 93005 ELECTROCARDIOGRAM TRACING: CPT | Performed by: EMERGENCY MEDICINE

## 2019-09-20 PROCEDURE — 36415 COLL VENOUS BLD VENIPUNCTURE: CPT

## 2019-09-20 PROCEDURE — 80177 DRUG SCRN QUAN LEVETIRACETAM: CPT

## 2019-09-20 PROCEDURE — 85025 COMPLETE CBC W/AUTO DIFF WBC: CPT

## 2019-09-20 PROCEDURE — 93010 ELECTROCARDIOGRAM REPORT: CPT | Performed by: INTERNAL MEDICINE

## 2019-09-20 PROCEDURE — 83605 ASSAY OF LACTIC ACID: CPT

## 2019-09-20 PROCEDURE — 83880 ASSAY OF NATRIURETIC PEPTIDE: CPT

## 2019-09-20 PROCEDURE — 83735 ASSAY OF MAGNESIUM: CPT

## 2019-09-20 PROCEDURE — 83874 ASSAY OF MYOGLOBIN: CPT

## 2019-09-20 PROCEDURE — 70450 CT HEAD/BRAIN W/O DYE: CPT

## 2019-09-20 PROCEDURE — 99285 EMERGENCY DEPT VISIT HI MDM: CPT

## 2019-09-20 PROCEDURE — 80175 DRUG SCREEN QUAN LAMOTRIGINE: CPT

## 2019-09-20 PROCEDURE — 80048 BASIC METABOLIC PNL TOTAL CA: CPT

## 2019-09-20 PROCEDURE — 80076 HEPATIC FUNCTION PANEL: CPT

## 2019-09-20 RX ORDER — LORATADINE 10 MG/1
10 TABLET ORAL DAILY
COMMUNITY

## 2019-09-20 RX ORDER — ACETAMINOPHEN 325 MG/1
650 TABLET ORAL
COMMUNITY

## 2019-09-20 RX ORDER — RANITIDINE 15 MG/ML
150 SOLUTION ORAL 2 TIMES DAILY
COMMUNITY

## 2019-09-20 RX ORDER — BISACODYL 10 MG
10 SUPPOSITORY, RECTAL RECTAL DAILY PRN
COMMUNITY

## 2019-09-20 RX ORDER — GUAIFENESIN AND DEXTROMETHORPHAN HYDROBROMIDE 100; 10 MG/5ML; MG/5ML
10 SOLUTION ORAL EVERY 4 HOURS PRN
COMMUNITY

## 2019-09-20 RX ORDER — CLONAZEPAM 2 MG/1
2 TABLET, ORALLY DISINTEGRATING ORAL PRN
COMMUNITY

## 2019-09-20 RX ORDER — IPRATROPIUM BROMIDE AND ALBUTEROL SULFATE 2.5; .5 MG/3ML; MG/3ML
1 SOLUTION RESPIRATORY (INHALATION) 4 TIMES DAILY
COMMUNITY

## 2019-09-20 RX ORDER — LANOLIN ALCOHOL/MO/W.PET/CERES
3 CREAM (GRAM) TOPICAL NIGHTLY
COMMUNITY

## 2019-09-20 RX ORDER — POTASSIUM CHLORIDE 750 MG/1
10 TABLET, EXTENDED RELEASE ORAL 2 TIMES DAILY
COMMUNITY

## 2019-09-20 RX ORDER — LACOSAMIDE 10 MG/ML
150 SOLUTION ORAL 2 TIMES DAILY
COMMUNITY

## 2019-09-20 RX ORDER — CHLORHEXIDINE GLUCONATE 0.12 MG/ML
5 RINSE ORAL 3 TIMES DAILY
COMMUNITY

## 2019-09-20 RX ORDER — FUROSEMIDE 20 MG/1
20 TABLET ORAL DAILY
COMMUNITY

## 2019-09-20 RX ORDER — ALBUTEROL SULFATE 2.5 MG/3ML
2.5 SOLUTION RESPIRATORY (INHALATION) EVERY 4 HOURS PRN
COMMUNITY

## 2019-09-20 NOTE — PROGRESS NOTES
Transitions of Care Pharmacy Service   Medication Review    The patient's list of current home medications has been reviewed and updated. Source(s) of information: med list from [facility]    Please feel free to call with any questions about this encounter. Thank you. Luis Garcia, [facility]  Transitions of Care Pharmacy Service  Phone:  [phone]  Fax: [phone]            Prior to Admission medications    Medication Sig       chlorhexidine (PERIDEX) 0.12 % solution Take 5 mLs by mouth 3 times daily To gums       furosemide (LASIX) 20 MG tablet 20 mg by Per G Tube route daily       ipratropium-albuterol (DUONEB) 0.5-2.5 (3) MG/3ML SOLN nebulizer solution Inhale 1 vial into the lungs 4 times daily       loratadine (CLARITIN) 10 MG tablet 10 mg by Per G Tube route daily       melatonin 3 MG TABS tablet Take 3 mg by mouth nightly       ranitidine (ZANTAC) 15 MG/ML syrup 150 mg by Per G Tube route 2 times daily       lacosamide (VIMPAT) 10 MG/ML SOLN oral solution 150 mg by Per G Tube route 2 times daily. acetaminophen (TYLENOL) 325 MG tablet 650 mg by Per G Tube route every 4-6 hours as needed for Pain or Fever       albuterol (PROVENTIL) (2.5 MG/3ML) 0.083% nebulizer solution Take 2.5 mg by nebulization every 4 hours as needed for Shortness of Breath       bisacodyl (DULCOLAX) 10 MG suppository Place 10 mg rectally daily as needed for Constipation       clonazePAM (KLONOPIN) 2 MG disintegrating tablet 2 mg as needed (seizures 3 or greater per hour. May repeat x 1 within 1 hour once daily. ).        Dextromethorphan-guaiFENesin  MG/5ML SYRP 10 mLs by Per G Tube route every 4 hours as needed (congestion)       lamoTRIgine (LAMICTAL) 150 MG tablet 2 tablets by Per G Tube route every morning Indications: 300mg in AM and 200mg in PM       levETIRAcetam (KEPPRA) 100 MG/ML solution 15 mLs by PEG Tube route 2 times daily       lamoTRIgine (LAMICTAL) 200 MG tablet 1 tablet by Per G Tube route nightly Indications: 300MG IN am AND 200MG IN pm       ondansetron (ZOFRAN-ODT) 4 MG disintegrating tablet Take 1 tablet by mouth every 6 hours as needed for Nausea       carbamide peroxide (DEBROX) 6.5 % otic solution 5 drops See Admin Instructions 5 drops BID on the 1st and 15th of the month       ketoconazole (NIZORAL) 2 % shampoo Apply topically Twice a Week Wednesdays and Saturdays       zinc oxide (DESITIN) 40 % PSTE paste Apply topically as needed (irritation)       neomycin-bacitracin-polymyxin (NEOSPORIN) 400-5-5000 ointment Apply topically as needed (prn per [facility] plan of treatment)       budesonide (PULMICORT) 0.5 MG/2ML nebulizer suspension Take 1 ampule by nebulization 2 times daily       lactulose (CHRONULAC) 10 GM/15ML solution 20 g by Per G Tube route 2 times daily       polyethylene glycol (MIRALAX) PACK packet 17 g by Per G Tube route daily       glycopyrrolate (ROBINUL) 1 MG tablet 1 mg by Per G Tube route daily        nystatin (MYCOSTATIN) 276888 UNIT/GM powder Apply topically daily Apply between toes.        baclofen (LIORESAL) 10 MG tablet 10 mg by Per G Tube route 3 times daily       Potassium Chloride ER 10mEq tablet 10 mEq by Per G Tube route 2 times daily        montelukast (SINGULAIR) 10 MG tablet 10 mg by Per G Tube route nightly       fluticasone (FLONASE) 50 MCG/ACT nasal spray 2 sprays by Nasal route daily       Multiple Vitamin (DAILY-JAY PO) 1 tablet by Per G Tube route daily        calcium carbonate (TUMS) 500 MG chewable tablet 1,000 mg by Per G Tube route 2 times daily 2 tabs (=1000mg) BID       pseudoephedrine (SUDAFED) 30 MG tablet 30 mg by Per G Tube route every 8 hours as needed for Congestion        senna-docusate (PERICOLACE) 8.6-50 MG per tablet 2 tablets by Per G Tube route daily       Cholecalciferol (VITAMIN D3) 1000 units TABS 1,000 Units by Per G Tube route 2 times daily        Nutritional Supplements (REPLETE/FIBER) LIQD 900 mLs by Feeding Tube route daily

## 2019-09-20 NOTE — ED NOTES
Pt. Arrives to ER via EMS for altered mental status this am. He lives a group home [facility]) and was more lethargic this am. They were concerned he might have had a seizure last night. Caregiver at bedside does not know any information. Pt. Alert, some eye contact. Tachypnic, 22-28, with some gagging at times. Pale, skin dry. Cool.       Nat Cotter RN  09/20/19 [id number]

## 2019-09-20 NOTE — ED PROVIDER NOTES
[facility] ED  eMERGENCY dEPARTMENT eNCOUnter      Pt Name: Lillian Ya  MRN: [id number]  [place] 1970  Date of evaluation: 9/20/2019  Provider: Sravani Santiago MD    CHIEF COMPLAINT     Chief Complaint   Patient presents with    Altered Mental Status         HISTORY OF PRESENT ILLNESS   (Location/Symptom, Timing/Onset, Context/Setting,Quality, Duration, Modifying Factors, Severity)  Note limiting factors. Lillian Ya is a 51 y.o. male who presents to the emergency department by EMS from the [facility] where he is a longtime resident. Patient was described as being unresponsive this morning and was transported to the ED for evaluation of suspected seizure. Patient has profound mental retardation, cerebral palsy. He has scoliosis and spastic quadriparesis. He has a history of diastolic congestive heart failure and seizure disorder. He has an indwelling G-tube for feeding purposes and has undergone fundoplication. He is on BiPAP at night and is on oxygen at all times. The history is provided by a caregiver, medical records and the EMS personnel. Nursing Notes werereviewed. REVIEW OF SYSTEMS    (2-9 systems for level 4, 10 or more for level 5)     Review of Systems   Unable to perform ROS: Patient nonverbal       Except as noted above the remainder of the review of systems was reviewed and negative.        PAST MEDICAL HISTORY     Past Medical History:   Diagnosis Date    Abdominal distension     Acute exacerbation of chronic obstructive pulmonary disease (COPD) ([address]     Acute respiratory failure with hypoxia (HCC) 3/11/2019    Cerebral palsied ([address]     Cerebral palsy (HCC)     Choledocholithiasis 8/14/2018    Cholelithiasis with chronic cholecystitis     Constipation     Delayed gastric emptying     Dysphagia     GERD (gastroesophageal reflux disease)     Hearing loss     Hypoxia     Ileus ([address] 8/18/2018    Mental disability     MRDD    Multifocal pneumonia greater per hour. May repeat x 1 within 1 hour once daily.). DEXTROMETHORPHAN-GUAIFENESIN  MG/5ML SYRP    10 mLs by Per G Tube route every 4 hours as needed (congestion)    FLUTICASONE (FLONASE) 50 MCG/ACT NASAL SPRAY    2 sprays by Nasal route daily    FUROSEMIDE (LASIX) 20 MG TABLET    20 mg by Per G Tube route daily    GLYCOPYRROLATE (ROBINUL) 1 MG TABLET    1 mg by Per G Tube route daily     IPRATROPIUM-ALBUTEROL (DUONEB) 0.5-2.5 (3) MG/3ML SOLN NEBULIZER SOLUTION    Inhale 1 vial into the lungs 4 times daily    KETOCONAZOLE (NIZORAL) 2 % SHAMPOO    Apply topically Twice a Week Wednesdays and Saturdays    LACOSAMIDE (VIMPAT) 10 MG/ML SOLN ORAL SOLUTION    150 mg by Per G Tube route 2 times daily. LACTULOSE (CHRONULAC) 10 GM/15ML SOLUTION    20 g by Per G Tube route 2 times daily    LAMOTRIGINE (LAMICTAL) 150 MG TABLET    2 tablets by Per G Tube route every morning Indications: 300mg in AM and 200mg in PM    LAMOTRIGINE (LAMICTAL) 200 MG TABLET    1 tablet by Per G Tube route nightly Indications: 300MG IN am AND 200MG IN pm    LEVETIRACETAM (KEPPRA) 100 MG/ML SOLUTION    15 mLs by PEG Tube route 2 times daily    LORATADINE (CLARITIN) 10 MG TABLET    10 mg by Per G Tube route daily    MELATONIN 3 MG TABS TABLET    Take 3 mg by mouth nightly    MONTELUKAST (SINGULAIR) 10 MG TABLET    10 mg by Per G Tube route nightly    MULTIPLE VITAMIN (DAILY-JAY PO)    1 tablet by Per G Tube route daily     NEOMYCIN-BACITRACIN-POLYMYXIN (NEOSPORIN) 400-5-5000 OINTMENT    Apply topically as needed (prn per SNF plan of treatment)    NUTRITIONAL SUPPLEMENTS (REPLETE/FIBER) LIQD    900 mLs by Feeding Tube route daily     NYSTATIN (MYCOSTATIN) 032500 UNIT/GM POWDER    Apply topically daily Apply between toes.     ONDANSETRON (ZOFRAN-ODT) 4 MG DISINTEGRATING TABLET    Take 1 tablet by mouth every 6 hours as needed for Nausea    POLYETHYLENE GLYCOL (MIRALAX) PACK PACKET    17 g by Per G Tube route daily    POTASSIUM CHLORIDE 98.1 °F (36.7 °C) Axillary 77 16 96 % -- 100 lb (45.4 kg)       Physical Exam   Constitutional:   Patient is short in stature and apparently unaware of his surroundings. He is only marginally cooperative. He tends to withdraw to any stimulus. He vocalizes but does not verbalize. HENT:   Head: Normocephalic. Right Ear: External ear normal.   Left Ear: External ear normal.   Nose: Nose normal.   The oral cavity is moist.   Eyes: Pupils are equal, round, and reactive to light. EOM are normal. No scleral icterus. Neck: Neck supple. Cardiovascular: Normal rate, regular rhythm and normal heart sounds. Pulmonary/Chest: Effort normal and breath sounds normal. No respiratory distress. Abdominal: Soft. He exhibits distension. He exhibits no fluid wave and no mass. Bowel sounds are decreased. There is no hepatosplenomegaly. There is no tenderness. Musculoskeletal: Normal range of motion. Neurological:   Patient is listless but withdraws to any stimulus. He moves all extremities minimally. Skin: Skin is warm and dry. No pallor. Nursing note and vitals reviewed. DIAGNOSTIC RESULTS     EKG: All EKG's are interpreted by the Emergency Department Physician who either signs orCo-signs this chart in the absence of a cardiologist.    Normal sinus rhythm. RADIOLOGY:   Non-plain film images such as CT, Ultrasound and MRI are read by the radiologist. Plain radiographic images are visualized and preliminarily interpreted by the emergency physician with the below findings:    Interpretation per the Radiologist below, ifavailable at the time of this note:    CT CHEST WO CONTRAST   Final Result   1. Expiratory phase of imaging with findings of subsegmental atelectasis. Nonspecific ground-glass attenuation in the left lung and right upper lung   field is favored to represent a component of this atelectasis versus   inflammatory process. There is no consolidation or effusion.       2.  Advanced thoracolumbar

## 2019-09-21 LAB
EKG ATRIAL RATE: 69 BPM
EKG P AXIS: 61 DEGREES
EKG P-R INTERVAL: 168 MS
EKG Q-T INTERVAL: 430 MS
EKG QRS DURATION: 88 MS
EKG QTC CALCULATION (BAZETT): 460 MS
EKG R AXIS: 107 DEGREES
EKG T AXIS: 45 DEGREES
EKG VENTRICULAR RATE: 69 BPM

## 2019-09-22 ENCOUNTER — HOSPITAL ENCOUNTER (EMERGENCY)
Age: 49
Discharge: HOME | End: 2019-09-23
Attending: EMERGENCY MEDICINE
Payer: MEDICARE

## 2019-09-22 ENCOUNTER — APPOINTMENT (OUTPATIENT)
Dept: CT IMAGING | Age: 49
End: 2019-09-22
Payer: MEDICARE

## 2019-09-22 ENCOUNTER — APPOINTMENT (OUTPATIENT)
Dept: GENERAL RADIOLOGY | Age: 49
End: 2019-09-22
Payer: MEDICARE

## 2019-09-22 DIAGNOSIS — T50.901A MEDICATION OVERDOSE, ACCIDENTAL OR UNINTENTIONAL, INITIAL ENCOUNTER: Primary | ICD-10-CM

## 2019-09-22 LAB
-: NORMAL
ABSOLUTE EOS #: 0.04 K/UL (ref 0–0.44)
ABSOLUTE IMMATURE GRANULOCYTE: 0.02 K/UL (ref 0–0.3)
ABSOLUTE LYMPH #: 1.91 K/UL (ref 1.1–3.7)
ABSOLUTE MONO #: 0.75 K/UL (ref 0.1–1.2)
ANION GAP SERPL CALCULATED.3IONS-SCNC: 12 MMOL/L (ref 9–17)
BASOPHILS # BLD: 1 % (ref 0–2)
BASOPHILS ABSOLUTE: 0.07 K/UL (ref 0–0.2)
BUN BLDV-MCNC: 17 MG/DL (ref 6–20)
BUN/CREAT BLD: 41 (ref 9–20)
CALCIUM SERPL-MCNC: 9.5 MG/DL (ref 8.6–10.4)
CHLORIDE BLD-SCNC: 101 MMOL/L (ref 98–107)
CHP ED QC CHECK: NORMAL
CO2: 26 MMOL/L (ref 20–31)
CREAT SERPL-MCNC: 0.41 MG/DL (ref 0.7–1.2)
DIFFERENTIAL TYPE: ABNORMAL
EOSINOPHILS RELATIVE PERCENT: 1 % (ref 1–4)
FIO2: 3
GFR AFRICAN AMERICAN: >60 ML/MIN
GFR NON-AFRICAN AMERICAN: >60 ML/MIN
GFR SERPL CREATININE-BSD FRML MDRD: ABNORMAL ML/MIN/{1.73_M2}
GFR SERPL CREATININE-BSD FRML MDRD: ABNORMAL ML/MIN/{1.73_M2}
GLUCOSE BLD-MCNC: 83 MG/DL
GLUCOSE BLD-MCNC: 83 MG/DL (ref 75–110)
GLUCOSE BLD-MCNC: 87 MG/DL (ref 70–99)
HCT VFR BLD CALC: 41.5 % (ref 40.7–50.3)
HEMOGLOBIN: 13.4 G/DL (ref 13–17)
IMMATURE GRANULOCYTES: 0 %
LYMPHOCYTES # BLD: 29 % (ref 24–43)
MCH RBC QN AUTO: 28 PG (ref 25.2–33.5)
MCHC RBC AUTO-ENTMCNC: 32.3 G/DL (ref 28.4–34.8)
MCV RBC AUTO: 86.8 FL (ref 82.6–102.9)
MONOCYTES # BLD: 11 % (ref 3–12)
MYOGLOBIN: 27 NG/ML (ref 28–72)
NEGATIVE BASE EXCESS, ART: ABNORMAL (ref 0–2)
NRBC AUTOMATED: 0 PER 100 WBC
O2 DEVICE/FLOW/%: ABNORMAL
PATIENT TEMP: ABNORMAL
PDW BLD-RTO: 18.2 % (ref 11.8–14.4)
PLATELET # BLD: 306 K/UL (ref 138–453)
PLATELET ESTIMATE: ABNORMAL
PMV BLD AUTO: 12.8 FL (ref 8.1–13.5)
POC HCO3: 28.4 MMOL/L (ref 22–27)
POC O2 SATURATION: 93 %
POC PCO2 TEMP: ABNORMAL MM HG
POC PCO2: 49 MM HG (ref 32–45)
POC PH TEMP: ABNORMAL
POC PH: 7.38 (ref 7.35–7.45)
POC PO2 TEMP: ABNORMAL MM HG
POC PO2: 70 MM HG (ref 75–95)
POSITIVE BASE EXCESS, ART: 3 (ref 0–2)
POTASSIUM SERPL-SCNC: 3.5 MMOL/L (ref 3.7–5.3)
RBC # BLD: 4.78 M/UL (ref 4.21–5.77)
RBC # BLD: ABNORMAL 10*6/UL
REASON FOR REJECTION: NORMAL
SEG NEUTROPHILS: 58 % (ref 36–65)
SEGMENTED NEUTROPHILS ABSOLUTE COUNT: 3.81 K/UL (ref 1.5–8.1)
SODIUM BLD-SCNC: 139 MMOL/L (ref 135–144)
TCO2 (CALC), ART: 30 MMOL/L (ref 23–28)
TROPONIN INTERP: ABNORMAL
TROPONIN T: ABNORMAL NG/ML
TROPONIN, HIGH SENSITIVITY: 16 NG/L (ref 0–22)
WBC # BLD: 6.6 K/UL (ref 3.5–11.3)
WBC # BLD: ABNORMAL 10*3/UL
ZZ NTE CLEAN UP: ORDERED TEST: NORMAL
ZZ NTE WITH NAME CLEAN UP: SPECIMEN SOURCE: NORMAL

## 2019-09-22 PROCEDURE — 80177 DRUG SCRN QUAN LEVETIRACETAM: CPT

## 2019-09-22 PROCEDURE — 84484 ASSAY OF TROPONIN QUANT: CPT

## 2019-09-22 PROCEDURE — 2500000003 HC RX 250 WO HCPCS: Performed by: NURSE PRACTITIONER

## 2019-09-22 PROCEDURE — 83874 ASSAY OF MYOGLOBIN: CPT

## 2019-09-22 PROCEDURE — 71045 X-RAY EXAM CHEST 1 VIEW: CPT

## 2019-09-22 PROCEDURE — 82947 ASSAY GLUCOSE BLOOD QUANT: CPT

## 2019-09-22 PROCEDURE — 82803 BLOOD GASES ANY COMBINATION: CPT

## 2019-09-22 PROCEDURE — 93005 ELECTROCARDIOGRAM TRACING: CPT | Performed by: NURSE PRACTITIONER

## 2019-09-22 PROCEDURE — 85025 COMPLETE CBC W/AUTO DIFF WBC: CPT

## 2019-09-22 PROCEDURE — 80048 BASIC METABOLIC PNL TOTAL CA: CPT

## 2019-09-22 PROCEDURE — 99285 EMERGENCY DEPT VISIT HI MDM: CPT

## 2019-09-22 PROCEDURE — G0480 DRUG TEST DEF 1-7 CLASSES: HCPCS

## 2019-09-22 PROCEDURE — 96374 THER/PROPH/DIAG INJ IV PUSH: CPT

## 2019-09-22 PROCEDURE — 70450 CT HEAD/BRAIN W/O DYE: CPT

## 2019-09-22 RX ORDER — FLUMAZENIL 0.1 MG/ML
0.3 INJECTION, SOLUTION INTRAVENOUS ONCE
Status: COMPLETED | OUTPATIENT
Start: 2019-09-22 | End: 2019-09-22

## 2019-09-22 RX ORDER — NALOXONE HYDROCHLORIDE 1 MG/ML
INJECTION INTRAMUSCULAR; INTRAVENOUS; SUBCUTANEOUS
Status: DISCONTINUED
Start: 2019-09-22 | End: 2019-09-23 | Stop reason: HOSPADM

## 2019-09-22 RX ORDER — NALOXONE HYDROCHLORIDE 1 MG/ML
2 INJECTION INTRAMUSCULAR; INTRAVENOUS; SUBCUTANEOUS ONCE
Status: DISCONTINUED | OUTPATIENT
Start: 2019-09-22 | End: 2019-09-23 | Stop reason: HOSPADM

## 2019-09-22 RX ORDER — FLUMAZENIL 0.1 MG/ML
0.1 INJECTION, SOLUTION INTRAVENOUS ONCE
Status: COMPLETED | OUTPATIENT
Start: 2019-09-22 | End: 2019-09-22

## 2019-09-22 RX ADMIN — FLUMAZENIL 0.1 MG: 0.1 INJECTION, SOLUTION INTRAVENOUS at 21:00

## 2019-09-22 RX ADMIN — FLUMAZENIL 0.3 MG: 0.1 INJECTION, SOLUTION INTRAVENOUS at 21:10

## 2019-09-23 VITALS
OXYGEN SATURATION: 99 % | TEMPERATURE: 98 F | SYSTOLIC BLOOD PRESSURE: 106 MMHG | DIASTOLIC BLOOD PRESSURE: 74 MMHG | RESPIRATION RATE: 24 BRPM | HEART RATE: 57 BPM

## 2019-09-23 LAB
EKG ATRIAL RATE: 61 BPM
EKG Q-T INTERVAL: 434 MS
EKG QRS DURATION: 84 MS
EKG QTC CALCULATION (BAZETT): 525 MS
EKG R AXIS: 113 DEGREES
EKG T AXIS: 52 DEGREES
EKG VENTRICULAR RATE: 88 BPM
KEPPRA: <2 UG/ML

## 2019-09-23 NOTE — ED PROVIDER NOTES
levETIRAcetam (KEPPRA) 100 MG/ML solution 15 mLs by PEG Tube route 2 times daily  Qty: 300 mL, Refills: 3      !! lamoTRIgine (LAMICTAL) 200 MG tablet 1 tablet by Per G Tube route nightly Indications: 300MG IN am AND 200MG IN pm  Qty: 30 tablet, Refills: 3      ondansetron (ZOFRAN-ODT) 4 MG disintegrating tablet Take 1 tablet by mouth every 6 hours as needed for Nausea      carbamide peroxide (DEBROX) 6.5 % otic solution 5 drops See Admin Instructions 5 drops BID on the 1st and 15th of the month      ketoconazole (NIZORAL) 2 % shampoo Apply topically Twice a Week Wednesdays and Saturdays      zinc oxide (DESITIN) 40 % PSTE paste Apply topically as needed (irritation)      neomycin-bacitracin-polymyxin (NEOSPORIN) 400-5-5000 ointment Apply topically as needed (prn per [facility] plan of treatment)      budesonide (PULMICORT) 0.5 MG/2ML nebulizer suspension Take 1 ampule by nebulization 2 times daily      lactulose (CHRONULAC) 10 GM/15ML solution 20 g by Per G Tube route 2 times daily      polyethylene glycol (MIRALAX) PACK packet 17 g by Per G Tube route daily      glycopyrrolate (ROBINUL) 1 MG tablet 1 mg by Per G Tube route daily       nystatin (MYCOSTATIN) 176975 UNIT/GM powder Apply topically daily Apply between toes.       baclofen (LIORESAL) 10 MG tablet 10 mg by Per G Tube route 3 times daily      montelukast (SINGULAIR) 10 MG tablet 10 mg by Per G Tube route nightly      fluticasone (FLONASE) 50 MCG/ACT nasal spray 2 sprays by Nasal route daily      Multiple Vitamin (DAILY-JAY PO) 1 tablet by Per G Tube route daily       calcium carbonate (TUMS) 500 MG chewable tablet 1,000 mg by Per G Tube route 2 times daily 2 tabs (=1000mg) BID      pseudoephedrine (SUDAFED) 30 MG tablet 30 mg by Per G Tube route every 8 hours as needed for Congestion       senna-docusate (PERICOLACE) 8.6-50 MG per tablet 2 tablets by Per G Tube route daily      Cholecalciferol (VITAMIN D3) 1000 units TABS 1,000 Units by Per G Tube route 2 Constitutional: He appears well-developed and well-nourished. He appears lethargic. HENT:   Head: Normocephalic and atraumatic. Right Ear: External ear normal.   Left Ear: External ear normal.   Nose: Nose normal.   Mouth/Throat: Uvula is midline, oropharynx is clear and moist and mucous membranes are normal.   Eyes: Pupils are equal, round, and reactive to light. Conjunctivae, EOM and lids are normal.   Pupils are 1+ react sluggishly to light. Neck: Neck supple. Cardiovascular: Normal rate, regular rhythm, S1 normal, S2 normal, normal heart sounds, intact distal pulses and normal pulses. Pulmonary/Chest: Effort normal and breath sounds normal. Tachypnea noted. Abdominal: Soft. Normal appearance and bowel sounds are normal. There is no tenderness. Neurological: He appears lethargic. Patient is nonverbal.  Patient is lethargic. Patient localizes movement to painful stimuli. Skin: Skin is warm, dry and intact. Capillary refill takes less than 2 seconds. There is pallor. Psychiatric:   Unable to assess. Patient is nonverbal.         DIAGNOSTIC RESULTS     EKG:All EKG's are interpreted by the Emergency Department Physician who either signs or Co-signs this chart in the absence of a cardiologist.    EKG interpreted by attending physician    RADIOLOGY:   Non-plain film images such as CT, Ultrasound and MRI are read by theradiologist. Plain radiographic images are visualized and preliminarily interpreted by the emergency physician with the below findings:    Ct Head Wo Contrast    Result Date: 9/22/2019  EXAMINATION: CT OF THE HEAD WITHOUT CONTRAST  9/22/2019 10:10 pm TECHNIQUE: CT of the head was performed without the administration of intravenous contrast. Dose modulation, iterative reconstruction, and/or weight based adjustment of the mA/kV was utilized to reduce the radiation dose to as low as reasonably achievable.  COMPARISON: September 20, 2019 HISTORY: ORDERING SYSTEM PROVIDED HISTORY: altered mental status TECHNOLOGIST PROVIDED HISTORY: FINDINGS: BRAIN/VENTRICLES: Again noted is severe atrophy and dilatation of the ventricular system stable from the prior exam.  There is no intra-axial or extra-axial bleed. There is no mass or mass effect. ORBITS: The visualized portion of the orbits demonstrate no acute abnormality. SINUSES: The visualized paranasal sinuses and mastoid air cells demonstrate no acute abnormality. SOFT TISSUES/SKULL:  No acute abnormality of the visualized skull or soft tissues. Stable severe atrophy and dilatation of the ventricular system with no acute intracranial abnormality seen. Ct Head Wo Contrast    Result Date: 9/20/2019  EXAMINATION: CT OF THE HEAD WITHOUT CONTRAST  9/20/2019 8:39 am TECHNIQUE: CT of the head was performed without the administration of intravenous contrast. Dose modulation, iterative reconstruction, and/or weight based adjustment of the mA/kV was utilized to reduce the radiation dose to as low as reasonably achievable. COMPARISON: 11/17/2017 HISTORY: ORDERING SYSTEM PROVIDED HISTORY: unresponsive TECHNOLOGIST PROVIDED HISTORY: FINDINGS: BRAIN/VENTRICLES: Ventriculomegaly with dilatation of the atria and occipital horns of the lateral ventricle. Cortical involutional changes are identified with widening of the subarachnoid spaces. Cerebellar volume loss. Findings are essentially unchanged as compared to previous study. No acute intracranial hemorrhage, mass effect, or midline shift. ORBITS: The visualized portion of the orbits demonstrate no acute abnormality. SINUSES: The visualized paranasal sinuses and mastoid air cells demonstrate no acute abnormality. SOFT TISSUES/SKULL:  No acute abnormality of the visualized skull or soft tissues. No convincing evidence for acute intracranial abnormality. Extensive focal parenchymal volume loss, similar to previous study.      Ct Chest Wo Contrast    Result Date: 9/20/2019  EXAMINATION: CT OF THE

## 2019-09-25 LAB — LACOSAMIDE: 4.1 UG/ML (ref 5–10)

## 2019-09-26 LAB
CULTURE: NORMAL
CULTURE: NORMAL
Lab: NORMAL
Lab: NORMAL
SPECIMEN DESCRIPTION: NORMAL
SPECIMEN DESCRIPTION: NORMAL

## 2020-01-10 ENCOUNTER — HOSPITAL ENCOUNTER (EMERGENCY)
Age: 50
Discharge: HOME | End: 2020-01-10
Attending: EMERGENCY MEDICINE
Payer: MEDICARE

## 2020-01-10 ENCOUNTER — APPOINTMENT (OUTPATIENT)
Dept: GENERAL RADIOLOGY | Age: 50
End: 2020-01-10
Payer: MEDICARE

## 2020-01-10 ENCOUNTER — APPOINTMENT (OUTPATIENT)
Dept: CT IMAGING | Age: 50
End: 2020-01-10
Payer: MEDICARE

## 2020-01-10 VITALS
OXYGEN SATURATION: 96 % | RESPIRATION RATE: 14 BRPM | DIASTOLIC BLOOD PRESSURE: 66 MMHG | HEART RATE: 65 BPM | SYSTOLIC BLOOD PRESSURE: 128 MMHG | TEMPERATURE: 97.8 F

## 2020-01-10 LAB
-: NORMAL
ABSOLUTE EOS #: 0.13 K/UL (ref 0–0.44)
ABSOLUTE IMMATURE GRANULOCYTE: 0.02 K/UL (ref 0–0.3)
ABSOLUTE LYMPH #: 1.99 K/UL (ref 1.1–3.7)
ABSOLUTE MONO #: 0.65 K/UL (ref 0.1–1.2)
ANION GAP SERPL CALCULATED.3IONS-SCNC: 10 MMOL/L (ref 9–17)
BASOPHILS # BLD: 1 % (ref 0–2)
BASOPHILS ABSOLUTE: 0.08 K/UL (ref 0–0.2)
BUN BLDV-MCNC: 14 MG/DL (ref 6–20)
BUN/CREAT BLD: 34 (ref 9–20)
CALCIUM SERPL-MCNC: 9.5 MG/DL (ref 8.6–10.4)
CHLORIDE BLD-SCNC: 102 MMOL/L (ref 98–107)
CO2: 28 MMOL/L (ref 20–31)
CREAT SERPL-MCNC: 0.41 MG/DL (ref 0.7–1.2)
DIFFERENTIAL TYPE: NORMAL
EOSINOPHILS RELATIVE PERCENT: 2 % (ref 1–4)
GFR AFRICAN AMERICAN: >60 ML/MIN
GFR NON-AFRICAN AMERICAN: >60 ML/MIN
GFR SERPL CREATININE-BSD FRML MDRD: ABNORMAL ML/MIN/{1.73_M2}
GFR SERPL CREATININE-BSD FRML MDRD: ABNORMAL ML/MIN/{1.73_M2}
GLUCOSE BLD-MCNC: 88 MG/DL (ref 70–99)
HCT VFR BLD CALC: 46.7 % (ref 40.7–50.3)
HEMOGLOBIN: 14.7 G/DL (ref 13–17)
IMMATURE GRANULOCYTES: 0 %
KEPPRA: 24 UG/ML
LACTIC ACID, SEPSIS WHOLE BLOOD: NORMAL MMOL/L (ref 0.5–1.9)
LACTIC ACID, SEPSIS WHOLE BLOOD: NORMAL MMOL/L (ref 0.5–1.9)
LACTIC ACID, SEPSIS: 0.8 MMOL/L (ref 0.5–1.9)
LACTIC ACID, SEPSIS: 1.1 MMOL/L (ref 0.5–1.9)
LAMOTRIGINE LEVEL: 6.3 UG/ML (ref 3–15)
LYMPHOCYTES # BLD: 36 % (ref 24–43)
MCH RBC QN AUTO: 30.2 PG (ref 25.2–33.5)
MCHC RBC AUTO-ENTMCNC: 31.5 G/DL (ref 28.4–34.8)
MCV RBC AUTO: 95.9 FL (ref 82.6–102.9)
MONOCYTES # BLD: 12 % (ref 3–12)
MYOGLOBIN: <21 NG/ML (ref 28–72)
NRBC AUTOMATED: 0 PER 100 WBC
PDW BLD-RTO: 14.4 % (ref 11.8–14.4)
PLATELET # BLD: 268 K/UL (ref 138–453)
PLATELET ESTIMATE: NORMAL
PMV BLD AUTO: 11 FL (ref 8.1–13.5)
POTASSIUM SERPL-SCNC: 4.8 MMOL/L (ref 3.7–5.3)
RBC # BLD: 4.87 M/UL (ref 4.21–5.77)
RBC # BLD: NORMAL 10*6/UL
REASON FOR REJECTION: NORMAL
SEG NEUTROPHILS: 49 % (ref 36–65)
SEGMENTED NEUTROPHILS ABSOLUTE COUNT: 2.68 K/UL (ref 1.5–8.1)
SODIUM BLD-SCNC: 140 MMOL/L (ref 135–144)
TROPONIN INTERP: ABNORMAL
TROPONIN T: ABNORMAL NG/ML
TROPONIN, HIGH SENSITIVITY: 11 NG/L (ref 0–22)
WBC # BLD: 5.6 K/UL (ref 3.5–11.3)
WBC # BLD: NORMAL 10*3/UL
ZZ NTE CLEAN UP: ORDERED TEST: NORMAL
ZZ NTE WITH NAME CLEAN UP: SPECIMEN SOURCE: NORMAL

## 2020-01-10 PROCEDURE — 99285 EMERGENCY DEPT VISIT HI MDM: CPT

## 2020-01-10 PROCEDURE — 6360000002 HC RX W HCPCS: Performed by: PHYSICIAN ASSISTANT

## 2020-01-10 PROCEDURE — 6360000002 HC RX W HCPCS

## 2020-01-10 PROCEDURE — 80177 DRUG SCRN QUAN LEVETIRACETAM: CPT

## 2020-01-10 PROCEDURE — 80048 BASIC METABOLIC PNL TOTAL CA: CPT

## 2020-01-10 PROCEDURE — 83874 ASSAY OF MYOGLOBIN: CPT

## 2020-01-10 PROCEDURE — 96375 TX/PRO/DX INJ NEW DRUG ADDON: CPT

## 2020-01-10 PROCEDURE — 93005 ELECTROCARDIOGRAM TRACING: CPT | Performed by: PHYSICIAN ASSISTANT

## 2020-01-10 PROCEDURE — 87040 BLOOD CULTURE FOR BACTERIA: CPT

## 2020-01-10 PROCEDURE — 70450 CT HEAD/BRAIN W/O DYE: CPT

## 2020-01-10 PROCEDURE — 83605 ASSAY OF LACTIC ACID: CPT

## 2020-01-10 PROCEDURE — 84484 ASSAY OF TROPONIN QUANT: CPT

## 2020-01-10 PROCEDURE — 71045 X-RAY EXAM CHEST 1 VIEW: CPT

## 2020-01-10 PROCEDURE — 81003 URINALYSIS AUTO W/O SCOPE: CPT

## 2020-01-10 PROCEDURE — 80175 DRUG SCREEN QUAN LAMOTRIGINE: CPT

## 2020-01-10 PROCEDURE — 96374 THER/PROPH/DIAG INJ IV PUSH: CPT

## 2020-01-10 PROCEDURE — 85025 COMPLETE CBC W/AUTO DIFF WBC: CPT

## 2020-01-10 RX ORDER — SODIUM CHLORIDE 0.9 % (FLUSH) 0.9 %
10 SYRINGE (ML) INJECTION EVERY 12 HOURS SCHEDULED
Status: DISCONTINUED | OUTPATIENT
Start: 2020-01-10 | End: 2020-01-11 | Stop reason: HOSPADM

## 2020-01-10 RX ORDER — LORAZEPAM 2 MG/ML
INJECTION INTRAMUSCULAR
Status: COMPLETED
Start: 2020-01-10 | End: 2020-01-10

## 2020-01-10 RX ORDER — SODIUM CHLORIDE 0.9 % (FLUSH) 0.9 %
10 SYRINGE (ML) INJECTION PRN
Status: DISCONTINUED | OUTPATIENT
Start: 2020-01-10 | End: 2020-01-11 | Stop reason: HOSPADM

## 2020-01-10 RX ORDER — LORAZEPAM 2 MG/ML
0.5 INJECTION INTRAMUSCULAR ONCE
Status: COMPLETED | OUTPATIENT
Start: 2020-01-10 | End: 2020-01-10

## 2020-01-10 RX ORDER — METHYLPREDNISOLONE SODIUM SUCCINATE 125 MG/2ML
125 INJECTION, POWDER, LYOPHILIZED, FOR SOLUTION INTRAMUSCULAR; INTRAVENOUS ONCE
Status: COMPLETED | OUTPATIENT
Start: 2020-01-10 | End: 2020-01-10

## 2020-01-10 RX ADMIN — LORAZEPAM 0.5 MG: 2 INJECTION INTRAMUSCULAR at 22:19

## 2020-01-10 RX ADMIN — LORAZEPAM 0.5 MG: 2 INJECTION INTRAMUSCULAR; INTRAVENOUS at 22:19

## 2020-01-10 RX ADMIN — METHYLPREDNISOLONE SODIUM SUCCINATE 125 MG: 125 INJECTION, POWDER, FOR SOLUTION INTRAMUSCULAR; INTRAVENOUS at 22:36

## 2020-01-10 NOTE — ED NOTES
Patient brought in by EMS for altered mental status from [facility]. Patient has Hx of cerebral palsey, scoliosis, has significant hx of aspirate pneumonia. Patient's caregiver reports yesterday was laughing, but is non-verbal. Patient today not tracking or acting his normal self. Patient's vitals taken and initially was thought to have a low saturation on arrival sating 100% on RA. Spoke with sister Ondina Brunson on phone and gave update.       Duane Ellison RN  01/10/20 1588

## 2020-01-11 NOTE — ED PROVIDER NOTES
[address] [place] [address] ED  [facility]      Pt Name: Karoline Ratliff  MRN: [id number]  [place] 1970  Date ofevaluation: 1/10/2020  Provider: Darwin Jean Dr       Chief Complaint   Patient presents with    Altered Mental Status         HISTORY OF PRESENT ILLNESS  (Location/Symptom, Timing/Onset, Context/Setting, Quality, Duration, Modifying Factors, Severity.)   Karoline Ratliff is a 47 y.o. male who presents to the emergency department with altered mental status. Patient is a history of mental retardation, seizures and cerebral palsy. Patient lives in a group home. Apparently EMS were told the patient had a low pulse ox. When they arrived he had 100% on room air oxygen level. Patient upon arrival is asleep but his oxygen level is 100 room air. Patient is nonverbal.      Nursing Notes were reviewed.     ALLERGIES     Ampicillin; Valium [diazepam]; and Other    CURRENT MEDICATIONS       Previous Medications    ACETAMINOPHEN (TYLENOL) 325 MG TABLET    650 mg by Per G Tube route every 4-6 hours as needed for Pain or Fever    ALBUTEROL (PROVENTIL) (2.5 MG/3ML) 0.083% NEBULIZER SOLUTION    Take 2.5 mg by nebulization every 4 hours as needed for Shortness of Breath    BACLOFEN (LIORESAL) 10 MG TABLET    10 mg by Per G Tube route 3 times daily    BISACODYL (DULCOLAX) 10 MG SUPPOSITORY    Place 10 mg rectally daily as needed for Constipation    BUDESONIDE (PULMICORT) 0.5 MG/2ML NEBULIZER SUSPENSION    Take 1 ampule by nebulization 2 times daily    CALCIUM CARBONATE (TUMS) 500 MG CHEWABLE TABLET    1,000 mg by Per G Tube route 2 times daily 2 tabs (=1000mg) BID    CARBAMIDE PEROXIDE (DEBROX) 6.5 % OTIC SOLUTION    5 drops See Admin Instructions 5 drops BID on the 1st and 15th of the month    CHLORHEXIDINE (PERIDEX) 0.12 % SOLUTION    Take 5 mLs by mouth 3 times daily To gums    CHOLECALCIFEROL (VITAMIN D3) 1000 UNITS TABS    1,000 Units by Per G Tube route 2 times daily GLYCOL (MIRALAX) PACK PACKET    17 g by Per G Tube route daily    POTASSIUM CHLORIDE (KLOR-CON M) 10 MEQ EXTENDED RELEASE TABLET    10 mEq 2 times daily Per G tube    PSEUDOEPHEDRINE (SUDAFED) 30 MG TABLET    30 mg by Per G Tube route every 8 hours as needed for Congestion     RANITIDINE (ZANTAC) 15 MG/ML SYRUP    150 mg by Per G Tube route 2 times daily    SENNA-DOCUSATE (PERICOLACE) 8.6-50 MG PER TABLET    2 tablets by Per G Tube route daily    ZINC OXIDE (DESITIN) 40 % PSTE PASTE    Apply topically as needed (irritation)       PAST MEDICAL HISTORY         Diagnosis Date    Abdominal distension     Acute exacerbation of chronic obstructive pulmonary disease (COPD) ([facility])     Acute respiratory failure with hypoxia ([facility]) 3/11/2019    Cerebral palsied ([address]     Cerebral palsy ([address]     Choledocholithiasis 8/14/2018    Cholelithiasis with chronic cholecystitis     Constipation     Delayed gastric emptying     Dysphagia     GERD (gastroesophageal reflux disease)     Hearing loss     Hypoxia     Ileus ([address] 8/18/2018    Mental disability     MRDD    Multifocal pneumonia 11/15/2017    Obstipation 8/14/2018    Pneumonia of left lower lobe due to infectious organism ([address]     Positive blood cultures     Profound mental retardation     Scoliosis     Seizure disorder ([address]     Seizures ([address]     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[address]        SURGICAL HISTORY           Procedure Laterality Date    CHOLECYSTECTOMY  08/18/2018    laparoscopic    ERCP  08/16/2018    GASTROSTOMY TUBE PLACEMENT      AL ERCP DX COLLECTION SPECIMEN BRUSHING/WASHING N/A 8/16/2018    ERCP PAPILOTOMY SWEEPING STONE EXTRACTION ENDOSCOPIC RETROGRADE CHOLANGIOPANCREATOGRAPHY performed by Anselmo Dyer MD at [address] [place] [address] N/A 8/18/2018    CHOLECYSTECTOMY LAPAROSCOPIC WITH UMBILICAL HERNIA REPAIR performed by Lorrain Lennox, DO at [address]      has Feeding tube         FAMILY HISTORY           Family 34 (*)     All other components within normal limits   TROP/MYOGLOBIN - Abnormal; Notable for the following components:    Myoglobin <21 (*)     All other components within normal limits   CULTURE BLOOD #1   CULTURE BLOOD #2   CBC WITH AUTO DIFFERENTIAL   LACTATE, SEPSIS   LACTATE, SEPSIS   SPECIMEN REJECTION   LAMOTRIGINE LEVEL   LEVETIRACETAM LEVEL   POCT URINALYSIS DIPSTICK       All other labs were within normal range or not returned as of this dictation. EMERGENCY DEPARTMENT COURSE and DIFFERENTIAL DIAGNOSIS/MDM:   Vitals:    Vitals:    01/10/20 1733   BP: 132/77   Pulse: 61   Resp: 18   Temp: 97.8 °F (36.6 °C)   TempSrc: Axillary   SpO2: 100%   Patient was sleepy throughout most of the visit. However patient did wake up pain returned back to baseline in the ER. Caregiver comfortable with patient going home. Work-up is negative. Will discharge. 11:27 PM  Patient had a seizure which he has a long standing history of.  Given 0.5 mg of ativan. Never became sleepy. Patient was alert right after seizure. Care giver says he has seizures at least weekly. Transportation arrived patient had oxygen level and 97% on room air. Will discharge home. He is awake and alert. Dc discussed with dr Zackary Farmer after dr Reginald Deluna signed out patient. CONSULTS:  None    PROCEDURES:  Procedures        FINAL IMPRESSION      1.  Altered mental status, unspecified altered mental status type          DISPOSITION/PLAN   DISPOSITION Decision To Discharge 01/10/2020 08:47:09 PM      PATIENTREFERRED TO:   Gus Keen MD  [address] [place] [address]  [phone]    In 3 days        DISCHARGE MEDICATIONS:     New Prescriptions    No medications on file           (Please note that portions of this note were completed with a voice recognition program.  Efforts were made to edit thedictations but occasionally words are mis-transcribed.)    TABATHA Das

## 2020-01-12 LAB
EKG ATRIAL RATE: 54 BPM
EKG P AXIS: 51 DEGREES
EKG P-R INTERVAL: 190 MS
EKG Q-T INTERVAL: 482 MS
EKG QRS DURATION: 86 MS
EKG QTC CALCULATION (BAZETT): 457 MS
EKG R AXIS: 101 DEGREES
EKG T AXIS: 56 DEGREES
EKG VENTRICULAR RATE: 54 BPM

## 2020-01-13 ENCOUNTER — APPOINTMENT (OUTPATIENT)
Dept: GENERAL RADIOLOGY | Age: 50
End: 2020-01-13
Payer: MEDICARE

## 2020-01-13 ENCOUNTER — HOSPITAL ENCOUNTER (EMERGENCY)
Age: 50
Discharge: HOME | End: 2020-01-14
Attending: EMERGENCY MEDICINE
Payer: MEDICARE

## 2020-01-13 LAB
-: ABNORMAL
ABSOLUTE EOS #: 0.18 K/UL (ref 0–0.44)
ABSOLUTE IMMATURE GRANULOCYTE: 0.03 K/UL (ref 0–0.3)
ABSOLUTE LYMPH #: 2.35 K/UL (ref 1.1–3.7)
ABSOLUTE MONO #: 1 K/UL (ref 0.1–1.2)
AMORPHOUS: ABNORMAL
ANION GAP SERPL CALCULATED.3IONS-SCNC: 10 MMOL/L (ref 9–17)
BACTERIA: ABNORMAL
BASOPHILS # BLD: 1 % (ref 0–2)
BASOPHILS ABSOLUTE: 0.09 K/UL (ref 0–0.2)
BILIRUBIN URINE: NEGATIVE
BUN BLDV-MCNC: 14 MG/DL (ref 6–20)
BUN/CREAT BLD: ABNORMAL (ref 9–20)
CALCIUM SERPL-MCNC: 9.4 MG/DL (ref 8.6–10.4)
CASTS UA: ABNORMAL /LPF
CHLORIDE BLD-SCNC: 99 MMOL/L (ref 98–107)
CO2: 28 MMOL/L (ref 20–31)
COLOR: YELLOW
COMMENT UA: ABNORMAL
CREAT SERPL-MCNC: <0.4 MG/DL (ref 0.7–1.2)
CRYSTALS, UA: ABNORMAL /HPF
DIFFERENTIAL TYPE: NORMAL
EOSINOPHILS RELATIVE PERCENT: 2 % (ref 1–4)
EPITHELIAL CELLS UA: ABNORMAL /HPF (ref 0–5)
GFR AFRICAN AMERICAN: ABNORMAL ML/MIN
GFR NON-AFRICAN AMERICAN: ABNORMAL ML/MIN
GFR SERPL CREATININE-BSD FRML MDRD: ABNORMAL ML/MIN/{1.73_M2}
GFR SERPL CREATININE-BSD FRML MDRD: ABNORMAL ML/MIN/{1.73_M2}
GLUCOSE BLD-MCNC: 92 MG/DL (ref 70–99)
GLUCOSE URINE: NEGATIVE
HCT VFR BLD CALC: 42.5 % (ref 40.7–50.3)
HEMOGLOBIN: 13.6 G/DL (ref 13–17)
IMMATURE GRANULOCYTES: 0 %
KETONES, URINE: NEGATIVE
LEUKOCYTE ESTERASE, URINE: ABNORMAL
LYMPHOCYTES # BLD: 27 % (ref 24–43)
MCH RBC QN AUTO: 30.6 PG (ref 25.2–33.5)
MCHC RBC AUTO-ENTMCNC: 32 G/DL (ref 28.4–34.8)
MCV RBC AUTO: 95.5 FL (ref 82.6–102.9)
MONOCYTES # BLD: 12 % (ref 3–12)
MUCUS: ABNORMAL
NITRITE, URINE: NEGATIVE
NRBC AUTOMATED: 0 PER 100 WBC
OTHER OBSERVATIONS UA: ABNORMAL
PDW BLD-RTO: 14.2 % (ref 11.8–14.4)
PH UA: 7 (ref 5–8)
PLATELET # BLD: 335 K/UL (ref 138–453)
PLATELET ESTIMATE: NORMAL
PMV BLD AUTO: 10.2 FL (ref 8.1–13.5)
POTASSIUM SERPL-SCNC: 3.9 MMOL/L (ref 3.7–5.3)
PROTEIN UA: NEGATIVE
RBC # BLD: 4.45 M/UL (ref 4.21–5.77)
RBC # BLD: NORMAL 10*6/UL
RBC UA: ABNORMAL /HPF (ref 0–2)
RENAL EPITHELIAL, UA: ABNORMAL /HPF
SEG NEUTROPHILS: 58 % (ref 36–65)
SEGMENTED NEUTROPHILS ABSOLUTE COUNT: 5.06 K/UL (ref 1.5–8.1)
SODIUM BLD-SCNC: 137 MMOL/L (ref 135–144)
SPECIFIC GRAVITY UA: 1.01 (ref 1–1.03)
TRICHOMONAS: ABNORMAL
TURBIDITY: ABNORMAL
URINE HGB: NEGATIVE
UROBILINOGEN, URINE: NORMAL
WBC # BLD: 8.7 K/UL (ref 3.5–11.3)
WBC # BLD: NORMAL 10*3/UL
WBC UA: ABNORMAL /HPF (ref 0–5)
YEAST: ABNORMAL

## 2020-01-13 PROCEDURE — 87086 URINE CULTURE/COLONY COUNT: CPT

## 2020-01-13 PROCEDURE — 81001 URINALYSIS AUTO W/SCOPE: CPT

## 2020-01-13 PROCEDURE — 80048 BASIC METABOLIC PNL TOTAL CA: CPT

## 2020-01-13 PROCEDURE — 51798 US URINE CAPACITY MEASURE: CPT

## 2020-01-13 PROCEDURE — 74018 RADEX ABDOMEN 1 VIEW: CPT

## 2020-01-13 PROCEDURE — 85025 COMPLETE CBC W/AUTO DIFF WBC: CPT

## 2020-01-13 PROCEDURE — 99284 EMERGENCY DEPT VISIT MOD MDM: CPT

## 2020-01-13 PROCEDURE — 43762 RPLC GTUBE NO REVJ TRC: CPT

## 2020-01-14 ENCOUNTER — APPOINTMENT (OUTPATIENT)
Dept: GENERAL RADIOLOGY | Age: 50
End: 2020-01-14
Payer: MEDICARE

## 2020-01-14 VITALS
RESPIRATION RATE: 16 BRPM | HEIGHT: 60 IN | TEMPERATURE: 98.2 F | HEART RATE: 50 BPM | WEIGHT: 130 LBS | SYSTOLIC BLOOD PRESSURE: 110 MMHG | BODY MASS INDEX: 25.52 KG/M2 | OXYGEN SATURATION: 96 % | DIASTOLIC BLOOD PRESSURE: 78 MMHG

## 2020-01-14 PROCEDURE — 74018 RADEX ABDOMEN 1 VIEW: CPT

## 2020-01-14 PROCEDURE — 6370000000 HC RX 637 (ALT 250 FOR IP): Performed by: EMERGENCY MEDICINE

## 2020-01-14 RX ORDER — CEPHALEXIN 500 MG/1
500 CAPSULE ORAL 2 TIMES DAILY
Qty: 14 CAPSULE | Refills: 0 | Status: SHIPPED | OUTPATIENT
Start: 2020-01-14 | End: 2020-01-21

## 2020-01-14 RX ORDER — CEPHALEXIN 500 MG/1
500 CAPSULE ORAL ONCE
Status: COMPLETED | OUTPATIENT
Start: 2020-01-14 | End: 2020-01-14

## 2020-01-14 RX ADMIN — CEPHALEXIN 500 MG: 500 CAPSULE ORAL at 03:10

## 2020-01-14 NOTE — ED PROVIDER NOTES
CHOLECYSTECTOMY  08/18/2018    laparoscopic    ERCP  08/16/2018    GASTROSTOMY TUBE PLACEMENT      GA ERCP DX COLLECTION SPECIMEN BRUSHING/WASHING N/A 8/16/2018    ERCP PAPILOTOMY SWEEPING STONE EXTRACTION ENDOSCOPIC RETROGRADE CHOLANGIOPANCREATOGRAPHY performed by Dustin James MD at [address] [place] [address] N/A 8/18/2018    CHOLECYSTECTOMY LAPAROSCOPIC WITH UMBILICAL HERNIA REPAIR performed by Laura Kenny DO at [address]      has Feeding tube         CURRENT MEDICATIONS     Discharge Medication List as of 1/14/2020  2:49 AM      CONTINUE these medications which have NOT CHANGED    Details   chlorhexidine (PERIDEX) 0.12 % solution Take 5 mLs by mouth 3 times daily To gumsHistorical Med      furosemide (LASIX) 20 MG tablet 20 mg by Per G Tube route dailyHistorical Med      ipratropium-albuterol (DUONEB) 0.5-2.5 (3) MG/3ML SOLN nebulizer solution Inhale 1 vial into the lungs 4 times dailyHistorical Med      loratadine (CLARITIN) 10 MG tablet 10 mg by Per G Tube route dailyHistorical Med      melatonin 3 MG TABS tablet Take 3 mg by mouth nightlyHistorical Med      ranitidine (ZANTAC) 15 MG/ML syrup 150 mg by Per G Tube route 2 times dailyHistorical Med      lacosamide (VIMPAT) 10 MG/ML SOLN oral solution 150 mg by Per G Tube route 2 times daily. Historical Med      acetaminophen (TYLENOL) 325 MG tablet 650 mg by Per G Tube route every 4-6 hours as needed for Pain or FeverHistorical Med      albuterol (PROVENTIL) (2.5 MG/3ML) 0.083% nebulizer solution Take 2.5 mg by nebulization every 4 hours as needed for Shortness of BreathHistorical Med      bisacodyl (DULCOLAX) 10 MG suppository Place 10 mg rectally daily as needed for ConstipationHistorical Med      clonazePAM (KLONOPIN) 2 MG disintegrating tablet 2 mg as needed (seizures 3 or greater per hour. May repeat x 1 within 1 hour once daily. ). Historical Med      Dextromethorphan-guaiFENesin  MG/5ML SYRP 10 mLs by Per G Tube route every 4 hours as needed (congestion)Historical Med      potassium chloride (KLOR-CON M) 10 MEQ extended release tablet 10 mEq 2 times daily Per G tubeHistorical Med      !! lamoTRIgine (LAMICTAL) 150 MG tablet 2 tablets by Per G Tube route every morning Indications: 300mg in AM and 200mg in PM, Disp-30 tablet, R-3Normal      levETIRAcetam (KEPPRA) 100 MG/ML solution 15 mLs by PEG Tube route 2 times daily, Disp-300 mL, R-3Normal      !! lamoTRIgine (LAMICTAL) 200 MG tablet 1 tablet by Per G Tube route nightly Indications: 300MG IN am AND 200MG IN pm, Disp-30 tablet, R-3Normal      ondansetron (ZOFRAN-ODT) 4 MG disintegrating tablet Take 1 tablet by mouth every 6 hours as needed for NauseaDC to SNF      carbamide peroxide (DEBROX) 6.5 % otic solution 5 drops See Admin Instructions 5 drops BID on the 1st and 15th of the monthHistorical Med      ketoconazole (NIZORAL) 2 % shampoo Apply topically Twice a Week Wednesdays and Saturdays, Topical, TWICE WEEKLY, Historical Med      zinc oxide (DESITIN) 40 % PSTE paste Apply topically as needed (irritation)Historical Med      neomycin-bacitracin-polymyxin (NEOSPORIN) 400-5-5000 ointment Apply topically as needed (prn per [facility] plan of treatment), Topical, PRN, Historical Med      budesonide (PULMICORT) 0.5 MG/2ML nebulizer suspension Take 1 ampule by nebulization 2 times dailyHistorical Med      lactulose (CHRONULAC) 10 GM/15ML solution 20 g by Per G Tube route 2 times dailyHistorical Med      polyethylene glycol (MIRALAX) PACK packet 17 g by Per G Tube route dailyHistorical Med      glycopyrrolate (ROBINUL) 1 MG tablet 1 mg by Per G Tube route daily Historical Med      nystatin (MYCOSTATIN) 828561 UNIT/GM powder Apply topically daily Apply between toes. , Topical, DAILY, Historical Med      baclofen (LIORESAL) 10 MG tablet 10 mg by Per G Tube route 3 times dailyHistorical Med      montelukast (SINGULAIR) 10 MG tablet 10 mg by Per G Tube route nightlyHistorical Med

## 2020-01-15 LAB
CULTURE: NORMAL
Lab: NORMAL
SPECIMEN DESCRIPTION: NORMAL

## 2020-03-25 PROBLEM — J96.01 ACUTE RESPIRATORY FAILURE WITH HYPOXIA (HCC): Status: RESOLVED | Noted: 2019-03-11 | Resolved: 2020-03-24

## 2020-06-06 ENCOUNTER — APPOINTMENT (OUTPATIENT)
Dept: CT IMAGING | Age: 50
End: 2020-06-06
Payer: MEDICARE

## 2020-06-06 ENCOUNTER — HOSPITAL ENCOUNTER (EMERGENCY)
Age: 50
Discharge: HOME | End: 2020-06-06
Attending: EMERGENCY MEDICINE
Payer: MEDICARE

## 2020-06-06 VITALS
BODY MASS INDEX: 25.52 KG/M2 | HEART RATE: 91 BPM | HEIGHT: 60 IN | TEMPERATURE: 97.9 F | OXYGEN SATURATION: 93 % | RESPIRATION RATE: 20 BRPM | WEIGHT: 130 LBS | SYSTOLIC BLOOD PRESSURE: 104 MMHG | DIASTOLIC BLOOD PRESSURE: 71 MMHG

## 2020-06-06 LAB
-: ABNORMAL
-: NORMAL
ABSOLUTE EOS #: 0.08 K/UL (ref 0–0.44)
ABSOLUTE IMMATURE GRANULOCYTE: 0.04 K/UL (ref 0–0.3)
ABSOLUTE LYMPH #: 1.23 K/UL (ref 1.1–3.7)
ABSOLUTE MONO #: 0.65 K/UL (ref 0.1–1.2)
ALBUMIN SERPL-MCNC: 3.9 G/DL (ref 3.5–5.2)
ALBUMIN/GLOBULIN RATIO: ABNORMAL (ref 1–2.5)
ALP BLD-CCNC: 90 U/L (ref 40–129)
ALT SERPL-CCNC: 27 U/L (ref 5–41)
AMORPHOUS: ABNORMAL
ANION GAP SERPL CALCULATED.3IONS-SCNC: 14 MMOL/L (ref 9–17)
AST SERPL-CCNC: 18 U/L
BACTERIA: ABNORMAL
BASOPHILS # BLD: 1 % (ref 0–2)
BASOPHILS ABSOLUTE: 0.07 K/UL (ref 0–0.2)
BILIRUB SERPL-MCNC: 0.13 MG/DL (ref 0.3–1.2)
BILIRUBIN URINE: NEGATIVE
BUN BLDV-MCNC: 13 MG/DL (ref 6–20)
BUN/CREAT BLD: ABNORMAL (ref 9–20)
CALCIUM SERPL-MCNC: 9.3 MG/DL (ref 8.6–10.4)
CASTS UA: ABNORMAL /LPF
CHLORIDE BLD-SCNC: 100 MMOL/L (ref 98–107)
CO2: 24 MMOL/L (ref 20–31)
COLOR: YELLOW
COMMENT UA: ABNORMAL
CREAT SERPL-MCNC: <0.4 MG/DL (ref 0.7–1.2)
CRYSTALS, UA: ABNORMAL /HPF
DIFFERENTIAL TYPE: ABNORMAL
EOSINOPHILS RELATIVE PERCENT: 1 % (ref 1–4)
EPITHELIAL CELLS UA: ABNORMAL /HPF (ref 0–5)
GFR AFRICAN AMERICAN: ABNORMAL ML/MIN
GFR NON-AFRICAN AMERICAN: ABNORMAL ML/MIN
GFR SERPL CREATININE-BSD FRML MDRD: ABNORMAL ML/MIN/{1.73_M2}
GFR SERPL CREATININE-BSD FRML MDRD: ABNORMAL ML/MIN/{1.73_M2}
GLUCOSE BLD-MCNC: 135 MG/DL (ref 70–99)
GLUCOSE URINE: NEGATIVE
HCT VFR BLD CALC: 46.1 % (ref 40.7–50.3)
HEMOGLOBIN: 14.7 G/DL (ref 13–17)
IMMATURE GRANULOCYTES: 1 %
KEPPRA: 12 UG/ML
KETONES, URINE: NEGATIVE
LACTIC ACID: 1.8 MMOL/L (ref 0.5–2.2)
LAMOTRIGINE LEVEL: 10 UG/ML (ref 3–15)
LEUKOCYTE ESTERASE, URINE: ABNORMAL
LYMPHOCYTES # BLD: 18 % (ref 24–43)
MCH RBC QN AUTO: 31.3 PG (ref 25.2–33.5)
MCHC RBC AUTO-ENTMCNC: 31.9 G/DL (ref 28.4–34.8)
MCV RBC AUTO: 98.1 FL (ref 82.6–102.9)
MONOCYTES # BLD: 9 % (ref 3–12)
MUCUS: ABNORMAL
NITRITE, URINE: NEGATIVE
NRBC AUTOMATED: 0 PER 100 WBC
OTHER OBSERVATIONS UA: ABNORMAL
PDW BLD-RTO: 13.6 % (ref 11.8–14.4)
PH UA: 7.5 (ref 5–8)
PLATELET # BLD: 367 K/UL (ref 138–453)
PLATELET ESTIMATE: ABNORMAL
PMV BLD AUTO: 10.8 FL (ref 8.1–13.5)
POTASSIUM SERPL-SCNC: 3.9 MMOL/L (ref 3.7–5.3)
PROTEIN UA: NEGATIVE
RBC # BLD: 4.7 M/UL (ref 4.21–5.77)
RBC # BLD: ABNORMAL 10*6/UL
RBC UA: ABNORMAL /HPF (ref 0–2)
REASON FOR REJECTION: NORMAL
RENAL EPITHELIAL, UA: ABNORMAL /HPF
SEG NEUTROPHILS: 70 % (ref 36–65)
SEGMENTED NEUTROPHILS ABSOLUTE COUNT: 4.96 K/UL (ref 1.5–8.1)
SODIUM BLD-SCNC: 138 MMOL/L (ref 135–144)
SPECIFIC GRAVITY UA: 1.01 (ref 1–1.03)
TOTAL PROTEIN: 6.8 G/DL (ref 6.4–8.3)
TRICHOMONAS: ABNORMAL
TURBIDITY: ABNORMAL
URINE HGB: NEGATIVE
UROBILINOGEN, URINE: NORMAL
WBC # BLD: 7 K/UL (ref 3.5–11.3)
WBC # BLD: ABNORMAL 10*3/UL
WBC UA: ABNORMAL /HPF (ref 0–5)
YEAST: ABNORMAL
ZZ NTE CLEAN UP: ORDERED TEST: NORMAL
ZZ NTE WITH NAME CLEAN UP: SPECIMEN SOURCE: NORMAL

## 2020-06-06 PROCEDURE — 6370000000 HC RX 637 (ALT 250 FOR IP): Performed by: EMERGENCY MEDICINE

## 2020-06-06 PROCEDURE — 85025 COMPLETE CBC W/AUTO DIFF WBC: CPT

## 2020-06-06 PROCEDURE — 81001 URINALYSIS AUTO W/SCOPE: CPT

## 2020-06-06 PROCEDURE — 99284 EMERGENCY DEPT VISIT MOD MDM: CPT

## 2020-06-06 PROCEDURE — 6360000002 HC RX W HCPCS: Performed by: EMERGENCY MEDICINE

## 2020-06-06 PROCEDURE — 80175 DRUG SCREEN QUAN LAMOTRIGINE: CPT

## 2020-06-06 PROCEDURE — 70450 CT HEAD/BRAIN W/O DYE: CPT

## 2020-06-06 PROCEDURE — 87077 CULTURE AEROBIC IDENTIFY: CPT

## 2020-06-06 PROCEDURE — 80053 COMPREHEN METABOLIC PANEL: CPT

## 2020-06-06 PROCEDURE — 83605 ASSAY OF LACTIC ACID: CPT

## 2020-06-06 PROCEDURE — 87086 URINE CULTURE/COLONY COUNT: CPT

## 2020-06-06 PROCEDURE — 80177 DRUG SCRN QUAN LEVETIRACETAM: CPT

## 2020-06-06 PROCEDURE — 71250 CT THORAX DX C-: CPT

## 2020-06-06 PROCEDURE — 2580000003 HC RX 258: Performed by: EMERGENCY MEDICINE

## 2020-06-06 PROCEDURE — 96365 THER/PROPH/DIAG IV INF INIT: CPT

## 2020-06-06 RX ORDER — LEVETIRACETAM 100 MG/ML
1500 SOLUTION ORAL ONCE
Status: COMPLETED | OUTPATIENT
Start: 2020-06-06 | End: 2020-06-06

## 2020-06-06 RX ORDER — CEPHALEXIN 500 MG/1
500 CAPSULE ORAL 2 TIMES DAILY
Qty: 14 CAPSULE | Refills: 0 | Status: SHIPPED | OUTPATIENT
Start: 2020-06-06 | End: 2020-06-13

## 2020-06-06 RX ADMIN — CEFTRIAXONE SODIUM 1 G: 1 INJECTION, POWDER, FOR SOLUTION INTRAMUSCULAR; INTRAVENOUS at 19:00

## 2020-06-06 RX ADMIN — LEVETIRACETAM 1500 MG: 500 SOLUTION ORAL at 16:49

## 2020-06-06 NOTE — ED PROVIDER NOTES
ADDENDUM:        Care of this patient was assumed from Dr. Juancho Loja   at    1800  . The patient was seen for Seizures  . The patient's initial evaluation and plan have been discussed with the prior provider who initially evaluated the patient. Nursing Notes, Past Medical Hx, Past Surgical Hx, Social Hx, Allergies, and Family Hx were all reviewed. I performed a repeat evaluation of the patient and reviewed tests completed so far. ED Course         Patient was endorsed to me pending CT abdomen pelvis results as well as urinalysis. CT scan consistent with constipation no evidence of obstruction and urinalysis consistent with urinary tract infection patient initiated on antibiotics with Ceftriaxone he has a listed allergy to ampicillin but is been on Keflex previously. Patient at baseline mental status no seizure-like activity. Discharged home with a prescription for Keflex as well. The patient was given the following medications:  Orders Placed This Encounter   Medications    levETIRAcetam (KEPPRA) 100 MG/ML solution 1,500 mg    cefTRIAXone (ROCEPHIN) 1 g IVPB in 50 mL D5W minibag       RECENT VITALS:  BP: 122/73, Temp: 97.9 °F (36.6 °C), Pulse: 92, Resp: 16     RADIOLOGY:All plain film, CT, MRI, and formal ultrasound images (except ED bedside ultrasound) are read by the radiologist and the images and interpretations are directly viewed by the emergency physician. CT Head WO Contrast   Final Result   Stable CT scan of the head. CT CHEST ABDOMEN PELVIS WO CONTRAST   Final Result   Ill-defined ground-glass and nodular opacities within the right upper lower   lobes. Bilateral nephrolithiasis without urinary obstruction or hydronephrosis. Focal wall thickening of the sigmoid colon. Large stool burden noted   proximal to the narrowing. Direct visualization with colonoscopy can be   considered as clinically indicated.                LABS: All lab results were reviewed by myself, and

## 2020-06-06 NOTE — ED PROVIDER NOTES
EMERGENCY DEPARTMENT ENCOUNTER    Pt Name: Alan Hall  MRN: [id number]  [place] 1970  Date of evaluation: 6/6/20  CHIEF COMPLAINT       Chief Complaint   Patient presents with    Seizures     HISTORY OF PRESENT ILLNESS   Patient is a 58-year-old male with multiple comorbidities listed below including MRDD, delayed gastric emptying, status post G-tube, on Keppra and Lamictal for seizures who was sent from place of residence for seizure activity. Per report, patient has approximately 3 seizures per hour. Today, staff noted 20 seizures in 1 hour. No seizure activities after paramedics arrived and noted seizure activities while in the ED. No reports of fever, cough, vomiting, diarrhea.     REVIEW OF SYSTEMS     Review of Systems   Unable to perform ROS: Patient nonverbal     PASTMEDICAL HISTORY     Past Medical History:   Diagnosis Date    Abdominal distension     Acute exacerbation of chronic obstructive pulmonary disease (COPD) ([address]     Acute respiratory failure with hypoxia ([address] 3/11/2019    Cerebral palsied ([address]     Cerebral palsy (HCC)     Choledocholithiasis 8/14/2018    Cholelithiasis with chronic cholecystitis     Constipation     Delayed gastric emptying     Dysphagia     GERD (gastroesophageal reflux disease)     Hearing loss     Hypoxia     Ileus ([address] 8/18/2018    Mental disability     MRDD    Multifocal pneumonia 11/15/2017    Obstipation 8/14/2018    Pneumonia of left lower lobe due to infectious organism ([address]     Positive blood cultures     Profound mental retardation     Scoliosis     Seizure disorder ([address]     Seizures ([address]     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[address]      SURGICAL HISTORY       Past Surgical History:   Procedure Laterality Date    CHOLECYSTECTOMY  08/18/2018    laparoscopic    ERCP  08/16/2018    GASTROSTOMY TUBE PLACEMENT      MD ERCP DX COLLECTION SPECIMEN BRUSHING/WASHING N/A 8/16/2018    ERCP PAPILOTOMY SWEEPING STONE EXTRACTION ENDOSCOPIC RETROGRADE CHOLANGIOPANCREATOGRAPHY performed by Naty Zamudio MD at [address] [place] [address] N/A 8/18/2018    CHOLECYSTECTOMY LAPAROSCOPIC WITH UMBILICAL HERNIA REPAIR performed by Olivia Breaux DO at [address]      has Feeding tube     CURRENT MEDICATIONS       Discharge Medication List as of 6/6/2020  7:19 PM      CONTINUE these medications which have NOT CHANGED    Details   chlorhexidine (PERIDEX) 0.12 % solution Take 5 mLs by mouth 3 times daily To gumsHistorical Med      furosemide (LASIX) 20 MG tablet 20 mg by Per G Tube route dailyHistorical Med      ipratropium-albuterol (DUONEB) 0.5-2.5 (3) MG/3ML SOLN nebulizer solution Inhale 1 vial into the lungs 4 times dailyHistorical Med      loratadine (CLARITIN) 10 MG tablet 10 mg by Per G Tube route dailyHistorical Med      melatonin 3 MG TABS tablet Take 3 mg by mouth nightlyHistorical Med      ranitidine (ZANTAC) 15 MG/ML syrup 150 mg by Per G Tube route 2 times dailyHistorical Med      lacosamide (VIMPAT) 10 MG/ML SOLN oral solution 150 mg by Per G Tube route 2 times daily. Historical Med      acetaminophen (TYLENOL) 325 MG tablet 650 mg by Per G Tube route every 4-6 hours as needed for Pain or FeverHistorical Med      albuterol (PROVENTIL) (2.5 MG/3ML) 0.083% nebulizer solution Take 2.5 mg by nebulization every 4 hours as needed for Shortness of BreathHistorical Med      bisacodyl (DULCOLAX) 10 MG suppository Place 10 mg rectally daily as needed for ConstipationHistorical Med      clonazePAM (KLONOPIN) 2 MG disintegrating tablet 2 mg as needed (seizures 3 or greater per hour. May repeat x 1 within 1 hour once daily. ). Historical Med      Dextromethorphan-guaiFENesin  MG/5ML SYRP 10 mLs by Per G Tube route every 4 hours as needed (congestion)Historical Med      potassium chloride (KLOR-CON M) 10 MEQ extended release tablet 10 mEq 2 times daily Per G tubeHistorical Med      !! lamoTRIgine (LAMICTAL) 150 MG tablet 2 tablets by Per G Tube route every morning Indications: 300mg in AM and 200mg in PM, Disp-30 tablet, R-3Normal      levETIRAcetam (KEPPRA) 100 MG/ML solution 15 mLs by PEG Tube route 2 times daily, Disp-300 mL, R-3Normal      !! lamoTRIgine (LAMICTAL) 200 MG tablet 1 tablet by Per G Tube route nightly Indications: 300MG IN am AND 200MG IN pm, Disp-30 tablet, R-3Normal      ondansetron (ZOFRAN-ODT) 4 MG disintegrating tablet Take 1 tablet by mouth every 6 hours as needed for NauseaDC to [facility]      carbamide peroxide (DEBROX) 6.5 % otic solution 5 drops See Admin Instructions 5 drops BID on the 1st and 15th of the monthHistorical Med      ketoconazole (NIZORAL) 2 % shampoo Apply topically Twice a Week Wednesdays and Saturdays, Topical, TWICE WEEKLY, Historical Med      zinc oxide (DESITIN) 40 % PSTE paste Apply topically as needed (irritation)Historical Med      neomycin-bacitracin-polymyxin (NEOSPORIN) 400-5-5000 ointment Apply topically as needed (prn per [facility] plan of treatment), Topical, PRN, Historical Med      budesonide (PULMICORT) 0.5 MG/2ML nebulizer suspension Take 1 ampule by nebulization 2 times dailyHistorical Med      lactulose (CHRONULAC) 10 GM/15ML solution 20 g by Per G Tube route 2 times dailyHistorical Med      polyethylene glycol (MIRALAX) PACK packet 17 g by Per G Tube route dailyHistorical Med      glycopyrrolate (ROBINUL) 1 MG tablet 1 mg by Per G Tube route daily Historical Med      nystatin (MYCOSTATIN) 865526 UNIT/GM powder Apply topically daily Apply between toes. , Topical, DAILY, Historical Med      baclofen (LIORESAL) 10 MG tablet 10 mg by Per G Tube route 3 times dailyHistorical Med      montelukast (SINGULAIR) 10 MG tablet 10 mg by Per G Tube route nightlyHistorical Med      fluticasone (FLONASE) 50 MCG/ACT nasal spray 2 sprays by Nasal route dailyHistorical Med      Multiple Vitamin (DAILY-JAY PO) 1 tablet by Per G Tube route daily Historical Med      calcium carbonate (TUMS) 500 MG chewable tablet 1,000 mg by Per G Tube route 2 times daily 2 tabs (=1000mg) BIDHistorical Med      pseudoephedrine (SUDAFED) 30 MG tablet 30 mg by Per G Tube route every 8 hours as needed for Congestion Historical Med      senna-docusate (PERICOLACE) 8.6-50 MG per tablet 2 tablets by Per G Tube route dailyHistorical Med      Cholecalciferol (VITAMIN D3) 1000 units TABS 1,000 Units by Per G Tube route 2 times daily Historical Med      Nutritional Supplements (REPLETE/FIBER) LIQD 900 mLs by Feeding Tube route daily Historical Med       !! - Potential duplicate medications found. Please discuss with provider. ALLERGIES     is allergic to ampicillin; valium [diazepam]; and other. FAMILY HISTORY     has no family status information on file. SOCIAL HISTORY       Social History     Tobacco Use    Smoking status: Never Smoker    Smokeless tobacco: Never Used   Substance Use Topics    Alcohol use: No    Drug use: No     PHYSICAL EXAM     INITIAL VITALS: /71   Pulse 91   Temp 97.9 °F (36.6 °C) (Axillary)   Resp 20   Ht 4' (1.219 m)   Wt 130 lb (59 kg)   SpO2 93%   BMI 39.67 kg/m²    Physical Exam  Constitutional:       Comments: Patient is nonverbal, lying in bed, in no acute distress. HENT:      Head: Normocephalic. Right Ear: External ear normal.      Left Ear: External ear normal.      Nose: Nose normal.   Eyes:      Conjunctiva/sclera: Conjunctivae normal.   Cardiovascular:      Rate and Rhythm: Tachycardia present. Pulmonary:      Effort: Pulmonary effort is normal.   Abdominal:      Comments: GI tube with clean, dry intact dressing. Skin:     General: Skin is dry. Neurological:      Mental Status: He is alert. Mental status is at baseline. Comments: Contracted, staring at ceiling, seemingly unaware of surroundings. Psychiatric:         Mood and Affect: Mood normal.         Behavior: Behavior normal.         MEDICAL DECISION MAKING:   The patient is tacky e, afebrile, nontoxic-appearing.   Physical UA 1+ (*)     All other components within normal limits   CULTURE, URINE   LACTIC ACID   LEVETIRACETAM LEVEL   LAMOTRIGINE LEVEL   SPECIMEN REJECTION       EMERGENCY DEPARTMENTCOURSE:         Vitals:    Vitals:    06/06/20 1750 06/06/20 1820 06/06/20 1850 06/06/20 1920   BP: 132/85 122/73 131/69 104/71   Pulse: 95 92 94 91   Resp: 27 16 23 20   Temp:       TempSrc:       SpO2: 93% 93% 94% 93%   Weight:       Height:           The patient was given the following medications while in the emergency department:  Orders Placed This Encounter   Medications    levETIRAcetam (KEPPRA) 100 MG/ML solution 1,500 mg    cefTRIAXone (ROCEPHIN) 1 g IVPB in 50 mL D5W minibag    cephALEXin (KEFLEX) 500 MG capsule     Sig: Take 1 capsule by mouth 2 times daily for 7 days     Dispense:  14 capsule     Refill:  0     CONSULTS:  None    FINAL IMPRESSION      1. Breakthrough seizure ([address]    2. Urinary tract infection without hematuria, site unspecified          DISPOSITION/PLAN   DISPOSITION Decision To Discharge 06/06/2020 08:24:50 PM      PATIENT REFERRED TO:  No follow-up provider specified.   DISCHARGE MEDICATIONS:  Discharge Medication List as of 6/6/2020  7:19 PM      START taking these medications    Details   cephALEXin (KEFLEX) 500 MG capsule Take 1 capsule by mouth 2 times daily for 7 days, Disp-14 capsule, R-0Print           Shefali Abreu MD  Attending Emergency Physician                    Juliette Granados MD  06/07/20 8701

## 2020-06-06 NOTE — ED NOTES
Bed: 20  Expected date: 6/6/20  Expected time: 3:47 PM  Means of arrival:   Comments:  LS 3     Eugenio Izaguirre RN  06/06/20 4565

## 2020-06-07 NOTE — ED NOTES
Caregiver at bedside updated on transportation ETA. Patient stable and in NAD in room. No seizure-like activity witnessed. Will continue to monitor.       Jasmyn Mcdonald RN  06/06/20 6629

## 2020-06-08 ENCOUNTER — CARE COORDINATION (OUTPATIENT)
Dept: FAMILY MEDICINE CLINIC | Age: 50
End: 2020-06-08

## 2020-06-08 LAB
CULTURE: ABNORMAL
Lab: ABNORMAL
SPECIMEN DESCRIPTION: ABNORMAL

## 2020-06-08 NOTE — CARE COORDINATION
[facility] attempted outreach for ED follow up, seizure activity, COVID 19 Protocol    No answer and LVM to return call to [facility] at [phone]

## 2020-06-09 ENCOUNTER — CARE COORDINATION (OUTPATIENT)
Dept: FAMILY MEDICINE CLINIC | Age: 50
End: 2020-06-09

## 2020-09-21 NOTE — ED NOTES
ASSESSMENT:   Presents to ED per EMS from a Group home with c/o  His Feeding tube malfunctioning and needs for tube feedings. Did use it this am for breakfast. Since then can't flush it. EMS states was told that there's a lot of back pressure. Has hx Cerebral Palsy, MRDD, Seizures, Quadraplegia. States he is acting his norm. Is nonverbal, does not follow commands. Lays on back starring up at ceiling. Feeding tube is intact lt upper abdomen. Tube irrigates well with water and get a good return of gastric contents. Seems to have a kink at securing device. when unkinked it works well. Observed by Dr Nevaeh Nichole.   Group home personal not here and suppose to be en route with Hx, allergy and med list.     Travis Hopson RN  10/21/17 8024
Exam per Dr Billy Hernández, RN  10/21/17 4447
Group home person Alfonzo Gupta) here and states has only been with group home for about 1 month. To them from [facility]. Gets tube feeding for 13 hours. She is calling group home to find out when it was placed. Due to be changed 12/2017.      Magi Narvaez RN  10/21/17 9043       Magi Narvaez RN  10/21/17 2788
[facility] transport here.      Carlita Walker RN  10/21/17 8907
[facility] transport notified and ETA about 1730. Care giver aware.      Hillary Gitelman, RN  10/21/17 0275
Radiology to [facility] for further films as per Radiologist request.     Tan Lewis RN  10/21/17 8455
electronic

## 2020-11-03 PROBLEM — J18.9 PNEUMONIA OF LEFT LOWER LOBE DUE TO INFECTIOUS ORGANISM: Status: RESOLVED | Noted: 2020-11-03 | Resolved: 2020-11-03

## 2020-11-03 PROBLEM — J18.9 PNEUMONIA DUE TO ORGANISM: Status: RESOLVED | Noted: 2018-01-23 | Resolved: 2020-11-03

## 2020-12-20 ENCOUNTER — APPOINTMENT (OUTPATIENT)
Dept: GENERAL RADIOLOGY | Age: 50
End: 2020-12-20
Payer: MEDICARE

## 2020-12-20 ENCOUNTER — HOSPITAL ENCOUNTER (EMERGENCY)
Age: 50
Discharge: HOME | End: 2020-12-20
Attending: EMERGENCY MEDICINE
Payer: MEDICARE

## 2020-12-20 VITALS
SYSTOLIC BLOOD PRESSURE: 137 MMHG | HEART RATE: 75 BPM | BODY MASS INDEX: 21.6 KG/M2 | RESPIRATION RATE: 24 BRPM | WEIGHT: 110 LBS | HEIGHT: 60 IN | OXYGEN SATURATION: 92 % | TEMPERATURE: 98.3 F | DIASTOLIC BLOOD PRESSURE: 88 MMHG

## 2020-12-20 LAB
ABSOLUTE EOS #: 0.12 K/UL (ref 0–0.44)
ABSOLUTE IMMATURE GRANULOCYTE: 0.03 K/UL (ref 0–0.3)
ABSOLUTE LYMPH #: 1.85 K/UL (ref 1.1–3.7)
ABSOLUTE MONO #: 0.55 K/UL (ref 0.1–1.2)
ALBUMIN SERPL-MCNC: 4.1 G/DL (ref 3.5–5.2)
ALBUMIN/GLOBULIN RATIO: NORMAL (ref 1–2.5)
ALP BLD-CCNC: 104 U/L (ref 40–129)
ALT SERPL-CCNC: 18 U/L (ref 5–41)
ANION GAP SERPL CALCULATED.3IONS-SCNC: 15 MMOL/L (ref 9–17)
AST SERPL-CCNC: 16 U/L
BASOPHILS # BLD: 1 % (ref 0–2)
BASOPHILS ABSOLUTE: 0.04 K/UL (ref 0–0.2)
BILIRUB SERPL-MCNC: 0.3 MG/DL (ref 0.3–1.2)
BILIRUBIN DIRECT: 0.12 MG/DL
BILIRUBIN, INDIRECT: 0.18 MG/DL (ref 0–1)
BNP INTERPRETATION: NORMAL
BUN BLDV-MCNC: 8 MG/DL (ref 6–20)
BUN/CREAT BLD: 18 (ref 9–20)
CALCIUM SERPL-MCNC: 9.5 MG/DL (ref 8.6–10.4)
CHLORIDE BLD-SCNC: 98 MMOL/L (ref 98–107)
CO2: 25 MMOL/L (ref 20–31)
CREAT SERPL-MCNC: 0.44 MG/DL (ref 0.7–1.2)
DIFFERENTIAL TYPE: ABNORMAL
EOSINOPHILS RELATIVE PERCENT: 2 % (ref 1–4)
GFR AFRICAN AMERICAN: >60 ML/MIN
GFR NON-AFRICAN AMERICAN: >60 ML/MIN
GFR SERPL CREATININE-BSD FRML MDRD: ABNORMAL ML/MIN/{1.73_M2}
GFR SERPL CREATININE-BSD FRML MDRD: ABNORMAL ML/MIN/{1.73_M2}
GLOBULIN: NORMAL G/DL (ref 1.5–3.8)
GLUCOSE BLD-MCNC: 186 MG/DL (ref 70–99)
HCT VFR BLD CALC: 43.7 % (ref 40.7–50.3)
HEMOGLOBIN: 14.1 G/DL (ref 13–17)
IMMATURE GRANULOCYTES: 1 %
LIPASE: 20 U/L (ref 13–60)
LYMPHOCYTES # BLD: 31 % (ref 24–43)
MCH RBC QN AUTO: 30.7 PG (ref 25.2–33.5)
MCHC RBC AUTO-ENTMCNC: 32.3 G/DL (ref 28.4–34.8)
MCV RBC AUTO: 95.2 FL (ref 82.6–102.9)
MONOCYTES # BLD: 9 % (ref 3–12)
NRBC AUTOMATED: 0 PER 100 WBC
PDW BLD-RTO: 13.7 % (ref 11.8–14.4)
PLATELET # BLD: 331 K/UL (ref 138–453)
PLATELET ESTIMATE: ABNORMAL
PMV BLD AUTO: 10.1 FL (ref 8.1–13.5)
POTASSIUM SERPL-SCNC: 3.7 MMOL/L (ref 3.7–5.3)
PRO-BNP: 53 PG/ML
RBC # BLD: 4.59 M/UL (ref 4.21–5.77)
RBC # BLD: ABNORMAL 10*6/UL
SEG NEUTROPHILS: 56 % (ref 36–65)
SEGMENTED NEUTROPHILS ABSOLUTE COUNT: 3.37 K/UL (ref 1.5–8.1)
SODIUM BLD-SCNC: 138 MMOL/L (ref 135–144)
TOTAL PROTEIN: 7.8 G/DL (ref 6.4–8.3)
WBC # BLD: 6 K/UL (ref 3.5–11.3)
WBC # BLD: ABNORMAL 10*3/UL

## 2020-12-20 PROCEDURE — 96374 THER/PROPH/DIAG INJ IV PUSH: CPT

## 2020-12-20 PROCEDURE — 83690 ASSAY OF LIPASE: CPT

## 2020-12-20 PROCEDURE — 80076 HEPATIC FUNCTION PANEL: CPT

## 2020-12-20 PROCEDURE — 83880 ASSAY OF NATRIURETIC PEPTIDE: CPT

## 2020-12-20 PROCEDURE — 99284 EMERGENCY DEPT VISIT MOD MDM: CPT

## 2020-12-20 PROCEDURE — 80048 BASIC METABOLIC PNL TOTAL CA: CPT

## 2020-12-20 PROCEDURE — 74018 RADEX ABDOMEN 1 VIEW: CPT

## 2020-12-20 PROCEDURE — 6370000000 HC RX 637 (ALT 250 FOR IP): Performed by: NURSE PRACTITIONER

## 2020-12-20 PROCEDURE — 85025 COMPLETE CBC W/AUTO DIFF WBC: CPT

## 2020-12-20 PROCEDURE — 6360000002 HC RX W HCPCS: Performed by: NURSE PRACTITIONER

## 2020-12-20 PROCEDURE — 71045 X-RAY EXAM CHEST 1 VIEW: CPT

## 2020-12-20 RX ORDER — POLYETHYLENE GLYCOL 3350 17 G/17G
17 POWDER, FOR SOLUTION ORAL DAILY PRN
Qty: 170 G | Refills: 0 | Status: SHIPPED | OUTPATIENT
Start: 2020-12-20 | End: 2020-12-30

## 2020-12-20 RX ORDER — ONDANSETRON HYDROCHLORIDE 4 MG/5ML
4 SOLUTION ORAL EVERY 8 HOURS PRN
Qty: 50 ML | Refills: 0 | Status: SHIPPED | OUTPATIENT
Start: 2020-12-20

## 2020-12-20 RX ORDER — POLYETHYLENE GLYCOL 3350 17 G/17G
17 POWDER, FOR SOLUTION ORAL ONCE
Status: COMPLETED | OUTPATIENT
Start: 2020-12-20 | End: 2020-12-20

## 2020-12-20 RX ORDER — ONDANSETRON 2 MG/ML
4 INJECTION INTRAMUSCULAR; INTRAVENOUS ONCE
Status: COMPLETED | OUTPATIENT
Start: 2020-12-20 | End: 2020-12-20

## 2020-12-20 RX ADMIN — POLYETHYLENE GLYCOL 3350 17 G: 17 POWDER, FOR SOLUTION ORAL at 13:13

## 2020-12-20 RX ADMIN — ONDANSETRON 4 MG: 2 INJECTION INTRAMUSCULAR; INTRAVENOUS at 11:00

## 2020-12-20 ASSESSMENT — ENCOUNTER SYMPTOMS
VOMITING: 1
COLOR CHANGE: 0
COUGH: 0
DIARRHEA: 0

## 2020-12-20 NOTE — ED PROVIDER NOTES
The patient was seen and examined by me in conjunction with the mid-level provider. I agree with his/her assessment and treatment plan. The patient was found to be constipated. Attempts were made at disimpaction but no stool could be reached. He is being given laxative through the G-tube.      Sandeep Miranda MD  12/20/20 6234

## 2020-12-20 NOTE — ED PROVIDER NOTES
[facility] ED  eMERGENCY dEPARTMENT eNCOUnter      Pt Name: Amrit Meza  MRN: [id number]  [place] 1970  Date of evaluation: 12/20/2020  Provider: KARIME Watt [id number]       Chief Complaint   Patient presents with    Emesis         HISTORY OF PRESENT ILLNESS  (Location/Symptom, Timing/Onset, Context/Setting, Quality, Duration, Modifying Factors, Severity.)   Amrit Meza is a 45 y.o. male who presents to the emergency department via EMS for N/V. Onset was 18 hours ago. He has a history of MRDD and is nonverbal. He was brought in from a group home. No caregiver is present. HPI is limited. He does have a history of aspiration pneumonia. He wears oxygen PRN. Patient appears in no acute distress. He has a G-tube. Nursing Notes were reviewed.     ALLERGIES     Ampicillin, Valium [diazepam], and Other    CURRENT MEDICATIONS       Previous Medications    ACETAMINOPHEN (TYLENOL) 325 MG TABLET    650 mg by Per G Tube route every 4-6 hours as needed for Pain or Fever    ALBUTEROL (PROVENTIL) (2.5 MG/3ML) 0.083% NEBULIZER SOLUTION    Take 2.5 mg by nebulization every 4 hours as needed for Shortness of Breath    BACLOFEN (LIORESAL) 10 MG TABLET    10 mg by Per G Tube route 3 times daily    BISACODYL (DULCOLAX) 10 MG SUPPOSITORY    Place 10 mg rectally daily as needed for Constipation    BUDESONIDE (PULMICORT) 0.5 MG/2ML NEBULIZER SUSPENSION    Take 1 ampule by nebulization 2 times daily    CALCIUM CARBONATE (TUMS) 500 MG CHEWABLE TABLET    1,000 mg by Per G Tube route 2 times daily 2 tabs (=1000mg) BID    CARBAMIDE PEROXIDE (DEBROX) 6.5 % OTIC SOLUTION    5 drops See Admin Instructions 5 drops BID on the 1st and 15th of the month    CHLORHEXIDINE (PERIDEX) 0.12 % SOLUTION    Take 5 mLs by mouth 3 times daily To gums    CHOLECALCIFEROL (VITAMIN D3) 1000 UNITS TABS    1,000 Units by Per G Tube route 2 times daily     CLONAZEPAM (KLONOPIN) 2 MG DISINTEGRATING TABLET    2 smokeless tobacco. He reports that he does not drink alcohol or use drugs. REVIEW OF SYSTEMS    (2-9 systems for level 4, 10 or more for level 5)     Review of Systems   Constitutional: Positive for fatigue. Negative for fever. ROS is limited due to patient mental status. Respiratory: Negative for cough. Gastrointestinal: Positive for vomiting. Negative for diarrhea. Skin: Negative for color change and wound. Except as noted above the remainder of the review of systems was reviewed and negative. PHYSICAL EXAM    (up to 7 for level 4, 8 or more for level 5)     ED Triage Vitals   BP Temp Temp src Pulse Resp SpO2 Height Weight   -- -- -- -- -- -- -- --     Physical Exam  Vitals signs reviewed. Constitutional:       General: He is not in acute distress. Appearance: He is well-developed. He is not diaphoretic. Eyes:      General: No scleral icterus. Conjunctiva/sclera: Conjunctivae normal.   Neck:      Vascular: No JVD. Cardiovascular:      Rate and Rhythm: Normal rate. Pulmonary:      Effort: Pulmonary effort is normal. No respiratory distress. Abdominal:      General: There is no distension. Palpations: Abdomen is soft. Comments: G-tube noted. Musculoskeletal:      Comments: Contractures noted. Skin:     General: Skin is warm and dry. Findings: No rash. Neurological:      Mental Status: He is alert and oriented to person, place, and time.    Psychiatric:         Behavior: Behavior normal.           DIAGNOSTIC RESULTS     RADIOLOGY:   Non-plain film images such as CT, Ultrasound and MRI are read by the radiologist. Plain radiographic images are visualized and preliminarily interpreted by the emergency physician with the below findings:    Interpretation per the Radiologist below, if available at the time of this note:    Xr Abdomen (kub) (single Ap View)    Result Date: 12/20/2020  EXAMINATION: ONE XRAY VIEW OF THE CHEST; ONE SUPINE XRAY VIEW(S) OF THE rectum may be causing partial fecal impaction. LABS:  Labs Reviewed   BASIC METABOLIC PANEL - Abnormal; Notable for the following components:       Result Value    Glucose 186 (*)     CREATININE 0.44 (*)     All other components within normal limits   CBC WITH AUTO DIFFERENTIAL - Abnormal; Notable for the following components:    Immature Granulocytes 1 (*)     All other components within normal limits   BRAIN NATRIURETIC PEPTIDE   HEPATIC FUNCTION PANEL   LIPASE       All other labs were within normal range or not returned as of this dictation. EMERGENCY DEPARTMENT COURSE and DIFFERENTIAL DIAGNOSIS/MDM:   Vitals:    Vitals:    12/20/20 1115 12/20/20 1130 12/20/20 1200 12/20/20 1215   BP: 120/89 136/84 (!) 147/88 137/88   Pulse: 81 81 78 75   Resp: 22 28 24 24   Temp:       TempSrc:       SpO2: 98% 95% 99% 92%   Weight:       Height:           MEDICATIONS GIVEN IN THE ED:  Medications   ondansetron (ZOFRAN) injection 4 mg (4 mg Intravenous Given 12/20/20 1100)   polyethylene glycol (GLYCOLAX) packet 17 g (17 g Per G Tube Given 12/20/20 1313)       CLINICAL DECISION MAKING:  The patient presented alert with a nontoxic appearance and was seen in conjunction with Dr. Rosa Shelby. Imaging showed concerns for pneumonia and fecal impaction. No stool was noted in the rectal vault per the RN. He was given a Fleets enema here. Prescriptions were written for miralax, Zofran, and Zithromax. Follow up with pcp, return to ED if condition worsens. He will be discharged back to his care facility. He passed a large stool prior to being picked up by transport. FINAL IMPRESSION      1. Pneumonia due to organism    2. Non-intractable vomiting with nausea, unspecified vomiting type    3.  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    Choledocholithiasis K80.50    Obstipation K59.00    Generalized abdominal pain R10.84    Constipation K59.00    Developmental disability F89    Ileus (HCC) K56.7    Influenza with respiratory manifestation other than pneumonia J11.1    Hypoxia R09.02    Abdominal distension R14.0    Dyspnea and respiratory abnormalities R06.00, R06.89    Acute respiratory failure with hypoxia (HCC) J96.01    Acute and chronic respiratory failure with hypoxia (HCC) J96.21    Acute respiratory failure (HCC) J96.00    COPD exacerbation ([facility]) J44.1    Obesity, Class II, BMI 35-39.9 E66.9    Malfunction of percutaneous endoscopic gastrostomy (PEG) tube ([facility]) K94.23    Breakthrough seizure ([address] G40.919    Mental retardation F79    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    Hip dysplasia, acquired, 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    Enterococcal sepsis ([facility]) A41.81    Pyogenic arthritis of knee ([address] M00.9    Closed fracture of distal end of right femur ([address] S72.401A    Gram-positive bacteremia R78.81    Hypernatremia E87.0    Normocytic anemia D64.9    Osteoporosis M81.0    Protein-calorie malnutrition ([address] E46    Respiratory failure (HCC) J96.90    Influenza J11.1    Seizure ([address] R56.9         DISPOSITION/PLAN   DISPOSITION  DISCHARGE      PATIENT REFERRED TO:   MD Kaitlin Pfeiffer [address] [place] [id number] [phone]    Schedule an appointment as soon as possible for a visit       [facility] ED  [address] [facility]  [phone]          DISCHARGE MEDICATIONS:     New Prescriptions    AZITHROMYCIN (ZITHROMAX) 100 MG/5ML SUSPENSION    25 mLs by Per G Tube route daily for 5 days    ONDANSETRON (ZOFRAN) 4 MG/5ML SOLUTION    5 mLs by Per G Tube route every 8 hours as needed for Nausea or Vomiting    POLYETHYLENE GLYCOL (MIRALAX) 17 GM/SCOOP POWDER    17 g by Per G Tube route daily as needed (constipation)           (Please note that portions of this note were completed with a voice recognition program.  Efforts were made to edit the dictations but occasionally words are mis-transcribed.)    Verle Grief, APRN - CNP     Verle Grief, APRN - CNP  12/20/20 [address], APRN - CNP  12/20/20 3494

## 2020-12-20 NOTE — ED NOTES
Patient had large bowel movement, patient cleansed and brief changed.      Saint Gain, RN  12/20/20 4972

## 2020-12-20 NOTE — ED NOTES
Patient presented to the ED with complaints of N/V. He was brought in via squad. He resides from [facility] and staff told EMS he's been vomiting all night. Upon assessment, patient has wet respirations, and dry heaving. He has no sign of fever. Vitals stable.       Jarrell Hahn RN  12/20/20 2223

## 2020-12-20 NOTE — ED NOTES
Bed: 21  Expected date: 12/20/20  Expected time: 10:17 AM  Means of arrival: [address]  Comments:  [facility] [address] [facility] [place].  Ernesto Bautista  12/20/20 1029

## 2020-12-27 ENCOUNTER — HOSPITAL ENCOUNTER (INPATIENT)
Age: 50
LOS: 1 days | Discharge: NURSING, REHAB OR LONG-TERM CARE | DRG: 189 | End: 2020-12-29
Attending: EMERGENCY MEDICINE | Admitting: INTERNAL MEDICINE
Payer: MEDICARE

## 2020-12-27 DIAGNOSIS — R06.89 DYSPNEA AND RESPIRATORY ABNORMALITIES: Primary | ICD-10-CM

## 2020-12-27 DIAGNOSIS — Z66 DNR (DO NOT RESUSCITATE): ICD-10-CM

## 2020-12-27 DIAGNOSIS — Z87.01 HISTORY OF ASPIRATION PNEUMONIA: ICD-10-CM

## 2020-12-27 DIAGNOSIS — R06.00 DYSPNEA AND RESPIRATORY ABNORMALITIES: Primary | ICD-10-CM

## 2020-12-27 PROCEDURE — 94660 CPAP INITIATION&MGMT: CPT

## 2020-12-27 PROCEDURE — 96375 TX/PRO/DX INJ NEW DRUG ADDON: CPT

## 2020-12-27 PROCEDURE — 96367 TX/PROPH/DG ADDL SEQ IV INF: CPT

## 2020-12-27 PROCEDURE — 96365 THER/PROPH/DIAG IV INF INIT: CPT

## 2020-12-27 PROCEDURE — 99285 EMERGENCY DEPT VISIT HI MDM: CPT

## 2020-12-27 RX ORDER — PANTOPRAZOLE SODIUM 40 MG/10ML
40 INJECTION, POWDER, LYOPHILIZED, FOR SOLUTION INTRAVENOUS ONCE
Status: COMPLETED | OUTPATIENT
Start: 2020-12-28 | End: 2020-12-28

## 2020-12-27 RX ORDER — SODIUM CHLORIDE 9 MG/ML
10 INJECTION INTRAVENOUS ONCE
Status: COMPLETED | OUTPATIENT
Start: 2020-12-28 | End: 2020-12-28

## 2020-12-27 RX ORDER — CLINDAMYCIN PHOSPHATE 600 MG/50ML
600 INJECTION INTRAVENOUS ONCE
Status: COMPLETED | OUTPATIENT
Start: 2020-12-28 | End: 2020-12-28

## 2020-12-27 RX ORDER — DEXAMETHASONE SODIUM PHOSPHATE 10 MG/ML
10 INJECTION, SOLUTION INTRAMUSCULAR; INTRAVENOUS ONCE
Status: COMPLETED | OUTPATIENT
Start: 2020-12-28 | End: 2020-12-28

## 2020-12-28 ENCOUNTER — APPOINTMENT (OUTPATIENT)
Dept: GENERAL RADIOLOGY | Age: 50
DRG: 189 | End: 2020-12-28
Payer: MEDICARE

## 2020-12-28 PROBLEM — Z86.16 HISTORY OF 2019 NOVEL CORONAVIRUS DISEASE (COVID-19): Status: ACTIVE | Noted: 2020-12-28

## 2020-12-28 PROBLEM — Z93.1 S/P PERCUTANEOUS ENDOSCOPIC GASTROSTOMY (PEG) TUBE PLACEMENT (HCC): Status: ACTIVE | Noted: 2020-12-04

## 2020-12-28 PROBLEM — R09.02 HYPOXEMIA: Status: ACTIVE | Noted: 2020-12-28

## 2020-12-28 LAB
ABSOLUTE EOS #: 0 K/UL (ref 0–0.4)
ABSOLUTE IMMATURE GRANULOCYTE: 0.07 K/UL (ref 0–0.3)
ABSOLUTE LYMPH #: 0.72 K/UL (ref 1–4.8)
ABSOLUTE MONO #: 0.33 K/UL (ref 0.2–0.8)
ANION GAP SERPL CALCULATED.3IONS-SCNC: 14 MMOL/L (ref 9–17)
BASOPHILS # BLD: 0 %
BASOPHILS ABSOLUTE: 0 K/UL (ref 0–0.2)
BNP INTERPRETATION: NORMAL
BUN BLDV-MCNC: 18 MG/DL (ref 6–20)
BUN/CREAT BLD: ABNORMAL (ref 9–20)
CALCIUM SERPL-MCNC: 9.5 MG/DL (ref 8.6–10.4)
CHLORIDE BLD-SCNC: 102 MMOL/L (ref 98–107)
CO2: 23 MMOL/L (ref 20–31)
CREAT SERPL-MCNC: <0.4 MG/DL (ref 0.7–1.2)
DIFFERENTIAL TYPE: ABNORMAL
EOSINOPHILS RELATIVE PERCENT: 0 % (ref 1–4)
GFR AFRICAN AMERICAN: ABNORMAL ML/MIN
GFR NON-AFRICAN AMERICAN: ABNORMAL ML/MIN
GFR SERPL CREATININE-BSD FRML MDRD: ABNORMAL ML/MIN/{1.73_M2}
GFR SERPL CREATININE-BSD FRML MDRD: ABNORMAL ML/MIN/{1.73_M2}
GLUCOSE BLD-MCNC: 128 MG/DL (ref 70–99)
HCT VFR BLD CALC: 43.6 % (ref 40.7–50.3)
HEMOGLOBIN: 14 G/DL (ref 13–17)
IMMATURE GRANULOCYTES: 1 %
LACTIC ACID: 1.5 MMOL/L (ref 0.5–2.2)
LYMPHOCYTES # BLD: 11 % (ref 24–44)
MCH RBC QN AUTO: 30.8 PG (ref 25.2–33.5)
MCHC RBC AUTO-ENTMCNC: 32.1 G/DL (ref 28.4–34.8)
MCV RBC AUTO: 95.8 FL (ref 82.6–102.9)
MONOCYTES # BLD: 5 % (ref 1–7)
NRBC AUTOMATED: 0 PER 100 WBC
PDW BLD-RTO: 13.8 % (ref 11.8–14.4)
PLATELET # BLD: 418 K/UL (ref 138–453)
PLATELET ESTIMATE: ABNORMAL
PMV BLD AUTO: 9.7 FL (ref 8.1–13.5)
POTASSIUM SERPL-SCNC: 5 MMOL/L (ref 3.7–5.3)
PRO-BNP: 253 PG/ML
RBC # BLD: 4.55 M/UL (ref 4.21–5.77)
RBC # BLD: ABNORMAL 10*6/UL
SARS-COV-2, RAPID: NOT DETECTED
SARS-COV-2: NORMAL
SARS-COV-2: NORMAL
SEG NEUTROPHILS: 83 % (ref 36–66)
SEGMENTED NEUTROPHILS ABSOLUTE COUNT: 5.38 K/UL (ref 1.8–7.7)
SODIUM BLD-SCNC: 139 MMOL/L (ref 135–144)
SOURCE: NORMAL
WBC # BLD: 6.5 K/UL (ref 3.5–11.3)
WBC # BLD: ABNORMAL 10*3/UL

## 2020-12-28 PROCEDURE — 6360000002 HC RX W HCPCS: Performed by: NURSE PRACTITIONER

## 2020-12-28 PROCEDURE — 71045 X-RAY EXAM CHEST 1 VIEW: CPT

## 2020-12-28 PROCEDURE — 83605 ASSAY OF LACTIC ACID: CPT

## 2020-12-28 PROCEDURE — 96375 TX/PRO/DX INJ NEW DRUG ADDON: CPT

## 2020-12-28 PROCEDURE — 2700000000 HC OXYGEN THERAPY PER DAY

## 2020-12-28 PROCEDURE — 6370000000 HC RX 637 (ALT 250 FOR IP): Performed by: NURSE PRACTITIONER

## 2020-12-28 PROCEDURE — 87040 BLOOD CULTURE FOR BACTERIA: CPT

## 2020-12-28 PROCEDURE — U0003 INFECTIOUS AGENT DETECTION BY NUCLEIC ACID (DNA OR RNA); SEVERE ACUTE RESPIRATORY SYNDROME CORONAVIRUS 2 (SARS-COV-2) (CORONAVIRUS DISEASE [COVID-19]), AMPLIFIED PROBE TECHNIQUE, MAKING USE OF HIGH THROUGHPUT TECHNOLOGIES AS DESCRIBED BY CMS-2020-01-R: HCPCS

## 2020-12-28 PROCEDURE — 94640 AIRWAY INHALATION TREATMENT: CPT

## 2020-12-28 PROCEDURE — 2500000003 HC RX 250 WO HCPCS: Performed by: EMERGENCY MEDICINE

## 2020-12-28 PROCEDURE — 2580000003 HC RX 258: Performed by: NURSE PRACTITIONER

## 2020-12-28 PROCEDURE — 1200000000 HC SEMI PRIVATE

## 2020-12-28 PROCEDURE — 6360000002 HC RX W HCPCS: Performed by: EMERGENCY MEDICINE

## 2020-12-28 PROCEDURE — 87070 CULTURE OTHR SPECIMN AEROBIC: CPT

## 2020-12-28 PROCEDURE — 85025 COMPLETE CBC W/AUTO DIFF WBC: CPT

## 2020-12-28 PROCEDURE — 96365 THER/PROPH/DIAG IV INF INIT: CPT

## 2020-12-28 PROCEDURE — 2500000003 HC RX 250 WO HCPCS: Performed by: NURSE PRACTITIONER

## 2020-12-28 PROCEDURE — C9113 INJ PANTOPRAZOLE SODIUM, VIA: HCPCS | Performed by: EMERGENCY MEDICINE

## 2020-12-28 PROCEDURE — U0002 COVID-19 LAB TEST NON-CDC: HCPCS

## 2020-12-28 PROCEDURE — 96367 TX/PROPH/DG ADDL SEQ IV INF: CPT

## 2020-12-28 PROCEDURE — 94660 CPAP INITIATION&MGMT: CPT

## 2020-12-28 PROCEDURE — APPSS45 APP SPLIT SHARED TIME 31-45 MINUTES: Performed by: NURSE PRACTITIONER

## 2020-12-28 PROCEDURE — 83880 ASSAY OF NATRIURETIC PEPTIDE: CPT

## 2020-12-28 PROCEDURE — 2580000003 HC RX 258: Performed by: EMERGENCY MEDICINE

## 2020-12-28 PROCEDURE — 94761 N-INVAS EAR/PLS OXIMETRY MLT: CPT

## 2020-12-28 PROCEDURE — 87205 SMEAR GRAM STAIN: CPT

## 2020-12-28 PROCEDURE — 99223 1ST HOSP IP/OBS HIGH 75: CPT | Performed by: INTERNAL MEDICINE

## 2020-12-28 PROCEDURE — 36415 COLL VENOUS BLD VENIPUNCTURE: CPT

## 2020-12-28 PROCEDURE — 80048 BASIC METABOLIC PNL TOTAL CA: CPT

## 2020-12-28 RX ORDER — IPRATROPIUM BROMIDE AND ALBUTEROL SULFATE 2.5; .5 MG/3ML; MG/3ML
1 SOLUTION RESPIRATORY (INHALATION)
Status: DISCONTINUED | OUTPATIENT
Start: 2020-12-28 | End: 2020-12-29 | Stop reason: HOSPADM

## 2020-12-28 RX ORDER — MONTELUKAST SODIUM 10 MG/1
10 TABLET ORAL NIGHTLY
Status: DISCONTINUED | OUTPATIENT
Start: 2020-12-28 | End: 2020-12-29 | Stop reason: HOSPADM

## 2020-12-28 RX ORDER — VITAMIN B COMPLEX
1000 TABLET ORAL 2 TIMES DAILY
Status: DISCONTINUED | OUTPATIENT
Start: 2020-12-28 | End: 2020-12-29 | Stop reason: HOSPADM

## 2020-12-28 RX ORDER — BISACODYL 10 MG
10 SUPPOSITORY, RECTAL RECTAL DAILY PRN
Status: DISCONTINUED | OUTPATIENT
Start: 2020-12-28 | End: 2020-12-29 | Stop reason: HOSPADM

## 2020-12-28 RX ORDER — FAMOTIDINE 20 MG/1
20 TABLET, FILM COATED ORAL 2 TIMES DAILY
Status: DISCONTINUED | OUTPATIENT
Start: 2020-12-28 | End: 2020-12-29 | Stop reason: HOSPADM

## 2020-12-28 RX ORDER — CALCIUM CARBONATE 200(500)MG
1000 TABLET,CHEWABLE ORAL 2 TIMES DAILY
Status: DISCONTINUED | OUTPATIENT
Start: 2020-12-28 | End: 2020-12-29 | Stop reason: HOSPADM

## 2020-12-28 RX ORDER — SODIUM CHLORIDE 9 MG/ML
INJECTION, SOLUTION INTRAVENOUS CONTINUOUS
Status: DISCONTINUED | OUTPATIENT
Start: 2020-12-28 | End: 2020-12-29 | Stop reason: HOSPADM

## 2020-12-28 RX ORDER — ACETAMINOPHEN 650 MG/1
650 SUPPOSITORY RECTAL EVERY 6 HOURS PRN
Status: DISCONTINUED | OUTPATIENT
Start: 2020-12-28 | End: 2020-12-29 | Stop reason: HOSPADM

## 2020-12-28 RX ORDER — LANOLIN ALCOHOL/MO/W.PET/CERES
3 CREAM (GRAM) TOPICAL NIGHTLY
Status: DISCONTINUED | OUTPATIENT
Start: 2020-12-28 | End: 2020-12-29 | Stop reason: HOSPADM

## 2020-12-28 RX ORDER — ONDANSETRON 2 MG/ML
4 INJECTION INTRAMUSCULAR; INTRAVENOUS EVERY 6 HOURS PRN
Status: DISCONTINUED | OUTPATIENT
Start: 2020-12-28 | End: 2020-12-29 | Stop reason: HOSPADM

## 2020-12-28 RX ORDER — LAMOTRIGINE 100 MG/1
200 TABLET ORAL NIGHTLY
Status: DISCONTINUED | OUTPATIENT
Start: 2020-12-28 | End: 2020-12-29 | Stop reason: HOSPADM

## 2020-12-28 RX ORDER — FLUTICASONE PROPIONATE 50 MCG
2 SPRAY, SUSPENSION (ML) NASAL DAILY
Status: DISCONTINUED | OUTPATIENT
Start: 2020-12-28 | End: 2020-12-29 | Stop reason: HOSPADM

## 2020-12-28 RX ORDER — NICOTINE 21 MG/24HR
1 PATCH, TRANSDERMAL 24 HOURS TRANSDERMAL DAILY PRN
Status: DISCONTINUED | OUTPATIENT
Start: 2020-12-28 | End: 2020-12-29 | Stop reason: HOSPADM

## 2020-12-28 RX ORDER — LACTULOSE 10 G/15ML
20 SOLUTION ORAL 2 TIMES DAILY
Status: DISCONTINUED | OUTPATIENT
Start: 2020-12-28 | End: 2020-12-29 | Stop reason: HOSPADM

## 2020-12-28 RX ORDER — SODIUM CHLORIDE 0.9 % (FLUSH) 0.9 %
10 SYRINGE (ML) INJECTION EVERY 12 HOURS SCHEDULED
Status: DISCONTINUED | OUTPATIENT
Start: 2020-12-28 | End: 2020-12-29 | Stop reason: HOSPADM

## 2020-12-28 RX ORDER — 0.9 % SODIUM CHLORIDE 0.9 %
500 INTRAVENOUS SOLUTION INTRAVENOUS ONCE
Status: COMPLETED | OUTPATIENT
Start: 2020-12-28 | End: 2020-12-28

## 2020-12-28 RX ORDER — BUDESONIDE 0.5 MG/2ML
500 INHALANT ORAL 2 TIMES DAILY
Status: DISCONTINUED | OUTPATIENT
Start: 2020-12-28 | End: 2020-12-29 | Stop reason: HOSPADM

## 2020-12-28 RX ORDER — CHLORHEXIDINE GLUCONATE 0.12 MG/ML
5 RINSE ORAL 3 TIMES DAILY
Status: DISCONTINUED | OUTPATIENT
Start: 2020-12-28 | End: 2020-12-29 | Stop reason: HOSPADM

## 2020-12-28 RX ORDER — ALBUTEROL SULFATE 2.5 MG/3ML
2.5 SOLUTION RESPIRATORY (INHALATION)
Status: DISCONTINUED | OUTPATIENT
Start: 2020-12-28 | End: 2020-12-29 | Stop reason: HOSPADM

## 2020-12-28 RX ORDER — LEVETIRACETAM 100 MG/ML
1500 SOLUTION ORAL 2 TIMES DAILY
Status: DISCONTINUED | OUTPATIENT
Start: 2020-12-28 | End: 2020-12-29 | Stop reason: HOSPADM

## 2020-12-28 RX ORDER — FUROSEMIDE 20 MG/1
20 TABLET ORAL DAILY
Status: DISCONTINUED | OUTPATIENT
Start: 2020-12-28 | End: 2020-12-29 | Stop reason: HOSPADM

## 2020-12-28 RX ORDER — SENNA AND DOCUSATE SODIUM 50; 8.6 MG/1; MG/1
2 TABLET, FILM COATED ORAL DAILY
Status: DISCONTINUED | OUTPATIENT
Start: 2020-12-28 | End: 2020-12-29 | Stop reason: HOSPADM

## 2020-12-28 RX ORDER — CETIRIZINE HYDROCHLORIDE 10 MG/1
10 TABLET ORAL DAILY
Status: DISCONTINUED | OUTPATIENT
Start: 2020-12-28 | End: 2020-12-29 | Stop reason: HOSPADM

## 2020-12-28 RX ORDER — ACETAMINOPHEN 325 MG/1
650 TABLET ORAL EVERY 6 HOURS PRN
Status: DISCONTINUED | OUTPATIENT
Start: 2020-12-28 | End: 2020-12-29 | Stop reason: HOSPADM

## 2020-12-28 RX ORDER — ONDANSETRON 4 MG/1
4 TABLET, ORALLY DISINTEGRATING ORAL EVERY 6 HOURS PRN
Status: DISCONTINUED | OUTPATIENT
Start: 2020-12-28 | End: 2020-12-29 | Stop reason: HOSPADM

## 2020-12-28 RX ORDER — LAMOTRIGINE 100 MG/1
300 TABLET ORAL EVERY MORNING
Status: DISCONTINUED | OUTPATIENT
Start: 2020-12-28 | End: 2020-12-29 | Stop reason: HOSPADM

## 2020-12-28 RX ORDER — LACOSAMIDE 100 MG/1
150 TABLET ORAL 2 TIMES DAILY
Status: DISCONTINUED | OUTPATIENT
Start: 2020-12-28 | End: 2020-12-29 | Stop reason: HOSPADM

## 2020-12-28 RX ORDER — CLONAZEPAM 0.5 MG/1
2 TABLET ORAL PRN
Status: DISCONTINUED | OUTPATIENT
Start: 2020-12-28 | End: 2020-12-29 | Stop reason: HOSPADM

## 2020-12-28 RX ORDER — BACLOFEN 10 MG/1
10 TABLET ORAL 3 TIMES DAILY
Status: DISCONTINUED | OUTPATIENT
Start: 2020-12-28 | End: 2020-12-29 | Stop reason: HOSPADM

## 2020-12-28 RX ORDER — GLYCOPYRROLATE 1 MG/1
1 TABLET ORAL DAILY
Status: DISCONTINUED | OUTPATIENT
Start: 2020-12-28 | End: 2020-12-29 | Stop reason: HOSPADM

## 2020-12-28 RX ORDER — PROMETHAZINE HYDROCHLORIDE 25 MG/1
12.5 TABLET ORAL EVERY 6 HOURS PRN
Status: DISCONTINUED | OUTPATIENT
Start: 2020-12-28 | End: 2020-12-29 | Stop reason: HOSPADM

## 2020-12-28 RX ORDER — SODIUM CHLORIDE 0.9 % (FLUSH) 0.9 %
10 SYRINGE (ML) INJECTION PRN
Status: DISCONTINUED | OUTPATIENT
Start: 2020-12-28 | End: 2020-12-29 | Stop reason: HOSPADM

## 2020-12-28 RX ORDER — CLINDAMYCIN PHOSPHATE 600 MG/50ML
600 INJECTION INTRAVENOUS EVERY 8 HOURS
Status: DISCONTINUED | OUTPATIENT
Start: 2020-12-28 | End: 2020-12-29 | Stop reason: HOSPADM

## 2020-12-28 RX ADMIN — IPRATROPIUM BROMIDE AND ALBUTEROL SULFATE 1 AMPULE: .5; 3 SOLUTION RESPIRATORY (INHALATION) at 07:36

## 2020-12-28 RX ADMIN — LAMOTRIGINE 300 MG: 100 TABLET ORAL at 10:52

## 2020-12-28 RX ADMIN — LEVETIRACETAM 1500 MG: 500 SOLUTION ORAL at 10:50

## 2020-12-28 RX ADMIN — LACOSAMIDE 150 MG: 100 TABLET, FILM COATED ORAL at 23:22

## 2020-12-28 RX ADMIN — LAMOTRIGINE 200 MG: 100 TABLET ORAL at 22:16

## 2020-12-28 RX ADMIN — DEXAMETHASONE SODIUM PHOSPHATE 10 MG: 10 INJECTION, SOLUTION INTRAMUSCULAR; INTRAVENOUS at 00:32

## 2020-12-28 RX ADMIN — SODIUM CHLORIDE: 9 INJECTION, SOLUTION INTRAVENOUS at 04:41

## 2020-12-28 RX ADMIN — CETIRIZINE HYDROCHLORIDE 10 MG: 10 TABLET, FILM COATED ORAL at 12:05

## 2020-12-28 RX ADMIN — FAMOTIDINE 20 MG: 20 TABLET, FILM COATED ORAL at 10:50

## 2020-12-28 RX ADMIN — SODIUM CHLORIDE 500 ML: 9 INJECTION, SOLUTION INTRAVENOUS at 19:48

## 2020-12-28 RX ADMIN — POTASSIUM BICARBONATE 10 MEQ: 391 TABLET, EFFERVESCENT ORAL at 10:51

## 2020-12-28 RX ADMIN — IPRATROPIUM BROMIDE AND ALBUTEROL SULFATE 1 AMPULE: .5; 3 SOLUTION RESPIRATORY (INHALATION) at 17:58

## 2020-12-28 RX ADMIN — IPRATROPIUM BROMIDE AND ALBUTEROL SULFATE 1 AMPULE: .5; 3 SOLUTION RESPIRATORY (INHALATION) at 14:46

## 2020-12-28 RX ADMIN — SODIUM CHLORIDE: 9 INJECTION, SOLUTION INTRAVENOUS at 18:11

## 2020-12-28 RX ADMIN — CLINDAMYCIN PHOSPHATE 600 MG: 600 INJECTION, SOLUTION INTRAVENOUS at 01:13

## 2020-12-28 RX ADMIN — CLINDAMYCIN PHOSPHATE 600 MG: 600 INJECTION, SOLUTION INTRAVENOUS at 17:00

## 2020-12-28 RX ADMIN — LACOSAMIDE 150 MG: 100 TABLET, FILM COATED ORAL at 10:53

## 2020-12-28 RX ADMIN — GLYCOPYRROLATE 1 MG: 1 TABLET ORAL at 10:51

## 2020-12-28 RX ADMIN — MONTELUKAST 10 MG: 10 TABLET, FILM COATED ORAL at 20:42

## 2020-12-28 RX ADMIN — BACLOFEN 10 MG: 10 TABLET ORAL at 20:42

## 2020-12-28 RX ADMIN — Medication 1000 MG: at 10:51

## 2020-12-28 RX ADMIN — LEVETIRACETAM 1500 MG: 500 SOLUTION ORAL at 20:42

## 2020-12-28 RX ADMIN — CEFTRIAXONE SODIUM 1 G: 1 INJECTION, POWDER, FOR SOLUTION INTRAMUSCULAR; INTRAVENOUS at 00:34

## 2020-12-28 RX ADMIN — POTASSIUM BICARBONATE 10 MEQ: 391 TABLET, EFFERVESCENT ORAL at 20:42

## 2020-12-28 RX ADMIN — ONDANSETRON 4 MG: 2 INJECTION INTRAMUSCULAR; INTRAVENOUS at 10:50

## 2020-12-28 RX ADMIN — PANTOPRAZOLE SODIUM 40 MG: 40 INJECTION, POWDER, FOR SOLUTION INTRAVENOUS at 00:32

## 2020-12-28 RX ADMIN — Medication 1000 UNITS: at 20:42

## 2020-12-28 RX ADMIN — IPRATROPIUM BROMIDE AND ALBUTEROL SULFATE 1 AMPULE: .5; 3 SOLUTION RESPIRATORY (INHALATION) at 11:13

## 2020-12-28 RX ADMIN — Medication 10 ML: at 00:32

## 2020-12-28 RX ADMIN — Medication 1000 MG: at 20:41

## 2020-12-28 RX ADMIN — CLINDAMYCIN PHOSPHATE 600 MG: 600 INJECTION, SOLUTION INTRAVENOUS at 10:49

## 2020-12-28 RX ADMIN — BACLOFEN 10 MG: 10 TABLET ORAL at 13:55

## 2020-12-28 RX ADMIN — Medication 1000 UNITS: at 10:53

## 2020-12-28 RX ADMIN — BACLOFEN 10 MG: 10 TABLET ORAL at 10:52

## 2020-12-28 RX ADMIN — BUDESONIDE 500 MCG: 0.5 SUSPENSION RESPIRATORY (INHALATION) at 07:36

## 2020-12-28 RX ADMIN — BUDESONIDE 500 MCG: 0.5 SUSPENSION RESPIRATORY (INHALATION) at 17:58

## 2020-12-28 RX ADMIN — FAMOTIDINE 20 MG: 20 TABLET, FILM COATED ORAL at 20:41

## 2020-12-28 RX ADMIN — FUROSEMIDE 20 MG: 20 TABLET ORAL at 12:05

## 2020-12-28 RX ADMIN — ENOXAPARIN SODIUM 40 MG: 100 INJECTION SUBCUTANEOUS at 10:49

## 2020-12-28 NOTE — H&P
[facility]  Office: [address], DO, Paul Frandy, DO, Michelle Dennys, DO, Miryam Echavarrias Blood, DO, Seth Rosen MD, Carissa Summers MD, Hitesh Michael MD, Bill Brizuela MD, Keith Perdue MD, Aliya Hopper MD, Aisha Patel MD, Carey Shannon MD, Nisa Mccain MD, Radha Collier, DO, Negra Freeman MD, Froylan Majano MD, Ronnie Hester DO, Mckenzie Barber MD,  Rj Lock, DO, Roxi Dominguez MD, Mel Samuel MD, Gary Johns, [facility], [facility] Albert, CNP, Alonso Ragsdale, CNP, Lesly Smith, CNS, Elías Campbell, [facility], Raynell Councilman, CNP, Dl Medina, CNP, Can Novoa, CNP, Lety Charles, CNP, Salima Cook PA-C, Jovanni Quach, [facility], Gen Smith, CNP, Danna Mondragon, CNP, Fei Becerra, CNP, Robert Terrell, CNP, Salvatore Velasquez, [facility]   [address] [facility]    HISTORY AND PHYSICAL EXAMINATION            Date:   12/28/2020  Patient name:  Krysta Weber  Date of admission:  12/27/2020 11:38 PM  MRN:   [id number]  Account:  [de-identified]  YOB: 1970  PCP:    Kenyetta Gorman MD  Room:   [place] [address]  Code Status:    DNR-CC    Chief Complaint:     Chief Complaint   Patient presents with    Shortness of Breath       History Obtained From:     patient, electronic medical record    History of Present Illness: Krysta Weber is a 45 y.o. Non-/non  male who presents with Shortness of Breath   and is admitted to the hospital for the management of Hypoxemia. Patient presented to the emergency department via EMS due to increased shortness of breath. He is a resident at the [facility]. Patient is nonverbal so information is obtained from the emergency department note. According to his caregivers at the group home, he was recently diagnosed with aspiration pneumonia. They also noted him to be less active than normal.  He wears CPAP at night and was noted to be hypoxic when not on CPAP.   According to chart review, he was previously diagnosed with Covid on November 27. Other medical history includes seizure disorder, cerebral palsy, developmental disability, mental retardation, status post PEG tube placement. He also has a history of frequent aspiration pneumonia. He is a DNR CC status. Chest x-ray demonstrates no acute cardiopulmonary process. His Covid screening was negative today. He was placed on BiPAP in the emergency department. He is being admitted for further management of hypoxemia.     Past Medical History:     Past Medical History:   Diagnosis Date    Abdominal distension     Acute exacerbation of chronic obstructive pulmonary disease (COPD) (HCC)     Acute respiratory failure with hypoxia (HCC) 3/11/2019    Cerebral palsied ([address]     Cerebral palsy (HCC)     Choledocholithiasis 8/14/2018    Cholelithiasis with chronic cholecystitis     Constipation     Delayed gastric emptying     Dysphagia     GERD (gastroesophageal reflux disease)     Hearing loss     Hypoxia     Ileus ([address] 8/18/2018    Mental disability     MRDD    Multifocal pneumonia 11/15/2017    Obstipation 8/14/2018    Pneumonia of left lower lobe due to infectious organism     Positive blood cultures     Profound mental retardation     Scoliosis     Seizure disorder ([address]     Seizures ([address]     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[address]         Past Surgical History:     Past Surgical History:   Procedure Laterality Date    CHOLECYSTECTOMY  08/18/2018    laparoscopic    ERCP  08/16/2018    GASTROSTOMY TUBE PLACEMENT      [place] ERCP DX COLLECTION SPECIMEN BRUSHING/WASHING N/A 8/16/2018    ERCP PAPILOTOMY SWEEPING STONE EXTRACTION ENDOSCOPIC RETROGRADE CHOLANGIOPANCREATOGRAPHY performed by Yann Montenegro MD at [address] [place] [address] N/A 8/18/2018    CHOLECYSTECTOMY LAPAROSCOPIC WITH UMBILICAL HERNIA REPAIR performed by Solange Davidson DO at [address]      has Feeding tube        Medications Prior to Admission:     Prior to Admission medications    Medication Sig Start Date End Date Taking? Authorizing Provider   polyethylene glycol (MIRALAX) 17 GM/SCOOP powder 17 g by Per G Tube route daily as needed (constipation) 12/20/20 12/30/20  [place] Held, APRN - JOSELYN   ondansetron [facility]) 4 MG/5ML solution 5 mLs by Per G Tube route every 8 hours as needed for Nausea or Vomiting 12/20/20   [place] Held, APRN - CNP   chlorhexidine (PERIDEX) 0.12 % solution Take 5 mLs by mouth 3 times daily To gums    Historical Provider, MD   furosemide (LASIX) 20 MG tablet 20 mg by Per G Tube route daily    Historical Provider, MD   ipratropium-albuterol (DUONEB) 0.5-2.5 (3) MG/3ML SOLN nebulizer solution Inhale 1 vial into the lungs 4 times daily    Historical Provider, MD   loratadine (CLARITIN) 10 MG tablet 10 mg by Per G Tube route daily    Historical Provider, MD   melatonin 3 MG TABS tablet Take 3 mg by mouth nightly    Historical Provider, MD   ranitidine (ZANTAC) 15 MG/ML syrup 150 mg by Per G Tube route 2 times daily    Historical Provider, MD   lacosamide (VIMPAT) 10 MG/ML SOLN oral solution 150 mg by Per G Tube route 2 times daily. Historical Provider, MD   acetaminophen (TYLENOL) 325 MG tablet 650 mg by Per G Tube route every 4-6 hours as needed for Pain or Fever    Historical Provider, MD   albuterol (PROVENTIL) (2.5 MG/3ML) 0.083% nebulizer solution Take 2.5 mg by nebulization every 4 hours as needed for Shortness of Breath    Historical Provider, MD   bisacodyl (DULCOLAX) 10 MG suppository Place 10 mg rectally daily as needed for Constipation    Historical Provider, MD   clonazePAM (KLONOPIN) 2 MG disintegrating tablet 2 mg as needed (seizures 3 or greater per hour. May repeat x 1 within 1 hour once daily. ).     Historical Provider, MD   Dextromethorphan-guaiFENesin  MG/5ML SYRP 10 mLs by Per G Tube route every 4 hours as needed (congestion)    Historical Provider, MD   potassium chloride (KLOR-CON M) 10 MEQ extended release tablet 10 mEq 2 times daily Per G tube    Historical Provider, MD   lamoTRIgine (LAMICTAL) 150 MG tablet 2 tablets by Per G Tube route every morning Indications: 300mg in AM and 200mg in PM 4/16/19   Sonu Heredia MD   levETIRAcetam (KEPPRA) 100 MG/ML solution 15 mLs by PEG Tube route 2 times daily 4/16/19   Sonu Heredia MD   lamoTRIgine (LAMICTAL) 200 MG tablet 1 tablet by Per G Tube route nightly Indications: 300MG IN am AND 200MG IN pm 4/16/19   Sonu Heredia MD   ondansetron (ZOFRAN-ODT) 4 MG disintegrating tablet Take 1 tablet by mouth every 6 hours as needed for Nausea 4/9/19   Francis Hunter DO   carbamide peroxide (DEBROX) 6.5 % otic solution 5 drops See Admin Instructions 5 drops BID on the 1st and 15th of the month    Historical Provider, MD   ketoconazole (NIZORAL) 2 % shampoo Apply topically Twice a Week Wednesdays and Saturdays    Historical Provider, MD   zinc oxide (DESITIN) 40 % PSTE paste Apply topically as needed (irritation)    Historical Provider, MD   neomycin-bacitracin-polymyxin (NEOSPORIN) 400-5-5000 ointment Apply topically as needed (prn per [facility] plan of treatment)    Historical Provider, MD   budesonide (PULMICORT) 0.5 MG/2ML nebulizer suspension Take 1 ampule by nebulization 2 times daily    Historical Provider, MD   lactulose (CHRONULAC) 10 GM/15ML solution 20 g by Per G Tube route 2 times daily    Historical Provider, MD   glycopyrrolate (ROBINUL) 1 MG tablet 1 mg by Per G Tube route daily     Historical Provider, MD   nystatin (MYCOSTATIN) 563026 UNIT/GM powder Apply topically daily Apply between toes.     Historical Provider, MD   baclofen (LIORESAL) 10 MG tablet 10 mg by Per G Tube route 3 times daily    Historical Provider, MD   montelukast (SINGULAIR) 10 MG tablet 10 mg by Per G Tube route nightly    Historical Provider, MD   fluticasone (FLONASE) 50 MCG/ACT nasal spray 2 sprays by Nasal route daily    Historical Provider, MD   Multiple Vitamin (DAILY-JAY PO) 1 tablet by Per G Tube route daily     Historical Provider, MD   calcium carbonate (TUMS) 500 MG chewable tablet 1,000 mg by Per G Tube route 2 times daily 2 tabs (=1000mg) BID    Historical Provider, MD   pseudoephedrine (SUDAFED) 30 MG tablet 30 mg by Per G Tube route every 8 hours as needed for Congestion     Historical Provider, MD   senna-docusate ([place] Cozier) 8.6-50 MG per tablet 2 tablets by Per G Tube route daily    Historical Provider, MD   Cholecalciferol (VITAMIN D3) 1000 units TABS 1,000 Units by Per G Tube route 2 times daily     Historical Provider, MD   Nutritional Supplements (REPLETE/FIBER) LIQD 900 mLs by Feeding Tube route daily     Historical Provider, MD        Allergies:     Ampicillin, Valium [diazepam], and Other    Social History:     Tobacco:    reports that he has never smoked. He has never used smokeless tobacco.  Alcohol:      reports no history of alcohol use. Drug Use:  reports no history of drug use. Family History:     Family History   Family history unknown: Yes       Review of Systems:     Positive and Negative as described in HPI. Review of Systems   Unable to perform ROS: Patient nonverbal       Physical Exam:   BP (!) 116/91   Pulse (!) 42   Temp 100.2 °F (37.9 °C) (Axillary)   Resp 18   Wt 110 lb (49.9 kg)   SpO2 97%   BMI 29.73 kg/m²   Temp (24hrs), Av.2 °F (37.9 °C), Min:100.2 °F (37.9 °C), Max:100.2 °F (37.9 °C)    No results for input(s): POCGLU in the last 72 hours. No intake or output data in the 24 hours ending 20 0555    Physical Exam  Vitals signs and nursing note reviewed. Constitutional:       General: He is not in acute distress. Appearance: Normal appearance. He is ill-appearing. He is not toxic-appearing or diaphoretic. HENT:      Head: Normocephalic and atraumatic.       Right Ear: External ear normal.      Left Ear: External ear normal.      Nose: Nose normal.      Mouth/Throat:      Mouth: Mucous membranes are moist.   Eyes:      General: No scleral icterus. Right eye: No discharge. Left eye: No discharge. Conjunctiva/sclera: Conjunctivae normal.      Pupils: Pupils are equal, round, and reactive to light. Neck:      Musculoskeletal: Normal range of motion and neck supple. No neck rigidity. Vascular: No carotid bruit. Cardiovascular:      Rate and Rhythm: Normal rate and regular rhythm. Pulses: Normal pulses. Heart sounds: Normal heart sounds. No murmur. No friction rub. No gallop. Pulmonary:      Effort: Pulmonary effort is normal. No respiratory distress. Breath sounds: No stridor. Rales present. No wheezing or rhonchi. Chest:      Chest wall: No tenderness. Abdominal:      General: Bowel sounds are normal. There is distension. Palpations: Abdomen is soft. There is no mass. Tenderness: There is no guarding or rebound. Hernia: No hernia is present. Comments: PEG tube left abdomen   Musculoskeletal:         General: No swelling, deformity or signs of injury. Right lower leg: No edema. Left lower leg: No edema. Comments: Contractured   Lymphadenopathy:      Cervical: No cervical adenopathy. Skin:     General: Skin is warm and dry. Capillary Refill: Capillary refill takes less than 2 seconds. Coloration: Skin is not jaundiced or pale. Findings: No bruising, erythema, lesion or rash. Neurological:      Mental Status: He is alert. Comments: Unable to assess, patient nonverbal   Psychiatric:      Comments: Unable to assess, patient nonverbal         Investigations:      Laboratory Testing:  Recent Results (from the past 24 hour(s))   COVID-19    Collection Time: 12/28/20 12:00 AM    Specimen: Other   Result Value Ref Range    SARS-CoV-2          SARS-CoV-2, Rapid Not Detected Not Detected    Source . NASOPHARYNGEAL SWAB     SARS-CoV-2         CBC Auto Differential    Collection Time: 12/28/20 12:03 AM   Result Value Ref Range    WBC 6.5 3.5 - 11.3 k/uL    RBC 4.55 4.21 - 5.77 m/uL    Hemoglobin 14.0 13.0 - 17.0 g/dL    Hematocrit 43.6 40.7 - 50.3 %    MCV 95.8 82.6 - 102.9 fL    MCH 30.8 25.2 - 33.5 pg    MCHC 32.1 28.4 - 34.8 g/dL    RDW 13.8 11.8 - 14.4 %    Platelets 625 633 - 901 k/uL    MPV 9.7 8.1 - 13.5 fL    NRBC Automated 0.0 0.0 per 100 WBC    Differential Type NOT REPORTED     WBC Morphology NOT REPORTED     RBC Morphology NOT REPORTED     Platelet Estimate NOT REPORTED     Seg Neutrophils 83 (H) 36 - 66 %    Lymphocytes 11 (L) 24 - 44 %    Monocytes 5 1 - 7 %    Eosinophils % 0 (L) 1 - 4 %    Basophils 0 %    Immature Granulocytes 1 (H) 0 %    Segs Absolute 5.38 1.8 - 7.7 k/uL    Absolute Lymph # 0.72 (L) 1.0 - 4.8 k/uL    Absolute Mono # 0.33 0.2 - 0.8 k/uL    Absolute Eos # 0.00 0.0 - 0.4 k/uL    Basophils Absolute 0.00 0.0 - 0.2 k/uL    Absolute Immature Granulocyte 0.07 0.00 - 0.30 k/uL   Basic Metabolic Panel    Collection Time: 12/28/20 12:03 AM   Result Value Ref Range    Glucose 128 (H) 70 - 99 mg/dL    BUN 18 6 - 20 mg/dL    CREATININE <0.40 (L) 0.70 - 1.20 mg/dL    Bun/Cre Ratio CANNOT BE CALCULATED 9 - 20    Calcium 9.5 8.6 - 10.4 mg/dL    Sodium 139 135 - 144 mmol/L    Potassium 5.0 3.7 - 5.3 mmol/L    Chloride 102 98 - 107 mmol/L    CO2 23 20 - 31 mmol/L    Anion Gap 14 9 - 17 mmol/L    GFR Non- CANNOT BE CALCULATED >60 mL/min    GFR  CANNOT BE CALCULATED >60 mL/min    GFR Comment          GFR Staging NOT REPORTED    Lactic Acid    Collection Time: 12/28/20  4:45 AM   Result Value Ref Range    Lactic Acid 1.5 0.5 - 2.2 mmol/L       Imaging/Diagnostics:  Xr Chest Portable    Result Date: 12/28/2020  No acute cardiopulmonary process       Assessment :      Hospital Problems           Last Modified POA    * (Principal) Hypoxemia 12/28/2020 Yes    Seizure disorder ([facility] 12/28/2020 Yes    Cerebral palsied ([facility] 12/28/2020 Yes    Developmental disability 12/28/2020 Yes    Mental retardation 12/28/2020 Yes    Overview Signed 6/10/2019  8:56 AM by [facility]     Replacing deprecated diagnoses         S/P percutaneous endoscopic gastrostomy (PEG) tube placement ([facility] [address] 12/28/2020 Yes    History of 2019 novel coronavirus disease (COVID-19) 12/28/2020 Yes          Plan:     Patient status inpatient in the  [facility]    1. [facility] service  2. Hypoxemia/COPD: Maintain on BiPAP as needed, IV clindamycin, nebulizers of Pulmicort, DuoNeb, and Proventil, monitor pulse ox, repeat chest x-ray tomorrow, obtain respiratory culture, Sudafed as needed  3. Seizure disorder: Continue home Vimpat, Lamictal, and Keppra, Klonopin as needed  4. Mental retardation and developmental disability: Monitor for safety  5. GI prophylaxis: Pepcid  6. DVT prophylaxis: Lovenox  7. IV fluids 75 mL/h  8. Monitor BMP and CBC, obtain BNP with morning labs  9. Maintain DNR CC status    Consultations:   IP CONSULT TO INTERNAL MEDICINE    Patient is admitted as inpatient status because of co-morbidities listed above, severity of signs and symptoms as outlined, requirement for current medical therapies and most importantly because of direct risk to patient if care not provided in a hospital setting. Expected length of stay > 48 hours.     KARIME Stovall CNP  12/28/2020  5:55 AM    Copy sent to Dr. Bruce Manning MD

## 2020-12-28 NOTE — PROGRESS NOTES
12/28/20 0001   Settings/Measurements   IPAP 22 cmH20   CPAP/EPAP 16 cmH2O   Resp 19   Vt Exhaled 356 mL   Minute Volume 8.2 Liters   patient bipap was alarming low minute ventilation. Patient had minute ventilation of 2.1. increased the ipap and epap as documented above and minute ventilation increased as documented. Will continue to monitor.

## 2020-12-28 NOTE — ED PROVIDER NOTES
[address] [place] [address] ED  eMERGENCY dEPARTMENT eNCOUnter      Pt Name: Edward Sutton  MRN: [id number]  [place] 1970  Date of evaluation: 12/27/2020  Provider: Nelsy Huertas MD    [address] [place] [address]       Chief Complaint   Patient presents with    Shortness of Breath         HISTORY OF PRESENT ILLNESS  (Location/Symptom, Timing/Onset, Context/Setting, Quality, Duration, Modifying Factors, Severity.)   Edward Sutton is a 45 y.o. male who presents to the emergency department via EMS due to increased difficulty breathing. Patient is a resident at [facility]. He is nonverbal.  He is DNR CC. He is usually on CPAP at night. He was recently diagnosed with aspiration pneumonia. Caregivers tonight felt that he was less active than normal.  He was also noted to be hypoxic when not on his CPAP. No additional history is known      Nursing Notes were reviewed.     ALLERGIES     Ampicillin, Valium [diazepam], and Other    CURRENT MEDICATIONS       Previous Medications    ACETAMINOPHEN (TYLENOL) 325 MG TABLET    650 mg by Per G Tube route every 4-6 hours as needed for Pain or Fever    ALBUTEROL (PROVENTIL) (2.5 MG/3ML) 0.083% NEBULIZER SOLUTION    Take 2.5 mg by nebulization every 4 hours as needed for Shortness of Breath    BACLOFEN (LIORESAL) 10 MG TABLET    10 mg by Per G Tube route 3 times daily    BISACODYL (DULCOLAX) 10 MG SUPPOSITORY    Place 10 mg rectally daily as needed for Constipation    BUDESONIDE (PULMICORT) 0.5 MG/2ML NEBULIZER SUSPENSION    Take 1 ampule by nebulization 2 times daily    CALCIUM CARBONATE (TUMS) 500 MG CHEWABLE TABLET    1,000 mg by Per G Tube route 2 times daily 2 tabs (=1000mg) BID    CARBAMIDE PEROXIDE (DEBROX) 6.5 % OTIC SOLUTION    5 drops See Admin Instructions 5 drops BID on the 1st and 15th of the month    CHLORHEXIDINE (PERIDEX) 0.12 % SOLUTION    Take 5 mLs by mouth 3 times daily To gums    CHOLECALCIFEROL (VITAMIN D3) 1000 UNITS TABS    1,000 Units by Per G Tube route 2 times daily     CLONAZEPAM (KLONOPIN) 2 MG DISINTEGRATING TABLET    2 mg as needed (seizures 3 or greater per hour. May repeat x 1 within 1 hour once daily.). DEXTROMETHORPHAN-GUAIFENESIN  MG/5ML SYRP    10 mLs by Per G Tube route every 4 hours as needed (congestion)    FLUTICASONE (FLONASE) 50 MCG/ACT NASAL SPRAY    2 sprays by Nasal route daily    FUROSEMIDE (LASIX) 20 MG TABLET    20 mg by Per G Tube route daily    GLYCOPYRROLATE (ROBINUL) 1 MG TABLET    1 mg by Per G Tube route daily     IPRATROPIUM-ALBUTEROL (DUONEB) 0.5-2.5 (3) MG/3ML SOLN NEBULIZER SOLUTION    Inhale 1 vial into the lungs 4 times daily    KETOCONAZOLE (NIZORAL) 2 % SHAMPOO    Apply topically Twice a Week Wednesdays and Saturdays    LACOSAMIDE (VIMPAT) 10 MG/ML SOLN ORAL SOLUTION    150 mg by Per G Tube route 2 times daily. LACTULOSE (CHRONULAC) 10 GM/15ML SOLUTION    20 g by Per G Tube route 2 times daily    LAMOTRIGINE (LAMICTAL) 150 MG TABLET    2 tablets by Per G Tube route every morning Indications: 300mg in AM and 200mg in PM    LAMOTRIGINE (LAMICTAL) 200 MG TABLET    1 tablet by Per G Tube route nightly Indications: 300MG IN am AND 200MG IN pm    LEVETIRACETAM (KEPPRA) 100 MG/ML SOLUTION    15 mLs by PEG Tube route 2 times daily    LORATADINE (CLARITIN) 10 MG TABLET    10 mg by Per G Tube route daily    MELATONIN 3 MG TABS TABLET    Take 3 mg by mouth nightly    MONTELUKAST (SINGULAIR) 10 MG TABLET    10 mg by Per G Tube route nightly    MULTIPLE VITAMIN (DAILY-JAY PO)    1 tablet by Per G Tube route daily     NEOMYCIN-BACITRACIN-POLYMYXIN (NEOSPORIN) 400-5-5000 OINTMENT    Apply topically as needed (prn per  plan of treatment)    NUTRITIONAL SUPPLEMENTS (REPLETE/FIBER) LIQD    900 mLs by Feeding Tube route daily     NYSTATIN (MYCOSTATIN) 371645 UNIT/GM POWDER    Apply topically daily Apply between toes.     ONDANSETRON (ZOFRAN) 4 MG/5ML SOLUTION    5 mLs by Per G Tube route every 8 hours as needed for Nausea or Vomiting ONDANSETRON (ZOFRAN-ODT) 4 MG DISINTEGRATING TABLET    Take 1 tablet by mouth every 6 hours as needed for Nausea    POLYETHYLENE GLYCOL (MIRALAX) 17 GM/SCOOP POWDER    17 g by Per G Tube route daily as needed (constipation)    POTASSIUM CHLORIDE (KLOR-CON M) 10 MEQ EXTENDED RELEASE TABLET    10 mEq 2 times daily Per G tube    PSEUDOEPHEDRINE (SUDAFED) 30 MG TABLET    30 mg by Per G Tube route every 8 hours as needed for Congestion     RANITIDINE (ZANTAC) 15 MG/ML SYRUP    150 mg by Per G Tube route 2 times daily    SENNA-DOCUSATE (PERICOLACE) 8.6-50 MG PER TABLET    2 tablets by Per G Tube route daily    ZINC OXIDE (DESITIN) 40 % PSTE PASTE    Apply topically as needed (irritation)       PAST MEDICAL HISTORY         Diagnosis Date    Abdominal distension     Acute exacerbation of chronic obstructive pulmonary disease (COPD) (HCC)     Acute respiratory failure with hypoxia (HCC) 3/11/2019    Cerebral palsied ([address]     Cerebral palsy (HCC)     Choledocholithiasis 8/14/2018    Cholelithiasis with chronic cholecystitis     Constipation     Delayed gastric emptying     Dysphagia     GERD (gastroesophageal reflux disease)     Hearing loss     Hypoxia     Ileus ([address] 8/18/2018    Mental disability     MRDD    Multifocal pneumonia 11/15/2017    Obstipation 8/14/2018    Pneumonia of left lower lobe due to infectious organism     Positive blood cultures     Profound mental retardation     Scoliosis     Seizure disorder (HCC)     Seizures (HCC)     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[address]        SURGICAL HISTORY           Procedure Laterality Date    CHOLECYSTECTOMY  08/18/2018    laparoscopic    ERCP  08/16/2018    GASTROSTOMY TUBE PLACEMENT      VT ERCP DX COLLECTION SPECIMEN BRUSHING/WASHING N/A 8/16/2018    ERCP PAPILOTOMY SWEEPING STONE EXTRACTION ENDOSCOPIC RETROGRADE CHOLANGIOPANCREATOGRAPHY performed by Candida Browning MD at [address] [place] [address] N/A 8/18/2018    CHOLECYSTECTOMY LAPAROSCOPIC WITH UMBILICAL HERNIA REPAIR performed by Cullen Santana DO at [address]      has Feeding tube         FAMILY HISTORY           Family history unknown: Yes     No family status information on file. SOCIAL HISTORY      reports that he has never smoked. He has never used smokeless tobacco. He reports that he does not drink alcohol or use drugs. REVIEW OF SYSTEMS    (2-9 systems for level 4, 10 or more for level 5)     Review of Systems   Unable to perform ROS: Patient nonverbal        Except as noted above the remainder of the review of systems was reviewed and negative. PHYSICAL EXAM    (up to 7 for level 4, 8 or more for level 5)     Vitals:    12/28/20 0014 12/28/20 0029 12/28/20 0044 12/28/20 0059   BP: 114/72 103/82 99/79 102/82   Pulse: 66 63 66 64   Resp: 18 17 20 20   Temp:       TempSrc:       SpO2: 99% 98% 97% 97%   Weight:         Physical exam reflects a well-nourished male. He does have low-grade temp. He is mildly tachypneic. On BiPAP pulse ox 100%. He does not appear uncomfortable. Integument warm and dry. Oral pharyngeal exam is without lesion. Heart regular rate and rhythm normal S1-S2. Lungs have scattered rales throughout. Abdomen is soft throughout no focal pain. Extremities show no gross abnormality. No cyanosis clubbing or edema.   Visualized integument without rash or lesion      DIAGNOSTIC RESULTS     EKG: All EKG's are interpreted by the Emergency Department Physician who either signs or Co-signs this chart in the absence of a cardiologist.        RADIOLOGY:   Non-plain film images such as CT, Ultrasound and MRI are read by the radiologist. Plain radiographic images are visualized and preliminarily interpreted by the emergency physician with the below findings:        Interpretation per the Radiologist below, if available at the time of this note:    XR CHEST PORTABLE   Final Result   No acute cardiopulmonary process             LABS:  Labs Reviewed CBC WITH AUTO DIFFERENTIAL - Abnormal; Notable for the following components:       Result Value    Seg Neutrophils 83 (*)     Lymphocytes 11 (*)     Eosinophils % 0 (*)     Immature Granulocytes 1 (*)     Absolute Lymph # 0.72 (*)     All other components within normal limits   BASIC METABOLIC PANEL - Abnormal; Notable for the following components:    Glucose 128 (*)     CREATININE <0.40 (*)     All other components within normal limits   CULTURE, BLOOD 1   CULTURE, BLOOD 1   COVID-19       All other labs were within normal range or not returned as of this dictation. EMERGENCY DEPARTMENT COURSE and DIFFERENTIAL DIAGNOSIS/MDM:   Vitals:    Vitals:    12/28/20 0014 12/28/20 0029 12/28/20 0044 12/28/20 0059   BP: 114/72 103/82 99/79 102/82   Pulse: 66 63 66 64   Resp: 18 17 20 20   Temp:       TempSrc:       SpO2: 99% 98% 97% 97%   Weight:         Patient is evaluated. He is DNR CC. He appears comfortable. He remains on BiPAP. He has stated history of aspiration pneumonia chest x-ray and baseline blood work requested. He is covered for his aspiration pneumonia. He is not in distress. He cannot be returned to [facility] overnight. Care is discussed with internal medicine to facilitate admission for further supportive management. CONSULTS:  IP CONSULT TO INTERNAL MEDICINE    PROCEDURES:  None    FINAL IMPRESSION      1. Dyspnea and respiratory abnormalities    2. History of aspiration pneumonia    3. DNR (do not resuscitate)          DISPOSITION/PLAN   DISPOSITION    Decision to Admit    PATIENT REFERRED TO:   No follow-up provider specified.     DISCHARGE MEDICATIONS:     New Prescriptions    No medications on file           (Please note that portions of this note were completed with a voice recognition program.  Efforts were made to edit the dictations but occasionally words are mis-transcribed.)    Wai Carolina MD  Attending Emergency Physician          Wai Carolina MD  12/28/20 8071

## 2020-12-28 NOTE — ED NOTES
Pt presents to ED with c/o respiratory distress. Per EMS pt lives at [address]. Pts home health aid states he was not acting himself. Pt is non-verbal but usually opens his eyes when spoken to. Tonight home health aid states pt was not opening his eyes. Upon arrival pt not responding to painful stimuli. Pt afebrile, vitals stable. Pt SPO2 99 on Bi-pap. Pt is a DNRCC.            Marciano Ham RN  12/28/20 1826

## 2020-12-28 NOTE — ED NOTES
Dr. Elidia Mendez at bedside, per Dr. Elidia Mendez he will be discussing his plan with administration and would let writer know what the plan will be moving forward.      Breana Duran RN  12/28/20 6907

## 2020-12-28 NOTE — ED NOTES
Bed: 20  Expected date:   Expected time:   Means of arrival:   Comments:  [facility]     [facility]  12/27/20 5988

## 2020-12-29 ENCOUNTER — APPOINTMENT (OUTPATIENT)
Dept: GENERAL RADIOLOGY | Age: 50
DRG: 189 | End: 2020-12-29
Payer: MEDICARE

## 2020-12-29 ENCOUNTER — HOSPITAL ENCOUNTER (OUTPATIENT)
Age: 50
Setting detail: OBSERVATION
Discharge: NURSING, REHAB OR LONG-TERM CARE | End: 2020-12-30
Attending: EMERGENCY MEDICINE | Admitting: INTERNAL MEDICINE
Payer: MEDICARE

## 2020-12-29 VITALS
RESPIRATION RATE: 18 BRPM | OXYGEN SATURATION: 100 % | BODY MASS INDEX: 21.99 KG/M2 | HEIGHT: 60 IN | HEART RATE: 51 BPM | WEIGHT: 112 LBS | DIASTOLIC BLOOD PRESSURE: 42 MMHG | SYSTOLIC BLOOD PRESSURE: 99 MMHG | TEMPERATURE: 97.3 F

## 2020-12-29 PROBLEM — R09.02 HYPOXEMIA: Status: RESOLVED | Noted: 2020-12-28 | Resolved: 2020-12-29

## 2020-12-29 PROBLEM — J44.1 COPD EXACERBATION (HCC): Status: RESOLVED | Noted: 2019-04-12 | Resolved: 2020-12-29

## 2020-12-29 PROBLEM — R06.89 CHRONIC RESPIRATORY INSUFFICIENCY: Status: ACTIVE | Noted: 2020-12-29

## 2020-12-29 LAB
-: NORMAL
ABSOLUTE EOS #: 0.18 K/UL (ref 0–0.44)
ABSOLUTE IMMATURE GRANULOCYTE: 0.07 K/UL (ref 0–0.3)
ABSOLUTE LYMPH #: 2.01 K/UL (ref 1.1–3.7)
ABSOLUTE MONO #: 0.56 K/UL (ref 0.1–1.2)
ANION GAP SERPL CALCULATED.3IONS-SCNC: 11 MMOL/L (ref 9–17)
ANION GAP SERPL CALCULATED.3IONS-SCNC: 8 MMOL/L (ref 9–17)
BASOPHILS # BLD: 1 % (ref 0–2)
BASOPHILS ABSOLUTE: 0.05 K/UL (ref 0–0.2)
BUN BLDV-MCNC: 13 MG/DL (ref 6–20)
BUN BLDV-MCNC: 9 MG/DL (ref 6–20)
BUN/CREAT BLD: ABNORMAL (ref 9–20)
BUN/CREAT BLD: ABNORMAL (ref 9–20)
CALCIUM SERPL-MCNC: 8.3 MG/DL (ref 8.6–10.4)
CALCIUM SERPL-MCNC: 9.2 MG/DL (ref 8.6–10.4)
CHLORIDE BLD-SCNC: 103 MMOL/L (ref 98–107)
CHLORIDE BLD-SCNC: 108 MMOL/L (ref 98–107)
CO2: 25 MMOL/L (ref 20–31)
CO2: 27 MMOL/L (ref 20–31)
CREAT SERPL-MCNC: <0.4 MG/DL (ref 0.7–1.2)
CREAT SERPL-MCNC: <0.4 MG/DL (ref 0.7–1.2)
DIFFERENTIAL TYPE: ABNORMAL
EOSINOPHILS RELATIVE PERCENT: 2 % (ref 1–4)
GFR AFRICAN AMERICAN: ABNORMAL ML/MIN
GFR AFRICAN AMERICAN: ABNORMAL ML/MIN
GFR NON-AFRICAN AMERICAN: ABNORMAL ML/MIN
GFR NON-AFRICAN AMERICAN: ABNORMAL ML/MIN
GFR SERPL CREATININE-BSD FRML MDRD: ABNORMAL ML/MIN/{1.73_M2}
GLUCOSE BLD-MCNC: 78 MG/DL (ref 70–99)
GLUCOSE BLD-MCNC: 99 MG/DL (ref 70–99)
HCT VFR BLD CALC: 40.2 % (ref 40.7–50.3)
HCT VFR BLD CALC: 42.2 % (ref 40.7–50.3)
HEMOGLOBIN: 12.5 G/DL (ref 13–17)
HEMOGLOBIN: 13.6 G/DL (ref 13–17)
IMMATURE GRANULOCYTES: 1 %
LYMPHOCYTES # BLD: 24 % (ref 24–43)
MCH RBC QN AUTO: 30.8 PG (ref 25.2–33.5)
MCH RBC QN AUTO: 30.9 PG (ref 25.2–33.5)
MCHC RBC AUTO-ENTMCNC: 31.1 G/DL (ref 28.4–34.8)
MCHC RBC AUTO-ENTMCNC: 32.2 G/DL (ref 28.4–34.8)
MCV RBC AUTO: 95.7 FL (ref 82.6–102.9)
MCV RBC AUTO: 99.3 FL (ref 82.6–102.9)
MONOCYTES # BLD: 7 % (ref 3–12)
NRBC AUTOMATED: 0 PER 100 WBC
NRBC AUTOMATED: 0 PER 100 WBC
PDW BLD-RTO: 13.8 % (ref 11.8–14.4)
PDW BLD-RTO: 14.2 % (ref 11.8–14.4)
PLATELET # BLD: 348 K/UL (ref 138–453)
PLATELET # BLD: 380 K/UL (ref 138–453)
PLATELET ESTIMATE: ABNORMAL
PMV BLD AUTO: 9.3 FL (ref 8.1–13.5)
PMV BLD AUTO: 9.7 FL (ref 8.1–13.5)
POTASSIUM SERPL-SCNC: 3.6 MMOL/L (ref 3.7–5.3)
POTASSIUM SERPL-SCNC: 3.9 MMOL/L (ref 3.7–5.3)
RBC # BLD: 4.05 M/UL (ref 4.21–5.77)
RBC # BLD: 4.41 M/UL (ref 4.21–5.77)
RBC # BLD: ABNORMAL 10*6/UL
REASON FOR REJECTION: NORMAL
SEG NEUTROPHILS: 66 % (ref 36–65)
SEGMENTED NEUTROPHILS ABSOLUTE COUNT: 5.47 K/UL (ref 1.5–8.1)
SODIUM BLD-SCNC: 141 MMOL/L (ref 135–144)
SODIUM BLD-SCNC: 141 MMOL/L (ref 135–144)
WBC # BLD: 10.2 K/UL (ref 3.5–11.3)
WBC # BLD: 8.3 K/UL (ref 3.5–11.3)
WBC # BLD: ABNORMAL 10*3/UL
ZZ NTE CLEAN UP: ORDERED TEST: NORMAL
ZZ NTE WITH NAME CLEAN UP: SPECIMEN SOURCE: NORMAL

## 2020-12-29 PROCEDURE — 2580000003 HC RX 258: Performed by: NURSE PRACTITIONER

## 2020-12-29 PROCEDURE — 6360000002 HC RX W HCPCS: Performed by: NURSE PRACTITIONER

## 2020-12-29 PROCEDURE — 71045 X-RAY EXAM CHEST 1 VIEW: CPT

## 2020-12-29 PROCEDURE — 99284 EMERGENCY DEPT VISIT MOD MDM: CPT

## 2020-12-29 PROCEDURE — 85025 COMPLETE CBC W/AUTO DIFF WBC: CPT

## 2020-12-29 PROCEDURE — 85027 COMPLETE CBC AUTOMATED: CPT

## 2020-12-29 PROCEDURE — 2500000003 HC RX 250 WO HCPCS: Performed by: NURSE PRACTITIONER

## 2020-12-29 PROCEDURE — 94660 CPAP INITIATION&MGMT: CPT

## 2020-12-29 PROCEDURE — G0378 HOSPITAL OBSERVATION PER HR: HCPCS

## 2020-12-29 PROCEDURE — 36415 COLL VENOUS BLD VENIPUNCTURE: CPT

## 2020-12-29 PROCEDURE — 2700000000 HC OXYGEN THERAPY PER DAY

## 2020-12-29 PROCEDURE — 80048 BASIC METABOLIC PNL TOTAL CA: CPT

## 2020-12-29 PROCEDURE — 6370000000 HC RX 637 (ALT 250 FOR IP): Performed by: NURSE PRACTITIONER

## 2020-12-29 PROCEDURE — 94761 N-INVAS EAR/PLS OXIMETRY MLT: CPT

## 2020-12-29 PROCEDURE — 94640 AIRWAY INHALATION TREATMENT: CPT

## 2020-12-29 PROCEDURE — 99219 PR INITIAL OBSERVATION CARE/DAY 50 MINUTES: CPT | Performed by: NURSE PRACTITIONER

## 2020-12-29 PROCEDURE — 99239 HOSP IP/OBS DSCHRG MGMT >30: CPT | Performed by: INTERNAL MEDICINE

## 2020-12-29 RX ADMIN — LEVETIRACETAM 1500 MG: 500 SOLUTION ORAL at 09:41

## 2020-12-29 RX ADMIN — Medication 1000 MG: at 09:41

## 2020-12-29 RX ADMIN — IPRATROPIUM BROMIDE AND ALBUTEROL SULFATE 1 AMPULE: .5; 3 SOLUTION RESPIRATORY (INHALATION) at 08:37

## 2020-12-29 RX ADMIN — LAMOTRIGINE 300 MG: 100 TABLET ORAL at 09:42

## 2020-12-29 RX ADMIN — BACLOFEN 10 MG: 10 TABLET ORAL at 09:41

## 2020-12-29 RX ADMIN — GLYCOPYRROLATE 1 MG: 1 TABLET ORAL at 10:05

## 2020-12-29 RX ADMIN — BUDESONIDE 500 MCG: 0.5 SUSPENSION RESPIRATORY (INHALATION) at 08:37

## 2020-12-29 RX ADMIN — CLINDAMYCIN PHOSPHATE 600 MG: 600 INJECTION, SOLUTION INTRAVENOUS at 09:39

## 2020-12-29 RX ADMIN — FUROSEMIDE 20 MG: 20 TABLET ORAL at 09:41

## 2020-12-29 RX ADMIN — CETIRIZINE HYDROCHLORIDE 10 MG: 10 TABLET, FILM COATED ORAL at 09:41

## 2020-12-29 RX ADMIN — ENOXAPARIN SODIUM 40 MG: 100 INJECTION SUBCUTANEOUS at 09:42

## 2020-12-29 RX ADMIN — SODIUM CHLORIDE: 9 INJECTION, SOLUTION INTRAVENOUS at 04:23

## 2020-12-29 RX ADMIN — FAMOTIDINE 20 MG: 20 TABLET, FILM COATED ORAL at 09:41

## 2020-12-29 RX ADMIN — CLINDAMYCIN PHOSPHATE 600 MG: 600 INJECTION, SOLUTION INTRAVENOUS at 01:13

## 2020-12-29 RX ADMIN — POTASSIUM BICARBONATE 10 MEQ: 391 TABLET, EFFERVESCENT ORAL at 09:42

## 2020-12-29 RX ADMIN — LACTULOSE 20 G: 20 SOLUTION ORAL at 09:41

## 2020-12-29 RX ADMIN — Medication 1000 UNITS: at 09:41

## 2020-12-29 RX ADMIN — CHLORHEXIDINE GLUCONATE 5 ML: 1.2 RINSE ORAL at 10:21

## 2020-12-29 RX ADMIN — LACOSAMIDE 150 MG: 100 TABLET, FILM COATED ORAL at 10:05

## 2020-12-29 RX ADMIN — Medication 10 ML: at 09:42

## 2020-12-29 ASSESSMENT — ENCOUNTER SYMPTOMS
VOMITING: 0
DIARRHEA: 0
SHORTNESS OF BREATH: 1

## 2020-12-29 NOTE — DISCHARGE INSTR - COC
Continuity of Care Form    Patient Name: Jonnie Prather   :  1970  MRN:  [id number]    Admit date:  2020  Discharge date:  2020    Code Status Order: DNR-CCA   Advance Directives:   [facility] Documentation       Date/Time Healthcare Directive Type of Healthcare Directive Copy in [address] Agent's Name Healthcare Agent's Phone Number    20 1489  --  --  --  Healthcare power of   Tushar Gonzales ([email or web address])  [phone]    20 2121  --  --  Yes, copy in chart  --  --  --    20  Yes, patient has an advance directive for healthcare treatment  --  --  --  --  --            Admitting Physician:  Jacoby Spangler MD  PCP: Steve Montiel MD    Discharging Nurse: [facility] Unit/Room#:   Discharging Unit Phone Number: [phone]    Emergency Contact:   Extended Emergency Contact Information  Primary Emergency Contact: Corey Dupree [address] Phone: [phone]  Work Phone: [phone]  Relation: Brother/Sister  Secondary Emergency Contact: Antonio Ivy [address] Phone: [phone]  Relation: Other    Past Surgical History:  Past Surgical History:   Procedure Laterality Date    CHOLECYSTECTOMY  2018    laparoscopic    ERCP  2018    GASTROSTOMY TUBE PLACEMENT      DE ERCP DX COLLECTION SPECIMEN BRUSHING/WASHING N/A 2018    ERCP PAPILOTOMY SWEEPING STONE EXTRACTION ENDOSCOPIC RETROGRADE CHOLANGIOPANCREATOGRAPHY performed by Silviano Brasher MD at [address] [place] [address] N/A 2018    CHOLECYSTECTOMY LAPAROSCOPIC WITH UMBILICAL HERNIA REPAIR performed by Suzy Abbott DO at [address]      has Feeding tube       Immunization History:   Immunization History   Administered Date(s) Administered    Influenza Virus Vaccine 10/21/2017       Active Problems:  Patient Active Problem List   Diagnosis Code    Acute exacerbation of chronic obstructive pulmonary disease (COPD) ([facility]) J44.1    Seizure disorder ([facility]) G40.909    Cerebral palsied ([facility] G80.9    Severe protein-calorie malnutrition ([place]: less than 60% of standard weight) (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) K56.7    Influenza with respiratory manifestation other than pneumonia J11.1    Hypoxia R09.02    Abdominal distension R14.0    Dyspnea and respiratory abnormalities R06.00, R06.89    Acute respiratory failure with hypoxia ([facility]) J96.01    Acute and chronic respiratory failure with hypoxia ([facility]) J96.21    Acute respiratory failure ([facility]) J96.00    COPD exacerbation ([facility]) J44.1    Obesity, Class II, BMI 35-39.9 E66.9    Malfunction of percutaneous endoscopic gastrostomy (PEG) tube (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) A41.81    Pyogenic arthritis of knee ([facility]) M00.9    Closed fracture of distal end of right femur ([facility]) S72.401A    Gram-positive bacteremia R78.81    Hypernatremia E87.0    Normocytic anemia D64.9    Osteoporosis M81.0    Protein-calorie malnutrition ([facility]) E46    Respiratory failure ([facility]) J96.90    Influenza J11.1    Seizure ([facility]) R56.9    Hypoxemia R09.02    S/P percutaneous endoscopic gastrostomy (PEG) tube placement ([facility]) Z93.1    History of 2019 novel coronavirus disease (COVID-19) Z86.19       Isolation/Infection:   Isolation            No Isolation          Patient Infection Status       None to display            Nurse Assessment:  Last Vital Signs: BP (!) 99/42   Pulse 51   Temp 97.3 °F (36.3 °C) (Axillary)   Resp 18   Ht 4' 3\" (1.295 m)   Wt 112 lb (50.8 kg)   SpO2 100%   BMI 30.27 kg/m²     Last documented pain score (0-10 scale):    Last Weight:   Wt Readings from Last 1 Encounters:   12/29/20 112 lb (50.8 kg)     Mental Status:  Alert but has intellectual disability, KERRIE    IV Access:  - None    Nursing Mobility/ADLs:  Walking   Dependent  Transfer  Dependent  Bathing  Dependent  Dressing  Dependent  Toileting  Dependent  Feeding  Dependent  Med Admin  Dependent  Med Delivery   crushed and given through PEG tube    Wound Care Documentation and Therapy:  Incision 08/18/18 Abdomen (Active)   Number of days: 069        Elimination:  Continence:   · Bowel: No  · Bladder: No  Urinary Catheter: None   Colostomy/Ileostomy/Ileal Conduit: No       Date of Last BM: ***  No intake or output data in the 24 hours ending 12/29/20 1044  No intake/output data recorded. Safety Concerns: At Risk for Falls and Aspiration Risk    Impairments/Disabilities:      Speech and MRDD    Nutrition Therapy:  Current Nutrition Therapy:   - Tube Feedings:  none    Routes of Feeding: Gastrostomy Tube  Liquids: No Restrictions  Daily Fluid Restriction: no  Last Modified Barium Swallow with Video (Video Swallowing Test): not done    Treatments at the Time of Hospital Discharge:   Respiratory Treatments: ***  Oxygen Therapy:  is on oxygen at 3 L/min per nasal cannula. Ventilator:    - CPAP   device from home    Rehab Therapies: Physical Therapy and Occupational Therapy  Weight Bearing Status/Restrictions: Other Medical Equipment (for information only, NOT a DME order):     Other Treatments: ***    Patient's personal belongings (please select all that are sent with patient):  None    RN SIGNATURE:  Electronically signed by Rachelle Colalzo RN on 12/29/20 at 11:15 AM EST    CASE MANAGEMENT/SOCIAL WORK SECTION    Inpatient Status Date: ***    Readmission Risk Assessment Score:  Readmission Risk              Risk of Unplanned Readmission:        22           Discharging to Facility/ Agency   · [facility]   · NPBDVKN:[facility]  · [phone]  · JAQUELIN:[phone]    Dialysis Facility (if applicable)   · Name:  · Address:  · Dialysis Schedule:  · Phone:  · Fax:    / signature: Electronically signed by LEONOR Garcia, LSW on 12/29/20 at 10:46 AM EST    PHYSICIAN SECTION    Prognosis: Guarded    Condition at Discharge: Stable    Rehab Potential (if transferring to Rehab): Guarded    Recommended Labs or Other Treatments After Discharge: Continue current treatment    Physician Certification: I certify the above information and transfer of [facility]  is necessary for the continuing treatment of the diagnosis listed and that he requires Intermediate/Mental Retardation/Developmental Disabilities Care for greater 30 days.      Update Admission H&P: No change in H&P    PHYSICIAN SIGNATURE:  Electronically signed by Max Figueroa MD on 12/29/20 at 11:03 AM EST

## 2020-12-29 NOTE — PROGRESS NOTES
Report given to [facility] home. Patient discharged via ambulance with all his belongings in stable condition.  Patient understood and signed AVS.

## 2020-12-29 NOTE — CARE COORDINATION
Social work: alerted group home pt likely to return today. Called and faxed [facility] paperwork left for [facility]. Saulo Maldonado RN lead is getting discharge organized with RN who has pt. Will call sister who is guardian as soon as time is known from [facility]    Social work: reached sister/guardian and advised pt would return to group home. Called [facility] they can come in 35 minutes so around 11:20 am.  Notified group home of same plan. RN lead ok with time of transfer.   Josephine Bah [facility]

## 2020-12-29 NOTE — PROGRESS NOTES
Physical Therapy  {PT ALL NOTES:005464}  Ottoniel Dhillon is a 45 y.o. Non-/non  male who presents with Shortness of Breath   and is admitted to the hospital for the management of Hypoxemia.     Patient presented to the emergency department via EMS due to increased shortness of breath. He is a resident at the [facility]. Patient is nonverbal so information is obtained from the emergency department note. According to his caregivers at the group home, he was recently diagnosed with aspiration pneumonia. They also noted him to be less active than normal.  He wears CPAP at night and was noted to be hypoxic when not on CPAP. According to chart review, he was previously diagnosed with Covid on November 27. Other medical history includes seizure disorder, cerebral palsy, developmental disability, mental retardation, status post PEG tube placement. He also has a history of frequent aspiration pneumonia. He is a DNR CC status. Chest x-ray demonstrates no acute cardiopulmonary process. His Covid screening was negative today. He was placed on BiPAP in the emergency department. He is being admitted for further management of hypoxemia.

## 2020-12-29 NOTE — PLAN OF CARE
Problem: Falls - Risk of:  Goal: Will remain free from falls  Description: Will remain free from falls  12/29/2020 1106 by Leighton Caldera RN  Outcome: Ongoing  Note: Siderails up x 2  Hourly rounding  Call light in reach  Instructed to call for assist before attempting out of bed.   Remains free from falls and accidental injury at this time   Floor free from obstacles  Bed is locked and in lowest position  Adequate lighting provided  Bed alarm on, [place] star and Stay with Me signs posted      12/29/2020 0252 by Sharon Enriquez RN  Outcome: Ongoing  Goal: Absence of physical injury  Description: Absence of physical injury  12/29/2020 1106 by Leighton Caldera RN  Outcome: Ongoing  12/29/2020 0252 by Sharon Enriquez RN  Outcome: Ongoing     Problem: Skin Integrity:  Goal: Will show no infection signs and symptoms  Description: Will show no infection signs and symptoms  12/29/2020 1106 by Leighton Caldera RN  Outcome: Ongoing  12/29/2020 0252 by Sharon Enriquez RN  Outcome: Ongoing  Goal: Absence of new skin breakdown  Description: Absence of new skin breakdown  12/29/2020 1106 by Leighton Caldera RN  Outcome: Ongoing  12/29/2020 0252 by Sharon Enriquez RN  Outcome: Ongoing     Problem: Daily Care:  Goal: Daily care needs are met  Description: Daily care needs are met  Outcome: Ongoing     Problem: Skin Integrity:  Goal: Skin integrity will stabilize  Description: Skin integrity will stabilize  Outcome: Ongoing     Problem: Discharge Planning:  Goal: Patients continuum of care needs are met  Description: Patients continuum of care needs are met  Outcome: Ongoing

## 2020-12-29 NOTE — PROGRESS NOTES
Physician Progress Note      PATIENT:               Mike Mackey  CSN #:                  [id number]  :                       1970  ADMIT DATE:       2020 11:38 PM  Chaitanya Akbar DATE:        2020 11:42 AM  RESPONDING  PROVIDER #:        Kristina Lozano MD          QUERY TEXT:    Pt admitted with with hypoxia and required BIPAP. If possible, please   document in the progress notes and discharge summary if you are evaluating   and/or treating any of the following: The medical record reflects the following:  Risk Factors: age 45 w recent respiratory infection and hypoxia documented. Clinical Indicators:  He comes from a group home, he is developmentally   delayed- MR and has CPAP use at night. History is minimal as pt is non verbal   but according to his caregiver he was noted to be hypoxic when not using his   CPAP and was less active than normal.  Pulse ox value dropped to 80% noted in   Flow sheet then maintained w BIPAP/ high flow. Upon arrival pt was not   responding to painful stimuli or opening his eyes. Treatment: Bipap,  now remains on non rebreather mask  Thank you,  Chelsie Flores, MSN,CDS  Options provided:  -- Acute respiratory failure with hypoxia  -- Acute respiratory failure with hypercapnia  -- Chronic respiratory failure with hypoxia  -- Chronic respiratory failure with hypercapnia  -- Acute on chronic respiratory failure with hypoxia  -- Acute on chronic respiratory failure with hypercapnia  -- Other - I will add my own diagnosis  -- Disagree - Not applicable / Not valid  -- Disagree - Clinically unable to determine / Unknown  -- Refer to Clinical Documentation Reviewer    PROVIDER RESPONSE TEXT:    This patient is in acute respiratory failure with hypoxia.     Query created by: Thomas Bergman on 2020 8:24 AM      Electronically signed by:  Kristina Lozano MD 2020 4:10 PM

## 2020-12-29 NOTE — DISCHARGE SUMMARY
108 12/29/2020    CO2 25 12/29/2020    BUN 13 12/29/2020    CREATININE <0.40 12/29/2020    ANIONGAP 8 12/29/2020    ALKPHOS 104 12/20/2020    ALT 18 12/20/2020    AST 16 12/20/2020    BILITOT 0.30 12/20/2020    LABALBU 4.1 12/20/2020    ALBUMIN NOT REPORTED 12/20/2020    LABGLOM CANNOT BE CALCULATED 12/29/2020    GFRAA CANNOT BE CALCULATED 12/29/2020    GFR      12/29/2020    GFR NOT REPORTED 12/29/2020    PROT 7.8 12/20/2020    CALCIUM 8.3 12/29/2020     PT/INR:    Lab Results   Component Value Date    PROTIME 13.0 03/10/2019    INR 1.3 03/10/2019     PTT:   Lab Results   Component Value Date    APTT 30.8 03/10/2019     FLP:    Lab Results   Component Value Date    TRIG 61 03/13/2019     U/A:    Lab Results   Component Value Date    COLORU YELLOW 06/06/2020    TURBIDITY SLIGHTLY CLOUDY 06/06/2020    SPECGRAV 1.006 06/06/2020    HGBUR NEGATIVE 06/06/2020    PHUR 7.5 06/06/2020    PROTEINU NEGATIVE 06/06/2020    GLUCOSEU NEGATIVE 06/06/2020    KETUA NEGATIVE 06/06/2020    BILIRUBINUR NEGATIVE 06/06/2020    UROBILINOGEN Normal 06/06/2020    NITRU NEGATIVE 06/06/2020    LEUKOCYTESUR LARGE 06/06/2020     TSH:  No results found for: TSH     Radiology:  Xr Chest Portable    Result Date: 12/29/2020  Interval increase in left basilar opacities, which may represent atelectasis versus developing consolidation. Xr Chest Portable    Result Date: 12/28/2020  No acute cardiopulmonary process       Consultations:    Consults:     Final Specialist Recommendations/Findings:   IP CONSULT TO INTERNAL MEDICINE  IP CONSULT TO DIETITIAN      The patient was seen and examined on day of discharge and this discharge summary is in conjunction with any daily progress note from day of discharge. Discharge plan:     Disposition: Group home    Physician Follow Up: Follow-up with PCP within 1 week after discharge  No follow-up provider specified.      Requiring Further Evaluation/Follow Up POST HOSPITALIZATION/Incidental Findings: Continue current care    Diet: As tolerated    Activity: As tolerated    Instructions to Patient: Be compliant with your medications    Discharge Medications:      Medication List      CONTINUE taking these medications    acetaminophen 325 MG tablet  Commonly known as: TYLENOL     albuterol (2.5 MG/3ML) 0.083% nebulizer solution  Commonly known as: PROVENTIL     baclofen 10 MG tablet  Commonly known as: LIORESAL     bisacodyl 10 MG suppository  Commonly known as: DULCOLAX     budesonide 0.5 MG/2ML nebulizer suspension  Commonly known as: PULMICORT     calcium carbonate 500 MG chewable tablet  Commonly known as: TUMS     carbamide peroxide 6.5 % otic solution  Commonly known as: DEBROX     chlorhexidine 0.12 % solution  Commonly known as: PERIDEX     clonazePAM 2 MG disintegrating tablet  Commonly known as: KLONOPIN     DAILY-JAY PO     Desitin 40 % Pste paste  Generic drug: zinc oxide     Dextromethorphan-guaiFENesin  MG/5ML Syrp     fluticasone 50 MCG/ACT nasal spray  Commonly known as: FLONASE     furosemide 20 MG tablet  Commonly known as: LASIX     glycopyrrolate 1 MG tablet  Commonly known as: ROBINUL     ipratropium-albuterol 0.5-2.5 (3) MG/3ML Soln nebulizer solution  Commonly known as: DUONEB     ketoconazole 2 % shampoo  Commonly known as: NIZORAL     lacosamide 10 MG/ML Soln oral solution  Commonly known as: VIMPAT     lactulose 10 GM/15ML solution  Commonly known as: CHRONULAC     * lamoTRIgine 150 MG tablet  Commonly known as: LAMICTAL  2 tablets by Per G Tube route every morning Indications: 300mg in AM and 200mg in PM     * lamoTRIgine 200 MG tablet  Commonly known as: LAMICTAL  1 tablet by Per G Tube route nightly Indications: 300MG IN am AND 200MG IN pm     levETIRAcetam 100 MG/ML solution  Commonly known as: KEPPRA  15 mLs by PEG Tube route 2 times daily     loratadine 10 MG tablet  Commonly known as: CLARITIN     melatonin 3 MG Tabs tablet     montelukast 10 MG tablet  Commonly known as: SINGULAIR     neomycin-bacitracin-polymyxin 400-5-5000 ointment  Commonly known as: NEOSPORIN     nystatin 098868 UNIT/GM powder  Commonly known as: MYCOSTATIN     ondansetron 4 MG disintegrating tablet  Commonly known as: ZOFRAN-ODT  Take 1 tablet by mouth every 6 hours as needed for Nausea     ondansetron 4 MG/5ML solution  Commonly known as: Zofran  5 mLs by Per G Tube route every 8 hours as needed for Nausea or Vomiting     polyethylene glycol 17 GM/SCOOP powder  Commonly known as: MiraLax  17 g by Per G Tube route daily as needed (constipation)     potassium chloride 10 MEQ extended release tablet  Commonly known as: KLOR-CON M     pseudoephedrine 30 MG tablet  Commonly known as: SUDAFED     raNITIdine 15 MG/ML syrup  Commonly known as: ZANTAC     Replete/Fiber Liqd     senna-docusate 8.6-50 MG per tablet  Commonly known as: PERICOLACE     vitamin D3 25 MCG (1000 UT) Tabs tablet  Commonly known as: CHOLECALCIFEROL         * This list has 2 medication(s) that are the same as other medications prescribed for you. Read the directions carefully, and ask your doctor or other care provider to review them with you. No discharge procedures on file. Time Spent on discharge is  40 mins in patient examination, evaluation, counseling as well as medication reconciliation, prescriptions for required medications, discharge plan and follow up. Electronically signed by   Saskia Kenny MD  12/29/2020  11:05 AM      Thank you Dr. Janine Greene MD for the opportunity to be involved in this patient's care.

## 2020-12-30 VITALS
BODY MASS INDEX: 21.99 KG/M2 | OXYGEN SATURATION: 99 % | HEART RATE: 79 BPM | HEIGHT: 60 IN | DIASTOLIC BLOOD PRESSURE: 77 MMHG | TEMPERATURE: 98.2 F | SYSTOLIC BLOOD PRESSURE: 116 MMHG | RESPIRATION RATE: 18 BRPM | WEIGHT: 112 LBS

## 2020-12-30 PROBLEM — J18.9 PNEUMONIA OF LEFT LOWER LOBE DUE TO INFECTIOUS ORGANISM: Status: ACTIVE | Noted: 2020-11-03

## 2020-12-30 LAB
ANION GAP SERPL CALCULATED.3IONS-SCNC: 8 MMOL/L (ref 9–17)
BUN BLDV-MCNC: 6 MG/DL (ref 6–20)
BUN/CREAT BLD: ABNORMAL (ref 9–20)
CALCIUM SERPL-MCNC: 9.8 MG/DL (ref 8.6–10.4)
CHLORIDE BLD-SCNC: 103 MMOL/L (ref 98–107)
CO2: 30 MMOL/L (ref 20–31)
CREAT SERPL-MCNC: <0.4 MG/DL (ref 0.7–1.2)
GFR AFRICAN AMERICAN: ABNORMAL ML/MIN
GFR NON-AFRICAN AMERICAN: ABNORMAL ML/MIN
GFR SERPL CREATININE-BSD FRML MDRD: ABNORMAL ML/MIN/{1.73_M2}
GFR SERPL CREATININE-BSD FRML MDRD: ABNORMAL ML/MIN/{1.73_M2}
GLUCOSE BLD-MCNC: 105 MG/DL (ref 70–99)
HCT VFR BLD CALC: 41.1 % (ref 40.7–50.3)
HEMOGLOBIN: 13.1 G/DL (ref 13–17)
INR BLD: 1.1
MCH RBC QN AUTO: 30.5 PG (ref 25.2–33.5)
MCHC RBC AUTO-ENTMCNC: 31.9 G/DL (ref 28.4–34.8)
MCV RBC AUTO: 95.6 FL (ref 82.6–102.9)
NRBC AUTOMATED: 0 PER 100 WBC
PDW BLD-RTO: 13.8 % (ref 11.8–14.4)
PLATELET # BLD: 371 K/UL (ref 138–453)
PMV BLD AUTO: 9.6 FL (ref 8.1–13.5)
POTASSIUM SERPL-SCNC: 4 MMOL/L (ref 3.7–5.3)
PROCALCITONIN: 0.08 NG/ML
PROTHROMBIN TIME: 14.1 SEC (ref 11.5–14.2)
RBC # BLD: 4.3 M/UL (ref 4.21–5.77)
SODIUM BLD-SCNC: 141 MMOL/L (ref 135–144)
WBC # BLD: 6.6 K/UL (ref 3.5–11.3)

## 2020-12-30 PROCEDURE — 6360000002 HC RX W HCPCS: Performed by: NURSE PRACTITIONER

## 2020-12-30 PROCEDURE — 36415 COLL VENOUS BLD VENIPUNCTURE: CPT

## 2020-12-30 PROCEDURE — 84145 PROCALCITONIN (PCT): CPT

## 2020-12-30 PROCEDURE — 2700000000 HC OXYGEN THERAPY PER DAY

## 2020-12-30 PROCEDURE — 6370000000 HC RX 637 (ALT 250 FOR IP): Performed by: NURSE PRACTITIONER

## 2020-12-30 PROCEDURE — 94660 CPAP INITIATION&MGMT: CPT

## 2020-12-30 PROCEDURE — 99217 PR OBSERVATION CARE DISCHARGE MANAGEMENT: CPT | Performed by: INTERNAL MEDICINE

## 2020-12-30 PROCEDURE — 2580000003 HC RX 258: Performed by: NURSE PRACTITIONER

## 2020-12-30 PROCEDURE — 94761 N-INVAS EAR/PLS OXIMETRY MLT: CPT

## 2020-12-30 PROCEDURE — 96365 THER/PROPH/DIAG IV INF INIT: CPT

## 2020-12-30 PROCEDURE — G0378 HOSPITAL OBSERVATION PER HR: HCPCS

## 2020-12-30 PROCEDURE — 2500000003 HC RX 250 WO HCPCS: Performed by: NURSE PRACTITIONER

## 2020-12-30 PROCEDURE — 85027 COMPLETE CBC AUTOMATED: CPT

## 2020-12-30 PROCEDURE — 96372 THER/PROPH/DIAG INJ SC/IM: CPT

## 2020-12-30 PROCEDURE — 80048 BASIC METABOLIC PNL TOTAL CA: CPT

## 2020-12-30 PROCEDURE — 94640 AIRWAY INHALATION TREATMENT: CPT

## 2020-12-30 PROCEDURE — 85610 PROTHROMBIN TIME: CPT

## 2020-12-30 RX ORDER — ONDANSETRON HYDROCHLORIDE 4 MG/5ML
4 SOLUTION ORAL EVERY 8 HOURS PRN
Status: DISCONTINUED | OUTPATIENT
Start: 2020-12-30 | End: 2020-12-30 | Stop reason: HOSPADM

## 2020-12-30 RX ORDER — LACOSAMIDE 100 MG/1
150 TABLET ORAL 2 TIMES DAILY
Status: DISCONTINUED | OUTPATIENT
Start: 2020-12-30 | End: 2020-12-30 | Stop reason: HOSPADM

## 2020-12-30 RX ORDER — CLONAZEPAM 0.5 MG/1
2 TABLET ORAL PRN
Status: DISCONTINUED | OUTPATIENT
Start: 2020-12-30 | End: 2020-12-30 | Stop reason: HOSPADM

## 2020-12-30 RX ORDER — ACETAMINOPHEN 325 MG/1
650 TABLET ORAL EVERY 4 HOURS PRN
Status: DISCONTINUED | OUTPATIENT
Start: 2020-12-30 | End: 2020-12-30 | Stop reason: SDUPTHER

## 2020-12-30 RX ORDER — LEVOFLOXACIN 5 MG/ML
750 INJECTION, SOLUTION INTRAVENOUS EVERY 24 HOURS
Qty: 1000 ML | Refills: 0 | Status: SHIPPED | OUTPATIENT
Start: 2020-12-31 | End: 2020-12-30 | Stop reason: HOSPADM

## 2020-12-30 RX ORDER — POTASSIUM CHLORIDE 7.45 MG/ML
10 INJECTION INTRAVENOUS PRN
Status: DISCONTINUED | OUTPATIENT
Start: 2020-12-30 | End: 2020-12-30 | Stop reason: HOSPADM

## 2020-12-30 RX ORDER — LACTULOSE 10 G/15ML
20 SOLUTION ORAL 2 TIMES DAILY
Status: DISCONTINUED | OUTPATIENT
Start: 2020-12-30 | End: 2020-12-30 | Stop reason: HOSPADM

## 2020-12-30 RX ORDER — LAMOTRIGINE 100 MG/1
300 TABLET ORAL EVERY MORNING
Status: DISCONTINUED | OUTPATIENT
Start: 2020-12-30 | End: 2020-12-30 | Stop reason: HOSPADM

## 2020-12-30 RX ORDER — GUAIFENESIN/DEXTROMETHORPHAN 100-10MG/5
10 SYRUP ORAL EVERY 4 HOURS PRN
Status: DISCONTINUED | OUTPATIENT
Start: 2020-12-30 | End: 2020-12-30 | Stop reason: HOSPADM

## 2020-12-30 RX ORDER — MONTELUKAST SODIUM 10 MG/1
10 TABLET ORAL NIGHTLY
Status: DISCONTINUED | OUTPATIENT
Start: 2020-12-30 | End: 2020-12-30 | Stop reason: HOSPADM

## 2020-12-30 RX ORDER — CALCIUM CARBONATE 200(500)MG
1000 TABLET,CHEWABLE ORAL 2 TIMES DAILY
Status: DISCONTINUED | OUTPATIENT
Start: 2020-12-30 | End: 2020-12-30 | Stop reason: HOSPADM

## 2020-12-30 RX ORDER — VITAMIN B COMPLEX
1000 TABLET ORAL 2 TIMES DAILY
Status: DISCONTINUED | OUTPATIENT
Start: 2020-12-30 | End: 2020-12-30 | Stop reason: HOSPADM

## 2020-12-30 RX ORDER — SODIUM CHLORIDE 0.9 % (FLUSH) 0.9 %
10 SYRINGE (ML) INJECTION EVERY 12 HOURS SCHEDULED
Status: DISCONTINUED | OUTPATIENT
Start: 2020-12-30 | End: 2020-12-30 | Stop reason: HOSPADM

## 2020-12-30 RX ORDER — BUDESONIDE 0.5 MG/2ML
500 INHALANT ORAL 2 TIMES DAILY
Status: DISCONTINUED | OUTPATIENT
Start: 2020-12-30 | End: 2020-12-30 | Stop reason: HOSPADM

## 2020-12-30 RX ORDER — PROMETHAZINE HYDROCHLORIDE 12.5 MG/1
12.5 TABLET ORAL EVERY 6 HOURS PRN
Status: DISCONTINUED | OUTPATIENT
Start: 2020-12-30 | End: 2020-12-30 | Stop reason: HOSPADM

## 2020-12-30 RX ORDER — SODIUM CHLORIDE 0.9 % (FLUSH) 0.9 %
10 SYRINGE (ML) INJECTION PRN
Status: DISCONTINUED | OUTPATIENT
Start: 2020-12-30 | End: 2020-12-30 | Stop reason: HOSPADM

## 2020-12-30 RX ORDER — LAMOTRIGINE 100 MG/1
200 TABLET ORAL NIGHTLY
Status: DISCONTINUED | OUTPATIENT
Start: 2020-12-30 | End: 2020-12-30 | Stop reason: HOSPADM

## 2020-12-30 RX ORDER — SENNA AND DOCUSATE SODIUM 50; 8.6 MG/1; MG/1
2 TABLET, FILM COATED ORAL DAILY
Status: DISCONTINUED | OUTPATIENT
Start: 2020-12-30 | End: 2020-12-30 | Stop reason: HOSPADM

## 2020-12-30 RX ORDER — NICOTINE 21 MG/24HR
1 PATCH, TRANSDERMAL 24 HOURS TRANSDERMAL DAILY PRN
Status: DISCONTINUED | OUTPATIENT
Start: 2020-12-30 | End: 2020-12-30 | Stop reason: HOSPADM

## 2020-12-30 RX ORDER — LEVOFLOXACIN 5 MG/ML
750 INJECTION, SOLUTION INTRAVENOUS EVERY 24 HOURS
Status: DISCONTINUED | OUTPATIENT
Start: 2020-12-30 | End: 2020-12-30 | Stop reason: HOSPADM

## 2020-12-30 RX ORDER — POTASSIUM CHLORIDE 20 MEQ/1
40 TABLET, EXTENDED RELEASE ORAL PRN
Status: DISCONTINUED | OUTPATIENT
Start: 2020-12-30 | End: 2020-12-30 | Stop reason: HOSPADM

## 2020-12-30 RX ORDER — FLUTICASONE PROPIONATE 50 MCG
2 SPRAY, SUSPENSION (ML) NASAL DAILY
Status: DISCONTINUED | OUTPATIENT
Start: 2020-12-30 | End: 2020-12-30 | Stop reason: HOSPADM

## 2020-12-30 RX ORDER — POLYETHYLENE GLYCOL 3350 17 G/17G
17 POWDER, FOR SOLUTION ORAL DAILY PRN
Status: DISCONTINUED | OUTPATIENT
Start: 2020-12-30 | End: 2020-12-30 | Stop reason: HOSPADM

## 2020-12-30 RX ORDER — LANOLIN ALCOHOL/MO/W.PET/CERES
3 CREAM (GRAM) TOPICAL NIGHTLY
Status: DISCONTINUED | OUTPATIENT
Start: 2020-12-30 | End: 2020-12-30 | Stop reason: HOSPADM

## 2020-12-30 RX ORDER — LEVOFLOXACIN 750 MG/1
750 TABLET ORAL DAILY
Qty: 6 TABLET | Refills: 0 | Status: SHIPPED | OUTPATIENT
Start: 2020-12-30 | End: 2021-01-05

## 2020-12-30 RX ORDER — FUROSEMIDE 20 MG/1
20 TABLET ORAL DAILY
Status: DISCONTINUED | OUTPATIENT
Start: 2020-12-30 | End: 2020-12-30 | Stop reason: HOSPADM

## 2020-12-30 RX ORDER — LEVETIRACETAM 100 MG/ML
1500 SOLUTION ORAL 2 TIMES DAILY
Status: DISCONTINUED | OUTPATIENT
Start: 2020-12-30 | End: 2020-12-30 | Stop reason: HOSPADM

## 2020-12-30 RX ORDER — IPRATROPIUM BROMIDE AND ALBUTEROL SULFATE 2.5; .5 MG/3ML; MG/3ML
1 SOLUTION RESPIRATORY (INHALATION) 4 TIMES DAILY
Status: DISCONTINUED | OUTPATIENT
Start: 2020-12-30 | End: 2020-12-30 | Stop reason: HOSPADM

## 2020-12-30 RX ORDER — CHLORHEXIDINE GLUCONATE 0.12 MG/ML
5 RINSE ORAL 3 TIMES DAILY
Status: DISCONTINUED | OUTPATIENT
Start: 2020-12-30 | End: 2020-12-30 | Stop reason: HOSPADM

## 2020-12-30 RX ORDER — CETIRIZINE HYDROCHLORIDE 10 MG/1
10 TABLET ORAL DAILY
Status: DISCONTINUED | OUTPATIENT
Start: 2020-12-30 | End: 2020-12-30 | Stop reason: HOSPADM

## 2020-12-30 RX ORDER — GLYCOPYRROLATE 1 MG/1
1 TABLET ORAL DAILY
Status: DISCONTINUED | OUTPATIENT
Start: 2020-12-30 | End: 2020-12-30 | Stop reason: HOSPADM

## 2020-12-30 RX ORDER — MAGNESIUM SULFATE 1 G/100ML
1 INJECTION INTRAVENOUS PRN
Status: DISCONTINUED | OUTPATIENT
Start: 2020-12-30 | End: 2020-12-30 | Stop reason: HOSPADM

## 2020-12-30 RX ORDER — ALBUTEROL SULFATE 2.5 MG/3ML
2.5 SOLUTION RESPIRATORY (INHALATION) EVERY 4 HOURS PRN
Status: DISCONTINUED | OUTPATIENT
Start: 2020-12-30 | End: 2020-12-30 | Stop reason: HOSPADM

## 2020-12-30 RX ORDER — MULTIVITAMIN WITH IRON
1 TABLET ORAL DAILY
Status: DISCONTINUED | OUTPATIENT
Start: 2020-12-30 | End: 2020-12-30 | Stop reason: HOSPADM

## 2020-12-30 RX ORDER — ACETAMINOPHEN 325 MG/1
650 TABLET ORAL EVERY 6 HOURS PRN
Status: DISCONTINUED | OUTPATIENT
Start: 2020-12-30 | End: 2020-12-30 | Stop reason: HOSPADM

## 2020-12-30 RX ORDER — FAMOTIDINE 20 MG/1
20 TABLET, FILM COATED ORAL 2 TIMES DAILY
Status: DISCONTINUED | OUTPATIENT
Start: 2020-12-30 | End: 2020-12-30 | Stop reason: HOSPADM

## 2020-12-30 RX ORDER — ONDANSETRON 2 MG/ML
4 INJECTION INTRAMUSCULAR; INTRAVENOUS EVERY 6 HOURS PRN
Status: DISCONTINUED | OUTPATIENT
Start: 2020-12-30 | End: 2020-12-30 | Stop reason: HOSPADM

## 2020-12-30 RX ORDER — NUTRITIONAL SUPPLEMENT/FIBER
900 LIQUID (ML) ORAL DAILY
Status: DISCONTINUED | OUTPATIENT
Start: 2020-12-30 | End: 2020-12-30 | Stop reason: RX

## 2020-12-30 RX ORDER — POLYETHYLENE GLYCOL 3350 17 G/17G
17 POWDER, FOR SOLUTION ORAL DAILY PRN
Status: DISCONTINUED | OUTPATIENT
Start: 2020-12-30 | End: 2020-12-30 | Stop reason: SDUPTHER

## 2020-12-30 RX ORDER — BISACODYL 10 MG
10 SUPPOSITORY, RECTAL RECTAL DAILY PRN
Status: DISCONTINUED | OUTPATIENT
Start: 2020-12-30 | End: 2020-12-30 | Stop reason: HOSPADM

## 2020-12-30 RX ORDER — ACETAMINOPHEN 650 MG/1
650 SUPPOSITORY RECTAL EVERY 6 HOURS PRN
Status: DISCONTINUED | OUTPATIENT
Start: 2020-12-30 | End: 2020-12-30 | Stop reason: HOSPADM

## 2020-12-30 RX ADMIN — LEVOFLOXACIN 750 MG: 5 INJECTION, SOLUTION INTRAVENOUS at 06:26

## 2020-12-30 RX ADMIN — ANTACID TABLETS 1000 MG: 500 TABLET, CHEWABLE ORAL at 01:38

## 2020-12-30 RX ADMIN — ENOXAPARIN SODIUM 40 MG: 100 INJECTION SUBCUTANEOUS at 10:13

## 2020-12-30 RX ADMIN — MULTIVITAMIN TABLET 1 TABLET: TABLET at 10:16

## 2020-12-30 RX ADMIN — LAMOTRIGINE 200 MG: 100 TABLET ORAL at 02:46

## 2020-12-30 RX ADMIN — Medication 10 ML: at 10:13

## 2020-12-30 RX ADMIN — Medication 1000 UNITS: at 10:13

## 2020-12-30 RX ADMIN — FLUTICASONE PROPIONATE 2 SPRAY: 50 SPRAY, METERED NASAL at 10:14

## 2020-12-30 RX ADMIN — DOCUSATE SODIUM 50MG AND SENNOSIDES 8.6MG 2 TABLET: 8.6; 5 TABLET, FILM COATED ORAL at 10:12

## 2020-12-30 RX ADMIN — LACOSAMIDE 150 MG: 100 TABLET, FILM COATED ORAL at 02:46

## 2020-12-30 RX ADMIN — LACOSAMIDE 150 MG: 100 TABLET, FILM COATED ORAL at 10:12

## 2020-12-30 RX ADMIN — ANTACID TABLETS 1000 MG: 500 TABLET, CHEWABLE ORAL at 10:12

## 2020-12-30 RX ADMIN — GLYCOPYRROLATE 1 MG: 1 TABLET ORAL at 10:12

## 2020-12-30 RX ADMIN — MONTELUKAST 10 MG: 10 TABLET, FILM COATED ORAL at 01:38

## 2020-12-30 RX ADMIN — Medication 3 MG: at 00:45

## 2020-12-30 RX ADMIN — IPRATROPIUM BROMIDE AND ALBUTEROL SULFATE 3 ML: .5; 3 SOLUTION RESPIRATORY (INHALATION) at 08:00

## 2020-12-30 RX ADMIN — FUROSEMIDE 20 MG: 20 TABLET ORAL at 10:13

## 2020-12-30 RX ADMIN — LACTULOSE 20 G: 20 SOLUTION ORAL at 01:38

## 2020-12-30 RX ADMIN — FAMOTIDINE 20 MG: 20 TABLET, FILM COATED ORAL at 02:46

## 2020-12-30 RX ADMIN — FAMOTIDINE 20 MG: 20 TABLET, FILM COATED ORAL at 10:12

## 2020-12-30 RX ADMIN — Medication 1000 UNITS: at 01:38

## 2020-12-30 RX ADMIN — BUDESONIDE 500 MCG: 0.5 SUSPENSION RESPIRATORY (INHALATION) at 08:01

## 2020-12-30 RX ADMIN — LACTULOSE 20 G: 20 SOLUTION ORAL at 10:12

## 2020-12-30 RX ADMIN — LEVETIRACETAM 1500 MG: 500 SOLUTION ORAL at 02:48

## 2020-12-30 RX ADMIN — IPRATROPIUM BROMIDE AND ALBUTEROL SULFATE 3 ML: .5; 3 SOLUTION RESPIRATORY (INHALATION) at 11:10

## 2020-12-30 RX ADMIN — CETIRIZINE HYDROCHLORIDE 10 MG: 10 TABLET, FILM COATED ORAL at 10:13

## 2020-12-30 RX ADMIN — LAMOTRIGINE 300 MG: 100 TABLET ORAL at 10:13

## 2020-12-30 RX ADMIN — LEVETIRACETAM 1500 MG: 500 SOLUTION ORAL at 10:11

## 2020-12-30 RX ADMIN — ANTI-FUNGAL POWDER MICONAZOLE NITRATE TALC FREE: 1.42 POWDER TOPICAL at 10:14

## 2020-12-30 RX ADMIN — ANTI-FUNGAL POWDER MICONAZOLE NITRATE TALC FREE: 1.42 POWDER TOPICAL at 02:52

## 2020-12-30 ASSESSMENT — PAIN SCALES - WONG BAKER: WONGBAKER_NUMERICALRESPONSE: 0

## 2020-12-30 NOTE — PROGRESS NOTES
Bora Damian from [facility] called for admitting diagnosis , and she stated a nurse would call us back in the morning for an update.
Occupational Therapy  [facility]  Occupational Therapy Not Seen Note    Patient not available for Occupational Therapy due to:    [] Testing:    [] Hemodialysis    [] Cancelled by RN:    []Refusal by Patient:    [] Surgery:     [] Intubation:     [] Pain Medication:    [] Sedation:     [] Spine Precautions :    [] Medical Instability:    [x] Other: 12/30: Cx; Pt being D/C at 14:00      Fermin Garnett OT
Patient was started on tube feedings but patient was only able to receive 32 ml of the feeding before Hugh Carpenter came to pick him up. Tube feeding information provided to Suresh at the facility. Patient discharged via [facility] with all his belongings to [facility] 24-hour nursing care [facility] in stable condition.
Physical Therapy  DATE: 2020    NAME: Birgit Conrad  MRN: [id number]   : 1970    Patient not seen this date for Physical Therapy due to:  [] Blood transfusion in progress  [] Cancel by RN  [] Hemodialysis  []  Refusal by Patient   [] Spine Precautions   [] Strict Bedrest  [] Surgery  [] Testing      [x] Pt dependent in all care prior to admit. Will d/c PT.           [] PT being discontinued at this time. Patient independent. No further needs. [] PT being discontinued at this time as the patient has been transferred to hospice care. No further needs.     [address], PT
Report given to Kishore Jasso RN at [facility].
30.0-34. 9)       Nutrition Diagnosis:   · Swallowing difficulty related to swallowing difficulty(MRDD) as evidenced by NPO or clear liquid status due to medical condition, nutrition support - enteral nutrition      Nutrition Interventions:   Food and/or Nutrient Delivery:  Continue Current Diet, Start Tube Feeding  Nutrition Education/Counseling:  Education not indicated   Coordination of Nutrition Care:  Continue to monitor while inpatient    Goals:  Oral intakes and enteral nutrition provide greater than 75% of estimated nutrition needs       Nutrition Monitoring and Evaluation:   Food/Nutrient Intake Outcomes:  Food and Nutrient Intake  Physical Signs/Symptoms Outcomes:  Chewing or Swallowing, Fluid Status or Edema, Skin, Weight, Biochemical Data     Discharge Planning:    Enteral Nutrition, Continue current diet         Gal Hnana  MFN, RDN, LDN  Lead Clinical Dietitian  RD Office Phone [phone]

## 2020-12-30 NOTE — ED NOTES
Called the facility to let them know he is being admitted. Spoke to Tashi Hsu [phone].       Cain Jon RN  12/30/20 0001

## 2020-12-30 NOTE — PLAN OF CARE
Problem: Nutrition  Goal: Optimal nutrition therapy  Outcome: Ongoing     Problem: SAFETY  Goal: Free from accidental physical injury  Outcome: Ongoing  Goal: Free from intentional harm  Outcome: Ongoing     Problem: DAILY CARE  Goal: Daily care needs are met  Outcome: Ongoing     Problem: PAIN  Goal: Patient's pain/discomfort is manageable  Outcome: Ongoing     Problem: SKIN INTEGRITY  Goal: Skin integrity is maintained or improved  Outcome: Ongoing     Problem: KNOWLEDGE DEFICIT  Goal: Patient/S.O. demonstrates understanding of disease process, treatment plan, medications, and discharge instructions.   Outcome: Ongoing     Problem: DISCHARGE BARRIERS  Goal: Patient's continuum of care needs are met  Outcome: Ongoing

## 2020-12-30 NOTE — DISCHARGE INSTR - COC
Continuity of Care Form    Patient Name: Sherry Holliday   :  1970  MRN:  [id number]    Admit date:  2020  Discharge date:  2020    Code Status Order: DNR-CC   Advance Directives:      Admitting Physician:  Shimon Beavers MD  PCP: Linda Costa MD    Discharging Nurse: Beverly Suhuaq 23 Unit/Room#:   Discharging Unit Phone Number: [phone]    Emergency Contact:   Extended Emergency Contact Information  Primary Emergency Contact: Dequan Roberts [address] Phone: [phone]  Work Phone: [phone]  Relation: Brother/Sister  Secondary Emergency Contact: Dianna Hart [address] Phone: [phone]  Relation: Other    Past Surgical History:  Past Surgical History:   Procedure Laterality Date    CHOLECYSTECTOMY  2018    laparoscopic    ERCP  2018    GASTROSTOMY TUBE PLACEMENT      TN ERCP DX COLLECTION SPECIMEN BRUSHING/WASHING N/A 2018    ERCP PAPILOTOMY SWEEPING STONE EXTRACTION ENDOSCOPIC RETROGRADE CHOLANGIOPANCREATOGRAPHY performed by Aisha Bellamy MD at [facility] N/A 2018    CHOLECYSTECTOMY LAPAROSCOPIC WITH UMBILICAL HERNIA REPAIR [facility] C[facility]ay [facility]      has Feeding tube       Immunization History:   Immunization History   Administered Date(s) Administered    Influenza Virus Vaccine 10/21/2017       Active Problems:  Patient Active Problem List   Diagnosis Code    Acute exacerbation of chronic obstructive pulmonary disease (COPD) ([facility] J44.1    Seizure disorder ([facility] G40.909    Cerebral palsy ([facility] G80.9    Severe protein-calorie malnutrition [place]: less than 60% of standard weight) ([address] E43    Multifocal pneumonia J18.9    Positive blood cultures R78.81    Pneumonia of left lower lobe due to infectious organism J18.9    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    Choledocholithiasis K80.50    Obstipation K59.00    Generalized abdominal pain R10.84    Constipation K59.00    Developmental disability F89    Ileus ([facility]) K56.7    Influenza with respiratory manifestation other than pneumonia J11.1    Hypoxia R09.02    Abdominal distension R14.0    Dyspnea and respiratory abnormalities R06.00, R06.89    Acute respiratory failure with hypoxia (HCC) J96.01    Acute and chronic respiratory failure with hypoxia ([facility]) J96.21    Acute respiratory failure ([facility]) J96.00    Obesity, Class II, BMI 35-39.9 E66.9    Malfunction of percutaneous endoscopic gastrostomy (PEG) tube ([facility]) K94.23    Breakthrough seizure ([address] G40.919    Mental retardation F79    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    Hip dysplasia, acquired, 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    Enterococcal sepsis ([facility]) A41.81    Pyogenic arthritis of knee ([facility]) M00.9    Closed fracture of distal end of right femur ([facility]) S72.401A    Gram-positive bacteremia R78.81    Hypernatremia E87.0    Normocytic anemia D64.9    Osteoporosis M81.0    Protein-calorie malnutrition ([facility]) E46    Respiratory failure ([facility]) J96.90    Influenza J11.1    Seizure ([facility]) R56.9    S/P percutaneous endoscopic gastrostomy (PEG) tube placement ([facility]) Z93.1    History of 2019 novel coronavirus disease (COVID-19) Z86.19    Chronic respiratory insufficiency R06.89       Isolation/Infection:   Isolation            No Isolation          Patient Infection Status       None to display            Nurse Assessment:  Last Vital Signs: /77   Pulse 79   Temp 98.2 °F (36.8 °C) (Axillary)   Resp 18   Ht 4' 3\" (1.295 m)   Wt 112 lb (50.8 kg)   SpO2 99%   BMI 30.27 kg/m²     Last documented pain score (0-10 scale):    Last Weight:   Wt Readings from Last 1 Encounters:   12/29/20 112 lb (50.8 kg)     Mental Status:  responds to voice    IV Access:  - None    Nursing Mobility/ADLs:  Walking   Dependent  Transfer  Dependent  Bathing  Dependent  Dressing  Dependent  Toileting Dependent  Feeding  Dependent  Med Admin  Dependent  Med Delivery   crushed and given through G tube    Wound Care Documentation and Therapy:  Incision 08/18/18 Abdomen (Active)   Number of days: 766        Elimination:  Continence:   · Bowel: No  · Bladder: No  Urinary Catheter: None   Colostomy/Ileostomy/Ileal Conduit: No       Date of Last BM:   No intake or output data in the 24 hours ending 12/30/20 1052  No intake/output data recorded. Safety Concerns: At Risk for Falls, History of Seizures and Aspiration Risk    Impairments/Disabilities:      Speech and developmental disability    Nutrition Therapy:  Current Nutrition Therapy:   - Tube Feedings:  Standard with fiber - Jevity 1.2 paula. Goal rate 45 ml/hr. Initial rate 20 ml/hr. Increase the initial rate by 25 ml after 4 hours to reach the goal rate. Water flush 100 mL 4x/day. Routes of Feeding: Gastrostomy Tube  Liquids: No Liquids  Daily Fluid Restriction: no  Last Modified Barium Swallow with Video (Video Swallowing Test): not done    Treatments at the Time of Hospital Discharge:   Respiratory Treatments: Pulmicort and albuterol  Oxygen Therapy:  is on oxygen at 2 L/min per nasal cannula. Ventilator:    - BiPAP   IPAP: 18 cmH20, CPAP/EPAP: 10 cmH2O(obstruction noted increased presssure) only when sleeping    Rehab Therapies: Physical Therapy and Occupational Therapy  Weight Bearing Status/Restrictions: No weight bearing restirctions  Other Medical Equipment (for information only, NOT a DME order):     Other Treatments:     Patient's personal belongings (please select all that are sent with patient):  None    RN SIGNATURE:  Electronically signed by Ector Caraballo RN on 12/30/20 at 12:07 PM EST    CASE MANAGEMENT/SOCIAL WORK SECTION    Inpatient Status Date: ***    Readmission Risk Assessment Score:  Readmission Risk              Risk of Unplanned Readmission:        0           Discharging to Facility/ Agency   · Name: [facility]  · Address:[address]  · Phone:[phone] DON  · [facility] [address] (if applicable)   · Name:  · Address:  · Dialysis Schedule:  · Phone:  · Fax:    / signature: Electronically signed by LEONOR Baron LSW on 12/30/20 at 1:12 PM EST    PHYSICIAN SECTION    Prognosis: Guarded    Condition at Discharge: Stable    Rehab Potential (if transferring to Rehab): Guarded    Recommended Labs or Other Treatments After Discharge: Continue current management    Physician Certification: I certify the above information and transfer of Diogenes Da Silva  is necessary for the continuing treatment of the diagnosis listed and that he requires LTAC for greater 30 days.      Update Admission H&P: No change in H&P    PHYSICIAN SIGNATURE:  Electronically signed by Gayle Dugan MD on 12/30/20 at 11:02 AM EST

## 2020-12-30 NOTE — DISCHARGE SUMMARY
[facility]  Office: Chuy Shaikh, DO, Mignon Golden, DO, Chyna Jacinto, DO, Bolivar Lovell Blood, DO, Sander Sterling MD, Aury Lopes MD, Jo Stovall MD, Skyler Mueller MD, Bessy Real MD, Yolette Tony MD, Felipa Andre MD, Callie Cat MD, Mbonu Dusty Babinski, MD, Gonzalo Gibson DO, Caitlyn Wells MD, Kimberly Rose MD, Aliya Graham DO, Zenobia Golden MD,  Ahsan Buck DO, Tung Peterson MD, Radha Wilks MD, Michelle Blanton, [facility], [facility], CNP, Alma Lara, CNP, Christine Malloy, CNS, Gianna Pantoja, CNP, Callum Singh, [facility], Josefina Topete, CNP, Dominique Lopez, CNP, Gino Burgess, CNP, Homar Dorman PA-C, Elizabeth Bolton, [facility], Joel Melgoza, CNP, Toro Calix, CNP, Jenni Hale, CNP, Arnaldo Sung, CNP, Renee Boss, [address]    Discharge Summary     Patient ID: Padmini Schulz  :  1970   MRN: [id number]     ACCOUNT:  [de-identified]   Patient's PCP: Astrid Agudelo MD  Admit Date: 2020   Discharge Date: 2020     Length of Stay: 0  Code Status:  DNR-CC  Admitting Physician: Radha Wilks MD  Discharge Physician: Radha Wilks MD     Active Discharge Diagnoses:     Hospital Problem Lists:  Principal Problem:    Pneumonia of left lower lobe due to infectious organism  Active Problems:    Seizure disorder [facility])    Cerebral palsy ([facility] [address]    Developmental disability    History of 2019 novel coronavirus disease (COVID-19)    Chronic respiratory insufficiency  Resolved Problems:    * No resolved hospital problems. *      Admission Condition:  fair     Discharged Condition: good    Hospital Stay:     Hospital Course: The patient was brought to the hospital from [facility] with report of low oxygen saturation. ER notes reflect that EMS was called to the center twice today to evaluate the patient for low oxygen saturation.  The patient has a history of developmental delay and cerebral palsy, he is unable to offer any information. Notes indicate that the patient was reported to have an oxygen saturation of 80% at the nursing facility, when EMS arrived they appreciated his oxygen saturation to be 100%. He wears 3L supplemental oxygen by nasal cannula regularly and bipap at night. No additional information is available at this time. Chart review indicates he has additional health history that includes spastic quadriplegia and seizure disorder. He was previously diagnosed with COVID-19 11/27/2020 ([facility]). He received decadron and remdesivir.      Of note, the patient was admitted to the hospital 12/28/2020 and discharged earlier today for similar symptomology.      Rapid SARS-CoV-2 negative 12/28/2020.      CXR shows interval increase in left basilar opacities, which may represent atelectasis versus developing consolidation. Patient doing well this time. Patient will be discharged back to his facility on IV Levaquin to be taken for 6 more days. His CODE STATUS is DNR CC.       Significant therapeutic interventions: Levaquin    Significant Diagnostic Studies:   Labs / Micro:  CBC:   Lab Results   Component Value Date    WBC 6.6 12/30/2020    RBC 4.30 12/30/2020    HGB 13.1 12/30/2020    HCT 41.1 12/30/2020    MCV 95.6 12/30/2020    MCH 30.5 12/30/2020    MCHC 31.9 12/30/2020    RDW 13.8 12/30/2020     12/30/2020     BMP:    Lab Results   Component Value Date    GLUCOSE 105 12/30/2020     12/30/2020    K 4.0 12/30/2020     12/30/2020    CO2 30 12/30/2020    ANIONGAP 8 12/30/2020    BUN 6 12/30/2020    CREATININE <0.40 12/30/2020    BUNCRER CANNOT BE CALCULATED 12/30/2020    CALCIUM 9.8 12/30/2020    LABGLOM CANNOT BE CALCULATED 12/30/2020    GFRAA CANNOT BE CALCULATED 12/30/2020    GFR      12/30/2020    GFR NOT REPORTED 12/30/2020     HFP:    Lab Results   Component Value Date    PROT 7.8 12/20/2020     CMP:    Lab Results   Component Value Date    GLUCOSE 105 12/30/2020  12/30/2020    K 4.0 12/30/2020     12/30/2020    CO2 30 12/30/2020    BUN 6 12/30/2020    CREATININE <0.40 12/30/2020    ANIONGAP 8 12/30/2020    ALKPHOS 104 12/20/2020    ALT 18 12/20/2020    AST 16 12/20/2020    BILITOT 0.30 12/20/2020    LABALBU 4.1 12/20/2020    ALBUMIN NOT REPORTED 12/20/2020    LABGLOM CANNOT BE CALCULATED 12/30/2020    GFRAA CANNOT BE CALCULATED 12/30/2020    GFR      12/30/2020    GFR NOT REPORTED 12/30/2020    PROT 7.8 12/20/2020    CALCIUM 9.8 12/30/2020     PT/INR:    Lab Results   Component Value Date    PROTIME 14.1 12/30/2020    INR 1.1 12/30/2020     PTT:   Lab Results   Component Value Date    APTT 30.8 03/10/2019     FLP:    Lab Results   Component Value Date    TRIG 61 03/13/2019     U/A:    Lab Results   Component Value Date    COLORU YELLOW 06/06/2020    TURBIDITY SLIGHTLY CLOUDY 06/06/2020    SPECGRAV 1.006 06/06/2020    HGBUR NEGATIVE 06/06/2020    PHUR 7.5 06/06/2020    PROTEINU NEGATIVE 06/06/2020    GLUCOSEU NEGATIVE 06/06/2020    KETUA NEGATIVE 06/06/2020    BILIRUBINUR NEGATIVE 06/06/2020    UROBILINOGEN Normal 06/06/2020    NITRU NEGATIVE 06/06/2020    LEUKOCYTESUR LARGE 06/06/2020     TSH:  No results found for: TSH     Radiology:  Xr Chest Portable    Result Date: 12/29/2020  Interval increase in left basilar opacities, which may represent atelectasis versus developing consolidation. Xr Chest Portable    Result Date: 12/28/2020  No acute cardiopulmonary process       Consultations:    Consults:     Final Specialist Recommendations/Findings:   IP CONSULT TO INTERNAL MEDICINE  IP CONSULT TO DIETITIAN  IP CONSULT TO DIETITIAN      The patient was seen and examined on day of discharge and this discharge summary is in conjunction with any daily progress note from day of discharge. Discharge plan:     Disposition: Home    Physician Follow Up:     No follow-up provider specified.      Requiring Further Evaluation/Follow Up POST HOSPITALIZATION/Incidental Findings: Continue Levaquin    Diet: As tolerated    Activity: As tolerated    Instructions to Patient: Not applicable    Discharge Medications:      Medication List      START taking these medications    levoFLOXacin 750 MG/150ML Soln  Commonly known as: LEVAQUIN  Infuse 150 mLs intravenously every 24 hours for 6 days  Start taking on: December 31, 2020        CONTINUE taking these medications    acetaminophen 325 MG tablet  Commonly known as: TYLENOL     albuterol (2.5 MG/3ML) 0.083% nebulizer solution  Commonly known as: PROVENTIL     baclofen 10 MG tablet  Commonly known as: LIORESAL     bisacodyl 10 MG suppository  Commonly known as: DULCOLAX     budesonide 0.5 MG/2ML nebulizer suspension  Commonly known as: PULMICORT     calcium carbonate 500 MG chewable tablet  Commonly known as: TUMS     carbamide peroxide 6.5 % otic solution  Commonly known as: DEBROX     chlorhexidine 0.12 % solution  Commonly known as: PERIDEX     clonazePAM 2 MG disintegrating tablet  Commonly known as: KLONOPIN     DAILY-JAY PO     Desitin 40 % Pste paste  Generic drug: zinc oxide     Dextromethorphan-guaiFENesin  MG/5ML Syrp     fluticasone 50 MCG/ACT nasal spray  Commonly known as: FLONASE     furosemide 20 MG tablet  Commonly known as: LASIX     glycopyrrolate 1 MG tablet  Commonly known as: ROBINUL     ipratropium-albuterol 0.5-2.5 (3) MG/3ML Soln nebulizer solution  Commonly known as: DUONEB     ketoconazole 2 % shampoo  Commonly known as: NIZORAL     lacosamide 10 MG/ML Soln oral solution  Commonly known as: VIMPAT     lactulose 10 GM/15ML solution  Commonly known as: CHRONULAC     * lamoTRIgine 150 MG tablet  Commonly known as: LAMICTAL  2 tablets by Per G Tube route every morning Indications: 300mg in AM and 200mg in PM     * lamoTRIgine 200 MG tablet  Commonly known as: LAMICTAL  1 tablet by Per G Tube route nightly Indications: 300MG IN am AND 200MG IN pm     levETIRAcetam 100 MG/ML solution  Commonly known as: KEPPRA  15 mLs by PEG Tube route 2 times daily     loratadine 10 MG tablet  Commonly known as: CLARITIN     melatonin 3 MG Tabs tablet     montelukast 10 MG tablet  Commonly known as: SINGULAIR     neomycin-bacitracin-polymyxin 400-5-5000 ointment  Commonly known as: NEOSPORIN     nystatin 920348 UNIT/GM powder  Commonly known as: MYCOSTATIN     ondansetron 4 MG disintegrating tablet  Commonly known as: ZOFRAN-ODT  Take 1 tablet by mouth every 6 hours as needed for Nausea     ondansetron 4 MG/5ML solution  Commonly known as: Zofran  5 mLs by Per G Tube route every 8 hours as needed for Nausea or Vomiting     polyethylene glycol 17 GM/SCOOP powder  Commonly known as: MiraLax  17 g by Per G Tube route daily as needed (constipation)     potassium chloride 10 MEQ extended release tablet  Commonly known as: KLOR-CON M     pseudoephedrine 30 MG tablet  Commonly known as: SUDAFED     raNITIdine 15 MG/ML syrup  Commonly known as: ZANTAC     Replete/Fiber Liqd     vitamin D3 25 MCG (1000 UT) Tabs tablet  Commonly known as: CHOLECALCIFEROL         * This list has 2 medication(s) that are the same as other medications prescribed for you. Read the directions carefully, and ask your doctor or other care provider to review them with you. ASK your doctor about these medications    senna-docusate 8.6-50 MG per tablet  Commonly known as: Michelle Ket           Where to Get Your Medications      [facility] [address] [place] [address] [phone] Poncho Gracia [phone]  [address] [place] [id number]    Phone: [phone]   · levoFLOXacin 750 MG/150ML Soln         No discharge procedures on file. Time Spent on discharge is  40 mins in patient examination, evaluation, counseling as well as medication reconciliation, prescriptions for required medications, discharge plan and follow up.     Electronically signed by   Anh Galeana MD  12/30/2020  11:09 AM      Thank you Dr. Jacqueline hCang MD for the opportunity to be involved in this patient's care.

## 2020-12-30 NOTE — CARE COORDINATION
Social work: DON was helpful in relocating pt from group home to main building of [facility] to get through the holiday and then see what care needs long term would be needed. [facility] Accepted the plan. .  [facility]

## 2020-12-30 NOTE — CARE COORDINATION
Patient is being discharged to [facility] 24 hour [facility]. D/C at 1400. [facility] to transport. Ann Delgado, is working on discharge.

## 2020-12-30 NOTE — H&P
[facility]  Office: [address], DO, Nash Souza, DO, Roxann Triana, DO, Yumiko Man, DO, Edwin Robertson MD, Artem Guillen MD, Judith Wilkins MD, Jayme Mckeon MD, Aly Murillo MD, Nestor Quezada MD, Ann Garcia MD, Vanessa Florian MD, Nisa Thomas MD, Jenifer Angulo DO, Jason Santana MD, Chepe Cooper MD, Maurizio Nice DO, Marcia Alvarez MD,  Aliya Bedolla DO, Tito Rhodes MD, Vibha Winkler MD, Kenn Horowitz, [facility], [address], [facility], Edward Villalobos, CNP, Lina Lo, CNS, Tobin Miller, CNP, Rosa Moran, CNP, Savage Velasco, CNP, Irlanda Pittman, [facility], Heidi Ramirez, CNP, Carolann Jett PA-C, Ovidio Fernandes, [facility], Amy Anderson, CNP, Joana Jordan, CNP, Som Coker, CNP, Sierra Granados, CNP, David James, [facility]    HISTORY AND PHYSICAL EXAMINATION            Date:   12/29/2020  Patient name:  Jonas Duarte  Date of admission:  12/29/2020  9:19 PM  MRN:   [id number]  Account:  [de-identified]  YOB: 1970  PCP:    Jonathan Denny MD  Room:   [place] [address]  Code Status:    Prior    Chief Complaint:     Chief Complaint   Patient presents with    Shortness of Breath     History Obtained From:     Nursing staff, electronic medical record. History of Present Illness: Jonas Duarte is a 45 y.o. Non-/non  male who presents with Shortness of Breath   and is admitted to the hospital for the management of Chronic respiratory insufficiency. The patient was brought to the hospital from [facility] with report of low oxygen saturation. ER notes reflect that EMS was called to the center twice today to evaluate the patient for low oxygen saturation. The patient has a history of developmental delay and cerebral palsy, he is unable to offer any information.  Notes indicate that the patient was reported to have an oxygen saturation of 80% at the nursing facility, when EMS arrived they appreciated his oxygen saturation to be 100%. He wears 3L supplemental oxygen by nasal cannula regularly and bipap at night. No additional information is available at this time. Chart review indicates he has additional health history that includes spastic quadriplegia and seizure disorder. He was previously diagnosed with COVID-19 11/27/2020 ([facility]). He received decadron and remdesivir. Of note, the patient was admitted to the hospital 12/28/2020 and discharged earlier today for similar symptomology. Rapid SARS-CoV-2 negative 12/28/2020. CXR shows interval increase in left basilar opacities, which may represent atelectasis versus developing consolidation.        Past Medical History:     Past Medical History:   Diagnosis Date    Abdominal distension     Acute exacerbation of chronic obstructive pulmonary disease (COPD) ([facility])     Acute respiratory failure with hypoxia ([facility]) 3/11/2019    Cerebral palsied ([address]     Cerebral palsy ([facility])     Choledocholithiasis 8/14/2018    Cholelithiasis with chronic cholecystitis     Constipation     Delayed gastric emptying     Dysphagia     GERD (gastroesophageal reflux disease)     Hearing loss     Hypoxia     Ileus ([address] 8/18/2018    Mental disability     MRDD    Multifocal pneumonia 11/15/2017    Obstipation 8/14/2018    Pneumonia of left lower lobe due to infectious organism     Positive blood cultures     Profound mental retardation     Scoliosis     Seizure disorder ([address]     Seizures ([address]     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[address]         Past Surgical History:     Past Surgical History:   Procedure Laterality Date    CHOLECYSTECTOMY  08/18/2018    laparoscopic    ERCP  08/16/2018    GASTROSTOMY TUBE PLACEMENT      SC ERCP DX COLLECTION SPECIMEN BRUSHING/WASHING N/A 8/16/2018    ERCP PAPILOTOMY SWEEPING STONE EXTRACTION ENDOSCOPIC RETROGRADE CHOLANGIOPANCREATOGRAPHY performed by Roya Zavala MD at [address]  LAP,CHOLECYSTECTOMY N/A 8/18/2018    CHOLECYSTECTOMY LAPAROSCOPIC WITH UMBILICAL HERNIA REPAIR performed by Brandy Hamilton DO at [address]      has Feeding tube        Medications Prior to Admission:     Prior to Admission medications    Medication Sig Start Date End Date Taking? Authorizing Provider   polyethylene glycol (MIRALAX) 17 GM/SCOOP powder 17 g by Per G Tube route daily as needed (constipation) 12/20/20 12/30/20 Yes Toyin Alfonso, APRN - JOSELYN   ondansetron [facility]) 4 MG/5ML solution 5 mLs by Per G Tube route every 8 hours as needed for Nausea or Vomiting 12/20/20  Yes Toyin Alfonso, APRN - CNP   chlorhexidine (PERIDEX) 0.12 % solution Take 5 mLs by mouth 3 times daily To gums   Yes Historical Provider, MD   furosemide (LASIX) 20 MG tablet 20 mg by Per G Tube route daily   Yes Historical Provider, MD   ipratropium-albuterol (DUONEB) 0.5-2.5 (3) MG/3ML SOLN nebulizer solution Inhale 1 vial into the lungs 4 times daily   Yes Historical Provider, MD   loratadine (CLARITIN) 10 MG tablet 10 mg by Per G Tube route daily   Yes Historical Provider, MD   melatonin 3 MG TABS tablet Take 3 mg by mouth nightly   Yes Historical Provider, MD   ranitidine (ZANTAC) 15 MG/ML syrup 150 mg by Per G Tube route 2 times daily   Yes Historical Provider, MD   lacosamide (VIMPAT) 10 MG/ML SOLN oral solution 150 mg by Per G Tube route 2 times daily. Yes Historical Provider, MD   acetaminophen (TYLENOL) 325 MG tablet 650 mg by Per G Tube route every 4-6 hours as needed for Pain or Fever   Yes Historical Provider, MD   albuterol (PROVENTIL) (2.5 MG/3ML) 0.083% nebulizer solution Take 2.5 mg by nebulization every 4 hours as needed for Shortness of Breath   Yes Historical Provider, MD   bisacodyl (DULCOLAX) 10 MG suppository Place 10 mg rectally daily as needed for Constipation   Yes Historical Provider, MD   clonazePAM (KLONOPIN) 2 MG disintegrating tablet 2 mg as needed (seizures 3 or greater per hour.  May repeat x 1 within 1 hour once daily. ). Yes Historical Provider, MD   Dextromethorphan-guaiFENesin  MG/5ML SYRP 10 mLs by Per G Tube route every 4 hours as needed (congestion)   Yes Historical Provider, MD   potassium chloride (KLOR-CON M) 10 MEQ extended release tablet 10 mEq 2 times daily Per G tube   Yes Historical Provider, MD   lamoTRIgine (LAMICTAL) 150 MG tablet 2 tablets by Per G Tube route every morning Indications: 300mg in AM and 200mg in PM 4/16/19  Yes Venida Manner, MD   levETIRAcetam (KEPPRA) 100 MG/ML solution 15 mLs by PEG Tube route 2 times daily 4/16/19  Yes Gisell Velasquez, MD   lamoTRIgine (LAMICTAL) 200 MG tablet 1 tablet by Per G Tube route nightly Indications: 300MG IN am AND 200MG IN pm 4/16/19  Yes Gisell Manner, MD   ondansetron (ZOFRAN-ODT) 4 MG disintegrating tablet Take 1 tablet by mouth every 6 hours as needed for Nausea 4/9/19  Yes Karmen Alfaro,    carbamide peroxide (DEBROX) 6.5 % otic solution 5 drops See Admin Instructions 5 drops BID on the 1st and 15th of the month   Yes Historical Provider, MD   ketoconazole (NIZORAL) 2 % shampoo Apply topically Twice a Week Wednesdays and Saturdays   Yes Historical Provider, MD   zinc oxide (DESITIN) 40 % PSTE paste Apply topically as needed (irritation)   Yes Historical Provider, MD   neomycin-bacitracin-polymyxin (NEOSPORIN) 400-5-5000 ointment Apply topically as needed (prn per [facility] plan of treatment)   Yes Historical Provider, MD   budesonide (PULMICORT) 0.5 MG/2ML nebulizer suspension Take 1 ampule by nebulization 2 times daily   Yes Historical Provider, MD   lactulose (CHRONULAC) 10 GM/15ML solution 20 g by Per G Tube route 2 times daily   Yes Historical Provider, MD   glycopyrrolate (ROBINUL) 1 MG tablet 1 mg by Per G Tube route daily    Yes Historical Provider, MD   nystatin (MYCOSTATIN) 901066 UNIT/GM powder Apply topically daily Apply between toes.    Yes Historical Provider, MD   baclofen (LIORESAL) 10 MG tablet 10 mg by Per G Tube route 3 times daily   Yes Historical Provider, MD   montelukast (SINGULAIR) 10 MG tablet 10 mg by Per G Tube route nightly   Yes Historical Provider, MD   fluticasone (FLONASE) 50 MCG/ACT nasal spray 2 sprays by Nasal route daily   Yes Historical Provider, MD   Multiple Vitamin (DAILY-JAY PO) 1 tablet by Per G Tube route daily    Yes Historical Provider, MD   calcium carbonate (TUMS) 500 MG chewable tablet 1,000 mg by Per G Tube route 2 times daily 2 tabs (=1000mg) BID   Yes Historical Provider, MD   pseudoephedrine (SUDAFED) 30 MG tablet 30 mg by Per G Tube route every 8 hours as needed for Congestion    Yes Historical Provider, MD   senna-docusate ([place]) 8.6-50 MG per tablet 2 tablets by Per G Tube route daily   Yes Historical Provider, MD   Cholecalciferol (VITAMIN D3) 1000 units TABS 1,000 Units by Per G Tube route 2 times daily    Yes Historical Provider, MD   Nutritional Supplements (REPLETE/FIBER) LIQD 900 mLs by Feeding Tube route daily    Yes Historical Provider, MD        Allergies:     Ampicillin, Valium [diazepam], and Other    Social History:     Tobacco:    reports that he has never smoked. He has never used smokeless tobacco.  Alcohol:      reports no history of alcohol use. Drug Use:  reports no history of drug use. Family History:     Family History   Family history unknown: Yes     Review of Systems:     Positive and Negative as described in HPI. Review of Systems   Unable to perform ROS: Patient nonverbal (Developmental delay, patient nonverbal. )     Physical Exam:   /80   Pulse 69   Temp 97.8 °F (36.6 °C)   Resp 20   Ht 4' 3\" (1.295 m)   Wt 112 lb (50.8 kg)   SpO2 99%   BMI 30.27 kg/m²   Temp (24hrs), Av.5 °F (36.4 °C), Min:97.3 °F (36.3 °C), Max:97.8 °F (36.6 °C)    No results for input(s): POCGLU in the last 72 hours. No intake or output data in the 24 hours ending 20 3502    Physical Exam  Vitals signs and nursing note reviewed. Constitutional:       General: He is not in acute distress. Appearance: He is not diaphoretic. HENT:      Head: Normocephalic and atraumatic. Right Ear: Hearing normal.      Left Ear: Hearing normal.      Nose: Nose normal. No rhinorrhea. Eyes:      General: Lids are normal.      Conjunctiva/sclera: Conjunctivae normal.      Right eye: Right conjunctiva is not injected. Left eye: Left conjunctiva is not injected. Pupils: Pupils are equal, round, and reactive to light. Pupils are equal.      Right eye: Pupil is reactive. Left eye: Pupil is reactive. Neck:      Musculoskeletal: Neck supple. Thyroid: No thyromegaly. Trachea: Trachea normal. No tracheal deviation. Cardiovascular:      Rate and Rhythm: Normal rate and regular rhythm. Pulses: Normal pulses. Heart sounds: Normal heart sounds. No murmur. Pulmonary:      Effort: Accessory muscle usage present. Breath sounds: No stridor. Examination of the right-middle field reveals rhonchi. Examination of the left-middle field reveals rhonchi. Examination of the right-lower field reveals rhonchi. Examination of the left-lower field reveals rhonchi. Rhonchi present. No decreased breath sounds. Abdominal:      General: Bowel sounds are normal. There is no distension. Palpations: Abdomen is soft. There is no mass. Tenderness: There is no abdominal tenderness. There is no guarding. Comments: G tube left abdomen   Musculoskeletal:         General: No tenderness. Skin:     General: Skin is warm and dry. Findings: No erythema, lesion or rash. Neurological:      Mental Status: He is alert. He is not disoriented.       Comments: Unable to fully assess, patient nonverbal.    Psychiatric:      Comments: Unable to assess, patient nonverbal        Investigations:      Laboratory Testing:  Recent Results (from the past 24 hour(s))   Basic Metabolic Panel w/ Reflex to MG    Collection Time: 12/29/20  6:21 AM   Result Value Ref Range    Glucose 78 70 - 99 mg/dL    BUN 13 6 - 20 mg/dL    CREATININE <0.40 (L) 0.70 - 1.20 mg/dL    Bun/Cre Ratio CANNOT BE CALCULATED 9 - 20    Calcium 8.3 (L) 8.6 - 10.4 mg/dL    Sodium 141 135 - 144 mmol/L    Potassium 3.9 3.7 - 5.3 mmol/L    Chloride 108 (H) 98 - 107 mmol/L    CO2 25 20 - 31 mmol/L    Anion Gap 8 (L) 9 - 17 mmol/L    GFR Non- CANNOT BE CALCULATED >60 mL/min    GFR  CANNOT BE CALCULATED >60 mL/min    GFR Comment          GFR Staging NOT REPORTED    SPECIMEN REJECTION    Collection Time: 12/29/20  6:21 AM   Result Value Ref Range    Specimen Source . BLOOD     Ordered Test CBC     Reason for Rejection       Unable to perform testing: Results suspect due to history of previous lab results.    - NOT REPORTED    CBC    Collection Time: 12/29/20  7:54 AM   Result Value Ref Range    WBC 10.2 3.5 - 11.3 k/uL    RBC 4.05 (L) 4.21 - 5.77 m/uL    Hemoglobin 12.5 (L) 13.0 - 17.0 g/dL    Hematocrit 40.2 (L) 40.7 - 50.3 %    MCV 99.3 82.6 - 102.9 fL    MCH 30.9 25.2 - 33.5 pg    MCHC 31.1 28.4 - 34.8 g/dL    RDW 14.2 11.8 - 14.4 %    Platelets 352 338 - 448 k/uL    MPV 9.7 8.1 - 13.5 fL    NRBC Automated 0.0 0.0 per 100 WBC   CBC with DIFF    Collection Time: 12/29/20  9:58 PM   Result Value Ref Range    WBC 8.3 3.5 - 11.3 k/uL    RBC 4.41 4.21 - 5.77 m/uL    Hemoglobin 13.6 13.0 - 17.0 g/dL    Hematocrit 42.2 40.7 - 50.3 %    MCV 95.7 82.6 - 102.9 fL    MCH 30.8 25.2 - 33.5 pg    MCHC 32.2 28.4 - 34.8 g/dL    RDW 13.8 11.8 - 14.4 %    Platelets 803 880 - 621 k/uL    MPV 9.3 8.1 - 13.5 fL    NRBC Automated 0.0 0.0 per 100 WBC    Differential Type NOT REPORTED     WBC Morphology NOT REPORTED     RBC Morphology NOT REPORTED     Platelet Estimate NOT REPORTED     Seg Neutrophils 66 (H) 36 - 65 %    Lymphocytes 24 24 - 43 %    Monocytes 7 3 - 12 %    Eosinophils % 2 1 - 4 %    Basophils 1 0 - 2 %    Immature Granulocytes 1 (H) 0 %    Segs Absolute 5.47 1.50 - 8.10 k/uL    Absolute Lymph # 2.01 1.10 - 3.70 k/uL    Absolute Mono # 0.56 0.10 - 1.20 k/uL    Absolute Eos # 0.18 0.00 - 0.44 k/uL    Basophils Absolute 0.05 0.00 - 0.20 k/uL    Absolute Immature Granulocyte 0.07 0.00 - 0.30 k/uL   Basic Metabolic Prof    Collection Time: 12/29/20  9:58 PM   Result Value Ref Range    Glucose 99 70 - 99 mg/dL    BUN 9 6 - 20 mg/dL    CREATININE <0.40 (L) 0.70 - 1.20 mg/dL    Bun/Cre Ratio CANNOT BE CALCULATED 9 - 20    Calcium 9.2 8.6 - 10.4 mg/dL    Sodium 141 135 - 144 mmol/L    Potassium 3.6 (L) 3.7 - 5.3 mmol/L    Chloride 103 98 - 107 mmol/L    CO2 27 20 - 31 mmol/L    Anion Gap 11 9 - 17 mmol/L    GFR Non- CANNOT BE CALCULATED >60 mL/min    GFR  CANNOT BE CALCULATED >60 mL/min    GFR Comment          GFR Staging NOT REPORTED        Imaging/Diagnostics:  Xr Chest Portable    Result Date: 12/29/2020  Interval increase in left basilar opacities, which may represent atelectasis versus developing consolidation. Xr Chest Portable    Result Date: 12/28/2020  No acute cardiopulmonary process     Assessment :      Hospital Problems           Last Modified POA    * (Principal) Pneumonia of left lower lobe due to infectious organism 12/30/2020 Yes    Seizure disorder ([facility] 12/29/2020 Yes    Cerebral palsy ([facility] 12/29/2020 Yes    Developmental disability 12/29/2020 Yes    History of 2019 novel coronavirus disease (COVID-19) 12/29/2020 Yes    Chronic respiratory insufficiency 12/30/2020 Yes          Plan:     Patient status inpatient in the  Med/Surge unit. 1. Chronic respiratory insufficiency supplemental oxygen and bipap as needed. 2. Nebulizer treatments. 3. Pneumonia- IV levaquin, hx of aspiration pneumonia, consolidation on CXR. Check procalcitonin, if negative consider discontinuing antibiotic therapy. 4. Seizure disorder- continue lamictal, keppra, vimpat. 5. Continue selected home medications.   6. Consult dietician- tube feed recommendations and management. 7. DVT prophylaxis. 8. Monitor vital signs. 9. Follow chemistries. 10. Telemetry. 11. PT/OT as appropriate. 12. DNR-CC. Plan of care discussed with [facility] ER RN. Consultations:   IP CONSULT TO INTERNAL MEDICINE    Patient is admitted as inpatient status because of co-morbidities listed above, severity of signs and symptoms as outlined, requirement for current medical therapies and most importantly because of direct risk to patient if care not provided in a hospital setting. Expected length of stay > 48 hours.     KARIME Wright CNP  12/29/2020  10:52 PM    Copy sent to Dr. Andriy Albright MD

## 2020-12-30 NOTE — ED PROVIDER NOTES
[facility] ED  eMERGENCY dEPARTMENT eNCOUnter      Pt Name: Ahmet Love  MRN: [id number]  [place] 1970  Date of evaluation: 12/29/2020  Provider: KARIME Myers CNP    CHIEF COMPLAINT       Chief Complaint   Patient presents with    Shortness of Breath         HISTORY OF PRESENT ILLNESS  (Location/Symptom, Timing/Onset, Context/Setting, Quality, Duration, Modifying Factors, Severity.)   Ahmet Love is a 45 y.o. male who presents to the emergency department via EMS for hypoxia concerns. His facility reported his oxygen saturation dropped to 80%. He wears NC 3 LC at home. He also wears BIPAP at night. Per EMS his facility did not apply the BIPAP because he does not wear that until 2200. EMS stated the patient's oxygen saturation was 100% upon their arrival. The patient is nonverbal; he has a history of MRDD. He was admitted last night for the same concerns. He has DNR-CC paperwork. Nursing Notes were reviewed.     ALLERGIES     Ampicillin, Valium [diazepam], and Other    CURRENT MEDICATIONS       Previous Medications    ACETAMINOPHEN (TYLENOL) 325 MG TABLET    650 mg by Per G Tube route every 4-6 hours as needed for Pain or Fever    ALBUTEROL (PROVENTIL) (2.5 MG/3ML) 0.083% NEBULIZER SOLUTION    Take 2.5 mg by nebulization every 4 hours as needed for Shortness of Breath    BACLOFEN (LIORESAL) 10 MG TABLET    10 mg by Per G Tube route 3 times daily    BISACODYL (DULCOLAX) 10 MG SUPPOSITORY    Place 10 mg rectally daily as needed for Constipation    BUDESONIDE (PULMICORT) 0.5 MG/2ML NEBULIZER SUSPENSION    Take 1 ampule by nebulization 2 times daily    CALCIUM CARBONATE (TUMS) 500 MG CHEWABLE TABLET    1,000 mg by Per G Tube route 2 times daily 2 tabs (=1000mg) BID    CARBAMIDE PEROXIDE (DEBROX) 6.5 % OTIC SOLUTION    5 drops See Admin Instructions 5 drops BID on the 1st and 15th of the month    CHLORHEXIDINE (PERIDEX) 0.12 % SOLUTION    Take 5 mLs by mouth 3 times daily To gums    CHOLECALCIFEROL (VITAMIN D3) 1000 UNITS TABS    1,000 Units by Per G Tube route 2 times daily     CLONAZEPAM (KLONOPIN) 2 MG DISINTEGRATING TABLET    2 mg as needed (seizures 3 or greater per hour. May repeat x 1 within 1 hour once daily.). DEXTROMETHORPHAN-GUAIFENESIN  MG/5ML SYRP    10 mLs by Per G Tube route every 4 hours as needed (congestion)    FLUTICASONE (FLONASE) 50 MCG/ACT NASAL SPRAY    2 sprays by Nasal route daily    FUROSEMIDE (LASIX) 20 MG TABLET    20 mg by Per G Tube route daily    GLYCOPYRROLATE (ROBINUL) 1 MG TABLET    1 mg by Per G Tube route daily     IPRATROPIUM-ALBUTEROL (DUONEB) 0.5-2.5 (3) MG/3ML SOLN NEBULIZER SOLUTION    Inhale 1 vial into the lungs 4 times daily    KETOCONAZOLE (NIZORAL) 2 % SHAMPOO    Apply topically Twice a Week Wednesdays and Saturdays    LACOSAMIDE (VIMPAT) 10 MG/ML SOLN ORAL SOLUTION    150 mg by Per G Tube route 2 times daily. LACTULOSE (CHRONULAC) 10 GM/15ML SOLUTION    20 g by Per G Tube route 2 times daily    LAMOTRIGINE (LAMICTAL) 150 MG TABLET    2 tablets by Per G Tube route every morning Indications: 300mg in AM and 200mg in PM    LAMOTRIGINE (LAMICTAL) 200 MG TABLET    1 tablet by Per G Tube route nightly Indications: 300MG IN am AND 200MG IN pm    LEVETIRACETAM (KEPPRA) 100 MG/ML SOLUTION    15 mLs by PEG Tube route 2 times daily    LORATADINE (CLARITIN) 10 MG TABLET    10 mg by Per G Tube route daily    MELATONIN 3 MG TABS TABLET    Take 3 mg by mouth nightly    MONTELUKAST (SINGULAIR) 10 MG TABLET    10 mg by Per G Tube route nightly    MULTIPLE VITAMIN (DAILY-JAY PO)    1 tablet by Per G Tube route daily     NEOMYCIN-BACITRACIN-POLYMYXIN (NEOSPORIN) 400-5-5000 OINTMENT    Apply topically as needed (prn per SNF plan of treatment)    NUTRITIONAL SUPPLEMENTS (REPLETE/FIBER) LIQD    900 mLs by Feeding Tube route daily     NYSTATIN (MYCOSTATIN) 863694 UNIT/GM POWDER    Apply topically daily Apply between toes.     ONDANSETRON (ZOFRAN) 4 MG/5ML SOLUTION    5 mLs by Per G Tube route every 8 hours as needed for Nausea or Vomiting    ONDANSETRON (ZOFRAN-ODT) 4 MG DISINTEGRATING TABLET    Take 1 tablet by mouth every 6 hours as needed for Nausea    POLYETHYLENE GLYCOL (MIRALAX) 17 GM/SCOOP POWDER    17 g by Per G Tube route daily as needed (constipation)    POTASSIUM CHLORIDE (KLOR-CON M) 10 MEQ EXTENDED RELEASE TABLET    10 mEq 2 times daily Per G tube    PSEUDOEPHEDRINE (SUDAFED) 30 MG TABLET    30 mg by Per G Tube route every 8 hours as needed for Congestion     RANITIDINE (ZANTAC) 15 MG/ML SYRUP    150 mg by Per G Tube route 2 times daily    SENNA-DOCUSATE (PERICOLACE) 8.6-50 MG PER TABLET    2 tablets by Per G Tube route daily    ZINC OXIDE (DESITIN) 40 % PSTE PASTE    Apply topically as needed (irritation)       PAST MEDICAL HISTORY         Diagnosis Date    Abdominal distension     Acute exacerbation of chronic obstructive pulmonary disease (COPD) ([facility])     Acute respiratory failure with hypoxia ([facility]) 3/11/2019    Cerebral palsied ([address]     Cerebral palsy ([address]     Choledocholithiasis 8/14/2018    Cholelithiasis with chronic cholecystitis     Constipation     Delayed gastric emptying     Dysphagia     GERD (gastroesophageal reflux disease)     Hearing loss     Hypoxia     Ileus ([address] 8/18/2018    Mental disability     MRDD    Multifocal pneumonia 11/15/2017    Obstipation 8/14/2018    Pneumonia of left lower lobe due to infectious organism     Positive blood cultures     Profound mental retardation     Scoliosis     Seizure disorder (HCC)     Seizures ([facility])     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[address]        SURGICAL HISTORY           Procedure Laterality Date    CHOLECYSTECTOMY  08/18/2018    laparoscopic    ERCP  08/16/2018    GASTROSTOMY TUBE PLACEMENT      MO ERCP DX COLLECTION SPECIMEN BRUSHING/WASHING N/A 8/16/2018    ERCP PAPILOTOMY SWEEPING STONE EXTRACTION ENDOSCOPIC RETROGRADE CHOLANGIOPANCREATOGRAPHY performed by Balaji Chand LABS:  Labs Reviewed   CBC WITH AUTO DIFFERENTIAL   BASIC METABOLIC PANEL       All other labs were within normal range or not returned as of this dictation. EMERGENCY DEPARTMENT COURSE and DIFFERENTIAL DIAGNOSIS/MDM:   Vitals:    Vitals:    12/29/20 2121 12/29/20 2127   BP:  137/80   Pulse: 74    Resp: 24    Temp:  97.8 °F (36.6 °C)   SpO2: 100%    Weight: 112 lb (50.8 kg)    Height: 4' 3\" (1.295 m)        CLINICAL DECISION MAKING:  The patient presented alert with a nontoxic appearance and was seen in conjunction with Dr. Scar Marte. There are concerns that the facility he resides at cannot take care of him properly. EMS reported they were called to the facility twice today. He was discharged from the hospital early this afternoon. I spoke with Graeme from [facility]. CONSULTS:  IP CONSULT TO INTERNAL MEDICINE      FINAL IMPRESSION      1. Chronic respiratory insufficiency    2. Living accommodation issues    3.  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    Choledocholithiasis K80.50    Obstipation K59.00    Generalized abdominal pain R10.84    Constipation K59.00    Developmental disability F89    Ileus ([address] K56.7    Influenza with respiratory manifestation other than pneumonia J11.1    Hypoxia R09.02    Abdominal distension R14.0    Dyspnea and respiratory abnormalities R06.00, R06.89    Acute respiratory failure with hypoxia (HCC) J96.01    Acute and chronic respiratory failure with hypoxia (HCC) J96.21    Acute respiratory failure (HCC) J96.00    Obesity, Class II, BMI 35-39.9 E66.9    Malfunction of percutaneous endoscopic gastrostomy (PEG) tube ([facility]) K94.23    Breakthrough seizure (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 M00.9    Closed fracture of distal end of right femur ([facility] S72.401A    Gram-positive bacteremia R78.81    Hypernatremia E87.0    Normocytic anemia D64.9    Osteoporosis M81.0    Protein-calorie malnutrition ([address] E46    Respiratory failure ([facility]) J96.90    Influenza J11.1    Seizure ([facility] R56.9    S/P percutaneous endoscopic gastrostomy (PEG) tube placement ([facility] Z93.1    History of 2019 novel coronavirus disease (COVID-19) Z86.19         DISPOSITION/PLAN   DISPOSITION Decision To Admit 12/29/2020 09:25:16 PM      PATIENT REFERRED TO:   No follow-up provider specified.     DISCHARGE MEDICATIONS:     New Prescriptions    No medications on file           (Please note that portions of this note were completed with a voice recognition program.  Efforts were made to edit the dictations but occasionally words are mis-transcribed.)    KARIME Mckeon CNP, APRN - CNP  12/29/20 1117

## 2020-12-30 NOTE — PLAN OF CARE
Nutrition Problem #1: Swallowing difficulty  Intervention: Food and/or Nutrient Delivery: Continue Current Diet, Start Tube Feeding  Nutritional Goals: Oral intakes and enteral nutrition provide greater than 75% of estimated nutrition needs

## 2020-12-30 NOTE — ED NOTES
Presents via EMS from "[facility] with c/o SOB. Pt was d/c from here earlier today. Per staff at the facility, pts O2 level was low. EMS states they were called out twice today and upon arrival pts SpO2 was 100%. History of MRDD, pt is nonverbal. SpO2 100% on 3L NC upon arrival. Per EMS pt wears CPAP at night.       Hailey Rubio RN  12/29/20 [id number]

## 2020-12-31 LAB
CULTURE: NORMAL
DIRECT EXAM: NORMAL
Lab: NORMAL
SPECIMEN DESCRIPTION: NORMAL

## 2021-09-09 NOTE — ED NOTES
Pt placed in Hospital bed; cleaned and brief changed.      Scot Moreno RN  08/14/18 3575
RN to administer enema; yellow, thick stool noted in brief; cleaned up.      Elyssa Hanley RN  08/14/18 8563
Family/Caregiver participated in identification of needs/problems/goals for treatment
Family/Caregiver participated in identification of needs/problems/goals for treatment/Family/Caregiver participated in defining interventions/Family/Caregiver participated in development of after care plan
Family/Caregiver participated in identification of needs/problems/goals for treatment/Family/Caregiver participated in development of after care plan

## 2023-04-05 NOTE — PROGRESS NOTES
Vannessa Childress informed that patient is being picked up at [phone] Prednisone Counseling:  I discussed with the patient the risks of prolonged use of prednisone including but not limited to weight gain, insomnia, osteoporosis, mood changes, diabetes, susceptibility to infection, glaucoma and high blood pressure.  In cases where prednisone use is prolonged, patients should be monitored with blood pressure checks, serum glucose levels and an eye exam.  Additionally, the patient may need to be placed on GI prophylaxis, PCP prophylaxis, and calcium and vitamin D supplementation and/or a bisphosphonate.  The patient verbalized understanding of the proper use and the possible adverse effects of prednisone.  All of the patient's questions and concerns were addressed.

## 2024-07-30 NOTE — ED PROVIDER NOTES
2 sprays by Nasal route daily    FUROSEMIDE (LASIX) 20 MG TABLET    20 mg by Per G Tube route daily    GLYCOPYRROLATE (ROBINUL) 1 MG TABLET    1 mg by Per G Tube route 3 times daily FOR INCREASED SECRETIONS. LACOSAMIDE (VIMPAT) 50 MG TABS TABLET    150 mg by Per G Tube route every morning. [place] Route LACOSAMIDE (VIMPAT) 50 MG TABS TABLET    100 mg by Per G Tube route nightly. [place] Route LACTULOSE (CHRONULAC) 10 GM/15ML SOLUTION    20 g by Per G Tube route 2 times daily     LAMOTRIGINE (LAMICTAL) 100 MG TABLET    100 mg by Per G Tube route every morning     LAMOTRIGINE (LAMICTAL) 100 MG TABLET    200 mg by Per G Tube route 2 times daily     LANSOPRAZOLE (PREVACID 24HR PO)    30 mg by Feeding Tube route daily    LEVETIRACETAM (KEPPRA) 100 MG/ML SOLUTION    Take 1,500 mg by mouth 2 times daily    LORATADINE (CLARITIN) 10 MG TABLET    10 mg by Per G Tube route daily    LORAZEPAM (ATIVAN) 1 MG TABLET    Take 1 mg by mouth as needed for Anxiety . MELATONIN 1 MG TABLET    2 mg by Per G Tube route nightly as needed for Sleep    MONTELUKAST (SINGULAIR) 10 MG TABLET    10 mg by Per G Tube route nightly    MULTIPLE VITAMIN (DAILY-JAY PO)    by Gastric Tube route daily    NUTRITIONAL SUPPLEMENTS (REPLETE/FIBER) LIQD    75 mLs by Feeding Tube route daily    NYSTATIN (MYCOSTATIN) 151872 UNIT/GM POWDER    Apply topically daily Apply between toes.     POLYETHYLENE GLYCOL (GLYCOLAX) POWDER    17 g by Per G Tube route daily    POTASSIUM CHLORIDE (KLOR-CON SPRINKLE PO)    10 mEq by Per G Tube route 2 times daily     PSEUDOEPHEDRINE (SUDAFED) 30 MG TABLET    30 mg by Per G Tube route 3 times daily    SENNA-DOCUSATE (PERICOLACE) 8.6-50 MG PER TABLET    2 tablets by Per G Tube route daily    SODIUM PHOSPHATES (FLEET) 7-19 GM/118ML    Place 1 enema rectally daily as needed       PAST MEDICAL HISTORY         Diagnosis Date    C[facility]bral palsy ([facility]     Cholelithiasis with chronic cholecystitis     Constipation     Delayed gastric emptying 2 = A lot of assistance

## 2024-12-09 NOTE — PROGRESS NOTES
Patient discharged via stretcher with Leslee Reveles. All paperwork and script sent with Leslee Reveles. Writer called report to Ankur at [place]. All questions answered, no further questions at this time. Detail Level: Zone

## 2025-03-24 NOTE — CARE COORDINATION
Social work: spoke to guardian Raven Camacho sister and she did approve the move to [facility] [address]. Report [phone] and fax [phone]. RN called report to [facility]. Plan for 2 pm  from [id number] [facility].  [facility] branch no

## (undated) DEVICE — HYPODERMIC SAFETY NEEDLE: Brand: MAGELLAN

## (undated) DEVICE — SPHINCTEROTOME: Brand: DREAMTOME™ RX 44

## (undated) DEVICE — TROCARS: Brand: KII® BALLOON BLUNT TIP SYSTEM

## (undated) DEVICE — CANNULA NSL AD TBNG L7FT PVC STR NONFLARED PRNG O2 DEL W STD

## (undated) DEVICE — WHISTLE TIP URETERAL CATHETER (RIGHT): Brand: COOK

## (undated) DEVICE — MASTISOL ADHESIVE LIQ 2/3ML

## (undated) DEVICE — SUTURE ETHLN SZ 3-0 L18IN NONABSORBABLE BLK PS-2 L19MM 3/8 1669H

## (undated) DEVICE — BLADE CLIPPER GEN PURP NS

## (undated) DEVICE — DISSECTOR LAP DIA5MM BLNT TIP ENDOPATH

## (undated) DEVICE — RETRIEVAL BALLOON CATHETER: Brand: EXTRACTOR™ PRO RX

## (undated) DEVICE — GLOVE SURG SZ 75 L12IN FNGR THK87MIL WHT LTX FREE

## (undated) DEVICE — SPONGE DRN W4XL4IN RAYON/POLYESTER 6 PLY NONWOVEN PRECUT

## (undated) DEVICE — SPONGE LAP W18XL18IN WHT COT 4 PLY FLD STRUNG RADPQ DISP ST

## (undated) DEVICE — 3M™ WARMING BLANKET, UPPER BODY, 10 PER CASE, 42268: Brand: BAIR HUGGER™

## (undated) DEVICE — BAG SPEC LAP H6IN DIA3IN 250ML 10 12MM CANN ATTCH MEM WIRE

## (undated) DEVICE — CUTTER ENDOSCP L340MM LIN ARTC SGL STROKE FIRING ENDOPATH

## (undated) DEVICE — YANKAUER,BULB TIP,W/O VENT,RIGID,STERILE: Brand: MEDLINE

## (undated) DEVICE — CATHETER URET 5FR L70CM OPN END SGL LUMN INJ HUB FLEXIMA

## (undated) DEVICE — TROCAR: Brand: KII FIOS FIRST ENTRY

## (undated) DEVICE — DRAPE C ARM UNIV W41XL74IN CLR PLAS XR VELC CLSR POLY STRP

## (undated) DEVICE — PLUMEPORT LAPAROSCOPIC SMOKE FILTRATION DEVICE: Brand: PLUMEPORT ACTIV

## (undated) DEVICE — GOWN,SIRUS,NON REINFRCD,LARGE,SET IN SL: Brand: MEDLINE

## (undated) DEVICE — APPLIER CLP M/L SHFT DIA5MM 15 LIG LIGAMAX 5

## (undated) DEVICE — SINGLE USE MONOPOLAR STRAIGHT ENDOSCOPIC CORD 10 FT. (3M): Brand: KIRWAN

## (undated) DEVICE — INTENDED FOR TISSUE SEPARATION, AND OTHER PROCEDURES THAT REQUIRE A SHARP SURGICAL BLADE TO PUNCTURE OR CUT.: Brand: BARD-PARKER ® CARBON RIB-BACK BLADES

## (undated) DEVICE — TROCAR: Brand: KII® SLEEVE

## (undated) DEVICE — Device

## (undated) DEVICE — DRAPE,REIN 53X77,STERILE: Brand: MEDLINE

## (undated) DEVICE — SOLUTION IV 1000ML 0.9% SOD CHL PH 5 INJ USP VIAFLX PLAS

## (undated) DEVICE — TUBING, SUCTION, 1/4" X 12', STRAIGHT: Brand: MEDLINE

## (undated) DEVICE — 35 ML SYRINGE LUER-LOCK TIP: Brand: MONOJECT

## (undated) DEVICE — DISSECTOR ENDOSCP DIA5MM CRV MPLR CAUT ENDOPATH

## (undated) DEVICE — SCISSOR REPROCESSD TP LAPSCP ENDOSCP CRV

## (undated) DEVICE — SYSTEM BX CAP BILI RAP EXCHG CAP LOK DEV COMPATIBLE W/ OLY

## (undated) DEVICE — MEDICINE CUP, GRADUATED, STER: Brand: MEDLINE

## (undated) DEVICE — KIT EVAC 0.25IN RECT TB DIA19FR 400CC PVC 3 SPR Y CONN DRN

## (undated) DEVICE — STRIP,CLOSURE,WOUND,MEDI-STRIP,1/2X4: Brand: MEDLINE

## (undated) DEVICE — DRAIN SURG 19FR 100% SIL RADPQ RND CHN FULL FLUT

## (undated) DEVICE — CATHETER IV 14GA L1.25IN TEF STR HUB INTROCAN SFTY

## (undated) DEVICE — GLOVE SURG SZ 7 L12IN FNGR THK87MIL WHT LTX FREE

## (undated) DEVICE — KIT,ANTI FOG,W/SPONGE & FLUID,SOFT PACK: Brand: MEDLINE